# Patient Record
Sex: MALE | Race: WHITE | NOT HISPANIC OR LATINO | Employment: OTHER | URBAN - METROPOLITAN AREA
[De-identification: names, ages, dates, MRNs, and addresses within clinical notes are randomized per-mention and may not be internally consistent; named-entity substitution may affect disease eponyms.]

---

## 2017-01-26 ENCOUNTER — OFFICE VISIT (OUTPATIENT)
Dept: FAMILY MEDICINE | Facility: CLINIC | Age: 62
End: 2017-01-26
Payer: COMMERCIAL

## 2017-01-26 VITALS
WEIGHT: 177 LBS | SYSTOLIC BLOOD PRESSURE: 132 MMHG | BODY MASS INDEX: 22.72 KG/M2 | DIASTOLIC BLOOD PRESSURE: 80 MMHG | HEART RATE: 76 BPM | HEIGHT: 74 IN

## 2017-01-26 DIAGNOSIS — F33.1 MAJOR DEPRESSIVE DISORDER, RECURRENT EPISODE, MODERATE (H): ICD-10-CM

## 2017-01-26 DIAGNOSIS — K59.00 CONSTIPATION, UNSPECIFIED CONSTIPATION TYPE: Primary | ICD-10-CM

## 2017-01-26 PROBLEM — K62.89 PROCTITIS: Status: ACTIVE | Noted: 2017-01-26

## 2017-01-26 PROCEDURE — 99214 OFFICE O/P EST MOD 30 MIN: CPT | Performed by: FAMILY MEDICINE

## 2017-01-26 RX ORDER — POLYETHYLENE GLYCOL 3350 17 G/17G
1 POWDER, FOR SOLUTION ORAL DAILY
COMMUNITY
End: 2022-02-08

## 2017-01-26 ASSESSMENT — ANXIETY QUESTIONNAIRES
5. BEING SO RESTLESS THAT IT IS HARD TO SIT STILL: MORE THAN HALF THE DAYS
2. NOT BEING ABLE TO STOP OR CONTROL WORRYING: NEARLY EVERY DAY
7. FEELING AFRAID AS IF SOMETHING AWFUL MIGHT HAPPEN: NEARLY EVERY DAY
3. WORRYING TOO MUCH ABOUT DIFFERENT THINGS: MORE THAN HALF THE DAYS
1. FEELING NERVOUS, ANXIOUS, OR ON EDGE: MORE THAN HALF THE DAYS
GAD7 TOTAL SCORE: 18
6. BECOMING EASILY ANNOYED OR IRRITABLE: NEARLY EVERY DAY

## 2017-01-26 ASSESSMENT — PATIENT HEALTH QUESTIONNAIRE - PHQ9: 5. POOR APPETITE OR OVEREATING: NEARLY EVERY DAY

## 2017-01-26 NOTE — NURSING NOTE
"Chief Complaint   Patient presents with     Constipation     Follow up on ongoing constipation. Not any better from last office visit.     Initial /80 mmHg  Pulse 76  Ht 6' 1.75\" (1.873 m)  Wt 177 lb (80.287 kg)  BMI 22.89 kg/m2 Estimated body mass index is 22.89 kg/(m^2) as calculated from the following:    Height as of this encounter: 6' 1.75\" (1.873 m).    Weight as of this encounter: 177 lb (80.287 kg).  BP completed using cuff size: large - left arm.  Anisha Bartlett LPN  "

## 2017-01-26 NOTE — PATIENT INSTRUCTIONS
(K59.00) Constipation, unspecified constipation type  (primary encounter diagnosis)  Comment:   Plan: polyethylene glycol (MIRALAX/GLYCOLAX) powder,         linaclotide (LINZESS) 145 MCG capsule        We discussed and reviewed the history and previous therapies. Linzess has been used in the past at 145 mg daily. This is reordered at 90 days and one year of refills. Use the med and the non drug therapies and the OTC medications. Call or return if this is not better. There is a higher dose of 290 mg daily and if that is more effective then call our RN at [phone] and the new dose will be ordered.     (F33.1) Major depressive disorder, recurrent episode, moderate (H)  Comment:   Plan: sertraline (ZOLOFT) 50 MG tablet        We will order Zoloft at 50 mg daily now and call if any side effects. Use the non drug therapies. Exercise is recommended. Avoid alcohol. Recheck in the clinic in 3-4 weeks. The med may need adjustment in the dose. The max is 200 mg daily.

## 2017-01-26 NOTE — PROGRESS NOTES
SUBJECTIVE:                                                    Ghulam Rizzo is a 61 year old male who presents to clinic today for the following health issues:    Constipation     Onset: Several years .     Description:   Frequency of bowel movements: 0-2 daily.  Stool consistency: hard    Progression of Symptoms:  worsening    Accompanying Signs & Symptoms:  Abdominal pain (cramping?): YES  Blood in stool: no   Rectal pain: YES- sometimes  Nausea/vomiting: no   Weight loss or gain: no     Other: Constipation is now causing sleep issues. Not able to fall asleep and wakes up after 2-3 hours of sleep, and feels as though he needs to have a BM, and is having cramping.    History:   History of abdominal surgery: YES- Pt had surgery on his stomach because it was twisted (1989). He also had surgery on his diaphragm and mesh was placed (1991).   Colonoscopy in 2013 showed proctitis.     Precipitating factors:   Recent use of narcotics, anticholinergics, calcium channel blockers, antacids, or iron supplements: no   Chronic Laxative Use: YES- Senna and Ducolax         Therapies Tried and outcome: enemas, increase in fiber, increase in fluids, laxatives, stool softeners and suppositories    He was treated for depression in 2015 with Zoloft at 50 mg daily. This was stopped as he was doing well. The symptoms started to recur in the last few months.       Current outpatient prescriptions:      polyethylene glycol (MIRALAX/GLYCOLAX) powder, Take 1 capful by mouth daily, Disp: , Rfl:      albuterol (ALBUTEROL) 108 (90 BASE) MCG/ACT inhaler, Inhale 2 puffs into the lungs every 4 hours as needed for shortness of breath / dyspnea, Disp: 1 Inhaler, Rfl: 11     senna-docusate (NEGRITO-COLACE) 8.6-50 MG per tablet, Take 1-2 tablets by mouth 2 times daily as needed for constipation., Disp: 100 tablet, Rfl: 5    Patient Active Problem List   Diagnosis     Hyperlipidemia LDL goal <130     Tobacco abuse     Idiopathic thrombocytopenic  "purpura (ITP)     Advanced directives, counseling/discussion     Moderate major depression (H)     Abdominal pain, generalized     Cirrhosis of liver without ascites (H)       Blood pressure 132/80, pulse 76, height 6' 1.75\" (1.873 m), weight 177 lb (80.287 kg).  Exam:  GENERAL APPEARANCE: healthy, alert and no distress  ABDOMEN:  soft, nontender, no HSM or masses and bowel sounds normal  PSYCH: mentation appears normal and affect flat      (K59.00) Constipation, unspecified constipation type  (primary encounter diagnosis)  Comment:   Plan: polyethylene glycol (MIRALAX/GLYCOLAX) powder,         linaclotide (LINZESS) 145 MCG capsule        We discussed and reviewed the history and previous therapies. Linzess has been used in the past at 145 mg daily. This is reordered at 90 days and one year of refills. Use the med and the non drug therapies and the OTC medications. Call or return if this is not better. There is a higher dose of 290 mg daily and if that is more effective then call our RN at [phone] and the new dose will be ordered.     (F33.1) Major depressive disorder, recurrent episode, moderate (H)  Comment:   Plan: sertraline (ZOLOFT) 50 MG tablet        We will order Zoloft at 50 mg daily now and call if any side effects. Use the non drug therapies. Exercise is recommended. Avoid alcohol. Recheck in the clinic in 3-4 weeks. The med may need adjustment in the dose. The max is 200 mg daily.     Luiz Eckert"

## 2017-01-26 NOTE — MR AVS SNAPSHOT
After Visit Summary   1/26/2017    Ghulam Rizzo    MRN: [id number]           Patient Information     Date Of Birth          1955        Visit Information        Provider Department      1/26/2017 8:20 AM Luiz Eckert MD [facility]        Today's Diagnoses     Constipation, unspecified constipation type    -  1     Major depressive disorder, recurrent episode, moderate (H)           Care Instructions    (K59.00) Constipation, unspecified constipation type  (primary encounter diagnosis)  Comment:   Plan: polyethylene glycol (MIRALAX/GLYCOLAX) powder,         linaclotide (LINZESS) 145 MCG capsule        We discussed and reviewed the history and previous therapies. Linzess has been used in the past at 145 mg daily. This is reordered at 90 days and one year of refills. Use the med and the non drug therapies and the OTC medications. Call or return if this is not better. There is a higher dose of 290 mg daily and if that is more effective then call our RN at [phone] and the new dose will be ordered.     (F33.1) Major depressive disorder, recurrent episode, moderate (H)  Comment:   Plan: sertraline (ZOLOFT) 50 MG tablet        We will order Zoloft at 50 mg daily now and call if any side effects. Use the non drug therapies. Exercise is recommended. Avoid alcohol. Recheck in the clinic in 3-4 weeks. The med may need adjustment in the dose. The max is 200 mg daily.         Follow-ups after your visit        Who to contact     If you have questions or need follow up information about today's clinic visit or your schedule please contact [facility] directly at [phone].  Normal or non-critical lab and imaging results will be communicated to you by MyChart, letter or phone within 4 business days after the clinic has received the results. If you do not hear from us within 7 days, please contact the clinic through MyChart or phone. If you have a critical or abnormal lab  "result, we will notify you by phone as soon as possible.  Submit refill requests through [employer] or call your pharmacy and they will forward the refill request to us. Please allow 3 business days for your refill to be completed.          Additional Information About Your Visit        [employer] Information     [employer] lets you send messages to your doctor, view your test results, renew your prescriptions, schedule appointments and more. To sign up, go to [email or web address] . Click on \"Log in\" on the left side of the screen, which will take you to the Welcome page. Then click on \"Sign up Now\" on the right side of the page.     You will be asked to enter the access code listed below, as well as some personal information. Please follow the directions to create your username and password.     Your access code is: [email or web address]  Expires: 2017  9:10 AM     Your access code will  in 90 days. If you need help or a new code, please call your [place] clinic or [phone].        Care EveryWhere ID     This is your Care EveryWhere ID. This could be used by other organizations to access your [place] medical records  [id number]        Your Vitals Were     Pulse Height BMI (Body Mass Index)             76 6' 1.75\" (1.873 m) 22.89 kg/m2          Blood Pressure from Last 3 Encounters:   17 132/80   10/27/16 129/80   16 133/88    Weight from Last 3 Encounters:   17 177 lb (80.287 kg)   10/27/16 184 lb (83.462 kg)   16 186 lb (84.369 kg)              Today, you had the following     No orders found for display         Today's Medication Changes          These changes are accurate as of: 17  9:10 AM.  If you have any questions, ask your nurse or doctor.               Start taking these medicines.        Dose/Directions    linaclotide 145 MCG capsule   Commonly known as:  LINZESS   Used for:  Constipation, unspecified constipation type   Started by:  Luiz Eckert MD        Dose:  " 145 mcg   Take 1 capsule (145 mcg) by mouth every morning (before breakfast)   Quantity:  90 capsule   Refills:  3       sertraline 50 MG tablet   Commonly known as:  ZOLOFT   Used for:  Major depressive disorder, recurrent episode, moderate (H)   Started by:  Luiz Eckert MD        Dose:  50 mg   Take 1 tablet (50 mg) by mouth daily   Quantity:  30 tablet   Refills:  1            Where to get your medicines      These medications were sent to [facility] - [address]  [address] [place] [address]     Phone:  [phone]    - sertraline 50 MG tablet      Some of these will need a paper prescription and others can be bought over the counter.  Ask your nurse if you have questions.     Bring a paper prescription for each of these medications    - linaclotide 145 MCG capsule             Primary Care Provider Office Phone # Fax #    Luiz Eckert [phone]       [facility] [address] [facility] [address]        Thank you!     Thank you for choosing [facility]  for your care. Our goal is always to provide you with excellent care. Hearing back from our patients is one way we can continue to improve our services. Please take a few minutes to complete the written survey that you may receive in the mail after your visit with us. Thank you!             Your Updated Medication List - Protect others around you: Learn how to safely use, store and throw away your medicines at www.disposemymeds.org.          This list is accurate as of: 1/26/17  9:10 AM.  Always use your most recent med list.                   Brand Name Dispense Instructions for use    albuterol 108 (90 BASE) MCG/ACT Inhaler    albuterol    1 Inhaler    Inhale 2 puffs into the lungs every 4 hours as needed for shortness of breath / dyspnea       linaclotide 145 MCG capsule    LINZESS    90 capsule    Take 1 capsule (145 mcg) by mouth every morning  (before breakfast)       polyethylene glycol powder    MIRALAX/GLYCOLAX     Take 1 capful by mouth daily       senna-docusate 8.6-50 MG per tablet    NEGRITO-COLACE    100 tablet    Take 1-2 tablets by mouth 2 times daily as needed for constipation.       sertraline 50 MG tablet    ZOLOFT    30 tablet    Take 1 tablet (50 mg) by mouth daily

## 2017-01-27 ASSESSMENT — ANXIETY QUESTIONNAIRES: GAD7 TOTAL SCORE: 18

## 2017-01-27 ASSESSMENT — PATIENT HEALTH QUESTIONNAIRE - PHQ9: SUM OF ALL RESPONSES TO PHQ QUESTIONS 1-9: 18

## 2017-03-02 ENCOUNTER — TELEPHONE (OUTPATIENT)
Dept: FAMILY MEDICINE | Facility: CLINIC | Age: 62
End: 2017-03-02

## 2017-03-23 DIAGNOSIS — F33.1 MAJOR DEPRESSIVE DISORDER, RECURRENT EPISODE, MODERATE (H): ICD-10-CM

## 2017-03-23 NOTE — TELEPHONE ENCOUNTER
Sertraline     Last Written Prescription Date: 01/26/17  Last Fill Quantity: 30, # refills: 1  Last Office Visit with [facility] primary care provider:  01/26/17        Last PHQ-9 score on record=   PHQ-9 SCORE 1/26/2017   Total Score -   Total Score 18

## 2017-05-22 DIAGNOSIS — F33.1 MAJOR DEPRESSIVE DISORDER, RECURRENT EPISODE, MODERATE (H): ICD-10-CM

## 2017-05-22 NOTE — TELEPHONE ENCOUNTER
Sertraline     Last Written Prescription Date: 03/28/17  Last Fill Quantity: 30, # refills: 1  [place] Office Visit with [facility] [place] care provider:  01/26/17        Last PHQ-9 score on record=   PHQ-9 SCORE 1/26/2017   Total Score -   Total Score 18

## 2017-05-24 NOTE — TELEPHONE ENCOUNTER
Routing refill request to provider for review/approval because:  PHQ-9 is >5 for RN protocol    Thank you  Sujey XIAO RN

## 2017-05-24 NOTE — TELEPHONE ENCOUNTER
He was supposed to recheck in clinic in February.   Please notify prescription sent to pharmacy for only two weeks of med.   Needs appt. .Luiz Eckert

## 2017-06-01 ENCOUNTER — OFFICE VISIT (OUTPATIENT)
Dept: FAMILY MEDICINE | Facility: CLINIC | Age: 62
End: 2017-06-01
Payer: COMMERCIAL

## 2017-06-01 VITALS
HEART RATE: 79 BPM | BODY MASS INDEX: 22.97 KG/M2 | SYSTOLIC BLOOD PRESSURE: 138 MMHG | HEIGHT: 74 IN | TEMPERATURE: 97.4 F | DIASTOLIC BLOOD PRESSURE: 89 MMHG | WEIGHT: 179 LBS

## 2017-06-01 DIAGNOSIS — F33.1 MAJOR DEPRESSIVE DISORDER, RECURRENT EPISODE, MODERATE (H): ICD-10-CM

## 2017-06-01 DIAGNOSIS — J01.01 ACUTE RECURRENT MAXILLARY SINUSITIS: ICD-10-CM

## 2017-06-01 DIAGNOSIS — Z23 ENCOUNTER FOR IMMUNIZATION: Primary | ICD-10-CM

## 2017-06-01 DIAGNOSIS — J20.9 BRONCHITIS WITH BRONCHOSPASM: ICD-10-CM

## 2017-06-01 PROCEDURE — 90670 PCV13 VACCINE IM: CPT | Performed by: FAMILY MEDICINE

## 2017-06-01 PROCEDURE — 99214 OFFICE O/P EST MOD 30 MIN: CPT | Mod: 25 | Performed by: FAMILY MEDICINE

## 2017-06-01 PROCEDURE — 90471 IMMUNIZATION ADMIN: CPT | Performed by: FAMILY MEDICINE

## 2017-06-01 RX ORDER — ALBUTEROL SULFATE 90 UG/1
2 AEROSOL, METERED RESPIRATORY (INHALATION) EVERY 4 HOURS PRN
Qty: 1 INHALER | Refills: 11 | Status: SHIPPED | OUTPATIENT
Start: 2017-06-01 | End: 2018-06-21

## 2017-06-01 ASSESSMENT — ANXIETY QUESTIONNAIRES
6. BECOMING EASILY ANNOYED OR IRRITABLE: NOT AT ALL
2. NOT BEING ABLE TO STOP OR CONTROL WORRYING: NOT AT ALL
1. FEELING NERVOUS, ANXIOUS, OR ON EDGE: NOT AT ALL
7. FEELING AFRAID AS IF SOMETHING AWFUL MIGHT HAPPEN: SEVERAL DAYS
3. WORRYING TOO MUCH ABOUT DIFFERENT THINGS: SEVERAL DAYS
IF YOU CHECKED OFF ANY PROBLEMS ON THIS QUESTIONNAIRE, HOW DIFFICULT HAVE THESE PROBLEMS MADE IT FOR YOU TO DO YOUR WORK, TAKE CARE OF THINGS AT HOME, OR GET ALONG WITH OTHER PEOPLE: SOMEWHAT DIFFICULT
5. BEING SO RESTLESS THAT IT IS HARD TO SIT STILL: SEVERAL DAYS
GAD7 TOTAL SCORE: 3

## 2017-06-01 ASSESSMENT — PATIENT HEALTH QUESTIONNAIRE - PHQ9: 5. POOR APPETITE OR OVEREATING: NOT AT ALL

## 2017-06-01 NOTE — PROGRESS NOTES
SUBJECTIVE:                                                    Ghulam Rizzo is a 61 year old male who presents to clinic today for the following health issues:      Medication Followup of Depression    Taking Medication as prescribed: yes    Side Effects:  None    Medication Helping Symptoms:  Yes    PHQ-9 ([employer]) 10/27/2016 1/26/2017   1.  Little interest or pleasure in doing things 0 2   2.  Feeling down, depressed, or hopeless 1 3   3.  Trouble falling or staying asleep, or sleeping too much 0 3   4.  Feeling tired or having little energy 0 2   5.  Poor appetite or overeating 1 3   6.  Feeling bad about yourself 1 2   7.  Trouble concentrating 0 2   8.  Moving slowly or restless 0 0   9.  Suicidal or self-harm thoughts 0 1   PHQ-9 Total Score 3 18   Difficulty at work, home, or with people  Very difficult     KSENIA-7   [employer], 2002; Used with Permission) 12/17/2015 1/26/2017   1. Feeling nervous, anxious, or on edge 1 2   2. Not being able to stop or control worrying 1 3   3. Worrying too much about different things 1 2   4. Trouble relaxing 1 3   5. Being so restless that it is hard to sit still 0 2   6. Becoming easily annoyed or irritable 0 3   7. Feeling afraid, as if something awful might happen 0 3   KSENIA-7 Total Score 4 18          Current Outpatient Prescriptions:      sertraline (ZOLOFT) 50 MG tablet, Take 1 tablet (50 mg) by mouth daily, Disp: 30 tablet, Rfl: 11     albuterol (ALBUTEROL) 108 (90 BASE) MCG/ACT Inhaler, Inhale 2 puffs into the lungs every 4 hours as needed for shortness of breath / dyspnea, Disp: 1 Inhaler, Rfl: 11     polyethylene glycol (MIRALAX/GLYCOLAX) powder, Take 1 capful by mouth daily, Disp: , Rfl:      linaclotide (LINZESS) 145 MCG capsule, Take 1 capsule (145 mcg) by mouth every morning (before breakfast), Disp: 90 capsule, Rfl: 3     senna-docusate (NEGRITO-COLACE) 8.6-50 MG per tablet, Take 1-2 tablets by mouth 2 times daily as needed for constipation., Disp: 100 tablet,  "Rfl: 5     [DISCONTINUED] sertraline (ZOLOFT) 50 MG tablet, Take 1 tablet (50 mg) by mouth daily, Disp: 15 tablet, Rfl: 0     [DISCONTINUED] albuterol (ALBUTEROL) 108 (90 BASE) MCG/ACT inhaler, Inhale 2 puffs into the lungs every 4 hours as needed for shortness of breath / dyspnea, Disp: 1 Inhaler, Rfl: 11    Patient Active Problem List   Diagnosis     Hyperlipidemia LDL goal <130     Tobacco abuse     Idiopathic thrombocytopenic purpura (ITP)     Advanced directives, counseling/discussion     Abdominal pain, generalized     Cirrhosis of liver without ascites (H)     Proctitis     Major depressive disorder, recurrent episode, moderate (H)       Blood pressure 138/89, pulse 79, temperature 97.4  F (36.3  C), temperature source Tympanic, height 6' 1.75\" (1.873 m), weight 179 lb (81.2 kg).    Exam:  GENERAL APPEARANCE: healthy, alert and no distress  ABDOMEN:  soft, nontender, no HSM or masses and bowel sounds normal  MS: extremities normal- no gross deformities noted, no evidence of inflammation in joints, FROM in all extremities.  SKIN: no suspicious lesions or rashes  PSYCH: mentation appears normal and affect normal/bright      (F33.1) Major depressive disorder, recurrent episode, moderate (H)  Comment:   Plan: sertraline (ZOLOFT) 50 MG tablet        You are doing very well, and the PH-9 IS 2, and the KSENIA-7 is 3 today. Stay on the med daily and call if any side effects.   Use the non drug therapies. This is a chronic therapy. If doing well then refill and recheck in one year.     (J20.9) Bronchitis with bronchospasm  Comment:   Plan: albuterol (ALBUTEROL) 108 (90 BASE) MCG/ACT         Inhaler        Use the inhaler as needed and preventatively. Avoid triggers. Refill for one year. Get the flu shot in the fall.     (J01.01) Acute recurrent maxillary sinusitis  Comment:   Plan: albuterol (ALBUTEROL) 108 (90 BASE) MCG/ACT         Inhaler        For the bowels and the Linzess, you may stop this for several days to see " if any cramping improves and if this does then restart it to be certain. You have to decide if the benefit is more than any side effect.   If doing well then use the dietary and exercise instructions and refill and recheck annually.     Luiz Eckert

## 2017-06-01 NOTE — PATIENT INSTRUCTIONS
Thank you for choosing [facility].  You may be receiving a survey in the mail from Lai Guevara regarding your visit today.  Please take a few minutes to complete and return the survey to let us know how we are doing.      If you have questions or concerns, please contact us via [employer] or you can contact your care team at [phone].    Our Clinic hours are:  Monday 6:40 am  to 7:00 pm  Tuesday -Friday 6:40 am to 5:00 pm    The [place] outpatient lab hours are:  Monday - Friday 6:10 am to 4:45 pm  Saturdays 7:00 am to 11:00 am  Appointments are required, call [phone]    If you have clinical questions after hours or would like to schedule an appointment,  call the clinic at [phone].    (F33.1) Major depressive disorder, recurrent episode, moderate (H)  Comment:   Plan: sertraline (ZOLOFT) 50 MG tablet        You are doing very well, and the PH-9 IS 2, and the KSENIA-7 is 3 today. Stay on the med daily and call if any side effects.   Use the non drug therapies. This is a chronic therapy. If doing well then refill and recheck in one year.     (J20.9) Bronchitis with bronchospasm  Comment:   Plan: albuterol (ALBUTEROL) 108 (90 BASE) MCG/ACT         Inhaler        Use the inhaler as needed and preventatively. Avoid triggers. Refill for one year. Get the flu shot in the fall.     (J01.01) Acute recurrent maxillary sinusitis  Comment:   Plan: albuterol (ALBUTEROL) 108 (90 BASE) MCG/ACT         Inhaler        For the bowels and the Linzess, you may stop this for several days to see if any cramping improves and if this does then restart it to be certain. You have to decide if the benefit is more than any side effect.   If doing well then use the dietary and exercise instructions and refill and recheck annually.

## 2017-06-01 NOTE — NURSING NOTE
"Chief Complaint   Patient presents with     Depression     Recheck on medication.       Initial /89  Pulse 79  Temp 97.4  F (36.3  C) (Tympanic)  Ht 6' 1.75\" (1.873 m)  Wt 179 lb (81.2 kg)  BMI 23.14 kg/m2 Estimated body mass index is 23.14 kg/(m^2) as calculated from the following:    Height as of this encounter: 6' 1.75\" (1.873 m).    Weight as of this encounter: 179 lb (81.2 kg).  Medication Reconciliation: complete  "

## 2017-06-01 NOTE — MR AVS SNAPSHOT
After Visit Summary   6/1/2017    Ghulam Rizzo    MRN: [id number]           Patient Information     Date Of Birth          1955        Visit Information        Provider Department      6/1/2017 10:00 AM Luiz Eckert MD [facility]        Today's Diagnoses     Major depressive disorder, recurrent episode, moderate (H)        Bronchitis with bronchospasm        Acute recurrent maxillary sinusitis          Care Instructions          Thank you for choosing [facility].  You may be receiving a survey in the mail from [facility] regarding your visit today.  Please take a few minutes to complete and return the survey to let us know how we are doing.      If you have questions or concerns, please contact us via [employer] or you can contact your care team at [phone].    Our Clinic hours are:  Monday 6:40 am  to 7:00 pm  Tuesday -Friday 6:40 am to 5:00 pm    The [place] outpatient lab hours are:  Monday - Friday 6:10 am to 4:45 pm  Saturdays 7:00 am to 11:00 am  Appointments are required, call [phone]    If you have clinical questions after hours or would like to schedule an appointment,  call the clinic at [phone].    (F33.1) Major depressive disorder, recurrent episode, moderate (H)  Comment:   Plan: sertraline (ZOLOFT) 50 MG tablet        You are doing very well, and the PH-9 IS 2, and the KSENIA-7 is 3 today. Stay on the med daily and call if any side effects.   Use the non drug therapies. This is a chronic therapy. If doing well then refill and recheck in one year.     (J20.9) Bronchitis with bronchospasm  Comment:   Plan: albuterol (ALBUTEROL) 108 (90 BASE) MCG/ACT         Inhaler        Use the inhaler as needed and preventatively. Avoid triggers. Refill for one year. Get the flu shot in the fall.     (J01.01) Acute recurrent maxillary sinusitis  Comment:   Plan: albuterol (ALBUTEROL) 108 (90 BASE) MCG/ACT         Inhaler        For the bowels and the Linzess,  "you may stop this for several days to see if any cramping improves and if this does then restart it to be certain. You have to decide if the benefit is more than any side effect.   If doing well then use the dietary and exercise instructions and refill and recheck annually.           Follow-ups after your visit        Who to contact     If you have questions or need follow up information about today's clinic visit or your schedule please contact [facility] directly at [phone].  Normal or non-critical lab and imaging results will be communicated to you by Textronics[employer], letter or phone within 4 business days after the clinic has received the results. If you do not hear from us within 7 days, please contact the clinic through Textronics[employer] or phone. If you have a critical or abnormal lab result, we will notify you by phone as soon as possible.  Submit refill requests through Solaicx or call your pharmacy and they will forward the refill request to us. Please allow 3 business days for your refill to be completed.          Additional Information About Your Visit        [facility] Information     Solaicx lets you send messages to your doctor, view your test results, renew your prescriptions, schedule appointments and more. To sign up, go to [email or web address] . Click on \"Log in\" on the left side of the screen, which will take you to the Welcome page. Then click on \"Sign up Now\" on the right side of the page.     You will be asked to enter the access code listed below, as well as some personal information. Please follow the directions to create your username and password.     Your access code is: [email or web address]  Expires: 2017 10:26 AM     Your access code will  in 90 days. If you need help or a new code, please call your [facility] or [phone].        Care EveryWhere ID     This is your Care EveryWhere ID. This could be used by other organizations to access your [facility] medical " "records  [id number]        Your Vitals Were     Pulse Temperature Height BMI (Body Mass Index)          79 97.4  F (36.3  C) (Tympanic) 6' 1.75\" (1.873 m) 23.14 kg/m2         Blood Pressure from Last 3 Encounters:   06/01/17 (!) 145/91   01/26/17 132/80   10/27/16 129/80    Weight from Last 3 Encounters:   06/01/17 179 lb (81.2 kg)   01/26/17 177 lb (80.3 kg)   10/27/16 184 lb (83.5 kg)              Today, you had the following     No orders found for display         Where to get your medicines      These medications were [facility] [id number] [place] [address]  [id number] [place] [id number]     Phone:  [phone]     albuterol 108 (90 BASE) MCG/ACT Inhaler    sertraline 50 MG tablet          Primary Care Provider Office Phone # Fax #    Luiz Eckert [phone]       [facility] [address] [facility] [address] [facility] [address]        Thank you!     Thank you for choosing [facility]  for your care. Our goal is always to provide you with excellent care. Hearing back from our patients is one way we can continue to improve our services. Please take a few minutes to complete the written survey that you may receive in the mail after your visit with us. Thank you!             Your Updated Medication List - Protect others around you: Learn how to safely use, store and throw away your medicines at www.disposemymeds.org.          This list is accurate as of: 6/1/17 10:26 AM.  Always use your most recent med list.                   Brand Name Dispense Instructions for use    albuterol 108 (90 BASE) MCG/ACT Inhaler    albuterol    1 Inhaler    Inhale 2 puffs into the lungs every 4 hours as needed for shortness of breath / dyspnea       linaclotide 145 MCG capsule    LINZESS    90 capsule    Take 1 capsule (145 mcg) by mouth every morning (before breakfast)       polyethylene glycol powder    MIRALAX/GLYCOLAX     Take 1 capful by mouth daily       " senna-docusate 8.6-50 MG per tablet    NEGRITO-COLACE    100 tablet    Take 1-2 tablets by mouth 2 times daily as needed for constipation.       sertraline 50 MG tablet    ZOLOFT    30 tablet    Take 1 tablet (50 mg) by mouth daily

## 2017-06-02 ASSESSMENT — ANXIETY QUESTIONNAIRES: GAD7 TOTAL SCORE: 3

## 2017-06-02 ASSESSMENT — PATIENT HEALTH QUESTIONNAIRE - PHQ9: SUM OF ALL RESPONSES TO PHQ QUESTIONS 1-9: 2

## 2017-08-07 ENCOUNTER — TELEPHONE (OUTPATIENT)
Dept: FAMILY MEDICINE | Facility: CLINIC | Age: 62
End: 2017-08-07

## 2017-08-07 NOTE — TELEPHONE ENCOUNTER
PHQ9 Due by:8/26/17    Please contact patient to complete follow up PHQ9 before their DUE DATE.     This is important feedback for your care team to monitor how you are doing while taking Zoloft.     You completed this same questionnaire   PHQ-9 SCORE 6/1/2017   Total Score -   Total Score 2       [place] STAFF: If upon calling patient and PHQ9 score is higher that 10 route to the provider. You may also seek an RN for review.

## 2017-08-08 NOTE — TELEPHONE ENCOUNTER
Left msg for patient to call back to complete PHQ9 and GAD7 questionnaires. Will postpone for 1 week. Charu Sesay, CMA

## 2017-08-15 NOTE — TELEPHONE ENCOUNTER
Left msg for patient to call back to clinic to complete PHQ9 and GAD7 questionnaires. Will postpone for 1 week. Charu Sesay CMA

## 2017-08-15 NOTE — TELEPHONE ENCOUNTER
Pt's wife called back and will give him the message to call us back.    Lorna [facility]

## 2017-08-17 ASSESSMENT — ANXIETY QUESTIONNAIRES
2. NOT BEING ABLE TO STOP OR CONTROL WORRYING: SEVERAL DAYS
1. FEELING NERVOUS, ANXIOUS, OR ON EDGE: NOT AT ALL
GAD7 TOTAL SCORE: 1
6. BECOMING EASILY ANNOYED OR IRRITABLE: NOT AT ALL
5. BEING SO RESTLESS THAT IT IS HARD TO SIT STILL: NOT AT ALL
3. WORRYING TOO MUCH ABOUT DIFFERENT THINGS: NOT AT ALL
7. FEELING AFRAID AS IF SOMETHING AWFUL MIGHT HAPPEN: NOT AT ALL

## 2017-08-17 ASSESSMENT — PATIENT HEALTH QUESTIONNAIRE - PHQ9
5. POOR APPETITE OR OVEREATING: NOT AT ALL
SUM OF ALL RESPONSES TO PHQ QUESTIONS 1-9: 1

## 2017-08-17 NOTE — TELEPHONE ENCOUNTER
Patient is calling back, so please call him back, to do the panel management.  Leonor Zavalape  Clinic Station  [facility]

## 2017-08-17 NOTE — TELEPHONE ENCOUNTER
Unclear why we are repeating a PHQ-9 when it was done on 6/1/17 and <4.  Dictation does not state it needs to be repeated sooner.  Left message to look into this with Michelle Sheppard as she started this panel management note. Priscilla GONZALEZ RN

## 2017-08-18 ASSESSMENT — ANXIETY QUESTIONNAIRES: GAD7 TOTAL SCORE: 1

## 2017-11-25 ENCOUNTER — HOSPITAL ENCOUNTER (EMERGENCY)
Facility: CLINIC | Age: 62
Discharge: HOME | End: 2017-11-25
Attending: FAMILY MEDICINE | Admitting: FAMILY MEDICINE
Payer: COMMERCIAL

## 2017-11-25 ENCOUNTER — APPOINTMENT (OUTPATIENT)
Dept: ULTRASOUND IMAGING | Facility: CLINIC | Age: 62
End: 2017-11-25
Attending: FAMILY MEDICINE
Payer: COMMERCIAL

## 2017-11-25 VITALS
RESPIRATION RATE: 16 BRPM | OXYGEN SATURATION: 96 % | SYSTOLIC BLOOD PRESSURE: 145 MMHG | DIASTOLIC BLOOD PRESSURE: 104 MMHG | TEMPERATURE: 98.8 F

## 2017-11-25 DIAGNOSIS — I80.01 THROMBOPHLEBITIS OF SUPERFICIAL VEINS OF RIGHT LOWER EXTREMITY: ICD-10-CM

## 2017-11-25 LAB
ALBUMIN SERPL-MCNC: 3.7 G/DL (ref 3.4–5)
ALP SERPL-CCNC: 77 U/L (ref 40–150)
ALT SERPL W P-5'-P-CCNC: 25 U/L (ref 0–70)
ANION GAP SERPL CALCULATED.3IONS-SCNC: 8 MMOL/L (ref 3–14)
APTT PPP: 33 SEC (ref 22–37)
AST SERPL W P-5'-P-CCNC: 14 U/L (ref 0–45)
BASOPHILS # BLD AUTO: 0 10E9/L (ref 0–0.2)
BASOPHILS NFR BLD AUTO: 0.4 %
BILIRUB SERPL-MCNC: 0.8 MG/DL (ref 0.2–1.3)
BUN SERPL-MCNC: 12 MG/DL (ref 7–30)
CALCIUM SERPL-MCNC: 8.4 MG/DL (ref 8.5–10.1)
CHLORIDE SERPL-SCNC: 110 MMOL/L (ref 94–109)
CO2 SERPL-SCNC: 22 MMOL/L (ref 20–32)
CREAT SERPL-MCNC: 0.66 MG/DL (ref 0.66–1.25)
CRP SERPL-MCNC: <2.9 MG/L (ref 0–8)
DIFFERENTIAL METHOD BLD: ABNORMAL
EOSINOPHIL # BLD AUTO: 0.1 10E9/L (ref 0–0.7)
EOSINOPHIL NFR BLD AUTO: 1 %
ERYTHROCYTE [DISTWIDTH] IN BLOOD BY AUTOMATED COUNT: 12.9 % (ref 10–15)
GFR SERPL CREATININE-BSD FRML MDRD: >90 ML/MIN/1.7M2
GLUCOSE SERPL-MCNC: 87 MG/DL (ref 70–99)
HCT VFR BLD AUTO: 51.2 % (ref 40–53)
HGB BLD-MCNC: 18.4 G/DL (ref 13.3–17.7)
IMM GRANULOCYTES # BLD: 0 10E9/L (ref 0–0.4)
IMM GRANULOCYTES NFR BLD: 0.1 %
INR PPP: 1.04 (ref 0.86–1.14)
LYMPHOCYTES # BLD AUTO: 2.1 10E9/L (ref 0.8–5.3)
LYMPHOCYTES NFR BLD AUTO: 29.9 %
MCH RBC QN AUTO: 33.3 PG (ref 26.5–33)
MCHC RBC AUTO-ENTMCNC: 35.9 G/DL (ref 31.5–36.5)
MCV RBC AUTO: 93 FL (ref 78–100)
MONOCYTES # BLD AUTO: 0.5 10E9/L (ref 0–1.3)
MONOCYTES NFR BLD AUTO: 7.6 %
NEUTROPHILS # BLD AUTO: 4.3 10E9/L (ref 1.6–8.3)
NEUTROPHILS NFR BLD AUTO: 61 %
PLATELET # BLD AUTO: 132 10E9/L (ref 150–450)
POTASSIUM SERPL-SCNC: 3.8 MMOL/L (ref 3.4–5.3)
PROT SERPL-MCNC: 7.1 G/DL (ref 6.8–8.8)
RBC # BLD AUTO: 5.53 10E12/L (ref 4.4–5.9)
SODIUM SERPL-SCNC: 140 MMOL/L (ref 133–144)
WBC # BLD AUTO: 7.1 10E9/L (ref 4–11)

## 2017-11-25 PROCEDURE — 86140 C-REACTIVE PROTEIN: CPT | Performed by: FAMILY MEDICINE

## 2017-11-25 PROCEDURE — 85730 THROMBOPLASTIN TIME PARTIAL: CPT | Performed by: FAMILY MEDICINE

## 2017-11-25 PROCEDURE — 80053 COMPREHEN METABOLIC PANEL: CPT | Performed by: FAMILY MEDICINE

## 2017-11-25 PROCEDURE — 99284 EMERGENCY DEPT VISIT MOD MDM: CPT | Mod: 25 | Performed by: FAMILY MEDICINE

## 2017-11-25 PROCEDURE — 93971 EXTREMITY STUDY: CPT | Mod: RT

## 2017-11-25 PROCEDURE — 85610 PROTHROMBIN TIME: CPT | Performed by: FAMILY MEDICINE

## 2017-11-25 PROCEDURE — 85025 COMPLETE CBC W/AUTO DIFF WBC: CPT | Performed by: FAMILY MEDICINE

## 2017-11-25 PROCEDURE — 99284 EMERGENCY DEPT VISIT MOD MDM: CPT | Mod: Z6 | Performed by: FAMILY MEDICINE

## 2017-11-25 NOTE — ED NOTES
First contact with patient.  All discharge home information given and all questions answered.  Patient ambulates out of department with steady gate.

## 2017-11-25 NOTE — ED NOTES
Pt woke two days ago with some burning in is RLL and after showering he noticed that there was a lump there and having pain, the pain and redness has been increasing over the past 2 days. Denies injury/fever/chills

## 2017-11-25 NOTE — ED AVS SNAPSHOT
[facility] Emergency Department    [address] [address]    Phone:  [phone]    Fax:  [phone]                                       Ghulam Rizzo   MRN: [id number]    Department:  [facility] Emergency Department   Date of Visit:  11/25/2017           After Visit Summary Signature Page     I have received my discharge instructions, and my questions have been answered. I have discussed any challenges I see with this plan with the nurse or doctor.    ..........................................................................................................................................  Patient/Patient Representative Signature      ..........................................................................................................................................  Patient Representative Print Name and Relationship to Patient    ..................................................               ................................................  Date                                            Time    ..........................................................................................................................................  Reviewed by Signature/Title    ...................................................              ..............................................  Date                                                            Time

## 2017-11-25 NOTE — DISCHARGE INSTRUCTIONS
Warm wet compresses 20 minutes 4 times a day to the area of concern and leg elevation at bedtime.  Ibuprofen as needed for pain.  Follow up in clinic with your primary care provider if not improving or worse.

## 2017-11-25 NOTE — ED AVS SNAPSHOT
[facility] Emergency Department    [address] [address]    Phone:  [phone]    Fax:  [phone]                                       Ghulam Rizzo   MRN: [id number]    Department:  [facility] Emergency Department   Date of Visit:  11/25/2017           Patient Information     Date Of Birth          1955        Your diagnoses for this visit were:     Thrombophlebitis of superficial veins of right lower extremity        You were seen by Jhnoatan Gonzalez MD.      Follow-up Information     Schedule an appointment as soon as possible for a visit with Luiz Eckert MD.    Specialty:  Family Practice    Why:  As needed, If symptoms worsen    Contact information:    [address] [id number] [phone]          Discharge Instructions       Warm wet compresses 20 minutes 4 times a day to the area of concern and leg elevation at bedtime.  Ibuprofen as needed for pain.  Follow up in clinic with your primary care provider if not improving or worse.    Discharge References/Attachments     THROMBOPHLEBITIS, SUPERFICIAL (ENGLISH)      24 Hour Appointment Hotline       To make an appointment at any [facility] call [phone] ([phone]). If you don't have a family doctor or clinic, we will help you find one. [facility] clinics are conveniently located to serve the needs of you and your family.             Review of your medicines      Our records show that you are taking the medicines listed below. If these are incorrect, please call your family doctor or clinic.        Dose / Directions Last dose taken    albuterol 108 (90 BASE) MCG/ACT Inhaler   Commonly known as:  PROAIR HFA   Dose:  2 puff   Quantity:  1 Inhaler        Inhale 2 puffs into the lungs every 4 hours as needed for shortness of breath / dyspnea   Refills:  11        linaclotide 145 MCG capsule   Commonly known as:  LINZESS   Dose:  145 mcg   Quantity:  90 capsule        Take 1 capsule  (145 mcg) by mouth every morning (before breakfast)   Refills:  3        polyethylene glycol powder   Commonly known as:  MIRALAX/GLYCOLAX   Dose:  1 capful        Take 1 capful by mouth daily   Refills:  0        senna-docusate 8.6-50 MG per tablet   Commonly known as:  NEGRITO-COLACE   Dose:  1-2 tablet   Quantity:  100 tablet        Take 1-2 tablets by mouth 2 times daily as needed for constipation.   Refills:  5        sertraline 50 MG tablet   Commonly known as:  ZOLOFT   Dose:  50 mg   Quantity:  30 tablet        Take 1 tablet (50 mg) by mouth daily   Refills:  11                Procedures and tests performed during your visit     CBC with platelets differential    CRP inflammation    Comprehensive metabolic panel    INR    Partial thromboplastin time    US Lower Extremity Venous Duplex Right      Orders Needing Specimen Collection     None      Pending Results     No orders found from 11/23/2017 to 11/26/2017.            Pending Culture Results     No orders found from 11/23/2017 to 11/26/2017.            Pending Results Instructions     If you had any lab results that were not finalized at the time of your Discharge, you can call the ED Lab Result RN at [phone]. You will be contacted by this team for any positive Lab results or changes in treatment. The nurses are available 7 days a week from 10A to 6:30P.  You can leave a message 24 hours per day and they will return your call.        Test Results From Your Hospital Stay        11/25/2017  2:19 PM      Component Results     Component Value Ref Range & Units Status    WBC 7.1 4.0 - 11.0 10e9/L Final    RBC Count 5.53 4.4 - 5.9 10e12/L Final    Hemoglobin 18.4 (H) 13.3 - 17.7 g/dL Final    Hematocrit 51.2 40.0 - 53.0 % Final    MCV 93 78 - 100 fl Final    MCH 33.3 (H) 26.5 - 33.0 pg Final    MCHC 35.9 31.5 - 36.5 g/dL Final    RDW 12.9 10.0 - 15.0 % Final    Platelet Count 132 (L) 150 - 450 10e9/L Final    Diff Method Automated Method  Final    %  Neutrophils 61.0 % Final    % Lymphocytes 29.9 % Final    % Monocytes 7.6 % Final    % Eosinophils 1.0 % Final    % Basophils 0.4 % Final    % Immature Granulocytes 0.1 % Final    Absolute Neutrophil 4.3 1.6 - 8.3 10e9/L Final    Absolute Lymphocytes 2.1 0.8 - 5.3 10e9/L Final    Absolute Monocytes 0.5 0.0 - 1.3 10e9/L Final    Absolute Eosinophils 0.1 0.0 - 0.7 10e9/L Final    Absolute Basophils 0.0 0.0 - 0.2 10e9/L Final    Abs Immature Granulocytes 0.0 0 - 0.4 10e9/L Final         11/25/2017  2:43 PM      Component Results     Component Value Ref Range & Units Status    CRP Inflammation <2.9 0.0 - 8.0 mg/L Final         11/25/2017  2:28 PM      Component Results     Component Value Ref Range & Units Status    INR 1.04 0.86 - 1.14 Final         11/25/2017  2:28 PM      Component Results     Component Value Ref Range & Units Status    PTT 33 22 - 37 sec Final         11/25/2017  2:44 PM      Component Results     Component Value Ref Range & Units Status    Sodium 140 133 - 144 mmol/L Final    Potassium 3.8 3.4 - 5.3 mmol/L Final    Chloride 110 (H) 94 - 109 mmol/L Final    Carbon Dioxide 22 20 - 32 mmol/L Final    Anion Gap 8 3 - 14 mmol/L Final    Glucose 87 70 - 99 mg/dL Final    Urea Nitrogen 12 7 - 30 mg/dL Final    Creatinine 0.66 0.66 - 1.25 mg/dL Final    GFR Estimate >90 >60 mL/min/1.7m2 Final    Non  GFR Calc    GFR Estimate If Black >90 >60 mL/min/1.7m2 Final    African American GFR Calc    Calcium 8.4 (L) 8.5 - 10.1 mg/dL Final    Bilirubin Total 0.8 0.2 - 1.3 mg/dL Final    Albumin 3.7 3.4 - 5.0 g/dL Final    Protein Total 7.1 6.8 - 8.8 g/dL Final    Alkaline Phosphatase 77 40 - 150 U/L Final    ALT 25 0 - 70 U/L Final    AST 14 0 - 45 U/L Final         11/25/2017  2:35 PM      Narrative     VENOUS ULTRASOUND RIGHT LEG  11/25/2017 2:32 PM     HISTORY: Swollen slightly reddened area right anterior shin; probable  superficial thrombophlebitis rule out DVT;     COMPARISON:  "None.    FINDINGS:  Examination of the deep veins with graded compression and  color flow Doppler with spectral wave form analysis shows no evidence  of thrombus in the common femoral vein, femoral vein, popliteal vein  or calf veins.  There is no venous insufficiency.  Scanning over the  symptomatic area in the right anterior shin demonstrates a superficial  thrombosed vein.        Impression     IMPRESSION: No evidence of deep venous thrombosis.    ANGÉLICA KUHN MD                Thank you for choosing [facility]       Thank you for choosing [facility] for your care. Our goal is always to provide you with excellent care. Hearing back from our patients is one way we can continue to improve our services. Please take a few minutes to complete the written survey that you may receive in the mail after you visit with us. Thank you!        [facility] Compliance Information     [employer] lets you send messages to your doctor, view your test results, renew your prescriptions, schedule appointments and more. To sign up, go to [email or web address] . Click on \"Log in\" on the left side of the screen, which will take you to the Welcome page. Then click on \"Sign up Now\" on the right side of the page.     You will be asked to enter the access code listed below, as well as some personal information. Please follow the directions to create your username and password.     Your access code is: [email or web address]  Expires: 2018  3:31 PM     Your access code will  in 90 days. If you need help or a new code, please call your [facility] clinic or [phone].        Care EveryWhere ID     This is your Care EveryWhere ID. This could be used by other organizations to access your [facility] medical records  [id number]        Equal Access to Services     [facility] : Haddc Almodovar, wajocelynda luqjanny, qaybta kala ziegler . So [facility] [phone].    ATENCIÓN: Si cherri melton a marcos " disposición servicios gratuitos de asistencia lingüística. Eric al [phone].    We comply with applicable federal civil rights laws and [place] laws. We do not discriminate on the basis of race, color, national origin, age, disability, sex, sexual orientation, or gender identity.            After Visit Summary       This is your record. Keep this with you and show to your community pharmacist(s) and doctor(s) at your next visit.

## 2017-11-25 NOTE — ED PROVIDER NOTES
History     Chief Complaint   Patient presents with     Mass     Pt has lump on R shin, red and hard, there for the past 3 days, no change since it started.       HPI  Ghulam Rizzo is a 62 year old male, past medical history significant for depression, alcoholic cirrhosis, ITP, hyperlipidemia, tobacco abuse, presents accompanied by his wife with concerns of a slightly reddened swollen area in the right anterior mid shin area.  The patient denies any history of trauma or injury of any type.  He noted this started about 2-3 days ago and has gotten slightly worse.  He notes no fever chills or sweats.  He feels well otherwise.  The area is tender.      Problem List:    Patient Active Problem List    Diagnosis Date Noted     Proctitis 01/26/2017     Priority: Medium     Colonoscopy in 2013.        Major depressive disorder, recurrent episode, moderate (H) 01/26/2017     Priority: Medium     Paxil caused anorgasmia       Cirrhosis of liver without ascites (H) 09/23/2015     Priority: Medium     Thought due to hepatitis C.  Follows with [facility].  Completed [facility] 9/2015.       Abdominal pain, generalized 08/20/2015     Priority: Medium     Advanced directives, counseling/discussion 09/14/2012     Priority: Medium     Discussed advance care planning with patient; however, patient declined at this time. 9/14/2012          Idiopathic thrombocytopenic purpura (ITP) 08/13/2012     Priority: Medium     See Hematology consult, August, 2012.        Hyperlipidemia LDL goal <130 07/16/2012     Priority: Medium     Tobacco abuse 07/16/2012     Priority: Medium        Past Medical History:    Past Medical History:   Diagnosis Date     Anisocoria      Asthma      Carpal tunnel syndrome      Head injury, unspecified      Hepatitis C      Obesity      Other convulsions      Other mononeuritis of upper limb      Unspecified essential hypertension        Past Surgical History:    Past Surgical History:   Procedure Laterality Date      COLONOSCOPY       GI SURGERY       HERNIA REPAIR       SURGICAL HISTORY OF -   4/6/87    esophagogastroduodenoscopy     SURGICAL HISTORY OF -   4/27/87    exploratory laparotomy and anterior gastropexy over a gastrostomy tube.     SURGICAL HISTORY OF -   11/13/90    esophagogastroduodenoscopy     SURGICAL HISTORY OF -   1991    L diaphragm eventration repair, fixation of stomach and colon to abd wall     SURGICAL HISTORY OF -       hiatal hernia repair     THORACIC SURGERY         Family History:    Family History   Problem Relation Age of Onset     CEREBROVASCULAR DISEASE Mother      HEART DISEASE Brother      HEART DISEASE Brother        Social History:  Marital Status:   [2]  Social History   Substance Use Topics     Smoking status: Current Every Day Smoker     Packs/day: 0.50     Types: Cigarettes     Smokeless tobacco: Never Used      Comment: started again 4/2010     Alcohol use Yes      Comment: 1-2 beers daily        Medications:      sertraline (ZOLOFT) 50 MG tablet   albuterol (ALBUTEROL) 108 (90 BASE) MCG/ACT Inhaler   polyethylene glycol (MIRALAX/GLYCOLAX) powder   linaclotide (LINZESS) 145 MCG capsule   senna-docusate (NEGRITO-COLACE) 8.6-50 MG per tablet         Review of Systems   All other systems reviewed and are negative.      Physical Exam   BP: (!) 143/93  Heart Rate: 80  Temp: 98.8  F (37.1  C)  Resp: 16  SpO2: 98 %      Physical Exam   Constitutional: He appears well-developed and well-nourished.   HENT:   Head: Normocephalic and atraumatic.   Eyes: Conjunctivae and EOM are normal. Pupils are equal, round, and reactive to light.   Neck: Normal range of motion. Neck supple.   Cardiovascular: Normal rate, regular rhythm, normal heart sounds and intact distal pulses.    Pulmonary/Chest: Effort normal and breath sounds normal.   Musculoskeletal:        Legs:  Nursing note and vitals reviewed.      ED Course     ED Course     Procedures             Results for orders placed or performed during  the hospital encounter of 11/25/17   US Lower Extremity Venous Duplex Right    Narrative    VENOUS ULTRASOUND RIGHT LEG  11/25/2017 2:32 PM     HISTORY: Swollen slightly reddened area right anterior shin; probable  superficial thrombophlebitis rule out DVT;     COMPARISON: None.    FINDINGS:  Examination of the deep veins with graded compression and  color flow Doppler with spectral wave form analysis shows no evidence  of thrombus in the common femoral vein, femoral vein, popliteal vein  or calf veins.  There is no venous insufficiency.  Scanning over the  symptomatic area in the right anterior shin demonstrates a superficial  thrombosed vein.      Impression    IMPRESSION: No evidence of deep venous thrombosis.    ANGÉLICA KUHN MD       Critical Care time:  none               Labs Ordered and Resulted from Time of ED Arrival Up to the Time of Departure from the ED   CBC WITH PLATELETS DIFFERENTIAL - Abnormal; Notable for the following:        Result Value    Hemoglobin 18.4 (*)     MCH 33.3 (*)     Platelet Count 132 (*)     All other components within normal limits   COMPREHENSIVE METABOLIC PANEL - Abnormal; Notable for the following:     Chloride 110 (*)     Calcium 8.4 (*)     All other components within normal limits   CRP INFLAMMATION   INR   PARTIAL THROMBOPLASTIN TIME   3:30 PM  Ultrasound and lab diagnostics reviewed with the patient and his wife.  I answered their questions.    Assessments & Plan (with Medical Decision Making)   62-year-old male past medical history reviewed as above presents to the emergency department with concerns of red swollen tender area in his right anterior mid one third shin area as described in the HPI documented physical exam.  Ultrasound was obtained to further delineate the suspicious area itches consistent on ultrasound with a superficial thrombosed vein.  His white count is not elevated, CRP is not elevated.  I do not think this represents cellulitis.  I discussed this  with them.  I recommended appropriate local supportive symptomatic care, anti-inflammatory medication, leg elevation and return if not improving or worse.      Disclaimer: This note consists of symbols derived from keyboarding, dictation and/or voice recognition software. As a result, there may be errors in the script that have gone undetected. Please consider this when interpreting information found in this chart.      I have reviewed the nursing notes.    I have reviewed the findings, diagnosis, plan and need for follow up with the patient.          New Prescriptions    No medications on file       Final diagnoses:   Thrombophlebitis of superficial veins of right lower extremity       11/25/2017   [facility] EMERGENCY DEPARTMENT     Jhonatan Gonzalez MD  11/25/17 0034

## 2018-03-30 ENCOUNTER — TELEPHONE (OUTPATIENT)
Dept: FAMILY MEDICINE | Facility: CLINIC | Age: 63
End: 2018-03-30

## 2018-03-30 DIAGNOSIS — H90.5 SENSORINEURAL HEARING LOSS, UNILATERAL: Primary | ICD-10-CM

## 2018-03-30 NOTE — TELEPHONE ENCOUNTER
Wife says he is hard of hearing. Referral placed. This is not a new problem she just finally talked him into getting tested for hearing aids. Rani Bolden RN

## 2018-03-30 NOTE — TELEPHONE ENCOUNTER
Reason for Call:  Other Referral    Detailed comments: Ghulam's wife is calling asking if Guhlam can get a referral to the Audiologist or bala he need to be seen first?  Please advise    Phone Number Patient can be reached at: Home number on file [phone] (home)    Best Time: any    Can we leave a detailed message on this number? YES    Call taken on 3/30/2018 at 11:02 AM by Jennifer Smiley

## 2018-04-02 ENCOUNTER — OFFICE VISIT (OUTPATIENT)
Dept: AUDIOLOGY | Facility: CLINIC | Age: 63
End: 2018-04-02
Payer: COMMERCIAL

## 2018-04-02 DIAGNOSIS — H90.3 SENSORINEURAL HEARING LOSS, BILATERAL: Primary | ICD-10-CM

## 2018-04-02 PROCEDURE — 92557 COMPREHENSIVE HEARING TEST: CPT | Performed by: AUDIOLOGIST

## 2018-04-02 PROCEDURE — 99207 ZZC NO CHARGE LOS: CPT | Performed by: AUDIOLOGIST

## 2018-04-02 PROCEDURE — 92550 TYMPANOMETRY & REFLEX THRESH: CPT | Performed by: AUDIOLOGIST

## 2018-04-02 NOTE — PROGRESS NOTES
AUDIOLOGY REPORT    SUBJECTIVE:  Ghulam Rizzo is a 62 year old male who was seen in the Audiology Clinic at [facility] for an audiologic evaluation, referred by Dr. Eckert.  The patient has been seen previously in this clinic for assessment and results indicated a mild to moderate sensorineural hearing loss bilaterally. The patient reports difficulty hearing his family, with the TV and in groups. Long standing history of noise exposure. The patient denies bilateral otalgia and bilateral drainage.     OBJECTIVE:  Otoscopic exam indicates partial obstruction with cerumen bilaterally     Pure Tone Thresholds assessed using conventional audiometry with good  reliability from 250-8000 Hz bilaterally using circumaural headphones     RIGHT:  mild, moderate and severe sensorineural hearing loss    LEFT:    mild, moderate and moderate-severe sensorineural hearing loss    Tympanogram:    RIGHT: normal eardrum mobility, 1000 Hz ipsi/contra reflex present    LEFT:   normal eardrum mobility, 1000 Hz ipsi/contra reflex present    Speech Reception Threshold:    RIGHT: 30 dB HL    LEFT:   30 dB HL  Word Recognition Score:     RIGHT: 84% at 75 dB HL using W22 recorded word list.    LEFT:   88% at 75 dB HL using W22 recorded word list.      ASSESSMENT:   Mild sloping to moderate to severe sensorineural hearing loss bilaterally.     Compared to patient's previous audiogram dated 11/3/2004, hearing has decreased bilaterally. Today s results were discussed with the patient and his wife.in detail.     PLAN: It is recommended that the patient return for a hearing aid consultation. Patient was counseled regarding hearing loss and impact on communication. Patient is a good candidate for amplification at this time.A [facility] information packet was given to the patient. Please call this clinic with questions regarding these results or recommendations.        Lillie Mir M.A. AN-AAA  Clinical audiologist [place]  # [id number]  4/2/2018

## 2018-04-02 NOTE — MR AVS SNAPSHOT
"              After Visit Summary   2018    Ghulam Rizzo    MRN: [id number]           Patient Information     Date Of Birth          1955        Visit Information        Provider Department      2018 1:30 PM Lillie Mir AuD [facility]        Today's Diagnoses     Sensorineural hearing loss, bilateral    -  1       Follow-ups after your visit        Who to contact     If you have questions or need follow up information about today's clinic visit or your schedule please contact [facility] directly at [phone].  Normal or non-critical lab and imaging results will be communicated to you by [facility], letter or phone within 4 business days after the clinic has received the results. If you do not hear from us within 7 days, please contact the clinic through [facility] [employer] or phone. If you have a critical or abnormal lab result, we will notify you by phone as soon as possible.  Submit refill requests through [employer] or call your pharmacy and they will forward the refill request to us. Please allow 3 business days for your refill to be completed.          Additional Information About Your Visit        MyChart Information     [employer]Me lets you send messages to your doctor, view your test results, renew your prescriptions, schedule appointments and more. To sign up, go to [email or web address] . Click on \"Log in\" on the left side of the screen, which will take you to the Welcome page. Then click on \"Sign up Now\" on the right side of the page.     You will be asked to enter the access code listed below, as well as some personal information. Please follow the directions to create your username and password.     Your access code is: [email or web address]  Expires: 2018  1:56 PM     Your access code will  in 90 days. If you need help or a new code, please call your [facility] or [phone].        Care EveryWhere ID     This is your Care EveryWhere ID. This could be used by other " organizations to access your [place] medical records  [id number]         Blood Pressure from Last 3 Encounters:   11/25/17 (!) 145/104   06/01/17 138/89   01/26/17 132/80    Weight from Last 3 Encounters:   06/01/17 179 lb (81.2 kg)   01/26/17 177 lb (80.3 kg)   10/27/16 184 lb (83.5 kg)              We Performed the Following     AUDIOGRAM/TYMPANOGRAM - INTERFACE     COMPREHENSIVE HEARING TEST     TYMPANOMETRY AND REFLEX THRESHOLD MEASUREMENTS        Primary Care Provider Office Phone # Fax #    Luiz Ridge Eckert [phone] [address] [address]        Equal Access to Services     TILA CAGLE : Hadii ermias gilmoreo Sokatina, waaxda luqadaha, qaybta kaalmada adeshimayada, la morgan. So [facility] [phone].    ATENCIÓN: Si habla español, tiene a marcos disposición servicios gratuitos de asistencia lingüística. Llame al [phone].    We comply with applicable federal civil rights laws and [place] laws. We do not discriminate on the basis of race, color, national origin, age, disability, sex, sexual orientation, or gender identity.            Thank you!     Thank you for choosing [facility]  for your care. Our goal is always to provide you with excellent care. Hearing back from our patients is one way we can continue to improve our services. Please take a few minutes to complete the written survey that you may receive in the mail after your visit with us. Thank you!             Your Updated Medication List - Protect others around you: Learn how to safely use, store and throw away your medicines at www.disposemymeds.org.          This list is accurate as of 4/2/18  1:56 PM.  Always use your most recent med list.                   Brand Name Dispense Instructions for use Diagnosis    albuterol 108 (90 BASE) MCG/ACT Inhaler    PROAIR HFA    1 Inhaler    Inhale 2 puffs into the lungs every 4 hours as needed for shortness of breath /  dyspnea    Bronchitis with bronchospasm, Acute recurrent maxillary sinusitis       linaclotide 145 MCG capsule    LINZESS    90 capsule    Take 1 capsule (145 mcg) by mouth every morning (before breakfast)    Constipation, unspecified constipation type       polyethylene glycol powder    MIRALAX/GLYCOLAX     Take 1 capful by mouth daily    Constipation, unspecified constipation type       senna-docusate 8.6-50 MG per tablet    NEGRITO-COLACE    100 tablet    Take 1-2 tablets by mouth 2 times daily as needed for constipation.    Chronic constipation       sertraline 50 MG tablet    ZOLOFT    30 tablet    Take 1 tablet (50 mg) by mouth daily    Major depressive disorder, recurrent episode, moderate (H)

## 2018-06-13 ENCOUNTER — ALLIED HEALTH/NURSE VISIT (OUTPATIENT)
Dept: FAMILY MEDICINE | Facility: CLINIC | Age: 63
End: 2018-06-13
Payer: COMMERCIAL

## 2018-06-13 DIAGNOSIS — F33.1 MAJOR DEPRESSIVE DISORDER, RECURRENT EPISODE, MODERATE (H): Primary | ICD-10-CM

## 2018-06-13 PROCEDURE — 99207 ZZC NO CHARGE NURSE ONLY: CPT

## 2018-06-13 NOTE — NURSING NOTE
Patient here for med refill.  Advised he needs an appt and one is made for him.  He has enough medication to cover until the med ck appt. Priscilla GONZALEZ RN

## 2018-06-13 NOTE — MR AVS SNAPSHOT
After Visit Summary   6/13/2018    Ghulam Rizzo    MRN: [id number]           Patient Information     Date Of Birth          1955        Visit Information        Provider Department      6/13/2018 11:00 AM GIOVANNI LANDRY/[facility]        Today's Diagnoses     Major depressive disorder, recurrent episode, moderate (H)    -  1       Follow-ups after your visit        Your next 10 appointments already scheduled     Jun 21, 2018 11:00 AM CDT   SHORT with Luiz Eckert MD   [facility]    [address] [address]   [phone]              Who to contact     If you have questions or need follow up information about today's clinic visit or your schedule please contact [facility] directly at [phone].  Normal or non-critical lab and imaging results will be communicated to you by MyChart, letter or phone within 4 business days after the clinic has received the results. If you do not hear from us within 7 days, please contact the clinic through MyChart or phone. If you have a critical or abnormal lab result, we will notify you by phone as soon as possible.  Submit refill requests through [employer] or call your pharmacy and they will forward the refill request to us. Please allow 3 business days for your refill to be completed.          Additional Information About Your Visit        Care EveryWhere ID     This is your Care EveryWhere ID. This could be used by other organizations to access your [place] medical records  [id number]         Blood Pressure from Last 3 Encounters:   11/25/17 (!) 145/104   06/01/17 138/89   01/26/17 132/80    Weight from Last 3 Encounters:   06/01/17 179 lb (81.2 kg)   01/26/17 177 lb (80.3 kg)   10/27/16 184 lb (83.5 kg)              Today, you had the following     No orders found for display       Primary Care Provider Office Phone # Fax #    Luiz Eckert MD  [phone]       [address] [address]        Equal Access to Services     TILA CAGLE : Hadii aad ku hadisabellpetra Scooby, angelia jamesmary, wilian kashabana edwards, la garcia thuyshima nolen laAnne Mariepauline morgan. So [facility] [phone].    ATENCIÓN: Si habla español, tiene a marcos disposición servicios gratuitos de asistencia lingüística. Llame al [phone].    We comply with applicable federal civil rights laws and [place] laws. We do not discriminate on the basis of race, color, national origin, age, disability, sex, sexual orientation, or gender identity.            Thank you!     Thank you for choosing [facility]  for your care. Our goal is always to provide you with excellent care. Hearing back from our patients is one way we can continue to improve our services. Please take a few minutes to complete the written survey that you may receive in the mail after your visit with us. Thank you!             Your Updated Medication List - Protect others around you: Learn how to safely use, store and throw away your medicines at www.disposemymeds.org.          This list is accurate as of 6/13/18 11:27 AM.  Always use your most recent med list.                   Brand Name Dispense Instructions for use Diagnosis    albuterol 108 (90 Base) MCG/ACT Inhaler    PROAIR HFA    1 Inhaler    Inhale 2 puffs into the lungs every 4 hours as needed for shortness of breath / dyspnea    Bronchitis with bronchospasm, Acute recurrent maxillary sinusitis       linaclotide 145 MCG capsule    LINZESS    90 capsule    Take 1 capsule (145 mcg) by mouth every morning (before breakfast)    Constipation, unspecified constipation type       polyethylene glycol powder    MIRALAX/GLYCOLAX     Take 1 capful by mouth daily    Constipation, unspecified constipation type       senna-docusate 8.6-50 MG per tablet    NEGRITO-COLACE    100 tablet    Take 1-2 tablets by mouth 2 times daily as needed for constipation.     Chronic constipation       sertraline 50 MG tablet    ZOLOFT    30 tablet    Take 1 tablet (50 mg) by mouth daily    Major depressive disorder, recurrent episode, moderate (H)

## 2018-06-21 ENCOUNTER — OFFICE VISIT (OUTPATIENT)
Dept: FAMILY MEDICINE | Facility: CLINIC | Age: 63
End: 2018-06-21
Payer: COMMERCIAL

## 2018-06-21 VITALS
DIASTOLIC BLOOD PRESSURE: 88 MMHG | BODY MASS INDEX: 24.38 KG/M2 | HEART RATE: 75 BPM | TEMPERATURE: 97.9 F | HEIGHT: 74 IN | WEIGHT: 190 LBS | SYSTOLIC BLOOD PRESSURE: 134 MMHG

## 2018-06-21 DIAGNOSIS — F33.1 MAJOR DEPRESSIVE DISORDER, RECURRENT EPISODE, MODERATE (H): ICD-10-CM

## 2018-06-21 DIAGNOSIS — Z23 ENCOUNTER FOR IMMUNIZATION: Primary | ICD-10-CM

## 2018-06-21 PROCEDURE — 99213 OFFICE O/P EST LOW 20 MIN: CPT | Mod: 25 | Performed by: FAMILY MEDICINE

## 2018-06-21 PROCEDURE — 90632 HEPA VACCINE ADULT IM: CPT | Performed by: FAMILY MEDICINE

## 2018-06-21 PROCEDURE — 90471 IMMUNIZATION ADMIN: CPT | Performed by: FAMILY MEDICINE

## 2018-06-21 PROCEDURE — 90472 IMMUNIZATION ADMIN EACH ADD: CPT | Performed by: FAMILY MEDICINE

## 2018-06-21 PROCEDURE — 90732 PPSV23 VACC 2 YRS+ SUBQ/IM: CPT | Performed by: FAMILY MEDICINE

## 2018-06-21 ASSESSMENT — ANXIETY QUESTIONNAIRES
2. NOT BEING ABLE TO STOP OR CONTROL WORRYING: MORE THAN HALF THE DAYS
GAD7 TOTAL SCORE: 7
5. BEING SO RESTLESS THAT IT IS HARD TO SIT STILL: MORE THAN HALF THE DAYS
3. WORRYING TOO MUCH ABOUT DIFFERENT THINGS: NOT AT ALL
6. BECOMING EASILY ANNOYED OR IRRITABLE: NOT AT ALL
1. FEELING NERVOUS, ANXIOUS, OR ON EDGE: MORE THAN HALF THE DAYS
7. FEELING AFRAID AS IF SOMETHING AWFUL MIGHT HAPPEN: SEVERAL DAYS
IF YOU CHECKED OFF ANY PROBLEMS ON THIS QUESTIONNAIRE, HOW DIFFICULT HAVE THESE PROBLEMS MADE IT FOR YOU TO DO YOUR WORK, TAKE CARE OF THINGS AT HOME, OR GET ALONG WITH OTHER PEOPLE: NOT DIFFICULT AT ALL

## 2018-06-21 ASSESSMENT — PATIENT HEALTH QUESTIONNAIRE - PHQ9: 5. POOR APPETITE OR OVEREATING: NOT AT ALL

## 2018-06-21 NOTE — PROGRESS NOTES
SUBJECTIVE:   Ghulam Rizzo is a 62 year old male who presents to clinic today for the following health issues:      Medication Followup of Depression    Taking Medication as prescribed: yes    Side Effects:  None    Medication Helping Symptoms:  Yes, he feels the medication and dose is helping his symptoms.    PHQ-9 ([employer]) 8/17/2017   1.  Little interest or pleasure in doing things 0   2.  Feeling down, depressed, or hopeless 0   3.  Trouble falling or staying asleep, or sleeping too much 1   4.  Feeling tired or having little energy 0   5.  Poor appetite or overeating 0   6.  Feeling bad about yourself 0   7.  Trouble concentrating 0   8.  Moving slowly or restless 0   9.  Suicidal or self-harm thoughts 0   PHQ-9 Total Score 1   Difficulty at work, home, or with people      KSENIA-7   [employer], 2002; Used with Permission) 8/17/2017   1. Feeling nervous, anxious, or on edge 0   2. Not being able to stop or control worrying 1   3. Worrying too much about different things 0   4. Trouble relaxing 0   5. Being so restless that it is hard to sit still 0   6. Becoming easily annoyed or irritable 0   7. Feeling afraid, as if something awful might happen 0   KSENIA-7 Total Score 1   If you checked any problems, how difficult have they made it for you to do your work, take care of things at home, or get along with other people?             Current Outpatient Prescriptions:      polyethylene glycol (MIRALAX/GLYCOLAX) powder, Take 1 capful by mouth daily, Disp: , Rfl:      senna-docusate (NEGRITO-COLACE) 8.6-50 MG per tablet, Take 1-2 tablets by mouth 2 times daily as needed for constipation., Disp: 100 tablet, Rfl: 5     sertraline (ZOLOFT) 50 MG tablet, Take 75 mg daily, or 1.5 pills daily., Disp: 45 tablet, Rfl: 11     [DISCONTINUED] sertraline (ZOLOFT) 50 MG tablet, Take 1 tablet (50 mg) by mouth daily, Disp: 30 tablet, Rfl: 0    Patient Active Problem List   Diagnosis     Hyperlipidemia LDL goal <130     Tobacco abuse      "Idiopathic thrombocytopenic purpura (ITP)     Advanced directives, counseling/discussion     Abdominal pain, generalized     Cirrhosis of liver without ascites (H)     Proctitis     Major depressive disorder, recurrent episode, moderate (H)       Blood pressure 134/88, pulse 75, temperature 97.9  F (36.6  C), temperature source Tympanic, height 6' 1.75\" (1.873 m), weight 190 lb (86.2 kg).    Exam:  GENERAL APPEARANCE: healthy, alert and no distress  EYES: EOMI,  PERRL  NECK: no adenopathy, no asymmetry, masses, or scars and thyroid normal to palpation  RESP: lungs clear to auscultation - no rales, rhonchi or wheezes  CV: regular rates and rhythm, normal S1 S2, no S3 or S4 and no murmur, click or rub -  PSYCH: mentation appears normal and affect normal/bright      (F33.1) Major depressive disorder, recurrent episode, moderate (H)  Comment:   Plan: sertraline (ZOLOFT) 50 MG tablet        We discussed the issues and the options and will increase the dose of the Zoloft from 50 mg to 75 mg daily by taking 1.5 pills of the 50 mg size, daily.   Call my RN at [phone] if any side effects. This will take several days to start working and several weeks for max effect. If doing well then refill and recheck annually.     Luiz Eckert    "

## 2018-06-21 NOTE — PATIENT INSTRUCTIONS
Thank you for choosing [facility].  You may be receiving a survey in the mail from Lai Guevara regarding your visit today.  Please take a few minutes to complete and return the survey to let us know how we are doing.      If you have questions or concerns, please contact us via [employer] or you can contact your care team at [phone].    Our Clinic hours are:  Monday 6:40 am  to 7:00 pm  Tuesday -Friday 6:40 am to 5:00 pm    The [place] outpatient lab hours are:  Monday - Friday 6:10 am to 4:45 pm  Saturdays 7:00 am to 11:00 am  Appointments are required, call [phone]    If you have clinical questions after hours or would like to schedule an appointment,  call the clinic at [phone].    (F33.1) Major depressive disorder, recurrent episode, moderate (H)  Comment:   Plan: sertraline (ZOLOFT) 50 MG tablet        We discussed the issues and the options and will increase the dose of the Zoloft from 50 mg to 75 mg daily by taking 1.5 pills of the 50 mg size, daily.   Call my RN at [phone] if any side effects. This will take several days to start working and several weeks for max effect. If doing well then refill and recheck annually.

## 2018-06-21 NOTE — MR AVS SNAPSHOT
After Visit Summary   6/21/2018    Ghulam Rizzo    MRN: [id number]           Patient Information     Date Of Birth          1955        Visit Information        Provider Department      6/21/2018 11:00 AM Luiz Eckert MD [facility]        Today's Diagnoses     Major depressive disorder, recurrent episode, moderate (H)          Care Instructions          Thank you for choosing [facility].  You may be receiving a survey in the mail from [facility] regarding your visit today.  Please take a few minutes to complete and return the survey to let us know how we are doing.      If you have questions or concerns, please contact us via [employer] or you can contact your care team at [phone].    Our Clinic hours are:  Monday 6:40 am  to 7:00 pm  Tuesday -Friday 6:40 am to 5:00 pm    The [place] outpatient lab hours are:  Monday - Friday 6:10 am to 4:45 pm  Saturdays 7:00 am to 11:00 am  Appointments are required, call [phone]    If you have clinical questions after hours or would like to schedule an appointment,  call the clinic at [phone].    (F33.1) Major depressive disorder, recurrent episode, moderate (H)  Comment:   Plan: sertraline (ZOLOFT) 50 MG tablet        We discussed the issues and the options and will increase the dose of the Zoloft from 50 mg to 75 mg daily by taking 1.5 pills of the 50 mg size, daily.   Call my RN at [phone] if any side effects. This will take several days to start working and several weeks for max effect. If doing well then refill and recheck annually.           Follow-ups after your visit        Who to contact     If you have questions or need follow up information about today's clinic visit or your schedule please contact [facility] directly at [phone].  Normal or non-critical lab and imaging results will be communicated to you by MyChart, letter or phone within 4 business days after the clinic has received the  "results. If you do not hear from us within 7 days, please contact the clinic through [employer] or phone. If you have a critical or abnormal lab result, we will notify you by phone as soon as possible.  Submit refill requests through [employer] or call your pharmacy and they will forward the refill request to us. Please allow 3 business days for your refill to be completed.          Additional Information About Your Visit        Care EveryWhere ID     This is your Care EveryWhere ID. This could be used by other organizations to access your [place] medical records  [id number]        Your Vitals Were     Pulse Temperature Height BMI (Body Mass Index)          75 97.9  F (36.6  C) (Tympanic) 6' 1.75\" (1.873 m) 24.56 kg/m2         Blood Pressure from Last 3 Encounters:   06/21/18 134/88   11/25/17 (!) 145/104   06/01/17 138/89    Weight from Last 3 Encounters:   06/21/18 190 lb (86.2 kg)   06/01/17 179 lb (81.2 kg)   01/26/17 177 lb (80.3 kg)              Today, you had the following     No orders found for display         Today's Medication Changes          These changes are accurate as of 6/21/18 11:50 AM.  If you have any questions, ask your nurse or doctor.               These medicines have changed or have updated prescriptions.        Dose/Directions    sertraline 50 MG tablet   Commonly known as:  ZOLOFT   This may have changed:    - how much to take  - how to take this  - when to take this  - additional instructions   Used for:  Major depressive disorder, recurrent episode, moderate (H)   Changed by:  Luiz Eckert MD        Take 75 mg daily, or 1.5 pills daily.   Quantity:  45 tablet   Refills:  11         Stop taking these medicines if you haven't already. Please contact your care team if you have questions.     albuterol 108 (90 Base) MCG/ACT Inhaler   Commonly known as:  PROAIR HFA   Stopped by:  Luiz Eckert MD           linaclotide 145 MCG capsule   Commonly known as:  LINZESS   Stopped by:  " Luiz Eckert MD                Where to get your medicines      These medications were sent to [facility] [address] [facility] [place]  [address] [facility]. [facility] [address]     Phone:  [phone]     sertraline 50 MG tablet                Primary Care Provider Office Phone # Fax #    Luiz Eckert [phone] [address] [facility] [address]        Equal Access to Services     TILA CAGLE : Hadii aad ku hadasho Soomaali, waaxda luqadaha, qaybta kaalmada adeegyada, waxay idiin hayaan adeeg kharash la'aan . So [facility] [phone].    ATENCIÓN: Si habla español, tiene a marcos disposición servicios gratuitos de asistencia lingüística. [facility] [phone].    We comply with applicable federal civil rights laws and [place] laws. We do not discriminate on the basis of race, color, national origin, age, disability, sex, sexual orientation, or gender identity.            Thank you!     Thank you for choosing [facility]  for your care. Our goal is always to provide you with excellent care. Hearing back from our patients is one way we can continue to improve our services. Please take a few minutes to complete the written survey that you may receive in the mail after your visit with us. Thank you!             Your Updated Medication List - Protect others around you: Learn how to safely use, store and throw away your medicines at www.disposemymeds.org.          This list is accurate as of 6/21/18 11:50 AM.  Always use your most recent med list.                   Brand Name Dispense Instructions for use Diagnosis    polyethylene glycol powder    MIRALAX/GLYCOLAX     Take 1 capful by mouth daily    Constipation, unspecified constipation type       senna-docusate 8.6-50 MG per tablet    NEGRITO-COLACE    100 tablet    Take 1-2 tablets by mouth 2 times daily as needed for constipation.    Chronic constipation       sertraline 50 MG tablet    ZOLOFT    45  tablet    Take 75 mg daily, or 1.5 pills daily.    Major depressive disorder, recurrent episode, moderate (H)

## 2018-06-21 NOTE — NURSING NOTE
Screening Questionnaire for Adult Immunization    Are you sick today?   No   Do you have allergies to medications, food, a vaccine component or latex?   No   Have you ever had a serious reaction after receiving a vaccination?   No   Do you have a long-term health problem with heart disease, lung disease, asthma, kidney disease, metabolic disease (e.g. diabetes), anemia, or other blood disorder?   No   Do you have cancer, leukemia, HIV/AIDS, or any other immune system problem?   No   In the past 3 months, have you taken medications that affect  your immune system, such as prednisone, other steroids, or anticancer drugs; drugs for the treatment of rheumatoid arthritis, Crohn s disease, or psoriasis; or have you had radiation treatments?   No   Have you had a seizure, or a brain or other nervous system problem?   No   During the past year, have you received a transfusion of blood or blood     products, or been given immune (gamma) globulin or antiviral drug?   No   For women: Are you pregnant or is there a chance you could become        pregnant during the next month?   No   Have you received any vaccinations in the past 4 weeks?   No     Immunization questionnaire answers were all negative.        Per orders of Dr. Eckert, injection of Hep A adult #2, Pneumovax 23,  given by Lee Ann John. Patient instructed to remain in clinic for 15 minutes afterwards, and to report any adverse reaction to me immediately.       Screening performed by Lee Ann John on 6/21/2018 at 12:11 PM.

## 2018-06-22 ASSESSMENT — ANXIETY QUESTIONNAIRES: GAD7 TOTAL SCORE: 7

## 2018-06-22 ASSESSMENT — PATIENT HEALTH QUESTIONNAIRE - PHQ9: SUM OF ALL RESPONSES TO PHQ QUESTIONS 1-9: 2

## 2018-11-11 ENCOUNTER — HEALTH MAINTENANCE LETTER (OUTPATIENT)
Age: 63
End: 2018-11-11

## 2019-07-25 DIAGNOSIS — F33.1 MAJOR DEPRESSIVE DISORDER, RECURRENT EPISODE, MODERATE (H): ICD-10-CM

## 2019-07-25 NOTE — TELEPHONE ENCOUNTER
"Requested Prescriptions   Pending Prescriptions Disp Refills     sertraline (ZOLOFT) 50 MG tablet [Pharmacy Med Name: SERTRALINE HCL 50 MG TABLET] 45 tablet 11     Sig: TAKE 1 & 1/2 TABLETS BY MOUTH EVERY DAY  Last Written Prescription Date:  6/21/2018  Last Fill Quantity: 45,  # refills: 11   Last office visit: 6/21/2018 with prescribing provider:  Babar    Future Office Visit:           SSRIs Protocol Failed - 7/25/2019  9:56 AM  KSENIA-7 SCORE 6/1/2017 8/17/2017 6/21/2018   Total Score - - -   Total Score 3 1 7             Failed - PHQ-9 score less than 5 in past 6 months     Please review last PHQ-9 score.  PHQ-9 SCORE 6/1/2017 8/17/2017 6/21/2018   PHQ-9 Total Score - - -   PHQ-9 Total Score 2 1 2              Failed - Recent (6 mo) or future (30 days) visit within the authorizing provider's specialty     Patient had office visit in the last 6 months or has a visit in the next 30 days with authorizing provider or within the authorizing provider's specialty.  See \"Patient Info\" tab in inbasket, or \"Choose Columns\" in Meds & Orders section of the refill encounter.            Passed - Medication is active on med list        Passed - Patient is age 18 or older          "

## 2019-07-25 NOTE — LETTER
[facility]  [address] [facility] [facility] [address]  Phone: [phone]       July 25, 2019         Ghulam Rizzo  [address] [place] [address]            Dear Ghulam:    We are concerned about your health care.  We recently provided you with medication refills.  Many medications require routine follow-up with your doctor.    Your prescription(s) have been refilled for 30 days so you may have time for the above noted follow-up. Please call to schedule soon so we can assure you have an appointment before your next refills are needed.    Thank you,      Luiz Eckert MD / marco

## 2019-08-04 ENCOUNTER — HOSPITAL ENCOUNTER (EMERGENCY)
Facility: CLINIC | Age: 64
Discharge: HOME | End: 2019-08-04
Attending: NURSE PRACTITIONER | Admitting: NURSE PRACTITIONER

## 2019-08-04 VITALS
RESPIRATION RATE: 18 BRPM | WEIGHT: 185 LBS | OXYGEN SATURATION: 100 % | TEMPERATURE: 98.5 F | DIASTOLIC BLOOD PRESSURE: 94 MMHG | BODY MASS INDEX: 23.74 KG/M2 | SYSTOLIC BLOOD PRESSURE: 172 MMHG | HEIGHT: 74 IN

## 2019-08-04 DIAGNOSIS — K02.9 TOOTH CARIES: ICD-10-CM

## 2019-08-04 DIAGNOSIS — K04.01 ACUTE PULPITIS: ICD-10-CM

## 2019-08-04 PROCEDURE — 99214 OFFICE O/P EST MOD 30 MIN: CPT | Mod: Z6 | Performed by: NURSE PRACTITIONER

## 2019-08-04 PROCEDURE — G0463 HOSPITAL OUTPT CLINIC VISIT: HCPCS | Performed by: NURSE PRACTITIONER

## 2019-08-04 RX ORDER — PENICILLIN V POTASSIUM 500 MG/1
500 TABLET, FILM COATED ORAL 3 TIMES DAILY
Qty: 30 TABLET | Refills: 0 | Status: SHIPPED | OUTPATIENT
Start: 2019-08-04 | End: 2019-08-14

## 2019-08-04 RX ORDER — HYDROCODONE BITARTRATE AND ACETAMINOPHEN 5; 325 MG/1; MG/1
1 TABLET ORAL EVERY 6 HOURS PRN
Qty: 10 TABLET | Refills: 0 | Status: SHIPPED | OUTPATIENT
Start: 2019-08-04 | End: 2019-08-07

## 2019-08-04 ASSESSMENT — MIFFLIN-ST. JEOR: SCORE: 1698.9

## 2019-08-04 NOTE — ED PROVIDER NOTES
History     Chief Complaint   Patient presents with     Dental Pain     HPI  Ghulam Rizzo is a 63 year old male who presents with dental pain for the past 3-4 days.  Pt reports pain is worsening and he has noticed swelling of left lower jaw.     Pt denies fever, aches, chills, sweats.   Pt reports foul taste in mouth.  Pt took some tylenol and ibuprofen without significant relief.  Patient denies loss of tooth or cracked tooth or trauma to the teeth or loss of feeling that he is aware of.    Allergies:  Allergies   Allergen Reactions     Levaquin [Levofloxacin] Nausea and Vomiting       Problem List:    Patient Active Problem List    Diagnosis Date Noted     Proctitis 01/26/2017     Priority: Medium     Colonoscopy in 2013.        Major depressive disorder, recurrent episode, moderate (H) 01/26/2017     Priority: Medium     Paxil caused anorgasmia       Cirrhosis of liver without ascites (H) 09/23/2015     Priority: Medium     Thought due to hepatitis C.  Follows with [facility].  Completed Harvoni 9/2015.       Abdominal pain, generalized 08/20/2015     Priority: Medium     Advanced directives, counseling/discussion 09/14/2012     Priority: Medium     Discussed advance care planning with patient; however, patient declined at this time. 9/14/2012          Idiopathic thrombocytopenic purpura (ITP) (H) 08/13/2012     Priority: Medium     See Hematology consult, August, 2012.        Hyperlipidemia LDL goal <130 07/16/2012     Priority: Medium     Tobacco abuse 07/16/2012     Priority: Medium        Past Medical History:    Past Medical History:   Diagnosis Date     Anisocoria      Asthma      Carpal tunnel syndrome      Head injury, unspecified      Hepatitis C      Obesity      Other convulsions      Other mononeuritis of upper limb      Unspecified essential hypertension        Past Surgical History:    Past Surgical History:   Procedure Laterality Date     COLONOSCOPY       GI SURGERY       HERNIA REPAIR        "SURGICAL HISTORY OF -   4/6/87    esophagogastroduodenoscopy     SURGICAL HISTORY OF -   4/27/87    exploratory laparotomy and anterior gastropexy over a gastrostomy tube.     SURGICAL HISTORY OF -   11/13/90    esophagogastroduodenoscopy     SURGICAL HISTORY OF -   1991    L diaphragm eventration repair, fixation of stomach and colon to abd wall     SURGICAL HISTORY OF -       hiatal hernia repair     THORACIC SURGERY         Family History:    Family History   Problem Relation Age of Onset     Cerebrovascular Disease Mother      Heart Disease Brother      Heart Disease Brother        Social History:  Marital Status:   [2]  Social History     Tobacco Use     Smoking status: Current Every Day Smoker     Packs/day: 0.50     Types: Cigarettes     Smokeless tobacco: Never Used     Tobacco comment: started again 4/2010   Substance Use Topics     Alcohol use: Yes     Comment: 1-2 beers daily     Drug use: No        Medications:      HYDROcodone-acetaminophen (NORCO) 5-325 MG tablet   penicillin V (VEETID) 500 MG tablet   polyethylene glycol (MIRALAX/GLYCOLAX) powder   senna-docusate (NEGRITO-COLACE) 8.6-50 MG per tablet   sertraline (ZOLOFT) 50 MG tablet         Review of Systems    Physical Exam   BP: (!) 172/94  Heart Rate: 76  Temp: 98.5  F (36.9  C)  Resp: 18  Height: 188 cm (6' 2\")  Weight: 83.9 kg (185 lb)  SpO2: 100 %      Physical Exam   Constitutional: He appears well-developed and well-nourished. He appears distressed (appears in pain).   HENT:   Head: Normocephalic and atraumatic. Head is without contusion.   Right Ear: Hearing, tympanic membrane, external ear and ear canal normal. No drainage or tenderness.   Left Ear: Hearing, tympanic membrane, external ear and ear canal normal. No drainage or tenderness.   Nose: Nose normal. No rhinorrhea.   Mouth/Throat: Uvula is midline, oropharynx is clear and moist and mucous membranes are normal. No trismus in the jaw. Abnormal dentition. Dental caries present. No " dental abscesses or uvula swelling. No posterior oropharyngeal edema.       Eyes: Pupils are equal, round, and reactive to light. No scleral icterus.   Neck: Normal range of motion. No tracheal deviation present.   Cardiovascular: Normal rate, regular rhythm, normal heart sounds and intact distal pulses. Exam reveals no gallop and no friction rub.   No murmur heard.  Pulmonary/Chest: Effort normal and breath sounds normal. No stridor. No respiratory distress. He has no wheezes. He has no rales.   Lymphadenopathy:     He has cervical adenopathy.   Skin: Skin is warm. Capillary refill takes less than 2 seconds. No rash noted. He is not diaphoretic.   Psychiatric: He has a normal mood and affect.   Nursing note and vitals reviewed.      ED Course        Procedures    No results found for this or any previous visit (from the past 24 hour(s)).    Medications - No data to display    Assessments & Plan (with Medical Decision Making)     I have reviewed the nursing notes.    I have reviewed the findings, diagnosis, plan and need for follow up with the patient.  63-year-old male presenting to urgent care with a 3-day history of dental pain.  Patient denies any trauma or loss of feeling that he is aware of.  Patient states that his pain originated in his left lower tooth region and not able to discern which tooth exactly.  Patient reports over the last 24 hours he has increasing pain with a foul taste in his mouth and some swelling noted on the outside of his left lower jaw.  Patient denies trismus.  On examination there is no trismus and there is no gumline swelling or erythema.  Tooth #20 has a feeling that is partially missing and tender and painful to palpation there is no purulent drainage noted.  There is no fluctuance on either side of the gumline noted.  Patient has no trismus.  Patient is not tachypneic nor tachycardic and no evidence of sepsis or Alphonse's angina.  Will initiate patient on penicillin 500 mill grams  p.o. 3 times daily and discussed pain management with ibuprofen and then hydrocodone for severe pain.  Discussed risks of narcotics.  Information given on dentist and encourage follow-up within 48 hours.  Patient verbalized understanding regarding all of the above.  Also encourage patient to gargle with salt water after every meal.  Patient discharged in stable condition.       Medication List      Started    HYDROcodone-acetaminophen 5-325 MG tablet  Commonly known as:  NORCO  1 tablet, Oral, EVERY 6 HOURS PRN     penicillin V 500 MG tablet  Commonly known as:  VEETID  500 mg, Oral, 3 TIMES DAILY            Final diagnoses:   Acute pulpitis   Tooth caries       8/4/2019   [facility] EMERGENCY DEPARTMENT     Priscilla Sotomayor, ANGELA CNP  08/04/19 1771

## 2019-08-04 NOTE — DISCHARGE INSTRUCTIONS
Start penicillin and take three times daily for 10 days  Take 2 strength tylenol with 800 mg of ibuprofen three times daily.  For more severe pain, take one vicodin every 6 hours.  Call dentist in am and get in ASAP  Many of these clinics offer a sliding fee option for patients that qualify, and see patients on a walk-in or same day basis. Please call each clinic directly. As services, hours, fees and policies vary greatly.          [facility], [facility]  [phone]  Sees no insurance  [facility], [place]  [phone]  Preventive services only  [facility] (mult loc) [phone]  [facility]    ([facility]), [facility]  [phone]  [facility]       [phone]  Preventive services only  [facility]  [phone]  Accepts MA & sees no ins  [facility],      Accepts MA & sees no ins   [place]   [phone]; [phone]  [facility]   Accepts MA & sees no ins       [phone]  [facility], [facility]  [phone]   Accepts MA emergencies  [facility] [phone]  [facility],     Accepts [facility]   [phone]    [facility] [phone]  Accepts MA & sees no ins   [facility]   Dental Clinic    [phone]  [facility], [facility]  [phone]   [facility] [phone]  [facility]  Preventive services only   [place]   [phone]  [facility] and [facility] [phone]  [facility], [place]  [phone]  Same day [facility] [phone]  Same day [facility],      Same day [facility]   [phone]    [facility], [facility] [phone]  Free [facility], walk-in only  [facility] (multiple locations) [phone]      [facility]  Dental , [facility] [phone]    [facility] [phone]  Free clinic, walk-in only  [facility]  [phone]  [facility] [phone] (adults)       [phone] (children)  [facility] [phone]    Also, referral service for low cost dental and healthcare: [phone]  And [phone]

## 2019-08-04 NOTE — ED AVS SNAPSHOT
[facility] Emergency Department  [address] [facility] [address] [facility] [address]  Phone:  [phone]  Fax:  [phone]                                    Ghulam Rizzo   MRN: [id number]    Department:  [facility] Emergency Department   Date of Visit:  8/4/2019           After Visit Summary Signature Page    I have received my discharge instructions, and my questions have been answered. I have discussed any challenges I see with this plan with the nurse or doctor.    ..........................................................................................................................................  Patient/Patient Representative Signature      ..........................................................................................................................................  Patient Representative Print Name and Relationship to Patient    ..................................................               ................................................  Date                                   Time    ..........................................................................................................................................  Reviewed by Signature/Title    ...................................................              ..............................................  Date                                               Time          22EPIC Rev 08/18

## 2019-11-07 ENCOUNTER — OFFICE VISIT (OUTPATIENT)
Dept: FAMILY MEDICINE | Facility: CLINIC | Age: 64
End: 2019-11-07
Payer: MEDICARE

## 2019-11-07 VITALS
HEIGHT: 74 IN | WEIGHT: 189 LBS | HEART RATE: 89 BPM | TEMPERATURE: 96.9 F | DIASTOLIC BLOOD PRESSURE: 98 MMHG | SYSTOLIC BLOOD PRESSURE: 152 MMHG | OXYGEN SATURATION: 99 % | RESPIRATION RATE: 16 BRPM | BODY MASS INDEX: 24.26 KG/M2

## 2019-11-07 DIAGNOSIS — Z13.6 SCREENING FOR HYPERTENSION: ICD-10-CM

## 2019-11-07 DIAGNOSIS — F33.1 MAJOR DEPRESSIVE DISORDER, RECURRENT EPISODE, MODERATE (H): ICD-10-CM

## 2019-11-07 DIAGNOSIS — K58.8 OTHER IRRITABLE BOWEL SYNDROME: Primary | ICD-10-CM

## 2019-11-07 DIAGNOSIS — J06.9 VIRAL URI WITH COUGH: ICD-10-CM

## 2019-11-07 PROCEDURE — 99214 OFFICE O/P EST MOD 30 MIN: CPT | Performed by: FAMILY MEDICINE

## 2019-11-07 ASSESSMENT — ANXIETY QUESTIONNAIRES
2. NOT BEING ABLE TO STOP OR CONTROL WORRYING: MORE THAN HALF THE DAYS
1. FEELING NERVOUS, ANXIOUS, OR ON EDGE: SEVERAL DAYS
6. BECOMING EASILY ANNOYED OR IRRITABLE: MORE THAN HALF THE DAYS
5. BEING SO RESTLESS THAT IT IS HARD TO SIT STILL: MORE THAN HALF THE DAYS
3. WORRYING TOO MUCH ABOUT DIFFERENT THINGS: SEVERAL DAYS
GAD7 TOTAL SCORE: 12
7. FEELING AFRAID AS IF SOMETHING AWFUL MIGHT HAPPEN: MORE THAN HALF THE DAYS

## 2019-11-07 ASSESSMENT — PATIENT HEALTH QUESTIONNAIRE - PHQ9
SUM OF ALL RESPONSES TO PHQ QUESTIONS 1-9: 14
5. POOR APPETITE OR OVEREATING: MORE THAN HALF THE DAYS

## 2019-11-07 ASSESSMENT — MIFFLIN-ST. JEOR: SCORE: 1712.05

## 2019-11-07 NOTE — PATIENT INSTRUCTIONS
Thank you for choosing [facility].  You may be receiving an email and/or telephone survey request from [facility] regarding your visit today.  Please take a few minutes to respond to the survey to let us know how we are doing.      If you have questions or concerns, please contact us via [employer] or you can contact your care team at [phone].    Our Clinic hours are:  Monday 6:40 am  to 7:00 pm  Tuesday -Friday 6:40 am to 5:00 pm    The [place] outpatient lab hours are:  Monday - Friday 6:10 am to 4:45 pm  Saturdays 7:00 am to 11:00 am  Appointments are required, call [phone]    If you have clinical questions after hours or would like to schedule an appointment,  call the clinic at [phone].    (K58.8) Other irritable bowel syndrome  (primary encounter diagnosis)  Comment:   Plan: linaclotide (LINZESS) 145 MCG capsule        For the bowels the Lizness is refilled for one year. Use 145 mg daily til better then find the lowest effective. You may stop this if all the symptoms are resolved for 2-3 weeks.   You may restart it if needed. Use the non drug therapies as in walking and staying well hydrated and stress reduction. Recheck in clinic in one year if the med is needed.     (F33.1) Major depressive disorder, recurrent episode, moderate (H)  Comment:   Plan: sertraline (ZOLOFT) 50 MG tablet        We discussed the depression and use the med at 75 mg daily of the Zoloft. Call if any side effects. Use the non drug therapies.   If doing well then refills are done for one year. Recheck then in clinic if stable.     (Z13.6) Screening for hypertension  Comment:   Plan: For the blood pressure monitor and record the BP at rest and the goal for the average is under 130/80.   Use the non drug therapies. If the BP is high then recheck in clinic with the readings.     (J06.9,  B97.89) Viral URI with cough  Comment:   Plan: use the fluids and Robitussin DM cough med. Tylenol and advil  as needed.   Avoid contagious exposures. Monitor for localizing signs of infection.

## 2019-11-07 NOTE — PROGRESS NOTES
Subjective     Ghulam Rizzo is a 64 year old male who presents to clinic today for the following health issues:    HPI   ENT Symptoms             Symptoms: cc Present Absent Comment   Fever/Chills   x    Fatigue  x  States he is always tired.   Muscle Aches  x  The right arm is always sore in the shoulder area.   Eye Irritation   x    Sneezing  x     Nasal Duran/Drg  x     Sinus Pressure/Pain   x    Loss of smell  x     Dental pain   x    Sore Throat  x  Woke up this morning with a sore throat.   Swollen Glands   x    Ear Pain/Fullness   x    Cough x x  Coughing out phlegm at times-green in color.  Cough is worse at night, has been sleeping in the recliner the past few nights.   Wheeze  x     Chest Pain   x    Shortness of breath   x    Rash   x    Other  x  Bowel concerns-constipation.  See below.     Symptom duration:  10 days.   Symptom severity:  Cough is not getting better.   Treatments tried:  Tylenol as needed.   Contacts:  Unknown.        Medication Followup of Depression    Taking Medication as prescribed: He has been out of the Zoloft.    Side Effects:  None    Medication Helping Symptoms:  He states his wife would say it helps him when taking.  He has been more irritable, but he feels that is due to not feeling well with cold and bowel issues.    Today the PHQ is 14, and the KSENIA is 12.     PHQ-9 ([employer]) 6/21/2018   1.  Little interest or pleasure in doing things 0   2.  Feeling down, depressed, or hopeless 0   3.  Trouble falling or staying asleep, or sleeping too much 0   4.  Feeling tired or having little energy 0   5.  Poor appetite or overeating 2   6.  Feeling bad about yourself 0   7.  Trouble concentrating 0   8.  Moving slowly or restless 0   9.  Suicidal or self-harm thoughts 0   PHQ-9 Total Score 2   Difficulty at work, home, or with people Not difficult at all     KSENIA-7   [employer], 2002; Used with Permission) 6/21/2018   1. Feeling nervous, anxious, or on edge 2   2. Not being able to stop  "or control worrying 2   3. Worrying too much about different things 0   4. Trouble relaxing 0   5. Being so restless that it is hard to sit still 2   6. Becoming easily annoyed or irritable 0   7. Feeling afraid, as if something awful might happen 1   KSENIA-7 Total Score 7   If you checked any problems, how difficult have they made it for you to do your work, take care of things at home, or get along with other people? Not difficult at all        BOWEL ISSUES:  With his current cold symptoms he has noticed more bowel issues-constipation.  He has been taking the laxatives and still will not have a bowel movement until two days later.  He was on Linzess 145 mg one every morning. That seemed to help when taking.    He will take laxatives as needed.      Current Outpatient Medications:      polyethylene glycol (MIRALAX/GLYCOLAX) powder, Take 1 capful by mouth daily, Disp: , Rfl:      senna-docusate (NEGRITO-COLACE) 8.6-50 MG per tablet, Take 1-2 tablets by mouth 2 times daily as needed for constipation., Disp: 100 tablet, Rfl: 5     sertraline (ZOLOFT) 50 MG tablet, TAKE 1 & 1/2 TABLETS BY MOUTH EVERY DAY, Disp: 45 tablet, Rfl: 0    Patient Active Problem List   Diagnosis     Hyperlipidemia LDL goal <130     Tobacco abuse     Idiopathic thrombocytopenic purpura (ITP) (H)     Advanced directives, counseling/discussion     Abdominal pain, generalized     Cirrhosis of liver without ascites (H)     Proctitis     Major depressive disorder, recurrent episode, moderate (H)       Blood pressure (!) 152/98, pulse 89, temperature 96.9  F (36.1  C), temperature source Tympanic, resp. rate 16, height 1.88 m (6' 2\"), weight 85.7 kg (189 lb), SpO2 99 %.    Exam:  GENERAL APPEARANCE: healthy, alert and no distress  EYES: EOMI,  PERRL  HENT: ear canals and TM's normal and nose and mouth without ulcers or lesions  NECK: no adenopathy, no asymmetry, masses, or scars and thyroid normal to palpation  RESP: rhonchi bilateral  CV: regular rates " and rhythm, normal S1 S2, no S3 or S4 and no murmur, click or rub -  ABDOMEN:  soft, nontender, no HSM or masses and bowel sounds normal  SKIN: no suspicious lesions or rashes  PSYCH: mentation appears normal and affect normal/bright      (K58.8) Other irritable bowel syndrome  (primary encounter diagnosis)  Comment:   Plan: linaclotide (LINZESS) 145 MCG capsule        For the bowels the Lizness is refilled for one year. Use 145 mg daily til better then find the lowest effective. You may stop this if all the symptoms are resolved for 2-3 weeks.   You may restart it if needed. Use the non drug therapies as in walking and staying well hydrated and stress reduction. Recheck in clinic in one year if the med is needed.     (F33.1) Major depressive disorder, recurrent episode, moderate (H)  Comment:   Plan: sertraline (ZOLOFT) 50 MG tablet        We discussed the depression and use the med at 75 mg daily of the Zoloft. Call if any side effects. Use the non drug therapies.   If doing well then refills are done for one year. Recheck then in clinic if stable.     (Z13.6) Screening for hypertension  Comment:   Plan: For the blood pressure monitor and record the BP at rest and the goal for the average is under 130/80.   Use the non drug therapies. If the BP is high then recheck in clinic with the readings.     (J06.9,  B97.89) Viral URI with cough  Comment:   Plan: use the fluids and Robitussin DM cough med. Tylenol and advil as needed.   Avoid contagious exposures. Monitor for localizing signs of infection.       Luiz Eckert MD

## 2019-11-08 ASSESSMENT — ANXIETY QUESTIONNAIRES: GAD7 TOTAL SCORE: 12

## 2020-06-28 ENCOUNTER — APPOINTMENT (OUTPATIENT)
Dept: CT IMAGING | Facility: CLINIC | Age: 65
End: 2020-06-28
Attending: FAMILY MEDICINE
Payer: COMMERCIAL

## 2020-06-28 ENCOUNTER — HOSPITAL ENCOUNTER (EMERGENCY)
Facility: CLINIC | Age: 65
Discharge: HOME | End: 2020-06-28
Attending: FAMILY MEDICINE | Admitting: FAMILY MEDICINE
Payer: COMMERCIAL

## 2020-06-28 VITALS
DIASTOLIC BLOOD PRESSURE: 84 MMHG | SYSTOLIC BLOOD PRESSURE: 143 MMHG | RESPIRATION RATE: 16 BRPM | HEIGHT: 74 IN | BODY MASS INDEX: 23.1 KG/M2 | TEMPERATURE: 98.8 F | WEIGHT: 180 LBS | HEART RATE: 66 BPM | OXYGEN SATURATION: 97 %

## 2020-06-28 DIAGNOSIS — R07.89 CHEST WALL PAIN: ICD-10-CM

## 2020-06-28 LAB
ALBUMIN SERPL-MCNC: 4 G/DL (ref 3.4–5)
ALP SERPL-CCNC: 76 U/L (ref 40–150)
ALT SERPL W P-5'-P-CCNC: 27 U/L (ref 0–70)
ANION GAP SERPL CALCULATED.3IONS-SCNC: 4 MMOL/L (ref 3–14)
AST SERPL W P-5'-P-CCNC: 14 U/L (ref 0–45)
BASOPHILS # BLD AUTO: 0.1 10E9/L (ref 0–0.2)
BASOPHILS NFR BLD AUTO: 0.8 %
BILIRUB DIRECT SERPL-MCNC: 0.2 MG/DL (ref 0–0.2)
BILIRUB SERPL-MCNC: 0.7 MG/DL (ref 0.2–1.3)
BUN SERPL-MCNC: 13 MG/DL (ref 7–30)
CALCIUM SERPL-MCNC: 8.6 MG/DL (ref 8.5–10.1)
CHLORIDE SERPL-SCNC: 107 MMOL/L (ref 94–109)
CO2 SERPL-SCNC: 27 MMOL/L (ref 20–32)
CREAT SERPL-MCNC: 0.72 MG/DL (ref 0.66–1.25)
DIFFERENTIAL METHOD BLD: NORMAL
EOSINOPHIL # BLD AUTO: 0.1 10E9/L (ref 0–0.7)
EOSINOPHIL NFR BLD AUTO: 2 %
ERYTHROCYTE [DISTWIDTH] IN BLOOD BY AUTOMATED COUNT: 13.2 % (ref 10–15)
GFR SERPL CREATININE-BSD FRML MDRD: >90 ML/MIN/{1.73_M2}
GLUCOSE SERPL-MCNC: 144 MG/DL (ref 70–99)
HCT VFR BLD AUTO: 50.3 % (ref 40–53)
HGB BLD-MCNC: 17.3 G/DL (ref 13.3–17.7)
IMM GRANULOCYTES # BLD: 0 10E9/L (ref 0–0.4)
IMM GRANULOCYTES NFR BLD: 0.3 %
LYMPHOCYTES # BLD AUTO: 2.3 10E9/L (ref 0.8–5.3)
LYMPHOCYTES NFR BLD AUTO: 37.6 %
MCH RBC QN AUTO: 32.8 PG (ref 26.5–33)
MCHC RBC AUTO-ENTMCNC: 34.4 G/DL (ref 31.5–36.5)
MCV RBC AUTO: 95 FL (ref 78–100)
MONOCYTES # BLD AUTO: 0.4 10E9/L (ref 0–1.3)
MONOCYTES NFR BLD AUTO: 7 %
NEUTROPHILS # BLD AUTO: 3.2 10E9/L (ref 1.6–8.3)
NEUTROPHILS NFR BLD AUTO: 52.3 %
NRBC # BLD AUTO: 0 10*3/UL
NRBC BLD AUTO-RTO: 0 /100
PLATELET # BLD AUTO: 157 10E9/L (ref 150–450)
POTASSIUM SERPL-SCNC: 4 MMOL/L (ref 3.4–5.3)
PROT SERPL-MCNC: 7.3 G/DL (ref 6.8–8.8)
RBC # BLD AUTO: 5.28 10E12/L (ref 4.4–5.9)
SODIUM SERPL-SCNC: 138 MMOL/L (ref 133–144)
TROPONIN I SERPL-MCNC: <0.015 UG/L (ref 0–0.04)
WBC # BLD AUTO: 6.1 10E9/L (ref 4–11)

## 2020-06-28 PROCEDURE — 25000128 H RX IP 250 OP 636: Performed by: FAMILY MEDICINE

## 2020-06-28 PROCEDURE — 93005 ELECTROCARDIOGRAM TRACING: CPT | Performed by: FAMILY MEDICINE

## 2020-06-28 PROCEDURE — 71275 CT ANGIOGRAPHY CHEST: CPT

## 2020-06-28 PROCEDURE — 80048 BASIC METABOLIC PNL TOTAL CA: CPT | Performed by: EMERGENCY MEDICINE

## 2020-06-28 PROCEDURE — 99285 EMERGENCY DEPT VISIT HI MDM: CPT | Mod: 25 | Performed by: FAMILY MEDICINE

## 2020-06-28 PROCEDURE — 80076 HEPATIC FUNCTION PANEL: CPT | Performed by: FAMILY MEDICINE

## 2020-06-28 PROCEDURE — 85025 COMPLETE CBC W/AUTO DIFF WBC: CPT | Performed by: EMERGENCY MEDICINE

## 2020-06-28 PROCEDURE — 25000125 ZZHC RX 250: Performed by: FAMILY MEDICINE

## 2020-06-28 PROCEDURE — 93010 ELECTROCARDIOGRAM REPORT: CPT | Mod: Z6 | Performed by: FAMILY MEDICINE

## 2020-06-28 PROCEDURE — 84484 ASSAY OF TROPONIN QUANT: CPT | Performed by: EMERGENCY MEDICINE

## 2020-06-28 RX ORDER — IOPAMIDOL 755 MG/ML
79 INJECTION, SOLUTION INTRAVASCULAR ONCE
Status: COMPLETED | OUTPATIENT
Start: 2020-06-28 | End: 2020-06-28

## 2020-06-28 RX ADMIN — IOPAMIDOL 79 ML: 755 INJECTION, SOLUTION INTRAVENOUS at 11:24

## 2020-06-28 RX ADMIN — SODIUM CHLORIDE 100 ML: 9 INJECTION, SOLUTION INTRAVENOUS at 11:24

## 2020-06-28 ASSESSMENT — MIFFLIN-ST. JEOR: SCORE: 1671.22

## 2020-06-28 NOTE — ED TRIAGE NOTES
R sided CP the is worse with mvt and palp. Pain off and on for a few days, worse today. Pt states he's been taking tyl for the pain and it does relieve pain some.

## 2020-06-28 NOTE — ED AVS SNAPSHOT
[facility] Emergency Department  [address] [address]  Phone:  [phone]  Fax:  [phone]                                    Ghulam Rizzo   MRN: [id number]    Department:  [facility] Emergency Department   Date of Visit:  6/28/2020           After Visit Summary Signature Page    I have received my discharge instructions, and my questions have been answered. I have discussed any challenges I see with this plan with the nurse or doctor.    ..........................................................................................................................................  Patient/Patient Representative Signature      ..........................................................................................................................................  Patient Representative Print Name and Relationship to Patient    ..................................................               ................................................  Date                                   Time    ..........................................................................................................................................  Reviewed by Signature/Title    ...................................................              ..............................................  Date                                               Time          22EPIC Rev 08/18

## 2020-06-28 NOTE — ED PROVIDER NOTES
"  History     Chief Complaint   Patient presents with     Chest Pain     R sided CP the is worse with mvt and palp. Pain off and on for a few days, worse today.     HPI  Ghulam Rizzo is a 64 year old male who presents with chest pain.  Symptoms began 2 days ago.  He recalls no specific injury but has a throbbing discomfort in the right chest over the pectoralis muscle area.  He has pain aggravated by bending and twisting movements and by use of the right arm.  Deep inspiration increases the pain.  He is not short of breath at rest.  He has not a cough, fever, sore throat.  He does not have nausea or abdominal pain.  He has chronic bowel problems with constipation and irritable bowel.  He has no new bowel concerns.  He has no irritative or obstructive voiding symptoms.  He smokes a half a pack of cigarettes per day.  He has a history of having had hepatitis C and said he was treated.  His chart says he has cirrhosis.  Despite this admits to \"a couple of beers per day.\"  He is not aware if he has been told not to consume alcohol because of the cirrhosis history.  No history of diabetes, hypertension, coronary disease.  He has taken nothing for the pain today.  He does not want anything at this time.    Allergies:  Allergies   Allergen Reactions     Levaquin [Levofloxacin] Nausea and Vomiting       Problem List:    Patient Active Problem List    Diagnosis Date Noted     Other irritable bowel syndrome 11/07/2019     Priority: Medium     Screening for hypertension 11/07/2019     Priority: Medium     Proctitis 01/26/2017     Priority: Medium     Colonoscopy in 2013.        Major depressive disorder, recurrent episode, moderate (H) 01/26/2017     Priority: Medium     Paxil caused anorgasmia       Cirrhosis of liver without ascites (H) 09/23/2015     Priority: Medium     Thought due to hepatitis C.  Follows with [facility].  Completed Harvoni 9/2015.       Abdominal pain, generalized 08/20/2015     Priority: Medium     Advanced " directives, counseling/discussion 09/14/2012     Priority: Medium     Discussed advance care planning with patient; however, patient declined at this time. 9/14/2012          Idiopathic thrombocytopenic purpura (ITP) (H) 08/13/2012     Priority: Medium     See Hematology consult, August, 2012.        Hyperlipidemia LDL goal <130 07/16/2012     Priority: Medium     Tobacco abuse 07/16/2012     Priority: Medium        Past Medical History:    Past Medical History:   Diagnosis Date     Anisocoria      Asthma      Carpal tunnel syndrome      Head injury, unspecified      Hepatitis C      Obesity      Other convulsions      Other mononeuritis of upper limb      Unspecified essential hypertension        Past Surgical History:    Past Surgical History:   Procedure Laterality Date     COLONOSCOPY       GI SURGERY       HERNIA REPAIR       SURGICAL HISTORY OF -   4/6/87    esophagogastroduodenoscopy     SURGICAL HISTORY OF -   4/27/87    exploratory laparotomy and anterior gastropexy over a gastrostomy tube.     SURGICAL HISTORY OF -   11/13/90    esophagogastroduodenoscopy     SURGICAL HISTORY OF -   1991    L diaphragm eventration repair, fixation of stomach and colon to abd wall     SURGICAL HISTORY OF -       hiatal hernia repair     THORACIC SURGERY         Family History:    Family History   Problem Relation Age of Onset     Cerebrovascular Disease Mother      Heart Disease Brother      Heart Disease Brother        Social History:  Marital Status:   [2]  Social History     Tobacco Use     Smoking status: Current Every Day Smoker     Packs/day: 0.50     Types: Cigarettes     Smokeless tobacco: Never Used     Tobacco comment: started again 4/2010   Substance Use Topics     Alcohol use: Yes     Comment: 1-2 beers daily     Drug use: No        Medications:    linaclotide (LINZESS) 145 MCG capsule  polyethylene glycol (MIRALAX/GLYCOLAX) powder  senna-docusate (NEGRITO-COLACE) 8.6-50 MG per tablet  sertraline (ZOLOFT) 50  "MG tablet          Review of Systems    All other systems are reviewed and are negative    Physical Exam   BP: (!) 146/85  Pulse: 85  Temp: 98.8  F (37.1  C)  Resp: 16  Height: 188 cm (6' 2\")  Weight: 81.6 kg (180 lb)  SpO2: 98 %      Physical Exam    Nursing note and vitals were reviewed.  Constitutional: Awake and alert, adequately nourished and developed appearing 64-year-old in mild discomfort, who does not appear acutely ill, and who answers questions appropriately and cooperates with examination.  HEENT: EOMI.   Neck: Freely mobile.  Cardiovascular: Cardiac examination reveals normal heart rate and regular rhythm without murmur.  Pulmonary/Chest: Breathing is unlabored.  Breath sounds are clear and equal bilaterally.  There no retractions, tachypnea, rales, wheezes, or rhonchi.  There appears to be some atrophy of the pectoralis muscles especially laterally on the right more than on the left.  In the area of the lateral pectoralis muscle he is exquisitely tender reproducing his symptoms but there is no ecchymosis overlying this and no crepitus or subcutaneous emphysema.  There is no swelling or deformity, erythema or warmth.  Abdomen: Soft, nontender, no HSM or masses rebound or guarding.  There is a well-healed midline abdominal scar.  Musculoskeletal: Extremities are warm and well-perfused and without edema  Neurological: Alert, oriented, thought content logical, coherent   Skin: Warm, dry, no rashes.  Psychiatric: Affect broad and appropriate.    ED Course        Procedures               EKG Interpretation:      Interpreted by Gerardo Maciel MD  Time reviewed: 10:15  Symptoms at time of EKG: chest pain   Rhythm: normal sinus   Rate: normal  Axis: normal  Ectopy: none  Conduction: normal  ST Segments/ T Waves: No ST-T wave changes  Q Waves: none  Comparison to prior: Unchanged from 3/26/2006    Clinical Impression: normal EKG          Critical Care time:  none               Results for orders placed or " performed during the hospital encounter of 06/28/20 (from the past 24 hour(s))   CBC with platelets, differential   Result Value Ref Range    WBC 6.1 4.0 - 11.0 10e9/L    RBC Count 5.28 4.4 - 5.9 10e12/L    Hemoglobin 17.3 13.3 - 17.7 g/dL    Hematocrit 50.3 40.0 - 53.0 %    MCV 95 78 - 100 fl    MCH 32.8 26.5 - 33.0 pg    MCHC 34.4 31.5 - 36.5 g/dL    RDW 13.2 10.0 - 15.0 %    Platelet Count 157 150 - 450 10e9/L    Diff Method Automated Method     % Neutrophils 52.3 %    % Lymphocytes 37.6 %    % Monocytes 7.0 %    % Eosinophils 2.0 %    % Basophils 0.8 %    % Immature Granulocytes 0.3 %    Nucleated RBCs 0 0 /100    Absolute Neutrophil 3.2 1.6 - 8.3 10e9/L    Absolute Lymphocytes 2.3 0.8 - 5.3 10e9/L    Absolute Monocytes 0.4 0.0 - 1.3 10e9/L    Absolute Eosinophils 0.1 0.0 - 0.7 10e9/L    Absolute Basophils 0.1 0.0 - 0.2 10e9/L    Abs Immature Granulocytes 0.0 0 - 0.4 10e9/L    Absolute Nucleated RBC 0.0    Basic metabolic panel   Result Value Ref Range    Sodium 138 133 - 144 mmol/L    Potassium 4.0 3.4 - 5.3 mmol/L    Chloride 107 94 - 109 mmol/L    Carbon Dioxide 27 20 - 32 mmol/L    Anion Gap 4 3 - 14 mmol/L    Glucose 144 (H) 70 - 99 mg/dL    Urea Nitrogen 13 7 - 30 mg/dL    Creatinine 0.72 0.66 - 1.25 mg/dL    GFR Estimate >90 >60 mL/min/[1.73_m2]    GFR Estimate If Black >90 >60 mL/min/[1.73_m2]    Calcium 8.6 8.5 - 10.1 mg/dL   Troponin I   Result Value Ref Range    Troponin I ES <0.015 0.000 - 0.045 ug/L   Hepatic panel   Result Value Ref Range    Bilirubin Direct 0.2 0.0 - 0.2 mg/dL    Bilirubin Total 0.7 0.2 - 1.3 mg/dL    Albumin 4.0 3.4 - 5.0 g/dL    Protein Total 7.3 6.8 - 8.8 g/dL    Alkaline Phosphatase 76 40 - 150 U/L    ALT 27 0 - 70 U/L    AST 14 0 - 45 U/L   CT Chest Pulmonary Embolism w Contrast    Narrative    CT CHEST PULMONARY EMBOLISM W CONTRAST 6/28/2020 11:36 AM    CLINICAL HISTORY: right plueritic chest pain  TECHNIQUE: CT angiogram chest during arterial phase injection IV  contrast.  2D and 3D MIP reconstructions were performed by the CT  technologist. Dose reduction techniques were used.     CONTRAST: 79mL Isovue-370    COMPARISON: CT chest 8/3/2015    FINDINGS:  ANGIOGRAM CHEST: Pulmonary arteries are normal caliber and negative  for pulmonary emboli. No CT evidence of right heart strain.    LUNGS AND PLEURA: Elevation of the left hemidiaphragm with minimal  left basilar linear scarring. Mild bronchial wall thickening in. No  pleural effusion, pneumothorax or focal consolidation. There are few  punctate calcified granulomas in both lungs. There is a stable 2 mm  noncalcified pulmonary nodule in the posterior right upper lobe 5-34  and considered benign.    MEDIASTINUM/AXILLAE: Heart size is normal without significant  pericardial effusion. Mild concentric left ventricular wall  thickening. The thoracic aorta is normal in caliber with mild  calcified plaque at the arch. No axillary, mediastinal or hilar  lymphadenopathy. Scattered thoracic calcified granulomas.    UPPER ABDOMEN: Partial visualization of a few hepatic cysts  nonobstructive calculus in the inferior pole of the left kidney    MUSCULOSKELETAL: Osteopenia. There are no destructive osseous lesions.      Impression    IMPRESSION:  1.  No pulmonary emboli.  2.  Findings consistent with nonspecific bronchitis.    MARIA ALEJANDRA ALVARADO MD       Medications   iopamidol (ISOVUE-370) solution 79 mL (79 mLs Intravenous Given 6/28/20 1124)   sodium chloride 0.9 % bag 500mL for CT scan flush use (100 mLs Intravenous Given 6/28/20 1124)       Assessments & Plan (with Medical Decision Making)     64-year-old male presented with chest pain.  Symptoms were fully reproducible on palpation of the chest and with use of the right arm and use of the chest wall muscles.  He recalls no specific injury but the history and exam more suggestive of a myofascial cause for his pain.  It is possible his alcohol use resulted in injury of which he was unaware.  Given  his smoking history and other comorbidities, he underwent advanced imaging and CT scan of the chest did not show a cause for his pain.  Laboratory evaluation was all reassuring.  An EKG was normal.  Given the above's findings I have low suspicion for ACS, pneumothorax, pneumonia, rib fracture, other serious causes for his pain.  I reviewed the test results with him and recommended treatment with acetaminophen and ibuprofen.  He should return to be seen if he cannot manage the pain or develops new concerning symptoms such as difficulty breathing, fevers, or other concerning symptoms.  He expressed understanding and his questions were all answered.    I have reviewed the nursing notes.    I have reviewed the findings, diagnosis, plan and need for follow up with the patient.       Discharge Medication List as of 6/28/2020  1:39 PM          Final diagnoses:   Chest wall pain       6/28/2020   [facility] EMERGENCY DEPARTMENT     Gerardo Maciel MD  06/28/20 1536

## 2020-06-28 NOTE — DISCHARGE INSTRUCTIONS
You may use ibuprofen 400 mg 4 times per day up to every 6 hours if needed for discomfort.  You may add acetaminophen 1000 mg 4 times per day up to every 4 hours if needed for discomfort.  Be seen if new concerning symptoms develop such as fever, increased pain, difficulty breathing, or other worrisome symptoms.

## 2020-12-17 DIAGNOSIS — F33.1 MAJOR DEPRESSIVE DISORDER, RECURRENT EPISODE, MODERATE (H): ICD-10-CM

## 2020-12-17 NOTE — TELEPHONE ENCOUNTER
"Requested Prescriptions   Pending Prescriptions Disp Refills     sertraline (ZOLOFT) 50 MG tablet [Pharmacy Med Name: sertraline 50 mg tablet] 45 tablet 11     Sig: TAKE 1 & 1/2 TABLETS BY MOUTH EVERY DAY       SSRIs Protocol Failed - 12/17/2020 10:31 AM        Failed - PHQ-9 score less than 5 in past 6 months     Please review last PHQ-9 score.           Failed - Recent (6 mo) or future (30 days) visit within the authorizing provider's specialty     Patient had office visit in the last 6 months or has a visit in the next 30 days with authorizing provider or within the authorizing provider's specialty.  See \"Patient Info\" tab in inbasket, or \"Choose Columns\" in Meds & Orders section of the refill encounter.            Passed - Medication is active on med list        Passed - Patient is age 18 or older             "

## 2020-12-22 NOTE — TELEPHONE ENCOUNTER
Medication is being filled for 1 time refill only due to:  Patient needs to be seen because it has been more than one year since last visit.  Nataliya Argueta RN

## 2021-01-21 ENCOUNTER — VIRTUAL VISIT (OUTPATIENT)
Dept: FAMILY MEDICINE | Facility: CLINIC | Age: 66
End: 2021-01-21
Payer: COMMERCIAL

## 2021-01-21 DIAGNOSIS — F33.1 MAJOR DEPRESSIVE DISORDER, RECURRENT EPISODE, MODERATE (H): ICD-10-CM

## 2021-01-21 PROCEDURE — 99213 OFFICE O/P EST LOW 20 MIN: CPT | Mod: 95 | Performed by: FAMILY MEDICINE

## 2021-01-21 ASSESSMENT — ANXIETY QUESTIONNAIRES
7. FEELING AFRAID AS IF SOMETHING AWFUL MIGHT HAPPEN: NOT AT ALL
5. BEING SO RESTLESS THAT IT IS HARD TO SIT STILL: SEVERAL DAYS
IF YOU CHECKED OFF ANY PROBLEMS ON THIS QUESTIONNAIRE, HOW DIFFICULT HAVE THESE PROBLEMS MADE IT FOR YOU TO DO YOUR WORK, TAKE CARE OF THINGS AT HOME, OR GET ALONG WITH OTHER PEOPLE: NOT DIFFICULT AT ALL
2. NOT BEING ABLE TO STOP OR CONTROL WORRYING: MORE THAN HALF THE DAYS
3. WORRYING TOO MUCH ABOUT DIFFERENT THINGS: NOT AT ALL
6. BECOMING EASILY ANNOYED OR IRRITABLE: NOT AT ALL
1. FEELING NERVOUS, ANXIOUS, OR ON EDGE: MORE THAN HALF THE DAYS
GAD7 TOTAL SCORE: 5

## 2021-01-21 ASSESSMENT — PATIENT HEALTH QUESTIONNAIRE - PHQ9
SUM OF ALL RESPONSES TO PHQ QUESTIONS 1-9: 3
5. POOR APPETITE OR OVEREATING: NOT AT ALL

## 2021-01-21 NOTE — PATIENT INSTRUCTIONS
Our Clinic hours are:  Mondays    7:20 am - 7 pm  Tues -  Fri  7:20 am - 5 pm    Clinic Phone: [phone]    The clinic lab opens at 7:30 am Mon - Fri and appointments are required.    [facility]. [phone]  Monday  8 am - 7pm  Tues - Fri 8 am - 5:30 pm       (F33.1) Major depressive disorder, recurrent episode, moderate (H)  Comment:   Plan: sertraline (ZOLOFT) 50 MG tablet        You are doing well. This is a chronic problem and I will refill the med for one year.   Use the non drug therapies. Recheck annually if doing well.

## 2021-01-22 ASSESSMENT — ANXIETY QUESTIONNAIRES: GAD7 TOTAL SCORE: 5

## 2022-01-17 DIAGNOSIS — F33.1 MAJOR DEPRESSIVE DISORDER, RECURRENT EPISODE, MODERATE (H): ICD-10-CM

## 2022-01-20 NOTE — TELEPHONE ENCOUNTER
Pending Prescriptions:                       Disp   Refills    sertraline (ZOLOFT) 50 MG tablet          135 ta*3            Sig: TAKE 1 & 1/2 TABLETS BY MOUTH EVERY DAY, Needs           office visit before next refill    Routing refill request to provider for review/approval because:  Labs out of range:    PHQ-9 score:    PHQ 1/21/2021   PHQ-9 Total Score 3   Q9: Thoughts of better off dead/self-harm past 2 weeks Not at all         6 mo since last visit-provider retired-called pt and left message so can schedule appt and new provider    Hernandez Curran, RN

## 2022-01-21 NOTE — TELEPHONE ENCOUNTER
Sharri Ta contacted Ghulam on 01/21/22 and left a message. If patient calls back please contact care team.

## 2022-01-25 ENCOUNTER — NURSE TRIAGE (OUTPATIENT)
Dept: NURSING | Facility: CLINIC | Age: 67
End: 2022-01-25
Payer: COMMERCIAL

## 2022-01-25 NOTE — TELEPHONE ENCOUNTER
Wife Daija reports that Ghulam tested positive for COVID on 1/13. Symptoms started that time: body aches and headache. Cough has also improved and is mild now.    He tested positive again today using at home test. [facility] advised that pt has met guidelines to end home isolation on 1/24. Pt may continue to test positive for COVID for the next 3 months but is no longer considered contagious.    Caller verbalized understanding.    Yue Cat RN/[facility] Nurse Advisor    Reason for Disposition    [1] COVID-19 diagnosed by positive lab test AND [2] mild symptoms (e.g., cough, fever, others) AND [3] no complications or SOB    COVID-19 Home Isolation, questions about    Additional Information    Negative: SEVERE difficulty breathing (e.g., struggling for each breath, speaks in single words)    Negative: Difficult to awaken or acting confused (e.g., disoriented, slurred speech)    Negative: Bluish (or gray) lips or face now    Negative: Shock suspected (e.g., cold/pale/clammy skin, too weak to stand, low BP, rapid pulse)    Negative: Sounds like a life-threatening emergency to the triager    Negative: SEVERE or constant chest pain or pressure (Exception: mild central chest pain, present only when coughing)    Negative: MODERATE difficulty breathing (e.g., speaks in phrases, SOB even at rest, pulse 100-120)    Negative: [1] Headache AND [2] stiff neck (can't touch chin to chest)    Negative: MILD difficulty breathing (e.g., minimal/no SOB at rest, SOB with walking, pulse <100)    Negative: Chest pain or pressure    Negative: Patient sounds very sick or weak to the triager    Negative: Fever > 103 F (39.4 C)    Negative: [1] Fever > 101 F (38.3 C) AND [2] age > 60 years    Negative: [1] Fever > 100.0 F (37.8 C) AND [2] bedridden (e.g., nursing home patient, CVA, chronic illness, recovering from surgery)    Negative: HIGH RISK for severe COVID complications (e.g., age > 64 years, obesity with BMI > 25, pregnant, chronic lung  disease or other chronic medical condition)  (Exception: Already seen by PCP and no new or worsening symptoms.)    Negative: [1] HIGH RISK patient AND [2] influenza is widespread in the community AND [3] ONE OR MORE respiratory symptoms: cough, sore throat, runny or stuffy nose    Negative: [1] HIGH RISK patient AND [2] influenza exposure within the last 7 days AND [3] ONE OR MORE respiratory symptoms: cough, sore throat, runny or stuffy nose    Negative: [1] COVID-19 infection suspected by caller or triager AND [2] mild symptoms (cough, fever, or others) AND [3] negative COVID-19 rapid test    Negative: Fever present > 3 days (72 hours)    Negative: [1] Fever returns after gone for over 24 hours AND [2] symptoms worse or not improved    Negative: [1] Continuous (nonstop) coughing interferes with work or school AND [2] no improvement using cough treatment per protocol    Negative: Cough present > 3 weeks    Negative: [1] COVID-19 diagnosed by positive lab test AND [2] NO symptoms (e.g., cough, fever, others)    Protocols used: CORONAVIRUS (COVID-19) DIAGNOSED OR [id number] 8.25.2021

## 2022-02-08 ENCOUNTER — OFFICE VISIT (OUTPATIENT)
Dept: FAMILY MEDICINE | Facility: CLINIC | Age: 67
End: 2022-02-08
Payer: COMMERCIAL

## 2022-02-08 VITALS
SYSTOLIC BLOOD PRESSURE: 130 MMHG | HEART RATE: 84 BPM | RESPIRATION RATE: 18 BRPM | TEMPERATURE: 97.9 F | DIASTOLIC BLOOD PRESSURE: 78 MMHG | WEIGHT: 178.4 LBS | OXYGEN SATURATION: 98 % | BODY MASS INDEX: 23.64 KG/M2 | HEIGHT: 73 IN

## 2022-02-08 DIAGNOSIS — R73.09 ELEVATED GLUCOSE: ICD-10-CM

## 2022-02-08 DIAGNOSIS — Z00.00 ROUTINE GENERAL MEDICAL EXAMINATION AT A HEALTH CARE FACILITY: Primary | ICD-10-CM

## 2022-02-08 DIAGNOSIS — Z12.11 COLON CANCER SCREENING: ICD-10-CM

## 2022-02-08 DIAGNOSIS — Z12.5 ENCOUNTER FOR SCREENING FOR MALIGNANT NEOPLASM OF PROSTATE: ICD-10-CM

## 2022-02-08 DIAGNOSIS — Z23 NEED FOR VACCINATION: ICD-10-CM

## 2022-02-08 DIAGNOSIS — K74.60 CIRRHOSIS OF LIVER WITHOUT ASCITES, UNSPECIFIED HEPATIC CIRRHOSIS TYPE (H): ICD-10-CM

## 2022-02-08 DIAGNOSIS — Z13.220 SCREENING CHOLESTEROL LEVEL: ICD-10-CM

## 2022-02-08 DIAGNOSIS — F33.40 RECURRENT MAJOR DEPRESSIVE DISORDER, IN REMISSION (H): ICD-10-CM

## 2022-02-08 LAB
CHOLEST SERPL-MCNC: 170 MG/DL
FASTING STATUS PATIENT QL REPORTED: YES
HBA1C MFR BLD: 5.4 % (ref 0–5.6)
HDLC SERPL-MCNC: 61 MG/DL
LDLC SERPL CALC-MCNC: 99 MG/DL
NONHDLC SERPL-MCNC: 109 MG/DL
PSA SERPL-MCNC: 11.3 UG/L (ref 0–4)
TRIGL SERPL-MCNC: 50 MG/DL

## 2022-02-08 PROCEDURE — 80061 LIPID PANEL: CPT | Performed by: FAMILY MEDICINE

## 2022-02-08 PROCEDURE — 99213 OFFICE O/P EST LOW 20 MIN: CPT | Mod: 25 | Performed by: FAMILY MEDICINE

## 2022-02-08 PROCEDURE — G0103 PSA SCREENING: HCPCS | Performed by: FAMILY MEDICINE

## 2022-02-08 PROCEDURE — 83036 HEMOGLOBIN GLYCOSYLATED A1C: CPT | Performed by: FAMILY MEDICINE

## 2022-02-08 PROCEDURE — 90471 IMMUNIZATION ADMIN: CPT | Performed by: FAMILY MEDICINE

## 2022-02-08 PROCEDURE — 90714 TD VACC NO PRESV 7 YRS+ IM: CPT | Performed by: FAMILY MEDICINE

## 2022-02-08 PROCEDURE — 99397 PER PM REEVAL EST PAT 65+ YR: CPT | Mod: 25 | Performed by: FAMILY MEDICINE

## 2022-02-08 PROCEDURE — 36415 COLL VENOUS BLD VENIPUNCTURE: CPT | Performed by: FAMILY MEDICINE

## 2022-02-08 RX ORDER — ATORVASTATIN CALCIUM 20 MG/1
20 TABLET, FILM COATED ORAL DAILY
Qty: 90 TABLET | Refills: 3 | Status: ON HOLD | OUTPATIENT
Start: 2022-02-08 | End: 2022-07-15

## 2022-02-08 ASSESSMENT — ANXIETY QUESTIONNAIRES
1. FEELING NERVOUS, ANXIOUS, OR ON EDGE: NOT AT ALL
7. FEELING AFRAID AS IF SOMETHING AWFUL MIGHT HAPPEN: NOT AT ALL
2. NOT BEING ABLE TO STOP OR CONTROL WORRYING: MORE THAN HALF THE DAYS
3. WORRYING TOO MUCH ABOUT DIFFERENT THINGS: NOT AT ALL
GAD7 TOTAL SCORE: 7
IF YOU CHECKED OFF ANY PROBLEMS ON THIS QUESTIONNAIRE, HOW DIFFICULT HAVE THESE PROBLEMS MADE IT FOR YOU TO DO YOUR WORK, TAKE CARE OF THINGS AT HOME, OR GET ALONG WITH OTHER PEOPLE: SOMEWHAT DIFFICULT
6. BECOMING EASILY ANNOYED OR IRRITABLE: MORE THAN HALF THE DAYS
5. BEING SO RESTLESS THAT IT IS HARD TO SIT STILL: SEVERAL DAYS

## 2022-02-08 ASSESSMENT — PAIN SCALES - GENERAL: PAINLEVEL: NO PAIN (0)

## 2022-02-08 ASSESSMENT — PATIENT HEALTH QUESTIONNAIRE - PHQ9
5. POOR APPETITE OR OVEREATING: MORE THAN HALF THE DAYS
SUM OF ALL RESPONSES TO PHQ QUESTIONS 1-9: 3

## 2022-02-08 ASSESSMENT — MIFFLIN-ST. JEOR: SCORE: 1642.35

## 2022-02-08 ASSESSMENT — ACTIVITIES OF DAILY LIVING (ADL): CURRENT_FUNCTION: NO ASSISTANCE NEEDED

## 2022-02-08 NOTE — PATIENT INSTRUCTIONS
Check with insurance regarding Zoster (shingles)     Lab work today.     Start on Lipitor 20 mg daily.   Follow up with lab only visit in 3 months for recheck of cholesterol.     Tetanus vaccine today.       Preventive Health Recommendations:     See your health care provider every year to    Review health changes.     Discuss preventive care.      Review your medicines if your doctor has prescribed any.      Talk with your health care provider about whether you should have a test to screen for prostate cancer (PSA).    Every 3 years, have a diabetes test (fasting glucose). If you are at risk for diabetes, you should have this test more often.    Every 5 years, have a cholesterol test. Have this test more often if you are at risk for high cholesterol or heart disease.     Every 10 years, have a colonoscopy. Or, have a yearly FIT test (stool test). These exams will check for colon cancer.    Talk to with your health care provider about screening for Abdominal Aortic Aneurysm if you have a family history of AAA or have a history of smoking.    Shots:     Get a flu shot each year.     Get a tetanus shot every 10 years.     Talk to your doctor about your pneumonia vaccines. There are now two you should receive - Pneumovax (PPSV 23) and Prevnar (PCV 13).     Talk to your pharmacist about a shingles vaccine.     Talk to your doctor about the hepatitis B vaccine.  Nutrition:     Eat at least 5 servings of fruits and vegetables each day.     Eat whole-grain bread, whole-wheat pasta and brown rice instead of white grains and rice.     Get adequate Calcium and Vitamin D.   Lifestyle    Exercise for at least 150 minutes a week (30 minutes a day, 5 days a week). This will help you control your weight and prevent disease.     Limit alcohol to one drink per day.     No smoking.     Wear sunscreen to prevent skin cancer.    See your dentist every six months for an exam and cleaning.    See your eye doctor every 1 to 2 years to  screen for conditions such as glaucoma, macular degeneration, cataracts, etc.    Personalized Prevention Plan  You are due for the preventive services outlined below.  Your care team is available to assist you in scheduling these services.  If you have already completed any of these items, please share that information with your care team to update in your medical record.  Health Maintenance Due   Topic Date Due     ANNUAL REVIEW OF HM ORDERS  Never done     Hepatitis B Vaccine (1 of 3 - Risk 3-dose series) Never done     Zoster (Shingles) Vaccine (1 of 2) Never done     Cholesterol Lab  07/23/2017     Colorectal Cancer Screening  08/30/2018     Diptheria Tetanus Pertussis (DTAP/TDAP/TD) Vaccine (3 - Td or Tdap) 05/25/2020     Depression Assessment  07/21/2021     Flu Vaccine (1) 09/01/2021     FALL RISK ASSESSMENT  01/21/2022

## 2022-02-08 NOTE — PROGRESS NOTES
"SUBJECTIVE:   Ghulam Rizzo is a 66 year old male who presents for Preventive Visit.      Patient has been advised of split billing requirements and indicates understanding: Yes  Are you in the first 12 months of your Medicare coverage?  No    Healthy Habits:    In general, how would you rate your overall health?  Fair    Frequency of exercise:  2-3 days/week    Duration of exercise:  Less than 15 minutes    Do you usually eat at least 4 servings of fruit and vegetables a day, include whole grains    & fiber and avoid regularly eating high fat or \"junk\" foods?  No    Taking medications regularly:  Yes    Barriers to taking medications:  None    Medication side effects:  None    Ability to successfully perform activities of daily living:  No assistance needed    Home Safety:  No safety concerns identified    Hearing Impairment:  No hearing concerns    In the past 6 months, have you been bothered by leaking of urine?  No    In general, how would you rate your overall mental or emotional health?  Very good      PHQ-2 Total Score:    Additional concerns today:  No       Immunizations: defers flu, check with insurance for zoster.   Cholesterol: Screen today  Diabetes: screen today with A1c    Colon Cancer: Colonoscopy completed 8/2013, singular tubular adenoma present.   Prostate: screen today   HIV screen: negative in 2012   Diet/Exercise: active outside.   Tobacco: 1/2 ppd for 15 years.       Mood  Currently on Zoloft 75 mg daily.   Reports things are going well.   Wishes to continue on current dose.       The ASCVD Risk score (Marks NILO Jr., et al., 2013) failed to calculate for the following reasons:    Cannot find a previous HDL lab    Cannot find a previous total cholesterol lab      History of hepatitis C. Completed [facility] 9/2015.       Do you feel safe in your environment? Yes    Have you ever done Advance Care Planning? (For example, a Health Directive, POLST, or a discussion with a medical provider or your loved " ones about your wishes): No, advance care planning information given to patient to review.  Patient plans to discuss their wishes with loved ones or provider.         Fall risk  Fallen 2 or more times in the past year?: No  Any fall with injury in the past year?: No    Cognitive Screening   1) Repeat 3 items (Leader, Season, Table)    2) Clock draw: NORMAL  3) 3 item recall: Recalls 2 objects   Results: NORMAL clock, 1-2 items recalled: COGNITIVE IMPAIRMENT LESS LIKELY    Mini-CogTM Copyright SUKI Jewell. Licensed by the author for use in [facility]; reprinted with permission (kyra@[email or web address]). All rights reserved.      Do you have sleep apnea, excessive snoring or daytime drowsiness?: snores a lot\    Reviewed and updated as needed this visit by clinical staff  Tobacco  Allergies  Meds  Problems  Med Hx  Surg Hx  Fam Hx  Soc Hx         Reviewed and updated as needed this visit by Provider  Tobacco  Allergies  Meds  Problems  Med Hx  Surg Hx  Fam Hx        Social History     Tobacco Use     Smoking status: Current Every Day Smoker     Packs/day: 0.50     Types: Cigarettes     Smokeless tobacco: Never Used     Tobacco comment: started again 4/2010   Substance Use Topics     Alcohol use: Yes     Comment: 1-2 beers daily     If you drink alcohol do you typically have >3 drinks per day or >7 drinks per week? No    Alcohol Use 2/8/2022   Prescreen: >3 drinks/day or >7 drinks/week? No       Current providers sharing in care for this patient include:   Patient Care Team:  Luiz Eckert MD as Assigned PCP    The following health maintenance items are reviewed in Epic and correct as of today:  Health Maintenance Due   Topic Date Due     ANNUAL REVIEW OF HM ORDERS  Never done     HEPATITIS B IMMUNIZATION (1 of 3 - Risk 3-dose series) Never done     ZOSTER IMMUNIZATION (1 of 2) Never done     LIPID  07/23/2017     COLORECTAL CANCER SCREENING  08/30/2018     DTAP/TDAP/TD IMMUNIZATION (3 - Td or  "Tdap) 05/25/2020     PHQ-9  07/21/2021     INFLUENZA VACCINE (1) 09/01/2021     FALL RISK ASSESSMENT  01/21/2022     Review of Systems  Constitutional, HEENT, cardiovascular, pulmonary, gi and gu systems are negative, except as otherwise noted.    OBJECTIVE:   /78 (BP Location: Left arm, Patient Position: Sitting, Cuff Size: Adult Regular)   Pulse 84   Temp 97.9  F (36.6  C) (Tympanic)   Resp 18   Ht 1.861 m (6' 1.27\")   Wt 80.9 kg (178 lb 6.4 oz)   SpO2 98%   BMI 23.37 kg/m   Estimated body mass index is 23.37 kg/m  as calculated from the following:    Height as of this encounter: 1.861 m (6' 1.27\").    Weight as of this encounter: 80.9 kg (178 lb 6.4 oz).  Physical Exam  General: Alert, cooperative, no acute distress  Head: Normocephalic, atraumatic   Eyes: PERRL, no scleral icterus, no conjunctival injection   Ears: External ear without deformity, external canals patent, TM intact with no signs of infection   Nose: nares patent  Throat: Oropharynx clear, non-erythematous, tonsils non-enlarged   Neck: supple, trachea midline, no goiter   CV: RRR, no murmur   Lungs: Clear, non-labored breathing, No rales or rhonchi   Abdomen: soft, non-tender prior vertical surgical scar present.   Extremities: No peripheral edema, calves non-tender   MSK: strength intact in upper and lower extremities. Gait normal.   Neuro: CN II-XII grossly intact. No focal deficits. Sensation intact.       ASSESSMENT / PLAN:   Ghulam was seen today for physical and establish care.    Diagnoses and all orders for this visit:    Routine general medical examination at a health care facility  Immunizations: defers flu, check with insurance for zoster.   Cholesterol: Screen today  Diabetes: screen today with A1c    Colon Cancer: Colonoscopy completed 8/2013, singular tubular adenoma present.   Prostate: screen today   HIV screen: negative in 2012   Diet/Exercise: active outside.   Tobacco: 1/2 ppd for 15 years.     Screening cholesterol " "level  Elevated ascvd risk score   - Will start patient on Lipitor 20 mg daily. Check cholesterol today and in 3 months.   Medication discussed in full.   -     Lipid panel reflex to direct LDL Fasting  -     atorvastatin (LIPITOR) 20 MG tablet; Take 1 tablet (20 mg) by mouth daily  -     Lipid panel reflex to direct LDL Fasting; Future  -     ALT; Future    Colon cancer screening  -     Adult Gastro Ref - Procedure Only; Future    Encounter for screening for malignant neoplasm of prostate  -     PSA, screen    Elevated glucose  -     Hemoglobin A1c    Need for vaccination  -     TD PRSERV FREE >=7 YRS ADS IM [[id number]]    Recurrent major depressive disorder, in remission (H)  Stable, doing well. Less irritable on the medication. Wishes to continue at this time.   -     sertraline (ZOLOFT) 50 MG tablet; TAKE 1 & 1/2 TABLETS BY MOUTH EVERY DAY    Cirrhosis of liver without ascites, unspecified hepatic cirrhosis type (H)  Hepatitis C  - Cured by Jose C in 2015.       Patient has been advised of split billing requirements and indicates understanding: Yes by MA and AFR    COUNSELING:  Reviewed preventive health counseling, as reflected in patient instructions       Regular exercise       Healthy diet/nutrition       Colon cancer screening       Prostate cancer screening    Estimated body mass index is 23.37 kg/m  as calculated from the following:    Height as of this encounter: 1.861 m (6' 1.27\").    Weight as of this encounter: 80.9 kg (178 lb 6.4 oz).        He reports that he has been smoking cigarettes. He has been smoking about 0.50 packs per day. He has never used smokeless tobacco.  Tobacco Cessation Action Plan:   Not interested at this time.     The risks, benefits and treatment options of prescribed medications or other treatments have been discussed with the patient. The patient verbalized their understanding and should call or follow up if no improvement or if they develop further problems.    Appropriate " preventive services were discussed with this patient, including applicable screening as appropriate for cardiovascular disease, diabetes, osteopenia/osteoporosis, and glaucoma.  As appropriate for age/gender, discussed screening for colorectal cancer, prostate cancer, breast cancer, and cervical cancer. Checklist reviewing preventive services available has been given to the patient.    Reviewed patients plan of care and provided an AVS. The Basic Care Plan (routine screening as documented in Health Maintenance) for Ghulam meets the Care Plan requirement. This Care Plan has been established and reviewed with the Patient.    Counseling Resources:  ATP IV Guidelines  Pooled Cohorts Equation Calculator  Breast Cancer Risk Calculator  Breast Cancer: Medication to Reduce Risk  FRAX Risk Assessment  ICSI Preventive Guidelines  Dietary Guidelines for Americans, 2010  USDA's MyPlate  ASA Prophylaxis  Lung CA Screening    DO [facility]    Identified Health Risks:

## 2022-02-09 DIAGNOSIS — R97.20 ELEVATED PROSTATE SPECIFIC ANTIGEN (PSA): ICD-10-CM

## 2022-02-09 ASSESSMENT — ANXIETY QUESTIONNAIRES: GAD7 TOTAL SCORE: 7

## 2022-03-21 ENCOUNTER — HOSPITAL ENCOUNTER (OUTPATIENT)
Facility: CLINIC | Age: 67
End: 2022-03-21
Attending: SURGERY | Admitting: SURGERY
Payer: COMMERCIAL

## 2022-03-21 DIAGNOSIS — Z11.59 ENCOUNTER FOR SCREENING FOR OTHER VIRAL DISEASES: Primary | ICD-10-CM

## 2022-04-06 ENCOUNTER — ANESTHESIA EVENT (OUTPATIENT)
Dept: GASTROENTEROLOGY | Facility: CLINIC | Age: 67
End: 2022-04-06
Payer: COMMERCIAL

## 2022-04-06 ASSESSMENT — LIFESTYLE VARIABLES: TOBACCO_USE: 1

## 2022-04-06 NOTE — ANESTHESIA PREPROCEDURE EVALUATION
Anesthesia Pre-Procedure Evaluation    Patient: Ghulam Rizzo   MRN: [id number] : 1955        Procedure : Procedure(s):  COLONOSCOPY          Past Medical History:   Diagnosis Date     Anisocoria      Asthma      Carpal tunnel syndrome          Head injury, unspecified     closed head injury 4 yrs ago     Hepatitis C     Cured by [facility] in . diagnosed in  was positive for Hepatitis C.      Obesity     2006, resolved     Other convulsions     seizure disorder due to head injury 4 yrs ago.     Other mononeuritis of upper limb     L phrenic nerve paralysis     Unspecified essential hypertension       Past Surgical History:   Procedure Laterality Date     COLONOSCOPY       GI SURGERY       HERNIA REPAIR       SURGICAL HISTORY OF -   86    esophagogastroduodenoscopy     SURGICAL HISTORY OF -   87    exploratory laparotomy and anterior gastropexy over a gastrostomy tube.     SURGICAL HISTORY OF -   89    esophagogastroduodenoscopy     SURGICAL HISTORY OF -       L diaphragm eventration repair, fixation of stomach and colon to abd wall     SURGICAL HISTORY OF -       hiatal hernia repair     THORACIC SURGERY        Allergies   Allergen Reactions     Levaquin [Levofloxacin] Nausea and Vomiting      Social History     Tobacco Use     Smoking status: Current Every Day Smoker     Packs/day: 0.50     Types: Cigarettes     Smokeless tobacco: Never Used     Tobacco comment: started again 2010   Substance Use Topics     Alcohol use: Yes     Comment: 1-2 beers daily      Wt Readings from Last 1 Encounters:   22 80.9 kg (178 lb 6.4 oz)        Anesthesia Evaluation   Pt has had prior anesthetic. Type: MAC and General.        ROS/MED HX  ENT/Pulmonary:     (+) tobacco use, Current use, asthma     Neurologic:       Cardiovascular:     (+) Dyslipidemia hypertension-----Previous cardiac testing   Echo: Date: Results:    Stress Test: Date: Results:    ECG Reviewed: Date:   Results:  Sinus Rhythm   WITHIN NORMAL LIMITS  Cath: Date: Results:      METS/Exercise Tolerance:     Hematologic:       Musculoskeletal:       GI/Hepatic: Comment: IBS    (+) hepatitis type C, liver disease,     Renal/Genitourinary:       Endo:       Psychiatric/Substance Use:     (+) psychiatric history depression     Infectious Disease:       Malignancy:       Other:               OUTSIDE LABS:  CBC:   Lab Results   Component Value Date    WBC 6.1 06/28/2020    WBC 7.1 11/25/2017    HGB 17.3 06/28/2020    HGB 18.4 (H) 11/25/2017    HCT 50.3 06/28/2020    HCT 51.2 11/25/2017     06/28/2020     (L) 11/25/2017     BMP:   Lab Results   Component Value Date     06/28/2020     11/25/2017    POTASSIUM 4.0 06/28/2020    POTASSIUM 3.8 11/25/2017    CHLORIDE 107 06/28/2020    CHLORIDE 110 (H) 11/25/2017    CO2 27 06/28/2020    CO2 22 11/25/2017    BUN 13 06/28/2020    BUN 12 11/25/2017    CR 0.72 06/28/2020    CR 0.66 11/25/2017     (H) 06/28/2020    GLC 87 11/25/2017     COAGS:   Lab Results   Component Value Date    PTT 33 11/25/2017    INR 1.04 11/25/2017     POC:   Lab Results   Component Value Date     (H) 04/03/2006     HEPATIC:   Lab Results   Component Value Date    ALBUMIN 4.0 06/28/2020    PROTTOTAL 7.3 06/28/2020    ALT 27 06/28/2020    AST 14 06/28/2020    ALKPHOS 76 06/28/2020    BILITOTAL 0.7 06/28/2020     OTHER:   Lab Results   Component Value Date    A1C 5.4 02/08/2022    TAVIA 8.6 06/28/2020    PHOS 3.5 09/14/2012    MAG 1.9 09/07/2012    TSH 2.54 08/10/2015    T4 1.12 08/10/2015    CRP <2.9 11/25/2017               ANGELA Woodruff CRNA

## 2022-04-20 ENCOUNTER — ANESTHESIA (OUTPATIENT)
Dept: GASTROENTEROLOGY | Facility: CLINIC | Age: 67
End: 2022-04-20
Payer: COMMERCIAL

## 2022-05-31 ENCOUNTER — OFFICE VISIT (OUTPATIENT)
Dept: UROLOGY | Facility: CLINIC | Age: 67
End: 2022-05-31
Attending: FAMILY MEDICINE
Payer: COMMERCIAL

## 2022-05-31 VITALS
HEART RATE: 75 BPM | BODY MASS INDEX: 23.64 KG/M2 | OXYGEN SATURATION: 98 % | DIASTOLIC BLOOD PRESSURE: 83 MMHG | WEIGHT: 178.35 LBS | SYSTOLIC BLOOD PRESSURE: 149 MMHG | HEIGHT: 73 IN | TEMPERATURE: 97.4 F

## 2022-05-31 DIAGNOSIS — N30.00 ACUTE CYSTITIS WITHOUT HEMATURIA: Primary | ICD-10-CM

## 2022-05-31 DIAGNOSIS — R97.20 ELEVATED PROSTATE SPECIFIC ANTIGEN (PSA): ICD-10-CM

## 2022-05-31 LAB
ALBUMIN UR-MCNC: NEGATIVE MG/DL
APPEARANCE UR: CLEAR
BACTERIA #/AREA URNS HPF: ABNORMAL /HPF
BILIRUB UR QL STRIP: NEGATIVE
COLOR UR AUTO: YELLOW
GLUCOSE UR STRIP-MCNC: NEGATIVE MG/DL
HGB UR QL STRIP: ABNORMAL
KETONES UR STRIP-MCNC: NEGATIVE MG/DL
LEUKOCYTE ESTERASE UR QL STRIP: NEGATIVE
MUCOUS THREADS #/AREA URNS LPF: PRESENT /LPF
NITRATE UR QL: NEGATIVE
PH UR STRIP: 6 [PH] (ref 5–7)
RBC #/AREA URNS AUTO: ABNORMAL /HPF
SP GR UR STRIP: 1.02 (ref 1–1.03)
SQUAMOUS #/AREA URNS AUTO: ABNORMAL /LPF
UROBILINOGEN UR STRIP-ACNC: 0.2 E.U./DL
WBC #/AREA URNS AUTO: ABNORMAL /HPF

## 2022-05-31 PROCEDURE — 81001 URINALYSIS AUTO W/SCOPE: CPT | Performed by: UROLOGY

## 2022-05-31 PROCEDURE — 99203 OFFICE O/P NEW LOW 30 MIN: CPT | Mod: 25 | Performed by: UROLOGY

## 2022-05-31 PROCEDURE — 87086 URINE CULTURE/COLONY COUNT: CPT | Performed by: UROLOGY

## 2022-05-31 PROCEDURE — 51798 US URINE CAPACITY MEASURE: CPT | Performed by: UROLOGY

## 2022-05-31 NOTE — NURSING NOTE
"Initial BP (!) 149/83 (BP Location: Right arm, Patient Position: Sitting, Cuff Size: Adult Regular)   Pulse 75   Temp 97.4  F (36.3  C) (Tympanic)   Ht 1.854 m (6' 1\")   Wt 80.9 kg (178 lb 5.6 oz)   SpO2 98%   BMI 23.53 kg/m   Estimated body mass index is 23.53 kg/m  as calculated from the following:    Height as of this encounter: 1.854 m (6' 1\").    Weight as of this encounter: 80.9 kg (178 lb 5.6 oz). .    Maria Regalado MA    "

## 2022-05-31 NOTE — NURSING NOTE
Active order to obtain bladder scan? Yes:    Name of ordering provider:  Dr. Hope  Bladder scan preformed post void Yes: .  Bladder scan reveled 47ML.  Provider notified?  Yes:     Maria Regalado MA

## 2022-05-31 NOTE — PROGRESS NOTES
Appointment source: New Patient  Patient name: Ghulam MCNEAL Matthias  Urology Staff: Ghulam Hope MD    Subjective: This is a 66 year old year old male with an elevated PSA of 11.3 ng/mL.  Last prior PSA was 2.23 ng/mL approximately 9 years ago.    No family history of prostate cancer.    No voiding symptoms.    Does complain of some difficulty initiating a bowel movement but has a significant history of past abdominal surgeries.    Past history of hemorrhoid surgery.    Objective: Postvoid residual 47 mL.    Abdomen shows previous surgical scars and some degree of herniation of the abdomen transversely below the umbilicus.    No evidence of inguinal hernia.    External genitalia are visibly and palpably normal.    Rectal examination revealed no obvious induration of the prostate but limited somewhat by patient discomfort.    Assessment: Elevated PSA    Plan: Urinalysis and urine culture will be reviewed for possible cystitis although he has no symptoms and an MRI of the prostate will be obtained if no evidence of infection is found in his urine specimen.    Total time 15 minutes

## 2022-06-02 LAB — BACTERIA UR CULT: NORMAL

## 2022-06-07 DIAGNOSIS — R97.20 ELEVATED PROSTATE SPECIFIC ANTIGEN (PSA): Primary | ICD-10-CM

## 2022-06-07 NOTE — PROGRESS NOTES
UA UC not compelling for bacterial cystitis.  We will proceed with an MRI of the prostate to evaluate his elevated PSA.

## 2022-07-13 ENCOUNTER — ANESTHESIA EVENT (OUTPATIENT)
Dept: SURGERY | Facility: CLINIC | Age: 67
DRG: 330 | End: 2022-07-13
Payer: COMMERCIAL

## 2022-07-13 ENCOUNTER — ANESTHESIA (OUTPATIENT)
Dept: SURGERY | Facility: CLINIC | Age: 67
DRG: 330 | End: 2022-07-13
Payer: COMMERCIAL

## 2022-07-13 ENCOUNTER — APPOINTMENT (OUTPATIENT)
Dept: CT IMAGING | Facility: CLINIC | Age: 67
DRG: 330 | End: 2022-07-13
Attending: EMERGENCY MEDICINE
Payer: COMMERCIAL

## 2022-07-13 ENCOUNTER — HOSPITAL ENCOUNTER (INPATIENT)
Facility: CLINIC | Age: 67
LOS: 5 days | Discharge: HOME | DRG: 330 | End: 2022-07-18
Attending: EMERGENCY MEDICINE | Admitting: SURGERY
Payer: COMMERCIAL

## 2022-07-13 DIAGNOSIS — Z90.49 S/P RIGHT HEMICOLECTOMY: ICD-10-CM

## 2022-07-13 DIAGNOSIS — Z11.52 ENCOUNTER FOR SCREENING LABORATORY TESTING FOR COVID-19 VIRUS: ICD-10-CM

## 2022-07-13 DIAGNOSIS — K56.2 CECAL VOLVULUS (H): Primary | ICD-10-CM

## 2022-07-13 LAB
ALBUMIN SERPL-MCNC: 3.6 G/DL (ref 3.4–5)
ALBUMIN UR-MCNC: NEGATIVE MG/DL
ALP SERPL-CCNC: 54 U/L (ref 40–150)
ALT SERPL W P-5'-P-CCNC: 17 U/L (ref 0–70)
ANION GAP SERPL CALCULATED.3IONS-SCNC: 6 MMOL/L (ref 3–14)
APPEARANCE UR: CLEAR
AST SERPL W P-5'-P-CCNC: 8 U/L (ref 0–45)
BASOPHILS # BLD AUTO: 0.1 10E3/UL (ref 0–0.2)
BASOPHILS NFR BLD AUTO: 1 %
BILIRUB DIRECT SERPL-MCNC: 0.2 MG/DL (ref 0–0.2)
BILIRUB SERPL-MCNC: 0.8 MG/DL (ref 0.2–1.3)
BILIRUB UR QL STRIP: NEGATIVE
BUN SERPL-MCNC: 12 MG/DL (ref 7–30)
CALCIUM SERPL-MCNC: 8.7 MG/DL (ref 8.5–10.1)
CHLORIDE BLD-SCNC: 106 MMOL/L (ref 94–109)
CO2 SERPL-SCNC: 25 MMOL/L (ref 20–32)
COLOR UR AUTO: YELLOW
CREAT SERPL-MCNC: 0.63 MG/DL (ref 0.66–1.25)
EOSINOPHIL # BLD AUTO: 0.1 10E3/UL (ref 0–0.7)
EOSINOPHIL NFR BLD AUTO: 1 %
ERYTHROCYTE [DISTWIDTH] IN BLOOD BY AUTOMATED COUNT: 13.3 % (ref 10–15)
FLUAV RNA SPEC QL NAA+PROBE: NEGATIVE
FLUBV RNA RESP QL NAA+PROBE: NEGATIVE
GFR SERPL CREATININE-BSD FRML MDRD: >90 ML/MIN/1.73M2
GLUCOSE BLD-MCNC: 92 MG/DL (ref 70–99)
GLUCOSE UR STRIP-MCNC: NEGATIVE MG/DL
HCT VFR BLD AUTO: 45.2 % (ref 40–53)
HGB BLD-MCNC: 15.8 G/DL (ref 13.3–17.7)
HGB UR QL STRIP: ABNORMAL
HOLD SPECIMEN: NORMAL
IMM GRANULOCYTES # BLD: 0 10E3/UL
IMM GRANULOCYTES NFR BLD: 0 %
KETONES UR STRIP-MCNC: 80 MG/DL
LEUKOCYTE ESTERASE UR QL STRIP: NEGATIVE
LIPASE SERPL-CCNC: 86 U/L (ref 73–393)
LYMPHOCYTES # BLD AUTO: 2.1 10E3/UL (ref 0.8–5.3)
LYMPHOCYTES NFR BLD AUTO: 27 %
MCH RBC QN AUTO: 33.1 PG (ref 26.5–33)
MCHC RBC AUTO-ENTMCNC: 35 G/DL (ref 31.5–36.5)
MCV RBC AUTO: 95 FL (ref 78–100)
MONOCYTES # BLD AUTO: 0.7 10E3/UL (ref 0–1.3)
MONOCYTES NFR BLD AUTO: 9 %
MUCOUS THREADS #/AREA URNS LPF: PRESENT /LPF
NEUTROPHILS # BLD AUTO: 4.9 10E3/UL (ref 1.6–8.3)
NEUTROPHILS NFR BLD AUTO: 62 %
NITRATE UR QL: NEGATIVE
NRBC # BLD AUTO: 0 10E3/UL
NRBC BLD AUTO-RTO: 0 /100
PH UR STRIP: 6 [PH] (ref 5–7)
PLATELET # BLD AUTO: 159 10E3/UL (ref 150–450)
POTASSIUM BLD-SCNC: 4.2 MMOL/L (ref 3.4–5.3)
PROT SERPL-MCNC: 6.6 G/DL (ref 6.8–8.8)
RBC # BLD AUTO: 4.78 10E6/UL (ref 4.4–5.9)
RBC URINE: 6 /HPF
SARS-COV-2 RNA RESP QL NAA+PROBE: NEGATIVE
SODIUM SERPL-SCNC: 137 MMOL/L (ref 133–144)
SP GR UR STRIP: 1.02 (ref 1–1.03)
SQUAMOUS EPITHELIAL: <1 /HPF
UROBILINOGEN UR STRIP-MCNC: NORMAL MG/DL
WBC # BLD AUTO: 7.9 10E3/UL (ref 4–11)
WBC URINE: 1 /HPF

## 2022-07-13 PROCEDURE — 120N000001 HC R&B MED SURG/OB

## 2022-07-13 PROCEDURE — C9290 INJ, BUPIVACAINE LIPOSOME: HCPCS | Performed by: NURSE ANESTHETIST, CERTIFIED REGISTERED

## 2022-07-13 PROCEDURE — 250N000013 HC RX MED GY IP 250 OP 250 PS 637: Performed by: SURGERY

## 2022-07-13 PROCEDURE — 258N000003 HC RX IP 258 OP 636: Performed by: NURSE ANESTHETIST, CERTIFIED REGISTERED

## 2022-07-13 PROCEDURE — C9803 HOPD COVID-19 SPEC COLLECT: HCPCS | Performed by: EMERGENCY MEDICINE

## 2022-07-13 PROCEDURE — 83690 ASSAY OF LIPASE: CPT | Performed by: EMERGENCY MEDICINE

## 2022-07-13 PROCEDURE — 74177 CT ABD & PELVIS W/CONTRAST: CPT

## 2022-07-13 PROCEDURE — 250N000009 HC RX 250: Performed by: EMERGENCY MEDICINE

## 2022-07-13 PROCEDURE — 85025 COMPLETE CBC W/AUTO DIFF WBC: CPT | Performed by: EMERGENCY MEDICINE

## 2022-07-13 PROCEDURE — 88307 TISSUE EXAM BY PATHOLOGIST: CPT | Mod: TC | Performed by: SURGERY

## 2022-07-13 PROCEDURE — 360N000077 HC SURGERY LEVEL 4, PER MIN: Performed by: SURGERY

## 2022-07-13 PROCEDURE — 81001 URINALYSIS AUTO W/SCOPE: CPT | Performed by: EMERGENCY MEDICINE

## 2022-07-13 PROCEDURE — 250N000011 HC RX IP 250 OP 636: Performed by: SURGERY

## 2022-07-13 PROCEDURE — 250N000025 HC SEVOFLURANE, PER MIN: Performed by: SURGERY

## 2022-07-13 PROCEDURE — 250N000009 HC RX 250: Performed by: NURSE ANESTHETIST, CERTIFIED REGISTERED

## 2022-07-13 PROCEDURE — 99285 EMERGENCY DEPT VISIT HI MDM: CPT | Performed by: EMERGENCY MEDICINE

## 2022-07-13 PROCEDURE — 96374 THER/PROPH/DIAG INJ IV PUSH: CPT | Mod: 59 | Performed by: EMERGENCY MEDICINE

## 2022-07-13 PROCEDURE — 0DTF0ZZ RESECTION OF RIGHT LARGE INTESTINE, OPEN APPROACH: ICD-10-PCS | Performed by: SURGERY

## 2022-07-13 PROCEDURE — 49560 PR REPAIR INCISIONAL HERNIA,REDUCIBLE: CPT | Mod: 59 | Performed by: SURGERY

## 2022-07-13 PROCEDURE — 36415 COLL VENOUS BLD VENIPUNCTURE: CPT | Performed by: EMERGENCY MEDICINE

## 2022-07-13 PROCEDURE — 999N000141 HC STATISTIC PRE-PROCEDURE NURSING ASSESSMENT: Performed by: SURGERY

## 2022-07-13 PROCEDURE — 271N000001 HC OR GENERAL SUPPLY NON-STERILE: Performed by: SURGERY

## 2022-07-13 PROCEDURE — 96361 HYDRATE IV INFUSION ADD-ON: CPT | Performed by: EMERGENCY MEDICINE

## 2022-07-13 PROCEDURE — 370N000017 HC ANESTHESIA TECHNICAL FEE, PER MIN: Performed by: SURGERY

## 2022-07-13 PROCEDURE — 99222 1ST HOSP IP/OBS MODERATE 55: CPT | Mod: 57 | Performed by: SURGERY

## 2022-07-13 PROCEDURE — 82248 BILIRUBIN DIRECT: CPT | Performed by: EMERGENCY MEDICINE

## 2022-07-13 PROCEDURE — 88307 TISSUE EXAM BY PATHOLOGIST: CPT | Mod: 26 | Performed by: PATHOLOGY

## 2022-07-13 PROCEDURE — 272N000001 HC OR GENERAL SUPPLY STERILE: Performed by: SURGERY

## 2022-07-13 PROCEDURE — 250N000011 HC RX IP 250 OP 636: Performed by: EMERGENCY MEDICINE

## 2022-07-13 PROCEDURE — 250N000011 HC RX IP 250 OP 636: Performed by: NURSE ANESTHETIST, CERTIFIED REGISTERED

## 2022-07-13 PROCEDURE — 710N000009 HC RECOVERY PHASE 1, LEVEL 1, PER MIN: Performed by: SURGERY

## 2022-07-13 PROCEDURE — 99285 EMERGENCY DEPT VISIT HI MDM: CPT | Mod: 25 | Performed by: EMERGENCY MEDICINE

## 2022-07-13 PROCEDURE — 87636 SARSCOV2 & INF A&B AMP PRB: CPT | Performed by: EMERGENCY MEDICINE

## 2022-07-13 PROCEDURE — 44160 REMOVAL OF COLON: CPT | Performed by: SURGERY

## 2022-07-13 PROCEDURE — 0WQF0ZZ REPAIR ABDOMINAL WALL, OPEN APPROACH: ICD-10-PCS | Performed by: SURGERY

## 2022-07-13 PROCEDURE — 258N000003 HC RX IP 258 OP 636: Performed by: SURGERY

## 2022-07-13 PROCEDURE — 258N000003 HC RX IP 258 OP 636: Performed by: EMERGENCY MEDICINE

## 2022-07-13 RX ORDER — ONDANSETRON 2 MG/ML
INJECTION INTRAMUSCULAR; INTRAVENOUS PRN
Status: DISCONTINUED | OUTPATIENT
Start: 2022-07-13 | End: 2022-07-13

## 2022-07-13 RX ORDER — FENTANYL CITRATE 50 UG/ML
50 INJECTION, SOLUTION INTRAMUSCULAR; INTRAVENOUS EVERY 5 MIN PRN
Status: DISCONTINUED | OUTPATIENT
Start: 2022-07-13 | End: 2022-07-13 | Stop reason: HOSPADM

## 2022-07-13 RX ORDER — HYDROMORPHONE HCL IN WATER/PF 6 MG/30 ML
0.2 PATIENT CONTROLLED ANALGESIA SYRINGE INTRAVENOUS
Status: DISCONTINUED | OUTPATIENT
Start: 2022-07-13 | End: 2022-07-18 | Stop reason: HOSPADM

## 2022-07-13 RX ORDER — NALOXONE HYDROCHLORIDE 0.4 MG/ML
0.4 INJECTION, SOLUTION INTRAMUSCULAR; INTRAVENOUS; SUBCUTANEOUS
Status: DISCONTINUED | OUTPATIENT
Start: 2022-07-13 | End: 2022-07-18 | Stop reason: HOSPADM

## 2022-07-13 RX ORDER — KETAMINE HYDROCHLORIDE 10 MG/ML
INJECTION INTRAMUSCULAR; INTRAVENOUS PRN
Status: DISCONTINUED | OUTPATIENT
Start: 2022-07-13 | End: 2022-07-13

## 2022-07-13 RX ORDER — KETOROLAC TROMETHAMINE 30 MG/ML
INJECTION, SOLUTION INTRAMUSCULAR; INTRAVENOUS PRN
Status: DISCONTINUED | OUTPATIENT
Start: 2022-07-13 | End: 2022-07-13

## 2022-07-13 RX ORDER — LIDOCAINE 40 MG/G
CREAM TOPICAL
Status: DISCONTINUED | OUTPATIENT
Start: 2022-07-13 | End: 2022-07-18 | Stop reason: HOSPADM

## 2022-07-13 RX ORDER — GLYCOPYRROLATE 0.2 MG/ML
INJECTION, SOLUTION INTRAMUSCULAR; INTRAVENOUS PRN
Status: DISCONTINUED | OUTPATIENT
Start: 2022-07-13 | End: 2022-07-13

## 2022-07-13 RX ORDER — ENOXAPARIN SODIUM 100 MG/ML
40 INJECTION SUBCUTANEOUS EVERY 24 HOURS
Status: DISCONTINUED | OUTPATIENT
Start: 2022-07-14 | End: 2022-07-18 | Stop reason: HOSPADM

## 2022-07-13 RX ORDER — ONDANSETRON 2 MG/ML
4 INJECTION INTRAMUSCULAR; INTRAVENOUS EVERY 30 MIN PRN
Status: DISCONTINUED | OUTPATIENT
Start: 2022-07-13 | End: 2022-07-13 | Stop reason: HOSPADM

## 2022-07-13 RX ORDER — SODIUM CHLORIDE, SODIUM LACTATE, POTASSIUM CHLORIDE, CALCIUM CHLORIDE 600; 310; 30; 20 MG/100ML; MG/100ML; MG/100ML; MG/100ML
INJECTION, SOLUTION INTRAVENOUS CONTINUOUS
Status: DISCONTINUED | OUTPATIENT
Start: 2022-07-13 | End: 2022-07-15

## 2022-07-13 RX ORDER — ONDANSETRON 2 MG/ML
4 INJECTION INTRAMUSCULAR; INTRAVENOUS EVERY 6 HOURS PRN
Status: DISCONTINUED | OUTPATIENT
Start: 2022-07-13 | End: 2022-07-18 | Stop reason: HOSPADM

## 2022-07-13 RX ORDER — ACETAMINOPHEN 325 MG/1
650 TABLET ORAL EVERY 4 HOURS PRN
Status: DISCONTINUED | OUTPATIENT
Start: 2022-07-16 | End: 2022-07-18 | Stop reason: HOSPADM

## 2022-07-13 RX ORDER — ONDANSETRON 4 MG/1
4 TABLET, ORALLY DISINTEGRATING ORAL EVERY 30 MIN PRN
Status: DISCONTINUED | OUTPATIENT
Start: 2022-07-13 | End: 2022-07-13 | Stop reason: HOSPADM

## 2022-07-13 RX ORDER — MAGNESIUM SULFATE HEPTAHYDRATE 40 MG/ML
INJECTION, SOLUTION INTRAVENOUS PRN
Status: DISCONTINUED | OUTPATIENT
Start: 2022-07-13 | End: 2022-07-13

## 2022-07-13 RX ORDER — HYDROMORPHONE HCL IN WATER/PF 6 MG/30 ML
0.4 PATIENT CONTROLLED ANALGESIA SYRINGE INTRAVENOUS EVERY 5 MIN PRN
Status: DISCONTINUED | OUTPATIENT
Start: 2022-07-13 | End: 2022-07-13 | Stop reason: HOSPADM

## 2022-07-13 RX ORDER — NALOXONE HYDROCHLORIDE 0.4 MG/ML
0.2 INJECTION, SOLUTION INTRAMUSCULAR; INTRAVENOUS; SUBCUTANEOUS
Status: DISCONTINUED | OUTPATIENT
Start: 2022-07-13 | End: 2022-07-18 | Stop reason: HOSPADM

## 2022-07-13 RX ORDER — EPHEDRINE SULFATE 50 MG/ML
INJECTION, SOLUTION INTRAMUSCULAR; INTRAVENOUS; SUBCUTANEOUS PRN
Status: DISCONTINUED | OUTPATIENT
Start: 2022-07-13 | End: 2022-07-13

## 2022-07-13 RX ORDER — PROPOFOL 10 MG/ML
INJECTION, EMULSION INTRAVENOUS PRN
Status: DISCONTINUED | OUTPATIENT
Start: 2022-07-13 | End: 2022-07-13

## 2022-07-13 RX ORDER — ACETAMINOPHEN 325 MG/1
975 TABLET ORAL ONCE
Status: CANCELLED | OUTPATIENT
Start: 2022-07-13

## 2022-07-13 RX ORDER — ACETAMINOPHEN 325 MG/1
975 TABLET ORAL EVERY 8 HOURS
Status: COMPLETED | OUTPATIENT
Start: 2022-07-13 | End: 2022-07-16

## 2022-07-13 RX ORDER — BUPIVACAINE HYDROCHLORIDE 2.5 MG/ML
INJECTION, SOLUTION EPIDURAL; INFILTRATION; INTRACAUDAL PRN
Status: DISCONTINUED | OUTPATIENT
Start: 2022-07-13 | End: 2022-07-13

## 2022-07-13 RX ORDER — ONDANSETRON 4 MG/1
4 TABLET, ORALLY DISINTEGRATING ORAL EVERY 6 HOURS PRN
Status: DISCONTINUED | OUTPATIENT
Start: 2022-07-13 | End: 2022-07-18 | Stop reason: HOSPADM

## 2022-07-13 RX ORDER — SODIUM CHLORIDE 9 MG/ML
INJECTION, SOLUTION INTRAVENOUS CONTINUOUS
Status: DISCONTINUED | OUTPATIENT
Start: 2022-07-13 | End: 2022-07-13

## 2022-07-13 RX ORDER — FENTANYL CITRATE 50 UG/ML
INJECTION, SOLUTION INTRAMUSCULAR; INTRAVENOUS PRN
Status: DISCONTINUED | OUTPATIENT
Start: 2022-07-13 | End: 2022-07-13

## 2022-07-13 RX ORDER — SODIUM CHLORIDE, SODIUM LACTATE, POTASSIUM CHLORIDE, CALCIUM CHLORIDE 600; 310; 30; 20 MG/100ML; MG/100ML; MG/100ML; MG/100ML
INJECTION, SOLUTION INTRAVENOUS CONTINUOUS
Status: DISCONTINUED | OUTPATIENT
Start: 2022-07-13 | End: 2022-07-13 | Stop reason: HOSPADM

## 2022-07-13 RX ORDER — DEXAMETHASONE SODIUM PHOSPHATE 4 MG/ML
INJECTION, SOLUTION INTRA-ARTICULAR; INTRALESIONAL; INTRAMUSCULAR; INTRAVENOUS; SOFT TISSUE PRN
Status: DISCONTINUED | OUTPATIENT
Start: 2022-07-13 | End: 2022-07-13

## 2022-07-13 RX ORDER — MEPERIDINE HYDROCHLORIDE 25 MG/ML
INJECTION INTRAMUSCULAR; INTRAVENOUS; SUBCUTANEOUS PRN
Status: DISCONTINUED | OUTPATIENT
Start: 2022-07-13 | End: 2022-07-13

## 2022-07-13 RX ORDER — IOPAMIDOL 755 MG/ML
75 INJECTION, SOLUTION INTRAVASCULAR ONCE
Status: COMPLETED | OUTPATIENT
Start: 2022-07-13 | End: 2022-07-13

## 2022-07-13 RX ORDER — ERTAPENEM 1 G/1
1 INJECTION, POWDER, LYOPHILIZED, FOR SOLUTION INTRAMUSCULAR; INTRAVENOUS EVERY 24 HOURS
Status: COMPLETED | OUTPATIENT
Start: 2022-07-13 | End: 2022-07-14

## 2022-07-13 RX ORDER — OXYCODONE HYDROCHLORIDE 5 MG/1
5 TABLET ORAL EVERY 4 HOURS PRN
Status: DISCONTINUED | OUTPATIENT
Start: 2022-07-13 | End: 2022-07-13 | Stop reason: HOSPADM

## 2022-07-13 RX ORDER — HYDROMORPHONE HCL IN WATER/PF 6 MG/30 ML
0.4 PATIENT CONTROLLED ANALGESIA SYRINGE INTRAVENOUS
Status: DISCONTINUED | OUTPATIENT
Start: 2022-07-13 | End: 2022-07-18 | Stop reason: HOSPADM

## 2022-07-13 RX ORDER — SODIUM CHLORIDE, SODIUM LACTATE, POTASSIUM CHLORIDE, CALCIUM CHLORIDE 600; 310; 30; 20 MG/100ML; MG/100ML; MG/100ML; MG/100ML
INJECTION, SOLUTION INTRAVENOUS CONTINUOUS PRN
Status: DISCONTINUED | OUTPATIENT
Start: 2022-07-13 | End: 2022-07-13

## 2022-07-13 RX ORDER — ATORVASTATIN CALCIUM 20 MG/1
20 TABLET, FILM COATED ORAL DAILY
Status: DISCONTINUED | OUTPATIENT
Start: 2022-07-14 | End: 2022-07-18 | Stop reason: HOSPADM

## 2022-07-13 RX ADMIN — BUPIVACAINE 20 ML: 13.3 INJECTION, SUSPENSION, LIPOSOMAL INFILTRATION at 20:26

## 2022-07-13 RX ADMIN — BUPIVACAINE HYDROCHLORIDE 20 ML: 2.5 INJECTION, SOLUTION EPIDURAL; INFILTRATION; INTRACAUDAL at 20:26

## 2022-07-13 RX ADMIN — HYDROMORPHONE HYDROCHLORIDE 0.4 MG: 0.2 INJECTION, SOLUTION INTRAMUSCULAR; INTRAVENOUS; SUBCUTANEOUS at 22:38

## 2022-07-13 RX ADMIN — Medication 5 MG: at 18:05

## 2022-07-13 RX ADMIN — Medication 10 MG: at 18:08

## 2022-07-13 RX ADMIN — ROCURONIUM BROMIDE 40 MG: 50 INJECTION, SOLUTION INTRAVENOUS at 18:08

## 2022-07-13 RX ADMIN — DEXAMETHASONE SODIUM PHOSPHATE 4 MG: 4 INJECTION, SOLUTION INTRA-ARTICULAR; INTRALESIONAL; INTRAMUSCULAR; INTRAVENOUS; SOFT TISSUE at 17:59

## 2022-07-13 RX ADMIN — ROCURONIUM BROMIDE 10 MG: 50 INJECTION, SOLUTION INTRAVENOUS at 17:59

## 2022-07-13 RX ADMIN — ERTAPENEM SODIUM 1 G: 1 INJECTION, POWDER, LYOPHILIZED, FOR SOLUTION INTRAMUSCULAR; INTRAVENOUS at 17:32

## 2022-07-13 RX ADMIN — KETAMINE HYDROCHLORIDE 30 MG: 10 INJECTION, SOLUTION INTRAMUSCULAR; INTRAVENOUS at 19:09

## 2022-07-13 RX ADMIN — SUGAMMADEX 200 MG: 100 INJECTION, SOLUTION INTRAVENOUS at 20:00

## 2022-07-13 RX ADMIN — MAGNESIUM SULFATE HEPTAHYDRATE 2 G: 40 INJECTION, SOLUTION INTRAVENOUS at 19:16

## 2022-07-13 RX ADMIN — PHENYLEPHRINE HYDROCHLORIDE 150 MCG: 10 INJECTION INTRAVENOUS at 18:47

## 2022-07-13 RX ADMIN — ACETAMINOPHEN 325MG 975 MG: 325 TABLET ORAL at 22:37

## 2022-07-13 RX ADMIN — KETOROLAC TROMETHAMINE 15 MG: 30 INJECTION, SOLUTION INTRAMUSCULAR at 20:00

## 2022-07-13 RX ADMIN — KETAMINE HYDROCHLORIDE 20 MG: 10 INJECTION, SOLUTION INTRAMUSCULAR; INTRAVENOUS at 19:24

## 2022-07-13 RX ADMIN — PROPOFOL 200 MG: 10 INJECTION, EMULSION INTRAVENOUS at 17:59

## 2022-07-13 RX ADMIN — GLYCOPYRROLATE 0.1 MG: 0.2 INJECTION, SOLUTION INTRAMUSCULAR; INTRAVENOUS at 17:59

## 2022-07-13 RX ADMIN — GLYCOPYRROLATE 0.1 MG: 0.2 INJECTION, SOLUTION INTRAMUSCULAR; INTRAVENOUS at 17:55

## 2022-07-13 RX ADMIN — ROCURONIUM BROMIDE 20 MG: 50 INJECTION, SOLUTION INTRAVENOUS at 19:08

## 2022-07-13 RX ADMIN — SODIUM CHLORIDE, POTASSIUM CHLORIDE, SODIUM LACTATE AND CALCIUM CHLORIDE: 600; 310; 30; 20 INJECTION, SOLUTION INTRAVENOUS at 18:14

## 2022-07-13 RX ADMIN — MIDAZOLAM 2 MG: 1 INJECTION INTRAMUSCULAR; INTRAVENOUS at 17:55

## 2022-07-13 RX ADMIN — MEPERIDINE HYDROCHLORIDE 25 MG: 25 INJECTION, SOLUTION INTRAMUSCULAR; INTRAVENOUS; SUBCUTANEOUS at 18:26

## 2022-07-13 RX ADMIN — SODIUM CHLORIDE 61 ML: 9 INJECTION, SOLUTION INTRAVENOUS at 15:07

## 2022-07-13 RX ADMIN — IOPAMIDOL 75 ML: 755 INJECTION, SOLUTION INTRAVENOUS at 15:07

## 2022-07-13 RX ADMIN — FENTANYL CITRATE 250 MCG: 50 INJECTION, SOLUTION INTRAMUSCULAR; INTRAVENOUS at 17:59

## 2022-07-13 RX ADMIN — Medication 5 MG: at 18:39

## 2022-07-13 RX ADMIN — Medication 5 MG: at 18:44

## 2022-07-13 RX ADMIN — SODIUM CHLORIDE: 9 INJECTION, SOLUTION INTRAVENOUS at 16:09

## 2022-07-13 RX ADMIN — SUCCINYLCHOLINE CHLORIDE 120 MG: 20 INJECTION, SOLUTION INTRAMUSCULAR; INTRAVENOUS at 17:59

## 2022-07-13 RX ADMIN — SODIUM CHLORIDE, POTASSIUM CHLORIDE, SODIUM LACTATE AND CALCIUM CHLORIDE: 600; 310; 30; 20 INJECTION, SOLUTION INTRAVENOUS at 22:56

## 2022-07-13 RX ADMIN — ONDANSETRON 4 MG: 2 INJECTION INTRAMUSCULAR; INTRAVENOUS at 20:00

## 2022-07-13 RX ADMIN — HYDROMORPHONE HYDROCHLORIDE 1 MG: 1 INJECTION, SOLUTION INTRAMUSCULAR; INTRAVENOUS; SUBCUTANEOUS at 20:08

## 2022-07-13 ASSESSMENT — ACTIVITIES OF DAILY LIVING (ADL)
DRESSING/BATHING_DIFFICULTY: NO
DIFFICULTY_EATING/SWALLOWING: NO
DOING_ERRANDS_INDEPENDENTLY_DIFFICULTY: NO
TOILETING_ISSUES: NO
CHANGE_IN_FUNCTIONAL_STATUS_SINCE_ONSET_OF_CURRENT_ILLNESS/INJURY: NO
ADLS_ACUITY_SCORE: 35
FALL_HISTORY_WITHIN_LAST_SIX_MONTHS: NO
ADLS_ACUITY_SCORE: 35
WALKING_OR_CLIMBING_STAIRS_DIFFICULTY: NO

## 2022-07-13 ASSESSMENT — LIFESTYLE VARIABLES: TOBACCO_USE: 1

## 2022-07-13 NOTE — ED TRIAGE NOTES
Pt here with abdominal pain, decreased appetite, and constipation.      Triage Assessment     Row Name 07/13/22 1124       Triage Assessment (Adult)    Airway WDL WDL       Respiratory WDL    Respiratory WDL WDL       Skin Circulation/Temperature WDL    Skin Circulation/Temperature WDL WDL       Cardiac WDL    Cardiac WDL WDL       Peripheral/Neurovascular WDL    Peripheral Neurovascular WDL WDL       Cognitive/Neuro/Behavioral WDL    Cognitive/Neuro/Behavioral WDL WDL

## 2022-07-13 NOTE — ED PROVIDER NOTES
"  History     Chief Complaint   Patient presents with     Abdominal Pain     HPI  Ghulam Rizzo is a 66 year old male who presents for evaluation of abdominal pain.  Pain is in the lower abdomen.  Pain began 3 days ago.  Pain at times is severe, dull and achy.  He is thrown up both of the last 2 days.  He has not eaten today for fear of vomiting.  He has a history of a \"twisted stomach for which she needed surgery 1989, this sounds to be a possible gastric volvulus.  Only other abdominal surgery was for hemorrhoids later.  No history of a bowel obstruction.  He still has his appendix as far as he knows.  No fever.  Last bowel movement was today which was soft.    Allergies:  Allergies   Allergen Reactions     Levaquin [Levofloxacin] Nausea and Vomiting       Problem List:    Patient Active Problem List    Diagnosis Date Noted     Elevated prostate specific antigen (PSA) 02/09/2022     Priority: Medium     Other irritable bowel syndrome 11/07/2019     Priority: Medium     Screening for hypertension 11/07/2019     Priority: Medium     Proctitis 01/26/2017     Priority: Medium     Colonoscopy in 2013.        Major depressive disorder, recurrent episode, moderate (H) 01/26/2017     Priority: Medium     Paxil caused anorgasmia       Cirrhosis of liver without ascites (H) 09/23/2015     Priority: Medium     Thought due to hepatitis C.  Follows with [facility].  Completed [facility] 9/2015.       Abdominal pain, generalized 08/20/2015     Priority: Medium     Advanced directives, counseling/discussion 09/14/2012     Priority: Medium     Discussed advance care planning with patient; however, patient declined at this time. 9/14/2012          Hyperlipidemia LDL goal <130 07/16/2012     Priority: Medium     Tobacco abuse 07/16/2012     Priority: Medium        Past Medical History:    Past Medical History:   Diagnosis Date     Anisocoria      Asthma      Carpal tunnel syndrome      Head injury, unspecified      Hepatitis C      Obesity " "     Other convulsions      Other mononeuritis of upper limb      Unspecified essential hypertension        Past Surgical History:    Past Surgical History:   Procedure Laterality Date     COLONOSCOPY       GI SURGERY       HERNIA REPAIR       SURGICAL HISTORY OF -   4/6/87    esophagogastroduodenoscopy     SURGICAL HISTORY OF -   4/27/87    exploratory laparotomy and anterior gastropexy over a gastrostomy tube.     SURGICAL HISTORY OF -   11/13/90    esophagogastroduodenoscopy     SURGICAL HISTORY OF -   1991    L diaphragm eventration repair, fixation of stomach and colon to abd wall     SURGICAL HISTORY OF -       hiatal hernia repair     THORACIC SURGERY         Family History:    Family History   Problem Relation Age of Onset     Cerebrovascular Disease Mother      Heart Disease Brother      Heart Disease Brother        Social History:  Marital Status:   [2]  Social History     Tobacco Use     Smoking status: Current Every Day Smoker     Packs/day: 0.50     Types: Cigarettes     Smokeless tobacco: Never Used     Tobacco comment: started again 4/2010   Vaping Use     Vaping Use: Never used   Substance Use Topics     Alcohol use: Yes     Comment: 1-2 beers daily     Drug use: No        Medications:    No current outpatient medications on file.        Review of Systems  All other systems are reviewed and are negative    Physical Exam   BP: (!) 150/67  Pulse: 73  Temp: 97.6  F (36.4  C)  Resp: 16  Height: 188 cm (6' 2\")  Weight: 77.1 kg (170 lb)  SpO2: 100 %      Physical Exam  Vitals and nursing note reviewed.   Constitutional:       General: He is not in acute distress.     Appearance: He is well-developed. He is not diaphoretic.   HENT:      Head: Normocephalic and atraumatic.   Eyes:      General: No scleral icterus.        Right eye: No discharge.         Left eye: No discharge.      Conjunctiva/sclera: Conjunctivae normal.   Cardiovascular:      Rate and Rhythm: Normal rate and regular rhythm.      " Heart sounds: Normal heart sounds. No murmur heard.  Pulmonary:      Effort: Pulmonary effort is normal. No respiratory distress.      Breath sounds: Normal breath sounds. No stridor.   Abdominal:      Palpations: Abdomen is soft.      Tenderness: There is abdominal tenderness (severe rlq). There is guarding.   Musculoskeletal:         General: Normal range of motion.      Cervical back: Normal range of motion and neck supple.   Skin:     General: Skin is warm and dry.      Coloration: Skin is not pale.      Findings: No erythema or rash.   Neurological:      Mental Status: He is alert.      Cranial Nerves: No cranial nerve deficit.      Motor: No abnormal muscle tone.         ED Course                 Procedures         Critical Care time:  none               Results for orders placed or performed during the hospital encounter of 07/13/22 (from the past 24 hour(s))   Orange Draw    Narrative    The following orders were created for panel order Orange Draw.  Procedure                               Abnormality         Status                     ---------                               -----------         ------                     Extra Red Top Tube[[id number]]                               Final result               Extra Green Top (Lithium...[[id number]]                      Final result               Extra Purple Top Tube[[id number]]                            Final result                 Please view results for these tests on the individual orders.   CBC with Platelets & Differential    Narrative    The following orders were created for panel order CBC with Platelets & Differential.  Procedure                               Abnormality         Status                     ---------                               -----------         ------                     CBC with platelets and d...[[id number]]  Abnormal            Final result                 Please view results for these tests on the individual orders.   Hepatic  function panel   Result Value Ref Range    Bilirubin Total 0.8 0.2 - 1.3 mg/dL    Bilirubin Direct 0.2 0.0 - 0.2 mg/dL    Protein Total 6.6 (L) 6.8 - 8.8 g/dL    Albumin 3.6 3.4 - 5.0 g/dL    Alkaline Phosphatase 54 40 - 150 U/L    AST 8 0 - 45 U/L    ALT 17 0 - 70 U/L   Lipase   Result Value Ref Range    Lipase 86 73 - 393 U/L   Extra Red Top Tube   Result Value Ref Range    Hold Specimen JIC    Extra Green Top (Lithium Heparin) Tube   Result Value Ref Range    Hold Specimen JIC    Extra Purple Top Tube   Result Value Ref Range    Hold Specimen JIC    CBC with platelets and differential   Result Value Ref Range    WBC Count 7.9 4.0 - 11.0 10e3/uL    RBC Count 4.78 4.40 - 5.90 10e6/uL    Hemoglobin 15.8 13.3 - 17.7 g/dL    Hematocrit 45.2 40.0 - 53.0 %    MCV 95 78 - 100 fL    MCH 33.1 (H) 26.5 - 33.0 pg    MCHC 35.0 31.5 - 36.5 g/dL    RDW 13.3 10.0 - 15.0 %    Platelet Count 159 150 - 450 10e3/uL    % Neutrophils 62 %    % Lymphocytes 27 %    % Monocytes 9 %    % Eosinophils 1 %    % Basophils 1 %    % Immature Granulocytes 0 %    NRBCs per 100 WBC 0 <1 /100    Absolute Neutrophils 4.9 1.6 - 8.3 10e3/uL    Absolute Lymphocytes 2.1 0.8 - 5.3 10e3/uL    Absolute Monocytes 0.7 0.0 - 1.3 10e3/uL    Absolute Eosinophils 0.1 0.0 - 0.7 10e3/uL    Absolute Basophils 0.1 0.0 - 0.2 10e3/uL    Absolute Immature Granulocytes 0.0 <=0.4 10e3/uL    Absolute NRBCs 0.0 10e3/uL   Basic metabolic panel   Result Value Ref Range    Sodium 137 133 - 144 mmol/L    Potassium 4.2 3.4 - 5.3 mmol/L    Chloride 106 94 - 109 mmol/L    Carbon Dioxide (CO2) 25 20 - 32 mmol/L    Anion Gap 6 3 - 14 mmol/L    Urea Nitrogen 12 7 - 30 mg/dL    Creatinine 0.63 (L) 0.66 - 1.25 mg/dL    Calcium 8.7 8.5 - 10.1 mg/dL    Glucose 92 70 - 99 mg/dL    GFR Estimate >90 >60 mL/min/1.73m2   UA with Microscopic reflex to Culture    Specimen: Urine, Midstream   Result Value Ref Range    Color Urine Yellow Colorless, Straw, Light Yellow, Yellow    Appearance Urine  Clear Clear    Glucose Urine Negative Negative mg/dL    Bilirubin Urine Negative Negative    Ketones Urine 80  (A) Negative mg/dL    Specific Gravity Urine 1.019 1.003 - 1.035    Blood Urine Small (A) Negative    pH Urine 6.0 5.0 - 7.0    Protein Albumin Urine Negative Negative mg/dL    Urobilinogen Urine Normal Normal, 2.0 mg/dL    Nitrite Urine Negative Negative    Leukocyte Esterase Urine Negative Negative    Mucus Urine Present (A) None Seen /LPF    RBC Urine 6 (H) <=2 /HPF    WBC Urine 1 <=5 /HPF    Squamous Epithelials Urine <1 <=1 /HPF    Narrative    Urine Culture not indicated   Symptomatic; Yes; 7/10/2022 Influenza A/B & SARS-CoV2 (COVID-19) Virus PCR Multiplex Nose    Specimen: Nose; Swab   Result Value Ref Range    Influenza A PCR Negative Negative    Influenza B PCR Negative Negative    SARS CoV2 PCR Negative Negative    Narrative    Testing was performed using the fernanda SARS-CoV-2 & Influenza A/B Assay on the fernanda Maame System. This test should be ordered for the detection of SARS-CoV-2 and influenza viruses in individuals who meet clinical and/or epidemiological criteria. Test performance is unknown in asymptomatic patients. This test is for in vitro diagnostic use under the FDA EUA for laboratories certified under CLIA to perform moderate and/or high complexity testing. This test has not been FDA cleared or approved. A negative result does not rule out the presence of PCR inhibitors in the specimen or target RNA in concentration below the limit of detection for the assay. If only one viral target is positive but coinfection with multiple targets is suspected, the sample should be re-tested with another FDA cleared, approved or authorized test, if coinfection would change clinical management. [facility] are certified under the Clinical Laboratory Improvement Amendments of 1988 (CLIA-88) as  qualified to perform moderate and/or high complexity laboratory testing.   Abd/pelvis CT, IV  contrast only TRAUMA  / AAA    Narrative    CT ABDOMEN PELVIS WITH CONTRAST 7/13/2022 3:18 PM    CLINICAL HISTORY: Abdominal pain, vomiting.    TECHNIQUE: CT scan of the abdomen and pelvis was performed following  injection of IV contrast. Multiplanar reformats were obtained. Dose  reduction techniques were used.  CONTRAST: 75 mL Isovue 370    COMPARISON: 8/21/2015.     FINDINGS:   LOWER CHEST: Similar markedly elevated left hemidiaphragm with  probable minimal left base atelectasis.    HEPATOBILIARY: Normal contour with no significant mass. No bile duct  dilatation. Calcified gallstones. Low-attenuation subcentimeter liver  lesion(s) compatible with benign cysts or other benign lesions. No  specific evaluation or follow-up is recommended in a low risk patient.    PANCREAS: No significant mass, duct dilatation, or inflammatory  change.    SPLEEN: Normal size.    ADRENAL GLANDS: No significant nodules.    KIDNEYS/BLADDER: 8 mm stone in the inferior left kidney. Benign renal  cysts bilaterally, no follow-up indicated. No ureteral stones or  hydronephrosis. No bladder stone.    BOWEL: The cecum is in the left abdomen. There is some twisting of the  bowel and mesentery just to the left of midline coronal images 27  through 36 compatible with cecal volvulus. Moderate proximal small  bowel dilatation concerning for obstruction.    PELVIC ORGANS: No pelvic masses.    ADDITIONAL FINDINGS: Trace free fluid.    MUSCULOSKELETAL: No frankly destructive bony lesions.      Impression    IMPRESSION:   1.  Findings concerning for cecal volvulus with proximal small bowel  dilatation.  2.  8 mm stone inferior left kidney.     ELIEL DECKER MD         SYSTEM ID:  [id number]       Medications   sodium chloride 0.9% infusion ( Intravenous New Bag 7/13/22 5259)   ertapenem (INVanz) 1 g vial to attach to  mL bag (1 g Intravenous New Bag 7/13/22 5258)   bupivacaine liposome (EXPAREL) 1.3 % LA inj susp 20 mL (has no administration in  "time range)   bupivacaine liposome (EXPAREL) LA inj was given in the infiltration site to produce post-op analgesia. Duration of action is up to 72 hours. Other \"karmen\" meds should not be given for 96 hours except for lidocaine 4% patch. This is for INFORMATION ONLY. (has no administration in time range)   iopamidol (ISOVUE-370) solution 75 mL (75 mLs Intravenous Given 7/13/22 1507)   sodium chloride 0.9 % bag 500mL for CT scan flush use (61 mLs Intravenous Given 7/13/22 1507)       Assessments & Plan (with Medical Decision Making)  66-year-old male who presented with abdominal pain and CT evidence for cecal volvulus.  Case discussed with the surgeon, Dr. Martino who plans to take the patient to the operative room this evening.       I have reviewed the nursing notes.    I have reviewed the findings, diagnosis, plan and need for follow up with the patient.       Current Discharge Medication List          Final diagnoses:   Cecal volvulus (H)       7/13/2022   [facility] EMERGENCY DEPT     Anthony Wylie MD  07/13/22 3552    "

## 2022-07-13 NOTE — H&P
Surgical Consultation/History and Physical  [facility] General Surgery    Ghulam is seen in consultation for abdominal pain    Chief Complaint:  Abdominal pain    History of Present Illness: Ghulam Rizzo is a 66 year old male presents with abdominal pain.  Patient has a history of hepatitis C (Treated, in remission), depression, HLD who presents with intermittent, progressive abdominal pain.  Pain is 4/10 at baseline and increases significantly when he eats, currently it is 4/10. He did have a normal bowel movement for him this morning.  He has associated bloating and cramping with his episodes.  No fevers or chills. He states he has always had issues with bowel movements.  He has a history of gastric volvulus, treated with gastropexy and history of colopexy in past (1989 and 1991).    Patient Active Problem List   Diagnosis     Hyperlipidemia LDL goal <130     Tobacco abuse     Advanced directives, counseling/discussion     Abdominal pain, generalized     Cirrhosis of liver without ascites (H)     Proctitis     Major depressive disorder, recurrent episode, moderate (H)     Other irritable bowel syndrome     Screening for hypertension     Elevated prostate specific antigen (PSA)       Past Medical History:   Diagnosis Date     Anisocoria      Asthma      Carpal tunnel syndrome     2006     Head injury, unspecified     closed head injury 4 yrs ago     Hepatitis C     Cured by Harvoni in 2015. diagnosed in 2012 was positive for Hepatitis C.      Obesity     2006, resolved     Other convulsions     seizure disorder due to head injury 4 yrs ago.     Other mononeuritis of upper limb     L phrenic nerve paralysis     Unspecified essential hypertension        Past Surgical History:   Procedure Laterality Date     COLONOSCOPY       GI SURGERY       HERNIA REPAIR       SURGICAL HISTORY OF -   4/6/87    esophagogastroduodenoscopy     SURGICAL HISTORY OF -   4/27/87    exploratory laparotomy and anterior gastropexy over a  "gastrostomy tube.     SURGICAL HISTORY OF -   11/13/90    esophagogastroduodenoscopy     SURGICAL HISTORY OF -   1991    L diaphragm eventration repair, fixation of stomach and colon to abd wall     SURGICAL HISTORY OF -       hiatal hernia repair     THORACIC SURGERY         Family History   Problem Relation Age of Onset     Cerebrovascular Disease Mother      Heart Disease Brother      Heart Disease Brother        Social History     Tobacco Use     Smoking status: Current Every Day Smoker     Packs/day: 0.50     Types: Cigarettes     Smokeless tobacco: Never Used     Tobacco comment: started again 4/2010   Substance Use Topics     Alcohol use: Yes     Comment: 1-2 beers daily        History   Drug Use No       Current Outpatient Medications   Medication Sig Dispense Refill     atorvastatin (LIPITOR) 20 MG tablet Take 1 tablet (20 mg) by mouth daily 90 tablet 3     senna-docusate (NEGRITO-COLACE) 8.6-50 MG per tablet Take 1-2 tablets by mouth 2 times daily as needed for constipation. 100 tablet 5     sertraline (ZOLOFT) 50 MG tablet TAKE 1 & 1/2 TABLETS BY MOUTH EVERY  tablet 3       Allergies   Allergen Reactions     Levaquin [Levofloxacin] Nausea and Vomiting       Review of Systems:   10 point ROS otherwise negative    Physical Exam:  BP (!) 150/67   Pulse 73   Temp 97.6  F (36.4  C) (Tympanic)   Resp 16   Ht 1.88 m (6' 2\")   Wt 77.1 kg (170 lb)   SpO2 100%   BMI 21.83 kg/m      Constitutional- No acute distress, thin, non-toxic  Eyes: Anicteric, no injection.  PERRL  ENT:  Normocephalic, atraumatic, Nose midline, moist mucus membranes  Neck - supple, no LAD  Respiratory- Clear to auscultation bilaterally, good inspiratory effort  Cardiovascular - Heart RRR, no lift's, thrills, murmurs, rubs, or gallop.  No peripheral edema.  No clubbing.  Abdomen - Soft, partially reducible low midline hernia.  Moderate R sided abdominal pain with voluntary guarding  Neuro - No focal neuro deficits, Alert and " oriented x 3  Psych: Appropriate mood and affect  Musculoskeletal: Normal gait, symmetric strength.  FROM upper and lower extremities.  Skin: Warm, Dry    Lab Results   Component Value Date    WBC 7.9 07/13/2022    WBC 6.1 06/28/2020     Lab Results   Component Value Date    RBC 4.78 07/13/2022    RBC 5.28 06/28/2020     Lab Results   Component Value Date    HGB 15.8 07/13/2022    HGB 17.3 06/28/2020     Lab Results   Component Value Date    HCT 45.2 07/13/2022    HCT 50.3 06/28/2020     Lab Results   Component Value Date    MCV 95 07/13/2022    MCV 95 06/28/2020     Lab Results   Component Value Date    MCH 33.1 07/13/2022    MCH 32.8 06/28/2020     Lab Results   Component Value Date    MCHC 35.0 07/13/2022    MCHC 34.4 06/28/2020     Lab Results   Component Value Date    RDW 13.3 07/13/2022    RDW 13.2 06/28/2020     Lab Results   Component Value Date     07/13/2022     06/28/2020     Last Comprehensive Metabolic Panel:  Sodium   Date Value Ref Range Status   07/13/2022 137 133 - 144 mmol/L Final   06/28/2020 138 133 - 144 mmol/L Final     Potassium   Date Value Ref Range Status   07/13/2022 4.2 3.4 - 5.3 mmol/L Final   06/28/2020 4.0 3.4 - 5.3 mmol/L Final     Chloride   Date Value Ref Range Status   07/13/2022 106 94 - 109 mmol/L Final   06/28/2020 107 94 - 109 mmol/L Final     Carbon Dioxide   Date Value Ref Range Status   06/28/2020 27 20 - 32 mmol/L Final     Carbon Dioxide (CO2)   Date Value Ref Range Status   07/13/2022 25 20 - 32 mmol/L Final     Anion Gap   Date Value Ref Range Status   07/13/2022 6 3 - 14 mmol/L Final   06/28/2020 4 3 - 14 mmol/L Final     Glucose   Date Value Ref Range Status   07/13/2022 92 70 - 99 mg/dL Final   06/28/2020 144 (H) 70 - 99 mg/dL Final     Urea Nitrogen   Date Value Ref Range Status   07/13/2022 12 7 - 30 mg/dL Final   06/28/2020 13 7 - 30 mg/dL Final     Creatinine   Date Value Ref Range Status   07/13/2022 0.63 (L) 0.66 - 1.25 mg/dL Final   06/28/2020  0.72 0.66 - 1.25 mg/dL Final     GFR Estimate   Date Value Ref Range Status   07/13/2022 >90 >60 mL/min/1.73m2 Final     Comment:     Effective December 21, 2021 eGFRcr in adults is calculated using the 2021 CKD-EPI creatinine equation which includes age and gender (Bakari barney al., [place], DOI: 10.1056/RRNFvh3618456)   06/28/2020 >90 >60 mL/min/[1.73_m2] Final     Comment:     Non  GFR Calc  Starting 12/18/2018, serum creatinine based estimated GFR (eGFR) will be   calculated using the Chronic Kidney Disease Epidemiology Collaboration   (CKD-EPI) equation.       Calcium   Date Value Ref Range Status   07/13/2022 8.7 8.5 - 10.1 mg/dL Final   06/28/2020 8.6 8.5 - 10.1 mg/dL Final     Bilirubin Total   Date Value Ref Range Status   07/13/2022 0.8 0.2 - 1.3 mg/dL Final   06/28/2020 0.7 0.2 - 1.3 mg/dL Final     Alkaline Phosphatase   Date Value Ref Range Status   07/13/2022 54 40 - 150 U/L Final   06/28/2020 76 40 - 150 U/L Final     ALT   Date Value Ref Range Status   07/13/2022 17 0 - 70 U/L Final   06/28/2020 27 0 - 70 U/L Final     AST   Date Value Ref Range Status   07/13/2022 8 0 - 45 U/L Final   06/28/2020 14 0 - 45 U/L Final     CT Abdomen/Pelvis:  FINDINGS:   LOWER CHEST: Similar markedly elevated left hemidiaphragm with  probable minimal left base atelectasis.     HEPATOBILIARY: Normal contour with no significant mass. No bile duct  dilatation. Calcified gallstones. Low-attenuation subcentimeter liver  lesion(s) compatible with benign cysts or other benign lesions. No  specific evaluation or follow-up is recommended in a low risk patient.     PANCREAS: No significant mass, duct dilatation, or inflammatory  change.     SPLEEN: Normal size.     ADRENAL GLANDS: No significant nodules.     KIDNEYS/BLADDER: 8 mm stone in the inferior left kidney. Benign renal  cysts bilaterally, no follow-up indicated. No ureteral stones or  hydronephrosis. No bladder stone.     BOWEL: The cecum is in the left abdomen.  There is some twisting of the  bowel and mesentery just to the left of midline coronal images 27  through 36 compatible with cecal volvulus. Moderate proximal small  bowel dilatation concerning for obstruction.     PELVIC ORGANS: No pelvic masses.     ADDITIONAL FINDINGS: Trace free fluid.     MUSCULOSKELETAL: No frankly destructive bony lesions.                                                                      IMPRESSION:   1.  Findings concerning for cecal volvulus with proximal small bowel  dilatation.  2.  8 mm stone inferior left kidney.     Assessment:  1. Cecal volvulus  2. Tobacco dependence  3. History of Hepatitis C    Plan:   Mr. Rizzo presents with cecal volvulus.  He has had numerous other surgeries including gastropexy, colopexy, diaphragm injury, hiatal hernia repair.  I performed an independent history, physical and reviewed his labs/imaging.  I discussed the management of cecal volvulus and recommended laparotomy, colectomy and all indicated procedures.  I discussed the indications, risks, benefits alternatives.  I discussed the expected hospital duration and potentials of recovery.  I reviewed potential of ostomy (though not likely) with him.  He and wife had opportunity to ask questions, all questions were answered.  To OR.    Risks discussed include but are not limited to: bleeding, infection, anastomotic dehiscence (a leak where the bowels are reconnected), potential for ostomy (temporary or permanent), potential for recurrence, potential for ileus (delayed recovery in bowel function), potential need for additional treatments/surgery, injury to intra-abdominal structures, hernia recurrence, difficult recovery from anesthesia (including prolonged ventilation), heart attack, blood clots (DVT or Pulmonary Embolism), stroke, or death.      Tommy Martino, DO on 7/13/2022 at 5:45 PM

## 2022-07-13 NOTE — ANESTHESIA PREPROCEDURE EVALUATION
Anesthesia Pre-Procedure Evaluation    Patient: Ghulam Rizzo   MRN: [id number] : 1955        Procedure : Procedure(s):  Laparotomy, right colectomy, all indicated procedure          Past Medical History:   Diagnosis Date     Anisocoria      Asthma      Carpal tunnel syndrome          Head injury, unspecified     closed head injury 4 yrs ago     Hepatitis C     Cured by [facility] in . diagnosed in  was positive for Hepatitis C.      Obesity     , resolved     Other convulsions     seizure disorder due to head injury 4 yrs ago.     Other mononeuritis of upper limb     L phrenic nerve paralysis     Unspecified essential hypertension       Past Surgical History:   Procedure Laterality Date     COLONOSCOPY       GI SURGERY       HERNIA REPAIR       SURGICAL HISTORY OF -   87    esophagogastroduodenoscopy     SURGICAL HISTORY OF -   87    exploratory laparotomy and anterior gastropexy over a gastrostomy tube.     SURGICAL HISTORY OF -   90    esophagogastroduodenoscopy     SURGICAL HISTORY OF -       L diaphragm eventration repair, fixation of stomach and colon to abd wall     SURGICAL HISTORY OF -       hiatal hernia repair     THORACIC SURGERY        Allergies   Allergen Reactions     Levaquin [Levofloxacin] Nausea and Vomiting      Social History     Tobacco Use     Smoking status: Current Every Day Smoker     Packs/day: 0.50     Types: Cigarettes     Smokeless tobacco: Never Used     Tobacco comment: started again 2010   Substance Use Topics     Alcohol use: Yes     Comment: 1-2 beers daily      Wt Readings from Last 1 Encounters:   22 77.1 kg (170 lb)        Anesthesia Evaluation   Pt has had prior anesthetic. Type: MAC and General.    No history of anesthetic complications       ROS/MED HX  ENT/Pulmonary:     (+) tobacco use, Current use, Intermittent, asthma     Neurologic:  - neg neurologic ROS     Cardiovascular:     (+) Dyslipidemia -----Previous cardiac testing    Echo: Date: Results:    Stress Test: Date: Results:    ECG Reviewed: Date: 6-2020 Results:  Sinus Rhythm   WITHIN NORMAL LIMITS  Cath: Date: Results:      METS/Exercise Tolerance:     Hematologic:  - neg hematologic  ROS     Musculoskeletal:  - neg musculoskeletal ROS     GI/Hepatic: Comment: IBS    (+) hepatitis type C, liver disease,     Renal/Genitourinary:       Endo:  - neg endo ROS     Psychiatric/Substance Use:     (+) psychiatric history depression     Infectious Disease:  - neg infectious disease ROS     Malignancy:  - neg malignancy ROS     Other:  - neg other ROS          Physical Exam    Airway  airway exam normal           Respiratory Devices and Support         Dental  no notable dental history         Cardiovascular   cardiovascular exam normal          Pulmonary   pulmonary exam normal                OUTSIDE LABS:  CBC:   Lab Results   Component Value Date    WBC 7.9 07/13/2022    WBC 6.1 06/28/2020    HGB 15.8 07/13/2022    HGB 17.3 06/28/2020    HCT 45.2 07/13/2022    HCT 50.3 06/28/2020     07/13/2022     06/28/2020     BMP:   Lab Results   Component Value Date     07/13/2022     06/28/2020    POTASSIUM 4.2 07/13/2022    POTASSIUM 4.0 06/28/2020    CHLORIDE 106 07/13/2022    CHLORIDE 107 06/28/2020    CO2 25 07/13/2022    CO2 27 06/28/2020    BUN 12 07/13/2022    BUN 13 06/28/2020    CR 0.63 (L) 07/13/2022    CR 0.72 06/28/2020    GLC 92 07/13/2022     (H) 06/28/2020     COAGS:   Lab Results   Component Value Date    PTT 33 11/25/2017    INR 1.04 11/25/2017     POC:   Lab Results   Component Value Date     (H) 04/03/2006     HEPATIC:   Lab Results   Component Value Date    ALBUMIN 3.6 07/13/2022    PROTTOTAL 6.6 (L) 07/13/2022    ALT 17 07/13/2022    AST 8 07/13/2022    ALKPHOS 54 07/13/2022    BILITOTAL 0.8 07/13/2022     OTHER:   Lab Results   Component Value Date    A1C 5.4 02/08/2022    TAVIA 8.7 07/13/2022    PHOS 3.5 09/14/2012    MAG 1.9 09/07/2012     LIPASE 86 07/13/2022    TSH 2.54 08/10/2015    T4 1.12 08/10/2015    CRP <2.9 11/25/2017       Anesthesia Plan    ASA Status:  2, emergent    NPO Status:  NPO Appropriate    Anesthesia Type: General.     - Airway: ETT   Induction: Intravenous, RSI.   Maintenance: Balanced.        Consents    Anesthesia Plan(s) and associated risks, benefits, and realistic alternatives discussed. Questions answered and patient/representative(s) expressed understanding.     - Discussed: Risks, Benefits and Alternatives for BOTH SEDATION and the PROCEDURE were discussed     - Discussed with:  Patient, Spouse         Postoperative Care    Pain management: IV analgesics, Oral pain medications, Peripheral nerve block (Single Shot).   PONV prophylaxis: Ondansetron (or other 5HT-3), Dexamethasone or Solumedrol     Comments:    Other Comments: Emergent - cecal volvulus            ANGELA Woodruff CRNA

## 2022-07-14 LAB
ANION GAP SERPL CALCULATED.3IONS-SCNC: 7 MMOL/L (ref 3–14)
BUN SERPL-MCNC: 15 MG/DL (ref 7–30)
CALCIUM SERPL-MCNC: 8.1 MG/DL (ref 8.5–10.1)
CHLORIDE BLD-SCNC: 108 MMOL/L (ref 94–109)
CO2 SERPL-SCNC: 23 MMOL/L (ref 20–32)
CREAT SERPL-MCNC: 0.66 MG/DL (ref 0.66–1.25)
ERYTHROCYTE [DISTWIDTH] IN BLOOD BY AUTOMATED COUNT: 13.4 % (ref 10–15)
GFR SERPL CREATININE-BSD FRML MDRD: >90 ML/MIN/1.73M2
GLUCOSE BLD-MCNC: 104 MG/DL (ref 70–99)
HCT VFR BLD AUTO: 45.7 % (ref 40–53)
HGB BLD-MCNC: 15.4 G/DL (ref 13.3–17.7)
MAGNESIUM SERPL-MCNC: 2.2 MG/DL (ref 1.6–2.3)
MCH RBC QN AUTO: 32.9 PG (ref 26.5–33)
MCHC RBC AUTO-ENTMCNC: 33.7 G/DL (ref 31.5–36.5)
MCV RBC AUTO: 98 FL (ref 78–100)
PHOSPHATE SERPL-MCNC: 3.9 MG/DL (ref 2.5–4.5)
PLATELET # BLD AUTO: 142 10E3/UL (ref 150–450)
POTASSIUM BLD-SCNC: 4.2 MMOL/L (ref 3.4–5.3)
RBC # BLD AUTO: 4.68 10E6/UL (ref 4.4–5.9)
SODIUM SERPL-SCNC: 138 MMOL/L (ref 133–144)
WBC # BLD AUTO: 9.1 10E3/UL (ref 4–11)

## 2022-07-14 PROCEDURE — 36415 COLL VENOUS BLD VENIPUNCTURE: CPT | Performed by: SURGERY

## 2022-07-14 PROCEDURE — 258N000003 HC RX IP 258 OP 636: Performed by: SURGERY

## 2022-07-14 PROCEDURE — 85027 COMPLETE CBC AUTOMATED: CPT | Performed by: SURGERY

## 2022-07-14 PROCEDURE — 83735 ASSAY OF MAGNESIUM: CPT | Performed by: SURGERY

## 2022-07-14 PROCEDURE — 250N000013 HC RX MED GY IP 250 OP 250 PS 637: Performed by: SURGERY

## 2022-07-14 PROCEDURE — 250N000011 HC RX IP 250 OP 636: Performed by: SURGERY

## 2022-07-14 PROCEDURE — 80048 BASIC METABOLIC PNL TOTAL CA: CPT | Performed by: SURGERY

## 2022-07-14 PROCEDURE — 120N000001 HC R&B MED SURG/OB

## 2022-07-14 PROCEDURE — 84100 ASSAY OF PHOSPHORUS: CPT | Performed by: SURGERY

## 2022-07-14 RX ADMIN — ACETAMINOPHEN 325MG 975 MG: 325 TABLET ORAL at 13:32

## 2022-07-14 RX ADMIN — HYDROMORPHONE HYDROCHLORIDE 0.4 MG: 0.2 INJECTION, SOLUTION INTRAMUSCULAR; INTRAVENOUS; SUBCUTANEOUS at 22:24

## 2022-07-14 RX ADMIN — HYDROMORPHONE HYDROCHLORIDE 0.4 MG: 0.2 INJECTION, SOLUTION INTRAMUSCULAR; INTRAVENOUS; SUBCUTANEOUS at 06:35

## 2022-07-14 RX ADMIN — ENOXAPARIN SODIUM 40 MG: 100 INJECTION SUBCUTANEOUS at 13:33

## 2022-07-14 RX ADMIN — HYDROMORPHONE HYDROCHLORIDE 0.4 MG: 0.2 INJECTION, SOLUTION INTRAMUSCULAR; INTRAVENOUS; SUBCUTANEOUS at 18:47

## 2022-07-14 RX ADMIN — HYDROMORPHONE HYDROCHLORIDE 0.4 MG: 0.2 INJECTION, SOLUTION INTRAMUSCULAR; INTRAVENOUS; SUBCUTANEOUS at 11:14

## 2022-07-14 RX ADMIN — SODIUM CHLORIDE, POTASSIUM CHLORIDE, SODIUM LACTATE AND CALCIUM CHLORIDE: 600; 310; 30; 20 INJECTION, SOLUTION INTRAVENOUS at 18:56

## 2022-07-14 RX ADMIN — ACETAMINOPHEN 325MG 975 MG: 325 TABLET ORAL at 22:16

## 2022-07-14 RX ADMIN — FAMOTIDINE 20 MG: 10 INJECTION INTRAVENOUS at 08:26

## 2022-07-14 RX ADMIN — HYDROMORPHONE HYDROCHLORIDE 0.4 MG: 0.2 INJECTION, SOLUTION INTRAMUSCULAR; INTRAVENOUS; SUBCUTANEOUS at 13:37

## 2022-07-14 RX ADMIN — SODIUM CHLORIDE, POTASSIUM CHLORIDE, SODIUM LACTATE AND CALCIUM CHLORIDE 100 ML: 600; 310; 30; 20 INJECTION, SOLUTION INTRAVENOUS at 08:33

## 2022-07-14 RX ADMIN — SERTRALINE HYDROCHLORIDE 75 MG: 50 TABLET ORAL at 08:25

## 2022-07-14 RX ADMIN — HYDROMORPHONE HYDROCHLORIDE 0.4 MG: 0.2 INJECTION, SOLUTION INTRAMUSCULAR; INTRAVENOUS; SUBCUTANEOUS at 16:20

## 2022-07-14 RX ADMIN — ERTAPENEM SODIUM 1 G: 1 INJECTION, POWDER, LYOPHILIZED, FOR SOLUTION INTRAMUSCULAR; INTRAVENOUS at 18:50

## 2022-07-14 RX ADMIN — ACETAMINOPHEN 325MG 975 MG: 325 TABLET ORAL at 06:34

## 2022-07-14 RX ADMIN — FAMOTIDINE 20 MG: 10 INJECTION INTRAVENOUS at 20:10

## 2022-07-14 RX ADMIN — ATORVASTATIN CALCIUM 20 MG: 20 TABLET, FILM COATED ORAL at 08:25

## 2022-07-14 RX ADMIN — HYDROMORPHONE HYDROCHLORIDE 0.4 MG: 0.2 INJECTION, SOLUTION INTRAMUSCULAR; INTRAVENOUS; SUBCUTANEOUS at 04:30

## 2022-07-14 ASSESSMENT — ACTIVITIES OF DAILY LIVING (ADL)
ADLS_ACUITY_SCORE: 18
ADLS_ACUITY_SCORE: 21
CHANGE_IN_FUNCTIONAL_STATUS_SINCE_ONSET_OF_CURRENT_ILLNESS/INJURY: NO
DIFFICULTY_EATING/SWALLOWING: NO
WEAR_GLASSES_OR_BLIND: NO
ADLS_ACUITY_SCORE: 21
ADLS_ACUITY_SCORE: 21
CONCENTRATING,_REMEMBERING_OR_MAKING_DECISIONS_DIFFICULTY: NO
ADLS_ACUITY_SCORE: 21
DOING_ERRANDS_INDEPENDENTLY_DIFFICULTY: NO
ADLS_ACUITY_SCORE: 18
TOILETING_ISSUES: NO
ADLS_ACUITY_SCORE: 18
ADLS_ACUITY_SCORE: 18
HEARING_DIFFICULTY_OR_DEAF: NO
WALKING_OR_CLIMBING_STAIRS_DIFFICULTY: NO
ADLS_ACUITY_SCORE: 18
DIFFICULTY_COMMUNICATING: NO
FALL_HISTORY_WITHIN_LAST_SIX_MONTHS: NO
ADLS_ACUITY_SCORE: 18
ADLS_ACUITY_SCORE: 21
ADLS_ACUITY_SCORE: 18
DRESSING/BATHING_DIFFICULTY: NO

## 2022-07-14 NOTE — ANESTHESIA POSTPROCEDURE EVALUATION
Patient: Ghulam Rizzo    Procedure: Procedure(s):  Laparotomy, right colectomy, lysis of adhesions, primary incisional hernia repair       Anesthesia Type:  General    Note:  Disposition: Admission   Postop Pain Control: Uneventful            Sign Out: Well controlled pain   PONV: No   Neuro/Psych: Uneventful            Sign Out: Acceptable/Baseline neuro status   Airway/Respiratory: Uneventful            Sign Out: Acceptable/Baseline resp. status   CV/Hemodynamics: Uneventful            Sign Out: Acceptable CV status; No obvious hypovolemia; No obvious fluid overload   Other NRE: NONE   DID A NON-ROUTINE EVENT OCCUR? No           Last vitals:  Vitals Value Taken Time   /66 07/13/22 2122   Temp 36.7  C (98  F) 07/13/22 2122   Pulse 80 07/13/22 2122   Resp 16 07/13/22 2115   SpO2 94 % 07/13/22 2122       Electronically Signed By: ANGELA Ulloa CRNA  July 13, 2022  10:27 PM

## 2022-07-14 NOTE — PLAN OF CARE
"A&Ox4. Up with STBA. Has had abdominal pain throughout the morning managed with PRN IV dilaudid 0.4 mg x2. Continues on NPO diet, except sips or water/ice chips. Midline abdominal incision covered with island dressing, drainage marked, no more drainage since overnight. Abdominal dressing intact. Normoactive bowel sounds. Afebrile. On RA. Has gotten up x2 to walk around the room, and sat into chair. Rivera cath pulled at 0930 with 600 out. Bladder scan done at 1400 of 121 ml. LR running at 100 ml/hr.     /64 (BP Location: Left arm)   Pulse 65   Temp 97.7  F (36.5  C) (Oral)   Resp 18   Ht 1.88 m (6' 2\")   Wt 77.1 kg (170 lb)   SpO2 97%   BMI 21.83 kg/m      Sharri Ramirez RN on 7/14/2022 at 3:28 PM      "

## 2022-07-14 NOTE — PLAN OF CARE
"[facility] ADMISSION NOTE    Patient admitted to room 2404 at approximately 2130 via cart from PACU. Patient was accompanied by nurse.     Verbal SBAR report received from WON Bahena prior to patient arrival.     Patient trasferred to bed via air kate. Patient alert and oriented X 3. The patient is not having any pain.  . Admission vital signs: Blood pressure 131/66, pulse 80, temperature 98  F (36.7  C), temperature source Axillary, resp. rate 16, height 1.88 m (6' 2\"), weight 77.1 kg (170 lb), SpO2 94 %. Patient was oriented to plan of care, call light, bed controls, tv, telephone, bathroom, and visiting hours.     Risk Assessment    The following safety risks were identified during admission: none. Yellow risk band applied: NO.     Skin Initial Assessment    This writer admitted this patient and completed a full skin assessment and Link score in the Adult PCS flowsheet. Appropriate interventions initiated as needed.          Education    Patient has a [place] to Observation order: No  Observation education completed and documented: N/A      NATALIIA BOUDREAUX RN      "

## 2022-07-14 NOTE — PROGRESS NOTES
Patient alert and oriented. Changing positions in bed indep.  Has c/o pain, relieved with PRN hydromorphone IV. Rivera catheter draining dark yellow urine. Dressing covering his surgical incision has some drainage, but remains intact.

## 2022-07-14 NOTE — PROGRESS NOTES
Subjective:     Seen and examined.  No acute events overnight.  Pain well controlled.  No fevers or chills.  No flatus.    I/O last 3 completed shifts:  In: 800 [I.V.:800]  Out: 400 [Urine:250; Blood:150]     No current outpatient medications on file.         ROUTINE IP LABS (Last four results)  BMP  Recent Labs   Lab 07/14/22  0447 07/13/22  1402    137   POTASSIUM 4.2 4.2   CHLORIDE 108 106   TAVIA 8.1* 8.7   CO2 23 25   BUN 15 12   CR 0.66 0.63*   * 92     CBC  Recent Labs   Lab 07/14/22  0447 07/13/22  1402   WBC 9.1 7.9   RBC 4.68 4.78   HGB 15.4 15.8   HCT 45.7 45.2   MCV 98 95   MCH 32.9 33.1*   MCHC 33.7 35.0   RDW 13.4 13.3   * 159     INRNo lab results found in last 7 days.         Tmax: 99.6  --->  Tcurrent: 98.5   B/P: 117/65  P: 69  R: 16  SpO2:93% on 2 L NC        EXAM  AOX4 NAD  CTAB  RRR  Appropriate incisional TTP without R/R/G,  Dressing C/D/I  No CCE        A/P:   1. POD 1 R Hemicolectomy, lysis of adhesions for cecal volvulus  - Pain: TAP, Dilaudid, Tylenol  - GI PPX: Pepcid  - IVF: LR @100  - Antibiotics: Invanz (stop after todays dose)  - OOB, Ambulate, IS  - Discontinue reynoso catheter  - Okay for sips/chips    Tommy Martino,  on 7/14/2022 at 7:55 AM

## 2022-07-14 NOTE — ANESTHESIA CARE TRANSFER NOTE
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    Anesthesia Type:   No value filed.     Note:    Oropharynx: spontaneously breathing and oral airway in place  Level of Consciousness: drowsy  Oxygen Supplementation: face mask  Level of Supplemental Oxygen (L/min / FiO2): 6  Independent Airway: airway patency satisfactory and stable  Dentition: dentition unchanged  Vital Signs Stable: post-procedure vital signs reviewed and stable  Report to RN Given: handoff report given  Patient transferred to: PACU    Handoff Report: Identifed the Patient, Identified the Reponsible Provider, Reviewed the pertinent medical history, Discussed the surgical course, Reviewed Intra-OP anesthesia mangement and issues during anesthesia, Set expectations for post-procedure period and Allowed opportunity for questions and acknowledgement of understanding      Vitals:  Vitals Value Taken Time   /86 07/13/22 2045   Temp 36.7  C (98  F) 07/13/22 2100   Pulse 80 07/13/22 2100   Resp 16 07/13/22 2100   SpO2 98 % 07/13/22 2100       Electronically Signed By: ANGELA Ulloa CRNA  July 13, 2022  9:01 PM

## 2022-07-14 NOTE — ANESTHESIA PROCEDURE NOTES
TAP Procedure Note    Pre-Procedure   Staff -        CRNA: Ann Marie Bah APRN CRNA       Performed By: CRNA       Pre-Anesthestic Checklist: patient identified, IV checked, site marked, risks and benefits discussed, informed consent, monitors and equipment checked, pre-op evaluation, at physician/surgeon's request and post-op pain management  Timeout:       Correct Patient: Yes        Correct Procedure: Yes        Correct Site: Yes        Correct Position: Yes        Correct Laterality: Yes        Site Marked: Yes  Procedure Documentation  Procedure: TAP       Diagnosis: POST OP PAIN       Laterality: bilateral       Patient Position: supine       Skin prep: Chloraprep       Insertion Site: T10-11.       Needle Type: insulated       Needle Gauge: 20.        Needle Length (millimeters): 100        Ultrasound guided       1. Ultrasound was used to identify targeted nerve, plexus, vascular marker, or fascial plane and place a needle adjacent to it in real-time.       2. Ultrasound was used to visualize the spread of anesthetic in close proximity to the above referenced structure.       3. A permanent image is entered into the patient's record.       4. The visualized anatomic structures appeared normal.       5. There were no apparent abnormal pathologic findings.    Assessment/Narrative         The placement was negative for: blood aspirated, painful injection and site bleeding       Paresthesias: No.       Bolus given via needle..        Secured via.        Insertion/Infusion Method: Single Shot       Complications: none       Injection made incrementally with aspirations every 5 mL.

## 2022-07-14 NOTE — OP NOTE
Procedure Date: July 13, 2022     PREOPERATIVE DIAGNOSIS:   1. Cecal volvulus  2. Incisional hernia    POSTOPERATIVE DIAGNOSIS:   1. Cecal volvulus  2. Intra-abdominal adhesions  3. Incisional hernia     PROCEDURE:   1. Laparotomy  2. Right hemicolectomy  3. Lysis of adhesions  4. Primary incisional hernia repair     ATTENDING SURGEON: Tommy Martino DO    Assistants:  None    ESTIMATED BLOOD LOSS: 30mL     INDICATIONS FOR PROCEDURE: : Mr. Rizzo presents with cecal volvulus by history and CT scan.  I discussed treatment would include right colectomy and all indicated procedures.  He had an incidental incisional herniaAfter discussion was held with the patient regarding alternatives, benefits, and risks (including, but not limited to, bleeding, infection, nontherapeutic operation, potential need for additional treatments/surgery, hernia, MI, death), the patient's questions were addressed and he consented to the aforementioned operative procedure.     PROCEDURE: After informed consent was obtained, the patient was brought to the operating room and placed in the supine position on the Operating Room table. Sequential compression devices were placed. General anesthesia was then administered. The abdomen was then prepped and draped in the typical sterile fashion. A time-out was performed to verify patient and procedure.  A reynoso catheter was placed under sterile conditions     A midline incision was performed and carried down to the fascia at his previous laparotomy incision. The fascia was divided, and the peritoneum was incised and opened to either pole of the incision. His low midline incisional hernia was encountered, omental fat was reduced.  The omentum was tacked under the fascia of the upper 2/3 of the incision. Approximately 30 minutes lysis of adhesions was required.  Findings on exploration include Intra-abdominal adhesions and small bowel dilation.    A wound protector was placed.  The small bowel was  eviscerated and run proximally, it was significantly dilated, however viable and slowly peristalsing.  This was followed distally to a single adhesion to the anterior abdominal wall which was taken down.  The cecum was located in the left mid abdomen and rotated to its anatomic position.  The colon was run.  At the midline there were significant adhesions to the upper abdomen from his known previous colopexy.  Upon examination, the cecal mesentery was significantly elongated and the right colon could easily be rotated to nearly the left paracolic gutter.  The mesentery also had scarring and I suspect he had been intermittently having volvulus for some time, right colectomy was prudent to prevent recurrence as noted on CT scan.      The white line of Toldt was taken down in the right paracolic gutter progressing superiorly.  The hepatic flexure was mobilized with a combination of sharp, blunt and electrocautery dissection.  This was significantly scarred.  Once the colon could be rotated past midline, a distal transection point was chosen a the middle colic artery.  A 75mm blue MANAV was used to transect the distal colon.  The mesentery was taken with the ligasure.  A proximal transection point was chosen and second MANAV used.   The transverse colon and small bowel were approximated with silk sutures, enterotomies created and a side-side functional end to end anastomosis created with a 75mm blue MANAV load.  The interior contents were examined, hemostatic.  A 60mm TA stapler was fired closing the common enterotomy.  The anastomosis was examined, however did not appear appropriate as it was deemed too short.   There was more than sufficient small bowel and colon mobilized to allow a new anastomosis.    The anastomosis was redone, enterotomies were made distal to the previous anastomosis, a blue MANAV was fired and the common enterotomy was taken with the proximal anastomosis with an additional MANAV stapler.  The mesentery of  the previous anastomosis was taken down with the ligasure, this was sent with the primary specimen.  The anastomosis was examined and appeared healthy with appropriate width.  Several Lembert stitch's were placed to imbricate a portion of the staple line and at the corner of the anastomosis.    Hemostasis was assured. Copious sterile saline irrigant was used, and the solution suctioned free. An instrument count, including laparotomy pads, sponges and needles was performed and found to be correct.       A change of gowns and gloves by the operative team was performed.  A closing tray was used.      The midline fascia was closed with a continuous looped #0 PDS suture, beginning from either pole of the incision and securing the suture at their meeting point in the center. Subcutaneous closure was accomplished in layers using 3-0 Vicryl suture, and skin edges were approximated using skin staples. The skin was cleansed and dried, before administration of a sterile bandage.   A post-operative TAP block was performed     The patient tolerated the procedure well, was allowed to recover and was transferred to the recovery room in stable condition.     Tommy Martino DO on 7/13/2022 at 8:20 PM

## 2022-07-15 LAB
ANION GAP SERPL CALCULATED.3IONS-SCNC: 8 MMOL/L (ref 3–14)
BUN SERPL-MCNC: 15 MG/DL (ref 7–30)
CALCIUM SERPL-MCNC: 8.1 MG/DL (ref 8.5–10.1)
CHLORIDE BLD-SCNC: 108 MMOL/L (ref 94–109)
CO2 SERPL-SCNC: 22 MMOL/L (ref 20–32)
CREAT SERPL-MCNC: 0.57 MG/DL (ref 0.66–1.25)
ERYTHROCYTE [DISTWIDTH] IN BLOOD BY AUTOMATED COUNT: 13.5 % (ref 10–15)
GFR SERPL CREATININE-BSD FRML MDRD: >90 ML/MIN/1.73M2
GLUCOSE BLD-MCNC: 85 MG/DL (ref 70–99)
HCT VFR BLD AUTO: 41 % (ref 40–53)
HGB BLD-MCNC: 13.9 G/DL (ref 13.3–17.7)
MCH RBC QN AUTO: 32.4 PG (ref 26.5–33)
MCHC RBC AUTO-ENTMCNC: 33.9 G/DL (ref 31.5–36.5)
MCV RBC AUTO: 96 FL (ref 78–100)
PATH REPORT.COMMENTS IMP SPEC: NORMAL
PATH REPORT.COMMENTS IMP SPEC: NORMAL
PATH REPORT.FINAL DX SPEC: NORMAL
PATH REPORT.GROSS SPEC: NORMAL
PATH REPORT.MICROSCOPIC SPEC OTHER STN: NORMAL
PATH REPORT.RELEVANT HX SPEC: NORMAL
PHOTO IMAGE: NORMAL
PLATELET # BLD AUTO: 132 10E3/UL (ref 150–450)
POTASSIUM BLD-SCNC: 3.9 MMOL/L (ref 3.4–5.3)
RBC # BLD AUTO: 4.29 10E6/UL (ref 4.4–5.9)
SODIUM SERPL-SCNC: 138 MMOL/L (ref 133–144)
WBC # BLD AUTO: 7 10E3/UL (ref 4–11)

## 2022-07-15 PROCEDURE — 80048 BASIC METABOLIC PNL TOTAL CA: CPT | Performed by: SURGERY

## 2022-07-15 PROCEDURE — 258N000001 HC RX 258: Performed by: SURGERY

## 2022-07-15 PROCEDURE — 36415 COLL VENOUS BLD VENIPUNCTURE: CPT | Performed by: SURGERY

## 2022-07-15 PROCEDURE — 85027 COMPLETE CBC AUTOMATED: CPT | Performed by: SURGERY

## 2022-07-15 PROCEDURE — 258N000003 HC RX IP 258 OP 636: Performed by: SURGERY

## 2022-07-15 PROCEDURE — 250N000011 HC RX IP 250 OP 636: Performed by: SURGERY

## 2022-07-15 PROCEDURE — 120N000001 HC R&B MED SURG/OB

## 2022-07-15 PROCEDURE — 250N000013 HC RX MED GY IP 250 OP 250 PS 637: Performed by: SURGERY

## 2022-07-15 RX ORDER — KETOROLAC TROMETHAMINE 15 MG/ML
15 INJECTION, SOLUTION INTRAMUSCULAR; INTRAVENOUS EVERY 6 HOURS
Status: COMPLETED | OUTPATIENT
Start: 2022-07-15 | End: 2022-07-17

## 2022-07-15 RX ORDER — DEXTROSE, SODIUM CHLORIDE, AND POTASSIUM CHLORIDE 5; .45; .15 G/100ML; G/100ML; G/100ML
500 INJECTION INTRAVENOUS ONCE
Status: COMPLETED | OUTPATIENT
Start: 2022-07-15 | End: 2022-07-15

## 2022-07-15 RX ADMIN — FAMOTIDINE 20 MG: 10 INJECTION INTRAVENOUS at 08:04

## 2022-07-15 RX ADMIN — SERTRALINE HYDROCHLORIDE 75 MG: 50 TABLET ORAL at 08:08

## 2022-07-15 RX ADMIN — ACETAMINOPHEN 325MG 975 MG: 325 TABLET ORAL at 05:53

## 2022-07-15 RX ADMIN — ACETAMINOPHEN 325MG 975 MG: 325 TABLET ORAL at 14:21

## 2022-07-15 RX ADMIN — FAMOTIDINE 20 MG: 10 INJECTION INTRAVENOUS at 20:20

## 2022-07-15 RX ADMIN — HYDROMORPHONE HYDROCHLORIDE 0.4 MG: 0.2 INJECTION, SOLUTION INTRAMUSCULAR; INTRAVENOUS; SUBCUTANEOUS at 12:34

## 2022-07-15 RX ADMIN — KETOROLAC TROMETHAMINE 15 MG: 15 INJECTION, SOLUTION INTRAMUSCULAR; INTRAVENOUS at 20:20

## 2022-07-15 RX ADMIN — ACETAMINOPHEN 325MG 975 MG: 325 TABLET ORAL at 22:52

## 2022-07-15 RX ADMIN — POTASSIUM CHLORIDE, DEXTROSE MONOHYDRATE AND SODIUM CHLORIDE 500 ML: 150; 5; 450 INJECTION, SOLUTION INTRAVENOUS at 08:07

## 2022-07-15 RX ADMIN — SODIUM CHLORIDE, POTASSIUM CHLORIDE, SODIUM LACTATE AND CALCIUM CHLORIDE: 600; 310; 30; 20 INJECTION, SOLUTION INTRAVENOUS at 06:00

## 2022-07-15 RX ADMIN — HYDROMORPHONE HYDROCHLORIDE 0.4 MG: 0.2 INJECTION, SOLUTION INTRAMUSCULAR; INTRAVENOUS; SUBCUTANEOUS at 05:54

## 2022-07-15 RX ADMIN — ATORVASTATIN CALCIUM 20 MG: 20 TABLET, FILM COATED ORAL at 08:09

## 2022-07-15 RX ADMIN — KETOROLAC TROMETHAMINE 15 MG: 15 INJECTION, SOLUTION INTRAMUSCULAR; INTRAVENOUS at 14:21

## 2022-07-15 RX ADMIN — HYDROMORPHONE HYDROCHLORIDE 0.4 MG: 0.2 INJECTION, SOLUTION INTRAMUSCULAR; INTRAVENOUS; SUBCUTANEOUS at 00:54

## 2022-07-15 RX ADMIN — ENOXAPARIN SODIUM 40 MG: 100 INJECTION SUBCUTANEOUS at 14:21

## 2022-07-15 RX ADMIN — HYDROMORPHONE HYDROCHLORIDE 0.4 MG: 0.2 INJECTION, SOLUTION INTRAMUSCULAR; INTRAVENOUS; SUBCUTANEOUS at 03:40

## 2022-07-15 RX ADMIN — KETOROLAC TROMETHAMINE 15 MG: 15 INJECTION, SOLUTION INTRAMUSCULAR; INTRAVENOUS at 08:02

## 2022-07-15 ASSESSMENT — ACTIVITIES OF DAILY LIVING (ADL)
ADLS_ACUITY_SCORE: 18
ADLS_ACUITY_SCORE: 22
ADLS_ACUITY_SCORE: 18
ADLS_ACUITY_SCORE: 18
ADLS_ACUITY_SCORE: 22
ADLS_ACUITY_SCORE: 18
ADLS_ACUITY_SCORE: 18
ADLS_ACUITY_SCORE: 22

## 2022-07-15 NOTE — PLAN OF CARE
Complains of abdominal/incisional pain.  PRN Dilaudid given and pain is manageable.  Surgical incision covered with primapore  dressing, old drainage outlined.      Bowel sounds hypoactive, denies flatus.  Denies nausea.  Taking in few ice chips.  Voiding in urinal.  IV fluids at 100 ml per hour.    VSS and afebrile.

## 2022-07-15 NOTE — PROGRESS NOTES
Subjective:   Seen and examined.  No acute events.  No fevers or chills.  No flatus yet.  Ambulating.  Pain present, mostly well controlled.      I/O last 3 completed shifts:  In: 1958 [P.O.:120; I.V.:1838]  Out: 1325 [Urine:1325]     No current outpatient medications on file.         ROUTINE IP LABS (Last four results)  BMP  Recent Labs   Lab 07/15/22  0442 07/14/22  0447 07/13/22  1402    138 137   POTASSIUM 3.9 4.2 4.2   CHLORIDE 108 108 106   TAVIA 8.1* 8.1* 8.7   CO2 22 23 25   BUN 15 15 12   CR 0.57* 0.66 0.63*   GLC 85 104* 92     CBC  Recent Labs   Lab 07/15/22  0442 07/14/22  0447 07/13/22  1402   WBC 7.0 9.1 7.9   RBC 4.29* 4.68 4.78   HGB 13.9 15.4 15.8   HCT 41.0 45.7 45.2   MCV 96 98 95   MCH 32.4 32.9 33.1*   MCHC 33.9 33.7 35.0   RDW 13.5 13.4 13.3   * 142* 159     INRNo lab results found in last 7 days.         Tmax: 99.3  --->  Tcurrent: 99.3   B/P: 130/69  P: 74  R: 16  SpO2:93% RA        EXAM  AOX4 NAD  CTAB  RRR  Soft, appropriate incisional TTP without R/R/G  No CCE        A/P:   1. POD 2 R Hemicolectomy, lysis of adhesions for cecal volvulus  - Pain: TAP, Dilaudid, Tylenol, add Toradol  - GI PPX: Pepcid  - IVF:  D5 0.45 + 20K  - Antibiotics: none  - OOB, Ambulate, IS  - Start clears    Labs stable  Dr. Jordan to see over weekend  Await return of bowel function

## 2022-07-15 NOTE — PLAN OF CARE
Pt complains of minimal pain, managed with tylenol and toradol, received 1 dose of IV dilaudid.  Tolerating small amounts of clear liquids, no nausea.  Small amount of drainage to dressing, staples intact, new dressing applied.  Pt ambulating in room and in halls independently.  Voiding frequent small amounts.  BS audible, + flatus.

## 2022-07-16 LAB
HOLD SPECIMEN: NORMAL
PLATELET # BLD AUTO: 139 10E3/UL (ref 150–450)

## 2022-07-16 PROCEDURE — 250N000013 HC RX MED GY IP 250 OP 250 PS 637: Performed by: SURGERY

## 2022-07-16 PROCEDURE — 85049 AUTOMATED PLATELET COUNT: CPT | Performed by: SURGERY

## 2022-07-16 PROCEDURE — 120N000001 HC R&B MED SURG/OB

## 2022-07-16 PROCEDURE — 36415 COLL VENOUS BLD VENIPUNCTURE: CPT | Performed by: SURGERY

## 2022-07-16 PROCEDURE — 250N000011 HC RX IP 250 OP 636: Performed by: SURGERY

## 2022-07-16 RX ORDER — OXYCODONE HYDROCHLORIDE 5 MG/1
5 TABLET ORAL EVERY 4 HOURS PRN
Status: DISCONTINUED | OUTPATIENT
Start: 2022-07-16 | End: 2022-07-17

## 2022-07-16 RX ADMIN — KETOROLAC TROMETHAMINE 15 MG: 15 INJECTION, SOLUTION INTRAMUSCULAR; INTRAVENOUS at 08:36

## 2022-07-16 RX ADMIN — ACETAMINOPHEN 325MG 975 MG: 325 TABLET ORAL at 06:02

## 2022-07-16 RX ADMIN — FAMOTIDINE 20 MG: 10 INJECTION INTRAVENOUS at 20:12

## 2022-07-16 RX ADMIN — SERTRALINE HYDROCHLORIDE 75 MG: 50 TABLET ORAL at 08:40

## 2022-07-16 RX ADMIN — HYDROMORPHONE HYDROCHLORIDE 0.2 MG: 0.2 INJECTION, SOLUTION INTRAMUSCULAR; INTRAVENOUS; SUBCUTANEOUS at 08:33

## 2022-07-16 RX ADMIN — OXYCODONE HYDROCHLORIDE 5 MG: 5 TABLET ORAL at 18:15

## 2022-07-16 RX ADMIN — KETOROLAC TROMETHAMINE 15 MG: 15 INJECTION, SOLUTION INTRAMUSCULAR; INTRAVENOUS at 01:58

## 2022-07-16 RX ADMIN — KETOROLAC TROMETHAMINE 15 MG: 15 INJECTION, SOLUTION INTRAMUSCULAR; INTRAVENOUS at 14:28

## 2022-07-16 RX ADMIN — HYDROMORPHONE HYDROCHLORIDE 0.2 MG: 0.2 INJECTION, SOLUTION INTRAMUSCULAR; INTRAVENOUS; SUBCUTANEOUS at 14:28

## 2022-07-16 RX ADMIN — ENOXAPARIN SODIUM 40 MG: 100 INJECTION SUBCUTANEOUS at 14:28

## 2022-07-16 RX ADMIN — ACETAMINOPHEN 325MG 975 MG: 325 TABLET ORAL at 14:28

## 2022-07-16 RX ADMIN — ATORVASTATIN CALCIUM 20 MG: 20 TABLET, FILM COATED ORAL at 08:40

## 2022-07-16 RX ADMIN — FAMOTIDINE 20 MG: 10 INJECTION INTRAVENOUS at 08:37

## 2022-07-16 RX ADMIN — KETOROLAC TROMETHAMINE 15 MG: 15 INJECTION, SOLUTION INTRAMUSCULAR; INTRAVENOUS at 20:12

## 2022-07-16 ASSESSMENT — ACTIVITIES OF DAILY LIVING (ADL)
ADLS_ACUITY_SCORE: 22
ADLS_ACUITY_SCORE: 22
ADLS_ACUITY_SCORE: 18
ADLS_ACUITY_SCORE: 22
ADLS_ACUITY_SCORE: 18
ADLS_ACUITY_SCORE: 18
ADLS_ACUITY_SCORE: 22
ADLS_ACUITY_SCORE: 18
ADLS_ACUITY_SCORE: 22

## 2022-07-16 NOTE — PROGRESS NOTES
"General Surgery    S: Still some incisional abdominal pain.  Started on clears yesterday and not nauseated but does seem to feel little more bloated or full quickly with that.  Has passed flatus and had a bowel movement.  Ambulating.  Does get some wheeze or congestion in the chest.  Has been working on incentive spirometer.    O:  Vital signs:  Temp: 98.5  F (36.9  C) Temp src: Oral BP: (!) 151/79 (left arm) Pulse: 68   Resp: 18 SpO2: 94 % O2 Device: None (Room air) Oxygen Delivery: 6 LPM Height: 188 cm (6' 2\") Weight: 77.1 kg (170 lb)  Estimated body mass index is 21.83 kg/m  as calculated from the following:    Height as of this encounter: 1.88 m (6' 2\").    Weight as of this encounter: 77.1 kg (170 lb).      Gen: AAOx3  Abd: Soft, non-distended.  Moderate incisional tenderness.  Dressing clean    Labs/Imaging  Results for orders placed or performed during the hospital encounter of 07/13/22 (from the past 24 hour(s))   Platelet count   Result Value Ref Range    Platelet Count 139 (L) 150 - 450 10e3/uL   Extra Tube    Narrative    The following orders were created for panel order Extra Tube.  Procedure                               Abnormality         Status                     ---------                               -----------         ------                     Extra Green Top (Lithium...[[id number]]                      Final result                 Please view results for these tests on the individual orders.   Extra Green Top (Lithium Heparin) Tube   Result Value Ref Range    Hold Specimen JIC            A: Postop day 3 open right hemicolectomy and lysis of adhesions for cecal volvulus    P: We will add oral oxycodone to his pain regimen as this may last longer and help with control.  Keep on clears for now until tolerating a bit better.  Ambulation, incentive spirometry  Lovenox    Ej Jordan MD    "

## 2022-07-16 NOTE — PLAN OF CARE
Pt complains of intermittent abdominal pain, no nausea.  Pain has been well managed with current medications.  He had a large BM today.  Tolerating clear liquid diet.  Ambulating in room and in hallways independently.  Abdominal incision CDI, dressing changed.  Pt has occasional loose sounding cough, tends to breath shallow.  Encouraged use of IS.

## 2022-07-16 NOTE — PROGRESS NOTES
"DATE & TIME: 7P-7A     7/15-7/16               Cognitive Concerns/ Orientation : alert and oriented    ABNL VS/O2: vitals stable, on RA and tolerating it well    MOBILITY: SBA- independent in room    PAIN MANAGEMENT: pain expressed in abdomen along incision site, scheduled tylenol and toradol given with good relief    DIET: Clear liquids    BOWEL/BLADDER: Voiding small amounts of urine, passing some flatus but no stool noted on shift     ABNL LAB/BG: platelet 139    DRAIN/DEVICES:PIV saline locked     D/C DATE: unsure of discharge plans at this time    OTHER IMPORTANT INFO:     /74 (BP Location: Left arm)   Pulse 74   Temp 98.3  F (36.8  C) (Oral)   Resp 18   Ht 1.88 m (6' 2\")   Wt 77.1 kg (170 lb)   SpO2 94%   BMI 21.83 kg/m      Tiny Gifford RN on 7/16/2022 at 5:55 AM    "

## 2022-07-17 PROCEDURE — 250N000013 HC RX MED GY IP 250 OP 250 PS 637: Performed by: SURGERY

## 2022-07-17 PROCEDURE — 120N000001 HC R&B MED SURG/OB

## 2022-07-17 PROCEDURE — 250N000011 HC RX IP 250 OP 636: Performed by: SURGERY

## 2022-07-17 RX ORDER — HYDROCODONE BITARTRATE AND ACETAMINOPHEN 5; 325 MG/1; MG/1
1-2 TABLET ORAL EVERY 6 HOURS PRN
Status: DISCONTINUED | OUTPATIENT
Start: 2022-07-17 | End: 2022-07-18 | Stop reason: HOSPADM

## 2022-07-17 RX ADMIN — SERTRALINE HYDROCHLORIDE 75 MG: 50 TABLET ORAL at 08:19

## 2022-07-17 RX ADMIN — HYDROCODONE BITARTRATE AND ACETAMINOPHEN 2 TABLET: 5; 325 TABLET ORAL at 21:57

## 2022-07-17 RX ADMIN — HYDROCODONE BITARTRATE AND ACETAMINOPHEN 1 TABLET: 5; 325 TABLET ORAL at 16:03

## 2022-07-17 RX ADMIN — HYDROMORPHONE HYDROCHLORIDE 0.2 MG: 0.2 INJECTION, SOLUTION INTRAMUSCULAR; INTRAVENOUS; SUBCUTANEOUS at 08:19

## 2022-07-17 RX ADMIN — ACETAMINOPHEN 325MG 650 MG: 325 TABLET ORAL at 08:19

## 2022-07-17 RX ADMIN — KETOROLAC TROMETHAMINE 15 MG: 15 INJECTION, SOLUTION INTRAMUSCULAR; INTRAVENOUS at 02:23

## 2022-07-17 RX ADMIN — FAMOTIDINE 20 MG: 10 INJECTION INTRAVENOUS at 08:19

## 2022-07-17 RX ADMIN — HYDROCODONE BITARTRATE AND ACETAMINOPHEN 1 TABLET: 5; 325 TABLET ORAL at 14:22

## 2022-07-17 RX ADMIN — ATORVASTATIN CALCIUM 20 MG: 20 TABLET, FILM COATED ORAL at 08:29

## 2022-07-17 RX ADMIN — ENOXAPARIN SODIUM 40 MG: 100 INJECTION SUBCUTANEOUS at 14:22

## 2022-07-17 RX ADMIN — FAMOTIDINE 20 MG: 10 INJECTION INTRAVENOUS at 21:11

## 2022-07-17 ASSESSMENT — ACTIVITIES OF DAILY LIVING (ADL)
ADLS_ACUITY_SCORE: 18

## 2022-07-17 NOTE — PROGRESS NOTES
"DATE & TIME: 7P-7A 7/16-7/17            Cognitive Concerns/ Orientation :  alert and oriented    ABNL VS/O2: vitals stable, on RA and tolerating it well    MOBILITY: up independently in room and ambulating well    PAIN MANAGEMENT: pain continues in abdomen, states the oxy he was given made the stomach pain worse, toradol was effective overnight    DIET: continues on clears diet, tolerating it well    BOWEL/BLADDER: continent of bowel and bladder    ABNL LAB/BG: platelet 139    DRAIN/DEVICES: PIV saline locked and patent    D/C DATE: unsure of discharge at this time    OTHER IMPORTANT INFO:     BP (!) 146/73 (BP Location: Left arm)   Pulse 73   Temp 99.1  F (37.3  C) (Oral)   Resp 16   Ht 1.88 m (6' 2\")   Wt 77.1 kg (170 lb)   SpO2 94%   BMI 21.83 kg/m      Tiny iGfford RN on 7/17/2022 at 5:36 AM    "

## 2022-07-17 NOTE — PLAN OF CARE
Pt started on norco for pain, tolerating well and pain has been managed.  He has had 2 BMs today.  Diet advanced to full liquids for dinner, had 50% of meal, drinking Boost. Ambulating in room and halls independently.

## 2022-07-17 NOTE — PROGRESS NOTES
"General Surgery    S: Abdomen still sore at times but overall tolerable.  The oral oxycodone yesterday seem to upset his stomach.  He recalls that in the past he had tolerated Vicodin or something similar.  Positive bowel movement and flatus.  Tolerating clears and now feeling like he is up for more.  Ambulating in the halls.  Still some coarseness with breathing but feels that this is improved compared to yesterday.  Using the incentive and able to bring up some mucus.    O:  Vital signs:  Temp: 98.7  F (37.1  C) Temp src: Oral BP: (!) 150/69 Pulse: 71   Resp: 15 SpO2: 97 % O2 Device: None (Room air) Oxygen Delivery: 6 LPM Height: 188 cm (6' 2\") Weight: 77.1 kg (170 lb)  Estimated body mass index is 21.83 kg/m  as calculated from the following:    Height as of this encounter: 1.88 m (6' 2\").    Weight as of this encounter: 77.1 kg (170 lb).      Gen: AAOx3  Abd: Soft, non-distended.  Mild incisional tenderness.  Dressing clean with staples  Lungs coarse    Labs/Imaging  No results found for this or any previous visit (from the past 24 hour(s)).        A: Postop day 4 open right hemicolectomy and lysis of adhesions for cecal volvulus    P: Will try Norco instead of the oxycodone to see if tolerates better  Advance diet as tolerated  Ambulation, incentive spirometry  Lovenox    Ej Jordan MD    "

## 2022-07-18 VITALS
HEIGHT: 74 IN | TEMPERATURE: 98 F | WEIGHT: 170 LBS | OXYGEN SATURATION: 96 % | DIASTOLIC BLOOD PRESSURE: 70 MMHG | HEART RATE: 60 BPM | BODY MASS INDEX: 21.82 KG/M2 | SYSTOLIC BLOOD PRESSURE: 142 MMHG | RESPIRATION RATE: 18 BRPM

## 2022-07-18 PROCEDURE — 250N000013 HC RX MED GY IP 250 OP 250 PS 637: Performed by: SURGERY

## 2022-07-18 PROCEDURE — 250N000011 HC RX IP 250 OP 636: Performed by: SURGERY

## 2022-07-18 RX ORDER — HYDROCODONE BITARTRATE AND ACETAMINOPHEN 5; 325 MG/1; MG/1
1 TABLET ORAL EVERY 6 HOURS PRN
Qty: 12 TABLET | Refills: 0 | Status: SHIPPED | OUTPATIENT
Start: 2022-07-18 | End: 2022-07-21

## 2022-07-18 RX ADMIN — ENOXAPARIN SODIUM 40 MG: 100 INJECTION SUBCUTANEOUS at 14:20

## 2022-07-18 RX ADMIN — SERTRALINE HYDROCHLORIDE 75 MG: 50 TABLET ORAL at 08:35

## 2022-07-18 RX ADMIN — HYDROCODONE BITARTRATE AND ACETAMINOPHEN 2 TABLET: 5; 325 TABLET ORAL at 04:16

## 2022-07-18 RX ADMIN — FAMOTIDINE 20 MG: 10 INJECTION INTRAVENOUS at 08:34

## 2022-07-18 RX ADMIN — HYDROCODONE BITARTRATE AND ACETAMINOPHEN 2 TABLET: 5; 325 TABLET ORAL at 10:36

## 2022-07-18 RX ADMIN — ATORVASTATIN CALCIUM 20 MG: 20 TABLET, FILM COATED ORAL at 08:35

## 2022-07-18 ASSESSMENT — ACTIVITIES OF DAILY LIVING (ADL)
ADLS_ACUITY_SCORE: 18

## 2022-07-18 NOTE — PROGRESS NOTES
"Patient doing well with norco for pain relief.  States it has been helping better than oxy from yesterday.  Continues to drink fluids and ambulating well in room and in hallway.  Will continue to monitor for pain.    /69 (BP Location: Left arm)   Pulse 67   Temp 99  F (37.2  C) (Oral)   Resp 18   Ht 1.88 m (6' 2\")   Wt 77.1 kg (170 lb)   SpO2 96%   BMI 21.83 kg/m      Tiny Gifford RN on 7/18/2022 at 4:33 AM    "

## 2022-07-18 NOTE — PLAN OF CARE
[facility] DISCHARGE NOTE    Patient discharged to home at 3:12 PM via wheel chair. Accompanied by spouse and staff. Discharge instructions reviewed with patient, opportunity offered to ask questions. Prescriptions sent to patients preferred pharmacy. All belongings sent with patient.    Anisha Reis RNGoal Outcome Evaluation:

## 2022-07-18 NOTE — DISCHARGE INSTRUCTIONS
HOME CARE FOLLOWING ABDOMINAL SURGERY    INCISIONAL CARE:  Replace the bandage over your incision (or incisions) until all drainage stops, or if more comfortable to have in place.  If present, leave the steri-strips (white paper tapes) in place for 14 days after surgery.  If you have staples in your incision at the time of discharge, they will be removed at your follow-up appointment.  If Dermabond (a type of skin glue) is present, leave in place until it wears/flakes off.     BATHING:  Avoid baths for 1 week after surgery.  Showers are okay.  You may wash your hair at any time.  Gently pat your incision dry after bathing.    ACTIVITY:  Light Activity -- you may immediately be up and about as tolerated.  Driving -- you may drive when comfortable and off narcotic pain medications (example: Norco, Percocet, Hydrocodone).  Light Work -- resume when comfortable off pain medications.  (If you can drive, you probably can work.)  Strenuous Work/Activity -- limit lifting to 20 pounds for 4 weeks.  Then, progressively increase with time.  Active Sports (running, biking, etc.) -- cautiously resume after 6 weeks.  Most importantly listen to your body.  If an activity causes pain or discomfort please stop and try in a few days or decrease your intensity.    DISCOMFORT:  Use pain medications as prescribed by your surgeon.  Take the pain medication with some food, when possible, to minimize side effects.  Expect gradual improvement.      Narcotic Pain Medications:  Do not drive, make important decisions or operate heavy machinery while on narcotic pain medications.  Narcotic pain medications can be associated with nausea, sleep disturbance, and constipation.  Unless directed otherwise, please take an over the counter stool softener (Colace 100mg twice a day) unless directed otherwise.  As soon as you are able to stop narcotic pain medications the better. Long term use of narcotics is associated with tolerance (the need for more  medication for effect) and potential addiction.    DIET:  Return to diet you were on before surgery, unless you are given specific diet instructions.  Drink plenty of fluids.  While taking pain medications, increase dietary fiber or add a fiber supplementation like Metamucil or Citrucel to help prevent constipation - a possible side effect of pain medications.    NAUSEA:  If nauseated from the anesthetic/pain meds; rest in bed, get up cautiously with assistance, and drink clear liquids (juice, tea, broth).    RETURN APPOINTMENT:  Schedule a follow-up visit 2-3 weeks after discharge from the hospital.  Office Phone: [phone]     CONTACT US IF THE FOLLOWING DEVELOPS:   1. A fever that is above 101     2. If there is a large amount of drainage, bleeding, or swelling.   3. Severe pain that is not relieved by your prescription.   4. Drainage that is thick, cloudy, yellow, green or white.   5. Any other questions not answered by  Frequently Asked Questions  sheet.      FREQUENTLY ASKED QUESTIONS:    Q:  How should my incision look?    A:  Normally your incision will appear slightly swollen with light redness directly along the incision itself as it heals.  It may feel like a bump or ridge as the healing/scarring happens, and over time (3-4 months) this bump or ridge feeling should slowly go away.  In general, clear or pink watery drainage can be normal at first as your incision heals, but should decrease over time.    Q:  How do I know if my incision is infected?  A:  Look at your incision for signs of infection, like redness around the incision spreading to surrounding skin, or drainage of cloudy or foul-smelling drainage.  If you feel warm, check your temperature to see if you are running a fever.    **If any of these things occur, please notify the nurse at our office.  We may need you to come into the office for an incision check.      Q:  How do I take care of my incision?  A:  If you have a dressing in place -  Starting the day after surgery, replace the dressing 1-2 times a day until there is no further drainage from the incision.  At that time, a dressing is no longer needed.  Try to minimize tape on the skin if irritation is occurring at the tape sites.  If you have significant irritation from tape on the skin, please call the office to discuss other method of dressing your incision.    Small pieces of tape called  steri-strips  may be present directly overlying your incision; these may be removed 10 days after surgery unless otherwise specified by your surgeon.  If these tapes start to loosen at the ends, you may trim them back until they fall off or are removed.    A:  If you had  Dermabond  tissue glue used as a dressing (this causes your incision to look shiny with a clear covering over it) - This type of dressing wears off with time and does not require more dressings over the top unless it is draining around the glue as it wears off.  Do not apply ointments or lotions over the incisions until the glue has completely worn off.    Q:  There is a piece of tape or a sticky  lead  still on my skin.  Can I remove this?  A:  Sometimes the sticky  leads  used for monitoring during surgery or for evaluation in the emergency department are not all removed while you are in the hospital.  These sometimes have a tab or metal dot on them.  You can easily remove these on your own, like taking off a band-aid.  If there is a gel substance under the  lead , simply wipe/clean it off with a washcloth or paper towel.      Q:  What can I do to minimize constipation (very hard stools, or lack of stools)?  A:  Stay well hydrated.  Increase your dietary fiber intake or take a fiber supplement -with plenty of water.  Walk around frequently.  You may consider an over-the-counter stool-softener.  Your Pharmacist can assist you with choosing one that is stocked at your pharmacy.  Constipation is also one of the most common side effects of  pain medication.  If you are using pain medication, be pro-active and try to PREVENT problems with constipation by taking the steps above BEFORE constipation becomes a problem.    Q:  What do I do if I need more pain medications?  A:  Call the office to receive refills.  Be aware that certain pain meds cannot be called into a pharmacy and actually require a paper prescription.  A change may be made in your pain med as you progress thru your recovery period or if you have side effects to certain meds.    --Pain meds are NOT refilled after 5pm on weekdays, and NOT AT ALL on the weekends, so please look ahead to prevent problems.      Q:  Why am I having a hard time sleeping now that I am at home?  A:  Many medications you receive while you are in the hospital can impact your sleep for a number of days after your surgery/hospitalization.  Decreased level of activity and naps during the day may also make sleeping at night difficult.  Try to minimize day-time naps, and get up frequently during the day to walk around your home during your recovery time.  Sleep aides may be of some help, but are not recommended for long-term use.      Q:  I am having some back discomfort.  What should I do?  A:  This may be related to certain positioning that was required for your surgery, extended periods of time in bed, or other changes in your overall activity level.  You may try ice, heat, acetaminophen, or ibuprofen to treat this temporarily.  Note that many pain medications have acetaminophen in them and would state this on the prescription bottle.  Be sure not to exceed the maximum of 4000mg per day of acetaminophen.     **If the pain you are having does not resolve, is severe, or is a flare of back pain you have had on other occasions prior to surgery, please contact your primary physician for further recommendations or for an appointment to be examined at their office.    Q:  Why am I having headaches?  A:  Headaches can be caused  by many things:  caffeine withdrawal, use of pain meds, dehydration, high blood pressure, lack of sleep, over-activity/exhaustion, flare-up of usual migraine headaches.  If you feel this is related to muscle tension (a band-like feeling around the head, or a pressure at the low-back of the head) you may try ice or heat to this area.  You may need to drink more fluids (try electrolyte drink like Gatorade), rest, or take your usual migraine medications.   **If your headaches do not resolve, worsen, are accompanied by other symptoms, or if your blood pressure is high, please call your primary physician for recommendation and/or examination.    Q:  I am unable to urinate.  What do I do?  A:  A small percentage of people can have difficulty urinating initially after surgery.  This includes being able to urinate only a very small amount at a time and feeling discomfort or pressure in the very low abdomen.  This is called  urinary retention , and is actually an urgent situation.  Proceed to your nearest Emergency department for evaluation (not an Urgent Care Center).  Sometimes the bladder does not work correctly after certain medications you receive during surgery, or related to certain procedures.  You may need to have a catheter placed until your bladder recovers.  When planning to go to an Emergency department, it may help to call the ER to let them know you are coming in for this problem after a surgery.  This may help you get in quicker to be evaluated.  **If you have symptoms of a urinary tract infection, please contact your primary physician for the proper evaluation and treatment.          If you have other questions, please call the office Monday thru Friday between 8am and 5pm to discuss with the nurse.  # [phone]    There is a surgeon ON CALL on weekday evenings and over the weekend in case of urgent need only, and may be contacted at the same number.    If you are having an emergency, call 911 or proceed  to your nearest emergency department.

## 2022-07-18 NOTE — DISCHARGE SUMMARY
[facility] Discharge Summary    Ghulam Rizzo MRN# [id number]   Age: 66 year old YOB: 1955     Date of Admission:  7/13/2022  Date of Discharge::  7/18/2022   Admitting Physician:  Tommy Martino, DO  Discharge Physician:  Calli Montiel MD     Home clinic:  [place]  Primary doctor:   Kaylen Ref-Primary, Physician    Admission Diagnoses:  Cecal volvulus (H) [K56.2]  Hx of cirrhosis      Discharge Diagnoses:  Active Problems:    Cecal volvulus (H)    Assessment: s/p open right hemicolectomy     Plan: as above        Procedures:  PROCEDURE:   1. Laparotomy  2. Right hemicolectomy  3. Lysis of adhesions  4. Primary incisional hernia repair     Medications prior to Admission:  Medications Prior to Admission   Medication Sig Dispense Refill Last Dose     Cholecalciferol (VITAMIN D3 PO) Take by mouth daily 2 tabs daily   7/13/2022 at am     senna-docusate (NEGRITO-COLACE) 8.6-50 MG per tablet Take 1-2 tablets by mouth 2 times daily as needed for constipation. 100 tablet 5 7/12/2022 at unknown     sertraline (ZOLOFT) 50 MG tablet TAKE 1 & 1/2 TABLETS BY MOUTH EVERY  tablet 3 7/13/2022 at am         Medications on Discharge:  Current Discharge Medication List      START taking these medications    Details   HYDROcodone-acetaminophen (NORCO) 5-325 MG tablet Take 1 tablet by mouth every 6 hours as needed for pain  Qty: 12 tablet, Refills: 0    Associated Diagnoses: Cecal volvulus (H); S/P right hemicolectomy         CONTINUE these medications which have NOT CHANGED    Details   Cholecalciferol (VITAMIN D3 PO) Take by mouth daily 2 tabs daily      senna-docusate (NEGRITO-COLACE) 8.6-50 MG per tablet Take 1-2 tablets by mouth 2 times daily as needed for constipation.  Qty: 100 tablet, Refills: 5    Associated Diagnoses: Chronic constipation      sertraline (ZOLOFT) 50 MG tablet TAKE 1 & 1/2 TABLETS BY MOUTH EVERY DAY  Qty: 135 tablet, Refills: 3    Comments: Profile  Associated Diagnoses: Recurrent  "major depressive disorder, in remission (H)         STOP taking these medications       atorvastatin (LIPITOR) 20 MG tablet Comments:   Reason for Stopping:                 Consultations:  No consultations were requested during this admission    Brief History of Illness:  Mr. Rizzo presents with cecal volvulus by history and CT scan.  I discussed treatment would include right colectomy and all indicated procedures.  He had an incidental incisional herniaAfter discussion was held with the patient regarding alternatives, benefits, and risks (including, but not limited to, bleeding, infection, nontherapeutic operation, potential need for additional treatments/surgery, hernia, MI, death), the patient's questions were addressed and he consented to the aforementioned operative procedure.    Hospital Course:  Did well post operatively.  Finally had return of bowel function on POD #4.  Tolerating diet on POD #5 and was discharged.      Discharge Exam:    BP (!) 142/70 (BP Location: Left arm)   Pulse 60   Temp 98  F (36.7  C) (Oral)   Resp 18   Ht 1.88 m (6' 2\")   Wt 77.1 kg (170 lb)   SpO2 96%   BMI 21.83 kg/m      GENERAL APPEARANCE: healthy, alert and no distress  ABDOMEN: soft, nontender, without hepatosplenomegaly or masses and bowel sounds normal      Attestation:  I have reviewed today's vital signs, notes, medications, labs and imaging.  Amount of time performed on this discharge summary: 15 minutes.    Calli Montiel MD    "

## 2022-07-20 ENCOUNTER — OFFICE VISIT (OUTPATIENT)
Dept: SURGERY | Facility: CLINIC | Age: 67
End: 2022-07-20
Payer: COMMERCIAL

## 2022-07-20 VITALS
BODY MASS INDEX: 21.82 KG/M2 | SYSTOLIC BLOOD PRESSURE: 128 MMHG | DIASTOLIC BLOOD PRESSURE: 76 MMHG | WEIGHT: 170 LBS | TEMPERATURE: 97.9 F | HEART RATE: 75 BPM | HEIGHT: 74 IN

## 2022-07-20 DIAGNOSIS — Z90.49 S/P RIGHT HEMICOLECTOMY: Primary | ICD-10-CM

## 2022-07-20 DIAGNOSIS — Z72.0 TOBACCO ABUSE: ICD-10-CM

## 2022-07-20 PROCEDURE — 99024 POSTOP FOLLOW-UP VISIT: CPT | Performed by: SURGERY

## 2022-07-20 NOTE — LETTER
7/20/2022         RE: Ghulam Rizzo  [address] [place] [id number]        Dear Colleague,    Thank you for referring your patient, Ghulam Rizzo, to the [facility]. Please see a copy of my visit note below.      Assessment & Plan   Problem List Items Addressed This Visit        Behavioral    Tobacco abuse      Other Visit Diagnoses     S/P right hemicolectomy    -  Primary         66-year-old male postop day 7 from exploratory laparotomy with right hemicolectomy and incisional hernia repair secondary to cecal volvulus  -I will leave the staple in for now.  -I discussed with his wife that if the area of redness and bulging worsens or if patients develop increased pain or discharge; they will need to come see me on Friday to see if this area needs to be opened up versus just starting antibiotic.  -This area is likely a seroma or hematoma.  -Patient to follow-up with Dr. Martino next week for staple removal and repeat evaluation of this area.  -All their questions were answered to their satisfaction.        No follow-ups on file.    [facility] Montiel MD  [facility]    Lamar Parmar is a 66 year old accompanied by his spouse, presenting for the following health issues:  Surgical Followup      Exploratory laparotomy with right hemicolectomy and incisional hernia repair with Dr. Martino on 7/13/2022.  Discharged from hospital 2 days ago.  Still having some pain.  There is a little bulge on the left side of the incision.  There is some redness within this area too.  Per his wife, this area has not increased in size or redness.  No discharge at the incision.  Having bowel movement.  Tolerating foods.  No fevers.  No chills.       Review of Systems   Constitutional, HEENT, cardiovascular, pulmonary, gi and gu systems are negative, except as otherwise noted.      Objective    There were no vitals taken for this visit.  There is no height or weight on file to calculate  BMI.  Physical Exam   Abdomen: Incision with staples in place.  Clean dry and intact.  There is a 5 cm bulge on the left side of the incision.  Some redness to this area.  Some warmth.  But no obvious cellulitis.  No signs of abscess or induration.                      Again, thank you for allowing me to participate in the care of your patient.        Sincerely,        Calli Montiel MD

## 2022-07-20 NOTE — NURSING NOTE
"Initial /76 (BP Location: Right arm, Patient Position: Sitting, Cuff Size: Adult Regular)   Pulse 75   Temp 97.9  F (36.6  C) (Tympanic)   Ht 1.88 m (6' 2\")   Wt 77.1 kg (170 lb)   BMI 21.83 kg/m   Estimated body mass index is 21.83 kg/m  as calculated from the following:    Height as of this encounter: 1.88 m (6' 2\").    Weight as of this encounter: 77.1 kg (170 lb). .    Maria Regalado MA    "

## 2022-07-20 NOTE — PROGRESS NOTES
Assessment & Plan   Problem List Items Addressed This Visit        Behavioral    Tobacco abuse      Other Visit Diagnoses     S/P right hemicolectomy    -  Primary         66-year-old male postop day 7 from exploratory laparotomy with right hemicolectomy and incisional hernia repair secondary to cecal volvulus  -I will leave the staple in for now.  -I discussed with his wife that if the area of redness and bulging worsens or if patients develop increased pain or discharge; they will need to come see me on Friday to see if this area needs to be opened up versus just starting antibiotic.  -This area is likely a seroma or hematoma.  -Patient to follow-up with Dr. Martino next week for staple removal and repeat evaluation of this area.  -All their questions were answered to their satisfaction.        No follow-ups on file.    [facility] MD  [facility]    Lamar Parmar is a 66 year old accompanied by his spouse, presenting for the following health issues:  Surgical Followup      Exploratory laparotomy with right hemicolectomy and incisional hernia repair with Dr. Martino on 7/13/2022.  Discharged from hospital 2 days ago.  Still having some pain.  There is a little bulge on the left side of the incision.  There is some redness within this area too.  Per his wife, this area has not increased in size or redness.  No discharge at the incision.  Having bowel movement.  Tolerating foods.  No fevers.  No chills.       Review of Systems   Constitutional, HEENT, cardiovascular, pulmonary, gi and gu systems are negative, except as otherwise noted.      Objective    There were no vitals taken for this visit.  There is no height or weight on file to calculate BMI.  Physical Exam   Abdomen: Incision with staples in place.  Clean dry and intact.  There is a 5 cm bulge on the left side of the incision.  Some redness to this area.  Some warmth.  But no obvious cellulitis.  No signs of abscess or  induration.

## 2022-07-22 ENCOUNTER — TELEPHONE (OUTPATIENT)
Dept: SURGERY | Facility: CLINIC | Age: 67
End: 2022-07-22

## 2022-07-22 NOTE — TELEPHONE ENCOUNTER
Wife called and she reported that her  has only 2 pain pills left and is wondering if they can get a refill. She reported he is still having pain. He is taking them as prescribed. Pt is taking senna and ducolax daily for chronic constipation. Pt was going well in the hospital and is now having trouble going to the bathroom again. Did advise pt drink some prune juice or eat prunes and increase fiber. Please advise. Thank you.  Chio GASCA RN BSN PHN  [facility]

## 2022-07-22 NOTE — TELEPHONE ENCOUNTER
Message  Received: Today  Montiel, MD Calli sent to Christina Dominguez RN  Caller: Unspecified (Today, 10:47 AM)  No.  I will not refill his narcotic especially when he's constipated.  I recommend ibuprofen 600-800mg Q6h alternating with tylenol.  Make sure he eats food before his ibuprofen.     Thanks     Pamela

## 2022-07-22 NOTE — TELEPHONE ENCOUNTER
Called pt and wife and reviewed provider note below. Reviewed again to increase fiber and could use some miralax if needed. Advised to call if they had any questions.  Chio GASCA RN BSN PHN  [facility]

## 2022-07-26 ENCOUNTER — OFFICE VISIT (OUTPATIENT)
Dept: SURGERY | Facility: CLINIC | Age: 67
End: 2022-07-26
Payer: COMMERCIAL

## 2022-07-26 ENCOUNTER — NURSE TRIAGE (OUTPATIENT)
Dept: NURSING | Facility: CLINIC | Age: 67
End: 2022-07-26

## 2022-07-26 VITALS
DIASTOLIC BLOOD PRESSURE: 74 MMHG | HEART RATE: 64 BPM | SYSTOLIC BLOOD PRESSURE: 119 MMHG | HEIGHT: 74 IN | WEIGHT: 158.6 LBS | BODY MASS INDEX: 20.35 KG/M2 | TEMPERATURE: 97.3 F

## 2022-07-26 DIAGNOSIS — Z90.49 S/P COLECTOMY: Primary | ICD-10-CM

## 2022-07-26 PROCEDURE — 99024 POSTOP FOLLOW-UP VISIT: CPT | Performed by: SURGERY

## 2022-07-26 NOTE — NURSING NOTE
"Initial /74 (BP Location: Right arm, Patient Position: Sitting, Cuff Size: Adult Regular)   Pulse 64   Temp 97.3  F (36.3  C) (Tympanic)   Ht 1.88 m (6' 2\")   Wt 71.9 kg (158 lb 9.6 oz)   BMI 20.36 kg/m   Estimated body mass index is 20.36 kg/m  as calculated from the following:    Height as of this encounter: 1.88 m (6' 2\").    Weight as of this encounter: 71.9 kg (158 lb 9.6 oz). .    Maria Regalado MA    "

## 2022-07-26 NOTE — PROGRESS NOTES
"General Surgery Post Op    Pt returns for follow up visit s/p right hemicolectomy on 7/13/22.    He is doing well overall.  He states he has struggled with bowel movements since discharge home.  He has not had a regular bowel regimen since discharge.  No fevers or chills.  No wound drainage.  He last had bowel movement yesterday, states it was difficult to pass. He admits not much appetite.    Physical exam: Vitals: /74 (BP Location: Right arm, Patient Position: Sitting, Cuff Size: Adult Regular)   Pulse 64   Temp 97.3  F (36.3  C) (Tympanic)   Ht 1.88 m (6' 2\")   Wt 71.9 kg (158 lb 9.6 oz)   BMI 20.36 kg/m    BMI= Body mass index is 20.36 kg/m .    Exam:  Constitutional: healthy, alert and no distress  Cardiovascular: negative  Respiratory: negative  Gastrointestinal: Abdomen soft, non-tender. BS normal. No masses, organomegaly  Incision C/D/I.  Small seroma left of midline.  Staples removed without issue at bedside.    Path:  Large intestine, right side with appendix, resection:  -Viable ileal and colonic resection margins  -Normal appendix without inflammation  -Central tissue section with prominent vascular congestion consistent with history of volvulus/mechanical obstruction; no evidence of ischemic injury in examined section material       Assessment:     ICD-10-CM    1. S/P colectomy  Z90.49      Plan: Ghulam Rizzo was seen for follow-up after right colectomy for cecal volvulus.  Patient is doing well overall.  His exam is benign, vitals stable.  He has had diminished appetite and constipation since operation.  I suggested smaller meals (wife states this has been issue prior to surgery), supplements with boost or ensure.  I also suggested a bowel regimen.  We reviewed the pathology which was benign.  We discussed routine post-operative care of wounds including avoiding sun exposure to wounds to limit scarring, no need for overlying ointments, and weight restrictions going forward.  I would like to " see him back in 1 month for follow-up or sooner if needed.    Tommy Martino, DO on 7/26/2022 at 8:49 AM

## 2022-07-26 NOTE — TELEPHONE ENCOUNTER
Nurse Triage SBAR    Consent to communicate on file to talk to Wife Chacha.    Is this a 2nd Level Triage? YES, LICENSED PRACTITIONER REVIEW IS REQUIRED    Situation: Patient developed a rash today after his bath.    Background: Patient started Barbara lax today and developed a rash within 4 hours of taking the medication.    Assessment: Rash is on both his arms and his sides, but not on his legs.  No different soap, no different laundry detergent    Protocol Recommended Disposition:   See HCP Within 4 Hours (Or PCP Triage)    Recommendation: Please advise on continued Miralax use. Patient did take Benadryl 50 mg and this helped the itching.     Paged to provider at 5:47 pm    Page returned 5:52    Per Dr. Rutledge  Patient should stop Barbara lax and he should follow up with surgery for a different bowel regiment. He should call back if he has any difficulty breathing, or swelling.    Wife notified and she verbalizes understanding.    nAtonia Blunt RN on 7/26/2022 at 5:55 PM      Reason for Disposition    [1] Taking new prescription medication AND [2] rash within 4 hours of 1st dose    Additional Information    Negative: Patient sounds very sick or weak to the triager    Negative: Looks infected (e.g., spreading redness, red streak, pus)    Negative: [1] Life-threatening reaction (anaphylaxis) in the past to the same drug AND [2] < 2 hours since exposure    Negative: Difficulty breathing or wheezing    Negative: [1] Hoarseness or cough AND [2] started soon after 1st dose of drug    Negative: [1] Swollen tongue AND [2] started soon after 1st dose of drug    Negative: [1] Purple or blood-colored rash (spots or dots) AND [2] fever    Negative: Sounds like a life-threatening emergency to the triager    Negative: Swollen tongue    Negative: [1] Widespread hives AND [2] onset < 2 hours of exposure to 1st dose of drug    Negative: Fever    Negative: Patient sounds very sick or weak to the triager    Negative: [1] Purple or  blood-colored rash (spots or dots) AND [2] no fever AND [3] sounds well to triager    Protocols used: RASH - WIDESPREAD ON DRUGS-A-AH, ITCHING - [id number]

## 2022-07-26 NOTE — LETTER
"    7/26/2022         RE: Ghulam Rizzo  [address] [place] [address]        Dear Colleague,    Thank you for referring your patient, Ghualm Rizzo, to the [facility]. Please see a copy of my visit note below.    General Surgery Post Op    Pt returns for follow up visit s/p right hemicolectomy on 7/13/22.    He is doing well overall.  He states he has struggled with bowel movements since discharge home.  He has not had a regular bowel regimen since discharge.  No fevers or chills.  No wound drainage.  He last had bowel movement yesterday, states it was difficult to pass. He admits not much appetite.    Physical exam: Vitals: /74 (BP Location: Right arm, Patient Position: Sitting, Cuff Size: Adult Regular)   Pulse 64   Temp 97.3  F (36.3  C) (Tympanic)   Ht 1.88 m (6' 2\")   Wt 71.9 kg (158 lb 9.6 oz)   BMI 20.36 kg/m    BMI= Body mass index is 20.36 kg/m .    Exam:  Constitutional: healthy, alert and no distress  Cardiovascular: negative  Respiratory: negative  Gastrointestinal: Abdomen soft, non-tender. BS normal. No masses, organomegaly  Incision C/D/I.  Small seroma left of midline.  Staples removed without issue at bedside.    Path:  Large intestine, right side with appendix, resection:  -Viable ileal and colonic resection margins  -Normal appendix without inflammation  -Central tissue section with prominent vascular congestion consistent with history of volvulus/mechanical obstruction; no evidence of ischemic injury in examined section material       Assessment:     ICD-10-CM    1. S/P colectomy  Z90.49      Plan: Ghulam Rizzo was seen for follow-up after right colectomy for cecal volvulus.  Patient is doing well overall.  His exam is benign, vitals stable.  He has had diminished appetite and constipation since operation.  I suggested smaller meals (wife states this has been issue prior to surgery), supplements with boost or ensure.  I also suggested a bowel regimen.  We " reviewed the pathology which was benign.  We discussed routine post-operative care of wounds including avoiding sun exposure to wounds to limit scarring, no need for overlying ointments, and weight restrictions going forward.  I would like to see him back in 1 month for follow-up or sooner if needed.    Tommy Martino DO on 7/26/2022 at 8:49 AM          Again, thank you for allowing me to participate in the care of your patient.        Sincerely,        Tommy Martino DO

## 2022-07-27 ENCOUNTER — TELEPHONE (OUTPATIENT)
Dept: SURGERY | Facility: CLINIC | Age: 67
End: 2022-07-27

## 2022-07-27 NOTE — TELEPHONE ENCOUNTER
Reason for Call:  Other call back    Detailed comments: Pt's wife calling stating she would like Dr. Martino's care team to reach out see TE 07/27/22-Nurse Triage. Pt having ongoing hard time with his bowel-worsening. Pt experiencing rash from Mirlax. Rash has since disappeared after using benadryl.   Pt has upcoming appt.-not until Aug. Please advise.     Phone Number Patient can be reached at: Home number on file [phone] (home)    Best Time: any    Can we leave a detailed message on this number? YES    Call taken on 7/27/2022 at 11:18 AM by Ester Trinidad

## 2022-07-27 NOTE — TELEPHONE ENCOUNTER
"Primary Care Provider addressed the rash for assumed Barbara lax allergy.    Recommended they reach out to Surgery to recommend an alternative Bowel plan s/p right hemicolectomy on 7/13/22.      (Had slow transit symptoms prior to surgery and took 4 days to have 1st BM prior to discharge from Hospital.)  Pt had spoke with RN on 7/22=  Her note indicates; taking senna and ducolax daily for chronic constipation. Did advise pt drink some prune juice or eat prunes and increase fiber.    Pt then seen Dr Martino yesterday and advised \"bowel regimen\", Did not document specifics.      Needs replacement recommendation for Miralax.     Please advise.    Renae THOMAS   [facility] RN        "

## 2022-07-28 ENCOUNTER — TELEPHONE (OUTPATIENT)
Dept: SURGERY | Facility: CLINIC | Age: 67
End: 2022-07-28

## 2022-07-28 NOTE — TELEPHONE ENCOUNTER
"Wife, Chacha calling.    \"He had surgery 3 weeks ago and had the staples out Wednesday. He hadn't had any drainage at all until today, bit now he had a little pinkish drainage from his incision and he is really concerned. I think he is trying too hard to have a BM.. He looks scared\"    Denies foul odor, fever, spreading redness.     Advised wounds do drain as they heal and to watch for spreading redness, foul odor, fever, increased tenderness.     I did advise they can cover area with a gauze pad to catch the drainage and if s/sx of infection occur, to call back.     Can try to increase fluid intake and try warm fluids, increase fruit and vegetable intake. Has tried Miralax. Advised can try Milk of magnesia or suppository. Can also try stool softners and otc fiber.     Wife verbalized understanding. Bebe Tamayo RN                "

## 2022-08-25 ENCOUNTER — OFFICE VISIT (OUTPATIENT)
Dept: SURGERY | Facility: CLINIC | Age: 67
End: 2022-08-25
Payer: COMMERCIAL

## 2022-08-25 VITALS
HEART RATE: 86 BPM | DIASTOLIC BLOOD PRESSURE: 77 MMHG | WEIGHT: 161 LBS | SYSTOLIC BLOOD PRESSURE: 138 MMHG | OXYGEN SATURATION: 100 % | BODY MASS INDEX: 20.67 KG/M2

## 2022-08-25 DIAGNOSIS — Z90.49 S/P RIGHT HEMICOLECTOMY: Primary | ICD-10-CM

## 2022-08-25 PROCEDURE — 99024 POSTOP FOLLOW-UP VISIT: CPT | Performed by: SURGERY

## 2022-08-25 NOTE — NURSING NOTE
Chief Complaint   Patient presents with     Surgical Followup     S/P colectomy:patient reports having regular bowel movements and gaining a little appetite since last Post op         Vitals:    08/25/22 1304   BP: 138/77   BP Location: Right arm   Patient Position: Sitting   Cuff Size: Adult Regular   Pulse: 86   SpO2: 100%   Weight: 73 kg (161 lb)     Wt Readings from Last 1 Encounters:   08/25/22 73 kg (161 lb)       Fern Jesus MA

## 2022-08-25 NOTE — LETTER
8/25/2022         RE: Ghulam Rizzo  [address] [place] [id number]        Dear Colleague,    Thank you for referring your patient, Ghulam Rizzo, to the [facility]. Please see a copy of my visit note below.    General Surgery Post Op    Pt returns for follow up visit s/p right colectomy    Patient has been doing well, tolerating diet. Bowels moving well. Pain controlled.He had serous wound drainage which has since stopped.  He has gained 5 lbs back since last visit. No fevers.      Physical exam: Vitals: /77 (BP Location: Right arm, Patient Position: Sitting, Cuff Size: Adult Regular)   Pulse 86   Wt 73 kg (161 lb)   SpO2 100%   BMI 20.67 kg/m    BMI= Body mass index is 20.67 kg/m .    Exam:  Constitutional: healthy, alert and no distress  Cardiovascular: negative  Respiratory: negative  Gastrointestinal: Abdomen soft, non-tender. BS normal. No masses, organomegaly  Incision C/D/I    Assessment:     ICD-10-CM    1. S/P right hemicolectomy  Z90.49      Plan: Mr. Rizzo is doing much better at this visit.  He is gaining weight, energy and back to his baseline.  He likely had a small seroma which has since drained.  He feels his bowels are better than his pre-operative state.  He has no limitations at this time.  I encouraged him to continue dietary supplements at this time until he is back to baseline.  He can follow-up on an as needed basis.    Tommy Martino DO on 8/25/2022 at 1:13 PM          Again, thank you for allowing me to participate in the care of your patient.        Sincerely,        Tommy Martino DO

## 2023-01-16 ENCOUNTER — APPOINTMENT (OUTPATIENT)
Dept: MRI IMAGING | Facility: CLINIC | Age: 68
End: 2023-01-16
Attending: FAMILY MEDICINE
Payer: COMMERCIAL

## 2023-01-16 ENCOUNTER — HOSPITAL ENCOUNTER (EMERGENCY)
Facility: CLINIC | Age: 68
Discharge: HOME | End: 2023-01-16
Attending: FAMILY MEDICINE | Admitting: FAMILY MEDICINE
Payer: COMMERCIAL

## 2023-01-16 VITALS
HEIGHT: 74 IN | HEART RATE: 66 BPM | DIASTOLIC BLOOD PRESSURE: 85 MMHG | BODY MASS INDEX: 22.46 KG/M2 | SYSTOLIC BLOOD PRESSURE: 155 MMHG | OXYGEN SATURATION: 100 % | RESPIRATION RATE: 18 BRPM | WEIGHT: 175 LBS | TEMPERATURE: 98.6 F

## 2023-01-16 DIAGNOSIS — I67.1 INTERNAL CAROTID ANEURYSM: ICD-10-CM

## 2023-01-16 DIAGNOSIS — H81.399 PERIPHERAL VERTIGO, UNSPECIFIED LATERALITY: ICD-10-CM

## 2023-01-16 LAB
ANION GAP SERPL CALCULATED.3IONS-SCNC: 8 MMOL/L (ref 7–15)
APTT PPP: 31 SECONDS (ref 22–38)
BASOPHILS # BLD AUTO: 0 10E3/UL (ref 0–0.2)
BASOPHILS NFR BLD AUTO: 1 %
BUN SERPL-MCNC: 9.8 MG/DL (ref 8–23)
CALCIUM SERPL-MCNC: 9.4 MG/DL (ref 8.8–10.2)
CHLORIDE SERPL-SCNC: 102 MMOL/L (ref 98–107)
CREAT SERPL-MCNC: 0.68 MG/DL (ref 0.67–1.17)
DEPRECATED HCO3 PLAS-SCNC: 27 MMOL/L (ref 22–29)
EOSINOPHIL # BLD AUTO: 0 10E3/UL (ref 0–0.7)
EOSINOPHIL NFR BLD AUTO: 1 %
ERYTHROCYTE [DISTWIDTH] IN BLOOD BY AUTOMATED COUNT: 12.8 % (ref 10–15)
GFR SERPL CREATININE-BSD FRML MDRD: >90 ML/MIN/1.73M2
GLUCOSE SERPL-MCNC: 99 MG/DL (ref 70–99)
HCT VFR BLD AUTO: 44.4 % (ref 40–53)
HGB BLD-MCNC: 15.5 G/DL (ref 13.3–17.7)
HOLD SPECIMEN: NORMAL
IMM GRANULOCYTES # BLD: 0 10E3/UL
IMM GRANULOCYTES NFR BLD: 0 %
INR PPP: 1.09 (ref 0.85–1.15)
LYMPHOCYTES # BLD AUTO: 1.9 10E3/UL (ref 0.8–5.3)
LYMPHOCYTES NFR BLD AUTO: 33 %
MCH RBC QN AUTO: 32.4 PG (ref 26.5–33)
MCHC RBC AUTO-ENTMCNC: 34.9 G/DL (ref 31.5–36.5)
MCV RBC AUTO: 93 FL (ref 78–100)
MONOCYTES # BLD AUTO: 0.4 10E3/UL (ref 0–1.3)
MONOCYTES NFR BLD AUTO: 7 %
NEUTROPHILS # BLD AUTO: 3.3 10E3/UL (ref 1.6–8.3)
NEUTROPHILS NFR BLD AUTO: 58 %
NRBC # BLD AUTO: 0 10E3/UL
NRBC BLD AUTO-RTO: 0 /100
PLATELET # BLD AUTO: 178 10E3/UL (ref 150–450)
POTASSIUM SERPL-SCNC: 4.5 MMOL/L (ref 3.4–5.3)
RBC # BLD AUTO: 4.79 10E6/UL (ref 4.4–5.9)
SODIUM SERPL-SCNC: 137 MMOL/L (ref 136–145)
TROPONIN T SERPL HS-MCNC: <6 NG/L
WBC # BLD AUTO: 5.6 10E3/UL (ref 4–11)

## 2023-01-16 PROCEDURE — 258N000003 HC RX IP 258 OP 636: Performed by: FAMILY MEDICINE

## 2023-01-16 PROCEDURE — 85730 THROMBOPLASTIN TIME PARTIAL: CPT | Performed by: FAMILY MEDICINE

## 2023-01-16 PROCEDURE — 93005 ELECTROCARDIOGRAM TRACING: CPT | Performed by: FAMILY MEDICINE

## 2023-01-16 PROCEDURE — 85025 COMPLETE CBC W/AUTO DIFF WBC: CPT | Performed by: FAMILY MEDICINE

## 2023-01-16 PROCEDURE — 93010 ELECTROCARDIOGRAM REPORT: CPT | Performed by: FAMILY MEDICINE

## 2023-01-16 PROCEDURE — 96360 HYDRATION IV INFUSION INIT: CPT | Mod: 59 | Performed by: FAMILY MEDICINE

## 2023-01-16 PROCEDURE — 255N000002 HC RX 255 OP 636: Performed by: FAMILY MEDICINE

## 2023-01-16 PROCEDURE — 84484 ASSAY OF TROPONIN QUANT: CPT | Performed by: FAMILY MEDICINE

## 2023-01-16 PROCEDURE — 99285 EMERGENCY DEPT VISIT HI MDM: CPT | Mod: 25 | Performed by: FAMILY MEDICINE

## 2023-01-16 PROCEDURE — 96361 HYDRATE IV INFUSION ADD-ON: CPT | Performed by: FAMILY MEDICINE

## 2023-01-16 PROCEDURE — A9585 GADOBUTROL INJECTION: HCPCS | Performed by: FAMILY MEDICINE

## 2023-01-16 PROCEDURE — 80048 BASIC METABOLIC PNL TOTAL CA: CPT | Performed by: FAMILY MEDICINE

## 2023-01-16 PROCEDURE — 70553 MRI BRAIN STEM W/O & W/DYE: CPT

## 2023-01-16 PROCEDURE — 70549 MR ANGIOGRAPH NECK W/O&W/DYE: CPT

## 2023-01-16 PROCEDURE — 36415 COLL VENOUS BLD VENIPUNCTURE: CPT | Performed by: FAMILY MEDICINE

## 2023-01-16 PROCEDURE — 70544 MR ANGIOGRAPHY HEAD W/O DYE: CPT

## 2023-01-16 PROCEDURE — 85610 PROTHROMBIN TIME: CPT | Performed by: FAMILY MEDICINE

## 2023-01-16 RX ORDER — GADOBUTROL 604.72 MG/ML
10 INJECTION INTRAVENOUS ONCE
Status: COMPLETED | OUTPATIENT
Start: 2023-01-16 | End: 2023-01-16

## 2023-01-16 RX ADMIN — SODIUM CHLORIDE 50 ML: 9 INJECTION, SOLUTION INTRAVENOUS at 15:24

## 2023-01-16 RX ADMIN — GADOBUTROL 10 ML: 604.72 INJECTION INTRAVENOUS at 15:24

## 2023-01-16 ASSESSMENT — ACTIVITIES OF DAILY LIVING (ADL)
ADLS_ACUITY_SCORE: 35
ADLS_ACUITY_SCORE: 35

## 2023-01-16 NOTE — ED PROVIDER NOTES
HPI   The patient is a 67-year-old male presenting with dizziness.  He has a known history of cirrhosis without ascites.  Known history of anxiety and depression.  He had a cecal volvulus with right-sided hemicolectomy performed in July, 2022.  He takes Zoloft.  He smokes.  He drinks 1-2 beers a day.  No drugs of abuse.    The patient reports dizziness starting 2 to 3 days ago.  His dizziness will come on multiple times a day.  The dizziness is described as spinning and he feels off balance.  He feels mildly nauseous but there is been no vomiting.  Symptoms will last minutes at a time when present.  It is not reproducible.  It seems to come on without obvious cause.  No lightheadedness or fainting.  No palpitations.  No chest pain.  No shortness of breath.  No recent fever or obvious illness.  No trauma or injury.  No tinnitus or changes in hearing.  No ear pain.  No dental pain.  No facial pain or pressure.  No green nasal discharge.  No headache or neck pain.        Allergies:  Allergies   Allergen Reactions     Levaquin [Levofloxacin] Nausea and Vomiting     Problem List:    Patient Active Problem List    Diagnosis Date Noted     Cecal volvulus (H) 07/13/2022     Priority: Medium     Elevated prostate specific antigen (PSA) 02/09/2022     Priority: Medium     Other irritable bowel syndrome 11/07/2019     Priority: Medium     Screening for hypertension 11/07/2019     Priority: Medium     Proctitis 01/26/2017     Priority: Medium     Colonoscopy in 2013.        Major depressive disorder, recurrent episode, moderate (H) 01/26/2017     Priority: Medium     Paxil caused anorgasmia       Cirrhosis of liver without ascites (H) 09/23/2015     Priority: Medium     Thought due to hepatitis C.  Follows with [facility].  Completed Harvoni 9/2015.       Abdominal pain, generalized 08/20/2015     Priority: Medium     Advanced directives, counseling/discussion 09/14/2012     Priority: Medium     Discussed advance care planning  with patient; however, patient declined at this time. 9/14/2012          Hyperlipidemia LDL goal <130 07/16/2012     Priority: Medium     Tobacco abuse 07/16/2012     Priority: Medium      Past Medical History:    Past Medical History:   Diagnosis Date     Anisocoria      Asthma      Carpal tunnel syndrome      Head injury, unspecified      Hepatitis C      Obesity      Other convulsions      Other mononeuritis of upper limb      Unspecified essential hypertension      Past Surgical History:    Past Surgical History:   Procedure Laterality Date     COLECTOMY WITHOUT COLOSTOMY Right 7/13/2022    Procedure: Laparotomy, right colectomy, lysis of adhesions,;  Surgeon: Tmomy Martino DO;  Location: [facility] OR     COLONOSCOPY       GI SURGERY       HERNIA REPAIR       HERNIORRHAPHY INCISIONAL (LOCATION) N/A 7/13/2022    Procedure: primary incisional hernia repair;  Surgeon: Tommy Martino DO;  Location: [facility] OR     SURGICAL HISTORY OF -   4/6/87    esophagogastroduodenoscopy     SURGICAL HISTORY OF -   4/27/87    exploratory laparotomy and anterior gastropexy over a gastrostomy tube.     SURGICAL HISTORY OF -   11/13/90    esophagogastroduodenoscopy     SURGICAL HISTORY OF -   1991    L diaphragm eventration repair, fixation of stomach and colon to abd wall     SURGICAL HISTORY OF -       hiatal hernia repair     THORACIC SURGERY       Family History:    Family History   Problem Relation Age of Onset     Cerebrovascular Disease Mother      Heart Disease Brother      Heart Disease Brother      Social History:  Marital Status:   [2]  Social History     Tobacco Use     Smoking status: Every Day     Packs/day: 0.50     Types: Cigarettes     Smokeless tobacco: Never     Tobacco comments:     started again 4/2010   Vaping Use     Vaping Use: Never used   Substance Use Topics     Alcohol use: Yes     Comment: 1-2 beers daily     Drug use: No      Medications:    Cholecalciferol (VITAMIN D3 PO)  senna-docusate  "(NEGRITO-COLACE) 8.6-50 MG per tablet  sertraline (ZOLOFT) 50 MG tablet      Review of Systems   All other systems reviewed and are negative.      PE   BP: (!) 160/94  Pulse: 79  Temp: 98.6  F (37  C)  Resp: 18  Height: 188 cm (6' 2\")  Weight: 79.4 kg (175 lb)  SpO2: 98 %  Physical Exam  Vitals and nursing note reviewed.   Constitutional:       General: He is not in acute distress.  HENT:      Head: Atraumatic.      Right Ear: External ear normal.      Left Ear: External ear normal.      Nose: Nose normal.      Mouth/Throat:      Mouth: Mucous membranes are moist.      Pharynx: Oropharynx is clear.   Eyes:      General: No scleral icterus.     Extraocular Movements: Extraocular movements intact.      Conjunctiva/sclera: Conjunctivae normal.      Pupils: Pupils are equal, round, and reactive to light.   Cardiovascular:      Rate and Rhythm: Normal rate.   Pulmonary:      Effort: Pulmonary effort is normal. No respiratory distress.   Abdominal:      Palpations: Abdomen is soft.      Tenderness: There is no abdominal tenderness.   Musculoskeletal:         General: Normal range of motion.      Cervical back: Normal range of motion.   Skin:     General: Skin is warm and dry.   Neurological:      General: No focal deficit present.      Mental Status: He is alert and oriented to person, place, and time.      Comments: The patient does not have reproducible dizziness with Trendelenburg and turning his head to the left or to the right.  No nystagmus.    No dysarthria or dysphasia.  CN II-VIII intact grossly.  Moving U/L extremities B.  Strength 5/5 U/L extremities B.  Sensation intact grossly to touch (equal).  Rapid alternating movements intact.  Negative Rhomberg.  Normal finger-to-nose movments.   Psychiatric:         Behavior: Behavior normal.         ED COURSE and MDM   1453.  Patient has 3 days of intermittent dizziness that he tells me is worsening with time.  No other neurological deficit described and no deficit found " on examination.  No reproducible vertigo here in the ED.  MRI brain, MRA head and neck pending.  EKG documented below.  Lab work pending.    1819.  MRI/MRA complete.  Results reviewed with the patient and his spouse who is in the room throughout.  No evidence for acute pathology requiring hospitalization or consultation.  Peripheral vertigo likely, ENT referral order placed.  Patient does not want medication at home.  Intermittent episodes of dizziness throughout the day, none currently.  Aneurysm identified in the left internal carotid artery, 3 to 4 mm.  Neurosurgery referral order placed, screening likely needed going forward.    EKG  (1518)   Interpretation performed by me.  Rate: 69     Rhythm: sinus     Axis: nl  Intervals: ND (12-2) 168, QRS (<12) 96, QTc (>5) 399  P wave: F is inverted in V1, inverted in V2.    QRS complex: nl   ST segment / T-wave: nl  Conclusion: Possible left atrial enlargement.    1820.  The patient, their parent if applicable, and/or their medical decision maker(s) and I have reviewed all of the available historical information, applicable PMH, physical exam findings, and objective diagnostic data gathered during this ED visit.  We then discussed all work-up options and then together agreed upon the course taken during this visit.  The ultimate disposition and plan was a cooperative decision made between myself and the patient, their parent if applicable, and/or their legal decision maker(s).  The risks and benefits of all decisions made during this visit were discussed to the best of my abilities given the circumstances, and all parties are understanding of the pertinent ramifications of these decisions.      LABS  Labs Ordered and Resulted from Time of ED Arrival to Time of ED Departure   BASIC METABOLIC PANEL - Normal       Result Value    Sodium 137      Potassium 4.5      Chloride 102      Carbon Dioxide (CO2) 27      Anion Gap 8      Urea Nitrogen 9.8      Creatinine 0.68       Calcium 9.4      Glucose 99      GFR Estimate >90     INR - Normal    INR 1.09     PARTIAL THROMBOPLASTIN TIME - Normal    aPTT 31     TROPONIN T, HIGH SENSITIVITY - Normal    Troponin T, High Sensitivity <6     CBC WITH PLATELETS AND DIFFERENTIAL    WBC Count 5.6      RBC Count 4.79      Hemoglobin 15.5      Hematocrit 44.4      MCV 93      MCH 32.4      MCHC 34.9      RDW 12.8      Platelet Count 178      % Neutrophils 58      % Lymphocytes 33      % Monocytes 7      % Eosinophils 1      % Basophils 1      % Immature Granulocytes 0      NRBCs per 100 WBC 0      Absolute Neutrophils 3.3      Absolute Lymphocytes 1.9      Absolute Monocytes 0.4      Absolute Eosinophils 0.0      Absolute Basophils 0.0      Absolute Immature Granulocytes 0.0      Absolute NRBCs 0.0         IMAGING  Images reviewed by me.  Radiology report also reviewed.  MRA Neck (Carotids) wo & w Contrast   Final Result   IMPRESSION:   1. Mildly limited evaluation of the origins of the great vessels and   the origin/proximal V1 segment of the right vertebral artery due to   motion artifacts.   2. Within that limitation, no significant stenosis, occlusion, or   dissection identified involving the cervical carotid or vertebral   arteries.      ELIEL FLOYD MD            SYSTEM ID:  [id number]      MR Brain w/o & w Contrast   Final Result   IMPRESSION:   1. No acute intracranial process.   2. Brain atrophy and presumed chronic small vessel ischemic change, as   described.   3. Unchanged prominent retrocerebellar arachnoid cyst.      ELIEL FLOYD MD            SYSTEM ID:  [id number]      MRA Brain ([place] of [place]) wo Contrast   Final Result   IMPRESSION:   1. No high-grade stenosis or large vessel occlusion identified   involving the major intracranial arteries.   2. Inferomedially directed focal outpouching concerning for saccular   aneurysm arising from the paraclinoid segment of the left internal   carotid artery, as described.      ELIEL SHELTON  MD MARIEL            SYSTEM ID:  [id number]          Procedures    Medications   gadobutrol (GADAVIST) injection 10 mL (10 mLs Intravenous Given 1/16/23 1524)   0.9% sodium chloride BOLUS (0 mLs Intravenous Stopped 1/16/23 1820)         IMPRESSION       ICD-10-CM    1. Peripheral vertigo, unspecified laterality  H81.399 Adult ENT  Referral      2. Internal carotid aneurysm  I67.1 Neurosurgery Referral    3-4 mm               Medication List      There are no discharge medications for this visit.                     Edwin Kohli MD  01/16/23 1569

## 2023-01-16 NOTE — ED TRIAGE NOTES
Pt here with dizziness and vision changes with double vision on/off for three days.      Triage Assessment     Row Name 01/16/23 7609       Triage Assessment (Adult)    Airway WDL WDL       Respiratory WDL    Respiratory WDL WDL       Cardiac WDL    Cardiac WDL WDL       Cognitive/Neuro/Behavioral WDL    Cognitive/Neuro/Behavioral WDL X  dizziness with vision changes x 3 days

## 2023-01-16 NOTE — ED NOTES
Pt back from MRI, able to ambulate from wheelchair to bed independently, denies any pain or current dizziness, Pt reports dizziness is intermittent. Wife at bedside.

## 2023-01-16 NOTE — ED NOTES
"Patient complains of having dizziness x 3 days.  Denies illness.  Dizziness not reproducible with head movement or changes of position.  Vision is blurry with \"trails\" on objects.  Denies nausea and vomiting.  "

## 2023-01-17 NOTE — DISCHARGE INSTRUCTIONS
RETURN TO THE EMERGENCY ROOM FOR THE FOLLOWING:    Severely worsened dizziness, vomiting and dehydration, new severe headache, one-sided weakness or incoordination, facial droop, trouble with speech, or at anytime for any concern.    FOLLOW UP:    Referral orders placed for ENT and neurosurgery, as discussed.  Expect phone calls within the next 2-3 business days to help with scheduling.    TREATMENT RECOMMENDATIONS:    None new.  No changes.    NURSE ADVICE LINE:  [phone] or [phone]

## 2023-01-21 ENCOUNTER — APPOINTMENT (OUTPATIENT)
Dept: CT IMAGING | Facility: CLINIC | Age: 68
End: 2023-01-21
Attending: FAMILY MEDICINE
Payer: COMMERCIAL

## 2023-01-21 ENCOUNTER — HOSPITAL ENCOUNTER (EMERGENCY)
Facility: CLINIC | Age: 68
Discharge: HOME | End: 2023-01-21
Attending: FAMILY MEDICINE | Admitting: FAMILY MEDICINE
Payer: COMMERCIAL

## 2023-01-21 VITALS
HEART RATE: 64 BPM | OXYGEN SATURATION: 100 % | TEMPERATURE: 95.9 F | RESPIRATION RATE: 18 BRPM | SYSTOLIC BLOOD PRESSURE: 149 MMHG | WEIGHT: 175 LBS | BODY MASS INDEX: 22.47 KG/M2 | DIASTOLIC BLOOD PRESSURE: 84 MMHG

## 2023-01-21 DIAGNOSIS — N13.2 HYDRONEPHROSIS WITH URINARY OBSTRUCTION DUE TO URETERAL CALCULUS: ICD-10-CM

## 2023-01-21 DIAGNOSIS — N20.1 URETEROLITHIASIS: ICD-10-CM

## 2023-01-21 DIAGNOSIS — K59.00 CONSTIPATION, UNSPECIFIED CONSTIPATION TYPE: ICD-10-CM

## 2023-01-21 DIAGNOSIS — R10.84 ABDOMINAL PAIN, GENERALIZED: ICD-10-CM

## 2023-01-21 LAB
ALBUMIN SERPL BCG-MCNC: 4.3 G/DL (ref 3.5–5.2)
ALP SERPL-CCNC: 89 U/L (ref 40–129)
ALT SERPL W P-5'-P-CCNC: 15 U/L (ref 10–50)
ANION GAP SERPL CALCULATED.3IONS-SCNC: 9 MMOL/L (ref 7–15)
AST SERPL W P-5'-P-CCNC: 18 U/L (ref 10–50)
BASOPHILS # BLD AUTO: 0.1 10E3/UL (ref 0–0.2)
BASOPHILS NFR BLD AUTO: 1 %
BILIRUB SERPL-MCNC: 0.8 MG/DL
BUN SERPL-MCNC: 10.8 MG/DL (ref 8–23)
CALCIUM SERPL-MCNC: 9.5 MG/DL (ref 8.8–10.2)
CHLORIDE SERPL-SCNC: 100 MMOL/L (ref 98–107)
CREAT SERPL-MCNC: 0.67 MG/DL (ref 0.67–1.17)
DEPRECATED HCO3 PLAS-SCNC: 26 MMOL/L (ref 22–29)
EOSINOPHIL # BLD AUTO: 0.1 10E3/UL (ref 0–0.7)
EOSINOPHIL NFR BLD AUTO: 1 %
ERYTHROCYTE [DISTWIDTH] IN BLOOD BY AUTOMATED COUNT: 12.8 % (ref 10–15)
GFR SERPL CREATININE-BSD FRML MDRD: >90 ML/MIN/1.73M2
GLUCOSE SERPL-MCNC: 102 MG/DL (ref 70–99)
HCT VFR BLD AUTO: 44.7 % (ref 40–53)
HGB BLD-MCNC: 15.9 G/DL (ref 13.3–17.7)
HOLD SPECIMEN: NORMAL
IMM GRANULOCYTES # BLD: 0 10E3/UL
IMM GRANULOCYTES NFR BLD: 0 %
LIPASE SERPL-CCNC: 44 U/L (ref 13–60)
LYMPHOCYTES # BLD AUTO: 2.1 10E3/UL (ref 0.8–5.3)
LYMPHOCYTES NFR BLD AUTO: 28 %
MCH RBC QN AUTO: 32.8 PG (ref 26.5–33)
MCHC RBC AUTO-ENTMCNC: 35.6 G/DL (ref 31.5–36.5)
MCV RBC AUTO: 92 FL (ref 78–100)
MONOCYTES # BLD AUTO: 0.6 10E3/UL (ref 0–1.3)
MONOCYTES NFR BLD AUTO: 8 %
NEUTROPHILS # BLD AUTO: 4.7 10E3/UL (ref 1.6–8.3)
NEUTROPHILS NFR BLD AUTO: 62 %
NRBC # BLD AUTO: 0 10E3/UL
NRBC BLD AUTO-RTO: 0 /100
PLATELET # BLD AUTO: 186 10E3/UL (ref 150–450)
POTASSIUM SERPL-SCNC: 5 MMOL/L (ref 3.4–5.3)
PROT SERPL-MCNC: 6.9 G/DL (ref 6.4–8.3)
RBC # BLD AUTO: 4.85 10E6/UL (ref 4.4–5.9)
SODIUM SERPL-SCNC: 135 MMOL/L (ref 136–145)
WBC # BLD AUTO: 7.5 10E3/UL (ref 4–11)

## 2023-01-21 PROCEDURE — 99284 EMERGENCY DEPT VISIT MOD MDM: CPT | Performed by: FAMILY MEDICINE

## 2023-01-21 PROCEDURE — 80053 COMPREHEN METABOLIC PANEL: CPT | Performed by: FAMILY MEDICINE

## 2023-01-21 PROCEDURE — 99285 EMERGENCY DEPT VISIT HI MDM: CPT | Mod: 25 | Performed by: FAMILY MEDICINE

## 2023-01-21 PROCEDURE — 83690 ASSAY OF LIPASE: CPT | Performed by: FAMILY MEDICINE

## 2023-01-21 PROCEDURE — 36415 COLL VENOUS BLD VENIPUNCTURE: CPT | Performed by: FAMILY MEDICINE

## 2023-01-21 PROCEDURE — 250N000009 HC RX 250: Performed by: FAMILY MEDICINE

## 2023-01-21 PROCEDURE — 85025 COMPLETE CBC W/AUTO DIFF WBC: CPT | Performed by: FAMILY MEDICINE

## 2023-01-21 PROCEDURE — 250N000013 HC RX MED GY IP 250 OP 250 PS 637: Performed by: FAMILY MEDICINE

## 2023-01-21 PROCEDURE — 74177 CT ABD & PELVIS W/CONTRAST: CPT

## 2023-01-21 PROCEDURE — 250N000011 HC RX IP 250 OP 636: Performed by: FAMILY MEDICINE

## 2023-01-21 RX ORDER — ACETAMINOPHEN 500 MG
500-1000 TABLET ORAL EVERY 6 HOURS PRN
COMMUNITY
End: 2023-03-16

## 2023-01-21 RX ORDER — IOPAMIDOL 755 MG/ML
77 INJECTION, SOLUTION INTRAVASCULAR ONCE
Status: COMPLETED | OUTPATIENT
Start: 2023-01-21 | End: 2023-01-21

## 2023-01-21 RX ADMIN — SODIUM CHLORIDE 61 ML: 9 INJECTION, SOLUTION INTRAVENOUS at 14:36

## 2023-01-21 RX ADMIN — SODIUM PHOSPHATE 1 ENEMA: 7; 19 ENEMA RECTAL at 15:43

## 2023-01-21 RX ADMIN — IOPAMIDOL 77 ML: 755 INJECTION, SOLUTION INTRAVENOUS at 14:36

## 2023-01-21 ASSESSMENT — ACTIVITIES OF DAILY LIVING (ADL)
ADLS_ACUITY_SCORE: 35

## 2023-01-21 NOTE — DISCHARGE INSTRUCTIONS
There is a stone in your left ureter.  This will take some time to pass.  You should maintain good hydration.  You should schedule follow-up in the clinic in a week to see if it is passed.  You must return to the emergency department if you develop pain you cannot control with over-the-counter medication, fevers, vomiting, or other worrisome symptoms.  This does not appear to be the cause of your more generalized abdominal pain.  Your test results today are all reassuring with normal blood studies and no worrisome findings on CT other than the kidney stone.  Constipation may be the cause of this.  Return to be seen if you develop fevers, recurrent pain, nausea, vomiting, or other worrisome symptoms.

## 2023-01-21 NOTE — ED TRIAGE NOTES
abd pain, dizzy, having trouble with bowels. Has bowel surgery in July     Triage Assessment     Row Name 01/21/23 1118       Triage Assessment (Adult)    Airway WDL WDL       Respiratory WDL    Respiratory WDL WDL       Cardiac WDL    Cardiac WDL WDL       Peripheral/Neurovascular WDL    Peripheral Neurovascular WDL WDL       Cognitive/Neuro/Behavioral WDL    Cognitive/Neuro/Behavioral WDL WDL

## 2023-01-21 NOTE — ED PROVIDER NOTES
"  History     Chief Complaint   Patient presents with     Abdominal Pain     HPI     Ghulam Rizzo is a 67 year old male who is in with abdominal pain.  This began yesterday.  He was seen 5 days ago with vague symptoms of dizziness and fatigue and had an extensive work-up including brain MRI and MRA, blood chemistries, CBC, EKG.  All of this was normal.  He was referred to ENT with concerns for peripheral vestibulopathy.  However last night he developed diffuse constant upper abdominal discomfort.  He is concerned about his bowel movements which have been decreased and he feels like he has incomplete emptying of his bowel.  He did have a small bowel movement this morning and a small 1 yesterday.  He says he has not passed any gas today.  He is taken nothing by mouth today.  He was severely nauseated this morning but did not vomit.  He did not have any rectal pain with his bowel movement.  He has not had fevers.  His urinary stream has slowed but he does not have any irritative voiding symptoms.  He has had a number of abdominal surgeries.  About 30 years ago he had a \"twisted bowel.\"  He had surgery for that.  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  He had a cecal volvulus in July of last year and had surgery for that during that time had repair of some of the hernias.  He also has had an incisional hernia.  He retains his gallbladder.  He is not having any chest pain, dyspnea, URI symptoms.    Allergies:  Allergies   Allergen Reactions     Levaquin [Levofloxacin] Nausea and Vomiting       Problem List:    Patient Active Problem List    Diagnosis Date Noted     Cecal volvulus (H) 07/13/2022     Priority: Medium     Elevated prostate specific antigen (PSA) 02/09/2022     Priority: Medium     Other irritable bowel syndrome 11/07/2019     Priority: Medium     Screening for hypertension 11/07/2019     Priority: Medium     Proctitis 01/26/2017     " Priority: Medium     Colonoscopy in 2013.        Major depressive disorder, recurrent episode, moderate (H) 01/26/2017     Priority: Medium     Paxil caused anorgasmia       Cirrhosis of liver without ascites (H) 09/23/2015     Priority: Medium     Thought due to hepatitis C.  Follows with [facility].  Completed Harvoni 9/2015.       Abdominal pain, generalized 08/20/2015     Priority: Medium     Advanced directives, counseling/discussion 09/14/2012     Priority: Medium     Discussed advance care planning with patient; however, patient declined at this time. 9/14/2012          Hyperlipidemia LDL goal <130 07/16/2012     Priority: Medium     Tobacco abuse 07/16/2012     Priority: Medium        Past Medical History:    Past Medical History:   Diagnosis Date     Anisocoria      Asthma      Carpal tunnel syndrome      Head injury, unspecified      Hepatitis C      Obesity      Other convulsions      Other mononeuritis of upper limb      Unspecified essential hypertension        Past Surgical History:    Past Surgical History:   Procedure Laterality Date     COLECTOMY WITHOUT COLOSTOMY Right 7/13/2022    Procedure: Laparotomy, right colectomy, lysis of adhesions,;  Surgeon: Tommy Martino DO;  Location: [facility]     COLONOSCOPY       GI SURGERY       HERNIA REPAIR       HERNIORRHAPHY INCISIONAL (LOCATION) N/A 7/13/2022    Procedure: primary incisional hernia repair;  Surgeon: Tommy Martino DO;  Location: [facility] OR     SURGICAL HISTORY OF -   4/6/87    esophagogastroduodenoscopy     SURGICAL HISTORY OF -   4/27/87    exploratory laparotomy and anterior gastropexy over a gastrostomy tube.     SURGICAL HISTORY OF -   11/13/90    esophagogastroduodenoscopy     SURGICAL HISTORY OF -   1991    L diaphragm eventration repair, fixation of stomach and colon to abd wall     SURGICAL HISTORY OF -       hiatal hernia repair     THORACIC SURGERY         Family History:    Family History   Problem Relation Age of Onset      Cerebrovascular Disease Mother      Heart Disease Brother      Heart Disease Brother        Social History:  Marital Status:   [2]  Social History     Tobacco Use     Smoking status: Every Day     Packs/day: 0.50     Types: Cigarettes     Smokeless tobacco: Never     Tobacco comments:     started again 4/2010   Vaping Use     Vaping Use: Never used   Substance Use Topics     Alcohol use: Yes     Comment: 1-2 beers daily     Drug use: No        Medications:    acetaminophen (TYLENOL) 500 MG tablet  Cholecalciferol (VITAMIN D3 PO)  senna-docusate (NEGRITO-COLACE) 8.6-50 MG per tablet  sertraline (ZOLOFT) 50 MG tablet          Review of Systems    All other systems are reviewed and are negative    Physical Exam   BP: (!) 150/82  Pulse: 69  Temp: (!) 95.9  F (35.5  C)  Resp: 18  Weight: 79.4 kg (175 lb)  SpO2: 99 %      Physical Exam     Nursing note and vitals were reviewed.  Constitutional: Awake and alert, chronically ill appearing 67-year-old in moderate discomfort, who answers questions appropriately and cooperates with examination.  HEENT: Atraumatic and normocephalic.  PERRL EOMI.   Neck: Freely mobile.  Cardiovascular: Cardiac examination reveals normal heart rate and regular rhythm without murmur.  Pulmonary/Chest: Breathing is unlabored.  Breath sounds are clear and equal bilaterally.  There no retractions, tachypnea, rales, wheezes, or rhonchi.  Abdomen: Nondistended, soft, diffusely tender with diffuse guarding.  No peritoneal signs.  Musculoskeletal: Extremities are warm and well-perfused and without edema  Neurological: Alert, oriented, thought content logical, coherent   Skin: Warm, dry, no rashes.  Psychiatric: Affect congruent.    ED Course                 Procedures              Critical Care time:  none               Results for orders placed or performed during the hospital encounter of 01/21/23 (from the past 24 hour(s))   [place] Draw    Narrative    The following orders were created for panel  order [place] Draw.  Procedure                               Abnormality         Status                     ---------                               -----------         ------                     Extra Blue Top Tube[[id number]]                              Final result               Extra Red Top Tube[[id number]]                               Final result               Extra Green Top (Lithium...[[id number]]                      Final result               Extra Purple Top Tube[[id number]]                            Final result                 Please view results for these tests on the individual orders.   Extra Blue Top Tube   Result Value Ref Range    Hold Specimen JIC    Extra Red Top Tube   Result Value Ref Range    Hold Specimen JIC    Extra Green Top (Lithium Heparin) Tube   Result Value Ref Range    Hold Specimen JIC    Extra Purple Top Tube   Result Value Ref Range    Hold Specimen jic    CBC with platelets differential    Narrative    The following orders were created for panel order CBC with platelets differential.  Procedure                               Abnormality         Status                     ---------                               -----------         ------                     CBC with platelets and d...[[id number]]                      Final result                 Please view results for these tests on the individual orders.   Comprehensive metabolic panel   Result Value Ref Range    Sodium 135 (L) 136 - 145 mmol/L    Potassium 5.0 3.4 - 5.3 mmol/L    Chloride 100 98 - 107 mmol/L    Carbon Dioxide (CO2) 26 22 - 29 mmol/L    Anion Gap 9 7 - 15 mmol/L    Urea Nitrogen 10.8 8.0 - 23.0 mg/dL    Creatinine 0.67 0.67 - 1.17 mg/dL    Calcium 9.5 8.8 - 10.2 mg/dL    Glucose 102 (H) 70 - 99 mg/dL    Alkaline Phosphatase 89 40 - 129 U/L    AST 18 10 - 50 U/L    ALT 15 10 - 50 U/L    Protein Total 6.9 6.4 - 8.3 g/dL    Albumin 4.3 3.5 - 5.2 g/dL    Bilirubin Total 0.8 <=1.2 mg/dL    GFR Estimate >90 >60  mL/min/1.73m2   Lipase   Result Value Ref Range    Lipase 44 13 - 60 U/L   CBC with platelets and differential   Result Value Ref Range    WBC Count 7.5 4.0 - 11.0 10e3/uL    RBC Count 4.85 4.40 - 5.90 10e6/uL    Hemoglobin 15.9 13.3 - 17.7 g/dL    Hematocrit 44.7 40.0 - 53.0 %    MCV 92 78 - 100 fL    MCH 32.8 26.5 - 33.0 pg    MCHC 35.6 31.5 - 36.5 g/dL    RDW 12.8 10.0 - 15.0 %    Platelet Count 186 150 - 450 10e3/uL    % Neutrophils 62 %    % Lymphocytes 28 %    % Monocytes 8 %    % Eosinophils 1 %    % Basophils 1 %    % Immature Granulocytes 0 %    NRBCs per 100 WBC 0 <1 /100    Absolute Neutrophils 4.7 1.6 - 8.3 10e3/uL    Absolute Lymphocytes 2.1 0.8 - 5.3 10e3/uL    Absolute Monocytes 0.6 0.0 - 1.3 10e3/uL    Absolute Eosinophils 0.1 0.0 - 0.7 10e3/uL    Absolute Basophils 0.1 0.0 - 0.2 10e3/uL    Absolute Immature Granulocytes 0.0 <=0.4 10e3/uL    Absolute NRBCs 0.0 10e3/uL   CT Abdomen Pelvis w Contrast    Narrative    EXAM: CT ABDOMEN PELVIS W CONTRAST  LOCATION: [facility]  DATE/TIME: 1/21/2023 2:49 PM    INDICATION: abd pain  COMPARISON: CT abdomen and pelvis dated 7/13/2022 and 8/21/2015.  TECHNIQUE: CT scan of the abdomen and pelvis was performed following injection of IV contrast. Multiplanar reformats were obtained. Dose reduction techniques were used.  CONTRAST: 77 mL Isovue 370    FINDINGS:   LOWER CHEST: Study is mildly limited in that the left hemidiaphragm is not completely included due to elevation of the hemidiaphragm. This corresponds with patient's known history of diaphragmatic paralysis. Patient also has history of prior   diaphragmatic hernia repair. Visualized portions of the lower chest are otherwise unremarkable.    HEPATOBILIARY: A few subcentimeter hypodense liver lesions are again noted, similar in appearance to the prior study. These likely represent cysts but are too small to characterize. The largest hypoattenuating lesion seen on the prior study  dated   8/21/2015 has significantly decreased in size and only a tiny hypoattenuating lesion seen in this region on the current study. This is most consistent with a cyst. Gallbladder is unremarkable. No new intrahepatic lesion, biliary ductal dilatation,   cholelithiasis or choledocholithiasis is seen.    PANCREAS: Normal.    SPLEEN: There are multiple calcified granulomata in the spleen, similar to prior studies.    ADRENAL GLANDS: Normal.    KIDNEYS/BLADDER: Multiple right renal cysts are again noted, similar to prior studies. There is mild left hydronephrosis. There is a proximal left ureteral calculus (image 71 series 3, image 94 series 4, and image 108 series 5) measuring up to 0.8 x 0.6   x 0.8 cm. This calculus is previously noted to be in the left renal collecting system on 7/13/2022. No left intrarenal calculus seen on today's study. The kidneys otherwise enhance grossly normally. Urinary bladder is normal in appearance.    BOWEL: There is dilatation of the distal rectum and sigmoid colon due to a moderate amount of stool/fluid. There is fluid in the descending colon and in the transverse colon which could be associated with enteritis/diarrhea. Patient appears be status   post right hemicolectomy with small bowel colonic anastomosis of the right upper abdomen. There is mild thickening of the wall in the location likely postoperative in etiology. Small bowel is otherwise unremarkable. The stomach is mostly decompressed but   contains a small amount of fluid and air.    LYMPH NODES: Normal.    VASCULATURE: There is nonaneurysmal atherosclerosis.    PELVIC ORGANS: Normal.    MUSCULOSKELETAL: Partial effusion bilateral SI joints are noted. No aggressive osseous lesions or acute osseous fractures are seen.    ADDITIONAL FINDINGS: No abnormal free fluid or free air collections are seen in the peritoneal cavity. There are tiny bilateral fat-containing inguinal hernias, slightly larger on the left.      Impression     IMPRESSION:   1.  At least partially obstructive proximal left ureteral calculus measures 0.8 x 0.6 x 0.8 cm. Previously, this was noted in the left intrarenal collecting system.  2.  Postop changes status post right hemicolectomy.  3.  Fluid in the colon to the mid to distal descending colon with fluid and stool distending the rectum could be associated with enteritis/diarrhea.  4.  Uppermost aspect of the left abdominal contents are not completely included on the study due to elevation of the left hemidiaphragm. Patient has reported known hemidiaphragm paralysis.       Medications   sodium phosphate (FLEET ENEMA) 1 enema (has no administration in time range)   iopamidol (ISOVUE-370) solution 77 mL (77 mLs Intravenous Given 1/21/23 1436)   sodium chloride 0.9 % bag 500mL for CT scan flush use (61 mLs Intravenous Given 1/21/23 1436)     15:30: Test results reviewed with the patient.  Blood studies are all normal.  CT scan shows the above findings of fluid and stool in the distal colon without evidence of a bowel obstruction.  The patient does not have flank pain or symptoms compatible with renal colic.  The urolithiasis appears to be an incidental finding.  I discussed with him however that he needs to be alert for the development of flank pain, fever, vomiting and needs follow-up to ensure that the stone is passed and hydronephrosis has resolved.  For now I recommended a fleets enema to treat the stool burden in the distal colon.  He is agreeable to this plan.  At shift change his care was transitioned to Dr. Nuñez waiting results of the enema.    Assessments & Plan (with Medical Decision Making)     67-year-old male came in with diffuse abdominal pain and concern for constipation.  He has had a number of abdominal surgeries as discussed above.  He is at risk for bowel obstruction.  He reported not passing flatus and having minimal stooling in the last day.  Therefore he underwent advanced imaging.  This did  not show small bowel obstruction.  It did not show worrisome cause for his pain.  It did show a burden of stool and fluid in the distal colon as described above.  It also showed ureterolithiasis as described above.  This stone had previously been in the kidney and now is in the ureter proximally.  He is not describing any symptoms of flank pain and it seems to be an incidental finding.  I discussed with him however that this can become symptomatic and become infected and then if he develops flank pain, fevers, vomiting or other worrisome symptoms he needs to be seen for reevaluation.  Otherwise he should follow-up next week for recheck to ensure that the stone is passed.  With regard to the stool burden, we will give him a fleets enema in the emergency department to see if we can affect adequate relief of his constipation.  At shift change care was transitioned to Dr. Nuñez awaiting results of the enema.    I have reviewed the nursing notes.    I have reviewed the findings, diagnosis, plan and need for follow up with the patient.       Medical Decision Making  The patient presented with a problem that is an acute illness with systemic symptoms.    The patient's evaluation involved:  review of 3+ prior external note(s) (see separate area of note for details)    The patient's management involved only simple and very low risk treatment.        New Prescriptions    No medications on file       Final diagnoses:   Ureterolithiasis   Hydronephrosis with urinary obstruction due to ureteral calculus   Abdominal pain, generalized   Constipation, unspecified constipation type       1/21/2023   [facility] EMERGENCY DEPT     Gerardo Maciel MD  01/21/23 0262     none

## 2023-01-25 ENCOUNTER — TELEPHONE (OUTPATIENT)
Dept: NEUROSURGERY | Facility: CLINIC | Age: 68
End: 2023-01-25
Payer: COMMERCIAL

## 2023-01-26 ENCOUNTER — TELEPHONE (OUTPATIENT)
Dept: NEUROSURGERY | Facility: CLINIC | Age: 68
End: 2023-01-26
Payer: COMMERCIAL

## 2023-02-01 ENCOUNTER — OFFICE VISIT (OUTPATIENT)
Dept: FAMILY MEDICINE | Facility: CLINIC | Age: 68
End: 2023-02-01
Payer: COMMERCIAL

## 2023-02-01 VITALS
BODY MASS INDEX: 23.06 KG/M2 | DIASTOLIC BLOOD PRESSURE: 90 MMHG | HEART RATE: 75 BPM | RESPIRATION RATE: 18 BRPM | TEMPERATURE: 97.3 F | OXYGEN SATURATION: 98 % | WEIGHT: 174 LBS | HEIGHT: 73 IN | SYSTOLIC BLOOD PRESSURE: 142 MMHG

## 2023-02-01 DIAGNOSIS — F33.40 RECURRENT MAJOR DEPRESSIVE DISORDER, IN REMISSION (H): ICD-10-CM

## 2023-02-01 DIAGNOSIS — I67.1 SACCULAR ANEURYSM: ICD-10-CM

## 2023-02-01 DIAGNOSIS — R42 DIZZINESS: ICD-10-CM

## 2023-02-01 DIAGNOSIS — H61.22 IMPACTED CERUMEN OF LEFT EAR: ICD-10-CM

## 2023-02-01 DIAGNOSIS — R10.84 ABDOMINAL PAIN, GENERALIZED: Primary | ICD-10-CM

## 2023-02-01 DIAGNOSIS — K74.60 CIRRHOSIS OF LIVER WITHOUT ASCITES, UNSPECIFIED HEPATIC CIRRHOSIS TYPE (H): ICD-10-CM

## 2023-02-01 PROCEDURE — 90662 IIV NO PRSV INCREASED AG IM: CPT | Performed by: FAMILY MEDICINE

## 2023-02-01 PROCEDURE — G0008 ADMIN INFLUENZA VIRUS VAC: HCPCS | Performed by: FAMILY MEDICINE

## 2023-02-01 PROCEDURE — 69209 REMOVE IMPACTED EAR WAX UNI: CPT | Mod: LT | Performed by: FAMILY MEDICINE

## 2023-02-01 PROCEDURE — 99214 OFFICE O/P EST MOD 30 MIN: CPT | Mod: 25 | Performed by: FAMILY MEDICINE

## 2023-02-01 ASSESSMENT — PAIN SCALES - GENERAL: PAINLEVEL: NO PAIN (0)

## 2023-02-01 ASSESSMENT — ANXIETY QUESTIONNAIRES
GAD7 TOTAL SCORE: 8
GAD7 TOTAL SCORE: 8
IF YOU CHECKED OFF ANY PROBLEMS ON THIS QUESTIONNAIRE, HOW DIFFICULT HAVE THESE PROBLEMS MADE IT FOR YOU TO DO YOUR WORK, TAKE CARE OF THINGS AT HOME, OR GET ALONG WITH OTHER PEOPLE: NOT DIFFICULT AT ALL
1. FEELING NERVOUS, ANXIOUS, OR ON EDGE: NOT AT ALL
2. NOT BEING ABLE TO STOP OR CONTROL WORRYING: MORE THAN HALF THE DAYS
5. BEING SO RESTLESS THAT IT IS HARD TO SIT STILL: NOT AT ALL
3. WORRYING TOO MUCH ABOUT DIFFERENT THINGS: MORE THAN HALF THE DAYS
6. BECOMING EASILY ANNOYED OR IRRITABLE: MORE THAN HALF THE DAYS
7. FEELING AFRAID AS IF SOMETHING AWFUL MIGHT HAPPEN: MORE THAN HALF THE DAYS

## 2023-02-01 ASSESSMENT — PATIENT HEALTH QUESTIONNAIRE - PHQ9
5. POOR APPETITE OR OVEREATING: NOT AT ALL
SUM OF ALL RESPONSES TO PHQ QUESTIONS 1-9: 4

## 2023-02-01 NOTE — PROGRESS NOTES
Assessment & Plan     Abdominal pain, generalized  - resolved. Continue to monitor.     Dizziness  Improving. Has not followed up with ENT as previously advised. Number provided today.   Recommended eye exam.     Recurrent major depressive disorder, in remission (H)  Stable, refill provided.   - sertraline (ZOLOFT) 50 MG tablet  Dispense: 135 tablet; Refill: 3    Impacted cerumen of left ear  Washout completed today in clinic.       Cirrhosis of liver without ascites, unspecified hepatic cirrhosis type (H)  Known issue that I take into account for their medical decisions; no current exacerbations or new concerns.         Follow up in 2 weeks or sooner as needed.         Jamie Rutledge DO  [facility] JOANA Parmar is a 67 year old, presenting for the following health issues:  ER F/U      HPI     ED/UC Followup:    Facility:  [facility]  Date of visit: 1/21/2023 Ureterolithiasis-doing better and 1/16/2023 Peripheral vertigo-still having some dizziness.  Reason for visit: see above  Current Status: see above      Reports his abdominal pain has been improving.   Having daily bowel movements   No blood in the stool.   No constipation.   Staying hydrated.   On senna-docusate   No abdominal pain   No flank pain         MRA brain   IMPRESSION:  1. No high-grade stenosis or large vessel occlusion identified  involving the major intracranial arteries.  2. 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      MR brain   IMPRESSION:  1. No acute intracranial process.  2. Brain atrophy and presumed chronic small vessel ischemic change, as  described.  3. Unchanged prominent retrocerebellar arachnoid cyst.    MRA Neck  IMPRESSION:  1. Mildly limited evaluation of the origins of the great vessels and  the origin/proximal V1 segment of the right vertebral artery due  "to  motion artifacts.  2. Within that limitation, no significant stenosis, occlusion, or  dissection identified involving the cervical carotid or vertebral  arteries.      No spinning sensation.   Occasional blurriness with vision, nothing in the past three weeks.   Will occasionally have blurry vision. Will last for at most 5 mins then resolve. Will last for a couple times per day.   Last eye exam about 8-9 years ago.     Referral placed for neurosurgery due to aneurysm. Has not followed up with this.   Number provided.       Review of Systems         Objective    BP (!) 148/80 (BP Location: Right arm, Patient Position: Chair, Cuff Size: Adult Regular)   Pulse 75   Temp 97.3  F (36.3  C) (Tympanic)   Resp 18   Ht 1.861 m (6' 1.25\")   Wt 78.9 kg (174 lb)   SpO2 98%   BMI 22.80 kg/m    Body mass index is 22.8 kg/m .  Physical Exam   General: alert, cooperative, no acute distress   CV: RRR, no murmur  Resp: non-labored breathing, clear to auscultation, no wheezing or rales   Abdomen: Soft, non-tender, no guarding.   Extremities: No peripheral edema, calves non-tender.           "

## 2023-02-01 NOTE — PATIENT INSTRUCTIONS
Call to schedule with neurosurgery   Phone: [phone]    Call to follow up with ENT  [phone].      Schedule eye appt.     Ear washout today.     Follow up in 2 weeks.

## 2023-02-03 ENCOUNTER — TELEPHONE (OUTPATIENT)
Dept: NEUROSURGERY | Facility: CLINIC | Age: 68
End: 2023-02-03
Payer: COMMERCIAL

## 2023-02-05 NOTE — TELEPHONE ENCOUNTER
FUTURE VISIT INFORMATION      FUTURE VISIT INFORMATION:    Date: 2/7/2023    Time: 320pm    [place] [facility] [place]:    Referring provider:  Dr. Kohli     Referring providers clinic:  [facility] ED     Reason for visit/diagnosis  ED Visit Follow-up     RECORDS REQUESTED FROM:       Clinic name Comments Records Status Imaging Status   Internal ED Visit-1/16/2023    MR Brain-1/16/2023    MRA Neck/Brain-1/16/2023 Epic PACS

## 2023-02-07 ENCOUNTER — VIRTUAL VISIT (OUTPATIENT)
Dept: NEUROSURGERY | Facility: CLINIC | Age: 68
End: 2023-02-07
Attending: FAMILY MEDICINE
Payer: COMMERCIAL

## 2023-02-07 ENCOUNTER — PRE VISIT (OUTPATIENT)
Dept: NEUROSURGERY | Facility: CLINIC | Age: 68
End: 2023-02-07

## 2023-02-07 DIAGNOSIS — I67.1 INTERNAL CAROTID ANEURYSM: Primary | ICD-10-CM

## 2023-02-07 PROCEDURE — 99441 PR PHYSICIAN TELEPHONE EVALUATION 5-10 MIN: CPT | Mod: 95 | Performed by: NURSE PRACTITIONER

## 2023-02-07 NOTE — LETTER
2023       RE: Ghulam Rizzo  [address] [place] [id number]     Dear Colleague,    Thank you for referring your patient, Ghulam Rizzo, to the [facility] NEUROSURGERY CLINIC [place] at [facility]. Please see a copy of my visit note below.      [facility]  Department of Neurosurgery      Name: Ghulam Rizzo  MRN: [id number]  Age: 67 year old  : 1955  Referring provider: Edwin Kohli  2023      Chief Complaint:   Cerebral aneurysm, unruptured  New patient    History of Present Illness:   Ghulam Rizzo is a 67 year old male with a history of newly diagnosed cerebral aneurysm, unruptured who is here today for further evaluation and management.  On 2023, patient presented to [facility] emergency department with dizziness.  Brain imaging  Ruled out acute findings, but showed an incidental finding of left ICA aneurysm.     Today, I had a phone visit with the patient and his wife.  He continues to have intermittent episodes of dizziness.  Patient denies any known family history of brain aneurysms.  He had elevated blood pressure in the past and was on antihypertensives in the past.  He is not on any medications right now.  He smokes half pack a day for the past 20+ years.  Reports alcohol use, 1 beer per day.      Review of Systems:   Pertinent items are noted in HPI or as in patient entered ROS below, remainder of complete ROS is negative.   No flowsheet data found.     Active Medications:     Current Outpatient Medications:      acetaminophen (TYLENOL) 500 MG tablet, Take 500-1,000 mg by mouth every 6 hours as needed for mild pain, Disp: , Rfl:      Cholecalciferol (VITAMIN D3 PO), Take by mouth daily 2 tabs daily, Disp: , Rfl:      senna-docusate (NEGRITO-COLACE) 8.6-50 MG per tablet, Take 1-2 tablets by mouth 2 times daily as needed for constipation., Disp: 100 tablet, Rfl: 5     sertraline (ZOLOFT) 50 MG tablet,  TAKE 1 & 1/2 TABLETS BY MOUTH EVERY DAY, Disp: 135 tablet, Rfl: 3      Allergies:   Levaquin [levofloxacin]      Past Medical History:  Past Medical History:   Diagnosis Date     Anisocoria      Asthma      Carpal tunnel syndrome     2006     Head injury, unspecified     closed head injury 4 yrs ago     Hepatitis C     Cured by Harvoni in 2015. diagnosed in 2012 was positive for Hepatitis C.      Obesity     2006, resolved     Other convulsions     seizure disorder due to head injury 4 yrs ago.     Other mononeuritis of upper limb     L phrenic nerve paralysis     Unspecified essential hypertension      Patient Active Problem List   Diagnosis     Hyperlipidemia LDL goal <130     Tobacco abuse     Advanced directives, counseling/discussion     Abdominal pain, generalized     Cirrhosis of liver without ascites (H)     Proctitis     Major depressive disorder, recurrent episode, moderate (H)     Other irritable bowel syndrome     Screening for hypertension     Elevated prostate specific antigen (PSA)     Cecal volvulus (H)     Saccular aneurysm        Past Surgical History:  Past Surgical History:   Procedure Laterality Date     COLECTOMY WITHOUT COLOSTOMY Right 7/13/2022    Procedure: Laparotomy, right colectomy, lysis of adhesions,;  Surgeon: Tommy Martino DO;  Location: [facility] OR     COLONOSCOPY       GI SURGERY       HERNIA REPAIR       HERNIORRHAPHY INCISIONAL (LOCATION) N/A 7/13/2022    Procedure: primary incisional hernia repair;  Surgeon: Tommy Martino DO;  Location: [facility] OR     SURGICAL HISTORY OF -   4/6/87    esophagogastroduodenoscopy     SURGICAL HISTORY OF -   4/27/87    exploratory laparotomy and anterior gastropexy over a gastrostomy tube.     SURGICAL HISTORY OF -   11/13/90    esophagogastroduodenoscopy     SURGICAL HISTORY OF -   1991    L diaphragm eventration repair, fixation of stomach and colon to abd wall     SURGICAL HISTORY OF -       hiatal hernia repair     THORACIC SURGERY          Family History:   Family History   Problem Relation Age of Onset     Cerebrovascular Disease Mother      Heart Disease Brother      Heart Disease Brother          Social History:   Social History     Tobacco Use     Smoking status: Every Day     Packs/day: 0.50     Types: Cigarettes     Smokeless tobacco: Never     Tobacco comments:     started again 2010   Vaping Use     Vaping Use: Never used   Substance Use Topics     Alcohol use: Yes     Comment: 1-2 beers daily     Drug use: No            Imagin2023 MRA brain:                                                                   IMPRESSION:  1. No high-grade stenosis or large vessel occlusion identified  involving the major intracranial arteries.  2. 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        Assessment:  Cerebral aneurysm, unruptured  New patient    Plan: Discussed and images reviewed with the Dr. Hill.  Incidental finding of left ICA aneurysm on a recent imaging.  We discussed cerebral aneurysms, risk factors etc in detail. Smoking cessation was discussed.     For the Left ICA aneurysm, we recommend observation at this time.  We will plan to follow-up in 1 year with an MRA brain.  Patient to follow-up with Dr. Hill's team after imaging.       Susie Valenzuela CNP  Department of Neurosurgery    I spent 10 minutes on patient care activities related to this encounter on the date of service, including time spent reviewing the chart, obtaining history and examination and in counseling the patient, and in documentation in the electronic medical record.      Telephone call : 10 minutes

## 2023-02-07 NOTE — PROGRESS NOTES
[facility]  Department of Neurosurgery      Name: Ghulam Rizzo  MRN: [id number]  Age: 67 year old  : 1955  Referring provider: Edwin Kohli  2023      Chief Complaint:   Cerebral aneurysm, unruptured  New patient    History of Present Illness:   Ghulam Rizzo is a 67 year old male with a history of newly diagnosed cerebral aneurysm, unruptured who is here today for further evaluation and management.  On 2023, patient presented to [facility] emergency department with dizziness.  Brain imaging  Ruled out acute findings, but showed an incidental finding of left ICA aneurysm.     Today, I had a phone visit with the patient and his wife.  He continues to have intermittent episodes of dizziness.  Patient denies any known family history of brain aneurysms.  He had elevated blood pressure in the past and was on antihypertensives in the past.  He is not on any medications right now.  He smokes half pack a day for the past 20+ years.  Reports alcohol use, 1 beer per day.      Review of Systems:   Pertinent items are noted in HPI or as in patient entered ROS below, remainder of complete ROS is negative.   No flowsheet data found.     Active Medications:     Current Outpatient Medications:      acetaminophen (TYLENOL) 500 MG tablet, Take 500-1,000 mg by mouth every 6 hours as needed for mild pain, Disp: , Rfl:      Cholecalciferol (VITAMIN D3 PO), Take by mouth daily 2 tabs daily, Disp: , Rfl:      senna-docusate (NEGRITO-COLACE) 8.6-50 MG per tablet, Take 1-2 tablets by mouth 2 times daily as needed for constipation., Disp: 100 tablet, Rfl: 5     sertraline (ZOLOFT) 50 MG tablet, TAKE 1 & 1/2 TABLETS BY MOUTH EVERY DAY, Disp: 135 tablet, Rfl: 3      Allergies:   Levaquin [levofloxacin]      Past Medical History:  Past Medical History:   Diagnosis Date     Anisocoria      Asthma      Carpal tunnel syndrome     2006     Head injury, unspecified     closed head injury 4 yrs ago     Hepatitis  C     Cured by Harvoni in 2015. diagnosed in 2012 was positive for Hepatitis C.      Obesity     2006, resolved     Other convulsions     seizure disorder due to head injury 4 yrs ago.     Other mononeuritis of upper limb     L phrenic nerve paralysis     Unspecified essential hypertension      Patient Active Problem List   Diagnosis     Hyperlipidemia LDL goal <130     Tobacco abuse     Advanced directives, counseling/discussion     Abdominal pain, generalized     Cirrhosis of liver without ascites (H)     Proctitis     Major depressive disorder, recurrent episode, moderate (H)     Other irritable bowel syndrome     Screening for hypertension     Elevated prostate specific antigen (PSA)     Cecal volvulus (H)     Saccular aneurysm        Past Surgical History:  Past Surgical History:   Procedure Laterality Date     COLECTOMY WITHOUT COLOSTOMY Right 7/13/2022    Procedure: Laparotomy, right colectomy, lysis of adhesions,;  Surgeon: Tommy Martino DO;  Location: [facility] OR     COLONOSCOPY       GI SURGERY       HERNIA REPAIR       HERNIORRHAPHY INCISIONAL (LOCATION) N/A 7/13/2022    Procedure: primary incisional hernia repair;  Surgeon: Tommy Martino DO;  Location: [facility] OR     SURGICAL HISTORY OF -   4/6/87    esophagogastroduodenoscopy     SURGICAL HISTORY OF -   4/27/87    exploratory laparotomy and anterior gastropexy over a gastrostomy tube.     SURGICAL HISTORY OF -   11/13/90    esophagogastroduodenoscopy     SURGICAL HISTORY OF -   1991    L diaphragm eventration repair, fixation of stomach and colon to abd wall     SURGICAL HISTORY OF -       hiatal hernia repair     THORACIC SURGERY         Family History:   Family History   Problem Relation Age of Onset     Cerebrovascular Disease Mother      Heart Disease Brother      Heart Disease Brother          Social History:   Social History     Tobacco Use     Smoking status: Every Day     Packs/day: 0.50     Types: Cigarettes     Smokeless tobacco:  Never     Tobacco comments:     started again 2010   Vaping Use     Vaping Use: Never used   Substance Use Topics     Alcohol use: Yes     Comment: 1-2 beers daily     Drug use: No            Imagin2023 MRA brain:                                                                   IMPRESSION:  1. No high-grade stenosis or large vessel occlusion identified  involving the major intracranial arteries.  2. 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        Assessment:  Cerebral aneurysm, unruptured  New patient    Plan: Discussed and images reviewed with the Dr. Hill.  Incidental finding of left ICA aneurysm on a recent imaging.  We discussed cerebral aneurysms, risk factors etc in detail. Smoking cessation was discussed.     For the Left ICA aneurysm, we recommend observation at this time.  We will plan to follow-up in 1 year with an MRA brain.  Patient to follow-up with Dr. Hill's team after imaging.       Susie Valenzuela CNP  Department of Neurosurgery    I spent 10 minutes on patient care activities related to this encounter on the date of service, including time spent reviewing the chart, obtaining history and examination and in counseling the patient, and in documentation in the electronic medical record.

## 2023-02-07 NOTE — NURSING NOTE
Chief Complaint   Patient presents with     Video Visit     Internal Carotis aneursym- MRI results      Is the patient currently in the state of [place]? YES    Visit mode:TELEPHONE     If the visit is dropped, the patient can be reconnected by: TELEPHONE VISIT: Phone number: [phone]    Will anyone else be joining the visit? YES: Chacha Text to cell phone: [phone]      How would you like to obtain your AVS? MyChart    Are changes needed to the allergy or medication list? NO    Comments or concerns related to today's visit:

## 2023-02-08 ENCOUNTER — OFFICE VISIT (OUTPATIENT)
Dept: FAMILY MEDICINE | Facility: CLINIC | Age: 68
End: 2023-02-08
Payer: COMMERCIAL

## 2023-02-08 VITALS
TEMPERATURE: 97.3 F | RESPIRATION RATE: 16 BRPM | HEART RATE: 85 BPM | DIASTOLIC BLOOD PRESSURE: 86 MMHG | SYSTOLIC BLOOD PRESSURE: 144 MMHG | BODY MASS INDEX: 22.8 KG/M2 | OXYGEN SATURATION: 99 % | WEIGHT: 174 LBS

## 2023-02-08 DIAGNOSIS — R97.20 ELEVATED PROSTATE SPECIFIC ANTIGEN (PSA): ICD-10-CM

## 2023-02-08 DIAGNOSIS — R42 DIZZINESS: Primary | ICD-10-CM

## 2023-02-08 DIAGNOSIS — I10 BENIGN ESSENTIAL HYPERTENSION: ICD-10-CM

## 2023-02-08 LAB
ANION GAP SERPL CALCULATED.3IONS-SCNC: 13 MMOL/L (ref 7–15)
BUN SERPL-MCNC: 10.5 MG/DL (ref 8–23)
CALCIUM SERPL-MCNC: 9.3 MG/DL (ref 8.8–10.2)
CHLORIDE SERPL-SCNC: 99 MMOL/L (ref 98–107)
CREAT SERPL-MCNC: 0.69 MG/DL (ref 0.67–1.17)
DEPRECATED HCO3 PLAS-SCNC: 24 MMOL/L (ref 22–29)
GFR SERPL CREATININE-BSD FRML MDRD: >90 ML/MIN/1.73M2
GLUCOSE SERPL-MCNC: 107 MG/DL (ref 70–99)
POTASSIUM SERPL-SCNC: 4.2 MMOL/L (ref 3.4–5.3)
PSA SERPL-MCNC: 10.84 NG/ML (ref 0–4.5)
SODIUM SERPL-SCNC: 136 MMOL/L (ref 136–145)

## 2023-02-08 PROCEDURE — 80048 BASIC METABOLIC PNL TOTAL CA: CPT | Performed by: FAMILY MEDICINE

## 2023-02-08 PROCEDURE — 99214 OFFICE O/P EST MOD 30 MIN: CPT | Performed by: FAMILY MEDICINE

## 2023-02-08 PROCEDURE — 36415 COLL VENOUS BLD VENIPUNCTURE: CPT | Performed by: FAMILY MEDICINE

## 2023-02-08 PROCEDURE — 84153 ASSAY OF PSA TOTAL: CPT | Performed by: FAMILY MEDICINE

## 2023-02-08 RX ORDER — LISINOPRIL 10 MG/1
10 TABLET ORAL DAILY
Qty: 90 TABLET | Refills: 3 | Status: SHIPPED | OUTPATIENT
Start: 2023-02-08 | End: 2023-05-09

## 2023-02-08 ASSESSMENT — PATIENT HEALTH QUESTIONNAIRE - PHQ9
SUM OF ALL RESPONSES TO PHQ QUESTIONS 1-9: 0
SUM OF ALL RESPONSES TO PHQ QUESTIONS 1-9: 0
10. IF YOU CHECKED OFF ANY PROBLEMS, HOW DIFFICULT HAVE THESE PROBLEMS MADE IT FOR YOU TO DO YOUR WORK, TAKE CARE OF THINGS AT HOME, OR GET ALONG WITH OTHER PEOPLE: NOT DIFFICULT AT ALL

## 2023-02-08 ASSESSMENT — PAIN SCALES - GENERAL: PAINLEVEL: NO PAIN (0)

## 2023-02-08 NOTE — PATIENT INSTRUCTIONS
Elevated PSA. Call to schedule MRI of prostate  [phone]    Urology follow up.     Lab work today PSA    Start on Lisinopril 10 mg daily.     Follow up in 2 weeks.       HOW TO CHECK YOUR BLOOD PRESSURE AT HOME:      Avoid eating, smoking, and exercising for at least 30 minutes before taking a reading.     Be sure you have taken your BP medication at least 2-3 hours before you check it.      Sit quietly for 10 minutes before a reading.      Sit in a chair with your feet flat on the floor. Rest your  arm on a table so that the arm cuff is at the same level as your heart.     Remain still during the reading.     Record your blood pressure and pulse in a log and bring to your next appointment.

## 2023-02-08 NOTE — PROGRESS NOTES
Assessment & Plan     Dizziness  -- improving but continues to persist at times. No specific trigger. Can occur at rest or with activity. Imaging MRA neck, MRA brain, and MR Brain unremarkable other than LEFT ICA aneurysm and going to monitor as recently saw Neurosurgery.   -- BP remains elevated. Could be related to elevated blood pressure. Will proceed with treating to see if improves symptoms.     Benign essential hypertension  Start on Lisinopril 10 mg daily. Check lab work today. Advised healthy, low salt diet. Advised checking BP at home. Follow up in 2 weeks for recheck of BP and labs at that time.   - Basic metabolic panel  (Ca, Cl, CO2, Creat, Gluc, K, Na, BUN)  - lisinopril (ZESTRIL) 10 MG tablet  Dispense: 90 tablet; Refill: 3    Elevated prostate specific antigen (PSA)  PSA elevated at 11.3 on 2/8/2022.  -- suppose to follow up with MR prostate but did not do so. Number provided to schedule and have this completed. Recheck PSA today. Referral to Urology placed. No urinary symptoms at this time.   - PSA tumor marker  - Adult Urology  Referral    Follow up in 2 weeks.     Follow up with Urology.       Jamie Rutledge, [facility]    Lamar Parmar is a 67 year old accompanied by his spouse, presenting for the following health issues:  Dizziness      History of Present Illness       Reason for visit:  Follow up from er visits    He eats 0-1 servings of fruits and vegetables daily.He consumes 1 sweetened beverage(s) daily.He exercises with enough effort to increase his heart rate 9 or less minutes per day.  He exercises with enough effort to increase his heart rate 3 or less days per week.   He is taking medications regularly.    Today's PHQ-9         PHQ-9 Total Score: 0    PHQ-9 Q9 Thoughts of better off dead/self-harm past 2 weeks :   Not at all    How difficult have these problems made it for you to do your work, take care of things at home, or get along with other  people: Not difficult at all       67 year old male who presents to clinic for follow up regarding dizziness.     Dizziness  Onset/Duration: On-going-feels like it has slightly improved since last visit  Description:   Do you feel faint: No  Does it feel like the surroundings (bed, room) are moving: YES  Unsteady/off balance: YES  Have you passed out or fallen: No  Intensity: moderate  Progression of Symptoms: improving  Accompanying Signs & Symptoms:  Heart palpitations or chest pain: No  Nausea, vomiting: No  Weakness or lack of coordination in arms or legs: YES  Vision or speech changes: No  Numbness or tingling: No  Ringing in ears (Tinnitus): No  Hearing Loss: No  History:   Head trauma/concussion history: No  Previous similar symptoms: No  Recent bleeding history: No  Any new medications (BP?): No  Precipitating factors:   Worse with activity: No  Worse with head movement: No  Alleviating factors:   Does staying in a fixed position give relief: no   Therapies tried and outcome: None      Can get dizzy with just sitting, standing, or walking.   Scheduled to see Eye doctor later this week.     Recent imaging unremarkable other than Left ICA aneurysm. Followed up with Neurosurgery and plan to observe and repeat MRA brain in 1 year.     Elevated blood pressure.   No recent blood pressure medications.   No chest pain   No shortness of breath.       Elevated PSA 11.3 on 2/8/2022  Has met with Urology.   Suppose to follow up with prostate MRI but did not complete this.   Number provided today.   Denies any urinary symptoms.       Review of Systems   Constitutional, HEENT, cardiovascular, pulmonary, gi and gu systems are negative, except as otherwise noted.      Objective    BP (!) 144/86   Pulse 85   Temp 97.3  F (36.3  C) (Tympanic)   Resp 16   Wt 78.9 kg (174 lb)   SpO2 99%   BMI 22.80 kg/m    Body mass index is 22.8 kg/m .  Physical Exam   General: alert, cooperative, no acute distress   CV: RRR, no  murmur  Resp: non-labored breathing, clear to auscultation, no wheezing or rales   Abdomen: Soft, non-tender, no guarding.   Extremities: No peripheral edema, calves non-tender.   Neuro: CN II-XII grossly intact. No focal deficits. Strength and sensation in upper and lower extremities appropriate. Reflexes 2/4. No dysdiadochokinesia.  No issues with finger-nose-finger.

## 2023-03-01 ENCOUNTER — OFFICE VISIT (OUTPATIENT)
Dept: FAMILY MEDICINE | Facility: CLINIC | Age: 68
End: 2023-03-01
Payer: COMMERCIAL

## 2023-03-01 VITALS
WEIGHT: 174.8 LBS | SYSTOLIC BLOOD PRESSURE: 138 MMHG | DIASTOLIC BLOOD PRESSURE: 80 MMHG | TEMPERATURE: 97.6 F | HEART RATE: 70 BPM | BODY MASS INDEX: 22.9 KG/M2 | OXYGEN SATURATION: 98 %

## 2023-03-01 DIAGNOSIS — I10 BENIGN ESSENTIAL HYPERTENSION: ICD-10-CM

## 2023-03-01 DIAGNOSIS — R42 DIZZINESS: Primary | ICD-10-CM

## 2023-03-01 LAB
CREAT SERPL-MCNC: 0.68 MG/DL (ref 0.67–1.17)
GFR SERPL CREATININE-BSD FRML MDRD: >90 ML/MIN/1.73M2
POTASSIUM SERPL-SCNC: 4.2 MMOL/L (ref 3.4–5.3)

## 2023-03-01 PROCEDURE — 36415 COLL VENOUS BLD VENIPUNCTURE: CPT | Performed by: FAMILY MEDICINE

## 2023-03-01 PROCEDURE — 99213 OFFICE O/P EST LOW 20 MIN: CPT | Performed by: FAMILY MEDICINE

## 2023-03-01 PROCEDURE — 84132 ASSAY OF SERUM POTASSIUM: CPT | Performed by: FAMILY MEDICINE

## 2023-03-01 PROCEDURE — 82565 ASSAY OF CREATININE: CPT | Performed by: FAMILY MEDICINE

## 2023-03-01 ASSESSMENT — PAIN SCALES - GENERAL: PAINLEVEL: NO PAIN (0)

## 2023-03-01 NOTE — PATIENT INSTRUCTIONS
HOW TO CHECK YOUR BLOOD PRESSURE AT HOME:      Avoid eating, smoking, and exercising for at least 30 minutes before taking a reading.     Be sure you have taken your BP medication at least 2-3 hours before you check it.      Sit quietly for 10 minutes before a reading.      Sit in a chair with your feet flat on the floor. Rest your  arm on a table so that the arm cuff is at the same level as your heart.     Remain still during the reading.     Record your blood pressure and pulse in a log and bring to your next appointment.    BP goal is less than 140/90     Continue on Lisinopril 10 mg daily.       Follow up with Neurology for the dizziness.

## 2023-03-01 NOTE — PROGRESS NOTES
Assessment & Plan     Dizziness  Prior workup unremarkable with MRI brain, MRA head and neck.   -- spells will be very brief lasting a couple seconds where he feels the world is spinning. He will close his eyes, then open his eyes and it will resolve.   -- Occurs once every couple weeks.   -- not associated with other symptoms.   -- not associated with quick head movements.   -- will not occur with exertion, and no palpitations or irregular heart beats during the spells.   -- has seen eye specialist who stated eyes are okay.   -- Will place referral to Neurology for further evaluation.   - Adult Neurology  Referral    Benign essential hypertension  Started on Lisinopril 10 mg daily. Tolerating medication.   -- checking BP at home and within goal range.   -- advised to continue to monitor blood pressure.   -- check cr, K today.   - Creatinine  - Potassium    The risks, benefits and treatment options of prescribed medications or other treatments have been discussed with the patient. The patient verbalized their understanding and should call or follow up if no improvement or if they develop further problems.      Jamie Rutledge, [facility]    Lamar Parmar is a 67 year old, presenting for the following health issues:  Hypertension      HPI     67 year old male who presents to clinic for blood pressure follow up.     Continues to have some episodes of dizziness.   In the last 2 months has occurred 4 times.   Will feel like the room is turning.   Dizzy spells will last for a couple seconds. He will close his eyes and then its fine.   Has seen an eye specialist and reports nothing wrong with his eyes.   Has underwent MRI brain, MRA head and neck.   Studies overall unremarkable other than internal carotid aneurysm which patient has seen neurosurgery and determined to continue to monitor at this time and follow up with MRA brain in 1 year.     HTN  Started on Lisinopril 10 mg daily.    Tolerating the medication   No side effects.     Hypertension Follow-up      Do you check your blood pressure regularly outside of the clinic? Yes - every other day    Are you following a low salt diet? No    Are your blood pressures ever more than 140 on the top number (systolic) OR more   than 90 on the bottom number (diastolic), for example 140/90? Unknown-says wife has said it's fine, can not remember what the numbers have been      Review of Systems   Constitutional, HEENT, cardiovascular, pulmonary, gi and gu systems are negative, except as otherwise noted.      Objective    /80   Pulse 70   Temp 97.6  F (36.4  C) (Tympanic)   Wt 79.3 kg (174 lb 12.8 oz)   SpO2 98%   BMI 22.90 kg/m    Body mass index is 22.9 kg/m .  Physical Exam   General: alert, cooperative, no acute distress   CV: RRR, no murmur  Resp: non-labored breathing, clear to auscultation, no wheezing or rales   Extremities: No peripheral edema, calves non-tender.   Neuro: CN II-XII grossly intact. No focal deficits.

## 2023-03-09 ENCOUNTER — HOSPITAL ENCOUNTER (OUTPATIENT)
Dept: MRI IMAGING | Facility: CLINIC | Age: 68
Discharge: HOME | End: 2023-03-09
Attending: UROLOGY | Admitting: UROLOGY
Payer: COMMERCIAL

## 2023-03-09 DIAGNOSIS — R97.20 ELEVATED PROSTATE SPECIFIC ANTIGEN (PSA): ICD-10-CM

## 2023-03-09 PROCEDURE — 72197 MRI PELVIS W/O & W/DYE: CPT | Mod: 26 | Performed by: STUDENT IN AN ORGANIZED HEALTH CARE EDUCATION/TRAINING PROGRAM

## 2023-03-09 PROCEDURE — 255N000002 HC RX 255 OP 636: Performed by: UROLOGY

## 2023-03-09 PROCEDURE — 72197 MRI PELVIS W/O & W/DYE: CPT

## 2023-03-09 PROCEDURE — A9585 GADOBUTROL INJECTION: HCPCS | Performed by: UROLOGY

## 2023-03-09 RX ORDER — GADOBUTROL 604.72 MG/ML
10 INJECTION INTRAVENOUS ONCE
Status: COMPLETED | OUTPATIENT
Start: 2023-03-09 | End: 2023-03-09

## 2023-03-09 RX ADMIN — GADOBUTROL 10 ML: 604.72 INJECTION INTRAVENOUS at 15:17

## 2023-03-15 ENCOUNTER — TELEPHONE (OUTPATIENT)
Dept: UROLOGY | Facility: CLINIC | Age: 68
End: 2023-03-15
Payer: COMMERCIAL

## 2023-03-15 ENCOUNTER — TELEPHONE (OUTPATIENT)
Dept: FAMILY MEDICINE | Facility: CLINIC | Age: 68
End: 2023-03-15
Payer: COMMERCIAL

## 2023-03-15 NOTE — CONFIDENTIAL NOTE
Patient's spouse returned call confirming appointment.      Ej Hassan, RN  RN Care Coordinator - Urology

## 2023-03-15 NOTE — CONFIDENTIAL NOTE
Called and left VM for patient regarding appointment time for bx review with Dr. Blount.  This author's direct line provided for future questions/concerns.    Ej Hassan, RN  RN Care Coordinator - Urology

## 2023-03-15 NOTE — TELEPHONE ENCOUNTER
Reason for Call:  Appointment Request    Patient requesting this type of appt:  Bowel problems, no energy    Requested provider: Jamie Rutledge    Reason patient unable to be scheduled: Not within requested timeframe    When does patient want to be seen/preferred time: 1-2 days    Comments: see above    Okay to leave a detailed message?: Yes at Cell number on file:    Telephone Information:   Mobile [phone]       Call taken on 3/15/2023 at 4:01 PM by Carly Anderson

## 2023-03-16 ENCOUNTER — HOSPITAL ENCOUNTER (EMERGENCY)
Facility: CLINIC | Age: 68
Discharge: HOME | End: 2023-03-16
Attending: EMERGENCY MEDICINE | Admitting: EMERGENCY MEDICINE
Payer: COMMERCIAL

## 2023-03-16 ENCOUNTER — APPOINTMENT (OUTPATIENT)
Dept: CT IMAGING | Facility: CLINIC | Age: 68
End: 2023-03-16
Attending: EMERGENCY MEDICINE
Payer: COMMERCIAL

## 2023-03-16 VITALS
DIASTOLIC BLOOD PRESSURE: 80 MMHG | SYSTOLIC BLOOD PRESSURE: 143 MMHG | BODY MASS INDEX: 22.93 KG/M2 | HEART RATE: 68 BPM | OXYGEN SATURATION: 100 % | WEIGHT: 175 LBS | RESPIRATION RATE: 18 BRPM | TEMPERATURE: 96.8 F

## 2023-03-16 DIAGNOSIS — R10.84 ABDOMINAL PAIN, GENERALIZED: ICD-10-CM

## 2023-03-16 DIAGNOSIS — N30.01 ACUTE CYSTITIS WITH HEMATURIA: ICD-10-CM

## 2023-03-16 LAB
ALBUMIN SERPL BCG-MCNC: 4.5 G/DL (ref 3.5–5.2)
ALBUMIN UR-MCNC: 30 MG/DL
ALP SERPL-CCNC: 89 U/L (ref 40–129)
ALT SERPL W P-5'-P-CCNC: 15 U/L (ref 10–50)
ANION GAP SERPL CALCULATED.3IONS-SCNC: 12 MMOL/L (ref 7–15)
APPEARANCE UR: CLEAR
AST SERPL W P-5'-P-CCNC: 21 U/L (ref 10–50)
BACTERIA #/AREA URNS HPF: ABNORMAL /HPF
BASOPHILS # BLD AUTO: 0 10E3/UL (ref 0–0.2)
BASOPHILS NFR BLD AUTO: 1 %
BILIRUB DIRECT SERPL-MCNC: <0.2 MG/DL (ref 0–0.3)
BILIRUB SERPL-MCNC: 0.9 MG/DL
BILIRUB UR QL STRIP: NEGATIVE
BUN SERPL-MCNC: 12.6 MG/DL (ref 8–23)
CALCIUM SERPL-MCNC: 10 MG/DL (ref 8.8–10.2)
CHLORIDE SERPL-SCNC: 93 MMOL/L (ref 98–107)
COLOR UR AUTO: YELLOW
CREAT SERPL-MCNC: 0.71 MG/DL (ref 0.67–1.17)
DEPRECATED HCO3 PLAS-SCNC: 23 MMOL/L (ref 22–29)
EOSINOPHIL # BLD AUTO: 0.1 10E3/UL (ref 0–0.7)
EOSINOPHIL NFR BLD AUTO: 1 %
ERYTHROCYTE [DISTWIDTH] IN BLOOD BY AUTOMATED COUNT: 12.7 % (ref 10–15)
GFR SERPL CREATININE-BSD FRML MDRD: >90 ML/MIN/1.73M2
GLUCOSE SERPL-MCNC: 113 MG/DL (ref 70–99)
GLUCOSE UR STRIP-MCNC: NEGATIVE MG/DL
HCT VFR BLD AUTO: 46.1 % (ref 40–53)
HGB BLD-MCNC: 16.1 G/DL (ref 13.3–17.7)
HGB UR QL STRIP: ABNORMAL
IMM GRANULOCYTES # BLD: 0 10E3/UL
IMM GRANULOCYTES NFR BLD: 0 %
KETONES UR STRIP-MCNC: 5 MG/DL
LEUKOCYTE ESTERASE UR QL STRIP: ABNORMAL
LIPASE SERPL-CCNC: 34 U/L (ref 13–60)
LYMPHOCYTES # BLD AUTO: 2.1 10E3/UL (ref 0.8–5.3)
LYMPHOCYTES NFR BLD AUTO: 28 %
MCH RBC QN AUTO: 32.9 PG (ref 26.5–33)
MCHC RBC AUTO-ENTMCNC: 34.9 G/DL (ref 31.5–36.5)
MCV RBC AUTO: 94 FL (ref 78–100)
MONOCYTES # BLD AUTO: 0.5 10E3/UL (ref 0–1.3)
MONOCYTES NFR BLD AUTO: 7 %
MUCOUS THREADS #/AREA URNS LPF: PRESENT /LPF
NEUTROPHILS # BLD AUTO: 4.7 10E3/UL (ref 1.6–8.3)
NEUTROPHILS NFR BLD AUTO: 63 %
NITRATE UR QL: NEGATIVE
NRBC # BLD AUTO: 0 10E3/UL
NRBC BLD AUTO-RTO: 0 /100
PH UR STRIP: 6 [PH] (ref 5–7)
PLATELET # BLD AUTO: 220 10E3/UL (ref 150–450)
POTASSIUM SERPL-SCNC: 4.9 MMOL/L (ref 3.4–5.3)
PROT SERPL-MCNC: 7.6 G/DL (ref 6.4–8.3)
RBC # BLD AUTO: 4.89 10E6/UL (ref 4.4–5.9)
RBC URINE: 6 /HPF
SODIUM SERPL-SCNC: 128 MMOL/L (ref 136–145)
SP GR UR STRIP: 1.02 (ref 1–1.03)
UROBILINOGEN UR STRIP-MCNC: NORMAL MG/DL
WBC # BLD AUTO: 7.4 10E3/UL (ref 4–11)
WBC URINE: 13 /HPF

## 2023-03-16 PROCEDURE — 81001 URINALYSIS AUTO W/SCOPE: CPT | Performed by: EMERGENCY MEDICINE

## 2023-03-16 PROCEDURE — 250N000011 HC RX IP 250 OP 636: Performed by: EMERGENCY MEDICINE

## 2023-03-16 PROCEDURE — 36415 COLL VENOUS BLD VENIPUNCTURE: CPT | Performed by: FAMILY MEDICINE

## 2023-03-16 PROCEDURE — 83690 ASSAY OF LIPASE: CPT | Performed by: FAMILY MEDICINE

## 2023-03-16 PROCEDURE — 250N000009 HC RX 250: Performed by: EMERGENCY MEDICINE

## 2023-03-16 PROCEDURE — 99284 EMERGENCY DEPT VISIT MOD MDM: CPT | Performed by: EMERGENCY MEDICINE

## 2023-03-16 PROCEDURE — 80053 COMPREHEN METABOLIC PANEL: CPT | Performed by: FAMILY MEDICINE

## 2023-03-16 PROCEDURE — 82248 BILIRUBIN DIRECT: CPT | Performed by: EMERGENCY MEDICINE

## 2023-03-16 PROCEDURE — 87086 URINE CULTURE/COLONY COUNT: CPT | Performed by: EMERGENCY MEDICINE

## 2023-03-16 PROCEDURE — 85025 COMPLETE CBC W/AUTO DIFF WBC: CPT | Performed by: FAMILY MEDICINE

## 2023-03-16 PROCEDURE — 250N000013 HC RX MED GY IP 250 OP 250 PS 637: Performed by: EMERGENCY MEDICINE

## 2023-03-16 PROCEDURE — 99285 EMERGENCY DEPT VISIT HI MDM: CPT | Mod: 25 | Performed by: EMERGENCY MEDICINE

## 2023-03-16 PROCEDURE — 74177 CT ABD & PELVIS W/CONTRAST: CPT

## 2023-03-16 RX ORDER — SULFAMETHOXAZOLE/TRIMETHOPRIM 800-160 MG
1 TABLET ORAL 2 TIMES DAILY
Qty: 10 TABLET | Refills: 0 | Status: SHIPPED | OUTPATIENT
Start: 2023-03-16 | End: 2023-03-21

## 2023-03-16 RX ORDER — SULFAMETHOXAZOLE/TRIMETHOPRIM 800-160 MG
1 TABLET ORAL ONCE
Status: COMPLETED | OUTPATIENT
Start: 2023-03-16 | End: 2023-03-16

## 2023-03-16 RX ORDER — ACETAMINOPHEN 500 MG
1000 TABLET ORAL EVERY 6 HOURS PRN
COMMUNITY

## 2023-03-16 RX ORDER — IOPAMIDOL 755 MG/ML
77 INJECTION, SOLUTION INTRAVASCULAR ONCE
Status: COMPLETED | OUTPATIENT
Start: 2023-03-16 | End: 2023-03-16

## 2023-03-16 RX ADMIN — SODIUM CHLORIDE 61 ML: 9 INJECTION, SOLUTION INTRAVENOUS at 19:46

## 2023-03-16 RX ADMIN — SULFAMETHOXAZOLE AND TRIMETHOPRIM 1 TABLET: 800; 160 TABLET ORAL at 21:40

## 2023-03-16 RX ADMIN — IOPAMIDOL 77 ML: 755 INJECTION, SOLUTION INTRAVENOUS at 19:46

## 2023-03-16 ASSESSMENT — ACTIVITIES OF DAILY LIVING (ADL)
ADLS_ACUITY_SCORE: 35
ADLS_ACUITY_SCORE: 35

## 2023-03-16 NOTE — ED TRIAGE NOTES
Upper abdomen pain, have had problems forever but worse last couple of days. Also states has ongoing dizziness that is already seeing neurologist for     Triage Assessment     Row Name 03/16/23 1230       Triage Assessment (Adult)    Airway WDL WDL       Respiratory WDL    Respiratory WDL WDL       Peripheral/Neurovascular WDL    Peripheral Neurovascular WDL WDL       Cognitive/Neuro/Behavioral WDL    Cognitive/Neuro/Behavioral WDL WDL

## 2023-03-16 NOTE — ED PROVIDER NOTES
History     Chief Complaint   Patient presents with     Abdominal Pain     HPI  Ghulam Rizzo is a 67 year old male with history notable for cecal volvulus s/p right hemicolectomy, ureterolithiasis, hypertension, hyperlipidemia, prostatic mass on MR w/ biopsy pending, with one week of lower abdominal pain which is worsening over the past 3 days.  Pain is achy in nature.  Seems to be worse after eating.  Not associated with fevers, chills, vomiting or diarrhea.  Last bowel movement this afternoon.  Does feel nauseated.  No abdominal distention.  No dysuria, urgency or frequency.  No chest pain or shortness of breath.    The patient's PMHx, Surgical Hx, Allergies, and Medications were all reviewed with the patient.    Allergies:  Allergies   Allergen Reactions     Levaquin [Levofloxacin] Nausea and Vomiting       Problem List:    Patient Active Problem List    Diagnosis Date Noted     Benign essential hypertension 03/01/2023     Priority: Medium     Saccular aneurysm 02/07/2023     Priority: Medium     Cecal volvulus (H) 07/13/2022     Priority: Medium     Elevated prostate specific antigen (PSA) 02/09/2022     Priority: Medium     Other irritable bowel syndrome 11/07/2019     Priority: Medium     Screening for hypertension 11/07/2019     Priority: Medium     Proctitis 01/26/2017     Priority: Medium     Colonoscopy in 2013.        Major depressive disorder, recurrent episode, moderate (H) 01/26/2017     Priority: Medium     Paxil caused anorgasmia       Cirrhosis of liver without ascites (H) 09/23/2015     Priority: Medium     Thought due to hepatitis C.  Follows with [facility].  Completed Harvoni 9/2015.       Abdominal pain, generalized 08/20/2015     Priority: Medium     Advanced directives, counseling/discussion 09/14/2012     Priority: Medium     Discussed advance care planning with patient; however, patient declined at this time. 9/14/2012          Hyperlipidemia LDL goal <130 07/16/2012     Priority: Medium      Tobacco abuse 07/16/2012     Priority: Medium        Past Medical History:    Past Medical History:   Diagnosis Date     Anisocoria      Asthma      Carpal tunnel syndrome      Head injury, unspecified      Hepatitis C      Obesity      Other convulsions      Other mononeuritis of upper limb      Unspecified essential hypertension        Past Surgical History:    Past Surgical History:   Procedure Laterality Date     COLECTOMY WITHOUT COLOSTOMY Right 7/13/2022    Procedure: Laparotomy, right colectomy, lysis of adhesions,;  Surgeon: Tommy Martino DO;  Location: [facility]     COLONOSCOPY       GI SURGERY       HERNIA REPAIR       HERNIORRHAPHY INCISIONAL (LOCATION) N/A 7/13/2022    Procedure: primary incisional hernia repair;  Surgeon: Tommy Martino DO;  Location: [facility] OR     SURGICAL HISTORY OF -   4/6/87    esophagogastroduodenoscopy     SURGICAL HISTORY OF -   4/27/87    exploratory laparotomy and anterior gastropexy over a gastrostomy tube.     SURGICAL HISTORY OF -   11/13/90    esophagogastroduodenoscopy     SURGICAL HISTORY OF -   1991    L diaphragm eventration repair, fixation of stomach and colon to abd wall     SURGICAL HISTORY OF -       hiatal hernia repair     THORACIC SURGERY         Family History:    Family History   Problem Relation Age of Onset     Cerebrovascular Disease Mother      Heart Disease Brother      Heart Disease Brother        Social History:  Marital Status:   [2]  Social History     Tobacco Use     Smoking status: Every Day     Packs/day: 0.50     Types: Cigarettes     Smokeless tobacco: Never     Tobacco comments:     started again 4/2010   Vaping Use     Vaping Use: Never used   Substance Use Topics     Alcohol use: Yes     Comment: 1-2 beers daily     Drug use: No        Medications:    acetaminophen (TYLENOL) 500 MG tablet  lisinopril (ZESTRIL) 10 MG tablet  senna-docusate (NEGRITO-COLACE) 8.6-50 MG per tablet  sertraline (ZOLOFT) 50 MG  tablet  sulfamethoxazole-trimethoprim (BACTRIM DS) 800-160 MG tablet          Review of Systems  Pertinent positives and negatives mentioned in HPI    Physical Exam   BP: 138/88  Pulse: 94  Temp: 96.8  F (36  C)  Resp: 18  Weight: 79.4 kg (175 lb)  SpO2: 100 %    Physical Exam  GEN: Awake, alert, and cooperative.  Resting comfortably in semireclined position on cart  HENT: MMM. External ears and nose normal bilaterally.  EYES: EOM intact. Conjunctiva clear. No discharge.   NECK: Symmetric, freely mobile.   CV : Regular rate and rhythm.  PULM: Normal effort. No wheezes, rales, or rhonchi bilaterally.  ABD: Mild generalized tenderness without localization.  Soft and nondistended.  No involuntary guarding or rebound tenderness.  BACK: no CVA tenderness  NEURO: Normal speech. Following commands. CN II-XII grossly intact. Answering questions and interacting appropriately.   EXT: No gross deformity. Warm and well perfused.  INT: Warm. No diaphoresis. Normal color.        ED Course        Procedures       Critical Care time:  none               Results for orders placed or performed during the hospital encounter of 03/16/23 (from the past 24 hour(s))   CBC with platelets, differential    Narrative    The following orders were created for panel order CBC with platelets, differential.  Procedure                               Abnormality         Status                     ---------                               -----------         ------                     CBC with platelets and d...[[id number]]                      Final result                 Please view results for these tests on the individual orders.   Basic metabolic panel   Result Value Ref Range    Sodium 128 (L) 136 - 145 mmol/L    Potassium 4.9 3.4 - 5.3 mmol/L    Chloride 93 (L) 98 - 107 mmol/L    Carbon Dioxide (CO2) 23 22 - 29 mmol/L    Anion Gap 12 7 - 15 mmol/L    Urea Nitrogen 12.6 8.0 - 23.0 mg/dL    Creatinine 0.71 0.67 - 1.17 mg/dL    Calcium 10.0 8.8 -  10.2 mg/dL    Glucose 113 (H) 70 - 99 mg/dL    GFR Estimate >90 >60 mL/min/1.73m2   Lipase   Result Value Ref Range    Lipase 34 13 - 60 U/L   CBC with platelets and differential   Result Value Ref Range    WBC Count 7.4 4.0 - 11.0 10e3/uL    RBC Count 4.89 4.40 - 5.90 10e6/uL    Hemoglobin 16.1 13.3 - 17.7 g/dL    Hematocrit 46.1 40.0 - 53.0 %    MCV 94 78 - 100 fL    MCH 32.9 26.5 - 33.0 pg    MCHC 34.9 31.5 - 36.5 g/dL    RDW 12.7 10.0 - 15.0 %    Platelet Count 220 150 - 450 10e3/uL    % Neutrophils 63 %    % Lymphocytes 28 %    % Monocytes 7 %    % Eosinophils 1 %    % Basophils 1 %    % Immature Granulocytes 0 %    NRBCs per 100 WBC 0 <1 /100    Absolute Neutrophils 4.7 1.6 - 8.3 10e3/uL    Absolute Lymphocytes 2.1 0.8 - 5.3 10e3/uL    Absolute Monocytes 0.5 0.0 - 1.3 10e3/uL    Absolute Eosinophils 0.1 0.0 - 0.7 10e3/uL    Absolute Basophils 0.0 0.0 - 0.2 10e3/uL    Absolute Immature Granulocytes 0.0 <=0.4 10e3/uL    Absolute NRBCs 0.0 10e3/uL   Hepatic panel   Result Value Ref Range    Protein Total 7.6 6.4 - 8.3 g/dL    Albumin 4.5 3.5 - 5.2 g/dL    Bilirubin Total 0.9 <=1.2 mg/dL    Alkaline Phosphatase 89 40 - 129 U/L    AST 21 10 - 50 U/L    ALT 15 10 - 50 U/L    Bilirubin Direct <0.20 0.00 - 0.30 mg/dL   UA with Microscopic reflex to Culture    Specimen: Urine, Clean Catch   Result Value Ref Range    Color Urine Yellow Colorless, Straw, Light Yellow, Yellow    Appearance Urine Clear Clear    Glucose Urine Negative Negative mg/dL    Bilirubin Urine Negative Negative    Ketones Urine 5 (A) Negative mg/dL    Specific Gravity Urine 1.018 1.003 - 1.035    Blood Urine Small (A) Negative    pH Urine 6.0 5.0 - 7.0    Protein Albumin Urine 30 (A) Negative mg/dL    Urobilinogen Urine Normal Normal, 2.0 mg/dL    Nitrite Urine Negative Negative    Leukocyte Esterase Urine Trace (A) Negative    Bacteria Urine Few (A) None Seen /HPF    Mucus Urine Present (A) None Seen /LPF    RBC Urine 6 (H) <=2 /HPF    WBC Urine 13  (H) <=5 /HPF    Narrative    Urine Culture ordered based on laboratory criteria   CT Abdomen Pelvis w Contrast    Narrative    EXAM: CT CHEST, ABDOMEN PELVIS W CONTRAST  LOCATION: [facility]  DATE/TIME: 3/16/2023 7:55 PM    INDICATION: Generalized abdominal pain and discomfort, prior hemicolectomy.  COMPARISON: None.  TECHNIQUE: CT scan of the chest, abdomen, and pelvis was performed following injection of IV contrast. Multiplanar reformats were obtained. Dose reduction techniques were used.   CONTRAST: 77 mL Isovue 370    FINDINGS:   LUNGS AND PLEURA: Slight bilateral apical pleural scarring.    MEDIASTINUM/AXILLAE: Scattered benign calcified lymph nodes consistent with prior granulomatous infection. Minimal heterogeneous changes of both thyroid lobes with no dominant lesions identified.    CORONARY ARTERY CALCIFICATION: None.    HEPATOBILIARY: Scattered benign calcified granulomas and simple cysts. The gallbladder and bile ducts are normal.    PANCREAS: Normal.    SPLEEN: Scattered benign calcified granulomas.    ADRENAL GLANDS: Normal.    KIDNEYS/BLADDER: Scattered benign cysts are seen in both kidneys with no follow-up recommended. There is a 5.7 x 4.7 mm stone seen in the proximal aspect of the left ureter with no associated dilatation of the left urinary tract.    BOWEL: Mild findings of diverticulosis with no evidence for diverticulitis. Prior postoperative changes seen of the colon with no complications identified. There are some areas of the colon which show no significant gas, thus detail is limited in these   regions. The bowel is otherwise normal with no obstructive changes.    LYMPH NODES: Normal.    VASCULATURE: Mild to moderate calcification with no aneurysm.    PELVIC ORGANS: Mild prostatic gland enlargement.    MUSCULOSKELETAL: Diffuse osteopenia. Mild to moderate scattered hypertrophic changes primarily in the spine.      Impression    IMPRESSION:  1.  Since 1/21/2023,  the mild dilatation of the upper left urinary tract is no longer seen. Otherwise the abdomen and pelvis are stable.    2.  Stable location of a nonobstructive 5.7 x 4.7 mm stone in the upper aspect of the left ureter.    3.  Diverticulosis with no evidence for diverticulitis.    4.  Prior postoperative changes with no complications identified.    5.  Sequelae from prior granulomatous infection with numerous calcified granulomas.       Medications   iopamidol (ISOVUE-370) solution 77 mL (77 mLs Intravenous $Given 3/16/23 1946)   sodium chloride 0.9 % bag 500mL for CT scan flush use (61 mLs Intravenous $Given 3/16/23 1946)   sulfamethoxazole-trimethoprim (BACTRIM DS) 800-160 MG per tablet 1 tablet (1 tablet Oral $Given 3/16/23 2140)       Assessments & Plan (with Medical Decision Making)   67 year old male with past medical history significant for right hemicolectomy, hypertension, urolithiasis, hyperlipidemia, with 1 week of generalized abdominal pain which is worsened over the past 3 days.  Associated nausea but no vomiting, diarrhea, fevers.  Mild generalized tenderness on exam without signs of peritonitis.    CBC normal.  BMP with mild hyponatremia sodium 128, lipase normal at 34, and hepatic panel normal.    Given his history obtain CT scan abdomen pelvis with IV contrast.  CT showed resolution of dilatation of left urinary tract and stable nonobstructive stone in upper aspect of left ureter.  Diverticulosis without evidence of diverticulitis.    Urinalysis was obtained and Was suggestive of infection.  Urine culture is pending.  There were 13 WBCs and RBCs and trace leukocyte esterase.  I do not see a positive urine culture in our system.  Patient does have allergy to fluoroquinolones.  We will empirically treat with Bactrim.  First dose given here and prescription sent to preferred pharmacy pending culture results.  Patient was comfortable with plan being discharged to home.  We will follow-up with primary care  provider and return to emergency department for new or worsening symptoms.    I have reviewed the nursing notes.         New Prescriptions    SULFAMETHOXAZOLE-TRIMETHOPRIM (BACTRIM DS) 800-160 MG TABLET    Take 1 tablet by mouth 2 times daily for 5 days       Final diagnoses:   Abdominal pain, generalized   Acute cystitis with hematuria     Alex Champion MD    3/16/2023   [facility] EMERGENCY DEPT    Disclaimer: This note consists of words and symbols derived from keyboarding and dictation using voice recognition software.  As a result, there may be errors that have gone undetected.  Please consider this when interpreting information found in this note.             Alex Champion MD  03/16/23 [id number]

## 2023-03-17 NOTE — DISCHARGE INSTRUCTIONS
Your CT scan did not show any new abnormalities to explain your symptoms.  Your urine however appears infected.  A urine culture is pending.  I would like you to take Bactrim twice daily pending urine culture results.    Please follow-up with your primary care provider first part of the week.    Return to the emergency department for any new or worsening symptoms.      Prescription for antibiotic has been sent to preferred pharmacy.

## 2023-03-18 ENCOUNTER — TELEPHONE (OUTPATIENT)
Dept: EMERGENCY MEDICINE | Facility: CLINIC | Age: 68
End: 2023-03-18
Payer: COMMERCIAL

## 2023-03-18 LAB — BACTERIA UR CULT: NO GROWTH

## 2023-03-18 NOTE — TELEPHONE ENCOUNTER
Work up for urine culture - no growth - writer notes stone on CT - no call or change in treatment advised

## 2023-03-21 ENCOUNTER — OFFICE VISIT (OUTPATIENT)
Dept: FAMILY MEDICINE | Facility: CLINIC | Age: 68
End: 2023-03-21
Payer: COMMERCIAL

## 2023-03-21 VITALS
HEART RATE: 107 BPM | SYSTOLIC BLOOD PRESSURE: 130 MMHG | RESPIRATION RATE: 24 BRPM | HEIGHT: 74 IN | TEMPERATURE: 97.8 F | DIASTOLIC BLOOD PRESSURE: 80 MMHG | WEIGHT: 167 LBS | BODY MASS INDEX: 21.43 KG/M2 | OXYGEN SATURATION: 99 %

## 2023-03-21 DIAGNOSIS — K59.00 CONSTIPATION, UNSPECIFIED CONSTIPATION TYPE: ICD-10-CM

## 2023-03-21 DIAGNOSIS — R10.84 ABDOMINAL PAIN, GENERALIZED: Primary | ICD-10-CM

## 2023-03-21 DIAGNOSIS — R97.20 ELEVATED PROSTATE SPECIFIC ANTIGEN (PSA): ICD-10-CM

## 2023-03-21 PROCEDURE — 99214 OFFICE O/P EST MOD 30 MIN: CPT | Performed by: FAMILY MEDICINE

## 2023-03-21 RX ORDER — POLYETHYLENE GLYCOL 3350 17 G/17G
1 POWDER, FOR SOLUTION ORAL DAILY
Qty: 578 G | Refills: 1 | Status: SHIPPED | OUTPATIENT
Start: 2023-03-21

## 2023-03-21 ASSESSMENT — PAIN SCALES - GENERAL: PAINLEVEL: SEVERE PAIN (7)

## 2023-03-21 NOTE — PROGRESS NOTES
Assessment & Plan     Abdominal pain, generalized  Constipation, unspecified constipation type  -- Continues to have abdominal pain   -- Discussed recent lab testing and imaging.   -- having issues with constipation which could be playing a role. Will proceed with glycerin suppositories and also miralax with up titration until having a soft bowel movement daily.   -- Recheck BMP as sodium slightly low when in the ED.   - polyethylene glycol (MIRALAX) 17 GM/Dose powder  Dispense: 578 g; Refill: 1  - glycerin (LAXATIVE) 1.2 g suppository  Dispense: 25 suppository; Refill: 1  - Basic metabolic panel  (Ca, Cl, CO2, Creat, Gluc, K, Na, BUN)    Elevated PSA   Recent prostate MRI with PIRADS 5 lesion. Is scheduled for Prostate biopsy on 5/15         MED REC REQUIRED  Post Medication Reconciliation Status: discharge medications reconciled and changed, per note/orders    The risks, benefits and treatment options of prescribed medications or other treatments have been discussed with the patient. The patient verbalized their understanding and should call or follow up if no improvement or if they develop further problems.    Follow up next week.     >30 minutes spent on the date of the encounter doing chart review, history and exam, documentation and further activities as noted above      Jamie Rutledge DO  [facility]    Lamar Parmar is a 67 year old accompanied by his spouse, presenting for the following health issues:  ER F/U      HPI     ED/UC Followup:    Facility:  [facility]  Date of visit: 3/16/2023   Reason for visit: Abdominal pain  Current Status: still having abdominal pain, having a hard time with bowels.    Lab work CBC (WNL), BMP (hyponatremia 128), lipase (WNL), hepatic panel (WNL), urinalysis 6 RBC's, 12 WBC's.   Urine culture negative.     CT abdomen pelvis with contrast  IMPRESSION:  1.  Since 1/21/2023, the mild dilatation of the upper left urinary tract is no  "longer seen. Otherwise the abdomen and pelvis are stable.   2.  Stable location of a nonobstructive 5.7 x 4.7 mm stone in the upper aspect of the left ureter.  3.  Diverticulosis with no evidence for diverticulitis.  4.  Prior postoperative changes with no complications identified.  5.  Sequelae from prior granulomatous infection with numerous calcified granulomas.    Patient prescribed bactrim for 5 days.       Recent MR prostate with PIRADS 5 lesions, scheduled for prostate biopsy. 5/15.     Today in clinic:   Reports continued abdominal pain.   Having issues with constipation.   Had a small bowel constipated bowel movement.   Using senna-docusate.   Used miralax for a couple days.   Using Tylenol for pain, minimal improvement.       Review of Systems   Constitutional, HEENT, cardiovascular, pulmonary, gi and gu systems are negative, except as otherwise noted.      Objective    /80 (BP Location: Left arm, Patient Position: Chair, Cuff Size: Adult Regular)   Pulse 107   Temp 97.8  F (36.6  C) (Tympanic)   Resp 24   Ht 1.88 m (6' 2\")   Wt 75.8 kg (167 lb)   SpO2 99%   BMI 21.44 kg/m    Body mass index is 21.44 kg/m .  Physical Exam   General: alert, cooperative, no acute distress   CV: RRR, no murmur  Resp: non-labored breathing, clear to auscultation, no wheezing or rales   Abdomen: Soft, mild tenderness throughout. No rebound.   Extremities: No peripheral edema, calves non-tender.         "

## 2023-03-22 ENCOUNTER — TELEPHONE (OUTPATIENT)
Dept: UROLOGY | Facility: CLINIC | Age: 68
End: 2023-03-22
Payer: COMMERCIAL

## 2023-03-22 ENCOUNTER — TELEPHONE (OUTPATIENT)
Dept: FAMILY MEDICINE | Facility: CLINIC | Age: 68
End: 2023-03-22
Payer: COMMERCIAL

## 2023-03-22 DIAGNOSIS — R97.20 ELEVATED PROSTATE SPECIFIC ANTIGEN (PSA): Primary | ICD-10-CM

## 2023-03-22 RX ORDER — CIPROFLOXACIN 500 MG/1
500 TABLET, FILM COATED ORAL 2 TIMES DAILY
Qty: 6 TABLET | Refills: 0 | Status: SHIPPED | OUTPATIENT
Start: 2023-03-22 | End: 2023-03-25

## 2023-03-22 NOTE — TELEPHONE ENCOUNTER
Further discuss at upcoming appt. I would avoid probiotic at this time to not muddy the picture and continue with recommendations from office visit.     Dr. Rutledge

## 2023-03-22 NOTE — TELEPHONE ENCOUNTER
Dr. Rutledge,    The wife of the patient wants to know if probiotics would help patient and if so which one you recommend?.  Also want to talk about adjusting zoloft.  Advised this can be addressed at upcoming appt on Monday 3/27/23. . Priscilla GONZALEZ RN

## 2023-03-22 NOTE — TELEPHONE ENCOUNTER
Called & spoke with pt's wife, Daija & read providers message. Wife Verbalized understanding.    Alesha Grady RN

## 2023-03-27 ENCOUNTER — OFFICE VISIT (OUTPATIENT)
Dept: FAMILY MEDICINE | Facility: CLINIC | Age: 68
End: 2023-03-27
Payer: COMMERCIAL

## 2023-03-27 VITALS
TEMPERATURE: 96.4 F | HEART RATE: 80 BPM | BODY MASS INDEX: 21.19 KG/M2 | WEIGHT: 170.4 LBS | SYSTOLIC BLOOD PRESSURE: 136 MMHG | OXYGEN SATURATION: 96 % | DIASTOLIC BLOOD PRESSURE: 76 MMHG | HEIGHT: 75 IN | RESPIRATION RATE: 16 BRPM

## 2023-03-27 DIAGNOSIS — Z12.11 COLON CANCER SCREENING: ICD-10-CM

## 2023-03-27 DIAGNOSIS — K59.00 CONSTIPATION, UNSPECIFIED CONSTIPATION TYPE: Primary | ICD-10-CM

## 2023-03-27 DIAGNOSIS — R42 DIZZINESS: ICD-10-CM

## 2023-03-27 DIAGNOSIS — Z12.11 SCREEN FOR COLON CANCER: ICD-10-CM

## 2023-03-27 PROCEDURE — 99214 OFFICE O/P EST MOD 30 MIN: CPT | Performed by: FAMILY MEDICINE

## 2023-03-27 ASSESSMENT — PAIN SCALES - GENERAL: PAINLEVEL: NO PAIN (0)

## 2023-03-27 NOTE — PATIENT INSTRUCTIONS
Continue with miralax and senna-docusate    Proceed with colonoscopy.     Call [phone] to schedule colonoscopy.      Schedule holter monitor for 7 days.

## 2023-03-27 NOTE — PROGRESS NOTES
Assessment & Plan     Constipation, unspecified constipation type  Abdominal pain has improved after increasing bowel regimen to include MiraLAX twice daily and senna-docusate daily.  Did not get any relief with glycerin suppositories.  -- He continues to have a sense of incomplete emptying with bowel movements.  His last colonoscopy was in 2013 and had a small tubular adenoma polyp.  Repeat in 5 years however this was not completed.  -Order a colonoscopy for further evaluation  Screen for colon cancer  - Colonoscopy Screening  Referral    Dizziness  intermittent and sporadic. Can happen at rest, when walking, or when going from sitting to standing position. Recent MRA brain, MRA neck, and MR brain unremarkable for culprit.   -- Debi Hallpike testing negative in clinic.   -- Proceed with Holter monitor to rule out any underlying arrhythmia causing symptoms. He denies any other symptoms during his episodes but could be in an abnormal rhyhtm.   -- Has follow up with Neurology on 9/7/2023   - Adult Leadless EKG Monitor 3 to 7 Days    The risks, benefits and treatment options of prescribed medications or other treatments have been discussed with the patient. The patient verbalized their understanding and should call or follow up if no improvement or if they develop further problems.    >30 minutes spent on the date of the encounter doing chart review, history and exam, documentation and further activities as noted above      Jamie Rutledge DO  [facility]    Lamar Parmar is a 67 year old, presenting for the following health issues:  Abdominal Pain (Has had some improvement, not completely gone.      No flowsheet data found.  History of Present Illness       Reason for visit:  Abdominal pain follow up    He eats 0-1 servings of fruits and vegetables daily.He consumes 2 sweetened beverage(s) daily.He exercises with enough effort to increase his heart rate 10 to 19 minutes per day.  He  exercises with enough effort to increase his heart rate 7 days per week.   He is taking medications regularly.       67 year old male who presents to clinic for abdominal pain follow up.     Recent office visit on 3/21/2023  Increased bowel regimen to include glycerin suppositories, miralax.       Pain History:  Where in your body do you have pain? Abdominal/Flank Pain  Onset/Duration: couple months  Description:   Character: Cramping  Location: right lower quadrant left lower quadrant  Radiation: None  Intensity: 0/10  Progression of Symptoms:  improving  Accompanying Signs & Symptoms:  Fever/Chills: No  Gas/Bloating: No  Nausea: No  Vomitting: No  Diarrhea: No  Constipation: YES  Dysuria or Hematuria: No  History:   Trauma: No  Previous similar pain:   Previous tests done: CT 3/16/23  Precipitating factors:   Does the pain change with:     Food: No    Bowel Movement: YES- worse    Urination: No   Other factors:  No  Therapies tried and outcome: stool softner, miralax, suppositories    Miralax twice daily.   Senna-docusate daily in the morning.   Tried glycerin suppository but did not work.   Constipation has improved.   Last colonoscopy in 2013.    Reports having issues with feeling like complete emptying after having a bowel movement.       Dizziness/ lightheadedness  Will occur daily.   Symptoms will last for about 1 min then resolve on its own.   No palpitations.   No chest pain or shortness of breath.   Symptoms can occur when just sitting around resting or when going from a sitting to standing position.   Not related to exertion.   Symptoms have been ongoing for the last couple of months.   Recent evaluation with MRA neck, MRA Brain, MRI brain, EKG which was unremarkable.  No fevers  No vision changes.   Does not occur with quick head movement.       Review of Systems   Constitutional, HEENT, cardiovascular, pulmonary, gi and gu systems are negative, except as otherwise noted.      Objective    /76    "Pulse 80   Temp (!) 96.4  F (35.8  C) (Tympanic)   Resp 16   Ht 1.899 m (6' 2.75\")   Wt 77.3 kg (170 lb 6.4 oz)   SpO2 96%   BMI 21.44 kg/m    Body mass index is 21.44 kg/m .  Physical Exam   General: alert, cooperative, no acute distress   CV: RRR, no murmur  Resp: non-labored breathing, clear to auscultation, no wheezing or rales   Abdomen: Soft, non-tender, no guarding.   Extremities: No peripheral edema, calves non-tender.    Neuro: CN II-XII grossly intact. No focal deficits. Strength and sensation in upper and lower extremities appropriate. Reflexes 2/4. No dysdiadochokinesia.  No issues with finger-nose-finger.  [place]-Hallpike maneuver is negative bilaterally.          "

## 2023-03-29 ENCOUNTER — TELEPHONE (OUTPATIENT)
Dept: SURGERY | Facility: CLINIC | Age: 68
End: 2023-03-29
Payer: COMMERCIAL

## 2023-03-29 NOTE — TELEPHONE ENCOUNTER
Screening Questions  BLUE  KIND OF PREP RED  LOCATION [review exclusion criteria] GREEN  SEDATION TYPE        n Are you active on mychart?       Aamir Ordering/Referring Provider?        ucare What type of coverage do you have?      n Have you had a positive covid test in the last 14 days?     21.44 1. BMI  [BMI 40+ - review exclusion criteria]    y  2. Are you able to give consent for your medical care? [IF NO,RN REVIEW]          n  3. Are you taking any prescription pain medications on a routine schedule   (ex narcotics: oxycodone, roxicodone, oxycontin,  and percocet)? [RN Review]        n  3a. EXTENDED PREP What kind of prescription?     n 4. Do you have any chemical dependencies such as alcohol, street drugs, or methadone?        **If yes 3- 5 , please schedule with MAC sedation.**          IF YES TO ANY 6 - 10 - HOSPITAL SETTING ONLY.     n 6.   Do you need assistance transferring?     n 7.   Have you had a heart or lung transplant?    n 8.   Are you currently on dialysis?   n 9.   Do you use daily home oxygen?   n 10. Do you take nitroglycerin?   10a. n If yes, how often?     11. [FEMALES]  n Are you currently pregnant?    11a. n If yes, how many weeks? [ Greater than 12 weeks, OR NEEDED]    n 12. Do you have Pulmonary Hypertension? *NEED PAC APPT AT [facility] w/ [facility]*     n 13. [review exclusion criteria]  Do you have any implantable devices in your body (pacemaker, defib, LVAD)?    n 14. In the past 6 months, have you had any heart related issues including cardiomyopathy or heart attack?     14a. n If yes, did it require cardiac stenting if so when?     n 15. Have you had a stroke or Transient ischemic attack (TIA - aka  mini stroke ) within 6 months?      n 16. Do you have mod to severe Obstructive Sleep Apnea?  [Hospital only]    n 17. Do you have SEVERE AND UNCONTROLLED asthma? *NEED PAC APPT AT [facility] w/MAC*     18. Are you currently taking any blood thinners?     18a. No. Continue to 19.   18b. Yes/no  "Blood Thinner: No [CONTINUE TO #19]    n 19. Do you take the medication Phentermine?    19a. If yes, \"Hold for 7 days before procedure.  Please consult your prescribing provider if you have questions about holding this medication.\"     n  20. Do you have chronic kidney disease?      n  21. Do you have a diagnosis of diabetes?     y  22. On a regular basis do you go 3-5 days between bowel movements?     y 23. Preferred LOCAL Pharmacy for Pre Prescription    [ LIST ONLY ONE PHARMACY]     [facility] - [place], [place] - [address] [facility]        - CLOSING REMINDERS -    Informed patient they will need an adult    Cannot take any type of public or medical transportation alone    Conscious Sedation- Needs  for 6 hours after the procedure       MAC/General-Needs  for 24 hours after procedure    Pre-Procedure Covid test to be completed [[facility] PCR Testing Required]    Confirmed Nurse will call to complete assessment       - SCHEDULING DETAILS -  n Hospital Setting Required? If yes, what is the exclusion?: n   Montiel  Surgeon    4/10/23  Date of Procedure  Lower Endoscopy [Colonoscopy]  Type of Procedure Scheduled  [facility] [place]- If you answer yes to questions #8, #20, #21 [  pts ]Which Colonoscopy Prep was Sent?     general Sedation Type     n PAC / Pre-op Required                     "

## 2023-04-05 ENCOUNTER — TELEPHONE (OUTPATIENT)
Dept: UROLOGY | Facility: CLINIC | Age: 68
End: 2023-04-05
Payer: COMMERCIAL

## 2023-04-05 NOTE — TELEPHONE ENCOUNTER
----- Message from Ophelia Cronin LPN sent at 3/31/2023  2:05 PM CDT -----  Regarding: RE: sooner biopsy  Please call patient, I moved people around, DEANNE Blount will see him in person in [place] may 4 at 9 am,  ophelia  ----- Message -----  From: Shayna Cummins  Sent: 3/28/2023   8:40 AM CDT  To: Ophelia Cronin LPN  Subject: FW: sooner biopsy                                Ophelia navas patients biopsy got move to to April 24th. Can you please move his result appointment with Dr Blount earlier. Thank you  ----- Message -----  From: Shayna Cummins  Sent: 3/27/2023   3:01 PM CDT  To: Melony Jeffries  Subject: RE: sooner biopsy                                I left him a detailed VM to call me back if he wants a sooner biopsy on 4/24 8am in [place].   ----- Message -----  From: Melony Jeffries  Sent: 3/27/2023   2:12 PM CDT  To: Clinic [facility]ordin[facility]  Subject: sooner biopsy                                    Please offer patient sooner biopsy appointment 4/24 8:00. If patient accepts, send a message to Ophelia Cronin for follow up appointment at [facility]

## 2023-04-05 NOTE — TELEPHONE ENCOUNTER
Mailed out biopsy info and result appt in [place]. Left a detailed VM on cell that result appt will be in person with Dr Blount. Any questions to call the clinic. Number left

## 2023-04-06 ENCOUNTER — HOSPITAL ENCOUNTER (EMERGENCY)
Facility: CLINIC | Age: 68
Discharge: HOME | End: 2023-04-06
Attending: FAMILY MEDICINE | Admitting: FAMILY MEDICINE
Payer: COMMERCIAL

## 2023-04-06 ENCOUNTER — APPOINTMENT (OUTPATIENT)
Dept: GENERAL RADIOLOGY | Facility: CLINIC | Age: 68
End: 2023-04-06
Attending: FAMILY MEDICINE
Payer: COMMERCIAL

## 2023-04-06 VITALS
BODY MASS INDEX: 21.82 KG/M2 | TEMPERATURE: 97 F | OXYGEN SATURATION: 99 % | HEIGHT: 74 IN | SYSTOLIC BLOOD PRESSURE: 133 MMHG | HEART RATE: 67 BPM | DIASTOLIC BLOOD PRESSURE: 82 MMHG | RESPIRATION RATE: 16 BRPM | WEIGHT: 170 LBS

## 2023-04-06 DIAGNOSIS — S32.040A CLOSED COMPRESSION FRACTURE OF L4 LUMBAR VERTEBRA, INITIAL ENCOUNTER (H): ICD-10-CM

## 2023-04-06 DIAGNOSIS — S32.030A CLOSED COMPRESSION FRACTURE OF L3 LUMBAR VERTEBRA, INITIAL ENCOUNTER (H): ICD-10-CM

## 2023-04-06 PROCEDURE — 72100 X-RAY EXAM L-S SPINE 2/3 VWS: CPT

## 2023-04-06 PROCEDURE — 96375 TX/PRO/DX INJ NEW DRUG ADDON: CPT | Performed by: FAMILY MEDICINE

## 2023-04-06 PROCEDURE — 96374 THER/PROPH/DIAG INJ IV PUSH: CPT | Performed by: FAMILY MEDICINE

## 2023-04-06 PROCEDURE — 99284 EMERGENCY DEPT VISIT MOD MDM: CPT | Performed by: FAMILY MEDICINE

## 2023-04-06 PROCEDURE — 99285 EMERGENCY DEPT VISIT HI MDM: CPT | Mod: 25 | Performed by: FAMILY MEDICINE

## 2023-04-06 PROCEDURE — 250N000011 HC RX IP 250 OP 636: Performed by: FAMILY MEDICINE

## 2023-04-06 PROCEDURE — 96376 TX/PRO/DX INJ SAME DRUG ADON: CPT | Performed by: FAMILY MEDICINE

## 2023-04-06 RX ORDER — HYDROMORPHONE HYDROCHLORIDE 1 MG/ML
0.5 INJECTION, SOLUTION INTRAMUSCULAR; INTRAVENOUS; SUBCUTANEOUS
Status: DISCONTINUED | OUTPATIENT
Start: 2023-04-06 | End: 2023-04-06 | Stop reason: HOSPADM

## 2023-04-06 RX ORDER — ONDANSETRON 2 MG/ML
4 INJECTION INTRAMUSCULAR; INTRAVENOUS
Status: DISCONTINUED | OUTPATIENT
Start: 2023-04-06 | End: 2023-04-06 | Stop reason: HOSPADM

## 2023-04-06 RX ORDER — HYDROMORPHONE HYDROCHLORIDE 2 MG/1
2-4 TABLET ORAL EVERY 8 HOURS PRN
Qty: 20 TABLET | Refills: 0 | Status: SHIPPED | OUTPATIENT
Start: 2023-04-06 | End: 2023-04-09

## 2023-04-06 RX ADMIN — HYDROMORPHONE HYDROCHLORIDE 0.5 MG: 1 INJECTION, SOLUTION INTRAMUSCULAR; INTRAVENOUS; SUBCUTANEOUS at 16:22

## 2023-04-06 RX ADMIN — HYDROMORPHONE HYDROCHLORIDE 0.5 MG: 1 INJECTION, SOLUTION INTRAMUSCULAR; INTRAVENOUS; SUBCUTANEOUS at 17:42

## 2023-04-06 RX ADMIN — ONDANSETRON 4 MG: 2 INJECTION INTRAMUSCULAR; INTRAVENOUS at 16:22

## 2023-04-06 ASSESSMENT — ACTIVITIES OF DAILY LIVING (ADL): ADLS_ACUITY_SCORE: 39
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
## 2023-04-06 NOTE — DISCHARGE INSTRUCTIONS
RETURN TO THE EMERGENCY ROOM FOR THE FOLLOWING:    Severely worsened pain, new trouble with walking because of weakness unrelated to pain, new difficulty with bowel or bladder, or at anytime for any concern.    FOLLOW UP:    Referral order was placed for primary care clinic follow-up.  Expect a phone call within the next 2-3 business days to help with scheduling.    TREATMENT RECOMMENDATIONS:    Ibuprofen 600 mg every eight hours for pain (7 days duration).  Tylenol 1000 mg every eight hours for pain (7 days duration).  Therefore, you can alternate these every four hours and do it safely.    Dilaudid 2-4 mg every eight hours as needed for pain not relieved by the above.    Consider MiraLAX to help promote a regular stool pattern.  The goal is to have a soft, easy stool either every day or every other day.  You can titrate MiraLAX to achieve this goal.  MiraLAX is a nonsoluble fiber and is not addictive.  It will not cause bowel dysfunction or pathology.      NURSE ADVICE LINE:  [phone] or [phone]
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
## 2023-04-10 ENCOUNTER — TELEPHONE (OUTPATIENT)
Dept: FAMILY MEDICINE | Facility: CLINIC | Age: 68
End: 2023-04-10
Payer: COMMERCIAL

## 2023-04-10 NOTE — TELEPHONE ENCOUNTER
FYI - Status Update    Who is Calling: family member, wife calling (Chacha)    Update: should we cancel the appt for WED for the Holter Monitor since Ghulam was just in the ER for cracked ribs? Please advise ASAP.  This is probably not the best time to be doing the heart monitor with the compression fracture.    Does caller want a call/response back: Yes     Okay to leave a detailed message?: Yes at Home number on file [phone] (home)

## 2023-04-10 NOTE — TELEPHONE ENCOUNTER
Forwarding status update below to PCP to advise re: ZioPatch placement please.  Patient is scheduled for hospital f/u on 4/17/23.     Misti Higgins RN  [facility]

## 2023-04-12 ENCOUNTER — HOSPITAL ENCOUNTER (OUTPATIENT)
Dept: CARDIOLOGY | Facility: CLINIC | Age: 68
Discharge: HOME | End: 2023-04-12
Attending: FAMILY MEDICINE | Admitting: FAMILY MEDICINE
Payer: COMMERCIAL

## 2023-04-12 DIAGNOSIS — R42 DIZZINESS: ICD-10-CM

## 2023-04-12 PROCEDURE — 93244 EXT ECG>48HR<7D REV&INTERPJ: CPT | Performed by: INTERNAL MEDICINE

## 2023-04-12 PROCEDURE — 93242 EXT ECG>48HR<7D RECORDING: CPT

## 2023-04-17 ENCOUNTER — PRE VISIT (OUTPATIENT)
Dept: UROLOGY | Facility: CLINIC | Age: 68
End: 2023-04-17

## 2023-04-17 ENCOUNTER — OFFICE VISIT (OUTPATIENT)
Dept: FAMILY MEDICINE | Facility: CLINIC | Age: 68
End: 2023-04-17
Payer: COMMERCIAL

## 2023-04-17 ENCOUNTER — TELEPHONE (OUTPATIENT)
Dept: UROLOGY | Facility: CLINIC | Age: 68
End: 2023-04-17

## 2023-04-17 VITALS
SYSTOLIC BLOOD PRESSURE: 124 MMHG | RESPIRATION RATE: 24 BRPM | WEIGHT: 168 LBS | HEART RATE: 82 BPM | DIASTOLIC BLOOD PRESSURE: 80 MMHG | HEIGHT: 74 IN | OXYGEN SATURATION: 97 % | BODY MASS INDEX: 21.56 KG/M2 | TEMPERATURE: 96.7 F

## 2023-04-17 DIAGNOSIS — M80.08XD AGE-RELATED OSTEOPOROSIS WITH CURRENT PATHOLOGICAL FRACTURE, VERTEBRA(E), SUBSEQUENT ENCOUNTER FOR FRACTURE WITH ROUTINE HEALING: ICD-10-CM

## 2023-04-17 DIAGNOSIS — R97.20 ELEVATED PROSTATE SPECIFIC ANTIGEN (PSA): Primary | ICD-10-CM

## 2023-04-17 DIAGNOSIS — S32.040D CLOSED COMPRESSION FRACTURE OF L4 LUMBAR VERTEBRA, WITH ROUTINE HEALING, SUBSEQUENT ENCOUNTER: Primary | ICD-10-CM

## 2023-04-17 DIAGNOSIS — Z23 NEED FOR SHINGLES VACCINE: ICD-10-CM

## 2023-04-17 DIAGNOSIS — R97.20 ELEVATED PROSTATE SPECIFIC ANTIGEN (PSA): ICD-10-CM

## 2023-04-17 DIAGNOSIS — E87.1 HYPONATREMIA: ICD-10-CM

## 2023-04-17 DIAGNOSIS — S32.030D CLOSED COMPRESSION FRACTURE OF L3 LUMBAR VERTEBRA, WITH ROUTINE HEALING, SUBSEQUENT ENCOUNTER: ICD-10-CM

## 2023-04-17 LAB
ANION GAP SERPL CALCULATED.3IONS-SCNC: 13 MMOL/L (ref 7–15)
BUN SERPL-MCNC: 15 MG/DL (ref 8–23)
CALCIUM SERPL-MCNC: 10.1 MG/DL (ref 8.8–10.2)
CHLORIDE SERPL-SCNC: 97 MMOL/L (ref 98–107)
CREAT SERPL-MCNC: 0.82 MG/DL (ref 0.67–1.17)
DEPRECATED CALCIDIOL+CALCIFEROL SERPL-MC: 19 UG/L (ref 20–75)
DEPRECATED HCO3 PLAS-SCNC: 24 MMOL/L (ref 22–29)
GFR SERPL CREATININE-BSD FRML MDRD: >90 ML/MIN/1.73M2
GLUCOSE SERPL-MCNC: 92 MG/DL (ref 70–99)
POTASSIUM SERPL-SCNC: 4.9 MMOL/L (ref 3.4–5.3)
SODIUM SERPL-SCNC: 134 MMOL/L (ref 136–145)

## 2023-04-17 PROCEDURE — 36415 COLL VENOUS BLD VENIPUNCTURE: CPT | Performed by: FAMILY MEDICINE

## 2023-04-17 PROCEDURE — 82306 VITAMIN D 25 HYDROXY: CPT | Performed by: FAMILY MEDICINE

## 2023-04-17 PROCEDURE — 80048 BASIC METABOLIC PNL TOTAL CA: CPT | Performed by: FAMILY MEDICINE

## 2023-04-17 PROCEDURE — 99214 OFFICE O/P EST MOD 30 MIN: CPT | Performed by: FAMILY MEDICINE

## 2023-04-17 RX ORDER — CEFPODOXIME PROXETIL 100 MG/1
100 TABLET, FILM COATED ORAL 2 TIMES DAILY
Qty: 6 TABLET | Refills: 0 | Status: SHIPPED | OUTPATIENT
Start: 2023-04-17 | End: 2023-05-01

## 2023-04-17 RX ORDER — CALCITONIN SALMON 200 [IU]/.09ML
1 SPRAY, METERED NASAL DAILY
Qty: 2.7 ML | Refills: 0 | Status: SHIPPED | OUTPATIENT
Start: 2023-04-17 | End: 2023-05-17

## 2023-04-17 RX ORDER — CHOLECALCIFEROL (VITAMIN D3) 50 MCG
1 TABLET ORAL DAILY
Qty: 90 TABLET | Refills: 3 | COMMUNITY
Start: 2023-04-17

## 2023-04-17 ASSESSMENT — PAIN SCALES - GENERAL: PAINLEVEL: MODERATE PAIN (4)

## 2023-04-17 NOTE — PATIENT INSTRUCTIONS
Follow up with Ortho Spine.     Continue to use Tylenol and ibuprofen for pain.     Start on calcitonin, alternating nostrils daily.     Start taking vitamin D 2000 units daily.   Start taking calcium supplementation of 1000 mg daily.     Follow up in 3 weeks for recheck.

## 2023-04-17 NOTE — TELEPHONE ENCOUNTER
Reason for visit: prostate biopsy      Dx/Hx/Sx: elevated PSA    Records/imaging/labs/orders: MRI in [facility]     At Rooming: MRI guided (PIRADS-5, 1 lesion), gent, collect urine    Called patient to go over prep for biopsy including:      No blood thinning medications for 7 days before procedure, including aspirin, anticoagulants, ibuprofen and fish oil.       prophylactic antibiotics sent to primary pharmacy listed in chart:   -take the day before, the day of and the day after the procedure, one tablet, two times daily.      Complete a Fleets enema approximately 2 hours before the scheduled procedure.      Do not come to the appointment fasted.  
DISPLAY PLAN FREE TEXT

## 2023-04-17 NOTE — CONFIDENTIAL NOTE
Patient's wife called to cancel prostate biopsy scheduled for next week, because patient has a compression fracture in his thoracic spine and cannot lie comfortably for long periods of time.  Patient will call this author back after meeting with reta Hassan RN  RN Care Coordinator - Urology

## 2023-04-17 NOTE — PROGRESS NOTES
Assessment & Plan     Closed compression fracture of L4 lumbar vertebra, with routine healing, subsequent encounter  Closed compression fracture of L3 lumbar vertebra, with routine healing, subsequent encounter  -- Compression fracture from lifting.   -- Check Vitamin D level today.  -- start on calcium, vitamin D supplementation.   -- Proceed with Calcitonin for 1 month.   -- Follow up with Ortho Spine for next steps and cares.   -- Follow up with myself in 3 weeks.   -- Consider starting bisphosphonate at follow up visit.   - calcitonin, salmon, (MIACALCIN) 200 UNIT/ACT nasal spray  Dispense: 2.7 mL; Refill: 0  - Vitamin D Deficiency  - vitamin D3 (CHOLECALCIFEROL) 50 mcg (2000 units) tablet  Dispense: 90 tablet; Refill: 3  - Spine  Referral      Age-related osteoporosis with current pathological fracture, vertebra(e), subsequent encounter for fracture with routine healing  - Vitamin D Deficiency    Hyponatremia  Recheck BMP   - Basic metabolic panel  (Ca, Cl, CO2, Creat, Gluc, K, Na, BUN)    Elevated prostate specific antigen (PSA)  Scheduled to proceed with Prostate biopsy. Last PSA of 10.84.       Need for shingles vaccine  - zoster vaccine recombinant adjuvanted (SHINGRIX) injection  Dispense: 0.5 mL; Refill: 0           MED REC REQUIRED  Post Medication Reconciliation Status: discharge medications reconciled and changed, per note/orders    Follow up with Ortho Spine    Follow up with myself in 3 weeks.       Jamie Rutledge DO  [facility] JOANA Parmar is a 67 year old, presenting for the following health issues:  ER F/U (04/06/23)    HPI     ED/UC Followup:    Facility:  [facility] [place]   Date of visit: 4/6/2023  Reason for visit: back pain   Current Status: getting better but still hurts and is slow healing.     Low back pain   Compression fracture   Xray lumbar spine   IMPRESSION: Acute superior endplate compression fracture at L3 with 20% height loss and no  "retropulsion. Question superior endplate compression fracture at L4 with less than 10% height loss. Remaining vertebral body heights are maintained. Normal   alignment. Mild to moderate multilevel disc height loss, greatest at L5-S1. Mild facet hypertrophy at L4-L5 and L5-S1. There is aortic atherosclerosis. 8 mm calcification at the left mid abdomen adjacent to the left L3 transverse process may represent    atherosclerotic calcification or renal/ureteral stone.      Today in clinic:   Using ibuprofen and tylenol which slightly helps.   Continues to have pain in the lower back. Feels tight, achy pain.   Pain much improved from 1 week ago.   No numbness or tingling in the legs.   No weakness in the legs.   No loss of bowel or bladder function.     No current vitamin D or calcium supplementation.     Elevated PSA  Follows with Urology.     Review of Systems   Constitutional, HEENT, cardiovascular, pulmonary, gi and gu systems are negative, except as otherwise noted.      Objective    /80 (BP Location: Right arm, Patient Position: Sitting, Cuff Size: Adult Regular)   Pulse 82   Temp (!) 96.7  F (35.9  C) (Tympanic)   Resp 24   Ht 1.891 m (6' 2.45\")   Wt 76.2 kg (168 lb)   SpO2 97%   BMI 21.31 kg/m    Body mass index is 21.31 kg/m .  Physical Exam   General: alert, cooperative, no acute distress   MSK back: tender to palpation over the L3-L5 region over spinous process. Back ROM is quite limited in flexion and extension due to discomfort. LE sensation intact. LE strength full.       "

## 2023-04-18 DIAGNOSIS — E55.9 VITAMIN D DEFICIENCY: Primary | ICD-10-CM

## 2023-04-25 ENCOUNTER — TELEPHONE (OUTPATIENT)
Dept: FAMILY MEDICINE | Facility: CLINIC | Age: 68
End: 2023-04-25
Payer: COMMERCIAL

## 2023-04-25 NOTE — TELEPHONE ENCOUNTER
Symptoms    Describe your symptoms: patient is still having concerns about constipation, not improving since last directions. Wife reports increase in mood problems and depression.  Back injury improving    Any pain: Yes:     Patient has visit for:  Next 5 appointments (look out 90 days)    May 08, 2023  2:30 PM  (Arrive by 2:10 PM)  Provider Visit with Jamie Rutledge DO  [facility] ([facility] [place] )  Arrive at: Clinic [address] [id number] [phone]           Have you been seen for this:  No    Appointment offered?: No     Preferred Pharmacy:   [facility] - [place] - [address] [address]  [address] [address] [address]  Phone: [phone] Fax: [phone]      Okay to leave a detailed message?: Yes at Home number on file [phone] (home)

## 2023-04-26 NOTE — TELEPHONE ENCOUNTER
S-(situation): contipation    B-(background): on a regimen of miralax full capsule BID and senna daily.  Not using narcotic for back now.  Using tylenol and Ibuprofen.  Does get out for walks.  Drinking water and OJ.  Not having a regular BM.  Getting frustrated with it.  Not having a formed BM.  Not diarrhea.  No blood noted.  Goes a couple times a day    A-(assessment): costipation    R-(recommendations): dried prune, apricots or layla.  Prune juice, pear juice or apple juice.  Increase the fuirts and veges in diet.   Rescheduled next appt as his prostate bx has been scheduled. Priscilla GONZALEZ RN

## 2023-05-01 RX ORDER — CEFPODOXIME PROXETIL 100 MG/1
100 TABLET, FILM COATED ORAL 2 TIMES DAILY
Qty: 6 TABLET | Refills: 0 | Status: SHIPPED | OUTPATIENT
Start: 2023-05-01 | End: 2023-05-04

## 2023-05-08 ENCOUNTER — OFFICE VISIT (OUTPATIENT)
Dept: UROLOGY | Facility: CLINIC | Age: 68
End: 2023-05-08
Payer: COMMERCIAL

## 2023-05-08 VITALS
WEIGHT: 175 LBS | DIASTOLIC BLOOD PRESSURE: 93 MMHG | SYSTOLIC BLOOD PRESSURE: 171 MMHG | HEIGHT: 74 IN | BODY MASS INDEX: 22.46 KG/M2 | HEART RATE: 72 BPM

## 2023-05-08 DIAGNOSIS — R97.20 ELEVATED PROSTATE SPECIFIC ANTIGEN (PSA): Primary | ICD-10-CM

## 2023-05-08 PROCEDURE — 88305 TISSUE EXAM BY PATHOLOGIST: CPT | Mod: TC | Performed by: UROLOGY

## 2023-05-08 PROCEDURE — G0416 PROSTATE BIOPSY, ANY MTHD: HCPCS | Mod: 26 | Performed by: PATHOLOGY

## 2023-05-08 PROCEDURE — 76999 ECHO EXAMINATION PROCEDURE: CPT | Performed by: UROLOGY

## 2023-05-08 PROCEDURE — 76872 US TRANSRECTAL: CPT | Performed by: UROLOGY

## 2023-05-08 PROCEDURE — 55700 PR BIOPSY OF PROSTATE,NEEDLE/PUNCH: CPT | Performed by: UROLOGY

## 2023-05-08 RX ORDER — GENTAMICIN 40 MG/ML
80 INJECTION, SOLUTION INTRAMUSCULAR; INTRAVENOUS ONCE
Status: COMPLETED | OUTPATIENT
Start: 2023-05-08 | End: 2023-05-08

## 2023-05-08 RX ADMIN — GENTAMICIN 80 MG: 40 INJECTION, SOLUTION INTRAMUSCULAR; INTRAVENOUS at 09:38

## 2023-05-08 ASSESSMENT — PAIN SCALES - GENERAL: PAINLEVEL: NO PAIN (0)

## 2023-05-08 NOTE — PROGRESS NOTES
Reason for visit: MRI ultrasound fusion prostate biopsy    Indications: Mr. Rizzo is a 67 year old -year-old gentleman followed in clinic for elevated PSA. He presents today for  MRI/ultrasound fusion prostate biopsy.    Procedure: After informed consent was obtained and after confirming the patient has been off aspirin or aspirin like products for a week, took his antibiotics and perform his enema, the patient was placed left side down on the procedure table. Digital rectal exam was performed revealing a normal feeling prostate. The ultrasound probe was lubricated and placed into the patient's rectum without difficulty. Inspection of the prostate revealed a few calcifications, but no obvious hypoechoic regions.   Next 5 ccs of lidocaine were injected at the junction of the prostate and the seminal vesicles bilaterally.  Measurement of the prostate were then obtained revealing a volume of 37 ccs.  We then performed an ultrasound sweep of the prostate. These post-processed images were then utilized by the UroNav software to create a 3-D prostate volume.  These images were then overlaid on the patient's MRI from 3/9/23 and MRI/ ultrasound fusion was performed. We then obtained 2 cores from target #1, a lesion in the right mid TZ 11 oclock.  We then obtained another 12 cores in the standard sextant distribution with an additional core each from the left and right transition zones for a total of 16 cores obtained. The patient tolerated the procedure without difficulty.    The patient was counseled he could expect to see blood in his urine, semen, and stool for the next several days and should contact us should he develop any fevers chills or sweats. We'll see the patient back in a week to go over the results of his biopsy.

## 2023-05-08 NOTE — PATIENT INSTRUCTIONS
[facility]  Precautions Following a Prostate Biopsy    There are four conditions that you should watch for after a prostate biopsy:    Excessive pain  Bleeding irregularities (passing numerous  dime sized  clots or if your urine looks like cranberry juice)  Fever of 100 degrees or more  If you are unable to urinate      If any of these occur, call the Urology Clinic during normal business hours (M-F, 8:00-4:30) at [phone].  If you experience a problem after normal business hours, call our 24-hour phone number at [phone] and ask for the Urology Resident on call to be paged.    If you experience any discomfort following the biopsy, you may take Tylenol.  DO NOT TAKE ASPIRIN unless specified by your physician.   If the discomfort becomes severe or uncontrolled by medication, contact the Urology Clinic or Urology Resident (after normal business hours).    Do not be alarmed if you have some blood in your stool, in your urine, or ejaculate (semen).  This occurrence is normal and may last up to three (3) or four (4) days, usually intermittently.  Blood in the ejaculate (semen) may last several weeks, up to about a dozen ejaculations.  The blood in your ejaculate may appear as brown streaks, blood tinged, and immediately following a biopsy, it may appear bright red.    If you run a fever above 100 degrees, call the Urology Clinic or Urology Resident (after normal business hours) immediately.  If you are unable to reach your physician or the Resident on call, go to the nearest emergency room.  Explain that you have had a transrectal biopsy of your prostate and what problems you are experiencing.      You should attempt to urinate following your biopsy before you leave the clinic.  If you are unable to urinate four (4) to six (6) hours after you leave the clinic, you will need to contact the Urology Clinic or the Resident on call.  If you are unable to reach your physician or the  Resident on call, go to the nearest emergency room.            If you have any questions or concerns after your biopsy, feel free to contact the Urology Clinic at [phone] during M-F, 8:00-5pm business hours.  If you need to speak with someone after normal business hours, call [phone] and ask for the Resident on call to be paged.

## 2023-05-08 NOTE — NURSING NOTE
"Chief Complaint   Patient presents with     Prostate Biopsy       Blood pressure (!) 171/93, pulse 72, height 1.88 m (6' 2\"), weight 79.4 kg (175 lb). Body mass index is 22.47 kg/m .    Patient Active Problem List   Diagnosis     Hyperlipidemia LDL goal <130     Tobacco abuse     Advanced directives, counseling/discussion     Abdominal pain, generalized     Cirrhosis of liver without ascites (H)     Proctitis     Major depressive disorder, recurrent episode, moderate (H)     Other irritable bowel syndrome     Screening for hypertension     Elevated prostate specific antigen (PSA)     Cecal volvulus (H)     Saccular aneurysm     Benign essential hypertension       Allergies   Allergen Reactions     Levaquin [Levofloxacin] Nausea and Vomiting     Percocet [Oxycodone-Acetaminophen] Visual Disturbance     hallucinations       Current Outpatient Medications   Medication Sig Dispense Refill     acetaminophen (TYLENOL) 500 MG tablet Take 1,000 mg by mouth every 6 hours as needed for mild pain       calcitonin, salmon, (MIACALCIN) 200 UNIT/ACT nasal spray Spray 1 spray into one nostril alternating nostrils daily for 30 days Alternate nostril each day. 2.7 mL 0     glycerin (LAXATIVE) 1.2 g suppository Place 1 suppository rectally daily as needed (constipation.) 25 suppository 1     lisinopril (ZESTRIL) 10 MG tablet Take 1 tablet (10 mg) by mouth daily 90 tablet 3     polyethylene glycol (MIRALAX) 17 GM/Dose powder Take 17 g (1 capful.) by mouth daily 578 g 1     senna-docusate (NEGRITO-COLACE) 8.6-50 MG per tablet Take 1-2 tablets by mouth 2 times daily as needed for constipation. 100 tablet 5     sertraline (ZOLOFT) 50 MG tablet TAKE 1 & 1/2 TABLETS BY MOUTH EVERY  tablet 3     vitamin D3 (CHOLECALCIFEROL) 50 mcg (2000 units) tablet Take 1 tablet (50 mcg) by mouth daily 90 tablet 3       Social History     Tobacco Use     Smoking status: Every Day     Packs/day: 0.50     Years: 50.00     Pack years: 25.00     Types: " Cigarettes     Smokeless tobacco: Never     Tobacco comments:     started again 2010   Vaping Use     Vaping status: Never Used     Passive vaping exposure: Yes   Substance Use Topics     Alcohol use: Yes     Comment: 1-2 beers daily     Drug use: No       Invasive Procedure Safety Checklist:    Procedure: MRI fusion TRUS prostate biopsy    Action: Complete sections and checkboxes as appropriate.    Pre-procedure:  1. Patient ID Verified with 2 identifiers (Maisha and  or MRN) : YES    2. Procedure and site verified with patient/designee (when able) : YES    3. Accurate consent documentation in medical record : YES    4. H&P (or appropriate assessment) documented in medical record : N/A  H&P must be up to 30 days prior to procedure an updated within 24 hours of                 Procedure as applicable.     5. Relevant diagnostic and radiology test results appropriately labeled and displayed as applicable : YES    6. Blood products, implants, devices, and/or special equipment available for the procedure as applicable : YES    7. Procedure site(s) marked with provider initials [Exclusions: none] : NO    8. Marking not required. Reason : Yes  Procedure does not require site marking    Time Out:     Time-Out performed immediately prior to starting procedure, including verbal and active participation of all team members addressing: YES    1. Correct patient identity.  2. Confirmed that the correct side and site are marked.  3. An accurate procedure to be done.  4. Agreement on the procedure to be done.  5. Correct patient position.  6. Relevant images and results are properly labeled and appropriately displayed.  7. The need to administer antibiotics or fluids for irrigation purposes during the procedure as applicable.  8. Safety precautions based on patient history or medication use.    During Procedure: Verification of correct person, site, and procedure occurs any time the responsibility for care of the patient is  transferred to another member of the care team.      The following medication was given:     MEDICATION:  Lidocaine without epinephrine 1%  ROUTE: administered by physician - prostate nerve block   SITE: administered by physician - prostate nerve block    DOSE: 10 mL  LOT #: [id number]  : [employer]  EXPIRATION DATE: 2/26  NDC#: 05995-197-95   Was there drug waste? Yes  Amount of drug waste (mL): 20 mL.  Reason for waste:  Single use vial    Prior to injection, verified patient identity using patient's name and date of birth.  Due to injection administration, patient instructed to remain in clinic for 15 minutes  afterwards, and to report any adverse reaction to me immediately.    Drug Amount Wasted:  Yes: 20 mL (10 mg/ml )  Vial/Syringe: Single dose vial    Alanis Renteria  5/8/2023  9:34 AM

## 2023-05-08 NOTE — LETTER
5/8/2023       RE: Ghulam Rizzo  [address] [place] [id number]     Dear Colleague,    Thank you for referring your patient, Ghulam Rizzo, to the [facility] [place] [facility]. Please see a copy of my visit note below.    The following medication was given:     MEDICATION:  Gentamicin  ROUTE: IM  SITE: Deltoid - Right  DOSE: 80 mg  LOT #: [id number]  : [employer]  EXPIRATION DATE: 06/24  NDC#: 8256310   Was there drug waste? No  Multi-dose vial: No    Ej Hassan RN  May 8, 2023    Reason for visit: MRI ultrasound fusion prostate biopsy    Indications: Mr. Rizzo is a 67 year old -year-old gentleman followed in clinic for elevated PSA. He presents today for  MRI/ultrasound fusion prostate biopsy.    Procedure: After informed consent was obtained and after confirming the patient has been off aspirin or aspirin like products for a week, took his antibiotics and perform his enema, the patient was placed left side down on the procedure table. Digital rectal exam was performed revealing a normal feeling prostate. The ultrasound probe was lubricated and placed into the patient's rectum without difficulty. Inspection of the prostate revealed a few calcifications, but no obvious hypoechoic regions.   Next 5 ccs of lidocaine were injected at the junction of the prostate and the seminal vesicles bilaterally.  Measurement of the prostate were then obtained revealing a volume of 37 ccs.  We then performed an ultrasound sweep of the prostate. These post-processed images were then utilized by the UroNav software to create a 3-D prostate volume.  These images were then overlaid on the patient's MRI from 3/9/23 and MRI/ ultrasound fusion was performed. We then obtained 2 cores from target #1, a lesion in the right mid TZ 11 oclock.  We then obtained another 12 cores in the standard sextant distribution with an additional core  each from the left and right transition zones for a total of 16 cores obtained. The patient tolerated the procedure without difficulty.    The patient was counseled he could expect to see blood in his urine, semen, and stool for the next several days and should contact us should he develop any fevers chills or sweats. We'll see the patient back in a week to go over the results of his biopsy.       Again, thank you for allowing me to participate in the care of your patient.      Sincerely,    Chase Blount MD

## 2023-05-09 ENCOUNTER — OFFICE VISIT (OUTPATIENT)
Dept: FAMILY MEDICINE | Facility: CLINIC | Age: 68
End: 2023-05-09
Payer: COMMERCIAL

## 2023-05-09 VITALS
WEIGHT: 164 LBS | DIASTOLIC BLOOD PRESSURE: 70 MMHG | HEIGHT: 73 IN | TEMPERATURE: 96.8 F | RESPIRATION RATE: 24 BRPM | OXYGEN SATURATION: 98 % | BODY MASS INDEX: 21.74 KG/M2 | HEART RATE: 83 BPM | SYSTOLIC BLOOD PRESSURE: 120 MMHG

## 2023-05-09 DIAGNOSIS — R42 DIZZINESS: Primary | ICD-10-CM

## 2023-05-09 DIAGNOSIS — I10 BENIGN ESSENTIAL HYPERTENSION: ICD-10-CM

## 2023-05-09 DIAGNOSIS — S32.030D COMPRESSION FRACTURE OF L3 LUMBAR VERTEBRA, WITH ROUTINE HEALING, SUBSEQUENT ENCOUNTER: ICD-10-CM

## 2023-05-09 DIAGNOSIS — R97.20 ELEVATED PROSTATE SPECIFIC ANTIGEN (PSA): ICD-10-CM

## 2023-05-09 DIAGNOSIS — S32.040D COMPRESSION FRACTURE OF L4 VERTEBRA WITH ROUTINE HEALING, SUBSEQUENT ENCOUNTER: ICD-10-CM

## 2023-05-09 PROBLEM — S32.040A COMPRESSION FRACTURE OF L4 VERTEBRA (H): Status: ACTIVE | Noted: 2023-05-09

## 2023-05-09 PROCEDURE — 99214 OFFICE O/P EST MOD 30 MIN: CPT | Performed by: FAMILY MEDICINE

## 2023-05-09 RX ORDER — ALENDRONATE SODIUM 70 MG/1
70 TABLET ORAL
Qty: 52 TABLET | Refills: 0 | Status: CANCELLED | OUTPATIENT
Start: 2023-05-09

## 2023-05-09 ASSESSMENT — PAIN SCALES - GENERAL: PAINLEVEL: NO PAIN (0)

## 2023-05-09 NOTE — PATIENT INSTRUCTIONS
Stop the Lisinopril.     Continue to monitor blood pressure at home.     Follow up in 1 week for recheck of symptoms and follow up on blood pressure.           HOW TO CHECK YOUR BLOOD PRESSURE AT HOME:      Avoid eating, smoking, and exercising for at least 30 minutes before taking a reading.     Be sure you have taken your BP medication at least 2-3 hours before you check it.      Sit quietly for 10 minutes before a reading.      Sit in a chair with your feet flat on the floor. Rest your  arm on a table so that the arm cuff is at the same level as your heart.     Remain still during the reading.     Record your blood pressure and pulse in a log and bring to your next appointment.

## 2023-05-09 NOTE — PROGRESS NOTES
Assessment & Plan     Dizziness  Benign essential hypertension  Continues to have issues with dizziness. Prior MRA brain/neck, MR Brain unremarkable.   Holter monitor with 6 runs of SVT longest being 17 beats.   -- Having episodes of dizziness multiple times per day. Symptoms worse with going from sitting to standing.   -- On Lisinopril 10 mg daily.   -- Plan to stop lisinopril to see if this is culprit for dizziness.   -- Monitor BP at home.   -- Follow up in 1 week for BP recheck and recheck of symptoms.   -- Has Neurology appt 9/7/2023    Compression fracture of L4 vertebra with routine healing, subsequent encounter  Compression fracture of L3 lumbar vertebra, with routine healing, subsequent encounter  Scheduled to follow up with Ortho Spine 6/1  Continue on calcitonin.   Consider fosamax in the future.     Elevated prostate specific antigen (PSA)  Recently underwent prostate biopsy on 5/8.   Follows with Urology.     The risks, benefits and treatment options of prescribed medications or other treatments have been discussed with the patient. The patient verbalized their understanding and should call or follow up if no improvement or if they develop further problems.    Follow up in 1 week.     >30 minutes spent on the date of the encounter doing chart review, history and exam, documentation and further activities as noted above      Jamie Rutledge [facility]    Lamar Parmar is a 67 year old, presenting for the following health issues:  Dizziness                     History of Present Illness       Reason for visit:  Dizziness is getting worse    He eats 0-1 servings of fruits and vegetables daily.He consumes 6 sweetened beverage(s) daily.He exercises with enough effort to increase his heart rate 9 or less minutes per day.  He exercises with enough effort to increase his heart rate 3 or less days per week.   He is taking medications regularly.       67-year-old male who presents  "to clinic for follow-up of compression fracture found on x-ray on 4/6/2023 seen in clinic on 4/17 started on calcium, vitamin D supplementation and also calcitonin.  Instructed to follow-up with orthospine.      Vitamin D testing low at 19 on 4/17.     Today in clinic:    Continues to have issues of dizziness  Symptoms are worse when going from a sitting to standing position.   Symptoms can also occur when just sitting resting.   Recent holter monitor over 7 days showed 6 runs of SVT. He reports having multiple episodes of dizziness daily so unlikely to be attributed to this.       Recent MR brain, MRA neck and brain     MRA Brain  IMPRESSION:  1. No high-grade stenosis or large vessel occlusion identified  involving the major intracranial arteries.  2. 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    MRA neck  IMPRESSION:  1. Mildly limited evaluation of the origins of the great vessels and  the origin/proximal V1 segment of the right vertebral artery due to  motion artifacts.  2. Within that limitation, no significant stenosis, occlusion, or  dissection identified involving the cervical carotid or vertebral  arteries.      MR Brain   IMPRESSION:  1. No acute intracranial process.  2. Brain atrophy and presumed chronic small vessel ischemic change, as  described.  3. Unchanged prominent retrocerebellar arachnoid cyst.        Holter monitor   7-day Zio patch showed supraventricular tachycardia 6 episodes. with fastest episode 139 bpm lasting 17 beats and also longest episode of 17 beats    Review of Systems   Constitutional, HEENT, cardiovascular, pulmonary, gi and gu systems are negative, except as otherwise noted.      Objective    /70 (BP Location: Right arm, Patient Position: Chair, Cuff Size: Adult Regular)   Pulse 83   Temp 96.8  F (36  C) (Tympanic)   Resp 24   Ht 1.848 m (6' 0.75\") "   Wt 74.4 kg (164 lb)   SpO2 98%   BMI 21.79 kg/m    Body mass index is 21.79 kg/m .  Physical Exam   General: alert, cooperative, no acute distress   CV: RRR, no murmur  Resp: non-labored breathing, clear to auscultation, no wheezing or rales   Extremities: No peripheral edema, calves non-tender.   Neuro: CN II-XII grossly intact. No focal deficits. Strength and sensation in upper and lower extremities appropriate.

## 2023-05-10 ENCOUNTER — PRE VISIT (OUTPATIENT)
Dept: UROLOGY | Facility: CLINIC | Age: 68
End: 2023-05-10
Payer: COMMERCIAL

## 2023-05-10 LAB
PATH REPORT.COMMENTS IMP SPEC: ABNORMAL
PATH REPORT.COMMENTS IMP SPEC: ABNORMAL
PATH REPORT.COMMENTS IMP SPEC: YES
PATH REPORT.FINAL DX SPEC: ABNORMAL
PATH REPORT.GROSS SPEC: ABNORMAL
PATH REPORT.MICROSCOPIC SPEC OTHER STN: ABNORMAL
PATH REPORT.RELEVANT HX SPEC: ABNORMAL
PHOTO IMAGE: ABNORMAL

## 2023-05-10 NOTE — TELEPHONE ENCOUNTER
Reason for visit: biopsy results      Dx/Hx/Sx: elevated PSA    Records/imaging/labs/orders: pathology in epic    At Rooming: video visit

## 2023-05-15 ENCOUNTER — VIRTUAL VISIT (OUTPATIENT)
Dept: UROLOGY | Facility: CLINIC | Age: 68
End: 2023-05-15
Payer: COMMERCIAL

## 2023-05-15 DIAGNOSIS — R97.20 ELEVATED PROSTATE SPECIFIC ANTIGEN (PSA): Primary | ICD-10-CM

## 2023-05-15 PROCEDURE — 99207 PR NO CHARGE LOS: CPT | Performed by: UROLOGY

## 2023-05-15 NOTE — LETTER
5/15/2023       RE: Ghulam Rizzo  [address] [place] [phone]     Dear Colleague,    Thank you for referring your patient, Ghulam Rizzo, to the [facility]OLOG[facility]. Please see a copy of my visit note below.    Virtual Visit Details  ATTEMPTED PHONE VISIT:  Phone call went straight to .  Left  that I called.      Type of service:  Telephone Visit   Phone call duration: 0 minutes     CC: prostate biopsy results    HPI: 68 yo male s/p prostate biopsy 5/8/23.      OBJECTIVE:   Pathology from 5/8/23 show Gl 4+3=7 in right mid TZ 11 oclock and right TZ systematic biopsy    ASSESSMENT AND PLAN: Patient's phone went straight to .  No visit conducted today.  Will reschedule.      Again, thank you for allowing me to participate in the care of your patient.      Sincerely,    Chase Blount MD

## 2023-05-15 NOTE — PROGRESS NOTES
Virtual Visit Details  ATTEMPTED PHONE VISIT:  Phone call went straight to .  Left VM that I called.      Type of service:  Telephone Visit   Phone call duration: 0 minutes     CC: prostate biopsy results    HPI: 68 yo male s/p prostate biopsy 5/8/23.      OBJECTIVE:   Pathology from 5/8/23 show Gl 4+3=7 in right mid TZ 11 oclock and right TZ systematic biopsy    ASSESSMENT AND PLAN: Patient's phone went straight to .  No visit conducted today.  Will reschedule.

## 2023-05-15 NOTE — NURSING NOTE
Is the patient currently in the state of [place]? YES    Visit mode:TELEPHONE    If the visit is dropped, the patient can be reconnected by: TELEPHONE VISIT: Phone number: [phone]    Will anyone else be joining the visit? NO      How would you like to obtain your AVS? Mail a copy    Are changes needed to the allergy or medication list? NO    Reason for visit: Video Visit (Path review)

## 2023-05-16 ENCOUNTER — OFFICE VISIT (OUTPATIENT)
Dept: FAMILY MEDICINE | Facility: CLINIC | Age: 68
End: 2023-05-16
Payer: COMMERCIAL

## 2023-05-16 VITALS
WEIGHT: 168 LBS | OXYGEN SATURATION: 100 % | RESPIRATION RATE: 20 BRPM | HEART RATE: 79 BPM | SYSTOLIC BLOOD PRESSURE: 136 MMHG | BODY MASS INDEX: 22.26 KG/M2 | TEMPERATURE: 98.3 F | DIASTOLIC BLOOD PRESSURE: 80 MMHG | HEIGHT: 73 IN

## 2023-05-16 DIAGNOSIS — R42 DIZZINESS: Primary | ICD-10-CM

## 2023-05-16 DIAGNOSIS — C61 ADENOCARCINOMA OF PROSTATE (H): ICD-10-CM

## 2023-05-16 DIAGNOSIS — S32.040D COMPRESSION FRACTURE OF L4 VERTEBRA WITH ROUTINE HEALING, SUBSEQUENT ENCOUNTER: ICD-10-CM

## 2023-05-16 DIAGNOSIS — S32.030D COMPRESSION FRACTURE OF L3 LUMBAR VERTEBRA, WITH ROUTINE HEALING, SUBSEQUENT ENCOUNTER: ICD-10-CM

## 2023-05-16 DIAGNOSIS — I10 BENIGN ESSENTIAL HYPERTENSION: ICD-10-CM

## 2023-05-16 PROCEDURE — 99214 OFFICE O/P EST MOD 30 MIN: CPT | Performed by: FAMILY MEDICINE

## 2023-05-16 ASSESSMENT — PAIN SCALES - GENERAL: PAINLEVEL: SEVERE PAIN (6)

## 2023-05-16 NOTE — PROGRESS NOTES
Assessment & Plan     Dizziness  Benign essential hypertension  Improved after stopping Lisinopril.   Continue to stay off Lisinopril  BP is lower in the afternoon and correlates to when having dizzy spells.   Encouraged staying well hydrated and monitoring blood pressure at home.       Adenocarcinoma of prostate (H)  Compression fracture L3 and L4 vertebra  Discussed recent results with patient regarding most recent prostate biopsy results. Discussed will need to follow up with Urology for next steps and cares. With his recent compression fractures quite concerning for potential metastatic disease. Will proceed with PET scan at this time for futher evaluation.     - Adult Urology  Referral  - PET Oncology (Eyes to Thighs)      Follow up 2 weeks or sooner if needed.     31 minutes spent on the date of the encounter doing chart review, history and exam, documentation and further activities as noted above      Jamie Rutledge DO  [facility]    Lamar Parmar is a 67 year old, presenting for the following health issues:  Hypertension and Dizziness        5/16/23 3:36 PM   Additional Questions   Roomed by Shabana DRAKE        67-year-old male presents to clinic for follow-up on hypertension and also dizziness      Hypertension:  Lisinopril stopped on 5/9/2023 to see if this is potential culprit for his dizziness.    Today in clinic:   Checking blood pressures at home.   BP in the morning is 114-135 systolic, diastolic 71-84   BP in the afternoon systolic , diastolic 50's.   Drinking about 32 ounces of water per day.   Drinks 1 cup of coffee in the morning.   Alcohol 2 beers per day at the most other days won't drink any.     He reports the dizziness has improved since stopping the lisinopril. Having less frequent dizziness.       Recent prostate biopsy  Missed virtual appt yesterday. It appears call was attempted but went straight to voicemail.   Discussed recent biopsy results today  "in clinic with patient.   Discussed will need to follow up with Urology regarding this for next steps and new referral placed as his appt on 5/22 was cancelled.     Compression fractures.   Scheduled to see Spine on 6/1.   On calcium and vitamin D.   Quite concerning for potential metastatic disease causing compression fractures with recent prostate biopsy results.      Review of Systems   Constitutional, HEENT, cardiovascular, pulmonary, gi and gu systems are negative, except as otherwise noted.      Objective    BP (!) 140/80 (BP Location: Left arm, Patient Position: Chair, Cuff Size: Adult Regular)   Pulse 79   Temp 98.3  F (36.8  C) (Tympanic)   Resp 20   Ht 1.848 m (6' 0.75\")   Wt 76.2 kg (168 lb)   SpO2 100%   BMI 22.32 kg/m    Body mass index is 22.32 kg/m .  Physical Exam   General: alert, cooperative, no acute distress   CV: RRR, no murmur  Resp: non-labored breathing,   Abdomen: Soft, non-tender, no guarding.         "

## 2023-05-16 NOTE — PATIENT INSTRUCTIONS
Schedule appt with Urology.     Continue to stay off the Lisinopril medication.     Prostate biopsy results    Schedule PET scan.     Follow up in 2 weeks.       Final Diagnosis   A.  Prostate, target 1, right mid TZ 11:00, biopsy:  - Prostatic adenocarcinoma  - Zion score 7 (4+3)  - Grade group 3  - Extent: Involves 1 of 2 cores (6 mm, 60%)    B.  Prostate, left base, biopsy:  - Benign prostate tissue    C.  Prostate, left mid, biopsy:  - Benign prostate tissue    D.  Prostate, left apex, biopsy:  - Benign prostate tissue    E.  Prostate, right base, biopsy:  - Benign prostate tissue    F.  Prostate, right mid, biopsy:  - Benign prostate tissue    G.  Prostate, right apex, biopsy:  - High-grade prostatic intraepithelial neoplasia (HGPIN)    H.  Prostate, left transition zone, biopsy:  - Benign prostate tissue    I.  Prostate, right transition zone, biopsy:  - Prostatic adenocarcinoma  - Zoin score 7 (4+3)  - Grade group 3  - Extent: Involves 1 of 1 core (3 mm, 25%)

## 2023-05-17 ENCOUNTER — TELEPHONE (OUTPATIENT)
Dept: FAMILY MEDICINE | Facility: CLINIC | Age: 68
End: 2023-05-17
Payer: COMMERCIAL

## 2023-05-17 DIAGNOSIS — C61 ADENOCARCINOMA OF PROSTATE (H): Primary | ICD-10-CM

## 2023-05-18 ENCOUNTER — OFFICE VISIT (OUTPATIENT)
Dept: UROLOGY | Facility: CLINIC | Age: 68
End: 2023-05-18
Payer: COMMERCIAL

## 2023-05-18 VITALS — HEART RATE: 72 BPM | RESPIRATION RATE: 16 BRPM | DIASTOLIC BLOOD PRESSURE: 88 MMHG | SYSTOLIC BLOOD PRESSURE: 154 MMHG

## 2023-05-18 DIAGNOSIS — C61 PROSTATE CANCER (H): Primary | ICD-10-CM

## 2023-05-18 PROCEDURE — 99215 OFFICE O/P EST HI 40 MIN: CPT | Performed by: UROLOGY

## 2023-05-18 NOTE — PROGRESS NOTES
"Initial BP (!) 154/88 (BP Location: Left arm, Patient Position: Chair, Cuff Size: Adult Regular)   Pulse 72   Resp 16  Estimated body mass index is 22.32 kg/m  as calculated from the following:    Height as of 5/16/23: 1.848 m (6' 0.75\").    Weight as of 5/16/23: 76.2 kg (168 lb). .    Patient is here for results.  bello dugan LPN    "

## 2023-05-18 NOTE — PROGRESS NOTES
CC: prostate biopsy results    HPI: 66 yo male with elevated PSA s/p prostate biopsy 5/8/23.  Here for results.    OBJECTIVE: Pathology from 5/8/23 shows Gl 4+3=7 in 1/2 cores from the right mid TZ 11 oclock, and Gl 4+3=7 in 25% of 1 core from the right TZ systematic biopsy.    ASSESSMENT AND PLAN:  Over half of today's 45-minute visit was spent counseling the patient regarding his new diagnosis of prostate cancer.  I suggested to the patient that given his PSA between 10-20 ng/mL, normal or minimally abnormal exam and Gl 4+3=7 he has unfavorable intermediate risk prostate cancer.  As such, the patient will require a staging evaluation.  He already had a CT scan a couple of months ago, therefore we will get a bone scan to complete the evaluation.  Assuming no evidence of mets, the patient would be a candidate for observation, definitive therapy including surgery, both open and robotically assisted laparoscopic approaches, as well as radiation therapy in the form of external beam radiation versus brachytherapy versus proton beam therapy.  The patient is not an ideal observation candidate given his disease characteristics and otherwise age and good health.  We discussed risks of surgery including leaking of urine and erectile dysfunction.  I counseled the patient that at a year from surgery 90% of men would be dry and not need to wear any pads and 70% of men in their early to mid 50s with perfect erectile function going in who underwent a bilateral nerve-sparing would be able to have erections sufficient for intercourse with or without the use of Viagra.  The patient does have a history of several other abdominal surgeries which would make surgery more difficult and would carry a higher likelihood of needing to perform the surgery open.  We also discussed risks of radiation therapy including development of irritative urinary symptoms such as urgency, frequency of urination or urgency, frequency of stooling,  chronically loose stools or blood in the stool as well as blood in the urine.  We also discussed erectile dysfunction is a risk of radiation therapy such that 2-3 years following the treatment 50% of men will have new onset or worsening of preexisting erectile dysfunction.  We also discussed rectal toxicity from radiation can be reduced with placement of SpaceOAR.  We discussed that patients can have radiation after surgery, but generally speaking, we do not do it in reverse.  The patient asked many good questions today and these were answered to his satisfaction.  At this point, I suggested the patient meet with Radiation Oncology to hear about radiation options directly from them.  In addition, I have also suggested the patient obtain Dr. Sean Francois's Guide to Surviving Prostate Cancer, which is an excellent resource for all patients with prostate cancer.  We would then plan to see the patient back in 4-6 weeks from now to answer further questions and begin to make some plans at that time.

## 2023-05-19 ENCOUNTER — TELEPHONE (OUTPATIENT)
Dept: UROLOGY | Facility: CLINIC | Age: 68
End: 2023-05-19
Payer: COMMERCIAL

## 2023-05-19 NOTE — TELEPHONE ENCOUNTER
Reviewed Dr. Blount's note from yesterday's visit:     I suggested to the patient that given his PSA between 10-20 ng/mL, normal or minimally abnormal exam and Gl 4+3=7 he has unfavorable intermediate risk prostate cancer.  As such, the patient will require a staging evaluation.  He already had a CT scan a couple of months ago, therefore we will get a bone scan to complete the evaluation.     Order was placed for an NM bone scan whole body.    Called and spoke with Chacha and informed her another CT was NOT needed. Gave her the scheduling # to set up an appointment for the bone scan.    No further questions.    Kelli Lemus RN on 5/19/2023 at 11:25 AM

## 2023-05-19 NOTE — TELEPHONE ENCOUNTER
Reason for Call:  Other call back    Detailed comments: Patient's wife calling needing clarification on orders from Dr. Blount. Wife states she thought he was to only get the CT scan and skip the PET scan. Wife states that she was contacted this morning regarding the PET scan. Please call to clarify.     Phone Number Patient can be reached at: Home number on file [phone] (home)    Best Time: any    Can we leave a detailed message on this number? YES    Call taken on 5/19/2023 at 10:46 AM by Ester Trinidad

## 2023-05-30 ENCOUNTER — OFFICE VISIT (OUTPATIENT)
Dept: FAMILY MEDICINE | Facility: CLINIC | Age: 68
End: 2023-05-30
Payer: COMMERCIAL

## 2023-05-30 VITALS
SYSTOLIC BLOOD PRESSURE: 136 MMHG | HEART RATE: 90 BPM | HEIGHT: 73 IN | TEMPERATURE: 99.2 F | WEIGHT: 163.8 LBS | RESPIRATION RATE: 16 BRPM | BODY MASS INDEX: 21.71 KG/M2 | DIASTOLIC BLOOD PRESSURE: 76 MMHG | OXYGEN SATURATION: 97 %

## 2023-05-30 DIAGNOSIS — C61 ADENOCARCINOMA OF PROSTATE (H): ICD-10-CM

## 2023-05-30 DIAGNOSIS — I10 BENIGN ESSENTIAL HYPERTENSION: Primary | ICD-10-CM

## 2023-05-30 PROCEDURE — 99213 OFFICE O/P EST LOW 20 MIN: CPT | Performed by: FAMILY MEDICINE

## 2023-05-30 ASSESSMENT — PAIN SCALES - GENERAL: PAINLEVEL: NO PAIN (0)

## 2023-05-30 NOTE — PATIENT INSTRUCTIONS
HOW TO CHECK YOUR BLOOD PRESSURE AT HOME:      Avoid eating, smoking, and exercising for at least 30 minutes before taking a reading.     Be sure you have taken your BP medication at least 2-3 hours before you check it.      Sit quietly for 10 minutes before a reading.      Sit in a chair with your feet flat on the floor. Rest your  arm on a table so that the arm cuff is at the same level as your heart.     Remain still during the reading.     Record your blood pressure and pulse in a log and bring to your next appointment.      Continue to monitor BP at home    Follow up with Urology

## 2023-05-30 NOTE — PROGRESS NOTES
Assessment & Plan     Benign essential hypertension  -- episodes of lightheadedness have improved after stopping his lisinopril.  Blood pressure is at the upper limits of normal today.  Advised to continue to monitor blood pressure at home.  If blood pressure is persistently elevated greater than 140/90 he will reach out to the clinic.    Adenocarcinoma of prostate (H)  Follows with Urology.  Has nuclear medicine bone scan on 6/13/2023      The risks, benefits and treatment options of prescribed medications or other treatments have been discussed with the patient. The patient verbalized their understanding and should call or follow up if no improvement or if they develop further problems.      DO ELIZABET Headley [facility] Parmar is a 67 year old, presenting for the following health issues:  Hypertension        5/30/23  3:18PM   Additional Questions   Roomed by Gato MCNEAL     History of Present Illness       Hypertension: He presents for follow up of hypertension.  He does not check blood pressure  regularly outside of the clinic. Outpatient blood pressures have not been over 140/90. He follows a low salt diet.     He eats 0-1 servings of fruits and vegetables daily.He consumes 1 sweetened beverage(s) daily.He exercises with enough effort to increase his heart rate 10 to 19 minutes per day.  He exercises with enough effort to increase his heart rate 7 days per week.   He is taking medications regularly.     67-year-old male recently diagnosed with adenocarcinoma of the prostate who presents to clinic for follow up on blood pressure.    Prostate Cancer  Plans to proceed with NM bone scan on 6/13/2023  Following with Urology.     HTN  Lisinopril stopped due to low bp and episodes of dizziness which he attributes to low blood pressure.   This has improved by continues to have some mild symptoms.   No chest pain   No irregular heat beats.   Has not been monitoring BP at home.  "        Review of Systems       Objective    /76   Pulse 90   Temp 99.2  F (37.3  C) (Tympanic)   Resp 16   Ht 1.842 m (6' 0.5\")   Wt 74.3 kg (163 lb 12.8 oz)   SpO2 97%   BMI 21.91 kg/m    Body mass index is 21.91 kg/m .  Physical Exam   General: alert, cooperative, no acute distress   CV: RRR, no murmur  Resp: non-labored breathing, clear to auscultation, no wheezing or rales   Extremities: No peripheral edema, calves non-tender.         "

## 2023-06-07 ENCOUNTER — TELEPHONE (OUTPATIENT)
Dept: FAMILY MEDICINE | Facility: CLINIC | Age: 68
End: 2023-06-07
Payer: COMMERCIAL

## 2023-06-07 NOTE — TELEPHONE ENCOUNTER
I spoke with pt and wife.    Pt seen last month for dizziness and was told to be seen again if not getting better.    Pt updates the dizziness is worse.  Sees spots now when stands.  Is using extra caution with position changes by steadying self and holding onto items to stand.    Pt had been outdoors a lot recently.  Pt says he does better when he can be active.    Eating and drinking.  Denies nausea or vomiting.    Asks for appt sooner with Dr Rutledge?    Routed to provider.  Ok to use same day?    Shabana Gandhi RN

## 2023-06-07 NOTE — TELEPHONE ENCOUNTER
Reason for Call:  Appointment Request    Patient requesting this type of appt:  Stated at his last appointment in May that if his symptoms did not get better to be seen again. There is nothing until July 18th. They want something sooner.     Requested provider: Jamie Rutledge    Reason patient unable to be scheduled: Not within requested timeframe    When does patient want to be seen/preferred time: 1-2 days    Okay to leave a detailed message?: Yes at Home number on file [phone] (home)    Call taken on 6/7/2023 at 11:30 AM by Clarisa Limon

## 2023-06-08 ENCOUNTER — HOSPITAL ENCOUNTER (EMERGENCY)
Facility: CLINIC | Age: 68
Discharge: HOME | End: 2023-06-08
Attending: EMERGENCY MEDICINE | Admitting: EMERGENCY MEDICINE
Payer: COMMERCIAL

## 2023-06-08 ENCOUNTER — APPOINTMENT (OUTPATIENT)
Dept: CT IMAGING | Facility: CLINIC | Age: 68
End: 2023-06-08
Attending: EMERGENCY MEDICINE
Payer: COMMERCIAL

## 2023-06-08 VITALS
HEART RATE: 59 BPM | BODY MASS INDEX: 21.05 KG/M2 | OXYGEN SATURATION: 100 % | HEIGHT: 74 IN | WEIGHT: 164 LBS | DIASTOLIC BLOOD PRESSURE: 84 MMHG | RESPIRATION RATE: 20 BRPM | SYSTOLIC BLOOD PRESSURE: 173 MMHG | TEMPERATURE: 98.2 F

## 2023-06-08 DIAGNOSIS — R42 DIZZINESS: ICD-10-CM

## 2023-06-08 LAB
ALBUMIN SERPL BCG-MCNC: 4.3 G/DL (ref 3.5–5.2)
ALBUMIN UR-MCNC: NEGATIVE MG/DL
ALP SERPL-CCNC: 81 U/L (ref 40–129)
ALT SERPL W P-5'-P-CCNC: 18 U/L (ref 10–50)
ANION GAP SERPL CALCULATED.3IONS-SCNC: 10 MMOL/L (ref 7–15)
APPEARANCE UR: CLEAR
AST SERPL W P-5'-P-CCNC: 17 U/L (ref 10–50)
BASOPHILS # BLD AUTO: 0 10E3/UL (ref 0–0.2)
BASOPHILS NFR BLD AUTO: 1 %
BILIRUB SERPL-MCNC: 0.6 MG/DL
BILIRUB UR QL STRIP: NEGATIVE
BUN SERPL-MCNC: 9.9 MG/DL (ref 8–23)
CALCIUM SERPL-MCNC: 9.4 MG/DL (ref 8.8–10.2)
CHLORIDE SERPL-SCNC: 94 MMOL/L (ref 98–107)
COLOR UR AUTO: YELLOW
CREAT SERPL-MCNC: 0.7 MG/DL (ref 0.67–1.17)
DEPRECATED HCO3 PLAS-SCNC: 26 MMOL/L (ref 22–29)
EOSINOPHIL # BLD AUTO: 0.1 10E3/UL (ref 0–0.7)
EOSINOPHIL NFR BLD AUTO: 1 %
ERYTHROCYTE [DISTWIDTH] IN BLOOD BY AUTOMATED COUNT: 13.4 % (ref 10–15)
GFR SERPL CREATININE-BSD FRML MDRD: >90 ML/MIN/1.73M2
GLUCOSE SERPL-MCNC: 96 MG/DL (ref 70–99)
GLUCOSE UR STRIP-MCNC: NEGATIVE MG/DL
HCT VFR BLD AUTO: 39.7 % (ref 40–53)
HGB BLD-MCNC: 14 G/DL (ref 13.3–17.7)
HGB UR QL STRIP: NEGATIVE
HOLD SPECIMEN: NORMAL
HOLD SPECIMEN: NORMAL
IMM GRANULOCYTES # BLD: 0 10E3/UL
IMM GRANULOCYTES NFR BLD: 0 %
KETONES UR STRIP-MCNC: NEGATIVE MG/DL
LACTATE SERPL-SCNC: 1.2 MMOL/L (ref 0.7–2)
LEUKOCYTE ESTERASE UR QL STRIP: NEGATIVE
LYMPHOCYTES # BLD AUTO: 1.6 10E3/UL (ref 0.8–5.3)
LYMPHOCYTES NFR BLD AUTO: 33 %
MAGNESIUM SERPL-MCNC: 1.8 MG/DL (ref 1.7–2.3)
MCH RBC QN AUTO: 32.8 PG (ref 26.5–33)
MCHC RBC AUTO-ENTMCNC: 35.3 G/DL (ref 31.5–36.5)
MCV RBC AUTO: 93 FL (ref 78–100)
MONOCYTES # BLD AUTO: 0.4 10E3/UL (ref 0–1.3)
MONOCYTES NFR BLD AUTO: 9 %
MUCOUS THREADS #/AREA URNS LPF: PRESENT /LPF
NEUTROPHILS # BLD AUTO: 2.7 10E3/UL (ref 1.6–8.3)
NEUTROPHILS NFR BLD AUTO: 56 %
NITRATE UR QL: NEGATIVE
NRBC # BLD AUTO: 0 10E3/UL
NRBC BLD AUTO-RTO: 0 /100
PH UR STRIP: 7 [PH] (ref 5–7)
PLATELET # BLD AUTO: 169 10E3/UL (ref 150–450)
POTASSIUM SERPL-SCNC: 4.6 MMOL/L (ref 3.4–5.3)
PROT SERPL-MCNC: 6.6 G/DL (ref 6.4–8.3)
RBC # BLD AUTO: 4.27 10E6/UL (ref 4.4–5.9)
RBC URINE: 1 /HPF
SODIUM SERPL-SCNC: 130 MMOL/L (ref 136–145)
SP GR UR STRIP: 1.01 (ref 1–1.03)
T4 FREE SERPL-MCNC: 1.27 NG/DL (ref 0.9–1.7)
TSH SERPL DL<=0.005 MIU/L-ACNC: 4.27 UIU/ML (ref 0.3–4.2)
UROBILINOGEN UR STRIP-MCNC: NORMAL MG/DL
WBC # BLD AUTO: 4.9 10E3/UL (ref 4–11)
WBC URINE: <1 /HPF

## 2023-06-08 PROCEDURE — 85014 HEMATOCRIT: CPT | Performed by: EMERGENCY MEDICINE

## 2023-06-08 PROCEDURE — 70450 CT HEAD/BRAIN W/O DYE: CPT

## 2023-06-08 PROCEDURE — 84439 ASSAY OF FREE THYROXINE: CPT | Performed by: EMERGENCY MEDICINE

## 2023-06-08 PROCEDURE — 36415 COLL VENOUS BLD VENIPUNCTURE: CPT | Performed by: EMERGENCY MEDICINE

## 2023-06-08 PROCEDURE — 93010 ELECTROCARDIOGRAM REPORT: CPT | Performed by: EMERGENCY MEDICINE

## 2023-06-08 PROCEDURE — 81001 URINALYSIS AUTO W/SCOPE: CPT | Performed by: EMERGENCY MEDICINE

## 2023-06-08 PROCEDURE — 84443 ASSAY THYROID STIM HORMONE: CPT | Performed by: EMERGENCY MEDICINE

## 2023-06-08 PROCEDURE — 80053 COMPREHEN METABOLIC PANEL: CPT | Performed by: EMERGENCY MEDICINE

## 2023-06-08 PROCEDURE — 258N000003 HC RX IP 258 OP 636: Performed by: EMERGENCY MEDICINE

## 2023-06-08 PROCEDURE — 99285 EMERGENCY DEPT VISIT HI MDM: CPT | Mod: 25 | Performed by: EMERGENCY MEDICINE

## 2023-06-08 PROCEDURE — 93005 ELECTROCARDIOGRAM TRACING: CPT | Performed by: EMERGENCY MEDICINE

## 2023-06-08 PROCEDURE — 83735 ASSAY OF MAGNESIUM: CPT | Performed by: EMERGENCY MEDICINE

## 2023-06-08 PROCEDURE — 83605 ASSAY OF LACTIC ACID: CPT | Performed by: EMERGENCY MEDICINE

## 2023-06-08 PROCEDURE — 99284 EMERGENCY DEPT VISIT MOD MDM: CPT | Performed by: EMERGENCY MEDICINE

## 2023-06-08 PROCEDURE — 96360 HYDRATION IV INFUSION INIT: CPT | Performed by: EMERGENCY MEDICINE

## 2023-06-08 PROCEDURE — 96361 HYDRATE IV INFUSION ADD-ON: CPT | Performed by: EMERGENCY MEDICINE

## 2023-06-08 RX ORDER — SODIUM CHLORIDE 9 MG/ML
INJECTION, SOLUTION INTRAVENOUS CONTINUOUS
Status: DISCONTINUED | OUTPATIENT
Start: 2023-06-08 | End: 2023-06-08 | Stop reason: HOSPADM

## 2023-06-08 RX ADMIN — SODIUM CHLORIDE 1000 ML: 9 INJECTION, SOLUTION INTRAVENOUS at 12:50

## 2023-06-08 ASSESSMENT — ACTIVITIES OF DAILY LIVING (ADL)
ADLS_ACUITY_SCORE: 35

## 2023-06-08 NOTE — DISCHARGE INSTRUCTIONS
Stay well-hydrated    Continue  current medications    Follow-up neurology as scheduled    Return for any concerns

## 2023-06-08 NOTE — ED TRIAGE NOTES
Dizziness for several days  Has seen primary care      Triage Assessment     Row Name 06/08/23 1053       Triage Assessment (Adult)    Airway WDL WDL       Respiratory WDL    Respiratory WDL WDL       Skin Circulation/Temperature WDL    Skin Circulation/Temperature WDL WDL       Cardiac WDL    Cardiac WDL WDL       Peripheral/Neurovascular WDL    Peripheral Neurovascular WDL WDL       Cognitive/Neuro/Behavioral WDL    Cognitive/Neuro/Behavioral WDL WDL

## 2023-06-08 NOTE — ED PROVIDER NOTES
History     Chief Complaint   Patient presents with     Dizziness     HPI  Ghulam Rizzo is a 67 year old male who really weak fatigued many months of feeling lightheaded and near syncopal, worse over the past 5 days.  Denies recent significant exertion, poor oral intake, diarrhea black or bloody stool, nausea vomiting, focal neurologic complaint, shortness of air chest pain palpitations abdominal pain or rash.  History significant for adenocarcinoma the prostate no current treatment is scheduled for follow-up in the near future with urology.  Denies nocturia.  Sustained compression fracture of L3 and L4 vertebrae 5/9/2023.  Has hypertension, history of cecal volvulus, cirrhosis without ascites thought to be due to hepatitis C, hyperlipidemia.  Everyday smoker, 2 beers daily, denies illicit substance use.    Allergies:  Allergies   Allergen Reactions     Levaquin [Levofloxacin] Nausea and Vomiting     Percocet [Oxycodone-Acetaminophen] Visual Disturbance     hallucinations       Problem List:    Patient Active Problem List    Diagnosis Date Noted     Adenocarcinoma of prostate (H) 05/16/2023     Priority: Medium     Compression fracture of L4 vertebra (H) 05/09/2023     Priority: Medium     Compression fracture of L3 lumbar vertebra, with routine healing, subsequent encounter 05/09/2023     Priority: Medium     Benign essential hypertension 03/01/2023     Priority: Medium     Saccular aneurysm 02/07/2023     Priority: Medium     Cecal volvulus (H) 07/13/2022     Priority: Medium     Elevated prostate specific antigen (PSA) 02/09/2022     Priority: Medium     Other irritable bowel syndrome 11/07/2019     Priority: Medium     Screening for hypertension 11/07/2019     Priority: Medium     Proctitis 01/26/2017     Priority: Medium     Colonoscopy in 2013.        Major depressive disorder, recurrent episode, moderate (H) 01/26/2017     Priority: Medium     Paxil caused anorgasmia       Cirrhosis of liver without  ascites (H) 09/23/2015     Priority: Medium     Thought due to hepatitis C.  Follows with [facility].  Completed Harvoni 9/2015.       Abdominal pain, generalized 08/20/2015     Priority: Medium     Advanced directives, counseling/discussion 09/14/2012     Priority: Medium     Discussed advance care planning with patient; however, patient declined at this time. 9/14/2012          Hyperlipidemia LDL goal <130 07/16/2012     Priority: Medium     Tobacco abuse 07/16/2012     Priority: Medium        Past Medical History:    Past Medical History:   Diagnosis Date     Anisocoria      Asthma      Carpal tunnel syndrome      Head injury, unspecified      Hepatitis C      Obesity      Other convulsions      Other mononeuritis of upper limb      Unspecified essential hypertension        Past Surgical History:    Past Surgical History:   Procedure Laterality Date     COLECTOMY WITHOUT COLOSTOMY Right 7/13/2022    Procedure: Laparotomy, right colectomy, lysis of adhesions,;  Surgeon: Tommy Martino DO;  Location: [facility] OR     COLONOSCOPY       GI SURGERY       HERNIA REPAIR       HERNIORRHAPHY INCISIONAL (LOCATION) N/A 7/13/2022    Procedure: primary incisional hernia repair;  Surgeon: Tommy Martino DO;  Location: [facility] OR     SURGICAL HISTORY OF -   4/6/87    esophagogastroduodenoscopy     SURGICAL HISTORY OF -   4/27/87    exploratory laparotomy and anterior gastropexy over a gastrostomy tube.     SURGICAL HISTORY OF -   11/13/90    esophagogastroduodenoscopy     SURGICAL HISTORY OF -   1991    L diaphragm eventration repair, fixation of stomach and colon to abd wall     SURGICAL HISTORY OF -       hiatal hernia repair     THORACIC SURGERY         Family History:    Family History   Problem Relation Age of Onset     Cerebrovascular Disease Mother      Heart Disease Brother      Heart Disease Brother        Social History:  Marital Status:   [2]  Social History     Tobacco Use     Smoking status: Every Day      "Packs/day: 0.50     Years: 50.00     Pack years: 25.00     Types: Cigarettes     Smokeless tobacco: Never     Tobacco comments:     started again 4/2010   Vaping Use     Vaping status: Never Used     Passive vaping exposure: Yes   Substance Use Topics     Alcohol use: Yes     Comment: 1-2 beers daily     Drug use: No        Medications:    acetaminophen (TYLENOL) 500 MG tablet  calcitonin, salmon, (MIACALCIN) 200 UNIT/ACT nasal spray  Calcium Carbonate (CALCIUM-CARB 600 PO)  glycerin (LAXATIVE) 1.2 g suppository  polyethylene glycol (MIRALAX) 17 GM/Dose powder  senna-docusate (NEGRITO-COLACE) 8.6-50 MG per tablet  sertraline (ZOLOFT) 50 MG tablet  vitamin D3 (CHOLECALCIFEROL) 50 mcg (2000 units) tablet          Review of Systems    Physical Exam   BP: (!) 148/81  Pulse: 72  Temp: 98.2  F (36.8  C)  Resp: 20  Height: 188 cm (6' 2\")  Weight: 74.4 kg (164 lb)  SpO2: 100 %  Lying Orthostatic BP: 169/79  Standing Orthostatic BP: 152/94      Physical Exam  Nontoxic appearing no respiratory distress alert and oriented ×3  Head atraumatic normocephalic  Neck supple full active painless range of motion  Lungs clear to auscultation  Heart regular no murmur  Abdomen soft nontender bowel sounds positive   Strength and sensation grossly intact throughout the extremities, gait and station normal  Speech is fluent, good eye contact, thought processes are rational  Lower extremities without swelling, redness or tenderness  Pedal pulses symmetrical and strong    ED Course                 Procedures    ECG: Normal rate and rhythm, axis and intervals within normal limits, no Q waves, no ectopy no acute ST or T wave changes, unchanged from previous, read by Dr. Demetris uNñez         Results for orders placed or performed during the hospital encounter of 06/08/23 (from the past 24 hour(s))   [facility] Draw    Narrative    The following orders were created for panel order [facility].  Procedure                               Abnormality      "    Status                     ---------                               -----------         ------                     Extra Green Top (Lithium...[[id number]]                      Final result               Extra Purple Top Tube[[id number]]                            Final result                 Please view results for these tests on the individual orders.   Extra Green Top (Lithium Heparin) Tube   Result Value Ref Range    Hold Specimen jic    Extra Purple Top Tube   Result Value Ref Range    Hold Specimen JIC    CBC with platelets differential    Narrative    The following orders were created for panel order CBC with platelets differential.  Procedure                               Abnormality         Status                     ---------                               -----------         ------                     CBC with platelets and d...[[id number]]  Abnormal            Final result                 Please view results for these tests on the individual orders.   Comprehensive metabolic panel   Result Value Ref Range    Sodium 130 (L) 136 - 145 mmol/L    Potassium 4.6 3.4 - 5.3 mmol/L    Chloride 94 (L) 98 - 107 mmol/L    Carbon Dioxide (CO2) 26 22 - 29 mmol/L    Anion Gap 10 7 - 15 mmol/L    Urea Nitrogen 9.9 8.0 - 23.0 mg/dL    Creatinine 0.70 0.67 - 1.17 mg/dL    Calcium 9.4 8.8 - 10.2 mg/dL    Glucose 96 70 - 99 mg/dL    Alkaline Phosphatase 81 40 - 129 U/L    AST 17 10 - 50 U/L    ALT 18 10 - 50 U/L    Protein Total 6.6 6.4 - 8.3 g/dL    Albumin 4.3 3.5 - 5.2 g/dL    Bilirubin Total 0.6 <=1.2 mg/dL    GFR Estimate >90 >60 mL/min/1.73m2   Magnesium   Result Value Ref Range    Magnesium 1.8 1.7 - 2.3 mg/dL   CBC with platelets and differential   Result Value Ref Range    WBC Count 4.9 4.0 - 11.0 10e3/uL    RBC Count 4.27 (L) 4.40 - 5.90 10e6/uL    Hemoglobin 14.0 13.3 - 17.7 g/dL    Hematocrit 39.7 (L) 40.0 - 53.0 %    MCV 93 78 - 100 fL    MCH 32.8 26.5 - 33.0 pg    MCHC 35.3 31.5 - 36.5 g/dL    RDW 13.4 10.0 -  15.0 %    Platelet Count 169 150 - 450 10e3/uL    % Neutrophils 56 %    % Lymphocytes 33 %    % Monocytes 9 %    % Eosinophils 1 %    % Basophils 1 %    % Immature Granulocytes 0 %    NRBCs per 100 WBC 0 <1 /100    Absolute Neutrophils 2.7 1.6 - 8.3 10e3/uL    Absolute Lymphocytes 1.6 0.8 - 5.3 10e3/uL    Absolute Monocytes 0.4 0.0 - 1.3 10e3/uL    Absolute Eosinophils 0.1 0.0 - 0.7 10e3/uL    Absolute Basophils 0.0 0.0 - 0.2 10e3/uL    Absolute Immature Granulocytes 0.0 <=0.4 10e3/uL    Absolute NRBCs 0.0 10e3/uL   TSH with free T4 reflex   Result Value Ref Range    TSH 4.27 (H) 0.30 - 4.20 uIU/mL   T4 free   Result Value Ref Range    Free T4 1.27 0.90 - 1.70 ng/dL   Lactic acid whole blood   Result Value Ref Range    Lactic Acid 1.2 0.7 - 2.0 mmol/L   UA with Microscopic reflex to Culture    Specimen: Urine, Midstream   Result Value Ref Range    Color Urine Yellow Colorless, Straw, Light Yellow, Yellow    Appearance Urine Clear Clear    Glucose Urine Negative Negative mg/dL    Bilirubin Urine Negative Negative    Ketones Urine Negative Negative mg/dL    Specific Gravity Urine 1.012 1.003 - 1.035    Blood Urine Negative Negative    pH Urine 7.0 5.0 - 7.0    Protein Albumin Urine Negative Negative mg/dL    Urobilinogen Urine Normal Normal, 2.0 mg/dL    Nitrite Urine Negative Negative    Leukocyte Esterase Urine Negative Negative    Mucus Urine Present (A) None Seen /LPF    RBC Urine 1 <=2 /HPF    WBC Urine <1 <=5 /HPF    Narrative    Urine Culture not indicated   CT Head w/o Contrast    Narrative    CT HEAD W/O CONTRAST 6/8/2023 2:18 PM    INDICATION: Progressive near syncope headache  TECHNIQUE: CT scan of the head without contrast. Dose reduction  techniques were used.  CONTRAST:  None.  COMPARISON: 1/16/2023 brain MRI.    FINDINGS:   No intracranial hemorrhage, new extraaxial collection, mass effect or  CT evidence of acute infarct.  Unchanged retrocerebellar arachnoid  cyst with unchanged benign thinning of the  overlying adjacent  calvarium. Normal parenchymal density for age. The ventricles and  sulci are normal for age. No skull fracture. No scalp hematoma.  Unremarkable orbits. Paranasal sinuses are free of significant  disease. Clear mastoid air cells.      Impression    IMPRESSION:  1.  No acute intracranial abnormality.    2.  Redemonstrated retrocerebellar arachnoid cyst. This appears  relatively unchanged.    EMBER NICK MD         SYSTEM ID:  [id number]       Medications   0.9% sodium chloride BOLUS (0 mLs Intravenous Stopped 6/8/23 1446)     Followed by   sodium chloride 0.9% infusion (0 mLs Intravenous Hold 6/8/23 1253)       Assessments & Plan (with Medical Decision Making)  67-year-old presents with feeling of near syncope details per HPI.  Usual differential considered.  ECG without acute ischemic change, labs are normal and reassuring including CBC, CMP troponin, lactate, TSH elevated slightly at 4.27 Free T4 remains within normal limits.  CT head no evidence for acute change, large post cerebellar arachnoid cyst seen previously.  Urinalysis is unremarkable.  1 L normal saline IV.  Patient remained stable orthostatic vitals are normal.  No indication for further evaluation at this time.  They have neurology follow-up scheduled for mid June.  Encouraged to keep that appointment.  Discussed return criteria all questions answered.     I have reviewed the nursing notes.    I have reviewed the findings, diagnosis, plan and need for follow up with the patient.          New Prescriptions    No medications on file       Final diagnoses:   Dizziness       6/8/2023   [facility] EMERGENCY DEPT     Demetris Nuñez MD  06/08/23 7507

## 2023-06-12 RX ORDER — BISACODYL 5 MG/1
TABLET, DELAYED RELEASE ORAL
Qty: 4 TABLET | Refills: 0 | Status: SHIPPED | OUTPATIENT
Start: 2023-06-12 | End: 2023-10-10

## 2023-06-13 ENCOUNTER — HOSPITAL ENCOUNTER (OUTPATIENT)
Dept: NUCLEAR MEDICINE | Facility: CLINIC | Age: 68
Setting detail: NUCLEAR MEDICINE
Discharge: HOME | End: 2023-06-13
Attending: UROLOGY
Payer: COMMERCIAL

## 2023-06-13 DIAGNOSIS — C61 PROSTATE CANCER (H): ICD-10-CM

## 2023-06-13 PROCEDURE — 78306 BONE IMAGING WHOLE BODY: CPT

## 2023-06-13 PROCEDURE — 343N000001 HC RX 343: Performed by: UROLOGY

## 2023-06-13 PROCEDURE — A9561 TC99M OXIDRONATE: HCPCS | Performed by: UROLOGY

## 2023-06-13 RX ADMIN — Medication 21.2 MILLICURIE: at 07:50

## 2023-06-14 NOTE — PROGRESS NOTES
Department of Radiation Oncology  Radiation Therapy Center  [facility]  [address], [address]  [place] [address]  [phone]       Consultation Note    Name: Ghulam Rizzo MRN: [id number]   : 1955   Date of Service: Mohsen 15, 2023 Referring: Dr. Blount     Reason for consultation: Prostatic adenocarcinoma    History of Present Illness   Mr. Rizzo is a 67 year old male with biopsy-proven unfavorable intermediate (PSA 10-20 ng/mL, both biopsies grade group 3) prostatic adenocarcinoma, [place] 4+3 = 7, pathologic T1c (normal feeling prostate on BRENT, identified by needle biopsy found on 1 side) N0 M0.  The patient's PMH is significant for right colectomy, incisional herniorrhaphy, exploratory laparotomy and anterior gastropexy, left diaphragmatic eventration repair, fixation of the stomach and colon to the abdominal wall, and hiatal hernia repair.     Oncologic History:        3/9/2023, prostatic MR:    Prostate measures 4.3 x 3.4 x 4.9 cm.  37 g.    Suspicious lesion noted in the peripheral zone.    Lesion in the right base and mid gland peripherally and at the transition zone at the 9-12 o'clock position relative to the urethra.  Possible involvement of anterior fibromuscular stroma noted.  22 mm in greatest dimension.  Capsular abutment greater than 15 mm with smooth contour, moderate suspicion of minimal extraprostatic extension.  No suspicious adenopathy.  PI-RADS 5    3/16/2023, CT CAP: Normal lymph nodes.  Mild prostatic gland enlargement noted.    2023, lumbar XR: Acute superior endplate compression fracture at L3 with 20% height loss and no retropulsion.  Questionable superior endplate compression fracture at L4 with less than 10% height loss.    2023: Patient undergoes prostatic biopsy with Dr. Blount. Digital rectal exam was performed revealing a normal feeling prostate.    2023, surgical pathology:    Prostate, Target 1: Right Mid TZ 11:00:  Prostatic adenocarcinoma. [place] 4+3 = 7.  Grade group 3.  1/2 cores    Prostate, [place], Right: High-grade prostatic intraepithelial neoplasia    Prostate, Right Transitional Zone: Prostatic adenocarcinoma.  [place] 4+3 = 7.  Grade group 3.  1/1 cores.    5/18/2023, follow-up with Dr. Blount: Dr. Blount discussed the diagnosis of prostate cancer with the patient.  Patient diagnosed with unfavorable intermediate risk prostate cancer given the patient's PSA between 10-20 ng/mL, normal or minimally abnormal exam and [place] 4+3 = 7.  Plan for the patient to have a bone scan to complete evaluation for staging.  Dr. Blount discussed treatment options including observation, definitive therapy including surgery and/or radiotherapy.  Dr. Blount believes that this patient is not an ideal observation candidate given his disease characteristics.  Because the patient has a history of several abdominal surgeries, surgery would be more difficult and would carry a higher likelihood of needing to perform the surgery open.  Space OAR was discussed.  Referral to radiation oncology placed.    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  The patient plans to see neurology tomorrow, 6/16/2023.  Patient does complain of persistent dizziness, but denied vertigo-like symptoms on interview.  Patient also confirmed the presence of an L2-L3 compression fracture, which was diagnosed during a visit to the emergency department.  He has not seen orthopedic surgery.  Mechanism of injury was from heavy lifting.  As a result, the patient has significantly decreased his level of physical activity to allow his lumbar vertebrae to heal.  Patient  also reported that he has a colonoscopy on 7/3/2023.  The patient is in the clinic today to hear more information about radiotherapy versus surgery.  He was a accompanied by his wife Chacha and his daughter.    Past Medical History:  Past Medical History:   Diagnosis Date     Anisocoria      Asthma      Carpal tunnel syndrome     2006     Head injury, unspecified     closed head injury 4 yrs ago     Hepatitis C     Cured by [facility] in 2015. diagnosed in 2012 was positive for Hepatitis C.      Obesity     2006, resolved     Other convulsions     seizure disorder due to head injury 4 yrs ago.     Other mononeuritis of upper limb     L phrenic nerve paralysis     Unspecified essential hypertension      Past Surgical History:  Past Surgical History:   Procedure Laterality Date     COLECTOMY WITHOUT COLOSTOMY Right 7/13/2022    Procedure: Laparotomy, right colectomy, lysis of adhesions,;  Surgeon: Tommy Martino DO;  Location: [facility] OR     COLONOSCOPY       GI SURGERY       HERNIA REPAIR       HERNIORRHAPHY INCISIONAL (LOCATION) N/A 7/13/2022    Procedure: primary incisional hernia repair;  Surgeon: Tommy Martino DO;  Location: [facility] OR     SURGICAL HISTORY OF -   4/6/87    esophagogastroduodenoscopy     SURGICAL HISTORY OF -   4/27/87    exploratory laparotomy and anterior gastropexy over a gastrostomy tube.     SURGICAL HISTORY OF -   11/13/90    esophagogastroduodenoscopy     SURGICAL HISTORY OF -   1991    L diaphragm eventration repair, fixation of stomach and colon to abd wall     SURGICAL HISTORY OF -       hiatal hernia repair     THORACIC SURGERY       Medications:  Current Outpatient Medications   Medication     acetaminophen (TYLENOL) 500 MG tablet     bisacodyl (DULCOLAX) 5 MG EC tablet     calcitonin, salmon, (MIACALCIN) 200 UNIT/ACT nasal spray     Calcium Carbonate (CALCIUM-CARB 600 PO)     glycerin (LAXATIVE) 1.2 g suppository     polyethylene glycol (GOLYTELY) 236 g suspension      polyethylene glycol (MIRALAX) 17 GM/Dose powder     senna-docusate (NEGRITO-COLACE) 8.6-50 MG per tablet     sertraline (ZOLOFT) 50 MG tablet     vitamin D3 (CHOLECALCIFEROL) 50 mcg (2000 units) tablet     No current facility-administered medications for this visit.     Allergies:  Allergies   Allergen Reactions     Levaquin [Levofloxacin] Nausea and Vomiting     Percocet [Oxycodone-Acetaminophen] Visual Disturbance     hallucinations     Social History:  Social History     Socioeconomic History     Marital status:      Spouse name: Felecia     Number of children: 1     Years of education: Not on file     Highest education level: Not on file   Occupational History     Employer: [employer]   Tobacco Use     Smoking status: Every Day     Packs/day: 0.50     Years: 50.00     Pack years: 25.00     Types: Cigarettes     Smokeless tobacco: Never     Tobacco comments:     started again 4/2010   Vaping Use     Vaping status: Never Used     Passive vaping exposure: Yes   Substance and Sexual Activity     Alcohol use: Yes     Comment: 1-2 beers daily     Drug use: No     Sexual activity: Yes     Partners: Female   Other Topics Concern     Parent/sibling w/ CABG, MI or angioplasty before 65F 55M? Yes     Comment: 2 brothers- heart surgery   Social History Narrative     Not on file     Social Determinants of Health     Financial Resource Strain: Not on file   Food Insecurity: Not on file   Transportation Needs: Not on file   Physical Activity: Not on file   Stress: Not on file   Social Connections: Not on file   Intimate Partner Violence: Not on file   Housing Stability: Not on file     Family History:  Family History   Problem Relation Age of Onset     Cerebrovascular Disease Mother      Heart Disease Brother      Heart Disease Brother      Review of Systems   A 10-point review of systems was performed. Pertinent findings are noted in the HPI.    Physical Exam   ECOG Status: 2    Vitals:  BP (!) 175/90 (BP Location: Left  arm, Patient Position: Chair, Cuff Size: Adult Regular)   Pulse 73   Resp 16   Wt 76.5 kg (168 lb 9.6 oz)   SpO2 100%   BMI 21.65 kg/m      Gen: Alert, in NAD  Head: NC/AT  Eyes: PERRL, EOMI, sclera anicteric  Ears: No external auricular lesions  Nose/sinus: No rhinorrhea or epistaxis  Oral cavity/oropharynx: MMM, no visible oral cavity lesions, FOM and BOT are soft to palpation  Neck: Full ROM, supple, no palpable adenopathy  Pulm: No wheezing, stridor or respiratory distress  CV: Extremities are warm and well-perfused, no cyanosis, no pedal edema  Abdominal: Normal bowel sounds, soft, nontender, no masses  Musculoskeletal: Normal bulk and tone  Skin: Normal color and turgor  Neuro: A/Ox3, CN II-XII intact, normal gait    Imaging/Path/Labs   Imaging: As above  Path: As above  Labs: As above    Assessment    Mr. Rizzo is a 67 year old male with biopsy-proven unfavorable intermediate (PSA 10-20 ng/mL, both biopsies grade group 3) prostatic adenocarcinoma, [place] 4+3 = 7, pathologic T1c (normal feeling prostate on BRENT, identified by needle biopsy found on 1 side) N0 M0.  Plan   I discussed the natural history of his disease and his available management options, including surgery and EBRT. We would recommend radiotherapy to the prostate consisting of 70 Gy in 28 fractions in addition to 6 months of ADT.  Regarding ADT, the overall clinical picture of an intracranial aneurysm combined with persistent hypertension (on chart review) does give some pause for concern.  The patient denied any history of coronary artery disease. However, for these reasons I would like to see what neurology has to say tomorrow, 6/16/2023.  Presuming no contraindications for ADT, plan to treat with 6 months of ADT.  If the patient were to proceed with radiotherapy, plan to start treating the patient 8 weeks after beginning ADT.  In addition, we explained to the benefit of Space OAR and recommended that the patient have this done to  minimize rectal toxicity.    I thoroughly discussed the logistics, risks, and benefits of each option. EBRT would involve approximately 5.5 weeks of daily treatments, with the possibility of fiducial marker placement prior to treatment. I also discussed ADT and its associated side effects, which include (but are not limited to) hot flashes, decreased bone mineral density, weight gain, muscle loss, loss of libido and erectile function, gynecomastia, emotional lability, and effects on hypertension, cholesterol, coronary artery disease and diabetes that may put him at increased risk of cardiac events.     I did discuss that there are retrospective studies suggesting that surgery may have more favorable outcomes. I also discussed that failure after prostatectomy may often be salvaged with RT, but that failure after radiation therapy is more difficult and less successful to be salvaged with a surgery. Ultimately, I stressed that the decision is up to him and the best one is whatever fits most with his desires and how he balances various side effects and quality of life issues that may differ between treatments: namely, that surgery generally has higher risks of immediate postoperative ED and incontinence, and EBRT has more GI side effects such as loose stools and rectal bleeding and ED that may gradually occur over several years. I also discussed the risk of second malignancy from RT.    The risks, benefits, alternatives, and logistics to radiation therapy were discussed in detail. Possible side effects of radiation include, but are not limited to, urinary symptoms including frequency, urgency, intermittency, dysuria, and incontinence, loose stools or diarrhea, erectile dysfunction, and radiation injury to the rectum or bladder resulting in bleeding in the stool or urine, or bowel obstruction or perforation. He is aware that the side effects of radiation therapy may be severe and permanent, although we expect that such  risks would be low and that they are outweighed by the benefit of treatment.     The patient expressed understanding.  All questions were answered. We will have the patient return to our clinic in 1 month to help finalize treatment decision.    Patient was seen and discussed with my attending physician, Dr. Rodgers.    Anish Benavidez MD, MS PGY-2  PGY-2 Radiation Oncology  Department of Radiation Oncology  [facility], [place]  Phone: [phone]      I was present with the resident during the visit. I discussed the case with the resident and agree with the note as documented by the resident.    Armani Rodgers M.D.  Department of Radiation Oncology  [facility]

## 2023-06-15 ENCOUNTER — OFFICE VISIT (OUTPATIENT)
Dept: RADIATION THERAPY | Facility: OUTPATIENT CENTER | Age: 68
End: 2023-06-15
Payer: COMMERCIAL

## 2023-06-15 VITALS
WEIGHT: 168.6 LBS | SYSTOLIC BLOOD PRESSURE: 175 MMHG | RESPIRATION RATE: 16 BRPM | OXYGEN SATURATION: 100 % | HEART RATE: 73 BPM | BODY MASS INDEX: 21.65 KG/M2 | DIASTOLIC BLOOD PRESSURE: 90 MMHG

## 2023-06-15 DIAGNOSIS — C61 ADENOCARCINOMA OF PROSTATE (H): Primary | ICD-10-CM

## 2023-06-15 NOTE — LETTER
6/15/2023         RE: Ghulam Rizzo  [address] [facility] [id number]        Dear Colleague,    Thank you for referring your patient, Ghulam Rizzo, to the [facility] RAD[facility]ATION [facility]. Please see a copy of my visit note below.       Department of Radiation Oncology  Radiation Therapy Center  [facility]  [address], [address]  [place] [address] [phone]       Consultation Note    Name: Ghulam Rizzo MRN: [id number]   : 1955   Date of Service: Mohsen 15, 2023 Referring: Dr. Blount     Reason for consultation: Prostatic adenocarcinoma    History of Present Illness   Mr. Rizzo is a 67 year old male with biopsy-proven unfavorable intermediate (PSA 10-20 ng/mL, both biopsies grade group 3) prostatic adenocarcinoma, [place] 4+3 = 7, pathologic T1c (normal feeling prostate on BRENT, identified by needle biopsy found on 1 side) N0 M0.  The patient's PMH is significant for right colectomy, incisional herniorrhaphy, exploratory laparotomy and anterior gastropexy, left diaphragmatic eventration repair, fixation of the stomach and colon to the abdominal wall, and hiatal hernia repair.     Oncologic History:        3/9/2023, prostatic MR:  Prostate measures 4.3 x 3.4 x 4.9 cm.  37 g.  Suspicious lesion noted in the peripheral zone.  Lesion in the right base and mid gland peripherally and at the transition zone at the 9-12 o'clock position relative to the urethra.  Possible involvement of anterior fibromuscular stroma noted.  22 mm in greatest dimension.  Capsular abutment greater than 15 mm with smooth contour, moderate suspicion of minimal extraprostatic extension.  No suspicious adenopathy.  PI-RADS 5    3/16/2023, CT CAP: Normal lymph nodes.  Mild prostatic gland enlargement noted.    2023, lumbar XR: Acute superior endplate compression fracture at L3 with 20% height loss and no retropulsion.  Questionable superior endplate compression fracture at L4 with less  than 10% height loss.    5/8/2023: Patient undergoes prostatic biopsy with Dr. Blount. Digital rectal exam was performed revealing a normal feeling prostate.    5/8/2023, surgical pathology:  Prostate, Target 1: Right Mid TZ 11:00: Prostatic adenocarcinoma. [place] 4+3 = 7.  Grade group 3.  1/2 cores  Prostate, [place], Right: High-grade prostatic intraepithelial neoplasia  Prostate, Right Transitional Zone: Prostatic adenocarcinoma.  [place] 4+3 = 7.  Grade group 3.  1/1 cores.    5/18/2023, follow-up with Dr. Blount: Dr. Blount discussed the diagnosis of prostate cancer with the patient.  Patient diagnosed with unfavorable intermediate risk prostate cancer given the patient's PSA between 10-20 ng/mL, normal or minimally abnormal exam and Zion 4+3 = 7.  Plan for the patient to have a bone scan to complete evaluation for staging.  Dr. Blount discussed treatment options including observation, definitive therapy including surgery and/or radiotherapy.  Dr. Blount believes that this patient is not an ideal observation candidate given his disease characteristics.  Because the patient has a history of several abdominal surgeries, surgery would be more difficult and would carry a higher likelihood of needing to perform the surgery open.  Space OAR was discussed.  Referral to radiation oncology placed.    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  The patient plans to see neurology tomorrow, 6/16/2023.  Patient does complain of persistent dizziness, but denied vertigo-like symptoms on interview.  Patient also confirmed the presence of an L2-L3 compression fracture, which was diagnosed during a visit to the emergency  department.  He has not seen orthopedic surgery.  Mechanism of injury was from heavy lifting.  As a result, the patient has significantly decreased his level of physical activity to allow his lumbar vertebrae to heal.  Patient also reported that he has a colonoscopy on 7/3/2023.  The patient is in the clinic today to hear more information about radiotherapy versus surgery.  He was a accompanied by his wife Chacha and his daughter.    Past Medical History:  Past Medical History:   Diagnosis Date    Anisocoria     Asthma     Carpal tunnel syndrome     2006    Head injury, unspecified     closed head injury 4 yrs ago    Hepatitis C     Cured by [facility] in 2015. diagnosed in 2012 was positive for Hepatitis C.     Obesity     2006, resolved    Other convulsions     seizure disorder due to head injury 4 yrs ago.    Other mononeuritis of upper limb     L phrenic nerve paralysis    Unspecified essential hypertension      Past Surgical History:  Past Surgical History:   Procedure Laterality Date    COLECTOMY WITHOUT COLOSTOMY Right 7/13/2022    Procedure: Laparotomy, right colectomy, lysis of adhesions,;  Surgeon: Tommy Martino DO;  Location: [facility] OR    COLONOSCOPY      GI SURGERY      HERNIA REPAIR      HERNIORRHAPHY INCISIONAL (LOCATION) N/A 7/13/2022    Procedure: primary incisional hernia repair;  Surgeon: Tommy Martino DO;  Location: [facility] OR    SURGICAL HISTORY OF -   4/6/87    esophagogastroduodenoscopy    SURGICAL HISTORY OF -   4/27/87    exploratory laparotomy and anterior gastropexy over a gastrostomy tube.    SURGICAL HISTORY OF -   11/13/90    esophagogastroduodenoscopy    SURGICAL HISTORY OF -   1991    L diaphragm eventration repair, fixation of stomach and colon to abd wall    SURGICAL HISTORY OF -       hiatal hernia repair    THORACIC SURGERY       Medications:  Current Outpatient Medications   Medication    acetaminophen (TYLENOL) 500 MG tablet    bisacodyl (DULCOLAX) 5 MG EC tablet     calcitonin, salmon, (MIACALCIN) 200 UNIT/ACT nasal spray    Calcium Carbonate (CALCIUM-CARB 600 PO)    glycerin (LAXATIVE) 1.2 g suppository    polyethylene glycol (GOLYTELY) 236 g suspension    polyethylene glycol (MIRALAX) 17 GM/Dose powder    senna-docusate (NEGRITO-COLACE) 8.6-50 MG per tablet    sertraline (ZOLOFT) 50 MG tablet    vitamin D3 (CHOLECALCIFEROL) 50 mcg (2000 units) tablet     No current facility-administered medications for this visit.     Allergies:  Allergies   Allergen Reactions    Levaquin [Levofloxacin] Nausea and Vomiting    Percocet [Oxycodone-Acetaminophen] Visual Disturbance     hallucinations     Social History:  Social History     Socioeconomic History    Marital status:      Spouse name: Felecia    Number of children: 1    Years of education: Not on file    Highest education level: Not on file   Occupational History     Employer: [employer]   Tobacco Use    Smoking status: Every Day     Packs/day: 0.50     Years: 50.00     Pack years: 25.00     Types: Cigarettes    Smokeless tobacco: Never    Tobacco comments:     started again 4/2010   Vaping Use    Vaping status: Never Used     Passive vaping exposure: Yes   Substance and Sexual Activity    Alcohol use: Yes     Comment: 1-2 beers daily    Drug use: No    Sexual activity: Yes     Partners: Female   Other Topics Concern    Parent/sibling w/ CABG, MI or angioplasty before 65F 55M? Yes     Comment: 2 brothers- heart surgery   Social History Narrative    Not on file     Social Determinants of Health     Financial Resource Strain: Not on file   Food Insecurity: Not on file   Transportation Needs: Not on file   Physical Activity: Not on file   Stress: Not on file   Social Connections: Not on file   Intimate Partner Violence: Not on file   Housing Stability: Not on file     Family History:  Family History   Problem Relation Age of Onset    Cerebrovascular Disease Mother     Heart Disease Brother     Heart Disease Brother      Review of  Systems   A 10-point review of systems was performed. Pertinent findings are noted in the HPI.    Physical Exam   ECOG Status: 2    Vitals:  BP (!) 175/90 (BP Location: Left arm, Patient Position: Chair, Cuff Size: Adult Regular)   Pulse 73   Resp 16   Wt 76.5 kg (168 lb 9.6 oz)   SpO2 100%   BMI 21.65 kg/m      Gen: Alert, in NAD  Head: NC/AT  Eyes: PERRL, EOMI, sclera anicteric  Ears: No external auricular lesions  Nose/sinus: No rhinorrhea or epistaxis  Oral cavity/oropharynx: MMM, no visible oral cavity lesions, FOM and BOT are soft to palpation  Neck: Full ROM, supple, no palpable adenopathy  Pulm: No wheezing, stridor or respiratory distress  CV: Extremities are warm and well-perfused, no cyanosis, no pedal edema  Abdominal: Normal bowel sounds, soft, nontender, no masses  Musculoskeletal: Normal bulk and tone  Skin: Normal color and turgor  Neuro: A/Ox3, CN II-XII intact, normal gait    Imaging/Path/Labs   Imaging: As above  Path: As above  Labs: As above    Assessment    Mr. Rizzo is a 67 year old male with biopsy-proven unfavorable intermediate (PSA 10-20 ng/mL, both biopsies grade group 3) prostatic adenocarcinoma, [place] 4+3 = 7, pathologic T1c (normal feeling prostate on BRENT, identified by needle biopsy found on 1 side) N0 M0.  Plan   I discussed the natural history of his disease and his available management options, including surgery and EBRT. We would recommend radiotherapy to the prostate consisting of 70 Gy in 28 fractions in addition to 6 months of ADT.  Regarding ADT, the overall clinical picture of an intracranial aneurysm combined with persistent hypertension (on chart review) does give some pause for concern.  The patient denied any history of coronary artery disease. However, for these reasons I would like to see what neurology has to say tomorrow, 6/16/2023.  Presuming no contraindications for ADT, plan to treat with 6 months of ADT.  If the patient were to proceed with radiotherapy,  plan to start treating the patient 8 weeks after beginning ADT.  In addition, we explained to the benefit of Space OAR and recommended that the patient have this done to minimize rectal toxicity.    I thoroughly discussed the logistics, risks, and benefits of each option. EBRT would involve approximately 5.5 weeks of daily treatments, with the possibility of fiducial marker placement prior to treatment. I also discussed ADT and its associated side effects, which include (but are not limited to) hot flashes, decreased bone mineral density, weight gain, muscle loss, loss of libido and erectile function, gynecomastia, emotional lability, and effects on hypertension, cholesterol, coronary artery disease and diabetes that may put him at increased risk of cardiac events.     I did discuss that there are retrospective studies suggesting that surgery may have more favorable outcomes. I also discussed that failure after prostatectomy may often be salvaged with RT, but that failure after radiation therapy is more difficult and less successful to be salvaged with a surgery. Ultimately, I stressed that the decision is up to him and the best one is whatever fits most with his desires and how he balances various side effects and quality of life issues that may differ between treatments: namely, that surgery generally has higher risks of immediate postoperative ED and incontinence, and EBRT has more GI side effects such as loose stools and rectal bleeding and ED that may gradually occur over several years. I also discussed the risk of second malignancy from RT.    The risks, benefits, alternatives, and logistics to radiation therapy were discussed in detail. Possible side effects of radiation include, but are not limited to, urinary symptoms including frequency, urgency, intermittency, dysuria, and incontinence, loose stools or diarrhea, erectile dysfunction, and radiation injury to the rectum or bladder resulting in bleeding in  the stool or urine, or bowel obstruction or perforation. He is aware that the side effects of radiation therapy may be severe and permanent, although we expect that such risks would be low and that they are outweighed by the benefit of treatment.     The patient expressed understanding.  All questions were answered. We will have the patient return to our clinic in 1 month to help finalize treatment decision.    Patient was seen and discussed with my attending physician, Dr. Rodgers.    Anish Benavidez MD, MS PGY-2  PGY-2 Radiation Oncology  Department of Radiation Oncology  [facility]  Phone: [phone]      I was present with the resident during the visit. I discussed the case with the resident and agree with the note as documented by the resident.    Armani Rodgers M.D.  Department of Radiation Oncology  [facility]          Again, thank you for allowing me to participate in the care of your patient.        Sincerely,        Armani Rodgers MD

## 2023-06-15 NOTE — NURSING NOTE
REASON FOR APPOINTMENT   Type of Cancer: unfavorable intermediate risk  Location: prostate  Date of Symptom Onset:  Rising psa    TREATMENT TO-DATE FOR THIS CANCER  Surgery ? Discussion still ongoing with Dr. Blount - pt gathering info on treatment options surgery or radiation   Chemotherapy ? Needs 6 month dose of Lupron/Eligard   Other Treatments for this Cancer ? Discussion today for Lupron Eligard x6 months, SpaceOAR gel + fiducials, then external beam radiation    PERSONAL HISTORY OF CANCER   Previous Cancer ? no   Prior Radiation ? no   Prior Chemotherapy ? no   Prior Hormonal Therapy ? no     RECENT IMAGING STUDIES  Prostate MRI 3/9/23  Bone scan 6/13  CT 3/16/23    REFERRALS NEEDED  None at this time  Family asked about doing PT due to recent health changes and pt being more deconditioned than normal, this is a good idea - asked family to reach out to PCP at this time     VITALS  BP (!) 175/90 (BP Location: Left arm, Patient Position: Chair, Cuff Size: Adult Regular)   Pulse 73   Resp 16   Wt 76.5 kg (168 lb 9.6 oz)   SpO2 100%   BMI 21.65 kg/m      PACEMAKER/IMPLANTED CARDIAC DEVICE no    PAIN  Denies prostate pain  History of L2-L3 compression fx    PSYCHOSOCIAL  Marital Status:   Patient lives in [place] with wife Daija.  Number of children:   Working status:   Do you feel safe in your home? Yes    REVIEW OF SYSTEMS  Skin: negative  Eyes: negative  Ears/Nose/Throat: negative  Respiratory: No shortness of breath, No cough and No hemoptysis. Some dyspnea on exertion.  Cardiovascular: hx of aneurysm in the past 3 months  Gastrointestinal: intermittent loose stool/diahrrea  Genitourinary: nocturia and frequency  Musculoskeletal: decreased endurance for activity  Neurologic: dizziness, generalized weakness  Psychiatric: anxiety with health changes and decision making  Hematologic/Lymphatic/Immunologic: negative  Endocrine: negative      Radiation Oncology Patient Teaching    Current Concern:  education review with wife Daija, dtr Amber, and pt about steps in treatment  1) ADT  2) spaceOAR/fiducucial 3) external beam   Side effects and day to day life during radiaiton    Person involved with teaching: Patient, Wife and Daughter  Patient asked Questions: Yes  Patient was cooperative: Yes  Patient was receptive (willing to accept information given): Yes    Education Assessment  Comprehension ability: Medium  Knowledge level: Low  Factors affecting teaching: stress of many recent health changes    Education Materials Given  Radiation Therapy and You  Radiation to the pelvis  Caring for Your Skin During Radiation ...    Educational Topics Discussed  Side effects, Medications, Activity, Nutrition, Adjustment to illness and When to call MD/RN    Response To Teaching  More review necessary    Do you have an advanced directive or living will? Yes  Are you DNR/DNI? No

## 2023-06-16 ENCOUNTER — OFFICE VISIT (OUTPATIENT)
Dept: NEUROLOGY | Facility: CLINIC | Age: 68
End: 2023-06-16
Payer: COMMERCIAL

## 2023-06-16 VITALS
HEART RATE: 68 BPM | BODY MASS INDEX: 21.57 KG/M2 | WEIGHT: 168 LBS | DIASTOLIC BLOOD PRESSURE: 84 MMHG | SYSTOLIC BLOOD PRESSURE: 147 MMHG | RESPIRATION RATE: 16 BRPM

## 2023-06-16 DIAGNOSIS — I95.1 ORTHOSTATIC HYPOTENSION: Primary | ICD-10-CM

## 2023-06-16 DIAGNOSIS — R42 VERTIGO: ICD-10-CM

## 2023-06-16 DIAGNOSIS — R20.8 DECREASED SENSE OF VIBRATION: ICD-10-CM

## 2023-06-16 DIAGNOSIS — E83.00 DISORDER OF COPPER METABOLISM, UNSPECIFIED (H): ICD-10-CM

## 2023-06-16 DIAGNOSIS — I67.1 CEREBRAL ANEURYSM, NONRUPTURED: ICD-10-CM

## 2023-06-16 DIAGNOSIS — R73.9 HYPERGLYCEMIA: ICD-10-CM

## 2023-06-16 PROCEDURE — 99205 OFFICE O/P NEW HI 60 MIN: CPT | Performed by: PSYCHIATRY & NEUROLOGY

## 2023-06-16 NOTE — LETTER
6/16/2023         RE: Ghulam Rizzo  [address] [place] [id number]        Dear Colleague,    Thank you for referring your patient, Ghulam Rizzo, to the [facility]. Please see a copy of my visit note below.    NEUROLOGY OUTPATIENT CONSULT NOTE   Jun 16, 2023     CHIEF COMPLAINT/REASON FOR VISIT/REASON FOR CONSULT  Patient presents with:  Consult: Dizziness-Vertigo symptoms     REASON FOR CONSULTATION- Vertigo, light headedness    REFERRAL SOURCE  Dr. Jamie Rutledge  CC Dr. Jamie Rutledge    HISTORY OF PRESENT ILLNESS  Ghulam Rizzo is a 67 year old male seen today for evaluation of dizziness.  Initially his symptoms were episodic but now have become there all the time.  Standing up does make the symptoms worse and then sitting does help with the symptoms.  He mainly complains of lightheadedness with some black lines in his vision.  Denies any chest pains or palpitations.  Drinks plenty of water.  He was on lisinopril which was making his symptoms worse but now symptoms have improved with stopping the lisinopril though not completely.  He did have orthostatic vitals checked in the emergency room which were negative.  He denies any neck pain or mid back pain.  No numbness or weakness in the legs.  Denies any unsteadiness.      He also has infrequent episodes where he feels a room spinning sensation.  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  No ringing in the ears.    Previous history is reviewed and this is unchanged.    PAST MEDICAL/SURGICAL HISTORY  Past Medical History:   Diagnosis Date     Anisocoria      Asthma      Carpal tunnel syndrome     2006     Head injury, unspecified     closed head injury 4 yrs ago     Hepatitis C     Cured by [facility] in 2015. diagnosed in 2012 was positive for Hepatitis C.      Obesity     2006, resolved     Other convulsions     seizure disorder due to head injury 4 yrs ago.     Other mononeuritis of upper limb      L phrenic nerve paralysis     Unspecified essential hypertension      Patient Active Problem List   Diagnosis     Hyperlipidemia LDL goal <130     Tobacco abuse     Advanced directives, counseling/discussion     Abdominal pain, generalized     Cirrhosis of liver without ascites (H)     Proctitis     Major depressive disorder, recurrent episode, moderate (H)     Other irritable bowel syndrome     Screening for hypertension     Elevated prostate specific antigen (PSA)     Cecal volvulus (H)     Saccular aneurysm     Benign essential hypertension     Compression fracture of L4 vertebra (H)     Compression fracture of L3 lumbar vertebra, with routine healing, subsequent encounter     Adenocarcinoma of prostate (H)   Significant for high blood pressure and depression    FAMILY HISTORY  Family History   Problem Relation Age of Onset     Cerebrovascular Disease Mother      Heart Disease Brother      Heart Disease Brother    Negative for neurological problems    SOCIAL HISTORY  Social History     Tobacco Use     Smoking status: Every Day     Packs/day: 0.50     Years: 50.00     Pack years: 25.00     Types: Cigarettes     Smokeless tobacco: Never     Tobacco comments:     started again 4/2010   Vaping Use     Vaping status: Never Used     Passive vaping exposure: Yes   Substance Use Topics     Alcohol use: Yes     Comment: 1-2 beers daily     Drug use: No       SYSTEMS REVIEW  Twelve-system ROS was done and other than the HPI this was negative except for back pain, difficulty walking when getting up, dizziness, hearing loss, vision symptoms, sleeping problems, depression, stomach pain, bowel problems, weight loss, snoring loudly.    MEDICATIONS  bisacodyl (DULCOLAX) 5 MG EC tablet, Take 2 tablets at 3 pm the day before your procedure. If your procedure is before 11 am, take 2 additional tablets at 11 pm. If your procedure is after 11 am, take 2 additional tablets at 6 am. For additional instructions refer to your  colonoscopy prep instructions.  Calcium Carbonate (CALCIUM-CARB 600 PO), Take 2 tablets by mouth daily  polyethylene glycol (MIRALAX) 17 GM/Dose powder, Take 17 g (1 capful.) by mouth daily (Patient taking differently: Take 1 capful. by mouth 2 times daily)  senna-docusate (NEGRITO-COLACE) 8.6-50 MG per tablet, Take 1-2 tablets by mouth 2 times daily as needed for constipation.  sertraline (ZOLOFT) 50 MG tablet, TAKE 1 & 1/2 TABLETS BY MOUTH EVERY DAY  vitamin D3 (CHOLECALCIFEROL) 50 mcg (2000 units) tablet, Take 1 tablet (50 mcg) by mouth daily  acetaminophen (TYLENOL) 500 MG tablet, Take 1,000 mg by mouth every 6 hours as needed for mild pain  calcitonin, salmon, (MIACALCIN) 200 UNIT/ACT nasal spray, Spray 1 spray into one nostril alternating nostrils daily for 30 days Alternate nostril each day.  glycerin (LAXATIVE) 1.2 g suppository, Place 1 suppository rectally daily as needed (constipation.)  polyethylene glycol (GOLYTELY) 236 g suspension, The night before the exam at 6 pm drink an 8-ounce glass every 15 minutes until the jug is half empty. If you arrive before 11 AM: Drink the other half of the Golytely jug at 11 PM night before procedure. If you arrive after 11 AM: Drink the other half of the Golytely jug at 6 AM day of procedure. For additional instructions refer to your colonoscopy prep instructions.    No current facility-administered medications on file prior to visit.       PHYSICAL EXAMINATION  VITALS: BP (!) 160/88   Pulse 66   Resp 16   Wt 76.2 kg (168 lb)   BMI 21.57 kg/m    GENERAL: Healthy appearing, alert, no acute distress, normal habitus.  CARDIOVASCULAR: Extremities warm and well perfused. Pulses present.   NEUROLOGICAL:  Patient is awake and oriented to self, place and time.  Attention span is normal.  Memory is grossly intact.  Language is fluent and follows commands appropriately.  Appropriate fund of knowledge. Cranial nerves 2-12 are intact. There is no pronator drift.  Motor exam shows  5/5 strength in all extremities.  Tone is symmetric bilaterally in upper and lower extremities.  Reflexes are symmetric and 2+ in upper extremities and lower extremities. Sensory exam is grossly intact to light touch, pin prick and vibration with slightly decreased vibratory sense in the feet.  Finger to nose and heel to shin is without dysmetria.  Romberg is unsteady.  Gait is slightly wide-based and the patient is able to do tandem walk and walk on toes and heels with difficulty.  Orthostatic vitals        DIAGNOSTICS  CT head  IMPRESSION:  1.  No acute intracranial abnormality.     2.  Redemonstrated retrocerebellar arachnoid cyst. This appears  relatively unchanged.    MRI brain  IMPRESSION:  1. No acute intracranial process.  2. Brain atrophy and presumed chronic small vessel ischemic change, as  described.  3. Unchanged prominent retrocerebellar arachnoid cyst.     MRA neck 2023  IMPRESSION:  1. Mildly limited evaluation of the origins of the great vessels and  the origin/proximal V1 segment of the right vertebral artery due to  motion artifacts.  2. Within that limitation, no significant stenosis, occlusion, or  dissection identified involving the cervical carotid or vertebral  Arteries.    MRA brain  IMPRESSION:  1. No high-grade stenosis or large vessel occlusion identified  involving the major intracranial arteries.  2. 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    CARDIOLOGY  EKG      RELEVANT LABS  Component      Latest Ref Rng 6/8/2023  12:01 PM   WBC      4.0 - 11.0 10e3/uL 4.9    RBC Count      4.40 - 5.90 10e6/uL 4.27 (L)    Hemoglobin      13.3 - 17.7 g/dL 14.0    Hematocrit      40.0 - 53.0 % 39.7 (L)    MCV      78 - 100 fL 93    MCH      26.5 - 33.0 pg 32.8    MCHC      31.5 - 36.5 g/dL 35.3    RDW      10.0 - 15.0 % 13.4    Platelet Count      150 - 450 10e3/uL 169    %  Neutrophils      % 56    % Lymphocytes      % 33    % Monocytes      % 9    % Eosinophils      % 1    % Basophils      % 1    % Immature Granulocytes      % 0    NRBCs per 100 WBC      <1 /100 0    Absolute Neutrophils      1.6 - 8.3 10e3/uL 2.7    Absolute Lymphocytes      0.8 - 5.3 10e3/uL 1.6    Absolute Monocytes      0.0 - 1.3 10e3/uL 0.4    Absolute Eosinophils      0.0 - 0.7 10e3/uL 0.1    Absolute Basophils      0.0 - 0.2 10e3/uL 0.0    Absolute Immature Granulocytes      <=0.4 10e3/uL 0.0    Absolute NRBCs      10e3/uL 0.0    Sodium      136 - 145 mmol/L 130 (L)    Potassium      3.4 - 5.3 mmol/L 4.6    Chloride      98 - 107 mmol/L 94 (L)    Carbon Dioxide (CO2)      22 - 29 mmol/L 26    Anion Gap      7 - 15 mmol/L 10    Urea Nitrogen      8.0 - 23.0 mg/dL 9.9    Creatinine      0.67 - 1.17 mg/dL 0.70    Calcium      8.8 - 10.2 mg/dL 9.4    Glucose      70 - 99 mg/dL 96    Alkaline Phosphatase      40 - 129 U/L 81    AST      10 - 50 U/L 17    ALT      10 - 50 U/L 18    Protein Total      6.4 - 8.3 g/dL 6.6    Albumin      3.5 - 5.2 g/dL 4.3    Bilirubin Total      <=1.2 mg/dL 0.6    GFR Estimate      >60 mL/min/1.73m2 >90    TSH      0.30 - 4.20 uIU/mL 4.27 (H)    T4 Free      0.90 - 1.70 ng/dL 1.27       Legend:  (L) Low  (H) High    OUTSIDE RECORDS  Outside referral notes and chart notes were reviewed and pertinent information has been summarized (in addition to the HPI):-        IMPRESSION/REPORT/PLAN  Rule out orthostatic hypotension  Vertigo  Decreased sense of vibration-rule out neuropathy  Cerebral aneurysm, nonruptured    This is a 67 year old male with lightheadedness and episodes of vertigo.  On exam he does have slightly increased reflexes with decreased vibratory sense in the feet.  He does also have some unsteadiness with ambulation and especially with tandem walking.  Orthostatic vitals were negative.  Potentially he could have a neuropathy/autonomic neuropathy causing his symptoms.  We  will check a EMG and blood work to look for causes of neuropathy.    Other possibility for his lightheadedness could be normal aging.  Encouraged him to get up more slowly.  Encourage good hydration.  Discussed about pumping exercises.  Could consider medications if needed down the road though orthostatic vitals appear close to normal.    In terms of his vertigo potentially he could have benign positional paroxysmal vertigo and we will set him up with vestibular rehab to see if it helps.    MRI brain/MRA have been noncontributory for causes of dizziness.  His MRI does show cerebral aneurysm.  He does have risk factors of high blood pressure and smoking history.  Encouraged him to stop smoking.  The aneurysm would be unrelated to the dizziness.     Return back after testing.    -     Physical Therapy Referral; Future  -     EMG; Future  -     Vitamin B12; Future  -     Vitamin B1 whole blood; Future  -     Protein Immunofixation Serum; Future  -     Protein electrophoresis; Future  -     Hemoglobin A1c; Future  -     Copper level; Future    Return for In-Clinic Visit (must), After testing.    Over 60 minutes were spent coordinating the care for the patient on the day of the encounter.  This includes previsit, during visit and post visit activities as documented above.  Counseling patient.  Multiple problems reviewed/addressed.  Reviewing multiple test and imaging studies.  (Activities include but not inclusive of reviewing chart, reviewing outside records, reviewing labs and imaging study results as well as the images, patient visit time including getting history and exam,  use if applicable, review of test results with the patient and coming up with a plan in a shared model, counseling patient and family, education and answering patient questions, EMR , EMR diagnosis entry and problem list management, medication reconciliation and prescription management if applicable, paperwork if applicable,  printing documents and documentation of the visit activities.)        Chip Pinon MD  Neurologist  [facility]  Tel:- [phone]    This note was dictated using voice recognition software.  Any grammatical or context distortions are unintentional and inherent to the software.        Again, thank you for allowing me to participate in the care of your patient.        Sincerely,        Chip Pinon MD

## 2023-06-16 NOTE — NURSING NOTE
Chief Complaint   Patient presents with     Consult     Dizziness-Vertigo symptoms     Kelli Dominguez MA,[facility],7:38 AM

## 2023-06-16 NOTE — PROGRESS NOTES
NEUROLOGY OUTPATIENT CONSULT NOTE   Jun 16, 2023     CHIEF COMPLAINT/REASON FOR VISIT/REASON FOR CONSULT  Patient presents with:  Consult: Dizziness-Vertigo symptoms     REASON FOR CONSULTATION- Vertigo, light headedness    REFERRAL SOURCE  Dr. Jamie Rutledge   Dr. Jamie Rutledge    HISTORY OF PRESENT ILLNESS  Ghulam Rizzo is a 67 year old male seen today for evaluation of dizziness.  Initially his symptoms were episodic but now have become there all the time.  Standing up does make the symptoms worse and then sitting does help with the symptoms.  He mainly complains of lightheadedness with some black lines in his vision.  Denies any chest pains or palpitations.  Drinks plenty of water.  He was on lisinopril which was making his symptoms worse but now symptoms have improved with stopping the lisinopril though not completely.  He did have orthostatic vitals checked in the emergency room which were negative.  He denies any neck pain or mid back pain.  No numbness or weakness in the legs.  Denies any unsteadiness.      He also has infrequent episodes where he feels a room spinning sensation.  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  No ringing in the ears.    Previous history is reviewed and this is unchanged.    PAST MEDICAL/SURGICAL HISTORY  Past Medical History:   Diagnosis Date     Anisocoria      Asthma      Carpal tunnel syndrome     2006     Head injury, unspecified     closed head injury 4 yrs ago     Hepatitis C     Cured by [facility] in 2015. diagnosed in 2012 was positive for Hepatitis C.      Obesity     2006, resolved     Other convulsions     seizure disorder due to head injury 4 yrs ago.     Other mononeuritis of upper limb     L phrenic nerve paralysis     Unspecified essential hypertension      Patient Active Problem List   Diagnosis     Hyperlipidemia LDL goal <130     Tobacco abuse     Advanced directives, counseling/discussion     Abdominal pain, generalized      Cirrhosis of liver without ascites (H)     Proctitis     Major depressive disorder, recurrent episode, moderate (H)     Other irritable bowel syndrome     Screening for hypertension     Elevated prostate specific antigen (PSA)     Cecal volvulus (H)     Saccular aneurysm     Benign essential hypertension     Compression fracture of L4 vertebra (H)     Compression fracture of L3 lumbar vertebra, with routine healing, subsequent encounter     Adenocarcinoma of prostate (H)   Significant for high blood pressure and depression    FAMILY HISTORY  Family History   Problem Relation Age of Onset     Cerebrovascular Disease Mother      Heart Disease Brother      Heart Disease Brother    Negative for neurological problems    SOCIAL HISTORY  Social History     Tobacco Use     Smoking status: Every Day     Packs/day: 0.50     Years: 50.00     Pack years: 25.00     Types: Cigarettes     Smokeless tobacco: Never     Tobacco comments:     started again 4/2010   Vaping Use     Vaping status: Never Used     Passive vaping exposure: Yes   Substance Use Topics     Alcohol use: Yes     Comment: 1-2 beers daily     Drug use: No       SYSTEMS REVIEW  Twelve-system ROS was done and other than the HPI this was negative except for back pain, difficulty walking when getting up, dizziness, hearing loss, vision symptoms, sleeping problems, depression, stomach pain, bowel problems, weight loss, snoring loudly.    MEDICATIONS  bisacodyl (DULCOLAX) 5 MG EC tablet, Take 2 tablets at 3 pm the day before your procedure. If your procedure is before 11 am, take 2 additional tablets at 11 pm. If your procedure is after 11 am, take 2 additional tablets at 6 am. For additional instructions refer to your colonoscopy prep instructions.  Calcium Carbonate (CALCIUM-CARB 600 PO), Take 2 tablets by mouth daily  polyethylene glycol (MIRALAX) 17 GM/Dose powder, Take 17 g (1 capful.) by mouth daily (Patient taking differently: Take 1 capful. by mouth 2 times  daily)  senna-docusate (NEGRITO-COLACE) 8.6-50 MG per tablet, Take 1-2 tablets by mouth 2 times daily as needed for constipation.  sertraline (ZOLOFT) 50 MG tablet, TAKE 1 & 1/2 TABLETS BY MOUTH EVERY DAY  vitamin D3 (CHOLECALCIFEROL) 50 mcg (2000 units) tablet, Take 1 tablet (50 mcg) by mouth daily  acetaminophen (TYLENOL) 500 MG tablet, Take 1,000 mg by mouth every 6 hours as needed for mild pain  calcitonin, salmon, (MIACALCIN) 200 UNIT/ACT nasal spray, Spray 1 spray into one nostril alternating nostrils daily for 30 days Alternate nostril each day.  glycerin (LAXATIVE) 1.2 g suppository, Place 1 suppository rectally daily as needed (constipation.)  polyethylene glycol (GOLYTELY) 236 g suspension, The night before the exam at 6 pm drink an 8-ounce glass every 15 minutes until the jug is half empty. If you arrive before 11 AM: Drink the other half of the Golytely jug at 11 PM night before procedure. If you arrive after 11 AM: Drink the other half of the Golytely jug at 6 AM day of procedure. For additional instructions refer to your colonoscopy prep instructions.    No current facility-administered medications on file prior to visit.       PHYSICAL EXAMINATION  VITALS: BP (!) 160/88   Pulse 66   Resp 16   Wt 76.2 kg (168 lb)   BMI 21.57 kg/m    GENERAL: Healthy appearing, alert, no acute distress, normal habitus.  CARDIOVASCULAR: Extremities warm and well perfused. Pulses present.   NEUROLOGICAL:  Patient is awake and oriented to self, place and time.  Attention span is normal.  Memory is grossly intact.  Language is fluent and follows commands appropriately.  Appropriate fund of knowledge. Cranial nerves 2-12 are intact. There is no pronator drift.  Motor exam shows 5/5 strength in all extremities.  Tone is symmetric bilaterally in upper and lower extremities.  Reflexes are symmetric and 2+ in upper extremities and lower extremities. Sensory exam is grossly intact to light touch, pin prick and vibration with  slightly decreased vibratory sense in the feet.  Finger to nose and heel to shin is without dysmetria.  Romberg is unsteady.  Gait is slightly wide-based and the patient is able to do tandem walk and walk on toes and heels with difficulty.  Orthostatic vitals        DIAGNOSTICS  CT head  IMPRESSION:  1.  No acute intracranial abnormality.     2.  Redemonstrated retrocerebellar arachnoid cyst. This appears  relatively unchanged.    MRI brain  IMPRESSION:  1. No acute intracranial process.  2. Brain atrophy and presumed chronic small vessel ischemic change, as  described.  3. Unchanged prominent retrocerebellar arachnoid cyst.     MRA neck 2023  IMPRESSION:  1. Mildly limited evaluation of the origins of the great vessels and  the origin/proximal V1 segment of the right vertebral artery due to  motion artifacts.  2. Within that limitation, no significant stenosis, occlusion, or  dissection identified involving the cervical carotid or vertebral  Arteries.    MRA brain  IMPRESSION:  1. No high-grade stenosis or large vessel occlusion identified  involving the major intracranial arteries.  2. 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    CARDIOLOGY  EKG      RELEVANT LABS  Component      Latest Ref Rn 6/8/2023  12:01 PM   WBC      4.0 - 11.0 10e3/uL 4.9    RBC Count      4.40 - 5.90 10e6/uL 4.27 (L)    Hemoglobin      13.3 - 17.7 g/dL 14.0    Hematocrit      40.0 - 53.0 % 39.7 (L)    MCV      78 - 100 fL 93    MCH      26.5 - 33.0 pg 32.8    MCHC      31.5 - 36.5 g/dL 35.3    RDW      10.0 - 15.0 % 13.4    Platelet Count      150 - 450 10e3/uL 169    % Neutrophils      % 56    % Lymphocytes      % 33    % Monocytes      % 9    % Eosinophils      % 1    % Basophils      % 1    % Immature Granulocytes      % 0    NRBCs per 100 WBC      <1 /100 0    Absolute Neutrophils      1.6 - 8.3 10e3/uL 2.7     Absolute Lymphocytes      0.8 - 5.3 10e3/uL 1.6    Absolute Monocytes      0.0 - 1.3 10e3/uL 0.4    Absolute Eosinophils      0.0 - 0.7 10e3/uL 0.1    Absolute Basophils      0.0 - 0.2 10e3/uL 0.0    Absolute Immature Granulocytes      <=0.4 10e3/uL 0.0    Absolute NRBCs      10e3/uL 0.0    Sodium      136 - 145 mmol/L 130 (L)    Potassium      3.4 - 5.3 mmol/L 4.6    Chloride      98 - 107 mmol/L 94 (L)    Carbon Dioxide (CO2)      22 - 29 mmol/L 26    Anion Gap      7 - 15 mmol/L 10    Urea Nitrogen      8.0 - 23.0 mg/dL 9.9    Creatinine      0.67 - 1.17 mg/dL 0.70    Calcium      8.8 - 10.2 mg/dL 9.4    Glucose      70 - 99 mg/dL 96    Alkaline Phosphatase      40 - 129 U/L 81    AST      10 - 50 U/L 17    ALT      10 - 50 U/L 18    Protein Total      6.4 - 8.3 g/dL 6.6    Albumin      3.5 - 5.2 g/dL 4.3    Bilirubin Total      <=1.2 mg/dL 0.6    GFR Estimate      >60 mL/min/1.73m2 >90    TSH      0.30 - 4.20 uIU/mL 4.27 (H)    T4 Free      0.90 - 1.70 ng/dL 1.27       Legend:  (L) Low  (H) High    OUTSIDE RECORDS  Outside referral notes and chart notes were reviewed and pertinent information has been summarized (in addition to the HPI):-        IMPRESSION/REPORT/PLAN  Rule out orthostatic hypotension  Vertigo  Decreased sense of vibration-rule out neuropathy  Cerebral aneurysm, nonruptured    This is a 67 year old male with lightheadedness and episodes of vertigo.  On exam he does have slightly increased reflexes with decreased vibratory sense in the feet.  He does also have some unsteadiness with ambulation and especially with tandem walking.  Orthostatic vitals were negative.  Potentially he could have a neuropathy/autonomic neuropathy causing his symptoms.  We will check a EMG and blood work to look for causes of neuropathy.    Other possibility for his lightheadedness could be normal aging.  Encouraged him to get up more slowly.  Encourage good hydration.  Discussed about pumping exercises.  Could  consider medications if needed down the road though orthostatic vitals appear close to normal.    In terms of his vertigo potentially he could have benign positional paroxysmal vertigo and we will set him up with vestibular rehab to see if it helps.    MRI brain/MRA have been noncontributory for causes of dizziness.  His MRI does show cerebral aneurysm.  He does have risk factors of high blood pressure and smoking history.  Encouraged him to stop smoking.  The aneurysm would be unrelated to the dizziness.     Return back after testing.    -     Physical Therapy Referral; Future  -     EMG; Future  -     Vitamin B12; Future  -     Vitamin B1 whole blood; Future  -     Protein Immunofixation Serum; Future  -     Protein electrophoresis; Future  -     Hemoglobin A1c; Future  -     Copper level; Future    Return for In-Clinic Visit (must), After testing.    Over 60 minutes were spent coordinating the care for the patient on the day of the encounter.  This includes previsit, during visit and post visit activities as documented above.  Counseling patient.  Multiple problems reviewed/addressed.  Reviewing multiple test and imaging studies.  (Activities include but not inclusive of reviewing chart, reviewing outside records, reviewing labs and imaging study results as well as the images, patient visit time including getting history and exam,  use if applicable, review of test results with the patient and coming up with a plan in a shared model, counseling patient and family, education and answering patient questions, EMR , EMR diagnosis entry and problem list management, medication reconciliation and prescription management if applicable, paperwork if applicable, printing documents and documentation of the visit activities.)        Chip Pinon MD  Neurologist  [facility]  Tel:- [phone]    This note was dictated using voice recognition software.  Any  grammatical or context distortions are unintentional and inherent to the software.

## 2023-06-23 ENCOUNTER — LAB (OUTPATIENT)
Dept: LAB | Facility: CLINIC | Age: 68
End: 2023-06-23
Payer: COMMERCIAL

## 2023-06-23 DIAGNOSIS — E83.00 DISORDER OF COPPER METABOLISM, UNSPECIFIED (H): ICD-10-CM

## 2023-06-23 DIAGNOSIS — R73.9 HYPERGLYCEMIA: ICD-10-CM

## 2023-06-23 DIAGNOSIS — R20.8 DECREASED SENSE OF VIBRATION: ICD-10-CM

## 2023-06-23 LAB — HBA1C MFR BLD: 5 % (ref 0–5.6)

## 2023-06-23 PROCEDURE — 84155 ASSAY OF PROTEIN SERUM: CPT

## 2023-06-23 PROCEDURE — 82607 VITAMIN B-12: CPT

## 2023-06-23 PROCEDURE — 84425 ASSAY OF VITAMIN B-1: CPT | Mod: 90

## 2023-06-23 PROCEDURE — 82525 ASSAY OF COPPER: CPT | Mod: 90

## 2023-06-23 PROCEDURE — 83036 HEMOGLOBIN GLYCOSYLATED A1C: CPT

## 2023-06-23 PROCEDURE — 36415 COLL VENOUS BLD VENIPUNCTURE: CPT

## 2023-06-23 PROCEDURE — 84165 PROTEIN E-PHORESIS SERUM: CPT

## 2023-06-23 PROCEDURE — 86334 IMMUNOFIX E-PHORESIS SERUM: CPT

## 2023-06-23 PROCEDURE — 99000 SPECIMEN HANDLING OFFICE-LAB: CPT

## 2023-06-23 RX ORDER — BISACODYL 5 MG/1
TABLET, DELAYED RELEASE ORAL
Qty: 4 TABLET | Refills: 0 | Status: SHIPPED | OUTPATIENT
Start: 2023-06-23 | End: 2023-10-10

## 2023-06-24 LAB
COPPER SERPL-MCNC: 103.1 UG/DL
TOTAL PROTEIN SERUM FOR ELP: 6.6 G/DL (ref 6.4–8.3)
VIT B12 SERPL-MCNC: 388 PG/ML (ref 232–1245)

## 2023-06-26 LAB
ALBUMIN SERPL ELPH-MCNC: 4.3 G/DL (ref 3.7–5.1)
ALPHA1 GLOB SERPL ELPH-MCNC: 0.3 G/DL (ref 0.2–0.4)
ALPHA2 GLOB SERPL ELPH-MCNC: 0.8 G/DL (ref 0.5–0.9)
B-GLOBULIN SERPL ELPH-MCNC: 0.6 G/DL (ref 0.6–1)
GAMMA GLOB SERPL ELPH-MCNC: 0.7 G/DL (ref 0.7–1.6)
M PROTEIN SERPL ELPH-MCNC: 0.1 G/DL
PROT PATTERN SERPL ELPH-IMP: ABNORMAL
PROT PATTERN SERPL IFE-IMP: NORMAL
VIT B1 PYROPHOSHATE BLD-SCNC: 118 NMOL/L

## 2023-06-29 ENCOUNTER — TELEPHONE (OUTPATIENT)
Dept: NEUROLOGY | Facility: CLINIC | Age: 68
End: 2023-06-29

## 2023-06-29 ENCOUNTER — OFFICE VISIT (OUTPATIENT)
Dept: UROLOGY | Facility: CLINIC | Age: 68
End: 2023-06-29
Payer: COMMERCIAL

## 2023-06-29 VITALS
DIASTOLIC BLOOD PRESSURE: 88 MMHG | BODY MASS INDEX: 21.56 KG/M2 | HEART RATE: 70 BPM | WEIGHT: 167.99 LBS | SYSTOLIC BLOOD PRESSURE: 144 MMHG | HEIGHT: 74 IN | TEMPERATURE: 98.1 F

## 2023-06-29 DIAGNOSIS — C61 ADENOCARCINOMA OF PROSTATE (H): ICD-10-CM

## 2023-06-29 PROCEDURE — 99215 OFFICE O/P EST HI 40 MIN: CPT | Performed by: UROLOGY

## 2023-06-29 ASSESSMENT — PAIN SCALES - GENERAL: PAINLEVEL: NO PAIN (0)

## 2023-06-29 NOTE — NURSING NOTE
"Initial BP (!) 144/88 (BP Location: Left arm, Patient Position: Sitting, Cuff Size: Adult Regular)   Pulse 70   Temp 98.1  F (36.7  C) (Tympanic)   Ht 1.88 m (6' 2.02\")   Wt 76.2 kg (167 lb 15.9 oz)   BMI 21.56 kg/m   Estimated body mass index is 21.56 kg/m  as calculated from the following:    Height as of this encounter: 1.88 m (6' 2.02\").    Weight as of this encounter: 76.2 kg (167 lb 15.9 oz). .    Maria Regalado MA    "

## 2023-06-29 NOTE — TELEPHONE ENCOUNTER
----- Message from Chip Pinon MD sent at 6/26/2023  4:44 PM CDT -----  Blood work shows  1.  Abnormal serum protein electrophoresis.  This could be a false positive and will need to be repeated in 6 months  2.  Low vitamin B12.  I would recommend an over-the-counter vitamin B12 supplement to see if it helps.

## 2023-06-29 NOTE — PROGRESS NOTES
CC: prostate cancer    HPI: 68 yo male with Gl 4+3=7 prostate cancer.  He has been counseled on various options and comes in today to discuss further.    ASSESSMENT AND PLAN:  Over half of today's 55-minute visit was spent reviewing the chart, results and counseling the patient regarding his prostate cancer.  We again discussed options of observation vs surgery vs radiation.  I counseled the patient that given his extensive previous abdominal surgeries that robotic surgery would not be possible and that open surgery might also be more difficult and would carry higher risks of blood loss and possibly worse functional outcomes than robotic surgery.  The patient and his family asked many good questions today and these were answered to his satisfaction.  He will continue to think about his options.  He has an appointment with Dr. Rodgers in July and will likely make a decision then.

## 2023-07-05 ENCOUNTER — THERAPY VISIT (OUTPATIENT)
Dept: PHYSICAL THERAPY | Facility: CLINIC | Age: 68
End: 2023-07-05
Attending: PSYCHIATRY & NEUROLOGY
Payer: COMMERCIAL

## 2023-07-05 DIAGNOSIS — R42 VERTIGO: ICD-10-CM

## 2023-07-05 PROCEDURE — 97162 PT EVAL MOD COMPLEX 30 MIN: CPT | Mod: GP | Performed by: PHYSICAL THERAPIST

## 2023-07-05 PROCEDURE — 97112 NEUROMUSCULAR REEDUCATION: CPT | Mod: GP | Performed by: PHYSICAL THERAPIST

## 2023-07-05 NOTE — PROGRESS NOTES
"PHYSICAL THERAPY EVALUATION  Type of Visit: Evaluation    See electronic medical record for Abuse and Falls Screening details.    Subjective Sx began March 23, 2023, was sitting and room started spinning, maybe 3-4 minutes. Only couple episodes of room spinning. Has been outside and its happened. Can get lightheaded with bending over and standing up. Maybe a month since room has spun. Feels lightheaded sitting still. Has blurriness, sees black dots or lines, can see them right now. Does have blood pressure issues, his sx seem worse in the afternoons, BP is lower in evenings.     Presenting condition or subjective complaint:    Date of onset: 03/23/23    Relevant medical history:     Dates & types of surgery:      Prior diagnostic imaging/testing results:       Prior therapy history for the same diagnosis, illness or injury:        Prior Level of Function   Transfers: Independent  Ambulation: Independent  ADL: Independent  IADL: walks daily, house/yard work      Patient goals for therapy:  to not be dizzy, figure out what is wrong with black lines/dots he sees  Objective       OBSERVATION: R eye rests slightly higher, R pupil slight larger, L eye rests lateral  INTEGUMENTARY:   POSTURE: Standing Posture: Rounded shoulders, Forward head  PALPATION:   RANGE OF MOTION:   STRENGTH:       GAIT: slow stiff, decreased arm swing, mild path deviation vertical head turns, pivot normal      BALANCE:     SPECIAL TESTS  Functional Gait Assessment (FGA)      10 Meter Walk Test (Comfortable)     10 Meter Walk Test (Fast)     6 Minute Walk Test (6MWT)           Singleton Balance Scale (BBS)     5 Times Sit-to-Stand (5TSTS)       Dynamic Gait Index (DGI)  9/18 on 6 items   Timed Up and Go (TUG) - sec    Single Leg Stance Right (sec)    Single Leg Stance Left (sec)    Modified CTSIB Conditions (sec) Cond 1: 30 mild sway AP  Cond 2: 30  Mod sway AP., almost shaky  Cond 4: 30  Feet apart 2\"  Cond 5 : 7   Feet apart 2 \"   Romberg  (sec)  "   Sharpened Romberg (sec)    30 Second Sit to Stand (reps/height)            SENSATION: later in appt pt reports neurologist said he had decreased sensation in his feet (this had been asked prior without this answer)    REFLEXES:   COORDINATION:   MUSCLE TONE:        VESTIBULAR EVALUATION       Pertinent visual history: as noted above  Pertinent history of current vestibular problem:   DHI:      Cervicogenic Screen    Neck ROM Normal   Vertebral Artery Test Normal   Alar Ligament Test    Transverse Ligament Test Normal   Distraction    Neck Torsion Test (head still, body rotating)    Neck Torsion Test (head and body rotating)         Oculomotor Screen    Ocular ROM    Smooth Pursuit Normal   Saccades Normal   VOR Normal   VOR Cancellation    Head Impulse Test    Convergence Testing Abnormal neither converge fully, L eye worse, tried 3 different targets        Infrared Goggle Exam Vestibular Suppressant in Last 24 Hours? No  Exam Completed With: Infrared goggles   Spontaneous Nystagmus Negative   Gaze Evoked Nystagmus Negative   Head Shake Horizontal Nystagmus Negative   Positional Testing    Supine Head-Hanging Test     Left Right   Debi-Hallpike Negative Negative   Sidelying Test     HSCC Supine Roll Test Negative Negative   HSCC Forward Roll Test     Columb[place] and Lean Test -  Sitting Erect    Columb[place] and Lean Test - Seated, Head Bent 60 Degrees Forward    [place] and Lean Test - Seated, Head Bent Backwards       BPPV Canal(s):   BPPV Type:     Dynamic Visual Acuity (DVA)    Static Acuity (LogMar)    Horizontal Head Movement at 1 Hz (LogMar)    Horizontal Head Movement at 2 Hz (LogMar)           Assessment & Plan   CLINICAL IMPRESSIONS   Medical Diagnosis: vertigo    Treatment Diagnosis: motion sensitivity   Impression/Assessment: Patient is a 67 year old male with lightheaded, dizzy complaints.  The following significant findings have been identified: Impaired balance, Impaired sensation and Impaired gait. These impairments  interfere with their ability to perform recreational activities and household chores as compared to previous level of function.     Clinical Decision Making (Complexity):   Clinical Presentation: Evolving/Changing  Clinical Presentation Rationale: based on medical and personal factors listed in PT evaluation  Clinical Decision Making (Complexity): Moderate complexity    PLAN OF CARE  Treatment Interventions:  Interventions: Neuromuscular Re-education    Long Term Goals     PT Goal 1  Goal Identifier: 1  Goal Description: pt will have no dizzy spells in 4wk  Target Date: 08/02/23      Frequency of Treatment: 1x/wk  Duration of Treatment: 1x planned, willhold open 4wks    Recommended Referrals to Other Professionals:   Education Assessment:   Learner/Method: Patient;Listening;No Barriers to Learning    Risks and benefits of evaluation/treatment have been explained.   Patient/Family/caregiver agrees with Plan of Care.     Evaluation Time:     PT Eval, Moderate Complexity Minutes (46257): 25      Signing Clinician: Kris Hoenk, PT M [facility]                                                                                   OUTPATIENT PHYSICAL THERAPY      PLAN OF TREATMENT FOR OUTPATIENT REHABILITATION   Patient's Last Name, First Name, Ghulam Moran YOB: 1955   Provider's Name   [facility]   Medical Record No.  [id number]     Onset Date: 03/23/23  Start of Care Date: 07/05/23     Medical Diagnosis:  vertigo      PT Treatment Diagnosis:  motion sensitivity Plan of Treatment  Frequency/Duration: 1x/wk/ 1x planned, willhold open 4wks    Certification date from 07/05/23 to 08/02/23         See note for plan of treatment details and functional goals     Kris Hoenk, PT                         I CERTIFY THE NEED FOR THESE SERVICES FURNISHED UNDER        THIS PLAN OF TREATMENT AND WHILE UNDER MY CARE     (Physician attestation of this  document indicates review and certification of the therapy plan).                  Referring Provider:  Chip Pinon      Initial Assessment  See Epic Evaluation- Start of Care Date: 07/05/23

## 2023-07-12 ENCOUNTER — OFFICE VISIT (OUTPATIENT)
Dept: RADIATION THERAPY | Facility: OUTPATIENT CENTER | Age: 68
End: 2023-07-12
Payer: COMMERCIAL

## 2023-07-12 VITALS — WEIGHT: 169.6 LBS | BODY MASS INDEX: 21.77 KG/M2

## 2023-07-12 DIAGNOSIS — C61 ADENOCARCINOMA OF PROSTATE (H): Primary | ICD-10-CM

## 2023-07-12 NOTE — NURSING NOTE
"Oncology Rooming Note    July 12, 2023 10:02 AM   Ghulam Rizzo is a 67 year old male who presents for:    Chief Complaint   Patient presents with     Radiation Therapy     otv     Initial Vitals: Wt 76.9 kg (169 lb 9.6 oz)   BMI 21.77 kg/m   Estimated body mass index is 21.77 kg/m  as calculated from the following:    Height as of 6/29/23: 1.88 m (6' 2.02\").    Weight as of this encounter: 76.9 kg (169 lb 9.6 oz). Body surface area is 2 meters squared.  Data Unavailable Comment: Data Unavailable   No LMP for male patient.  Allergies reviewed: Yes  Medications reviewed: Yes    Medications: Medication refills not needed today.  Pharmacy name entered into Peer[employer]: [facility] - [place], [place] - [address] [address] [facility]    Clinical concerns: patient and wife Daija return to discuss options for prostate cancer treatment.  RN reviewed steps would be  Short term Lupron  SpaceOAR/fiducial procedure with Randy Blount  Radiation simulation/MRI  Pt and wife looking to proceed with these steps - MD will review in depth MD was notified.      Felicia Orourke RN            "

## 2023-07-12 NOTE — LETTER
7/12/2023         RE: Ghulam Rizzo  [address] [place] [id number]        Dear Colleague,    Thank you for referring your patient, Ghulam Rizzo, to the [facility] RAD[facility]ATION [facility]. Please see a copy of my visit note below.    Radiation Oncology Note    HPI: Mr. Rizzo is a 67 year old male with biopsy-proven unfavorable intermediate (PSA 10-20 ng/mL, both biopsies grade group 3) prostatic adenocarcinoma, Zion 4+3 = 7, pathologic T1c (normal feeling prostate on BRENT, identified by needle biopsy found on 1 side) N0 M0.    The patient presents for follow up.     After discussion, the patient wishes to proceed with definitive RT + ST-ADT.     Plan:  1. We will plan to schedule the patient for ST- ADT with plan to begin RT at approximately 8 week tracey.     2. Tentatively plan to treat to a total dose of 70 Gy in 28 fractions.     3. We discussed consideration of Space OAR gel/ fiducial markers prior to RT start.    4. We discussed side effects in great detail.     5. The patient agrees with current plan in place.    Armani Rodgers M.D.  Department of Radiation Oncology  [facility]

## 2023-07-12 NOTE — PROGRESS NOTES
Radiation Oncology Note    HPI: Mr. Rizzo is a 67 year old male with biopsy-proven unfavorable intermediate (PSA 10-20 ng/mL, both biopsies grade group 3) prostatic adenocarcinoma, Zion 4+3 = 7, pathologic T1c (normal feeling prostate on BRENT, identified by needle biopsy found on 1 side) N0 M0.    The patient presents for follow up.     After discussion, the patient wishes to proceed with definitive RT + ST-ADT.     Plan:  1. We will plan to schedule the patient for ST- ADT with plan to begin RT at approximately 8 week tracey.     2. Tentatively plan to treat to a total dose of 70 Gy in 28 fractions.     3. We discussed consideration of Space OAR gel/ fiducial markers prior to RT start.    4. We discussed side effects in great detail.     5. The patient agrees with current plan in place.    Armani Rodgers M.D.  Department of Radiation Oncology  [facility]

## 2023-07-13 ENCOUNTER — TELEPHONE (OUTPATIENT)
Dept: UROLOGY | Facility: CLINIC | Age: 68
End: 2023-07-13
Payer: COMMERCIAL

## 2023-07-13 NOTE — TELEPHONE ENCOUNTER
I was notified by radiation oncology the patient will move forward with lupron and radiation, not surg,  Dr Blount will be notified.  bello dugan LPN

## 2023-07-17 ENCOUNTER — ANESTHESIA EVENT (OUTPATIENT)
Dept: GASTROENTEROLOGY | Facility: CLINIC | Age: 68
End: 2023-07-17
Payer: COMMERCIAL

## 2023-07-17 ASSESSMENT — ENCOUNTER SYMPTOMS: SEIZURES: 1

## 2023-07-17 ASSESSMENT — LIFESTYLE VARIABLES: TOBACCO_USE: 1

## 2023-07-17 NOTE — ANESTHESIA PREPROCEDURE EVALUATION
Anesthesia Pre-Procedure Evaluation    Patient: Ghulam Rizzo   MRN: [id number] : 1955        Procedure : Procedure(s):  Colonoscopy          Past Medical History:   Diagnosis Date    Anisocoria     Asthma     Carpal tunnel syndrome         Head injury, unspecified     closed head injury 4 yrs ago    Hepatitis C     Cured by [facility] in . diagnosed in  was positive for Hepatitis C.     Obesity     2006, resolved    Other convulsions     seizure disorder due to head injury 4 yrs ago.    Other mononeuritis of upper limb     L phrenic nerve paralysis    Unspecified essential hypertension       Past Surgical History:   Procedure Laterality Date    COLECTOMY WITHOUT COLOSTOMY Right 2022    Procedure: Laparotomy, right colectomy, lysis of adhesions,;  Surgeon: Tommy Martino DO;  Location: [facility]    COLONOSCOPY      GI SURGERY      HERNIA REPAIR      HERNIORRHAPHY INCISIONAL (LOCATION) N/A 2022    Procedure: primary incisional hernia repair;  Surgeon: Tommy Martino DO;  Location: [facility]    SURGICAL HISTORY OF -   87    esophagogastroduodenoscopy    SURGICAL HISTORY OF -   87    exploratory laparotomy and anterior gastropexy over a gastrostomy tube.    SURGICAL HISTORY OF -   90    esophagogastroduodenoscopy    SURGICAL HISTORY OF -       L diaphragm eventration repair, fixation of stomach and colon to abd wall    SURGICAL HISTORY OF -       hiatal hernia repair    THORACIC SURGERY        Allergies   Allergen Reactions    Levaquin [Levofloxacin] Nausea and Vomiting    Percocet [Oxycodone-Acetaminophen] Visual Disturbance     hallucinations      Social History     Tobacco Use    Smoking status: Every Day     Packs/day: 0.50     Years: 50.00     Pack years: 25.00     Types: Cigarettes    Smokeless tobacco: Never    Tobacco comments:     started again 2010   Substance Use Topics    Alcohol use: Yes     Comment: 1-2 beers daily      Wt Readings from Last 1  Encounters:   07/12/23 76.9 kg (169 lb 9.6 oz)        Anesthesia Evaluation            ROS/MED HX  ENT/Pulmonary:     (+)                tobacco use, Current use,    asthma                  Neurologic:     (+)       seizures (? convulsions listed in chart),                         Cardiovascular:     (+) Dyslipidemia hypertension- -   -  - -                                      METS/Exercise Tolerance:     Hematologic:  - neg hematologic  ROS     Musculoskeletal:  - neg musculoskeletal ROS     GI/Hepatic:     (+)     hepatitis resolved      hepatitis type C, liver disease,       Renal/Genitourinary:  - neg Renal ROS     Endo:  - neg endo ROS     Psychiatric/Substance Use:  - neg psychiatric ROS     Infectious Disease:  - neg infectious disease ROS     Malignancy:       Other:            Physical Exam    Airway  airway exam normal      Mallampati: II   TM distance: > 3 FB   Neck ROM: full   Mouth opening: > 3 cm    Respiratory Devices and Support         Dental       (+) Completely normal teeth      Cardiovascular   cardiovascular exam normal          Pulmonary   pulmonary exam normal        breath sounds clear to auscultation           OUTSIDE LABS:  CBC:   Lab Results   Component Value Date    WBC 4.9 06/08/2023    WBC 7.4 03/16/2023    HGB 14.0 06/08/2023    HGB 16.1 03/16/2023    HCT 39.7 (L) 06/08/2023    HCT 46.1 03/16/2023     06/08/2023     03/16/2023     BMP:   Lab Results   Component Value Date     (L) 06/08/2023     (L) 04/17/2023    POTASSIUM 4.6 06/08/2023    POTASSIUM 4.9 04/17/2023    CHLORIDE 94 (L) 06/08/2023    CHLORIDE 97 (L) 04/17/2023    CO2 26 06/08/2023    CO2 24 04/17/2023    BUN 9.9 06/08/2023    BUN 15.0 04/17/2023    CR 0.70 06/08/2023    CR 0.82 04/17/2023    GLC 96 06/08/2023    GLC 92 04/17/2023     COAGS:   Lab Results   Component Value Date    PTT 31 01/16/2023    INR 1.09 01/16/2023     POC:   Lab Results   Component Value Date     (H) 04/03/2006      HEPATIC:   Lab Results   Component Value Date    ALBUMIN 4.3 06/08/2023    PROTTOTAL 6.6 06/08/2023    ALT 18 06/08/2023    AST 17 06/08/2023    ALKPHOS 81 06/08/2023    BILITOTAL 0.6 06/08/2023     OTHER:   Lab Results   Component Value Date    LACT 1.2 06/08/2023    A1C 5.0 06/23/2023    TAVIA 9.4 06/08/2023    PHOS 3.9 07/14/2022    MAG 1.8 06/08/2023    LIPASE 34 03/16/2023    TSH 4.27 (H) 06/08/2023    T4 1.27 06/08/2023    CRP <2.9 11/25/2017       Anesthesia Plan    ASA Status:  3    NPO Status:  NPO Appropriate    Anesthesia Type: General.     - Airway: Native airway   Induction: Intravenous.   Maintenance: TIVA.        Consents    Anesthesia Plan(s) and associated risks, benefits, and realistic alternatives discussed. Questions answered and patient/representative(s) expressed understanding.     - Discussed: Risks, Benefits and Alternatives for BOTH SEDATION and the PROCEDURE were discussed     - Discussed with:  Patient            Postoperative Care            Comments:                ANGELA Bob CRNA

## 2023-07-18 ENCOUNTER — HOSPITAL ENCOUNTER (OUTPATIENT)
Facility: CLINIC | Age: 68
Discharge: HOME | End: 2023-07-18
Attending: SURGERY | Admitting: SURGERY
Payer: COMMERCIAL

## 2023-07-18 ENCOUNTER — ANESTHESIA (OUTPATIENT)
Dept: GASTROENTEROLOGY | Facility: CLINIC | Age: 68
End: 2023-07-18
Payer: COMMERCIAL

## 2023-07-18 VITALS
OXYGEN SATURATION: 99 % | SYSTOLIC BLOOD PRESSURE: 156 MMHG | TEMPERATURE: 97.6 F | HEART RATE: 66 BPM | DIASTOLIC BLOOD PRESSURE: 84 MMHG | HEIGHT: 74 IN | RESPIRATION RATE: 14 BRPM | WEIGHT: 169.6 LBS | BODY MASS INDEX: 21.77 KG/M2

## 2023-07-18 DIAGNOSIS — Z12.11 SPECIAL SCREENING FOR MALIGNANT NEOPLASMS, COLON: Primary | ICD-10-CM

## 2023-07-18 LAB — COLONOSCOPY: NORMAL

## 2023-07-18 PROCEDURE — 250N000011 HC RX IP 250 OP 636: Performed by: NURSE ANESTHETIST, CERTIFIED REGISTERED

## 2023-07-18 PROCEDURE — 45378 DIAGNOSTIC COLONOSCOPY: CPT | Performed by: SURGERY

## 2023-07-18 PROCEDURE — 250N000009 HC RX 250

## 2023-07-18 PROCEDURE — 258N000003 HC RX IP 258 OP 636: Performed by: NURSE ANESTHETIST, CERTIFIED REGISTERED

## 2023-07-18 PROCEDURE — 250N000009 HC RX 250: Performed by: NURSE ANESTHETIST, CERTIFIED REGISTERED

## 2023-07-18 PROCEDURE — 45385 COLONOSCOPY W/LESION REMOVAL: CPT | Mod: PT | Performed by: SURGERY

## 2023-07-18 PROCEDURE — 370N000017 HC ANESTHESIA TECHNICAL FEE, PER MIN: Performed by: SURGERY

## 2023-07-18 PROCEDURE — 258N000003 HC RX IP 258 OP 636

## 2023-07-18 PROCEDURE — 88305 TISSUE EXAM BY PATHOLOGIST: CPT | Mod: 26 | Performed by: PATHOLOGY

## 2023-07-18 PROCEDURE — 88305 TISSUE EXAM BY PATHOLOGIST: CPT | Mod: TC | Performed by: SURGERY

## 2023-07-18 RX ORDER — LIDOCAINE 40 MG/G
CREAM TOPICAL
Status: DISCONTINUED | OUTPATIENT
Start: 2023-07-18 | End: 2023-07-18 | Stop reason: HOSPADM

## 2023-07-18 RX ORDER — PROPOFOL 10 MG/ML
INJECTION, EMULSION INTRAVENOUS CONTINUOUS PRN
Status: DISCONTINUED | OUTPATIENT
Start: 2023-07-18 | End: 2023-07-18

## 2023-07-18 RX ORDER — ONDANSETRON 2 MG/ML
4 INJECTION INTRAMUSCULAR; INTRAVENOUS EVERY 30 MIN PRN
Status: DISCONTINUED | OUTPATIENT
Start: 2023-07-18 | End: 2023-07-18 | Stop reason: HOSPADM

## 2023-07-18 RX ORDER — EPHEDRINE SULFATE 50 MG/ML
INJECTION, SOLUTION INTRAMUSCULAR; INTRAVENOUS; SUBCUTANEOUS PRN
Status: DISCONTINUED | OUTPATIENT
Start: 2023-07-18 | End: 2023-07-18

## 2023-07-18 RX ORDER — ONDANSETRON 4 MG/1
4 TABLET, ORALLY DISINTEGRATING ORAL EVERY 30 MIN PRN
Status: DISCONTINUED | OUTPATIENT
Start: 2023-07-18 | End: 2023-07-18 | Stop reason: HOSPADM

## 2023-07-18 RX ORDER — SODIUM CHLORIDE, SODIUM LACTATE, POTASSIUM CHLORIDE, CALCIUM CHLORIDE 600; 310; 30; 20 MG/100ML; MG/100ML; MG/100ML; MG/100ML
INJECTION, SOLUTION INTRAVENOUS CONTINUOUS PRN
Status: DISCONTINUED | OUTPATIENT
Start: 2023-07-18 | End: 2023-07-18

## 2023-07-18 RX ORDER — SODIUM CHLORIDE, SODIUM LACTATE, POTASSIUM CHLORIDE, CALCIUM CHLORIDE 600; 310; 30; 20 MG/100ML; MG/100ML; MG/100ML; MG/100ML
INJECTION, SOLUTION INTRAVENOUS CONTINUOUS
Status: DISCONTINUED | OUTPATIENT
Start: 2023-07-18 | End: 2023-07-18 | Stop reason: HOSPADM

## 2023-07-18 RX ORDER — LIDOCAINE HYDROCHLORIDE 20 MG/ML
INJECTION, SOLUTION INFILTRATION; PERINEURAL PRN
Status: DISCONTINUED | OUTPATIENT
Start: 2023-07-18 | End: 2023-07-18

## 2023-07-18 RX ADMIN — Medication 5 MG: at 14:26

## 2023-07-18 RX ADMIN — PROPOFOL 200 MCG/KG/MIN: 10 INJECTION, EMULSION INTRAVENOUS at 14:06

## 2023-07-18 RX ADMIN — SODIUM CHLORIDE, POTASSIUM CHLORIDE, SODIUM LACTATE AND CALCIUM CHLORIDE: 600; 310; 30; 20 INJECTION, SOLUTION INTRAVENOUS at 14:09

## 2023-07-18 RX ADMIN — Medication 10 MG: at 14:19

## 2023-07-18 RX ADMIN — Medication 10 MG: at 14:10

## 2023-07-18 RX ADMIN — LIDOCAINE HYDROCHLORIDE 0.3 ML: 10 INJECTION, SOLUTION EPIDURAL; INFILTRATION; INTRACAUDAL; PERINEURAL at 13:32

## 2023-07-18 RX ADMIN — SODIUM CHLORIDE, POTASSIUM CHLORIDE, SODIUM LACTATE AND CALCIUM CHLORIDE: 600; 310; 30; 20 INJECTION, SOLUTION INTRAVENOUS at 13:32

## 2023-07-18 RX ADMIN — LIDOCAINE HYDROCHLORIDE 60 MG: 20 INJECTION, SOLUTION INFILTRATION; PERINEURAL at 14:06

## 2023-07-18 ASSESSMENT — ACTIVITIES OF DAILY LIVING (ADL)
ADLS_ACUITY_SCORE: 33
ADLS_ACUITY_SCORE: 35

## 2023-07-18 NOTE — ANESTHESIA POSTPROCEDURE EVALUATION
Patient: Ghulam Rizzo    Procedure: Procedure(s):  Colonoscopy with polypectomy       Anesthesia Type:  General    Note:  Disposition: Outpatient   Postop Pain Control:             Sign Out: Well controlled pain   PONV:    Neuro/Psych:             Sign Out: Acceptable/Baseline neuro status   Airway/Respiratory:             Sign Out: Acceptable/Baseline resp. status   CV/Hemodynamics:             Sign Out: Acceptable CV status   Other NRE: NONE   DID A NON-ROUTINE EVENT OCCUR? No           Last vitals:  Vitals:    07/18/23 1304   BP: (!) 155/90   Pulse: 76   Resp: 17   Temp: 36.4  C (97.6  F)   SpO2: 100%       Electronically Signed By: ANGELA Ulloa CRNA  July 18, 2023  2:39 PM

## 2023-07-18 NOTE — H&P
ENDOSCOPY PRE-SEDATION H&P FOR OUTPATIENT PROCEDURES    Ghulam Rizzo  [id number]  1955    Procedure:   Colonoscopy possible biopsy, possible polypectomy, with MAC sedation.     Pre-procedure diagnosis: screen has prostate cancer.     Past medical history:   Past Medical History:   Diagnosis Date     Anisocoria      Asthma      Carpal tunnel syndrome     2006     Head injury, unspecified     closed head injury 4 yrs ago     Hepatitis C     Cured by [facility] in 2015. diagnosed in 2012 was positive for Hepatitis C.      Obesity     2006, resolved     Other convulsions     seizure disorder due to head injury 4 yrs ago.     Other mononeuritis of upper limb     L phrenic nerve paralysis     Unspecified essential hypertension        Past surgical history:   Past Surgical History:   Procedure Laterality Date     COLECTOMY WITHOUT COLOSTOMY Right 7/13/2022    Procedure: Laparotomy, right colectomy, lysis of adhesions,;  Surgeon: Tommy Martino DO;  Location: [facility]     COLONOSCOPY       GI SURGERY       HERNIA REPAIR       HERNIORRHAPHY INCISIONAL (LOCATION) N/A 7/13/2022    Procedure: primary incisional hernia repair;  Surgeon: Tommy Martino DO;  Location: [facility]     SURGICAL HISTORY OF -   4/6/87    esophagogastroduodenoscopy     SURGICAL HISTORY OF -   4/27/87    exploratory laparotomy and anterior gastropexy over a gastrostomy tube.     SURGICAL HISTORY OF -   11/13/90    esophagogastroduodenoscopy     SURGICAL HISTORY OF -   1991    L diaphragm eventration repair, fixation of stomach and colon to abd wall     SURGICAL HISTORY OF -       hiatal hernia repair     THORACIC SURGERY         Current Facility-Administered Medications   Medication     lactated ringers infusion     lidocaine (LMX4) kit     lidocaine 1 % 0.1-1 mL     ondansetron (ZOFRAN ODT) ODT tab 4 mg    Or     ondansetron (ZOFRAN) injection 4 mg     prochlorperazine (COMPAZINE) injection 5 mg     sodium chloride (PF) 0.9% PF flush 3 mL      sodium chloride (PF) 0.9% PF flush 3 mL       Allergies   Allergen Reactions     Levaquin [Levofloxacin] Nausea and Vomiting     Percocet [Oxycodone-Acetaminophen] Visual Disturbance     hallucinations       History of Anesthesia/Sedation Problems: no    Physical Exam:    Mental status: alert  Heart: Normal  Lung: Normal  Assessment of patient's airway: Normal  Other as pertinent for procedure: None     ASA Score: See Provation note    Mallampati score:  II - Faucial pillars and soft palate may be seen, but uvula is masked by the base of the tongue    Assessment/Plan:     The patient is an appropriate candidate to receive sedation.    Informed consent was discussed with the patient/family, including the risks, benefits, potential complications and any alternative options associated with sedation.    Patient assessment completed just prior to sedation and while under constant observation by the provider. Condition determined to be adequate for proceeding with sedation.    The specific risks for the procedure were discussed with the patient at the time of informed consent and include but are not limited to perforation which could require surgery, missing significant neoplasm or lesion, hemorrhage and adverse sedative complication.      Octavio Aguirre MD

## 2023-07-18 NOTE — LETTER
"July 20, 2023      Ghulam Rizzo  [address] [place] [phone]        Dear ,    We are writing to inform you of your test results.    Your pathology report was:  Normal, please follow up by having a repeat colonoscopy in 10 YEARS.      If you do have further questions please don t hesitate to call the below number.      To make an appointment Please call [phone], for  [facility] or  for [facility], to schedule a follow up appointment in 2 weeks.       Resulted Orders   Surgical Pathology Exam   Result Value Ref Range    Case Report       Surgical Pathology Report                         Case: [id number]                                  Authorizing Provider:  Octavio Aguirre MD Collected:           07/18/2023 02:32 PM          Ordering Location:     [facility]   Received:            07/19/2023 06:32 AM                                 [place]                                                                      Pathologist:           Isaias Hill MD PhD                                                      Specimen:    Large Intestine, Colon, at 10 cm                                                           Final Diagnosis       A. Colon, polyp at 10 cm, polypectomy:  -Hyperplastic polyp  -Negative for dysplasia or malignancy.        Clinical Information       Procedure:  Colonoscopy with polypectomy  Pre-op Diagnosis: Screen for colon cancer [Z12.11]  Post-op Diagnosis: Z12.11 - Screen for colon cancer [ICD-10-CM]      Gross Description       A(1). Large Intestine, Colon, at 10 cm:  The specimen is received in formalin, labeled with the patient's name, medical record number and other identifying information designated \"colon polyp at 10 cm\". It consists of 3 polypoid tissue fragments, 0.3-0.7 cm.  The largest fragment is inked black and trisected.  Entirely submitted in 1 cassette.   (ODALYS Shah)      Microscopic " Description       Microscopic examination was performed.      Performing Labs       The technical component of this testing was completed at [facility]      Case Images         If you have any questions or concerns, please call the clinic at the number listed above.       Sincerely,      Octavio Aguirre MD

## 2023-07-20 LAB
PATH REPORT.COMMENTS IMP SPEC: NORMAL
PATH REPORT.COMMENTS IMP SPEC: NORMAL
PATH REPORT.FINAL DX SPEC: NORMAL
PATH REPORT.GROSS SPEC: NORMAL
PATH REPORT.MICROSCOPIC SPEC OTHER STN: NORMAL
PATH REPORT.RELEVANT HX SPEC: NORMAL
PHOTO IMAGE: NORMAL

## 2023-07-21 ENCOUNTER — INFUSION THERAPY VISIT (OUTPATIENT)
Dept: INFUSION THERAPY | Facility: CLINIC | Age: 68
End: 2023-07-21
Attending: PHYSICIAN ASSISTANT
Payer: COMMERCIAL

## 2023-07-21 VITALS — HEART RATE: 78 BPM | SYSTOLIC BLOOD PRESSURE: 170 MMHG | RESPIRATION RATE: 16 BRPM | DIASTOLIC BLOOD PRESSURE: 93 MMHG

## 2023-07-21 DIAGNOSIS — C61 ADENOCARCINOMA OF PROSTATE (H): Primary | ICD-10-CM

## 2023-07-21 PROCEDURE — 250N000011 HC RX IP 250 OP 636: Mod: JZ | Performed by: PHYSICIAN ASSISTANT

## 2023-07-21 PROCEDURE — 96402 CHEMO HORMON ANTINEOPL SQ/IM: CPT

## 2023-07-21 RX ADMIN — LEUPROLIDE ACETATE 45 MG: KIT at 11:04

## 2023-07-21 NOTE — PROGRESS NOTES
Infusion Nursing Note:  Ghulam Rizzo presents today for Lupron.    Patient seen by provider today: No   present during visit today: Not Applicable.    Note: Lupron education given to pt viz [employer]. This is pt's 1st Lupron injection.      Intravenous Access:  N/A.    Treatment Conditions:  Not Applicable.      Post Infusion Assessment:  Patient tolerated injection without incident.       Discharge Plan:   Discharge instructions reviewed with: Patient and Family.  Patient and/or family verbalized understanding of discharge instructions and all questions answered.  Patient discharged in stable condition accompanied by: wife.  Departure Mode: Ambulatory.      Zulma Souza RN

## 2023-07-24 ENCOUNTER — PREP FOR PROCEDURE (OUTPATIENT)
Dept: UROLOGY | Facility: CLINIC | Age: 68
End: 2023-07-24
Payer: COMMERCIAL

## 2023-07-24 DIAGNOSIS — C61 PROSTATE CANCER (H): Primary | ICD-10-CM

## 2023-07-24 RX ORDER — CEFAZOLIN SODIUM 2 G/50ML
2 SOLUTION INTRAVENOUS SEE ADMIN INSTRUCTIONS
Status: CANCELLED | OUTPATIENT
Start: 2023-07-24

## 2023-07-24 RX ORDER — CEFAZOLIN SODIUM 2 G/50ML
2 SOLUTION INTRAVENOUS
Status: CANCELLED | OUTPATIENT
Start: 2023-07-24

## 2023-08-02 ENCOUNTER — TELEPHONE (OUTPATIENT)
Dept: UROLOGY | Facility: CLINIC | Age: 68
End: 2023-08-02
Payer: COMMERCIAL

## 2023-08-02 NOTE — TELEPHONE ENCOUNTER
Called patient to schedule surgery/procedure with Dr. Blount there was no answer, left voice message for patient to callback direct line to schedule, holding Wednesday August 30th for procedure.     Deric Jensen on 8/2/2023 at 12:06 PM

## 2023-08-06 DIAGNOSIS — C61 ADENOCARCINOMA OF PROSTATE (H): Primary | ICD-10-CM

## 2023-08-14 NOTE — TELEPHONE ENCOUNTER
Patient is scheduled for a surgical procedure with Dr. Blount      Spoke with: Patients wife Chacha via phone      Date of Surgery/Procedure: Wednesday August 30, 2023    [facility]      Informed patient they will need an adult : Yes     Pre-op: Yes      H&P: Patient to schedule    Additional imaging/appointments:     Additional comments:      Surgery packet: Sent via mail 8/14/23     Patient is aware that surgery time is tentative to change and to expect a call 3-1 business days from Pre Admission Nursing for instructions and arrival time

## 2023-08-15 DIAGNOSIS — C61 PROSTATE CANCER (H): Primary | ICD-10-CM

## 2023-08-21 ENCOUNTER — TELEPHONE (OUTPATIENT)
Dept: UROLOGY | Facility: CLINIC | Age: 68
End: 2023-08-21
Payer: COMMERCIAL

## 2023-08-21 NOTE — CONFIDENTIAL NOTE
VM for patient regarding need for H&P and UA before upcoming surgery.  This author's direct line provided for future questions/concerns.    Ej Hassan, RN  RN Care Coordinator - Urology

## 2023-08-25 ENCOUNTER — OFFICE VISIT (OUTPATIENT)
Dept: FAMILY MEDICINE | Facility: CLINIC | Age: 68
End: 2023-08-25
Payer: COMMERCIAL

## 2023-08-25 ENCOUNTER — TELEPHONE (OUTPATIENT)
Dept: NURSING | Facility: CLINIC | Age: 68
End: 2023-08-25

## 2023-08-25 VITALS
RESPIRATION RATE: 16 BRPM | BODY MASS INDEX: 20.57 KG/M2 | WEIGHT: 160.3 LBS | TEMPERATURE: 96.9 F | DIASTOLIC BLOOD PRESSURE: 90 MMHG | HEART RATE: 85 BPM | OXYGEN SATURATION: 97 % | SYSTOLIC BLOOD PRESSURE: 144 MMHG

## 2023-08-25 DIAGNOSIS — I10 BENIGN ESSENTIAL HYPERTENSION: ICD-10-CM

## 2023-08-25 DIAGNOSIS — Z01.818 PREOP GENERAL PHYSICAL EXAM: ICD-10-CM

## 2023-08-25 DIAGNOSIS — E55.9 VITAMIN D DEFICIENCY: ICD-10-CM

## 2023-08-25 DIAGNOSIS — C61 ADENOCARCINOMA OF PROSTATE (H): ICD-10-CM

## 2023-08-25 DIAGNOSIS — C61 PROSTATE CANCER (H): ICD-10-CM

## 2023-08-25 LAB
ALBUMIN UR-MCNC: ABNORMAL MG/DL
ALBUMIN UR-MCNC: ABNORMAL MG/DL
ANION GAP SERPL CALCULATED.3IONS-SCNC: 12 MMOL/L (ref 7–15)
APPEARANCE UR: ABNORMAL
APPEARANCE UR: ABNORMAL
BACTERIA #/AREA URNS HPF: ABNORMAL /HPF
BILIRUB UR QL STRIP: NEGATIVE
BILIRUB UR QL STRIP: NEGATIVE
BUN SERPL-MCNC: 12 MG/DL (ref 8–23)
CALCIUM SERPL-MCNC: 10.2 MG/DL (ref 8.8–10.2)
CAOX CRY #/AREA URNS HPF: ABNORMAL /HPF
CHLORIDE SERPL-SCNC: 99 MMOL/L (ref 98–107)
COLOR UR AUTO: YELLOW
COLOR UR AUTO: YELLOW
CREAT SERPL-MCNC: 0.79 MG/DL (ref 0.67–1.17)
DEPRECATED HCO3 PLAS-SCNC: 25 MMOL/L (ref 22–29)
GFR SERPL CREATININE-BSD FRML MDRD: >90 ML/MIN/1.73M2
GLUCOSE SERPL-MCNC: 105 MG/DL (ref 70–99)
GLUCOSE UR STRIP-MCNC: NEGATIVE MG/DL
GLUCOSE UR STRIP-MCNC: NEGATIVE MG/DL
HGB UR QL STRIP: ABNORMAL
HGB UR QL STRIP: ABNORMAL
KETONES UR STRIP-MCNC: NEGATIVE MG/DL
KETONES UR STRIP-MCNC: NEGATIVE MG/DL
LEUKOCYTE ESTERASE UR QL STRIP: ABNORMAL
LEUKOCYTE ESTERASE UR QL STRIP: ABNORMAL
MUCOUS THREADS #/AREA URNS LPF: PRESENT /LPF
NITRATE UR QL: NEGATIVE
NITRATE UR QL: NEGATIVE
PH UR STRIP: 6.5 [PH] (ref 5–7)
PH UR STRIP: 6.5 [PH] (ref 5–7)
POTASSIUM SERPL-SCNC: 4.3 MMOL/L (ref 3.4–5.3)
RBC #/AREA URNS AUTO: ABNORMAL /HPF
SODIUM SERPL-SCNC: 136 MMOL/L (ref 136–145)
SP GR UR STRIP: 1.02 (ref 1–1.03)
SP GR UR STRIP: 1.02 (ref 1–1.03)
SQUAMOUS #/AREA URNS AUTO: ABNORMAL /LPF
UROBILINOGEN UR STRIP-ACNC: 0.2 E.U./DL
UROBILINOGEN UR STRIP-ACNC: 0.2 E.U./DL
WBC #/AREA URNS AUTO: ABNORMAL /HPF

## 2023-08-25 PROCEDURE — 99214 OFFICE O/P EST MOD 30 MIN: CPT | Performed by: NURSE PRACTITIONER

## 2023-08-25 PROCEDURE — 80048 BASIC METABOLIC PNL TOTAL CA: CPT | Performed by: FAMILY MEDICINE

## 2023-08-25 PROCEDURE — 81001 URINALYSIS AUTO W/SCOPE: CPT | Performed by: NURSE PRACTITIONER

## 2023-08-25 PROCEDURE — 81003 URINALYSIS AUTO W/O SCOPE: CPT | Mod: 59 | Performed by: NURSE PRACTITIONER

## 2023-08-25 PROCEDURE — 82306 VITAMIN D 25 HYDROXY: CPT | Performed by: NURSE PRACTITIONER

## 2023-08-25 PROCEDURE — 36415 COLL VENOUS BLD VENIPUNCTURE: CPT | Performed by: NURSE PRACTITIONER

## 2023-08-25 RX ORDER — AMLODIPINE BESYLATE 2.5 MG/1
2.5 TABLET ORAL DAILY
Qty: 30 TABLET | Refills: 2 | Status: SHIPPED | OUTPATIENT
Start: 2023-08-25 | End: 2023-12-22

## 2023-08-25 ASSESSMENT — ANXIETY QUESTIONNAIRES
GAD7 TOTAL SCORE: 3
5. BEING SO RESTLESS THAT IT IS HARD TO SIT STILL: NOT AT ALL
7. FEELING AFRAID AS IF SOMETHING AWFUL MIGHT HAPPEN: NOT AT ALL
2. NOT BEING ABLE TO STOP OR CONTROL WORRYING: SEVERAL DAYS
IF YOU CHECKED OFF ANY PROBLEMS ON THIS QUESTIONNAIRE, HOW DIFFICULT HAVE THESE PROBLEMS MADE IT FOR YOU TO DO YOUR WORK, TAKE CARE OF THINGS AT HOME, OR GET ALONG WITH OTHER PEOPLE: NOT DIFFICULT AT ALL
3. WORRYING TOO MUCH ABOUT DIFFERENT THINGS: NOT AT ALL
4. TROUBLE RELAXING: NOT AT ALL
6. BECOMING EASILY ANNOYED OR IRRITABLE: SEVERAL DAYS
1. FEELING NERVOUS, ANXIOUS, OR ON EDGE: SEVERAL DAYS
GAD7 TOTAL SCORE: 3

## 2023-08-25 ASSESSMENT — PATIENT HEALTH QUESTIONNAIRE - PHQ9
SUM OF ALL RESPONSES TO PHQ QUESTIONS 1-9: 4
SUM OF ALL RESPONSES TO PHQ QUESTIONS 1-9: 4

## 2023-08-25 ASSESSMENT — PAIN SCALES - GENERAL: PAINLEVEL: NO PAIN (0)

## 2023-08-25 NOTE — LETTER
August 28, 2023      Ghulam Rizzo  [address] [place] [id number]        Dear ,    We are writing to inform you of your test results.    Vitamin D testing satisfactory.     Resulted Orders   Vitamin D Deficiency   Result Value Ref Range    Vitamin D, Total (25-Hydroxy) 44 20 - 75 ug/L    Narrative    Season, race, dietary intake, and treatment affect the concentration of 25-hydroxy-Vitamin D. Values may decrease during winter months and increase during summer months. Values 20-29 ug/L may indicate Vitamin D insufficiency and values <20 ug/L may indicate Vitamin D deficiency.    Vitamin D determination is routinely performed by an immunoassay specific for 25 hydroxyvitamin D3.  If an individual is on vitamin D2(ergocalciferol) supplementation, please specify 25 OH vitamin D2 and D3 level determination by LCMSMS test [id number].         If you have any questions or concerns, please call the clinic at the number listed above.       Sincerely,      Jamie Rutledge, DO

## 2023-08-25 NOTE — TELEPHONE ENCOUNTER
"Patient's wife calling, consent to communicate is in the chart and patient is with her. She is wondering about lab results from a pre-op exam.     Note from UA read to patient's wife as follows:    \"Urine is negative for infection, but positive for small amount of blood, might be due to his prostate. He can proceed with scheduled surgery     Rosa Alvarado, ANGELA CNP\"    Daija states satisfaction with results.     Charley Hoover RN  [facility]  August 25, 2023, 3:18 PM    "

## 2023-08-25 NOTE — PROGRESS NOTES
[facility]  [address] [place] [address]  Phone: [phone]  Primary Provider: Jamie Rutledge  Pre-op Performing Provider: JEN RAMÍREZ      PREOPERATIVE EVALUATION:  Today's date: 8/25/2023    Ghulam Rizzo is a 67 year old male who presents for a preoperative evaluation.      5/30/2023     3:12 PM   Additional Questions   Roomed by Gato MCNEAL   Accompanied by self       Surgical Information:  Surgery/Procedure: Transrectal ultrasound guided placement of fiducials and SpaceOar   Surgery Location: [facility]   Surgeon: DR Blount  Surgery Date: 8/30/23  Time of Surgery: 12:40 PM   Where patient plans to recover: At home with family  Fax number for surgical facility: Note does not need to be faxed, will be available electronically in Epic.    Assessment & Plan     The proposed surgical procedure is considered INTERMEDIATE risk.    Preop general physical exam    - UA Macroscopic with reflex to Microscopic and Culture - Lab Collect; Future  - Basic metabolic panel  (Ca, Cl, CO2, Creat, Gluc, K, Na, BUN); Future    Adenocarcinoma of prostate (H)  -cleared for surgery   - UA Macroscopic with reflex to Microscopic and Culture - Lab Collect; Future  - Basic metabolic panel  (Ca, Cl, CO2, Creat, Gluc, K, Na, BUN); Future    Benign essential hypertension  -BP was slightly elevated during visit, patient reports BP at home ranging from normal in the evenings to 140-150/90 in the mornings, he tried Lisinopril 10 mg daily, but had to stop it due to dizziness  -recommended to start Amlodipine, low dose 2.5 mg daily, monitor BP at home, patient was advised that he can proceed with scheduled surgery as long as his BP stay's below 150/90, hopefully will improve with Amlodipine   - amLODIPine (NORVASC) 2.5 MG tablet; Take 1 tablet (2.5 mg) by mouth daily  - Basic metabolic panel  (Ca, Cl, CO2, Creat, Gluc, K, Na, BUN); Future     - No identified additional risk factors other than  previously addressed    Antiplatelet or Anticoagulation Medication Instructions:   - Patient is on no antiplatelet or anticoagulation medications.    Additional Medication Instructions:  Patient is to take all scheduled medications on the day of surgery    RECOMMENDATION:  APPROVAL GIVEN to proceed with proposed procedure, without further diagnostic evaluation.      Subjective       HPI related to upcoming procedure: history of prostate cancer         8/25/2023     1:11 PM   Preop Questions   1. Have you ever had a heart attack or stroke? No   2. Have you ever had surgery on your heart or blood vessels, such as a stent placement, a coronary artery bypass, or surgery on an artery in your head, neck, heart, or legs? No   3. Do you have chest pain with activity? No   4. Do you have a history of  heart failure? No   5. Do you currently have a cold, bronchitis or symptoms of other infection? No   6. Do you have a cough, shortness of breath, or wheezing? YES -dry cough, mild    7. Do you or anyone in your family have previous history of blood clots? No   8. Do you or does anyone in your family have a serious bleeding problem such as prolonged bleeding following surgeries or cuts? No   9. Have you ever had problems with anemia or been told to take iron pills? No   10. Have you had any abnormal blood loss such as black, tarry or bloody stools? No   11. Have you ever had a blood transfusion? No   12. Are you willing to have a blood transfusion if it is medically needed before, during, or after your surgery? Yes   13. Have you or any of your relatives ever had problems with anesthesia? No   14. Do you have sleep apnea, excessive snoring or daytime drowsiness? No   15. Do you have any artifical heart valves or other implanted medical devices like a pacemaker, defibrillator, or continuous glucose monitor? No   16. Do you have artificial joints? No   17. Are you allergic to latex? No         Preoperative Review of :    reviewed - no record of controlled substances prescribed.      Status of Chronic Conditions:  See problem list for active medical problems.  Problems all longstanding and stable, except as noted/documented.  See ROS for pertinent symptoms related to these conditions.    Review of Systems  Constitutional, neuro, ENT, endocrine, pulmonary, cardiac, gastrointestinal, genitourinary, musculoskeletal, integument and psychiatric systems are negative, except as otherwise noted.    Patient Active Problem List    Diagnosis Date Noted    Vertigo 07/05/2023     Priority: Medium    Adenocarcinoma of prostate (H) 05/16/2023     Priority: Medium    Compression fracture of L4 vertebra (H) 05/09/2023     Priority: Medium    Compression fracture of L3 lumbar vertebra, with routine healing, subsequent encounter 05/09/2023     Priority: Medium    Benign essential hypertension 03/01/2023     Priority: Medium    Saccular aneurysm 02/07/2023     Priority: Medium    Cecal volvulus (H) 07/13/2022     Priority: Medium    Elevated prostate specific antigen (PSA) 02/09/2022     Priority: Medium    Other irritable bowel syndrome 11/07/2019     Priority: Medium    Screening for hypertension 11/07/2019     Priority: Medium    Proctitis 01/26/2017     Priority: Medium     Colonoscopy in 2013.       Major depressive disorder, recurrent episode, moderate (H) 01/26/2017     Priority: Medium     Paxil caused anorgasmia      Cirrhosis of liver without ascites (H) 09/23/2015     Priority: Medium     Thought due to hepatitis C.  Follows with [facility].  Completed [facility] 9/2015.      Abdominal pain, generalized 08/20/2015     Priority: Medium    Advanced directives, counseling/discussion 09/14/2012     Priority: Medium     Discussed advance care planning with patient; however, patient declined at this time. 9/14/2012         Hyperlipidemia LDL goal <130 07/16/2012     Priority: Medium    Tobacco abuse 07/16/2012     Priority: Medium      Past Medical  History:   Diagnosis Date    Anisocoria     Asthma     Carpal tunnel syndrome     2006    Head injury, unspecified     closed head injury 4 yrs ago    Hepatitis C     Cured by Harvoni in 2015. diagnosed in 2012 was positive for Hepatitis C.     Obesity     2006, resolved    Other convulsions     seizure disorder due to head injury 4 yrs ago.    Other mononeuritis of upper limb     L phrenic nerve paralysis    Unspecified essential hypertension      Past Surgical History:   Procedure Laterality Date    COLECTOMY WITHOUT COLOSTOMY Right 7/13/2022    Procedure: Laparotomy, right colectomy, lysis of adhesions,;  Surgeon: Tommy Martino DO;  Location: [facility]    COLONOSCOPY      COLONOSCOPY N/A 7/18/2023    Procedure: Colonoscopy with polypectomy;  Surgeon: Octavio Aguirre MD;  Location: [facility]    GI SURGERY      HERNIA REPAIR      HERNIORRHAPHY INCISIONAL (LOCATION) N/A 7/13/2022    Procedure: primary incisional hernia repair;  Surgeon: Tommy Martino DO;  Location: [facility]    SURGICAL HISTORY OF -   4/6/87    esophagogastroduodenoscopy    SURGICAL HISTORY OF -   4/27/87    exploratory laparotomy and anterior gastropexy over a gastrostomy tube.    SURGICAL HISTORY OF -   11/13/90    esophagogastroduodenoscopy    SURGICAL HISTORY OF -   1991    L diaphragm eventration repair, fixation of stomach and colon to abd wall    SURGICAL HISTORY OF -       hiatal hernia repair    THORACIC SURGERY       Current Outpatient Medications   Medication Sig Dispense Refill    acetaminophen (TYLENOL) 500 MG tablet Take 1,000 mg by mouth every 6 hours as needed for mild pain      amLODIPine (NORVASC) 2.5 MG tablet Take 1 tablet (2.5 mg) by mouth daily 30 tablet 2    Calcium Carbonate (CALCIUM-CARB 600 PO) Take 2 tablets by mouth daily      glycerin (LAXATIVE) 1.2 g suppository Place 1 suppository rectally daily as needed (constipation.) 25 suppository 1    polyethylene glycol (MIRALAX) 17 GM/Dose powder Take 17 g (1  capful.) by mouth daily (Patient taking differently: Take 1 capful. by mouth 2 times daily) 578 g 1    senna-docusate (NEGRITO-COLACE) 8.6-50 MG per tablet Take 1-2 tablets by mouth 2 times daily as needed for constipation. 100 tablet 5    sertraline (ZOLOFT) 50 MG tablet TAKE 1 & 1/2 TABLETS BY MOUTH EVERY  tablet 3    vitamin D3 (CHOLECALCIFEROL) 50 mcg (2000 units) tablet Take 1 tablet (50 mcg) by mouth daily 90 tablet 3    bisacodyl (DULCOLAX) 5 MG EC tablet 2 days prior to procedure, take 2 tablets at 4 pm. 1 day prior to procedure, take 2 tablets at 4 pm. For additional instructions refer to your colonoscopy prep instructions. (Patient not taking: Reported on 8/25/2023) 4 tablet 0    bisacodyl (DULCOLAX) 5 MG EC tablet Take 2 tablets at 3 pm the day before your procedure. If your procedure is before 11 am, take 2 additional tablets at 11 pm. If your procedure is after 11 am, take 2 additional tablets at 6 am. For additional instructions refer to your colonoscopy prep instructions. (Patient not taking: Reported on 8/25/2023) 4 tablet 0    calcitonin, salmon, (MIACALCIN) 200 UNIT/ACT nasal spray Spray 1 spray into one nostril alternating nostrils daily for 30 days Alternate nostril each day. 2.7 mL 0    polyethylene glycol (GOLYTELY) 236 g suspension 2 days prior at 5pm, mix and drink half of a jug of Golytely. Drink an 8 oz. glass of Golytely every 15 minutes until half of the jug is gone. Place remainder of Golytely in the refrigerator. 1 day prior at 5 pm, drink the 2nd half of a jug of Golytely bowel prep. 6 hours before your check-in time, drink an 8 oz. glass of Golytely every 15 minutes until half of the 2nd jug of Golytely is gone. Discard remainder of second jug. (Patient not taking: Reported on 8/25/2023) 8000 mL 0    polyethylene glycol (GOLYTELY) 236 g suspension The night before the exam at 6 pm drink an 8-ounce glass every 15 minutes until the jug is half empty. If you arrive before 11 AM:  Drink the other half of the Golytely jug at 11 PM night before procedure. If you arrive after 11 AM: Drink the other half of the Golytely jug at 6 AM day of procedure. For additional instructions refer to your colonoscopy prep instructions. (Patient not taking: Reported on 8/25/2023) 4000 mL 0       Allergies   Allergen Reactions    Levaquin [Levofloxacin] Nausea and Vomiting    Percocet [Oxycodone-Acetaminophen] Visual Disturbance     hallucinations        Social History     Tobacco Use    Smoking status: Every Day     Packs/day: 0.50     Years: 50.00     Pack years: 25.00     Types: Cigarettes    Smokeless tobacco: Never    Tobacco comments:     started again 4/2010   Substance Use Topics    Alcohol use: Yes     Comment: 1-2 beers daily       History   Drug Use No         Objective     BP (!) 144/90   Pulse 85   Temp 96.9  F (36.1  C) (Tympanic)   Resp 16   Wt 72.7 kg (160 lb 4.8 oz)   SpO2 97%   BMI 20.57 kg/m      Physical Exam    GENERAL APPEARANCE: healthy, alert and no distress     EYES: EOMI,  PERRL     NECK: no adenopathy, no asymmetry, masses, or scars and thyroid normal to palpation     RESP: lungs clear to auscultation - no rales, rhonchi or wheezes     CV: regular rates and rhythm, normal S1 S2, no S3 or S4 and no murmur, click or rub     MS: extremities normal- no gross deformities noted, no evidence of inflammation in joints, FROM in all extremities.     SKIN: no suspicious lesions or rashes     NEURO: Normal strength and tone, sensory exam grossly normal, mentation intact and speech normal     PSYCH: mentation appears normal. and affect normal/bright     LYMPHATICS: No cervical adenopathy    Recent Labs   Lab Test 06/23/23  1437 06/08/23  1201 04/17/23  1213 03/16/23  1550 01/21/23  1316 01/16/23  1452 07/13/22  1402 02/08/22  1447   HGB  --  14.0  --  16.1   < > 15.5   < >  --    PLT  --  169  --  220   < > 178   < >  --    INR  --   --   --   --   --  1.09  --   --    NA  --  130* 134* 128*    < > 137   < >  --    POTASSIUM  --  4.6 4.9 4.9   < > 4.5   < >  --    CR  --  0.70 0.82 0.71   < > 0.68   < >  --    A1C 5.0  --   --   --   --   --   --  5.4    < > = values in this interval not displayed.        Diagnostics:  Labs pending at this time.  Results will be reviewed when available.   No EKG this visit, completed in the last 90 days.    Revised Cardiac Risk Index (RCRI):  The patient has the following serious cardiovascular risks for perioperative complications:   - No serious cardiac risks = 0 points     RCRI Interpretation: 0 points: Class I (very low risk - 0.4% complication rate)         Signed Electronically by: ANGELA Herring CNP  Copy of this evaluation report is provided to requesting physician.    Answers submitted by the patient for this visit:  Patient Health Questionnaire (Submitted on 8/25/2023)  PHQ9 TOTAL SCORE: 4  KSENIA-7 (Submitted on 8/25/2023)  KSENIA 7 TOTAL SCORE: 3

## 2023-08-25 NOTE — LETTER
August 28, 2023      Ghulam Rizzo  [address] [place] [id number]        Dear ,    We are writing to inform you of your test results.      Urine is negative for infection, but positive for small amount of blood, might be due to his prostate. He can proceed with scheduled surgery     Resulted Orders   Vitamin D Deficiency   Result Value Ref Range    Vitamin D, Total (25-Hydroxy) 44 20 - 75 ug/L    Narrative    Season, race, dietary intake, and treatment affect the concentration of 25-hydroxy-Vitamin D. Values may decrease during winter months and increase during summer months. Values 20-29 ug/L may indicate Vitamin D insufficiency and values <20 ug/L may indicate Vitamin D deficiency.    Vitamin D determination is routinely performed by an immunoassay specific for 25 hydroxyvitamin D3.  If an individual is on vitamin D2(ergocalciferol) supplementation, please specify 25 OH vitamin D2 and D3 level determination by LCMSMS test [id number].     UA Macroscopic with reflex to Microscopic and Culture - Lab Collect   Result Value Ref Range    Color Urine Yellow Colorless, Straw, Light Yellow, Yellow    Appearance Urine Slightly Cloudy (A) Clear    Glucose Urine Negative Negative mg/dL    Bilirubin Urine Negative Negative    Ketones Urine Negative Negative mg/dL    Specific Gravity Urine 1.020 1.003 - 1.035    Blood Urine Small (A) Negative    pH Urine 6.5 5.0 - 7.0    Protein Albumin Urine Trace (A) Negative mg/dL    Urobilinogen Urine 0.2 0.2, 1.0 E.U./dL    Nitrite Urine Negative Negative    Leukocyte Esterase Urine Trace (A) Negative   UA Microscopic with Reflex to Culture   Result Value Ref Range    Bacteria Urine Few (A) None Seen /HPF    RBC Urine 2-5 (A) 0-2 /HPF /HPF    WBC Urine 0-5 0-5 /HPF /HPF    Squamous Epithelials Urine Few (A) None Seen /LPF    Mucus Urine Present (A) None Seen /LPF    Calcium Oxalate Crystals Urine Few (A) None Seen /HPF    Narrative    Urine Culture not indicated       If you  have any questions or concerns, please call the clinic at the number listed above.       Sincerely,      ANGELA Carreon CNP

## 2023-08-25 NOTE — H&P (VIEW-ONLY)
[facility]  [address] [facility] [address]  Phone: [phone]  Primary Provider: Jamie Rutledge  Pre-op Performing Provider: JEN RAMÍREZ      PREOPERATIVE EVALUATION:  Today's date: 8/25/2023    Ghulam Rizzo is a 67 year old male who presents for a preoperative evaluation.      5/30/2023     3:12 PM   Additional Questions   Roomed by Gato MCNEAL   Accompanied by self       Surgical Information:  Surgery/Procedure: Transrectal ultrasound guided placement of fiducials and SpaceOar   Surgery Location: [facility]   Surgeon: DR Blount  Surgery Date: 8/30/23  Time of Surgery: 12:40 PM   Where patient plans to recover: At home with family  Fax number for surgical facility: Note does not need to be faxed, will be available electronically in Epic.    Assessment & Plan     The proposed surgical procedure is considered INTERMEDIATE risk.    Preop general physical exam    - UA Macroscopic with reflex to Microscopic and Culture - Lab Collect; Future  - Basic metabolic panel  (Ca, Cl, CO2, Creat, Gluc, K, Na, BUN); Future    Adenocarcinoma of prostate (H)  -cleared for surgery   - UA Macroscopic with reflex to Microscopic and Culture - Lab Collect; Future  - Basic metabolic panel  (Ca, Cl, CO2, Creat, Gluc, K, Na, BUN); Future    Benign essential hypertension  -BP was slightly elevated during visit, patient reports BP at home ranging from normal in the evenings to 140-150/90 in the mornings, he tried Lisinopril 10 mg daily, but had to stop it due to dizziness  -recommended to start Amlodipine, low dose 2.5 mg daily, monitor BP at home, patient was advised that he can proceed with scheduled surgery as long as his BP stay's below 150/90, hopefully will improve with Amlodipine   - amLODIPine (NORVASC) 2.5 MG tablet; Take 1 tablet (2.5 mg) by mouth daily  - Basic metabolic panel  (Ca, Cl, CO2, Creat, Gluc, K, Na, BUN); Future     - No identified additional risk factors other than  previously addressed    Antiplatelet or Anticoagulation Medication Instructions:   - Patient is on no antiplatelet or anticoagulation medications.    Additional Medication Instructions:  Patient is to take all scheduled medications on the day of surgery    RECOMMENDATION:  APPROVAL GIVEN to proceed with proposed procedure, without further diagnostic evaluation.      Subjective       HPI related to upcoming procedure: history of prostate cancer         8/25/2023     1:11 PM   Preop Questions   1. Have you ever had a heart attack or stroke? No   2. Have you ever had surgery on your heart or blood vessels, such as a stent placement, a coronary artery bypass, or surgery on an artery in your head, neck, heart, or legs? No   3. Do you have chest pain with activity? No   4. Do you have a history of  heart failure? No   5. Do you currently have a cold, bronchitis or symptoms of other infection? No   6. Do you have a cough, shortness of breath, or wheezing? YES -dry cough, mild    7. Do you or anyone in your family have previous history of blood clots? No   8. Do you or does anyone in your family have a serious bleeding problem such as prolonged bleeding following surgeries or cuts? No   9. Have you ever had problems with anemia or been told to take iron pills? No   10. Have you had any abnormal blood loss such as black, tarry or bloody stools? No   11. Have you ever had a blood transfusion? No   12. Are you willing to have a blood transfusion if it is medically needed before, during, or after your surgery? Yes   13. Have you or any of your relatives ever had problems with anesthesia? No   14. Do you have sleep apnea, excessive snoring or daytime drowsiness? No   15. Do you have any artifical heart valves or other implanted medical devices like a pacemaker, defibrillator, or continuous glucose monitor? No   16. Do you have artificial joints? No   17. Are you allergic to latex? No         Preoperative Review of :    reviewed - no record of controlled substances prescribed.      Status of Chronic Conditions:  See problem list for active medical problems.  Problems all longstanding and stable, except as noted/documented.  See ROS for pertinent symptoms related to these conditions.    Review of Systems  Constitutional, neuro, ENT, endocrine, pulmonary, cardiac, gastrointestinal, genitourinary, musculoskeletal, integument and psychiatric systems are negative, except as otherwise noted.    Patient Active Problem List    Diagnosis Date Noted    Vertigo 07/05/2023     Priority: Medium    Adenocarcinoma of prostate (H) 05/16/2023     Priority: Medium    Compression fracture of L4 vertebra (H) 05/09/2023     Priority: Medium    Compression fracture of L3 lumbar vertebra, with routine healing, subsequent encounter 05/09/2023     Priority: Medium    Benign essential hypertension 03/01/2023     Priority: Medium    Saccular aneurysm 02/07/2023     Priority: Medium    Cecal volvulus (H) 07/13/2022     Priority: Medium    Elevated prostate specific antigen (PSA) 02/09/2022     Priority: Medium    Other irritable bowel syndrome 11/07/2019     Priority: Medium    Screening for hypertension 11/07/2019     Priority: Medium    Proctitis 01/26/2017     Priority: Medium     Colonoscopy in 2013.       Major depressive disorder, recurrent episode, moderate (H) 01/26/2017     Priority: Medium     Paxil caused anorgasmia      Cirrhosis of liver without ascites (H) 09/23/2015     Priority: Medium     Thought due to hepatitis C.  Follows with [facility].  Completed [facility] 9/2015.      Abdominal pain, generalized 08/20/2015     Priority: Medium    Advanced directives, counseling/discussion 09/14/2012     Priority: Medium     Discussed advance care planning with patient; however, patient declined at this time. 9/14/2012         Hyperlipidemia LDL goal <130 07/16/2012     Priority: Medium    Tobacco abuse 07/16/2012     Priority: Medium      Past Medical  History:   Diagnosis Date    Anisocoria     Asthma     Carpal tunnel syndrome     2006    Head injury, unspecified     closed head injury 4 yrs ago    Hepatitis C     Cured by [facility] in 2015. diagnosed in 2012 was positive for Hepatitis C.     Obesity     2006, resolved    Other convulsions     seizure disorder due to head injury 4 yrs ago.    Other mononeuritis of upper limb     L phrenic nerve paralysis    Unspecified essential hypertension      Past Surgical History:   Procedure Laterality Date    COLECTOMY WITHOUT COLOSTOMY Right 7/13/2022    Procedure: Laparotomy, right colectomy, lysis of adhesions,;  Surgeon: Tommy Martino DO;  Location: [facility]    COLONOSCOPY      COLONOSCOPY N/A 7/18/2023    Procedure: Colonoscopy with polypectomy;  Surgeon: Octavio Aguirre MD;  Location: [facility]    GI SURGERY      HERNIA REPAIR      HERNIORRHAPHY INCISIONAL (LOCATION) N/A 7/13/2022    Procedure: primary incisional hernia repair;  Surgeon: Tommy Martino DO;  Location: [facility]    SURGICAL HISTORY OF -   4/6/87    esophagogastroduodenoscopy    SURGICAL HISTORY OF -   4/27/87    exploratory laparotomy and anterior gastropexy over a gastrostomy tube.    SURGICAL HISTORY OF -   11/13/90    esophagogastroduodenoscopy    SURGICAL HISTORY OF -   1991    L diaphragm eventration repair, fixation of stomach and colon to abd wall    SURGICAL HISTORY OF -       hiatal hernia repair    THORACIC SURGERY       Current Outpatient Medications   Medication Sig Dispense Refill    acetaminophen (TYLENOL) 500 MG tablet Take 1,000 mg by mouth every 6 hours as needed for mild pain      amLODIPine (NORVASC) 2.5 MG tablet Take 1 tablet (2.5 mg) by mouth daily 30 tablet 2    Calcium Carbonate (CALCIUM-CARB 600 PO) Take 2 tablets by mouth daily      glycerin (LAXATIVE) 1.2 g suppository Place 1 suppository rectally daily as needed (constipation.) 25 suppository 1    polyethylene glycol (MIRALAX) 17 GM/Dose powder Take 17 g (1  capful.) by mouth daily (Patient taking differently: Take 1 capful. by mouth 2 times daily) 578 g 1    senna-docusate (NEGRITO-COLACE) 8.6-50 MG per tablet Take 1-2 tablets by mouth 2 times daily as needed for constipation. 100 tablet 5    sertraline (ZOLOFT) 50 MG tablet TAKE 1 & 1/2 TABLETS BY MOUTH EVERY  tablet 3    vitamin D3 (CHOLECALCIFEROL) 50 mcg (2000 units) tablet Take 1 tablet (50 mcg) by mouth daily 90 tablet 3    bisacodyl (DULCOLAX) 5 MG EC tablet 2 days prior to procedure, take 2 tablets at 4 pm. 1 day prior to procedure, take 2 tablets at 4 pm. For additional instructions refer to your colonoscopy prep instructions. (Patient not taking: Reported on 8/25/2023) 4 tablet 0    bisacodyl (DULCOLAX) 5 MG EC tablet Take 2 tablets at 3 pm the day before your procedure. If your procedure is before 11 am, take 2 additional tablets at 11 pm. If your procedure is after 11 am, take 2 additional tablets at 6 am. For additional instructions refer to your colonoscopy prep instructions. (Patient not taking: Reported on 8/25/2023) 4 tablet 0    calcitonin, salmon, (MIACALCIN) 200 UNIT/ACT nasal spray Spray 1 spray into one nostril alternating nostrils daily for 30 days Alternate nostril each day. 2.7 mL 0    polyethylene glycol (GOLYTELY) 236 g suspension 2 days prior at 5pm, mix and drink half of a jug of Golytely. Drink an 8 oz. glass of Golytely every 15 minutes until half of the jug is gone. Place remainder of Golytely in the refrigerator. 1 day prior at 5 pm, drink the 2nd half of a jug of Golytely bowel prep. 6 hours before your check-in time, drink an 8 oz. glass of Golytely every 15 minutes until half of the 2nd jug of Golytely is gone. Discard remainder of second jug. (Patient not taking: Reported on 8/25/2023) 8000 mL 0    polyethylene glycol (GOLYTELY) 236 g suspension The night before the exam at 6 pm drink an 8-ounce glass every 15 minutes until the jug is half empty. If you arrive before 11 AM:  Drink the other half of the Golytely jug at 11 PM night before procedure. If you arrive after 11 AM: Drink the other half of the Golytely jug at 6 AM day of procedure. For additional instructions refer to your colonoscopy prep instructions. (Patient not taking: Reported on 8/25/2023) 4000 mL 0       Allergies   Allergen Reactions    Levaquin [Levofloxacin] Nausea and Vomiting    Percocet [Oxycodone-Acetaminophen] Visual Disturbance     hallucinations        Social History     Tobacco Use    Smoking status: Every Day     Packs/day: 0.50     Years: 50.00     Pack years: 25.00     Types: Cigarettes    Smokeless tobacco: Never    Tobacco comments:     started again 4/2010   Substance Use Topics    Alcohol use: Yes     Comment: 1-2 beers daily       History   Drug Use No         Objective     BP (!) 144/90   Pulse 85   Temp 96.9  F (36.1  C) (Tympanic)   Resp 16   Wt 72.7 kg (160 lb 4.8 oz)   SpO2 97%   BMI 20.57 kg/m      Physical Exam    GENERAL APPEARANCE: healthy, alert and no distress     EYES: EOMI,  PERRL     NECK: no adenopathy, no asymmetry, masses, or scars and thyroid normal to palpation     RESP: lungs clear to auscultation - no rales, rhonchi or wheezes     CV: regular rates and rhythm, normal S1 S2, no S3 or S4 and no murmur, click or rub     MS: extremities normal- no gross deformities noted, no evidence of inflammation in joints, FROM in all extremities.     SKIN: no suspicious lesions or rashes     NEURO: Normal strength and tone, sensory exam grossly normal, mentation intact and speech normal     PSYCH: mentation appears normal. and affect normal/bright     LYMPHATICS: No cervical adenopathy    Recent Labs   Lab Test 06/23/23  1437 06/08/23  1201 04/17/23  1213 03/16/23  1550 01/21/23  1316 01/16/23  1452 07/13/22  1402 02/08/22  1447   HGB  --  14.0  --  16.1   < > 15.5   < >  --    PLT  --  169  --  220   < > 178   < >  --    INR  --   --   --   --   --  1.09  --   --    NA  --  130* 134* 128*    < > 137   < >  --    POTASSIUM  --  4.6 4.9 4.9   < > 4.5   < >  --    CR  --  0.70 0.82 0.71   < > 0.68   < >  --    A1C 5.0  --   --   --   --   --   --  5.4    < > = values in this interval not displayed.        Diagnostics:  Labs pending at this time.  Results will be reviewed when available.   No EKG this visit, completed in the last 90 days.    Revised Cardiac Risk Index (RCRI):  The patient has the following serious cardiovascular risks for perioperative complications:   - No serious cardiac risks = 0 points     RCRI Interpretation: 0 points: Class I (very low risk - 0.4% complication rate)         Signed Electronically by: ANGELA Herring CNP  Copy of this evaluation report is provided to requesting physician.    Answers submitted by the patient for this visit:  Patient Health Questionnaire (Submitted on 8/25/2023)  PHQ9 TOTAL SCORE: 4  KSENIA-7 (Submitted on 8/25/2023)  KSENIA 7 TOTAL SCORE: 3

## 2023-08-26 LAB — DEPRECATED CALCIDIOL+CALCIFEROL SERPL-MC: 44 UG/L (ref 20–75)

## 2023-08-30 ENCOUNTER — HOSPITAL ENCOUNTER (OUTPATIENT)
Facility: CLINIC | Age: 68
Discharge: HOME | End: 2023-08-30
Attending: UROLOGY | Admitting: UROLOGY
Payer: COMMERCIAL

## 2023-08-30 ENCOUNTER — ANESTHESIA EVENT (OUTPATIENT)
Dept: SURGERY | Facility: CLINIC | Age: 68
End: 2023-08-30
Payer: COMMERCIAL

## 2023-08-30 ENCOUNTER — ANESTHESIA (OUTPATIENT)
Dept: SURGERY | Facility: CLINIC | Age: 68
End: 2023-08-30
Payer: COMMERCIAL

## 2023-08-30 VITALS
SYSTOLIC BLOOD PRESSURE: 151 MMHG | TEMPERATURE: 97.7 F | RESPIRATION RATE: 13 BRPM | OXYGEN SATURATION: 100 % | DIASTOLIC BLOOD PRESSURE: 75 MMHG | BODY MASS INDEX: 20.46 KG/M2 | WEIGHT: 159.39 LBS | HEIGHT: 74 IN | HEART RATE: 61 BPM

## 2023-08-30 LAB — GLUCOSE BLDC GLUCOMTR-MCNC: 101 MG/DL (ref 70–99)

## 2023-08-30 PROCEDURE — C1889 IMPLANT/INSERT DEVICE, NOC: HCPCS | Performed by: UROLOGY

## 2023-08-30 PROCEDURE — 360N000075 HC SURGERY LEVEL 2, PER MIN: Performed by: UROLOGY

## 2023-08-30 PROCEDURE — 250N000009 HC RX 250: Performed by: NURSE ANESTHETIST, CERTIFIED REGISTERED

## 2023-08-30 PROCEDURE — 55874 TPRNL PLMT BIODEGRDABL MATRL: CPT | Mod: GC | Performed by: UROLOGY

## 2023-08-30 PROCEDURE — 710N000012 HC RECOVERY PHASE 2, PER MINUTE: Performed by: UROLOGY

## 2023-08-30 PROCEDURE — 82962 GLUCOSE BLOOD TEST: CPT

## 2023-08-30 PROCEDURE — 370N000017 HC ANESTHESIA TECHNICAL FEE, PER MIN: Performed by: UROLOGY

## 2023-08-30 PROCEDURE — 250N000011 HC RX IP 250 OP 636: Performed by: UROLOGY

## 2023-08-30 PROCEDURE — 55876 PLACE RT DEVICE/MARKER PROS: CPT | Mod: GC | Performed by: UROLOGY

## 2023-08-30 PROCEDURE — 258N000003 HC RX IP 258 OP 636: Performed by: NURSE ANESTHETIST, CERTIFIED REGISTERED

## 2023-08-30 PROCEDURE — 999N000141 HC STATISTIC PRE-PROCEDURE NURSING ASSESSMENT: Performed by: UROLOGY

## 2023-08-30 PROCEDURE — 250N000011 HC RX IP 250 OP 636: Performed by: NURSE ANESTHETIST, CERTIFIED REGISTERED

## 2023-08-30 PROCEDURE — 250N000011 HC RX IP 250 OP 636: Mod: JZ | Performed by: NURSE ANESTHETIST, CERTIFIED REGISTERED

## 2023-08-30 DEVICE — KIT NDL RAD POLYMARK MARKER PM-1.0-3-18-20: Type: IMPLANTABLE DEVICE | Site: PROSTATE | Status: FUNCTIONAL

## 2023-08-30 RX ORDER — DIMENHYDRINATE 50 MG/ML
25 INJECTION, SOLUTION INTRAMUSCULAR; INTRAVENOUS
Status: DISCONTINUED | OUTPATIENT
Start: 2023-08-30 | End: 2023-08-30 | Stop reason: HOSPADM

## 2023-08-30 RX ORDER — FENTANYL CITRATE 50 UG/ML
25 INJECTION, SOLUTION INTRAMUSCULAR; INTRAVENOUS EVERY 5 MIN PRN
Status: DISCONTINUED | OUTPATIENT
Start: 2023-08-30 | End: 2023-08-30 | Stop reason: HOSPADM

## 2023-08-30 RX ORDER — PROPOFOL 10 MG/ML
INJECTION, EMULSION INTRAVENOUS PRN
Status: DISCONTINUED | OUTPATIENT
Start: 2023-08-30 | End: 2023-08-30

## 2023-08-30 RX ORDER — FENTANYL CITRATE 50 UG/ML
INJECTION, SOLUTION INTRAMUSCULAR; INTRAVENOUS PRN
Status: DISCONTINUED | OUTPATIENT
Start: 2023-08-30 | End: 2023-08-30

## 2023-08-30 RX ORDER — ONDANSETRON 4 MG/1
4 TABLET, ORALLY DISINTEGRATING ORAL EVERY 30 MIN PRN
Status: DISCONTINUED | OUTPATIENT
Start: 2023-08-30 | End: 2023-08-30 | Stop reason: HOSPADM

## 2023-08-30 RX ORDER — ONDANSETRON 2 MG/ML
4 INJECTION INTRAMUSCULAR; INTRAVENOUS EVERY 30 MIN PRN
Status: DISCONTINUED | OUTPATIENT
Start: 2023-08-30 | End: 2023-08-30 | Stop reason: HOSPADM

## 2023-08-30 RX ORDER — CEFAZOLIN SODIUM/WATER 2 G/20 ML
2 SYRINGE (ML) INTRAVENOUS SEE ADMIN INSTRUCTIONS
Status: DISCONTINUED | OUTPATIENT
Start: 2023-08-30 | End: 2023-08-30 | Stop reason: HOSPADM

## 2023-08-30 RX ORDER — FENTANYL CITRATE 50 UG/ML
50 INJECTION, SOLUTION INTRAMUSCULAR; INTRAVENOUS EVERY 5 MIN PRN
Status: DISCONTINUED | OUTPATIENT
Start: 2023-08-30 | End: 2023-08-30 | Stop reason: HOSPADM

## 2023-08-30 RX ORDER — HYDROMORPHONE HYDROCHLORIDE 1 MG/ML
0.4 INJECTION, SOLUTION INTRAMUSCULAR; INTRAVENOUS; SUBCUTANEOUS EVERY 5 MIN PRN
Status: DISCONTINUED | OUTPATIENT
Start: 2023-08-30 | End: 2023-08-30 | Stop reason: HOSPADM

## 2023-08-30 RX ORDER — PROPOFOL 10 MG/ML
INJECTION, EMULSION INTRAVENOUS CONTINUOUS PRN
Status: DISCONTINUED | OUTPATIENT
Start: 2023-08-30 | End: 2023-08-30

## 2023-08-30 RX ORDER — HALOPERIDOL 5 MG/ML
1 INJECTION INTRAMUSCULAR
Status: DISCONTINUED | OUTPATIENT
Start: 2023-08-30 | End: 2023-08-30 | Stop reason: HOSPADM

## 2023-08-30 RX ORDER — SODIUM CHLORIDE, SODIUM LACTATE, POTASSIUM CHLORIDE, CALCIUM CHLORIDE 600; 310; 30; 20 MG/100ML; MG/100ML; MG/100ML; MG/100ML
INJECTION, SOLUTION INTRAVENOUS CONTINUOUS
Status: DISCONTINUED | OUTPATIENT
Start: 2023-08-30 | End: 2023-08-30 | Stop reason: HOSPADM

## 2023-08-30 RX ORDER — SODIUM CHLORIDE, SODIUM LACTATE, POTASSIUM CHLORIDE, CALCIUM CHLORIDE 600; 310; 30; 20 MG/100ML; MG/100ML; MG/100ML; MG/100ML
INJECTION, SOLUTION INTRAVENOUS CONTINUOUS PRN
Status: DISCONTINUED | OUTPATIENT
Start: 2023-08-30 | End: 2023-08-30

## 2023-08-30 RX ORDER — HYDROMORPHONE HYDROCHLORIDE 1 MG/ML
0.2 INJECTION, SOLUTION INTRAMUSCULAR; INTRAVENOUS; SUBCUTANEOUS EVERY 5 MIN PRN
Status: DISCONTINUED | OUTPATIENT
Start: 2023-08-30 | End: 2023-08-30 | Stop reason: HOSPADM

## 2023-08-30 RX ORDER — ONDANSETRON 2 MG/ML
INJECTION INTRAMUSCULAR; INTRAVENOUS PRN
Status: DISCONTINUED | OUTPATIENT
Start: 2023-08-30 | End: 2023-08-30

## 2023-08-30 RX ORDER — LIDOCAINE HYDROCHLORIDE 20 MG/ML
INJECTION, SOLUTION INFILTRATION; PERINEURAL PRN
Status: DISCONTINUED | OUTPATIENT
Start: 2023-08-30 | End: 2023-08-30

## 2023-08-30 RX ORDER — CEFAZOLIN SODIUM/WATER 2 G/20 ML
2 SYRINGE (ML) INTRAVENOUS
Status: COMPLETED | OUTPATIENT
Start: 2023-08-30 | End: 2023-08-30

## 2023-08-30 RX ADMIN — LIDOCAINE HYDROCHLORIDE 50 MG: 20 INJECTION, SOLUTION INFILTRATION; PERINEURAL at 15:54

## 2023-08-30 RX ADMIN — SODIUM CHLORIDE, POTASSIUM CHLORIDE, SODIUM LACTATE AND CALCIUM CHLORIDE: 600; 310; 30; 20 INJECTION, SOLUTION INTRAVENOUS at 15:45

## 2023-08-30 RX ADMIN — Medication 2 G: at 15:45

## 2023-08-30 RX ADMIN — ONDANSETRON 4 MG: 2 INJECTION INTRAMUSCULAR; INTRAVENOUS at 15:54

## 2023-08-30 RX ADMIN — PROPOFOL 50 MG: 10 INJECTION, EMULSION INTRAVENOUS at 15:54

## 2023-08-30 RX ADMIN — FENTANYL CITRATE 100 MCG: 50 INJECTION, SOLUTION INTRAMUSCULAR; INTRAVENOUS at 15:50

## 2023-08-30 RX ADMIN — MIDAZOLAM 2 MG: 1 INJECTION INTRAMUSCULAR; INTRAVENOUS at 15:45

## 2023-08-30 RX ADMIN — PROPOFOL 20 MG: 10 INJECTION, EMULSION INTRAVENOUS at 16:36

## 2023-08-30 RX ADMIN — PROPOFOL 20 MG: 10 INJECTION, EMULSION INTRAVENOUS at 16:42

## 2023-08-30 RX ADMIN — PROPOFOL 100 MCG/KG/MIN: 10 INJECTION, EMULSION INTRAVENOUS at 15:54

## 2023-08-30 ASSESSMENT — LIFESTYLE VARIABLES: TOBACCO_USE: 1

## 2023-08-30 ASSESSMENT — ACTIVITIES OF DAILY LIVING (ADL)
ADLS_ACUITY_SCORE: 35
ADLS_ACUITY_SCORE: 35
ADLS_ACUITY_SCORE: 33
ADLS_ACUITY_SCORE: 35

## 2023-08-30 NOTE — OP NOTE
OPERATIVE REPORT  8/30/2023    PREOPERATIVE DIAGNOSIS:    1. Prostate cancer    POSTOPERATIVE DIAGNOSIS: Same as above    PROCEDURES PERFORMED:   1. Transrectal ultrasound-guided placement of fiducial markers and SpaceOAR.    STAFF SURGEON: Chase Blount MD PhD    RESIDENT(S):  Gary Reese MD    ANESTHESIA: General    ESTIMATED BLOOD LOSS: 1 mL.     DRAINS: None    SPECIMEN(S): None    OPERATIVE INDICATIONS:   Ghulam Rizzo is a(n) 67 year old male with a recent diagnosis of prostate cancer.  The patient has elected external beam radiation therapy as his management strategy.  The patient presents today for transrectal ultrasound-guided placement of SpaceOAR fiducial markers in preparation for his radiation therapy.    DESCRIPTION OF PROCEDURE:   After informed consent was obtained, the patient was brought to the operating room, where he was administered general anesthesia with an LMA.  After suitable level of anesthesia was attained, he was placed in lithotomy position with all pressure points padded.  He was administered preoperative antibiotics.  He was prepped and draped in standard sterile fashion.  Next, the transrectal ultrasound probe was lubricated and placed into the patient's rectum, allowing good visualization of the prostate.  Then under transrectal ultrasound guidance, we advanced the needles containing the fiducial markers into the prostate.  We placed 1 marker in the right base, 1 at the right apex and 1 at the left apex.  These were all placed without difficulty.  We then used a finder needle, which was advanced under transrectal ultrasound guidance into the space between the prostate and the rectum.  After a confirmatory puff of saline, 10 mL of SpaceOAR hydrogel was injected in this space, resulting in an excellent left of the prostate off the rectum.  This was confirmed in both the sagittal and axial views.  The puncture sites were subsequently dressed with bacitracin, gauze and Tegaderm.   The patient was awakened, and he was brought to the recovery room in stable condition.

## 2023-08-30 NOTE — INTERVAL H&P NOTE
I have reviewed the surgical (or preoperative) H&P that is linked to this encounter, and examined the patient. There are no significant changes    Clinical Conditions Present on Arrival:  Clinically Significant Risk Factors Present on Admission          # Hypercalcemia: Highest Ca = 10.2 mg/dL in last 30 days, will monitor as appropriate

## 2023-08-30 NOTE — ANESTHESIA CARE TRANSFER NOTE
Patient: Ghulam Rizzo    Procedure: Procedure(s):  Transrectal ultrasound guided placement of fiducials and SpaceOar       Diagnosis: Prostate cancer (H) [C61]  Diagnosis Additional Information: No value filed.    Anesthesia Type:   General     Note:    Oropharynx: oropharynx clear of all foreign objects and spontaneously breathing  Level of Consciousness: awake and drowsy  Oxygen Supplementation: room air    Independent Airway: airway patency satisfactory and stable  Dentition: dentition unchanged  Vital Signs Stable: post-procedure vital signs reviewed and stable  Report to RN Given: handoff report given  Patient transferred to: Phase II    Handoff Report: Identifed the Patient, Identified the Reponsible Provider, Reviewed the pertinent medical history, Discussed the surgical course, Reviewed Intra-OP anesthesia mangement and issues during anesthesia, Set expectations for post-procedure period and Allowed opportunity for questions and acknowledgement of understanding      Vitals:  Vitals Value Taken Time   BP     Temp     Pulse     Resp     SpO2         Electronically Signed By: ANGELA Tapia CRNA  August 30, 2023  5:08 PM

## 2023-08-30 NOTE — ANESTHESIA PREPROCEDURE EVALUATION
Anesthesia Pre-Procedure Evaluation    Patient: Ghulam Rizzo   MRN: [id number] : 1955        Procedure : Procedure(s):  Transrectal ultrasound guided placement of fiducials and SpaceOar          Past Medical History:   Diagnosis Date     Anisocoria      Asthma      Carpal tunnel syndrome     2006     Head injury, unspecified     closed head injury 4 yrs ago     Hepatitis C     Cured by [facility] in . diagnosed in  was positive for Hepatitis C.      Obesity     2006, resolved     Other convulsions     seizure disorder due to head injury 4 yrs ago.     Other mononeuritis of upper limb     L phrenic nerve paralysis     Unspecified essential hypertension       Past Surgical History:   Procedure Laterality Date     COLECTOMY WITHOUT COLOSTOMY Right 2022    Procedure: Laparotomy, right colectomy, lysis of adhesions,;  Surgeon: Tommy Martino DO;  Location: [facility]     COLONOSCOPY       COLONOSCOPY N/A 2023    Procedure: Colonoscopy with polypectomy;  Surgeon: Octavio Aguirre MD;  Location: [facility]     GI SURGERY       HERNIA REPAIR       HERNIORRHAPHY INCISIONAL (LOCATION) N/A 2022    Procedure: primary incisional hernia repair;  Surgeon: Tommy Martino DO;  Location: [facility]     SURGICAL HISTORY OF -   87    esophagogastroduodenoscopy     SURGICAL HISTORY OF -   87    exploratory laparotomy and anterior gastropexy over a gastrostomy tube.     SURGICAL HISTORY OF -   90    esophagogastroduodenoscopy     SURGICAL HISTORY OF -       L diaphragm eventration repair, fixation of stomach and colon to abd wall     SURGICAL HISTORY OF -       hiatal hernia repair     THORACIC SURGERY        Allergies   Allergen Reactions     Levaquin [Levofloxacin] Nausea and Vomiting     Percocet [Oxycodone-Acetaminophen] Visual Disturbance     hallucinations      Social History     Tobacco Use     Smoking status: Every Day     Packs/day: 0.50     Years: 50.00     Pack years:  25.00     Types: Cigarettes     Smokeless tobacco: Never     Tobacco comments:     started again 4/2010   Substance Use Topics     Alcohol use: Yes     Comment: 1-2 beers daily      Wt Readings from Last 1 Encounters:   08/30/23 72.3 kg (159 lb 6.3 oz)        Anesthesia Evaluation   Pt has had prior anesthetic.         ROS/MED HX  ENT/Pulmonary:     (+)                tobacco use, Current use,    asthma                  Neurologic:       Cardiovascular:     (+) Dyslipidemia hypertension- -   -  - -                                      METS/Exercise Tolerance:     Hematologic:       Musculoskeletal:       GI/Hepatic:     (+)           hepatitis  liver disease,       Renal/Genitourinary:     (+)        BPH,      Endo:       Psychiatric/Substance Use:     (+) psychiatric history depression and anxiety       Infectious Disease:       Malignancy:       Other:          Physical Exam    Airway        Mallampati: II   TM distance: > 3 FB   Neck ROM: full   Mouth opening: > 3 cm    Respiratory Devices and Support         Dental       (+) Minor Abnormalities - some fillings, tiny chips      Cardiovascular   cardiovascular exam normal       Rhythm and rate: regular and normal     Pulmonary   pulmonary exam normal        breath sounds clear to auscultation       OUTSIDE LABS:  CBC:   Lab Results   Component Value Date    WBC 4.9 06/08/2023    WBC 7.4 03/16/2023    HGB 14.0 06/08/2023    HGB 16.1 03/16/2023    HCT 39.7 (L) 06/08/2023    HCT 46.1 03/16/2023     06/08/2023     03/16/2023     BMP:   Lab Results   Component Value Date     08/25/2023     (L) 06/08/2023    POTASSIUM 4.3 08/25/2023    POTASSIUM 4.6 06/08/2023    CHLORIDE 99 08/25/2023    CHLORIDE 94 (L) 06/08/2023    CO2 25 08/25/2023    CO2 26 06/08/2023    BUN 12.0 08/25/2023    BUN 9.9 06/08/2023    CR 0.79 08/25/2023    CR 0.70 06/08/2023     (H) 08/30/2023     (H) 08/25/2023     COAGS:   Lab Results   Component Value Date     PTT 31 01/16/2023    INR 1.09 01/16/2023     POC:   Lab Results   Component Value Date     (H) 04/03/2006     HEPATIC:   Lab Results   Component Value Date    ALBUMIN 4.3 06/08/2023    PROTTOTAL 6.6 06/08/2023    ALT 18 06/08/2023    AST 17 06/08/2023    ALKPHOS 81 06/08/2023    BILITOTAL 0.6 06/08/2023     OTHER:   Lab Results   Component Value Date    LACT 1.2 06/08/2023    A1C 5.0 06/23/2023    TAVIA 10.2 08/25/2023    PHOS 3.9 07/14/2022    MAG 1.8 06/08/2023    LIPASE 34 03/16/2023    TSH 4.27 (H) 06/08/2023    T4 1.27 06/08/2023    CRP <2.9 11/25/2017       Anesthesia Plan    ASA Status:  3    NPO Status:  NPO Appropriate    Anesthesia Type: General.     - Airway: LMA   Induction: Intravenous.   Maintenance: Balanced.        Consents    Anesthesia Plan(s) and associated risks, benefits, and realistic alternatives discussed. Questions answered and patient/representative(s) expressed understanding.     - Discussed: Risks, Benefits and Alternatives for BOTH SEDATION and the PROCEDURE were discussed     - Discussed with:  Spouse, Patient      - Extended Intubation/Ventilatory Support Discussed: No.      - Patient is DNR/DNI Status: No     Use of blood products discussed: No .     Postoperative Care    Pain management: IV analgesics, Oral pain medications, Multi-modal analgesia.   PONV prophylaxis: Ondansetron (or other 5HT-3), Dexamethasone or Solumedrol     Comments:              Jayden Saldana MD

## 2023-08-30 NOTE — DISCHARGE INSTRUCTIONS

## 2023-08-31 NOTE — ANESTHESIA POSTPROCEDURE EVALUATION
Patient: Ghulam Rizzo    Procedure: Procedure(s):  Transrectal ultrasound guided placement of fiducials and SpaceOar       Anesthesia Type:  General    Note:  Disposition: Outpatient   Postop Pain Control: Uneventful            Sign Out: Well controlled pain   PONV: No   Neuro/Psych: Uneventful            Sign Out: Acceptable/Baseline neuro status   Airway/Respiratory: Uneventful            Sign Out: Acceptable/Baseline resp. status   CV/Hemodynamics: Uneventful            Sign Out: Acceptable CV status; No obvious hypovolemia; No obvious fluid overload   Other NRE: NONE   DID A NON-ROUTINE EVENT OCCUR? No           Last vitals:  Vitals Value Taken Time   /75 08/30/23 1800   Temp 36.5  C (97.7  F) 08/30/23 1702   Pulse 61 08/30/23 1800   Resp 13 08/30/23 1800   SpO2 100 % 08/30/23 1803   Vitals shown include unvalidated device data.    Electronically Signed By: Denise Menchaca MD  August 30, 2023  7:07 PM

## 2023-09-07 ENCOUNTER — HOSPITAL ENCOUNTER (OUTPATIENT)
Dept: MRI IMAGING | Facility: CLINIC | Age: 68
Discharge: HOME | End: 2023-09-07
Attending: RADIOLOGY | Admitting: RADIOLOGY
Payer: COMMERCIAL

## 2023-09-07 ENCOUNTER — OFFICE VISIT (OUTPATIENT)
Dept: RADIATION THERAPY | Facility: OUTPATIENT CENTER | Age: 68
End: 2023-09-07
Payer: COMMERCIAL

## 2023-09-07 DIAGNOSIS — C61 ADENOCARCINOMA OF PROSTATE (H): ICD-10-CM

## 2023-09-07 DIAGNOSIS — C61 ADENOCARCINOMA OF PROSTATE (H): Primary | ICD-10-CM

## 2023-09-07 PROCEDURE — 72195 MRI PELVIS W/O DYE: CPT | Mod: TC,52

## 2023-09-07 NOTE — LETTER
9/7/2023         RE: Ghulam Rizzo  [address] [place] [id number]        Dear Colleague,    Thank you for referring your patient, Ghulam Rizzo, to the [facility]. Please see a copy of my visit note below.    Patient presents for CT simulation.    In the interval, the patient underwent Space OAR gel placement as well as prostate fiducial markers.    Side effects were re-discussed.  Consent obtained.    Armani Rodgers M.D.  Department of Radiation Oncology  [facility]       Again, thank you for allowing me to participate in the care of your patient.        Sincerely,        Armani Rodgers MD

## 2023-09-07 NOTE — PROGRESS NOTES
Patient presents for CT simulation.    In the interval, the patient underwent Space OAR gel placement as well as prostate fiducial markers.    Side effects were re-discussed.  Consent obtained.    Armani Rodgers M.D.  Department of Radiation Oncology  [facility]

## 2023-09-13 ENCOUNTER — OFFICE VISIT (OUTPATIENT)
Dept: NEUROLOGY | Facility: CLINIC | Age: 68
End: 2023-09-13
Attending: PSYCHIATRY & NEUROLOGY
Payer: COMMERCIAL

## 2023-09-13 VITALS
WEIGHT: 161 LBS | HEART RATE: 72 BPM | SYSTOLIC BLOOD PRESSURE: 154 MMHG | RESPIRATION RATE: 16 BRPM | DIASTOLIC BLOOD PRESSURE: 88 MMHG | BODY MASS INDEX: 20.67 KG/M2

## 2023-09-13 DIAGNOSIS — G62.9 NEUROPATHY: Primary | ICD-10-CM

## 2023-09-13 DIAGNOSIS — I95.1 ORTHOSTATIC HYPOTENSION: ICD-10-CM

## 2023-09-13 DIAGNOSIS — I67.1 CEREBRAL ANEURYSM, NONRUPTURED: ICD-10-CM

## 2023-09-13 DIAGNOSIS — R20.8 DECREASED SENSE OF VIBRATION: ICD-10-CM

## 2023-09-13 DIAGNOSIS — R77.8 ABNORMAL SPEP: ICD-10-CM

## 2023-09-13 PROCEDURE — 99214 OFFICE O/P EST MOD 30 MIN: CPT | Performed by: PSYCHIATRY & NEUROLOGY

## 2023-09-13 PROCEDURE — 95886 MUSC TEST DONE W/N TEST COMP: CPT | Mod: RT | Performed by: PSYCHIATRY & NEUROLOGY

## 2023-09-13 PROCEDURE — 95910 NRV CNDJ TEST 7-8 STUDIES: CPT | Performed by: PSYCHIATRY & NEUROLOGY

## 2023-09-13 RX ORDER — LANOLIN ALCOHOL/MO/W.PET/CERES
1000 CREAM (GRAM) TOPICAL DAILY
Qty: 90 TABLET | Refills: 1 | Status: SHIPPED | OUTPATIENT
Start: 2023-09-13

## 2023-09-13 NOTE — PROGRESS NOTES
NEUROLOGY OUTPATIENT PROGRESS NOTE   Sep 13, 2023     CHIEF COMPLAINT/REASON FOR VISIT/REASON FOR CONSULT  Patient presents with:  Follow Up    REASON FOR CONSULTATION- Vertigo, light headedness    REFERRAL SOURCE  Dr. Jamie Rutledge  CC Dr. Jamie Rutledge    HISTORY OF PRESENT ILLNESS  Ghulam Rizzo is a 67 year old male seen today for evaluation of dizziness.  Initially his symptoms were episodic but now have become there all the time.  Standing up does make the symptoms worse and then sitting does help with the symptoms.  He mainly complains of lightheadedness with some black lines in his vision.  Denies any chest pains or palpitations.  Drinks plenty of water.  He was on lisinopril which was making his symptoms worse but now symptoms have improved with stopping the lisinopril though not completely.  He did have orthostatic vitals checked in the emergency room which were negative.  He denies any neck pain or mid back pain.  No numbness or weakness in the legs.  Denies any unsteadiness.      He also has infrequent episodes where he feels a room spinning sensation.  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  No ringing in the ears.    9/13/23  Patient returns today.  Reports that his dizziness is much better at this point.  Physical therapy was done though he was not diagnosed with vertigo.  Reports mainly some blurred vision and episodes.  This can happen with sitting and not necessarily with standing.  Plans to see the eye doctor.  No other new symptoms.  Continues to remain low but unsteady but no falls.  No aneurysmal symptoms.    Previous history is reviewed and this is unchanged.    PAST MEDICAL/SURGICAL HISTORY  Past Medical History:   Diagnosis Date    Anisocoria     Asthma     Carpal tunnel syndrome     2006    Head injury, unspecified     closed head injury 4 yrs ago    Hepatitis C     Cured by [facility] in 2015. diagnosed in 2012 was positive for Hepatitis C.     Obesity      2006, resolved    Other convulsions     seizure disorder due to head injury 4 yrs ago.    Other mononeuritis of upper limb     L phrenic nerve paralysis    Unspecified essential hypertension      Patient Active Problem List   Diagnosis    Hyperlipidemia LDL goal <130    Tobacco abuse    Advanced directives, counseling/discussion    Abdominal pain, generalized    Cirrhosis of liver without ascites (H)    Proctitis    Major depressive disorder, recurrent episode, moderate (H)    Other irritable bowel syndrome    Screening for hypertension    Elevated prostate specific antigen (PSA)    Cecal volvulus (H)    Saccular aneurysm    Benign essential hypertension    Compression fracture of L4 vertebra (H)    Compression fracture of L3 lumbar vertebra, with routine healing, subsequent encounter    Adenocarcinoma of prostate (H)    Vertigo   Significant for high blood pressure and depression    FAMILY HISTORY  Family History   Problem Relation Age of Onset    Cerebrovascular Disease Mother     Heart Disease Brother     Heart Disease Brother    Negative for neurological problems    SOCIAL HISTORY  Social History     Tobacco Use    Smoking status: Every Day     Packs/day: 0.50     Years: 50.00     Pack years: 25.00     Types: Cigarettes    Smokeless tobacco: Never    Tobacco comments:     started again 4/2010   Vaping Use    Vaping Use: Never used   Substance Use Topics    Alcohol use: Yes     Comment: 1-2 beers daily    Drug use: No       SYSTEMS REVIEW  Twelve-system ROS was done and other than the HPI this was negative except for back pain, difficulty walking when getting up, dizziness, hearing loss, vision symptoms, sleeping problems, depression, stomach pain, bowel problems, weight loss, snoring loudly.  No new concerns/issues.    MEDICATIONS  acetaminophen (TYLENOL) 500 MG tablet, Take 1,000 mg by mouth every 6 hours as needed for mild pain  amLODIPine (NORVASC) 2.5 MG tablet, Take 1 tablet (2.5 mg) by mouth  daily  bisacodyl (DULCOLAX) 5 MG EC tablet, 2 days prior to procedure, take 2 tablets at 4 pm. 1 day prior to procedure, take 2 tablets at 4 pm. For additional instructions refer to your colonoscopy prep instructions.  bisacodyl (DULCOLAX) 5 MG EC tablet, Take 2 tablets at 3 pm the day before your procedure. If your procedure is before 11 am, take 2 additional tablets at 11 pm. If your procedure is after 11 am, take 2 additional tablets at 6 am. For additional instructions refer to your colonoscopy prep instructions.  Calcium Carbonate (CALCIUM-CARB 600 PO), Take 2 tablets by mouth daily  glycerin (LAXATIVE) 1.2 g suppository, Place 1 suppository rectally daily as needed (constipation.)  polyethylene glycol (GOLYTELY) 236 g suspension, 2 days prior at 5pm, mix and drink half of a jug of Golytely. Drink an 8 oz. glass of Golytely every 15 minutes until half of the jug is gone. Place remainder of Golytely in the refrigerator. 1 day prior at 5 pm, drink the 2nd half of a jug of Golytely bowel prep. 6 hours before your check-in time, drink an 8 oz. glass of Golytely every 15 minutes until half of the 2nd jug of Golytely is gone. Discard remainder of second jug.  polyethylene glycol (GOLYTELY) 236 g suspension, The night before the exam at 6 pm drink an 8-ounce glass every 15 minutes until the jug is half empty. If you arrive before 11 AM: Drink the other half of the Golytely jug at 11 PM night before procedure. If you arrive after 11 AM: Drink the other half of the Golytely jug at 6 AM day of procedure. For additional instructions refer to your colonoscopy prep instructions.  polyethylene glycol (MIRALAX) 17 GM/Dose powder, Take 17 g (1 capful.) by mouth daily (Patient taking differently: Take 1 capful. by mouth 2 times daily)  senna-docusate (NEGRITO-COLACE) 8.6-50 MG per tablet, Take 1-2 tablets by mouth 2 times daily as needed for constipation.  sertraline (ZOLOFT) 50 MG tablet, TAKE 1 & 1/2 TABLETS BY MOUTH EVERY  DAY  vitamin D3 (CHOLECALCIFEROL) 50 mcg (2000 units) tablet, Take 1 tablet (50 mcg) by mouth daily  calcitonin, salmon, (MIACALCIN) 200 UNIT/ACT nasal spray, Spray 1 spray into one nostril alternating nostrils daily for 30 days Alternate nostril each day.    leuprolide (LUPRON DEPOT) kit 45 mg         PHYSICAL EXAMINATION  VITALS: BP (!) 154/88   Pulse 72   Resp 16   Wt 73 kg (161 lb)   BMI 20.67 kg/m    GENERAL: Healthy appearing, alert, no acute distress, normal habitus.  CARDIOVASCULAR: Extremities warm and well perfused. Pulses present.   NEUROLOGICAL:  Patient is awake and oriented to self, place and time.  Attention span is normal.  Memory is grossly intact.  Language is fluent and follows commands appropriately.  Appropriate fund of knowledge. Cranial nerves 2-12 are intact. There is no pronator drift.  Motor exam shows 5/5 strength in all extremities.  Tone is symmetric bilaterally in upper and lower extremities.  Reflexes are symmetric and 2+ in upper extremities and lower extremities. Sensory exam is grossly intact to light touch, pin prick and vibration with slightly decreased vibratory sense in the feet.  Finger to nose and heel to shin is without dysmetria.  Romberg is unsteady.  Gait is slightly wide-based and the patient is able to do tandem walk and walk on toes and heels with difficulty.  Orthostatic vitals    Exam similar to before.  Gait is slightly better compared to before.    DIAGNOSTICS  CT head  IMPRESSION:  1.  No acute intracranial abnormality.     2.  Redemonstrated retrocerebellar arachnoid cyst. This appears  relatively unchanged.      MRI brain  IMPRESSION:  1. No acute intracranial process.  2. Brain atrophy and presumed chronic small vessel ischemic change, as  described.  3. Unchanged prominent retrocerebellar arachnoid cyst.     MRA neck 2023  IMPRESSION:  1. Mildly limited evaluation of the origins of the great vessels and  the origin/proximal V1 segment of the right vertebral  artery due to  motion artifacts.  2. Within that limitation, no significant stenosis, occlusion, or  dissection identified involving the cervical carotid or vertebral  Arteries.    MRA brain  IMPRESSION:  1. No high-grade stenosis or large vessel occlusion identified  involving the major intracranial arteries.  2. 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      CARDIOLOGY  EKG      RELEVANT LABS  Component      Latest Ref Rng 6/8/2023  12:01 PM   WBC      4.0 - 11.0 10e3/uL 4.9    RBC Count      4.40 - 5.90 10e6/uL 4.27 (L)    Hemoglobin      13.3 - 17.7 g/dL 14.0    Hematocrit      40.0 - 53.0 % 39.7 (L)    MCV      78 - 100 fL 93    MCH      26.5 - 33.0 pg 32.8    MCHC      31.5 - 36.5 g/dL 35.3    RDW      10.0 - 15.0 % 13.4    Platelet Count      150 - 450 10e3/uL 169    % Neutrophils      % 56    % Lymphocytes      % 33    % Monocytes      % 9    % Eosinophils      % 1    % Basophils      % 1    % Immature Granulocytes      % 0    NRBCs per 100 WBC      <1 /100 0    Absolute Neutrophils      1.6 - 8.3 10e3/uL 2.7    Absolute Lymphocytes      0.8 - 5.3 10e3/uL 1.6    Absolute Monocytes      0.0 - 1.3 10e3/uL 0.4    Absolute Eosinophils      0.0 - 0.7 10e3/uL 0.1    Absolute Basophils      0.0 - 0.2 10e3/uL 0.0    Absolute Immature Granulocytes      <=0.4 10e3/uL 0.0    Absolute NRBCs      10e3/uL 0.0    Sodium      136 - 145 mmol/L 130 (L)    Potassium      3.4 - 5.3 mmol/L 4.6    Chloride      98 - 107 mmol/L 94 (L)    Carbon Dioxide (CO2)      22 - 29 mmol/L 26    Anion Gap      7 - 15 mmol/L 10    Urea Nitrogen      8.0 - 23.0 mg/dL 9.9    Creatinine      0.67 - 1.17 mg/dL 0.70    Calcium      8.8 - 10.2 mg/dL 9.4    Glucose      70 - 99 mg/dL 96    Alkaline Phosphatase      40 - 129 U/L 81    AST      10 - 50 U/L 17    ALT      10 - 50 U/L 18    Protein Total      6.4 - 8.3 g/dL 6.6     Albumin      3.5 - 5.2 g/dL 4.3    Bilirubin Total      <=1.2 mg/dL 0.6    GFR Estimate      >60 mL/min/1.73m2 >90    TSH      0.30 - 4.20 uIU/mL 4.27 (H)    T4 Free      0.90 - 1.70 ng/dL 1.27       Legend:  (L) Low  (H) High    OUTSIDE RECORDS  Outside referral notes and chart notes were reviewed and pertinent information has been summarized (in addition to the HPI):-      EMG    Component      Latest Ref Rng 6/23/2023  2:37 PM   Albumin Fraction      3.7 - 5.1 g/dL 4.3    Alpha 1 Fraction      0.2 - 0.4 g/dL 0.3    Alpha 2 Fraction      0.5 - 0.9 g/dL 0.8    Beta Fraction      0.6 - 1.0 g/dL 0.6    Gamma Fraction      0.7 - 1.6 g/dL 0.7    Monoclonal Peak      <=0.0 g/dL 0.1 (H)    ELP Interpretation: Very small monoclonal protein (0.1 g/dL) seen in the gamma fraction. See immunofixation report on same specimen. Pathologic significance requires clinical correlation. Maria Dewitt M.D., Ph.D.    Vitamin B12      232 - 1,245 pg/mL 388    Vitamin B1 Whole Blood Level      70 - 180 nmol/L 118    Immunofixation ELP Monoclonal IgG immunoglobulin of lambda light chain type. Pathologic significance requires clinical correlation. Maria Dewitt M.D., Ph.D.    Hemoglobin A1C      0.0 - 5.6 % 5.0    Copper      70.0 - 140.0 ug/dL 103.1    Total Protein Serum for ELP      6.4 - 8.3 g/dL 6.6       Legend:  (H) High    EMG  CLINICAL INTERPRETATION:  This is an abnormal nerve conduction and EMG study.  The study is suggestive of a sensorimotor polyneuropathy in both legs.  Further clinical correlation is needed.     IMPRESSION/REPORT/PLAN  Rule out orthostatic hypotension  Vertigo  Decreased sense of vibration-rule out neuropathy  Cerebral aneurysm, nonruptured  Abnormal SPEP  Low normal B12    This is a 67 year old male with lightheadedness and episodes of vertigo.  On exam he does have slightly increased reflexes with decreased vibratory sense in the feet.  He does also have some unsteadiness with ambulation and especially with  tandem walking.  Orthostatic vitals were negative.  Exam does look slightly better today.  Possibly his dizziness might be more unsteadiness.  He does complain of some blurred vision at times and would recommend that he follow with an eye doctor for further evaluation.    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  Blood work has shown low normal vitamin B12.  We will put him on a vitamin B12 supplement to see if it helps.  Recheck level in 6 months.  Serum protein electrophoresis was slightly abnormal.  Suspect this is a false positive.  Recheck in 6 months.  Prognosis of idiopathic neuropathy.    He does have some lightheadedness though orthostatic vitals were negative.  Encourage good hydration.  Could be related to an autonomic neuropathy and will monitor.  Overall his dizziness is better.  Did see physical therapy for possible vertigo they did not feel he had vertigo.  Recommend to do the exercises on his own.    MRI brain/MRA have been noncontributory for causes of dizziness.  His MRI does show cerebral aneurysm.  He does have risk factors of high blood pressure and smoking history.  Encouraged him to stop smoking.  He still has not done so.  The aneurysm would be unrelated to the dizziness.  Repeat MRA in 6 months.    Return in 6 months.    -     cyanocobalamin (VITAMIN B-12) 1000 MCG tablet; Take 1 tablet (1,000 mcg) by mouth daily  -     Vitamin B12; Future  -     Protein Immunofixation Serum; Future  -     Protein electrophoresis; Future  -     MRA Head w/o Contrast Angiogram; Future    Return in about 6 months (around 3/13/2024) for In-Clinic Visit (must), After testing.    Over 35 minutes were spent coordinating the care for the patient on the day of the encounter.  This includes previsit, during visit and post visit activities as documented above.  Counseling patient.  Multiple test reviewed.  Prescription management.  Testing ordered.  (Activities include but not inclusive of reviewing  chart, reviewing outside records, reviewing labs and imaging study results as well as the images, patient visit time including getting history and exam,  use if applicable, review of test results with the patient and coming up with a plan in a shared model, counseling patient and family, education and answering patient questions, EMR , EMR diagnosis entry and problem list management, medication reconciliation and prescription management if applicable, paperwork if applicable, printing documents and documentation of the visit activities.)        Chip Pinon MD  Neurologist  [facility] [place]  Tel:- [phone]    This note was dictated using voice recognition software.  Any grammatical or context distortions are unintentional and inherent to the software.

## 2023-09-13 NOTE — LETTER
9/13/2023         RE: Ghulam Rizzo  [address] [place] [id number]        Dear Colleague,    Thank you for referring your patient, Ghulam Rizzo, to the [facility] NEUROLOGY [facility] [place]. Please see a copy of my visit note below.    NEUROLOGY OUTPATIENT PROGRESS NOTE   Sep 13, 2023     CHIEF COMPLAINT/REASON FOR VISIT/REASON FOR CONSULT  Patient presents with:  Follow Up    REASON FOR CONSULTATION- Vertigo, light headedness    REFERRAL SOURCE  Dr. Jamie Rutledge  CC Dr. Jamie Rutledge    HISTORY OF PRESENT ILLNESS  Ghulam Rizzo is a 67 year old male seen today for evaluation of dizziness.  Initially his symptoms were episodic but now have become there all the time.  Standing up does make the symptoms worse and then sitting does help with the symptoms.  He mainly complains of lightheadedness with some black lines in his vision.  Denies any chest pains or palpitations.  Drinks plenty of water.  He was on lisinopril which was making his symptoms worse but now symptoms have improved with stopping the lisinopril though not completely.  He did have orthostatic vitals checked in the emergency room which were negative.  He denies any neck pain or mid back pain.  No numbness or weakness in the legs.  Denies any unsteadiness.      He also has infrequent episodes where he feels a room spinning sensation.  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  No ringing in the ears.    9/13/23  Patient returns today.  Reports that his dizziness is much better at this point.  Physical therapy was done though he was not diagnosed with vertigo.  Reports mainly some blurred vision and episodes.  This can happen with sitting and not necessarily with standing.  Plans to see the eye doctor.  No other new symptoms.  Continues to remain low but unsteady but no falls.  No aneurysmal symptoms.    Previous history is reviewed and this is unchanged.    PAST MEDICAL/SURGICAL HISTORY  Past Medical  History:   Diagnosis Date     Anisocoria      Asthma      Carpal tunnel syndrome     2006     Head injury, unspecified     closed head injury 4 yrs ago     Hepatitis C     Cured by [facility] in 2015. diagnosed in 2012 was positive for Hepatitis C.      Obesity     2006, resolved     Other convulsions     seizure disorder due to head injury 4 yrs ago.     Other mononeuritis of upper limb     L phrenic nerve paralysis     Unspecified essential hypertension      Patient Active Problem List   Diagnosis     Hyperlipidemia LDL goal <130     Tobacco abuse     Advanced directives, counseling/discussion     Abdominal pain, generalized     Cirrhosis of liver without ascites (H)     Proctitis     Major depressive disorder, recurrent episode, moderate (H)     Other irritable bowel syndrome     Screening for hypertension     Elevated prostate specific antigen (PSA)     Cecal volvulus (H)     Saccular aneurysm     Benign essential hypertension     Compression fracture of L4 vertebra (H)     Compression fracture of L3 lumbar vertebra, with routine healing, subsequent encounter     Adenocarcinoma of prostate (H)     Vertigo   Significant for high blood pressure and depression    FAMILY HISTORY  Family History   Problem Relation Age of Onset     Cerebrovascular Disease Mother      Heart Disease Brother      Heart Disease Brother    Negative for neurological problems    SOCIAL HISTORY  Social History     Tobacco Use     Smoking status: Every Day     Packs/day: 0.50     Years: 50.00     Pack years: 25.00     Types: Cigarettes     Smokeless tobacco: Never     Tobacco comments:     started again 4/2010   Vaping Use     Vaping Use: Never used   Substance Use Topics     Alcohol use: Yes     Comment: 1-2 beers daily     Drug use: No       SYSTEMS REVIEW  Twelve-system ROS was done and other than the HPI this was negative except for back pain, difficulty walking when getting up, dizziness, hearing loss, vision symptoms, sleeping problems,  depression, stomach pain, bowel problems, weight loss, snoring loudly.  No new concerns/issues.    MEDICATIONS  acetaminophen (TYLENOL) 500 MG tablet, Take 1,000 mg by mouth every 6 hours as needed for mild pain  amLODIPine (NORVASC) 2.5 MG tablet, Take 1 tablet (2.5 mg) by mouth daily  bisacodyl (DULCOLAX) 5 MG EC tablet, 2 days prior to procedure, take 2 tablets at 4 pm. 1 day prior to procedure, take 2 tablets at 4 pm. For additional instructions refer to your colonoscopy prep instructions.  bisacodyl (DULCOLAX) 5 MG EC tablet, Take 2 tablets at 3 pm the day before your procedure. If your procedure is before 11 am, take 2 additional tablets at 11 pm. If your procedure is after 11 am, take 2 additional tablets at 6 am. For additional instructions refer to your colonoscopy prep instructions.  Calcium Carbonate (CALCIUM-CARB 600 PO), Take 2 tablets by mouth daily  glycerin (LAXATIVE) 1.2 g suppository, Place 1 suppository rectally daily as needed (constipation.)  polyethylene glycol (GOLYTELY) 236 g suspension, 2 days prior at 5pm, mix and drink half of a jug of Golytely. Drink an 8 oz. glass of Golytely every 15 minutes until half of the jug is gone. Place remainder of Golytely in the refrigerator. 1 day prior at 5 pm, drink the 2nd half of a jug of Golytely bowel prep. 6 hours before your check-in time, drink an 8 oz. glass of Golytely every 15 minutes until half of the 2nd jug of Golytely is gone. Discard remainder of second jug.  polyethylene glycol (GOLYTELY) 236 g suspension, The night before the exam at 6 pm drink an 8-ounce glass every 15 minutes until the jug is half empty. If you arrive before 11 AM: Drink the other half of the Golytely jug at 11 PM night before procedure. If you arrive after 11 AM: Drink the other half of the Golytely jug at 6 AM day of procedure. For additional instructions refer to your colonoscopy prep instructions.  polyethylene glycol (MIRALAX) 17 GM/Dose powder, Take 17 g (1  capful.) by mouth daily (Patient taking differently: Take 1 capful. by mouth 2 times daily)  senna-docusate (NEGRITO-COLACE) 8.6-50 MG per tablet, Take 1-2 tablets by mouth 2 times daily as needed for constipation.  sertraline (ZOLOFT) 50 MG tablet, TAKE 1 & 1/2 TABLETS BY MOUTH EVERY DAY  vitamin D3 (CHOLECALCIFEROL) 50 mcg (2000 units) tablet, Take 1 tablet (50 mcg) by mouth daily  calcitonin, salmon, (MIACALCIN) 200 UNIT/ACT nasal spray, Spray 1 spray into one nostril alternating nostrils daily for 30 days Alternate nostril each day.    leuprolide (LUPRON DEPOT) kit 45 mg         PHYSICAL EXAMINATION  VITALS: BP (!) 154/88   Pulse 72   Resp 16   Wt 73 kg (161 lb)   BMI 20.67 kg/m    GENERAL: Healthy appearing, alert, no acute distress, normal habitus.  CARDIOVASCULAR: Extremities warm and well perfused. Pulses present.   NEUROLOGICAL:  Patient is awake and oriented to self, place and time.  Attention span is normal.  Memory is grossly intact.  Language is fluent and follows commands appropriately.  Appropriate fund of knowledge. Cranial nerves 2-12 are intact. There is no pronator drift.  Motor exam shows 5/5 strength in all extremities.  Tone is symmetric bilaterally in upper and lower extremities.  Reflexes are symmetric and 2+ in upper extremities and lower extremities. Sensory exam is grossly intact to light touch, pin prick and vibration with slightly decreased vibratory sense in the feet.  Finger to nose and heel to shin is without dysmetria.  Romberg is unsteady.  Gait is slightly wide-based and the patient is able to do tandem walk and walk on toes and heels with difficulty.  Orthostatic vitals    Exam similar to before.  Gait is slightly better compared to before.    DIAGNOSTICS  CT head  IMPRESSION:  1.  No acute intracranial abnormality.     2.  Redemonstrated retrocerebellar arachnoid cyst. This appears  relatively unchanged.      MRI brain  IMPRESSION:  1. No acute intracranial process.  2. Brain  atrophy and presumed chronic small vessel ischemic change, as  described.  3. Unchanged prominent retrocerebellar arachnoid cyst.     MRA neck 2023  IMPRESSION:  1. Mildly limited evaluation of the origins of the great vessels and  the origin/proximal V1 segment of the right vertebral artery due to  motion artifacts.  2. Within that limitation, no significant stenosis, occlusion, or  dissection identified involving the cervical carotid or vertebral  Arteries.    MRA brain  IMPRESSION:  1. No high-grade stenosis or large vessel occlusion identified  involving the major intracranial arteries.  2. 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      CARDIOLOGY  EKG      RELEVANT LABS  Component      Latest Ref Rng 6/8/2023  12:01 PM   WBC      4.0 - 11.0 10e3/uL 4.9    RBC Count      4.40 - 5.90 10e6/uL 4.27 (L)    Hemoglobin      13.3 - 17.7 g/dL 14.0    Hematocrit      40.0 - 53.0 % 39.7 (L)    MCV      78 - 100 fL 93    MCH      26.5 - 33.0 pg 32.8    MCHC      31.5 - 36.5 g/dL 35.3    RDW      10.0 - 15.0 % 13.4    Platelet Count      150 - 450 10e3/uL 169    % Neutrophils      % 56    % Lymphocytes      % 33    % Monocytes      % 9    % Eosinophils      % 1    % Basophils      % 1    % Immature Granulocytes      % 0    NRBCs per 100 WBC      <1 /100 0    Absolute Neutrophils      1.6 - 8.3 10e3/uL 2.7    Absolute Lymphocytes      0.8 - 5.3 10e3/uL 1.6    Absolute Monocytes      0.0 - 1.3 10e3/uL 0.4    Absolute Eosinophils      0.0 - 0.7 10e3/uL 0.1    Absolute Basophils      0.0 - 0.2 10e3/uL 0.0    Absolute Immature Granulocytes      <=0.4 10e3/uL 0.0    Absolute NRBCs      10e3/uL 0.0    Sodium      136 - 145 mmol/L 130 (L)    Potassium      3.4 - 5.3 mmol/L 4.6    Chloride      98 - 107 mmol/L 94 (L)    Carbon Dioxide (CO2)      22 - 29 mmol/L 26    Anion Gap      7 - 15 mmol/L 10    Urea  Nitrogen      8.0 - 23.0 mg/dL 9.9    Creatinine      0.67 - 1.17 mg/dL 0.70    Calcium      8.8 - 10.2 mg/dL 9.4    Glucose      70 - 99 mg/dL 96    Alkaline Phosphatase      40 - 129 U/L 81    AST      10 - 50 U/L 17    ALT      10 - 50 U/L 18    Protein Total      6.4 - 8.3 g/dL 6.6    Albumin      3.5 - 5.2 g/dL 4.3    Bilirubin Total      <=1.2 mg/dL 0.6    GFR Estimate      >60 mL/min/1.73m2 >90    TSH      0.30 - 4.20 uIU/mL 4.27 (H)    T4 Free      0.90 - 1.70 ng/dL 1.27       Legend:  (L) Low  (H) High    OUTSIDE RECORDS  Outside referral notes and chart notes were reviewed and pertinent information has been summarized (in addition to the HPI):-      EMG    Component      Latest Ref Rng 6/23/2023  2:37 PM   Albumin Fraction      3.7 - 5.1 g/dL 4.3    Alpha 1 Fraction      0.2 - 0.4 g/dL 0.3    Alpha 2 Fraction      0.5 - 0.9 g/dL 0.8    Beta Fraction      0.6 - 1.0 g/dL 0.6    Gamma Fraction      0.7 - 1.6 g/dL 0.7    Monoclonal Peak      <=0.0 g/dL 0.1 (H)    ELP Interpretation: Very small monoclonal protein (0.1 g/dL) seen in the gamma fraction. See immunofixation report on same specimen. Pathologic significance requires clinical correlation. Maria Dewitt M.D., Ph.D.    Vitamin B12      232 - 1,245 pg/mL 388    Vitamin B1 Whole Blood Level      70 - 180 nmol/L 118    Immunofixation ELP Monoclonal IgG immunoglobulin of lambda light chain type. Pathologic significance requires clinical correlation. Maria Dewitt M.D., Ph.D.    Hemoglobin A1C      0.0 - 5.6 % 5.0    Copper      70.0 - 140.0 ug/dL 103.1    Total Protein Serum for ELP      6.4 - 8.3 g/dL 6.6       Legend:  (H) High    EMG  CLINICAL INTERPRETATION:  This is an abnormal nerve conduction and EMG study.  The study is suggestive of a sensorimotor polyneuropathy in both legs.  Further clinical correlation is needed.     IMPRESSION/REPORT/PLAN  Rule out orthostatic hypotension  Vertigo  Decreased sense of vibration-rule out neuropathy  Cerebral  aneurysm, nonruptured  Abnormal SPEP  Low normal B12    This is a 67 year old male with lightheadedness and episodes of vertigo.  On exam he does have slightly increased reflexes with decreased vibratory sense in the feet.  He does also have some unsteadiness with ambulation and especially with tandem walking.  Orthostatic vitals were negative.  Exam does look slightly better today.  Possibly his dizziness might be more unsteadiness.  He does complain of some blurred vision at times and would recommend that he follow with an eye doctor for further evaluation.    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  Blood work has shown low normal vitamin B12.  We will put him on a vitamin B12 supplement to see if it helps.  Recheck level in 6 months.  Serum protein electrophoresis was slightly abnormal.  Suspect this is a false positive.  Recheck in 6 months.  Prognosis of idiopathic neuropathy.    He does have some lightheadedness though orthostatic vitals were negative.  Encourage good hydration.  Could be related to an autonomic neuropathy and will monitor.  Overall his dizziness is better.  Did see physical therapy for possible vertigo they did not feel he had vertigo.  Recommend to do the exercises on his own.    MRI brain/MRA have been noncontributory for causes of dizziness.  His MRI does show cerebral aneurysm.  He does have risk factors of high blood pressure and smoking history.  Encouraged him to stop smoking.  He still has not done so.  The aneurysm would be unrelated to the dizziness.  Repeat MRA in 6 months.    Return in 6 months.    -     cyanocobalamin (VITAMIN B-12) 1000 MCG tablet; Take 1 tablet (1,000 mcg) by mouth daily  -     Vitamin B12; Future  -     Protein Immunofixation Serum; Future  -     Protein electrophoresis; Future  -     MRA Head w/o Contrast Angiogram; Future    Return in about 6 months (around 3/13/2024) for In-Clinic Visit (must), After testing.    Over 35 minutes were spent  coordinating the care for the patient on the day of the encounter.  This includes previsit, during visit and post visit activities as documented above.  Counseling patient.  Multiple test reviewed.  Prescription management.  Testing ordered.  (Activities include but not inclusive of reviewing chart, reviewing outside records, reviewing labs and imaging study results as well as the images, patient visit time including getting history and exam,  use if applicable, review of test results with the patient and coming up with a plan in a shared model, counseling patient and family, education and answering patient questions, EMR , EMR diagnosis entry and problem list management, medication reconciliation and prescription management if applicable, paperwork if applicable, printing documents and documentation of the visit activities.)        Chip Pinon MD  Neurologist  [facility] Neurology [facility]  Tel:- [phone]    This note was dictated using voice recognition software.  Any grammatical or context distortions are unintentional and inherent to the software.      Again, thank you for allowing me to participate in the care of your patient.        Sincerely,        Chip Pinon MD

## 2023-09-13 NOTE — PROCEDURES
[facility]     Formerly [facility]  [address], [address]  [place] [address]  Tel: [phone]  Fax: [phone]          Full Name: Ghulam Rizzo  Gender: Male  Patient ID: [id number] YOB: 1955      Visit Date: 9/13/2023 08:19  Age: 67 Years 11 Months Old  Interpreted By: Chip Pinon MD   Ref DrApple: Jamie Rutledge DO  Tech: ST   Height: 6 feet 2 inch  Reason for referral: Evaluate bilateral lowers. c/o dizziness, balance difficulty > 6 months. Right = Left.       Motor NCS      Nerve / Sites Lat Amp Dist Richar    ms mV cm m/s   R Peroneal - EDB      Ankle 5.94 2.2 8       Fib head 15.31 1.9 33 35      Pop fossa 18.39 1.8 11 36   L Peroneal - EDB      Ankle 5.42 4.6 8       Fib head 14.01 3.0 32 37      Pop fossa 16.98 2.7 11 37   R Tibial - AH      Ankle 5.94 4.2 8       Pop fossa 18.28 3.4 43 35   L Tibial - AH      Ankle 5.78 2.4 8       Pop fossa 18.07 1.8 45 37       F  Wave      Nerve Fmin    ms   R Peroneal - EDB 70.83   R Tibial - AH 73.18   L Peroneal - EDB 74.17   L Tibial - AH 74.11       Sensory NCS      Nerve / Sites Onset Lat Pk Lat Amp.2-3 Dist Richar    ms ms  V cm m/s   R Sural - Ankle (Calf)      Calf 4.01 4.53 6.3 14 35   L Sural - Ankle (Calf)      Calf 4.32 5.16 6.0 14 32   R Superficial peroneal - Ankle      Lat leg NR NR NR 12 NR   L Superficial peroneal - Ankle      Lat leg NR NR NR 12 NR       EMG Summary Table     Spontaneous MUAP Rcmt Note   Muscle Fib PSW Fasc IA # Amp Dur PPP Rate Type   R. Gluteus medius None None None N N N N N N N   R. Gluteus ramses None None None N N N N N N N   R. L3 paraspinal None None None N N N N N N N   R. L4 paraspinal None None None N N N N N N N   R. L5 paraspinal None None None N N N N N N N   R. S1 paraspinal Occ Occ None N N N N N N N   R. Adductor chiquita None None None N N N N N N N   R. Quadriceps None None None N N N N N N N   R. Tibialis anterior None None None Incr N N N  N N N   R. Peroneus longus None None None Incr N N N N N N   R. Gastrocnemius (Medial head) None None None Incr N N N N N N   R. Gastrocnemius (Lateral head) None None None Incr N N N N N N   R. Tibialis posterior Occ Occ None N N N N N N N   L. L3 paraspinal None None None N N N N N N N   L. L4 paraspinal None None None N N N N N N N   L. L5 paraspinal None None None N N N N N N N   L. S1 paraspinal None None None Incr N N N N N N   L. Adductor chiquita None None None N N N N N N N   L. Quadriceps None None None N N N N N N N   L. Tibialis anterior None None None Incr N N N N N N   L. Peroneus longus None None None Incr N N N N N N   L. Gastrocnemius (Medial head) None None None Incr N N N N N N   L. Gastrocnemius (Lateral head) None None None Incr N N N N N N   L. Tibialis posterior None None None Incr N N N N N N     SUMMARY  Nerve conduction and EMG study of bilateral lower extremities shows:    Normal right peroneal distal motor latency with low normal amplitude and decreased conduction velocity.  Normal left peroneal distal motor latency, amplitude and decreased conduction velocity.  Normal right tibial distal motor latency with low normal amplitude and decreased conduction velocity.  Normal left tibial distal motor latency with low amplitude and decreased conduction velocity.  Abnormal bilateral sural and absent bilateral superficial peroneal sensory SNAP.  Monopolar needle exam as above.      CLINICAL INTERPRETATION:  This is an abnormal nerve conduction and EMG study.  The study is suggestive of a sensorimotor polyneuropathy in both legs.  Further clinical correlation is needed.     Chip Pinon MD  Neurologist  [facility] Neurology [facility]  Tel:- [phone]

## 2023-09-13 NOTE — LETTER
9/13/2023         RE: Ghulam Rizzo  [address] [place] [id number]        Dear Colleague,    Thank you for referring your patient, Ghulam Rizzo, to the [facility] NEUROLOG[facility] CL[facility]IC [place]. Please see a copy of my visit note below.    See procedure note.       Again, thank you for allowing me to participate in the care of your patient.        Sincerely,        Chip Pinon MD

## 2023-09-21 ENCOUNTER — APPOINTMENT (OUTPATIENT)
Dept: RADIATION THERAPY | Facility: OUTPATIENT CENTER | Age: 68
End: 2023-09-21
Payer: COMMERCIAL

## 2023-09-22 ENCOUNTER — APPOINTMENT (OUTPATIENT)
Dept: RADIATION THERAPY | Facility: OUTPATIENT CENTER | Age: 68
End: 2023-09-22
Payer: COMMERCIAL

## 2023-09-22 NOTE — PROGRESS NOTES
Ghulam Barry is coming in for labs Monday 09/25. The only labs he has in his chart have an expected date of March 2024. Is it okay to draw these this early? Please advise.  Thank you,  [facility]

## 2023-09-25 ENCOUNTER — LAB (OUTPATIENT)
Dept: LAB | Facility: CLINIC | Age: 68
End: 2023-09-25
Payer: COMMERCIAL

## 2023-09-25 ENCOUNTER — DOCUMENTATION ONLY (OUTPATIENT)
Dept: RADIATION THERAPY | Facility: OUTPATIENT CENTER | Age: 68
End: 2023-09-25

## 2023-09-25 ENCOUNTER — APPOINTMENT (OUTPATIENT)
Dept: RADIATION THERAPY | Facility: OUTPATIENT CENTER | Age: 68
End: 2023-09-25
Payer: COMMERCIAL

## 2023-09-25 DIAGNOSIS — C61 ADENOCARCINOMA OF PROSTATE (H): ICD-10-CM

## 2023-09-25 DIAGNOSIS — R30.0 DYSURIA: Primary | ICD-10-CM

## 2023-09-25 DIAGNOSIS — R30.0 DYSURIA: ICD-10-CM

## 2023-09-25 LAB
ALBUMIN UR-MCNC: NEGATIVE MG/DL
APPEARANCE UR: CLEAR
BILIRUB UR QL STRIP: NEGATIVE
COLOR UR AUTO: YELLOW
GLUCOSE UR STRIP-MCNC: NEGATIVE MG/DL
HGB UR QL STRIP: NEGATIVE
KETONES UR STRIP-MCNC: NEGATIVE MG/DL
LEUKOCYTE ESTERASE UR QL STRIP: NEGATIVE
NITRATE UR QL: NEGATIVE
PH UR STRIP: 6 [PH] (ref 5–7)
SP GR UR STRIP: <=1.005 (ref 1–1.03)
UROBILINOGEN UR STRIP-ACNC: 0.2 E.U./DL

## 2023-09-25 PROCEDURE — 81003 URINALYSIS AUTO W/O SCOPE: CPT

## 2023-09-25 NOTE — PROGRESS NOTES
Patient on day 3 of radiation for prostate cancer. Reports burning and frequency of urination through the weekend,    MD requests UA/UC. Pt over to lab.

## 2023-09-26 ENCOUNTER — APPOINTMENT (OUTPATIENT)
Dept: RADIATION THERAPY | Facility: OUTPATIENT CENTER | Age: 68
End: 2023-09-26
Payer: COMMERCIAL

## 2023-09-27 ENCOUNTER — APPOINTMENT (OUTPATIENT)
Dept: RADIATION THERAPY | Facility: OUTPATIENT CENTER | Age: 68
End: 2023-09-27
Payer: COMMERCIAL

## 2023-09-27 ENCOUNTER — OFFICE VISIT (OUTPATIENT)
Dept: RADIATION THERAPY | Facility: OUTPATIENT CENTER | Age: 68
End: 2023-09-27
Payer: COMMERCIAL

## 2023-09-27 VITALS
WEIGHT: 162 LBS | HEART RATE: 83 BPM | BODY MASS INDEX: 20.8 KG/M2 | DIASTOLIC BLOOD PRESSURE: 89 MMHG | SYSTOLIC BLOOD PRESSURE: 150 MMHG

## 2023-09-27 DIAGNOSIS — C61 ADENOCARCINOMA OF PROSTATE (H): Primary | ICD-10-CM

## 2023-09-27 NOTE — LETTER
2023         RE: Ghulam Rizzo  [address] [facility] [id number]        Dear Colleague,    Thank you for referring your patient, Ghulam Rizzo, to the  [facility]. Please see a copy of my visit note below.    [facility]  SPECIALIZING IN BREAKTHROUGHS  Radiation Oncology    On Treatment Visit Note      Ghulam Rizzo      Date: 2023   MRN: [id number]   : 1955  Diagnosis: Prostate cancer      Reason for Visit:  On Radiation Treatment Visit     Treatment Summary to Date  Treatment Site: pelvis/prostate Current Dose: 1250/7000 cGy Fractions:       Chemotherapy  Chemo concurrent with radx?: No    Subjective:    Doing well.  No acute complaints.  No GI bother.  Mild  bother. UA ordered was negative. Noticed  slight dysuria and reduced urinary flow.    Nursing ROS:   Nutrition Alteration  Diet Type: Patient's Preference  Skin  Skin Reaction: 0 - No changes        Cardiovascular  Respiratory effort: 1 - Normal - without distress  Gastrointestinal  GI Note: chronic constipation, using otc products  Genitourinary   Note: burning has resolved, recent UA negative, still difficult to start, nocturia 3-4x     Pain Assessment  Pain Note: no pain issues      Objective:   BP (!) 150/89   Pulse 83   Wt 73.5 kg (162 lb)   BMI 20.80 kg/m    No apparent distress    Labs:  CBC RESULTS:   Recent Labs   Lab Test 23  1201   WBC 4.9   RBC 4.27*   HGB 14.0   HCT 39.7*   MCV 93   MCH 32.8   MCHC 35.3   RDW 13.4        ELECTROLYTES:  Recent Labs   Lab Test 23  1106 23  1336   NA  --  136   POTASSIUM  --  4.3   CHLORIDE  --  99   TAVIA  --  10.2   CO2  --  25   BUN  --  12.0   CR  --  0.79   * 105*       Assessment:  Mr. Rizzo is a 67 year old male with biopsy-proven unfavorable intermediate  prostatic adenocarcinoma, [place] 4+3 = 7, pathologic T1cN0.  He is undergoing definitive radiation therapy with short-term ADT.    Tolerating radiation  therapy well.  All questions and concerns addressed.    Plan:   Continue current therapy.    2.    bother.  UA was negative.  Consider ibuprofen as needed for radiation-induced inflammation.  Will consider Flomax if needed    Mosaiq chart and setup information reviewed  Ports checked    Medication Review  Med list reviewed with patient?: Yes  Med Note: Lupron #1 7/21/2023           Armani Rodgers MD

## 2023-09-27 NOTE — PROGRESS NOTES
[facility]  SPECIALIZING IN BREAKTHROUGHS  Radiation Oncology    On Treatment Visit Note      Ghulam Rizzo      Date: 2023   MRN: [id number]   : 1955  Diagnosis: Prostate cancer      Reason for Visit:  On Radiation Treatment Visit     Treatment Summary to Date  Treatment Site: pelvis/prostate Current Dose: 1250/7000 cGy Fractions:       Chemotherapy  Chemo concurrent with radx?: No    Subjective:    Doing well.  No acute complaints.  No GI bother.  Mild  bother. UA ordered was negative. Noticed  slight dysuria and reduced urinary flow.    Nursing ROS:   Nutrition Alteration  Diet Type: Patient's Preference  Skin  Skin Reaction: 0 - No changes        Cardiovascular  Respiratory effort: 1 - Normal - without distress  Gastrointestinal  GI Note: chronic constipation, using otc products  Genitourinary   Note: burning has resolved, recent UA negative, still difficult to start, nocturia 3-4x     Pain Assessment  Pain Note: no pain issues      Objective:   BP (!) 150/89   Pulse 83   Wt 73.5 kg (162 lb)   BMI 20.80 kg/m    No apparent distress    Labs:  CBC RESULTS:   Recent Labs   Lab Test 23  1201   WBC 4.9   RBC 4.27*   HGB 14.0   HCT 39.7*   MCV 93   MCH 32.8   MCHC 35.3   RDW 13.4        ELECTROLYTES:  Recent Labs   Lab Test 23  1106 23  1336   NA  --  136   POTASSIUM  --  4.3   CHLORIDE  --  99   TAVIA  --  10.2   CO2  --  25   BUN  --  12.0   CR  --  0.79   * 105*       Assessment:  Mr. Rizzo is a 67 year old male with biopsy-proven unfavorable intermediate  prostatic adenocarcinoma, [place] 4+3 = 7, pathologic T1cN0.  He is undergoing definitive radiation therapy with short-term ADT.    Tolerating radiation therapy well.  All questions and concerns addressed.    Plan:   Continue current therapy.    2.    bother.  UA was negative.  Consider ibuprofen as needed for radiation-induced inflammation.  Will consider Flomax if needed    Mosaiq chart and setup  information reviewed  Ports checked    Medication Review  Med list reviewed with patient?: Yes  Med Note: Lupron #1 7/21/2023           Armani Rodgers MD

## 2023-09-28 ENCOUNTER — APPOINTMENT (OUTPATIENT)
Dept: RADIATION THERAPY | Facility: OUTPATIENT CENTER | Age: 68
End: 2023-09-28
Payer: COMMERCIAL

## 2023-09-29 ENCOUNTER — APPOINTMENT (OUTPATIENT)
Dept: RADIATION THERAPY | Facility: OUTPATIENT CENTER | Age: 68
End: 2023-09-29
Payer: COMMERCIAL

## 2023-10-02 ENCOUNTER — APPOINTMENT (OUTPATIENT)
Dept: RADIATION THERAPY | Facility: OUTPATIENT CENTER | Age: 68
End: 2023-10-02
Payer: COMMERCIAL

## 2023-10-03 ENCOUNTER — APPOINTMENT (OUTPATIENT)
Dept: FAMILY MEDICINE | Facility: CLINIC | Age: 68
End: 2023-10-03
Payer: COMMERCIAL

## 2023-10-03 ENCOUNTER — APPOINTMENT (OUTPATIENT)
Dept: RADIATION THERAPY | Facility: OUTPATIENT CENTER | Age: 68
End: 2023-10-03
Payer: COMMERCIAL

## 2023-10-04 ENCOUNTER — OFFICE VISIT (OUTPATIENT)
Dept: RADIATION THERAPY | Facility: OUTPATIENT CENTER | Age: 68
End: 2023-10-04
Payer: COMMERCIAL

## 2023-10-04 ENCOUNTER — APPOINTMENT (OUTPATIENT)
Dept: RADIATION THERAPY | Facility: OUTPATIENT CENTER | Age: 68
End: 2023-10-04
Payer: COMMERCIAL

## 2023-10-04 VITALS
WEIGHT: 162 LBS | SYSTOLIC BLOOD PRESSURE: 148 MMHG | HEART RATE: 65 BPM | BODY MASS INDEX: 20.8 KG/M2 | DIASTOLIC BLOOD PRESSURE: 88 MMHG

## 2023-10-04 DIAGNOSIS — C61 ADENOCARCINOMA OF PROSTATE (H): Primary | ICD-10-CM

## 2023-10-04 NOTE — LETTER
10/4/2023         RE: Ghulam Rizzo  [address] [place] [id number]        Dear Colleague,    Thank you for referring your patient, Ghulam Rizzo, to the  [facility]. Please see a copy of my visit note below.    [facility]  SPECIALIZING IN BREAKTHROUGHS  Radiation Oncology    On Treatment Visit Note      Ghulam Rizzo      Date: 10/4/2023   MRN: [id number]   : 1955  Diagnosis: Prostate cancer      Reason for Visit:  On Radiation Treatment Visit     Treatment Summary to Date  Treatment Site: pelvis/prostate Current Dose: 2500/7000 cGy Fractions: 10/28      Chemotherapy  Chemo concurrent with radx?: No    Subjective:    Doing well.  No acute complaints.  No GI bother.  Mild  bother. UA ordered was negative. Noticed  slight dysuria and reduced urinary flow.    Nursing ROS:   Nutrition Alteration  Diet Type: Patient's Preference  Skin  Skin Reaction: 0 - No changes        Cardiovascular  Respiratory effort: 1 - Normal - without distress  Gastrointestinal  GI Note: chronic constipation, using otc products  Genitourinary   Note: burning has resolved, recent UA negative, still difficult to start, nocturia 3-4x     Pain Assessment  Pain Note: no pain issues      Objective:   BP (!) 148/88   Pulse 65   Wt 73.5 kg (162 lb)   BMI 20.80 kg/m    No apparent distress    Labs:  CBC RESULTS:   Recent Labs   Lab Test 23  1201   WBC 4.9   RBC 4.27*   HGB 14.0   HCT 39.7*   MCV 93   MCH 32.8   MCHC 35.3   RDW 13.4        ELECTROLYTES:  Recent Labs   Lab Test 23  1106 23  1336   NA  --  136   POTASSIUM  --  4.3   CHLORIDE  --  99   TAVIA  --  10.2   CO2  --  25   BUN  --  12.0   CR  --  0.79   * 105*       Assessment:  Mr. Rizzo is a 68 year old male with biopsy-proven unfavorable intermediate  prostatic adenocarcinoma, [place] 4+3 = 7, pathologic T1cN0.  He is undergoing definitive radiation therapy with short-term ADT.    Tolerating radiation  therapy well.  All questions and concerns addressed.    Plan:   Continue current therapy.    2.    bother.  UA was negative.  Consider ibuprofen as needed for radiation-induced inflammation.  Will consider Flomax if needed    Mosaiq chart and setup information reviewed  Ports checked    Medication Review  Med list reviewed with patient?: Yes  Med Note: Lupron #1 7/21/2023           Armani Rodgers MD

## 2023-10-04 NOTE — PROGRESS NOTES
[facility]  SPECIALIZING IN BREAKTHROUGHS  Radiation Oncology    On Treatment Visit Note      Ghulam Rizzo      Date: 10/4/2023   MRN: [id number]   : 1955  Diagnosis: Prostate cancer      Reason for Visit:  On Radiation Treatment Visit     Treatment Summary to Date  Treatment Site: pelvis/prostate Current Dose: 2500/7000 cGy Fractions: 10/28      Chemotherapy  Chemo concurrent with radx?: No    Subjective:    Doing well.  No acute complaints.  No GI bother.  Mild  bother. UA ordered was negative. Noticed  slight dysuria and reduced urinary flow.    Nursing ROS:   Nutrition Alteration  Diet Type: Patient's Preference  Skin  Skin Reaction: 0 - No changes        Cardiovascular  Respiratory effort: 1 - Normal - without distress  Gastrointestinal  GI Note: chronic constipation, using otc products  Genitourinary   Note: burning has resolved, recent UA negative, still difficult to start, nocturia 3-4x     Pain Assessment  Pain Note: no pain issues      Objective:   BP (!) 148/88   Pulse 65   Wt 73.5 kg (162 lb)   BMI 20.80 kg/m    No apparent distress    Labs:  CBC RESULTS:   Recent Labs   Lab Test 23  1201   WBC 4.9   RBC 4.27*   HGB 14.0   HCT 39.7*   MCV 93   MCH 32.8   MCHC 35.3   RDW 13.4        ELECTROLYTES:  Recent Labs   Lab Test 23  1106 23  1336   NA  --  136   POTASSIUM  --  4.3   CHLORIDE  --  99   TAVIA  --  10.2   CO2  --  25   BUN  --  12.0   CR  --  0.79   * 105*       Assessment:  Mr. Rizzo is a 68 year old male with biopsy-proven unfavorable intermediate  prostatic adenocarcinoma, [place] 4+3 = 7, pathologic T1cN0.  He is undergoing definitive radiation therapy with short-term ADT.    Tolerating radiation therapy well.  All questions and concerns addressed.    Plan:   Continue current therapy.    2.    bother.  UA was negative.  Consider ibuprofen as needed for radiation-induced inflammation.  Will consider Flomax if needed    Mosaiq chart and setup  information reviewed  Ports checked    Medication Review  Med list reviewed with patient?: Yes  Med Note: Lupron #1 7/21/2023           Armani Rodgers MD

## 2023-10-05 ENCOUNTER — APPOINTMENT (OUTPATIENT)
Dept: RADIATION THERAPY | Facility: OUTPATIENT CENTER | Age: 68
End: 2023-10-05
Payer: COMMERCIAL

## 2023-10-06 ENCOUNTER — APPOINTMENT (OUTPATIENT)
Dept: RADIATION THERAPY | Facility: OUTPATIENT CENTER | Age: 68
End: 2023-10-06
Payer: COMMERCIAL

## 2023-10-09 ENCOUNTER — APPOINTMENT (OUTPATIENT)
Dept: RADIATION THERAPY | Facility: OUTPATIENT CENTER | Age: 68
End: 2023-10-09
Payer: COMMERCIAL

## 2023-10-09 DIAGNOSIS — C61 PROSTATE CANCER (H): Primary | ICD-10-CM

## 2023-10-09 RX ORDER — TAMSULOSIN HYDROCHLORIDE 0.4 MG/1
0.4 CAPSULE ORAL DAILY
Qty: 30 CAPSULE | Refills: 1 | Status: SHIPPED | OUTPATIENT
Start: 2023-10-09 | End: 2023-11-29

## 2023-10-10 ENCOUNTER — APPOINTMENT (OUTPATIENT)
Dept: RADIATION THERAPY | Facility: OUTPATIENT CENTER | Age: 68
End: 2023-10-10
Payer: COMMERCIAL

## 2023-10-10 ENCOUNTER — VIRTUAL VISIT (OUTPATIENT)
Dept: FAMILY MEDICINE | Facility: CLINIC | Age: 68
End: 2023-10-10
Payer: COMMERCIAL

## 2023-10-10 DIAGNOSIS — K58.8 OTHER IRRITABLE BOWEL SYNDROME: ICD-10-CM

## 2023-10-10 DIAGNOSIS — C61 ADENOCARCINOMA OF PROSTATE (H): ICD-10-CM

## 2023-10-10 DIAGNOSIS — K59.00 CONSTIPATION, UNSPECIFIED CONSTIPATION TYPE: Primary | ICD-10-CM

## 2023-10-10 PROCEDURE — 99214 OFFICE O/P EST MOD 30 MIN: CPT | Mod: 95 | Performed by: FAMILY MEDICINE

## 2023-10-10 NOTE — PROGRESS NOTES
Ghulam is a 68 year old who is being evaluated via a billable telephone visit.      What phone number would you like to be contacted at? [phone]  How would you like to obtain your AVS? Mail a copy    Distant Location (provider location):  On-site    Assessment & Plan     Constipation, unspecified constipation type  Other irritable bowel syndrome  Having GI issues and abdominal discomfort from his Senna-S and miralax. He used Linzess in the past with great success. Wishes to go back on Linzess. Prescription provided. He plans to follow up with GI after completion of radiation therapy.   - linaclotide (LINZESS) 145 MCG capsule  Dispense: 30 capsule; Refill: 3    Adenocarcinoma of prostate (H)  Undergoing radiation therapy. Radiation therapy to be completed at the end of the month.     The risks, benefits and treatment options of prescribed medications or other treatments have been discussed with the patient. The patient verbalized their understanding and should call or follow up if no improvement or if they develop further problems.      Jamie Rutledge, [facility]    Subjective   Ghulam is a 68 year old, presenting for the following health issues:  bowel issues (Would like to discuss getting a prescription for Linzess, has used this in the past, discontinued to the cost.  Would like to try this again as cost is more affordable.  Original RX from [facility].  )      10/10/2023     4:52 PM   Additional Questions   Roomed by Debra MESA   Accompanied by wife, Felecia         10/10/2023     4:52 PM   Patient Reported Additional Medications   Patient reports taking the following new medications none       HPI     Chief Complaint   Patient presents with    bowel issues     Would like to discuss getting a prescription for Linzess, has used this in the past, discontinued to the cost.  Would like to try this again as cost is more affordable.  Original RX from [facility].         Using miralax twice per day.   Senna-S  1-2 tablets BID, occasionally taking more of this.   Reports having some abdominal discomfort at times.   Reports can sit on the toilet for a long time, and go a little bit. Wait 20 minutes then go some more.   Has tried to increase his fluid intake as well.   Currently undergoing radiation therapy for prostate cancer.     In the past has used Linzess medication which was quite helpful. Got this through [facility]. Due to financial reasons and insurance no longer covering the medication he stopped this.        Review of Systems       Objective    Vitals - Patient Reported  Pain Score: No Pain (0)        Physical Exam   healthy, alert, and no distress  PSYCH: Alert and oriented times 3; coherent speech, normal   rate and volume, able to articulate logical thoughts, able   to abstract reason, no tangential thoughts, no hallucinations   or delusions  His affect is normal  RESP: No cough, no audible wheezing, able to talk in full sentences  Remainder of exam unable to be completed due to telephone visits      Phone call duration: 11 minutes

## 2023-10-11 ENCOUNTER — OFFICE VISIT (OUTPATIENT)
Dept: RADIATION THERAPY | Facility: OUTPATIENT CENTER | Age: 68
End: 2023-10-11
Payer: COMMERCIAL

## 2023-10-11 ENCOUNTER — APPOINTMENT (OUTPATIENT)
Dept: RADIATION THERAPY | Facility: OUTPATIENT CENTER | Age: 68
End: 2023-10-11
Payer: COMMERCIAL

## 2023-10-11 VITALS
BODY MASS INDEX: 20.8 KG/M2 | HEART RATE: 66 BPM | DIASTOLIC BLOOD PRESSURE: 88 MMHG | WEIGHT: 162 LBS | OXYGEN SATURATION: 100 % | RESPIRATION RATE: 18 BRPM | SYSTOLIC BLOOD PRESSURE: 169 MMHG

## 2023-10-11 DIAGNOSIS — C61 ADENOCARCINOMA OF PROSTATE (H): Primary | ICD-10-CM

## 2023-10-11 ASSESSMENT — PAIN SCALES - GENERAL: PAINLEVEL: NO PAIN (0)

## 2023-10-11 NOTE — PROGRESS NOTES
[facility]  SPECIALIZING IN BREAKTHROUGHS  Radiation Oncology    On Treatment Visit Note      Ghulam Rizzo      Date: 10/11/2023   MRN: [id number]   : 1955  Diagnosis: Prostate cancer      Reason for Visit:  On Radiation Treatment Visit     Treatment Summary to Date  Treatment Site: pelvis/prostate Current Dose: 3750/7000 cGy Fractions: 15/28      Chemotherapy  Chemo concurrent with radx?: No    Subjective:    Doing well.  No acute complaints.  No GI bother.  Mild  bother. Prior UA ordered was negative. Noticed  slight dysuria and reduced urinary flow. Ibuprofen tried without alleviation. Started flomax today.    Nursing ROS:   Nutrition Alteration  Diet Type: Patient's Preference  Skin  Skin Reaction: 0 - No changes        Cardiovascular  Respiratory effort: 1 - Normal - without distress  Gastrointestinal  GI Note: chronic constipation, using otc products  Genitourinary   Note: burning has resolved, recent UA negative, still difficult to start, nocturia 3-4x     Pain Assessment  Pain Note: no pain issues      Objective:   BP (!) 169/88   Pulse 66   Resp 18   Wt 73.5 kg (162 lb)   SpO2 100%   BMI 20.80 kg/m    No apparent distress    Labs:  CBC RESULTS:   Recent Labs   Lab Test 23  1201   WBC 4.9   RBC 4.27*   HGB 14.0   HCT 39.7*   MCV 93   MCH 32.8   MCHC 35.3   RDW 13.4        ELECTROLYTES:  Recent Labs   Lab Test 23  1106 23  1336   NA  --  136   POTASSIUM  --  4.3   CHLORIDE  --  99   TAVIA  --  10.2   CO2  --  25   BUN  --  12.0   CR  --  0.79   * 105*       Assessment:  Mr. Rizzo is a 68 year old male with biopsy-proven unfavorable intermediate  prostatic adenocarcinoma, [place] 4+3 = 7, pathologic T1cN0.  He is undergoing definitive radiation therapy with short-term ADT.    Tolerating radiation therapy well.  All questions and concerns addressed.    Plan:   Continue current therapy.    2.    bother.  Started on Flomax, will monitor.    Mosaiq chart  and setup information reviewed  Ports checked    Medication Review  Med list reviewed with patient?: Yes  Med Note: Lupron #1 7/21/2023           Armani Rodgers MD

## 2023-10-11 NOTE — LETTER
10/11/2023         RE: Ghulam Rizzo  [address] [facility] [address]        Dear Colleague,    Thank you for referring your patient, Ghulam Rizzo, to the  [facility]. Please see a copy of my visit note below.    [facility]  SPECIALIZING IN BREAKTHROUGHS  Radiation Oncology    On Treatment Visit Note      Ghulam Rizzo      Date: 10/11/2023   MRN: [id number]   : 1955  Diagnosis: Prostate cancer      Reason for Visit:  On Radiation Treatment Visit     Treatment Summary to Date  Treatment Site: pelvis/prostate Current Dose: 3750/7000 cGy Fractions: 15/28      Chemotherapy  Chemo concurrent with radx?: No    Subjective:    Doing well.  No acute complaints.  No GI bother.  Mild  bother. Prior UA ordered was negative. Noticed  slight dysuria and reduced urinary flow. Ibuprofen tried without alleviation. Started flomax today.    Nursing ROS:   Nutrition Alteration  Diet Type: Patient's Preference  Skin  Skin Reaction: 0 - No changes        Cardiovascular  Respiratory effort: 1 - Normal - without distress  Gastrointestinal  GI Note: chronic constipation, using otc products  Genitourinary   Note: burning has resolved, recent UA negative, still difficult to start, nocturia 3-4x     Pain Assessment  Pain Note: no pain issues      Objective:   BP (!) 169/88   Pulse 66   Resp 18   Wt 73.5 kg (162 lb)   SpO2 100%   BMI 20.80 kg/m    No apparent distress    Labs:  CBC RESULTS:   Recent Labs   Lab Test 23  1201   WBC 4.9   RBC 4.27*   HGB 14.0   HCT 39.7*   MCV 93   MCH 32.8   MCHC 35.3   RDW 13.4        ELECTROLYTES:  Recent Labs   Lab Test 23  1106 23  1336   NA  --  136   POTASSIUM  --  4.3   CHLORIDE  --  99   TAVIA  --  10.2   CO2  --  25   BUN  --  12.0   CR  --  0.79   * 105*       Assessment:  Mr. Rizzo is a 68 year old male with biopsy-proven unfavorable intermediate  prostatic adenocarcinoma, [place] 4+3 = 7, pathologic T1cN0.  He  is undergoing definitive radiation therapy with short-term ADT.    Tolerating radiation therapy well.  All questions and concerns addressed.    Plan:   Continue current therapy.    2.    bother.  Started on Flomax, will monitor.    Mosaiq chart and setup information reviewed  Ports checked    Medication Review  Med list reviewed with patient?: Yes  Med Note: Lupron #1 7/21/2023           Armani Rodgers MD

## 2023-10-12 ENCOUNTER — APPOINTMENT (OUTPATIENT)
Dept: RADIATION THERAPY | Facility: OUTPATIENT CENTER | Age: 68
End: 2023-10-12
Payer: COMMERCIAL

## 2023-10-13 ENCOUNTER — APPOINTMENT (OUTPATIENT)
Dept: RADIATION THERAPY | Facility: OUTPATIENT CENTER | Age: 68
End: 2023-10-13
Payer: COMMERCIAL

## 2023-10-16 ENCOUNTER — APPOINTMENT (OUTPATIENT)
Dept: RADIATION THERAPY | Facility: OUTPATIENT CENTER | Age: 68
End: 2023-10-16
Payer: COMMERCIAL

## 2023-10-17 ENCOUNTER — APPOINTMENT (OUTPATIENT)
Dept: RADIATION THERAPY | Facility: OUTPATIENT CENTER | Age: 68
End: 2023-10-17
Payer: COMMERCIAL

## 2023-10-18 ENCOUNTER — OFFICE VISIT (OUTPATIENT)
Dept: RADIATION THERAPY | Facility: OUTPATIENT CENTER | Age: 68
End: 2023-10-18
Payer: COMMERCIAL

## 2023-10-18 ENCOUNTER — APPOINTMENT (OUTPATIENT)
Dept: RADIATION THERAPY | Facility: OUTPATIENT CENTER | Age: 68
End: 2023-10-18
Payer: COMMERCIAL

## 2023-10-18 VITALS
SYSTOLIC BLOOD PRESSURE: 159 MMHG | WEIGHT: 162 LBS | HEART RATE: 69 BPM | BODY MASS INDEX: 20.8 KG/M2 | DIASTOLIC BLOOD PRESSURE: 83 MMHG

## 2023-10-18 DIAGNOSIS — C61 ADENOCARCINOMA OF PROSTATE (H): Primary | ICD-10-CM

## 2023-10-18 NOTE — LETTER
10/18/2023         RE: Ghulam Rizzo  [address] [facility] [address]        Dear Colleague,    Thank you for referring your patient, Ghulam Rizzo, to the  [facility]. Please see a copy of my visit note below.    [facility]  SPECIALIZING IN BREAKTHROUGHS  Radiation Oncology    On Treatment Visit Note      Ghulam Rizzo      Date: 10/18/2023   MRN: [id number]   : 1955  Diagnosis: Prostate cancer      Reason for Visit:  On Radiation Treatment Visit     Treatment Summary to Date  Treatment Site: pelvis/prostate Current Dose: 5000/7000 cGy Fractions:       Chemotherapy  Chemo concurrent with radx?: No    Subjective:    Doing okay. Mild GI bother, but has variable bowel habit at baseline.  Mild  bother.  Noticed  slight dysuria and reduced urinary flow. Ibuprofen tried without alleviation. Started flomax  with some alleviation.  Has mild urgency at night.    Nursing ROS:   Nutrition Alteration  Diet Type: Patient's Preference  Skin  Skin Reaction: 0 - No changes        Cardiovascular  Respiratory effort: 1 - Normal - without distress  Gastrointestinal  GI Note: chronic constipation, using otc products  Genitourinary   Note: burning has resolved, recent UA negative, still difficult to start, nocturia 3-4x     Pain Assessment  Pain Note: no pain issues      Objective:   BP (!) 159/83   Pulse 69   Wt 73.5 kg (162 lb)   BMI 20.80 kg/m    No apparent distress    Labs:  CBC RESULTS:   Recent Labs   Lab Test 23  1201   WBC 4.9   RBC 4.27*   HGB 14.0   HCT 39.7*   MCV 93   MCH 32.8   MCHC 35.3   RDW 13.4        ELECTROLYTES:  Recent Labs   Lab Test 23  1106 23  1336   NA  --  136   POTASSIUM  --  4.3   CHLORIDE  --  99   TAVIA  --  10.2   CO2  --  25   BUN  --  12.0   CR  --  0.79   * 105*       Assessment:  Mr. Rizzo is a 68 year old male with biopsy-proven unfavorable intermediate  prostatic adenocarcinoma, [place] 4+3 = 7, pathologic  T1cN0.  He is undergoing definitive radiation therapy with short-term ADT.    Tolerating radiation therapy well.  All questions and concerns addressed.    Plan:   Continue current therapy.    2.    bother.  Continue Flomax.  Discussed consideration of anticholinergic for possible bladder spasm etiology as well, but will continue to assess at this time    Mosaiq chart and setup information reviewed  Ports checked    Medication Review  Med list reviewed with patient?: Yes  Med Note: Lupron #1 7/21/2023           Armani Rodgers MD

## 2023-10-18 NOTE — PROGRESS NOTES
[facility]  SPECIALIZING IN BREAKTHROUGHS  Radiation Oncology    On Treatment Visit Note      Ghulam Rizzo      Date: 10/18/2023   MRN: [id number]   : 1955  Diagnosis: Prostate cancer      Reason for Visit:  On Radiation Treatment Visit     Treatment Summary to Date  Treatment Site: pelvis/prostate Current Dose: 5000/7000 cGy Fractions:       Chemotherapy  Chemo concurrent with radx?: No    Subjective:    Doing okay. Mild GI bother, but has variable bowel habit at baseline.  Mild  bother.  Noticed  slight dysuria and reduced urinary flow. Ibuprofen tried without alleviation. Started flomax  with some alleviation.  Has mild urgency at night.    Nursing ROS:   Nutrition Alteration  Diet Type: Patient's Preference  Skin  Skin Reaction: 0 - No changes        Cardiovascular  Respiratory effort: 1 - Normal - without distress  Gastrointestinal  GI Note: chronic constipation, using otc products  Genitourinary   Note: burning has resolved, recent UA negative, still difficult to start, nocturia 3-4x     Pain Assessment  Pain Note: no pain issues      Objective:   BP (!) 159/83   Pulse 69   Wt 73.5 kg (162 lb)   BMI 20.80 kg/m    No apparent distress    Labs:  CBC RESULTS:   Recent Labs   Lab Test 23  1201   WBC 4.9   RBC 4.27*   HGB 14.0   HCT 39.7*   MCV 93   MCH 32.8   MCHC 35.3   RDW 13.4        ELECTROLYTES:  Recent Labs   Lab Test 23  1106 23  1336   NA  --  136   POTASSIUM  --  4.3   CHLORIDE  --  99   TAVIA  --  10.2   CO2  --  25   BUN  --  12.0   CR  --  0.79   * 105*       Assessment:  Mr. Rizzo is a 68 year old male with biopsy-proven unfavorable intermediate  prostatic adenocarcinoma, [place] 4+3 = 7, pathologic T1cN0.  He is undergoing definitive radiation therapy with short-term ADT.    Tolerating radiation therapy well.  All questions and concerns addressed.    Plan:   Continue current therapy.    2.    bother.  Continue Flomax.  Discussed  consideration of anticholinergic for possible bladder spasm etiology as well, but will continue to assess at this time    Mosaiq chart and setup information reviewed  Ports checked    Medication Review  Med list reviewed with patient?: Yes  Med Note: Lupron #1 7/21/2023           Armani Rodgers MD

## 2023-10-19 ENCOUNTER — APPOINTMENT (OUTPATIENT)
Dept: RADIATION THERAPY | Facility: OUTPATIENT CENTER | Age: 68
End: 2023-10-19
Payer: COMMERCIAL

## 2023-10-20 ENCOUNTER — APPOINTMENT (OUTPATIENT)
Dept: RADIATION THERAPY | Facility: OUTPATIENT CENTER | Age: 68
End: 2023-10-20
Payer: COMMERCIAL

## 2023-10-23 ENCOUNTER — APPOINTMENT (OUTPATIENT)
Dept: RADIATION THERAPY | Facility: OUTPATIENT CENTER | Age: 68
End: 2023-10-23
Payer: COMMERCIAL

## 2023-10-24 ENCOUNTER — APPOINTMENT (OUTPATIENT)
Dept: RADIATION THERAPY | Facility: OUTPATIENT CENTER | Age: 68
End: 2023-10-24
Payer: COMMERCIAL

## 2023-10-25 ENCOUNTER — OFFICE VISIT (OUTPATIENT)
Dept: RADIATION THERAPY | Facility: OUTPATIENT CENTER | Age: 68
End: 2023-10-25
Payer: COMMERCIAL

## 2023-10-25 ENCOUNTER — APPOINTMENT (OUTPATIENT)
Dept: RADIATION THERAPY | Facility: OUTPATIENT CENTER | Age: 68
End: 2023-10-25
Payer: COMMERCIAL

## 2023-10-25 VITALS
DIASTOLIC BLOOD PRESSURE: 93 MMHG | SYSTOLIC BLOOD PRESSURE: 169 MMHG | WEIGHT: 161 LBS | OXYGEN SATURATION: 100 % | BODY MASS INDEX: 20.67 KG/M2 | RESPIRATION RATE: 18 BRPM | HEART RATE: 66 BPM

## 2023-10-25 DIAGNOSIS — C61 ADENOCARCINOMA OF PROSTATE (H): Primary | ICD-10-CM

## 2023-10-25 NOTE — PROGRESS NOTES
[facility]  SPECIALIZING IN BREAKTHROUGHS  Radiation Oncology    On Treatment Visit Note      Ghulam Rizzo      Date: 10/25/2023   MRN: [id number]   : 1955  Diagnosis: Prostate cancer      Reason for Visit:  On Radiation Treatment Visit     Treatment Summary to Date  Treatment Site: pelvis/prostate Current Dose: 6250/7000 cGy Fractions:       Chemotherapy  Chemo concurrent with radx?: No    Subjective:    Doing okay. Mild GI bother, but has variable bowel habit at baseline.  Mild  bother.  Noticed  slight dysuria and reduced urinary flow. Ibuprofen tried without alleviation. Started flomax  with some alleviation.      Nursing ROS:   Nutrition Alteration  Diet Type: Patient's Preference  Skin  Skin Reaction: 0 - No changes        Cardiovascular  Respiratory effort: 1 - Normal - without distress  Gastrointestinal  GI Note: chronic constipation, using otc products  Genitourinary   Note: burning has resolved, recent UA negative, still difficult to start, nocturia 3-4x     Pain Assessment  Pain Note: no pain issues      Objective:   BP (!) 169/93   Pulse 66   Resp 18   Wt 73 kg (161 lb)   SpO2 100%   BMI 20.67 kg/m    No apparent distress    Labs:  CBC RESULTS:   Recent Labs   Lab Test 23  1201   WBC 4.9   RBC 4.27*   HGB 14.0   HCT 39.7*   MCV 93   MCH 32.8   MCHC 35.3   RDW 13.4        ELECTROLYTES:  Recent Labs   Lab Test 23  1106 23  1336   NA  --  136   POTASSIUM  --  4.3   CHLORIDE  --  99   TAVIA  --  10.2   CO2  --  25   BUN  --  12.0   CR  --  0.79   * 105*       Assessment:  Mr. Rizzo is a 68 year old male with biopsy-proven unfavorable intermediate  prostatic adenocarcinoma, [place] 4+3 = 7, pathologic T1cN0.  He is undergoing definitive radiation therapy with short-term ADT.    Tolerating radiation therapy well.  All questions and concerns addressed.    Plan:   Continue current therapy.  EOT on Monday. Will have patient RTC in 1 month with PSA, will  see PA.  2.   EULALIA bother.  Continue Flomax, consider increasing from daily to twice daily.    Mosaiq chart and setup information reviewed  Ports checked    Medication Review  Med list reviewed with patient?: Yes  Med Note: Lupron #1 7/21/2023           Armani Rodgers MD

## 2023-10-25 NOTE — LETTER
10/25/2023         RE: Ghulam Rizzo  [address] [facility] [address]        Dear Colleague,    Thank you for referring your patient, Ghulam Rizzo, to the  [facility]. Please see a copy of my visit note below.    [facility]  SPECIALIZING IN BREAKTHROUGHS  Radiation Oncology    On Treatment Visit Note      Ghulam Rizzo      Date: 10/25/2023   MRN: [id number]   : 1955  Diagnosis: Prostate cancer      Reason for Visit:  On Radiation Treatment Visit     Treatment Summary to Date  Treatment Site: pelvis/prostate Current Dose: 6250/7000 cGy Fractions:       Chemotherapy  Chemo concurrent with radx?: No    Subjective:    Doing okay. Mild GI bother, but has variable bowel habit at baseline.  Mild  bother.  Noticed  slight dysuria and reduced urinary flow. Ibuprofen tried without alleviation. Started flomax  with some alleviation.      Nursing ROS:   Nutrition Alteration  Diet Type: Patient's Preference  Skin  Skin Reaction: 0 - No changes        Cardiovascular  Respiratory effort: 1 - Normal - without distress  Gastrointestinal  GI Note: chronic constipation, using otc products  Genitourinary   Note: burning has resolved, recent UA negative, still difficult to start, nocturia 3-4x     Pain Assessment  Pain Note: no pain issues      Objective:   BP (!) 169/93   Pulse 66   Resp 18   Wt 73 kg (161 lb)   SpO2 100%   BMI 20.67 kg/m    No apparent distress    Labs:  CBC RESULTS:   Recent Labs   Lab Test 23  1201   WBC 4.9   RBC 4.27*   HGB 14.0   HCT 39.7*   MCV 93   MCH 32.8   MCHC 35.3   RDW 13.4        ELECTROLYTES:  Recent Labs   Lab Test 23  1106 23  1336   NA  --  136   POTASSIUM  --  4.3   CHLORIDE  --  99   TAVIA  --  10.2   CO2  --  25   BUN  --  12.0   CR  --  0.79   * 105*       Assessment:  Mr. Rizzo is a 68 year old male with biopsy-proven unfavorable intermediate  prostatic adenocarcinoma, [place] 4+3 = 7, pathologic  T1cN0.  He is undergoing definitive radiation therapy with short-term ADT.    Tolerating radiation therapy well.  All questions and concerns addressed.    Plan:   Continue current therapy.  EOT on Monday. Will have patient RTC in 1 month with PSA, will see PA.  2.    bother.  Continue Flomax, consider increasing from daily to twice daily.    Mosaiq chart and setup information reviewed  Ports checked    Medication Review  Med list reviewed with patient?: Yes  Med Note: Lupron #1 7/21/2023           Armani Rodgers MD

## 2023-10-26 ENCOUNTER — APPOINTMENT (OUTPATIENT)
Dept: RADIATION THERAPY | Facility: OUTPATIENT CENTER | Age: 68
End: 2023-10-26
Payer: COMMERCIAL

## 2023-10-27 ENCOUNTER — APPOINTMENT (OUTPATIENT)
Dept: RADIATION THERAPY | Facility: OUTPATIENT CENTER | Age: 68
End: 2023-10-27
Payer: COMMERCIAL

## 2023-10-30 ENCOUNTER — APPOINTMENT (OUTPATIENT)
Dept: RADIATION THERAPY | Facility: OUTPATIENT CENTER | Age: 68
End: 2023-10-30
Payer: COMMERCIAL

## 2023-10-31 ENCOUNTER — DOCUMENTATION ONLY (OUTPATIENT)
Dept: RADIATION THERAPY | Facility: OUTPATIENT CENTER | Age: 68
End: 2023-10-31

## 2023-10-31 NOTE — PROGRESS NOTES
Radiotherapy Treatment Summary              PATIENT: Ghulam Rizzo  MEDICAL RECORD NO: [id number]   : 1955    DIAGNOSIS: Prostate adenocarcinoma  INTENT OF RADIOTHERAPY: Adjuvant  PATHOLOGY:  unfavorable intermediate, Zion 4 + 3 = 7                                 STAGE:[id number]   CONCURRENT SYSTEMIC THERAPY:  No       ONCOLOGIC HISTORY:      23  PSA  =10.84      3/9/2023, prostatic MR:  Prostate measures 4.3 x 3.4 x 4.9 cm.  37 g.  Suspicious lesion noted in the peripheral zone.  Lesion in the right base and mid gland peripherally and at the transition zone at the 9-12 o'clock position relative to the urethra.  Possible involvement of anterior fibromuscular stroma noted.  22 mm in greatest dimension.  Capsular abutment greater than 15 mm with smooth contour, moderate suspicion of minimal extraprostatic extension.  No suspicious adenopathy.  PI-RADS 5     3/16/2023, CT CAP: Normal lymph nodes.  Mild prostatic gland enlargement noted.     2023, lumbar XR: Acute superior endplate compression fracture at L3 with 20% height loss and no retropulsion.  Questionable superior endplate compression fracture at L4 with less than 10% height loss.     2023: Patient undergoes prostatic biopsy with Dr. Blount. Digital rectal exam was performed revealing a normal feeling prostate.     2023, surgical pathology:  Prostate, Target 1: Right Mid TZ 11:00: Prostatic adenocarcinoma. Zion 4+3 = 7.  Grade group 3.  1/2 cores  Prostate, [place], Right: High-grade prostatic intraepithelial neoplasia  Prostate, Right Transitional Zone: Prostatic adenocarcinoma.  Zion 4+3 = 7.  Grade group 3.  1/1 cores.     2023, follow-up with Dr. Blount: Dr. Blount discussed the diagnosis of prostate cancer with the patient.  Patient diagnosed with unfavorable intermediate risk prostate cancer given the patient's PSA between 10-20 ng/mL, normal or minimally abnormal exam and Zion 4+3 = 7.  Plan for the patient to have  a bone scan to complete evaluation for staging.  Dr. Blount discussed treatment options including observation, definitive therapy including surgery and/or radiotherapy.  Dr. Blount believes that this patient is not an ideal observation candidate given his disease characteristics.  Because the patient has a history of several abdominal surgeries, surgery would be more difficult and would carry a higher likelihood of needing to perform the surgery open.  Space OAR was discussed.  Referral to radiation oncology placed.     6/8/2023, CT head no contrast: No acute intracranial abnormality     6/13/2023, bone scan: No osseous metastases    6/15/23 seen in clinic for consult. Dr Rodgers recommended radiotherapy to the prostate consisting of 70 Gy in 28 fractions in addition to 6 months of ADT.     7/21/23 received Lupron 45 mg.     SITE OF TREATMENT: Pelvis/Prostate    DATES  OF TREATMENT: 9/21/23 to 10/30/23    TOTAL DOSE OF TREATMENT: 7, 000 cGy    DOSE PER FRACTION OF TREATMENT: 250 cGy x 28 fractions       COMMENT/TOXICITY:     bother, Flomax started once then increased to twice daily              PAIN MANAGEMENT:  None                           FOLLOW UP PLAN: One month  CC  Patient Care Team:  Jamie Rutledge DO as PCP - General (Family Medicine)  Jamie Rutledge DO as MD (Family Medicine)  BONITA Hope MD as MD (Urology)  Jamie Rutledge DO as Assigned PCP  Chase Blount MD as Assigned Surgical Provider  Chip Pinon MD as MD (Neurology)  Chip Pinon MD as Assigned Neuroscience Provider  Armani Rodgers MD as Assigned Cancer Care Provider       ODALYS Segura  Department of Radiation Oncology  [facility]

## 2023-11-20 ENCOUNTER — TELEPHONE (OUTPATIENT)
Dept: RADIATION THERAPY | Facility: OUTPATIENT CENTER | Age: 68
End: 2023-11-20

## 2023-11-20 ENCOUNTER — HOSPITAL ENCOUNTER (EMERGENCY)
Facility: CLINIC | Age: 68
Discharge: HOME | End: 2023-11-20
Attending: EMERGENCY MEDICINE | Admitting: EMERGENCY MEDICINE
Payer: COMMERCIAL

## 2023-11-20 ENCOUNTER — APPOINTMENT (OUTPATIENT)
Dept: CT IMAGING | Facility: CLINIC | Age: 68
End: 2023-11-20
Attending: EMERGENCY MEDICINE
Payer: COMMERCIAL

## 2023-11-20 VITALS
BODY MASS INDEX: 20.28 KG/M2 | HEART RATE: 66 BPM | OXYGEN SATURATION: 100 % | WEIGHT: 158 LBS | RESPIRATION RATE: 18 BRPM | HEIGHT: 74 IN | DIASTOLIC BLOOD PRESSURE: 85 MMHG | TEMPERATURE: 98.4 F | SYSTOLIC BLOOD PRESSURE: 144 MMHG

## 2023-11-20 DIAGNOSIS — R33.9 URINARY RETENTION: ICD-10-CM

## 2023-11-20 LAB
ALBUMIN UR-MCNC: 30 MG/DL
ANION GAP SERPL CALCULATED.3IONS-SCNC: 10 MMOL/L (ref 7–15)
APPEARANCE UR: CLEAR
BASOPHILS # BLD AUTO: 0.1 10E3/UL (ref 0–0.2)
BASOPHILS NFR BLD AUTO: 1 %
BILIRUB UR QL STRIP: NEGATIVE
BUN SERPL-MCNC: 14 MG/DL (ref 8–23)
CALCIUM SERPL-MCNC: 9.2 MG/DL (ref 8.8–10.2)
CHLORIDE SERPL-SCNC: 100 MMOL/L (ref 98–107)
COLOR UR AUTO: YELLOW
CREAT SERPL-MCNC: 0.73 MG/DL (ref 0.67–1.17)
DEPRECATED HCO3 PLAS-SCNC: 24 MMOL/L (ref 22–29)
EGFRCR SERPLBLD CKD-EPI 2021: >90 ML/MIN/1.73M2
EOSINOPHIL # BLD AUTO: 0.1 10E3/UL (ref 0–0.7)
EOSINOPHIL NFR BLD AUTO: 2 %
ERYTHROCYTE [DISTWIDTH] IN BLOOD BY AUTOMATED COUNT: 13.5 % (ref 10–15)
GLUCOSE SERPL-MCNC: 105 MG/DL (ref 70–99)
GLUCOSE UR STRIP-MCNC: NEGATIVE MG/DL
HCT VFR BLD AUTO: 32.6 % (ref 40–53)
HGB BLD-MCNC: 11.7 G/DL (ref 13.3–17.7)
HGB UR QL STRIP: ABNORMAL
IMM GRANULOCYTES # BLD: 0 10E3/UL
IMM GRANULOCYTES NFR BLD: 0 %
KETONES UR STRIP-MCNC: NEGATIVE MG/DL
LEUKOCYTE ESTERASE UR QL STRIP: NEGATIVE
LYMPHOCYTES # BLD AUTO: 0.8 10E3/UL (ref 0.8–5.3)
LYMPHOCYTES NFR BLD AUTO: 19 %
MCH RBC QN AUTO: 33.2 PG (ref 26.5–33)
MCHC RBC AUTO-ENTMCNC: 35.9 G/DL (ref 31.5–36.5)
MCV RBC AUTO: 93 FL (ref 78–100)
MONOCYTES # BLD AUTO: 0.4 10E3/UL (ref 0–1.3)
MONOCYTES NFR BLD AUTO: 10 %
MUCOUS THREADS #/AREA URNS LPF: PRESENT /LPF
NEUTROPHILS # BLD AUTO: 2.8 10E3/UL (ref 1.6–8.3)
NEUTROPHILS NFR BLD AUTO: 68 %
NITRATE UR QL: NEGATIVE
NRBC # BLD AUTO: 0 10E3/UL
NRBC BLD AUTO-RTO: 0 /100
PH UR STRIP: 6 [PH] (ref 5–7)
PLATELET # BLD AUTO: 150 10E3/UL (ref 150–450)
POTASSIUM SERPL-SCNC: 4.6 MMOL/L (ref 3.4–5.3)
RBC # BLD AUTO: 3.52 10E6/UL (ref 4.4–5.9)
RBC URINE: 8 /HPF
SODIUM SERPL-SCNC: 134 MMOL/L (ref 135–145)
SP GR UR STRIP: 1.01 (ref 1–1.03)
SQUAMOUS EPITHELIAL: <1 /HPF
UROBILINOGEN UR STRIP-MCNC: NORMAL MG/DL
WBC # BLD AUTO: 4.1 10E3/UL (ref 4–11)
WBC URINE: 3 /HPF

## 2023-11-20 PROCEDURE — 51798 US URINE CAPACITY MEASURE: CPT | Performed by: EMERGENCY MEDICINE

## 2023-11-20 PROCEDURE — 81001 URINALYSIS AUTO W/SCOPE: CPT | Performed by: EMERGENCY MEDICINE

## 2023-11-20 PROCEDURE — 99284 EMERGENCY DEPT VISIT MOD MDM: CPT | Mod: 25 | Performed by: EMERGENCY MEDICINE

## 2023-11-20 PROCEDURE — 36415 COLL VENOUS BLD VENIPUNCTURE: CPT | Performed by: EMERGENCY MEDICINE

## 2023-11-20 PROCEDURE — 85025 COMPLETE CBC W/AUTO DIFF WBC: CPT | Performed by: EMERGENCY MEDICINE

## 2023-11-20 PROCEDURE — 99284 EMERGENCY DEPT VISIT MOD MDM: CPT | Performed by: EMERGENCY MEDICINE

## 2023-11-20 PROCEDURE — 80048 BASIC METABOLIC PNL TOTAL CA: CPT | Performed by: EMERGENCY MEDICINE

## 2023-11-20 PROCEDURE — 250N000009 HC RX 250: Performed by: EMERGENCY MEDICINE

## 2023-11-20 PROCEDURE — 74176 CT ABD & PELVIS W/O CONTRAST: CPT

## 2023-11-20 PROCEDURE — 51702 INSERT TEMP BLADDER CATH: CPT | Performed by: EMERGENCY MEDICINE

## 2023-11-20 RX ORDER — OXYBUTYNIN CHLORIDE 5 MG/1
5 TABLET ORAL 3 TIMES DAILY PRN
Qty: 12 TABLET | Refills: 0 | Status: SHIPPED | OUTPATIENT
Start: 2023-11-20 | End: 2023-11-29

## 2023-11-20 RX ORDER — LIDOCAINE HYDROCHLORIDE 20 MG/ML
JELLY TOPICAL ONCE
Status: COMPLETED | OUTPATIENT
Start: 2023-11-20 | End: 2023-11-20

## 2023-11-20 RX ADMIN — LIDOCAINE HYDROCHLORIDE: 20 JELLY TOPICAL at 10:54

## 2023-11-20 ASSESSMENT — ACTIVITIES OF DAILY LIVING (ADL)
ADLS_ACUITY_SCORE: 35
ADLS_ACUITY_SCORE: 35

## 2023-11-20 NOTE — ED TRIAGE NOTES
Pt presents due to inability to urinate. Last time he could fully void was yesterday afternoon. This morning only dribbling. Feeling as though he needs to go but no pain. Does report some burning with urination since yesterday. Finished radiation for prostate CA end of October.      Triage Assessment (Adult)       Row Name 11/20/23 1042          Triage Assessment    Airway WDL WDL        Respiratory WDL    Respiratory WDL WDL        Skin Circulation/Temperature WDL    Skin Circulation/Temperature WDL WDL        Cardiac WDL    Cardiac WDL WDL        Peripheral/Neurovascular WDL    Peripheral Neurovascular WDL WDL        Cognitive/Neuro/Behavioral WDL    Cognitive/Neuro/Behavioral WDL WDL

## 2023-11-20 NOTE — TELEPHONE ENCOUNTER
Received phone call from Ghulam's wife, informing this clinic that Ghulam is having a lot of pelvic pain/pressure and has been unable to urinate since late yesterday. They are very hesitant to go to ER or urgent care. No history of needing a catheter prior. They were instructed to go to ER/urgent care for an evaluation, they agreed. They may also call Dr Blount's urology office, as they are the referring providers. Dr. Rodgers made aware.

## 2023-11-20 NOTE — ED PROVIDER NOTES
History     Chief Complaint   Patient presents with    Urinary Retention     HPI  Ghulam Rizzo is a 68 year old male with past medical history significant for hyperlipidemia cirrhosis prostate cancer with recent treatment of prostate cancer with radiation who presents the emergency department complaining of urinary retention.  Patient states since yesterday afternoon he has only been able to pass a small amount of urine he feels distended like he has to go and has some burning with urination.  He finished radiation for prostate cancer at the end of October. Denies any fevers or chills has not had any significant headache or visual changes denies chest pain or shortness of breath.  He has not had any significant abdominal pain or flank pain just feels a fullness in his lower abdomen.  Did not see any blood in his urine denies any numbness weakness in any extremity.  He has not had a rash.  As per HPI.    Allergies:  Allergies   Allergen Reactions    Levaquin [Levofloxacin] Nausea and Vomiting    Percocet [Oxycodone-Acetaminophen] Visual Disturbance     hallucinations       Problem List:    Patient Active Problem List    Diagnosis Date Noted    Vertigo 07/05/2023     Priority: Medium    Adenocarcinoma of prostate (H) 05/16/2023     Priority: Medium    Compression fracture of L4 vertebra (H) 05/09/2023     Priority: Medium    Compression fracture of L3 lumbar vertebra, with routine healing, subsequent encounter 05/09/2023     Priority: Medium    Benign essential hypertension 03/01/2023     Priority: Medium    Saccular aneurysm 02/07/2023     Priority: Medium    Cecal volvulus (H) 07/13/2022     Priority: Medium    Elevated prostate specific antigen (PSA) 02/09/2022     Priority: Medium    Other irritable bowel syndrome 11/07/2019     Priority: Medium    Screening for hypertension 11/07/2019     Priority: Medium    Proctitis 01/26/2017     Priority: Medium     Colonoscopy in 2013.       Major depressive disorder,  recurrent episode, moderate (H) 01/26/2017     Priority: Medium     Paxil caused anorgasmia      Cirrhosis of liver without ascites (H) 09/23/2015     Priority: Medium     Thought due to hepatitis C.  Follows with [facility].  Completed Harvoni 9/2015.      Abdominal pain, generalized 08/20/2015     Priority: Medium    Advanced directives, counseling/discussion 09/14/2012     Priority: Medium     Discussed advance care planning with patient; however, patient declined at this time. 9/14/2012         Hyperlipidemia LDL goal <130 07/16/2012     Priority: Medium    Tobacco abuse 07/16/2012     Priority: Medium        Past Medical History:    Past Medical History:   Diagnosis Date    Anisocoria     Asthma     Carpal tunnel syndrome     Head injury, unspecified     Hepatitis C     Obesity     Other convulsions     Other mononeuritis of upper limb     Unspecified essential hypertension        Past Surgical History:    Past Surgical History:   Procedure Laterality Date    COLECTOMY WITHOUT COLOSTOMY Right 7/13/2022    Procedure: Laparotomy, right colectomy, lysis of adhesions,;  Surgeon: Tommy Martino DO;  Location: [facility] OR    COLONOSCOPY      COLONOSCOPY N/A 7/18/2023    Procedure: Colonoscopy with polypectomy;  Surgeon: Octavio Aguirre MD;  Location: [facility]    GI SURGERY      HERNIA REPAIR      HERNIORRHAPHY INCISIONAL (LOCATION) N/A 7/13/2022    Procedure: primary incisional hernia repair;  Surgeon: Tommy Martino DO;  Location: [facility] OR    INSERT SEED MARKER / MATRIX N/A 8/30/2023    Procedure: Transrectal ultrasound guided placement of fiducials and SpaceOar;  Surgeon: Chase Blount MD;  Location:  OR    SURGICAL HISTORY OF -   4/6/87    esophagogastroduodenoscopy    SURGICAL HISTORY OF -   4/27/87    exploratory laparotomy and anterior gastropexy over a gastrostomy tube.    SURGICAL HISTORY OF -   11/13/90    esophagogastroduodenoscopy    SURGICAL HISTORY OF -   1991    L diaphragm  "eventration repair, fixation of stomach and colon to abd wall    SURGICAL HISTORY OF -       hiatal hernia repair    THORACIC SURGERY         Family History:    Family History   Problem Relation Age of Onset    Cerebrovascular Disease Mother     Heart Disease Brother     Heart Disease Brother        Social History:  Marital Status:   [2]  Social History     Tobacco Use    Smoking status: Former     Packs/day: 0.50     Years: 50.00     Additional pack years: 0.00     Total pack years: 25.00     Types: Cigarettes    Smokeless tobacco: Never    Tobacco comments:     started again 4/2010   Vaping Use    Vaping Use: Never used   Substance Use Topics    Alcohol use: Yes     Comment: 1-2 beers daily    Drug use: No        Medications:    acetaminophen (TYLENOL) 500 MG tablet  amLODIPine (NORVASC) 2.5 MG tablet  calcitonin, salmon, (MIACALCIN) 200 UNIT/ACT nasal spray  Calcium Carbonate (CALCIUM-CARB 600 PO)  cyanocobalamin (VITAMIN B-12) 1000 MCG tablet  glycerin (LAXATIVE) 1.2 g suppository  linaclotide (LINZESS) 145 MCG capsule  polyethylene glycol (MIRALAX) 17 GM/Dose powder  senna-docusate (NEGRITO-COLACE) 8.6-50 MG per tablet  sertraline (ZOLOFT) 50 MG tablet  tamsulosin (FLOMAX) 0.4 MG capsule  vitamin D3 (CHOLECALCIFEROL) 50 mcg (2000 units) tablet          Review of Systems    Physical Exam   BP: 108/56  Pulse: 76  Temp: 98.4  F (36.9  C)  Resp: 18  Height: 188 cm (6' 2\")  Weight: 71.7 kg (158 lb)  SpO2: 100 %      Physical Exam  Vitals and nursing note reviewed.   Constitutional:       Appearance: Normal appearance. He is not ill-appearing, toxic-appearing or diaphoretic.      Comments: Mild generalized distress secondary to urinary retention   HENT:      Head: Normocephalic.      Nose: Nose normal.      Mouth/Throat:      Mouth: Mucous membranes are moist.      Pharynx: Oropharynx is clear.   Eyes:      Conjunctiva/sclera: Conjunctivae normal.   Cardiovascular:      Rate and Rhythm: Normal rate and regular " rhythm.      Pulses: Normal pulses.      Heart sounds: Normal heart sounds. No murmur heard.  Pulmonary:      Effort: Pulmonary effort is normal.      Breath sounds: Normal breath sounds. No stridor. No wheezing or rhonchi.   Abdominal:      General: Abdomen is flat. Bowel sounds are normal. There is no distension.      Palpations: Abdomen is soft.      Tenderness: There is no abdominal tenderness. There is no right CVA tenderness or left CVA tenderness.   Musculoskeletal:         General: No swelling or tenderness. Normal range of motion.      Cervical back: Normal range of motion and neck supple.      Right lower leg: No edema.      Left lower leg: No edema.   Skin:     General: Skin is warm and dry.      Findings: No rash.   Neurological:      General: No focal deficit present.      Mental Status: He is alert and oriented to person, place, and time.      Sensory: No sensory deficit.      Motor: No weakness.      Coordination: Coordination normal.   Psychiatric:         Mood and Affect: Mood normal.         ED Course                 Procedures              Critical Care time:  none               Labs Ordered and Resulted from Time of ED Arrival to Time of ED Departure   BASIC METABOLIC PANEL - Abnormal       Result Value    Sodium 134 (*)     Potassium 4.6      Chloride 100      Carbon Dioxide (CO2) 24      Anion Gap 10      Urea Nitrogen 14.0      Creatinine 0.73      GFR Estimate >90      Calcium 9.2      Glucose 105 (*)    ROUTINE UA WITH MICROSCOPIC REFLEX TO CULTURE - Abnormal    Color Urine Yellow      Appearance Urine Clear      Glucose Urine Negative      Bilirubin Urine Negative      Ketones Urine Negative      Specific Gravity Urine 1.014      Blood Urine Small (*)     pH Urine 6.0      Protein Albumin Urine 30 (*)     Urobilinogen Urine Normal      Nitrite Urine Negative      Leukocyte Esterase Urine Negative      Mucus Urine Present (*)     RBC Urine 8 (*)     WBC Urine 3      Squamous Epithelials  Urine <1     CBC WITH PLATELETS AND DIFFERENTIAL - Abnormal    WBC Count 4.1      RBC Count 3.52 (*)     Hemoglobin 11.7 (*)     Hematocrit 32.6 (*)     MCV 93      MCH 33.2 (*)     MCHC 35.9      RDW 13.5      Platelet Count 150      % Neutrophils 68      % Lymphocytes 19      % Monocytes 10      % Eosinophils 2      % Basophils 1      % Immature Granulocytes 0      NRBCs per 100 WBC 0      Absolute Neutrophils 2.8      Absolute Lymphocytes 0.8      Absolute Monocytes 0.4      Absolute Eosinophils 0.1      Absolute Basophils 0.1      Absolute Immature Granulocytes 0.0      Absolute NRBCs 0.0          Medications   lidocaine (XYLOCAINE) 2 % external gel ( Topical $Given 11/20/23 105)     Results for orders placed or performed during the hospital encounter of 11/20/23   Abd/pelvis CT - no contrast - Stone Protocol    Narrative    CT ABDOMEN AND PELVIS WITHOUT CONTRAST 11/20/2023 12:14 PM    CLINICAL HISTORY: Abdominal pain.  Urinary retention status post x-ray  treatment for prostate cancer with difficulty urinating rule out  obstruction or other abnormality.    TECHNIQUE: CT scan of the abdomen and pelvis was performed without IV  contrast. Multiplanar reformats were obtained. Dose reduction  techniques were used.  CONTRAST: None.    COMPARISON: March 16, 2023    FINDINGS:   LOWER CHEST: Stable elevated left hemidiaphragm. No infiltrates or  effusions.    HEPATOBILIARY: No significant mass or bile duct dilatation. No  calcified gallstones. Stable liver cyst requiring no follow-up.    PANCREAS: No significant mass, duct dilatation, or inflammatory  change.    SPLEEN: Normal size. Granulomatous change. Similar more anterior  positioning than typically seen.    ADRENAL GLANDS: No significant nodules.    KIDNEYS/BLADDER: Lobular stone vs. two stones adjacent one another  measuring 9 mm in the bladder. No other urolithiasis. No  hydronephrosis. Catheter present in the bladder, largely decompressed.    BOWEL: No  obstruction or inflammatory change.    VASCULATURE: No abdominal aortic aneurysm.    PELVIC ORGANS: Hyperdensity in the posterior prostate extending into  the pelvis noted, of uncertain etiology.    OTHER: No adenopathy or free fluid.    MUSCULOSKELETAL: New compression deformity of L3 since comparison. No  discrete sclerotic bony lesions.      Impression    IMPRESSION:   1.  Compression deformity of L3 which is new since comparison.  2.  Two stones adjacent one another vs. lobular stone in the bladder  measuring 9 mm.    ELIEL DECKER MD         SYSTEM ID:  [id number]      Assessments & Plan (with Medical Decision Making) records reviewed.  Past medical history medications and allergies were reviewed.  Office visit for radiation oncology on 1025 was reviewed.  Labs were obtained.  A bladder scan was done and showed about 500 mL of urine present a Rivera catheter was therefore placed because patient was unable to void.  Urine was sent.  I independently reviewed interpreted labs.  Patient's white count was 4.1 hemoglobin 1.7 platelet count 150.  Basic metabolic panel without significant abnormality.  Urinalysis with small blood and 8 RBCs no evidence of UTI is present.  Patient feeling better after Rivera catheter placed.  Urine is clear.  Due to his history of radiation treatment to rule out obstruction or other abnormality risk benefits of CT scan of the abdomen pelvis without contrast were discussed patient and wife and they agree with plan.  This revealed a compression deformity at L3 and 2 stones adjacent with another or lobular stone in bladder measuring 9 mm.  Findings were discussed with patient in detail he is aware of the compression fracture and the stone was bladder I have advised him to follow-up with his urologist or radiation oncology this week and that we could either pull the Rivera catheter now and see if he can urinate or wait a few days and have it pulled by primary or the urgent care.  If decreased  urine output blood in the urine abdominal pain fevers or other symptoms present he should be return for further evaluation and care.  Patient is agree with this plan.     I have reviewed the nursing notes.    I have reviewed the findings, diagnosis, plan and need for follow up with the patient.     .        Discharge Medication List as of 11/20/2023  1:50 PM          Final diagnoses:   Urinary retention       11/20/2023   [facility] EMERGENCY DEPT       Luiz Syed MD  11/21/23 [id number]

## 2023-11-20 NOTE — DISCHARGE INSTRUCTIONS
Return if symptoms worsen or new symptoms develop.  Follow-up with primary care physician next available.  Drink plenty of fluids.  Wear Rivera leg bag and monitor urine output.  Remove Rivera catheter in several days.  Contact urologist about possible follow-up.  If decreased urine output increased abdominal pain fevers or other symptoms present patient should immediately return for further evaluation and care.

## 2023-11-21 ENCOUNTER — OFFICE VISIT (OUTPATIENT)
Dept: FAMILY MEDICINE | Facility: CLINIC | Age: 68
End: 2023-11-21
Payer: COMMERCIAL

## 2023-11-21 VITALS
RESPIRATION RATE: 14 BRPM | DIASTOLIC BLOOD PRESSURE: 80 MMHG | HEIGHT: 73 IN | OXYGEN SATURATION: 100 % | SYSTOLIC BLOOD PRESSURE: 142 MMHG | HEART RATE: 77 BPM | BODY MASS INDEX: 21.07 KG/M2 | TEMPERATURE: 97.9 F | WEIGHT: 159 LBS

## 2023-11-21 DIAGNOSIS — R33.9 URINARY RETENTION: Primary | ICD-10-CM

## 2023-11-21 DIAGNOSIS — N21.0 BLADDER STONE: ICD-10-CM

## 2023-11-21 DIAGNOSIS — C61 ADENOCARCINOMA OF PROSTATE (H): ICD-10-CM

## 2023-11-21 PROCEDURE — 99214 OFFICE O/P EST MOD 30 MIN: CPT | Mod: 25 | Performed by: FAMILY MEDICINE

## 2023-11-21 PROCEDURE — 90662 IIV NO PRSV INCREASED AG IM: CPT | Performed by: FAMILY MEDICINE

## 2023-11-21 PROCEDURE — G0008 ADMIN INFLUENZA VIRUS VAC: HCPCS | Performed by: FAMILY MEDICINE

## 2023-11-21 ASSESSMENT — PAIN SCALES - GENERAL: PAINLEVEL: NO PAIN (0)

## 2023-11-21 NOTE — PATIENT INSTRUCTIONS
Start on linzess. Continue other constipation medications as well.     Follow up with Urology regarding the bladder stones and the urinary retention.

## 2023-11-21 NOTE — PROGRESS NOTES
Assessment & Plan     Urinary retention  Bladder stone  Patient with urinary retention and ER visit on 11/20 with reynoso catheter placed. CT of abdomen showed 1-2 stones in the bladder. Reynoso draining appropriately. Suppose to follow up with Urology and urgent referral placed for reynoso management and next steps moving forward regarding his urinary retention.   - Adult Urology  Referral      Adenocarcinoma of prostate (H)  Completed radiation therapy at the end of October.     The risks, benefits and treatment options of prescribed medications or other treatments have been discussed with the patient. The patient verbalized their understanding and should call or follow up if no improvement or if they develop further problems.      >30 minutes spent on the date of the encounter doing chart review, history and exam, documentation and further activities as noted above      Jamie Rutledge DO  [facility] SystemJUAREZ Parmar is a 68 year old, presenting for the following health issues:  ER Follow Up and Constipation (Follow up from previous phone visit. No longer taking the linzess)        11/21/2023     2:48 PM   Additional Questions   Roomed by Shelby ALICEA   Accompanied by self         11/21/2023     2:48 PM   Patient Reported Additional Medications   Patient reports taking the following new medications none       History of Present Illness       Reason for visit:  ER Follow up and constipation follow up    He eats 0-1 servings of fruits and vegetables daily.He consumes 4 sweetened beverage(s) daily.He exercises with enough effort to increase his heart rate 60 or more minutes per day.  He exercises with enough effort to increase his heart rate 5 days per week.   He is taking medications regularly.         ED/UC Followup:    Facility:  [facility]   Date of visit: 11/20/2023  Reason for visit: urinary retention   Current Status: Improved with catheter    Recently completed radiation to  "prostate at end of October.     CT abdomen pelvis w/o contrast.   IMPRESSION:   1.  Compression deformity of L3 which is new since comparison.  2.  Two stones adjacent one another vs. lobular stone in the bladder  measuring 9 mm.    Today in clinic:    Recent ED visit on 11/20/2023, note not completed at time of visit.   Reynoso catheter placed in the ED.   Prescribed oxybutynin for bladder spasm.     Reynoso catheter has been draining without any issues. No blood clots or blood in the urine.   No abdomina pain.  No back pain   No fevers.       Continues to have some constipation.   Miralax 1 capful 2 times daily.   Senna- docusate 3 tablets in the AM.   Started on linzess and stopped his other constipation medication. Took linzess for one week then stopped and went back on the miralax, senna-docusate.   Continues to have some issues with constipation.       Review of Systems   Constitutional, HEENT, cardiovascular, pulmonary, gi and gu systems are negative, except as otherwise noted.      Objective    BP (!) 142/80 (BP Location: Right arm, Patient Position: Sitting, Cuff Size: Adult Regular)   Pulse 77   Temp 97.9  F (36.6  C) (Tympanic)   Resp 14   Ht 1.854 m (6' 1\")   Wt 72.1 kg (159 lb)   SpO2 100%   BMI 20.98 kg/m    Body mass index is 20.98 kg/m .  Physical Exam   General: alert, cooperative, no acute distress   CV: RRR, no murmur  Resp: non-labored breathing, clear to auscultation, no wheezing or rales   Abdomen: Soft, non-tender, no guarding.   Extremities: No peripheral edema, calves non-tender.   Gu: reynoso catheter in place. Draining straw colored urine.       "

## 2023-11-22 ENCOUNTER — TELEPHONE (OUTPATIENT)
Dept: UROLOGY | Facility: CLINIC | Age: 68
End: 2023-11-22
Payer: COMMERCIAL

## 2023-11-22 NOTE — TELEPHONE ENCOUNTER
Spoke with patient and explained that he should keep the catheter in for more than a couple of days. Advised that he make appt next week for TOV and appt with provider. Explained that he could have the catheter removed sooner, however this would be done in family practice or urgent care as explained in the ER visit. Pt opted for appt next week for TOV and provider. Appointments made.  Chio GASCA RN BSN PHN  Specialty Clinics

## 2023-11-22 NOTE — TELEPHONE ENCOUNTER
[facility]    Phone Message    May a detailed message be left on voicemail: yes     Reason for Call: Appointment Intake    Referring Provider Name:     Jamie Rutledge DO     Diagnosis and/or Symptoms:     Jamie Rutledge DO       Patient is being referred for retention.Sending encounter per guidelines. Please review and follow-up with patient for scheduling.  Appt needed in 3-5 days    Action Taken: Message routed to:  Other: [facility] urology    Travel Screening: Not Applicable

## 2023-11-29 ENCOUNTER — ALLIED HEALTH/NURSE VISIT (OUTPATIENT)
Dept: UROLOGY | Facility: CLINIC | Age: 68
End: 2023-11-29
Payer: COMMERCIAL

## 2023-11-29 ENCOUNTER — OFFICE VISIT (OUTPATIENT)
Dept: UROLOGY | Facility: CLINIC | Age: 68
End: 2023-11-29
Payer: COMMERCIAL

## 2023-11-29 VITALS
DIASTOLIC BLOOD PRESSURE: 83 MMHG | RESPIRATION RATE: 20 BRPM | TEMPERATURE: 98 F | HEART RATE: 84 BPM | SYSTOLIC BLOOD PRESSURE: 135 MMHG

## 2023-11-29 DIAGNOSIS — C61 PROSTATE CANCER (H): ICD-10-CM

## 2023-11-29 DIAGNOSIS — R33.9 URINARY RETENTION WITH INCOMPLETE BLADDER EMPTYING: Primary | ICD-10-CM

## 2023-11-29 PROCEDURE — 99213 OFFICE O/P EST LOW 20 MIN: CPT | Performed by: STUDENT IN AN ORGANIZED HEALTH CARE EDUCATION/TRAINING PROGRAM

## 2023-11-29 PROCEDURE — 99207 PR NO CHARGE NURSE ONLY: CPT

## 2023-11-29 RX ORDER — CIPROFLOXACIN 500 MG/1
500 TABLET, FILM COATED ORAL ONCE
Status: COMPLETED | OUTPATIENT
Start: 2023-11-29 | End: 2023-11-29

## 2023-11-29 RX ORDER — TAMSULOSIN HYDROCHLORIDE 0.4 MG/1
0.4 CAPSULE ORAL DAILY
Qty: 90 CAPSULE | Refills: 1 | Status: SHIPPED | OUTPATIENT
Start: 2023-11-29 | End: 2023-11-29

## 2023-11-29 RX ORDER — TAMSULOSIN HYDROCHLORIDE 0.4 MG/1
0.4 CAPSULE ORAL EVERY EVENING
Qty: 90 CAPSULE | Refills: 1 | Status: SHIPPED | OUTPATIENT
Start: 2023-11-29 | End: 2024-06-07

## 2023-11-29 RX ADMIN — CIPROFLOXACIN 500 MG: 500 TABLET, FILM COATED ORAL at 13:38

## 2023-11-29 NOTE — PROGRESS NOTES
UROLOGY FOLLOW-UP NOTE          Chief Complaint:   Today I had the pleasure of seeing . Ghulam Rizzo in follow-up for a chief complaint of urinary retention.          Interval Update   Ghulam Rizzo is a very pleasant 68 year old male with a history of cirrhosis of the liver, cecal volvulus, HLD, HTN, and prostate cancer.     Brief History: Mr. Ghulam Rizzo was diagnosed with [place] 4+3=7 prostate cancer in May 2023. He was referred to radiation oncology for treatment and robotic prostatectomy was not an option given his history of abdominal surgeries. He completed radiation therapy in October 2023.     He presented to the ED on 11/20/2023 with urinary retention and an indwelling catheter was placed. CT abdomen pelvis showed either two adjacent bladder stones or one lobular stone measuring 9 mm. Prior CT scans in 2023 showed a 5-6 mm stone in the left ureter.          Physical Exam:   Patient is a 68 year old  male   Vitals: Blood pressure 135/83, pulse 84, temperature 98  F (36.7  C), temperature source Tympanic, resp. rate 20.  General: Alert and oriented x 3, no acute distress.  Respiratory: Non-labored breathing.  Cardiac: Regular rate.      Labs and Pathology:    I personally reviewed all applicable laboratory data and went over findings with patient  Significant for:    CBC RESULTS:  Recent Labs   Lab Test 11/20/23  1126 06/08/23  1201 03/16/23  1550 01/21/23  1316   WBC 4.1 4.9 7.4 7.5   HGB 11.7* 14.0 16.1 15.9    169 220 186        BMP RESULTS:  Recent Labs   Lab Test 11/20/23  1126 08/30/23  1106 08/25/23  1336 06/08/23  1201 04/17/23  1213 07/13/22  1402 06/28/20  1016 11/25/17  1400   *  --  136 130* 134*   < > 138 140   POTASSIUM 4.6  --  4.3 4.6 4.9   < > 4.0 3.8   CHLORIDE 100  --  99 94* 97*   < > 107 110*   CO2 24  --  25 26 24   < > 27 22   ANIONGAP 10  --  12 10 13   < > 4 8   * 101* 105* 96 92   < > 144* 87   BUN 14.0  --  12.0 9.9 15.0   < > 13 12   CR 0.73  --  0.79  0.70 0.82   < > 0.72 0.66   GFRESTIMATED >90  --  >90 >90 >90   < > >90 >90   GFRESTBLACK  --   --   --   --   --   --  >90 >90   TAVIA 9.2  --  10.2 9.4 10.1   < > 8.6 8.4*    < > = values in this interval not displayed.       UA RESULTS:   Recent Labs   Lab Test 11/20/23  1114 09/25/23  1403 08/25/23  1336 06/08/23  1243   SG 1.014 <=1.005 1.020  1.020 1.012   URINEPH 6.0 6.0 6.5  6.5 7.0   NITRITE Negative Negative Negative  Negative Negative   RBCU 8*  --  2-5* 1   WBCU 3  --  0-5 <1       PSA RESULTS  PSA   Date Value Ref Range Status   08/03/2012 2.23 0 - 4 ug/L Final   01/15/2011 2.24 0 - 4 ug/L Final   11/03/2009 2.01 0 - 4 ug/L Final   11/30/2007 3.29 0 - 4 ug/L Final   12/02/2006 3.12 0 - 4 ug/L Final     Prostate Specific Antigen Screen   Date Value Ref Range Status   02/08/2022 11.30 (H) 0.00 - 4.00 ug/L Final     PSA Tumor Marker   Date Value Ref Range Status   02/08/2023 10.84 (H) 0.00 - 4.50 ng/mL Final           Imaging:    I personally reviewed all applicable imaging and went over the below findings with patient.    Results for orders placed or performed during the hospital encounter of 11/20/23   Abd/pelvis CT - no contrast - Stone Protocol    Narrative    CT ABDOMEN AND PELVIS WITHOUT CONTRAST 11/20/2023 12:14 PM    CLINICAL HISTORY: Abdominal pain.  Urinary retention status post x-ray  treatment for prostate cancer with difficulty urinating rule out  obstruction or other abnormality.    TECHNIQUE: CT scan of the abdomen and pelvis was performed without IV  contrast. Multiplanar reformats were obtained. Dose reduction  techniques were used.  CONTRAST: None.    COMPARISON: March 16, 2023    FINDINGS:   LOWER CHEST: Stable elevated left hemidiaphragm. No infiltrates or  effusions.    HEPATOBILIARY: No significant mass or bile duct dilatation. No  calcified gallstones. Stable liver cyst requiring no follow-up.    PANCREAS: No significant mass, duct dilatation, or inflammatory  change.    SPLEEN:  Normal size. Granulomatous change. Similar more anterior  positioning than typically seen.    ADRENAL GLANDS: No significant nodules.    KIDNEYS/BLADDER: Lobular stone vs. two stones adjacent one another  measuring 9 mm in the bladder. No other urolithiasis. No  hydronephrosis. Catheter present in the bladder, largely decompressed.    BOWEL: No obstruction or inflammatory change.    VASCULATURE: No abdominal aortic aneurysm.    PELVIC ORGANS: Hyperdensity in the posterior prostate extending into  the pelvis noted, of uncertain etiology.    OTHER: No adenopathy or free fluid.    MUSCULOSKELETAL: New compression deformity of L3 since comparison. No  discrete sclerotic bony lesions.      Impression    IMPRESSION:   1.  Compression deformity of L3 which is new since comparison.  2.  Two stones adjacent one another vs. lobular stone in the bladder  measuring 9 mm.    ELIEL DECKER MD         SYSTEM ID:  [id number]              Assessment/Plan   68 year old male seen in evaluation for urinary retention and bladder stones. He presented to the ED on 11/20/2023 with urinary retention and an indwelling catheter was placed.     A TOV was perfomed today. 275 mL NS was instilled in the bladder. The patient was able to void 250 mL and 20 mL remained by bladder scan. He left clinic without a catheter.     He has stopped taking tamsulosin due to lightheadedness. We discussed a trial of this medication taken in the evening. He is in agreement.     Of note, he has a history of prostate cancer and completed prostate radiation in October 2023.     CT abdomen pelvis from 11/20/2023 showed either two adjacent bladder stones or one lobular stone measuring 9 mm. Prior CT scans in 2023 showed a 5-6 mm stone in the left ureter. We will schedule the patient for cystoscopy to evaluate for the persistence of bladder stone(s), possible removal, and causes of urinary retention.     Plan:  Consider trial of tamsulosin 0.4 mg every evening. Monitor  for lightheadedness.   Cystoscopy for evaluation of bladder stone(s).              Past Medical History:     Past Medical History:   Diagnosis Date    Anisocoria     Asthma     Carpal tunnel syndrome     2006    Head injury, unspecified     closed head injury 4 yrs ago    Hepatitis C     Cured by [facility] in 2015. diagnosed in 2012 was positive for Hepatitis C.     Obesity     2006, resolved    Other convulsions     seizure disorder due to head injury 4 yrs ago.    Other mononeuritis of upper limb     L phrenic nerve paralysis    Unspecified essential hypertension             Past Surgical History:     Past Surgical History:   Procedure Laterality Date    COLECTOMY WITHOUT COLOSTOMY Right 7/13/2022    Procedure: Laparotomy, right colectomy, lysis of adhesions,;  Surgeon: Tommy Martino DO;  Location: [facility]    COLONOSCOPY      COLONOSCOPY N/A 7/18/2023    Procedure: Colonoscopy with polypectomy;  Surgeon: Octavio Aguirre MD;  Location: [facility]    GI SURGERY      HERNIA REPAIR      HERNIORRHAPHY INCISIONAL (LOCATION) N/A 7/13/2022    Procedure: primary incisional hernia repair;  Surgeon: Tommy Martino DO;  Location: [facility] OR    INSERT SEED MARKER / MATRIX N/A 8/30/2023    Procedure: Transrectal ultrasound guided placement of fiducials and SpaceO[employer];  Surgeon: Chase Blount MD;  Location:  OR    SURGICAL HISTORY OF -   4/6/87    esophagogastroduodenoscopy    SURGICAL HISTORY OF -   4/27/87    exploratory laparotomy and anterior gastropexy over a gastrostomy tube.    SURGICAL HISTORY OF -   11/13/90    esophagogastroduodenoscopy    SURGICAL HISTORY OF -   1991    L diaphragm eventration repair, fixation of stomach and colon to abd wall    SURGICAL HISTORY OF -       hiatal hernia repair    THORACIC SURGERY              Medications     Current Outpatient Medications   Medication    acetaminophen (TYLENOL) 500 MG tablet    amLODIPine (NORVASC) 2.5 MG tablet    calcitonin, salmon,  (MIACALCIN) 200 UNIT/ACT nasal spray    Calcium Carbonate (CALCIUM-CARB 600 PO)    cyanocobalamin (VITAMIN B-12) 1000 MCG tablet    glycerin (LAXATIVE) 1.2 g suppository    linaclotide (LINZESS) 145 MCG capsule    oxyBUTYnin (DITROPAN) 5 MG tablet    polyethylene glycol (MIRALAX) 17 GM/Dose powder    senna-docusate (NEGRITO-COLACE) 8.6-50 MG per tablet    sertraline (ZOLOFT) 50 MG tablet    tamsulosin (FLOMAX) 0.4 MG capsule    vitamin D3 (CHOLECALCIFEROL) 50 mcg (2000 units) tablet     Current Facility-Administered Medications   Medication    leuprolide (LUPRON DEPOT) kit 45 mg            Family History:     Family History   Problem Relation Age of Onset    Cerebrovascular Disease Mother     Heart Disease Brother     Heart Disease Brother             Social History:     Social History     Socioeconomic History    Marital status:      Spouse name: Felecia    Number of children: 1    Years of education: Not on file    Highest education level: Not on file   Occupational History     Employer: [employer]   Tobacco Use    Smoking status: Former     Packs/day: 0.50     Years: 50.00     Additional pack years: 0.00     Total pack years: 25.00     Types: Cigarettes    Smokeless tobacco: Never    Tobacco comments:     started again 4/2010   Vaping Use    Vaping Use: Never used   Substance and Sexual Activity    Alcohol use: Yes     Comment: 1-2 beers daily    Drug use: No    Sexual activity: Yes     Partners: Female   Other Topics Concern    Parent/sibling w/ CABG, MI or angioplasty before 65F 55M? Yes     Comment: 2 brothers- heart surgery   Social History Narrative    Not on file     Social Determinants of Health     Financial Resource Strain: Low Risk  (11/21/2023)    Financial Resource Strain     Within the past 12 months, have you or your family members you live with been unable to get utilities (heat, electricity) when it was really needed?: No   Food Insecurity: Low Risk  (11/21/2023)    Food Insecurity     Within  the past 12 months, did you worry that your food would run out before you got money to buy more?: No     Within the past 12 months, did the food you bought just not last and you didn t have money to get more?: No   Transportation Needs: Low Risk  (11/21/2023)    Transportation Needs     Within the past 12 months, has lack of transportation kept you from medical appointments, getting your medicines, non-medical meetings or appointments, work, or from getting things that you need?: No   Physical Activity: Not on file   Stress: Not on file   Social Connections: Not on file   Interpersonal Safety: Not on file   Housing Stability: Low Risk  (11/21/2023)    Housing Stability     Do you have housing? : Yes     Are you worried about losing your housing?: No            Allergies:   Levaquin [levofloxacin] and Percocet [oxycodone-acetaminophen]         Review of Systems:  From intake questionnaire   Negative 14 system review except as noted on HPI, nurse's note.        JOZEF PLATT PA-C  Department of Urology

## 2023-11-29 NOTE — PROGRESS NOTES
Chief Complaint   Patient presents with    Urinary Retention       Vitals:    11/29/23 1340   BP: 135/83   BP Location: Right arm   Patient Position: Sitting   Cuff Size: Adult Regular   Pulse: 84   Resp: 20   Temp: 98  F (36.7  C)   TempSrc: Tympanic     Wt Readings from Last 1 Encounters:   11/21/23 72.1 kg (159 lb)       TOV performed prior to appointment  Instilled 275 ml into bladder  Following removal of catheter, patient voided 250 ml  Bladder scan revealed 20 ml     Abx given orally per standing orders    11/29/2023    Aleksandra TAVAREZ RN   Specialty Clinics

## 2023-11-29 NOTE — PROGRESS NOTES
Patient presented to clinic today for trial void per Amelia Antonio PA-C     Urine in catheter bag appearing clear yellow.   Disconnected at reynoso tube.   Instilled 275 ml of sterile water into bladder.   Patient tolerated well. Upon patient having urge to urinate and unable to tolerate more fluid  Balloon emptied. 8 ml's removed  16 fr reynoso removed with tip intact.     Patient able to urinate 250 ml.   Bladder scan revealed 20 ml post-void    Per Urology RN TOV guidelines: Patient discharged home. To contact clinic with any questions or concerns. Should present to local ED if unable to urinate at home after clinic hours  Antibiotic given to patient per protocol- see MAR for administration details.     Provider on-site for nurse visit: Amelia Antonio PA-C       Patient discharged to home.     Aleksandra TAVAREZ RN  [facility]

## 2023-12-04 ENCOUNTER — LAB (OUTPATIENT)
Dept: LAB | Facility: CLINIC | Age: 68
End: 2023-12-04
Payer: COMMERCIAL

## 2023-12-04 DIAGNOSIS — C61 ADENOCARCINOMA OF PROSTATE (H): ICD-10-CM

## 2023-12-04 LAB — PSA SERPL DL<=0.01 NG/ML-MCNC: 0.05 NG/ML (ref 0–4.5)

## 2023-12-04 PROCEDURE — 36415 COLL VENOUS BLD VENIPUNCTURE: CPT

## 2023-12-04 PROCEDURE — 84153 ASSAY OF PSA TOTAL: CPT

## 2023-12-05 ENCOUNTER — OFFICE VISIT (OUTPATIENT)
Dept: RADIATION THERAPY | Facility: OUTPATIENT CENTER | Age: 68
End: 2023-12-05
Payer: COMMERCIAL

## 2023-12-05 VITALS
RESPIRATION RATE: 12 BRPM | BODY MASS INDEX: 22.35 KG/M2 | OXYGEN SATURATION: 100 % | WEIGHT: 169.4 LBS | DIASTOLIC BLOOD PRESSURE: 82 MMHG | SYSTOLIC BLOOD PRESSURE: 149 MMHG | HEART RATE: 82 BPM

## 2023-12-05 DIAGNOSIS — C61 PROSTATE CANCER (H): Primary | ICD-10-CM

## 2023-12-05 NOTE — LETTER
12/5/2023         RE: Ghulam Rizzo  [address] [place] [id number]        Dear Colleague,    Thank you for referring your patient, Ghulam Rizzo, to the [facility]. Please see a copy of my visit note below.    Radiation Oncology Note    HPI: Mr. Rizzo is a 68 year old male with biopsy-proven unfavorable intermediate  prostatic adenocarcinoma, [place] 4+3 = 7, pathologic T1cN0.  He underwent definitive radiation therapy with short-term ADT.     Radiation Treatment  9/21/23 to 10/30/23: Prostate, 7000 cGy in 28 fractions (7/21/23 received Lupron 45 mg x 1).     Patient returns for follow-up.    PSA on 12/4/23 was 0.05, down from pre-RT value of 10.84.    He is overall doing okay right now. Was seen in ED for urinary obstructive symptoms, requiring reynoso catheter placement. Though to possibly related to bladder stones vs alternative etiology. Will undergo cystoscopy. Doing okay otherwise. No blood in stool.    Plan:  PSA is down trending.   Agree with cystoscopy for further evaluation of recent urinary obstructive symptoms/retention. Timeline after RT suggests alternative etiology than RT, although is possible to have contributed.    Return to clinic in 6 months with PSA,  will see PAApple Rodgers M.D.  Department of Radiation Oncology  [facility]       Again, thank you for allowing me to participate in the care of your patient.        Sincerely,        Armani Rodgers MD

## 2023-12-05 NOTE — NURSING NOTE
FOLLOW-UP VISIT    Patient Name: Ghulam Rizzo      : 1955     Age: 68 year old        ______________________________________________________________________________     Chief Complaint   Patient presents with    Radiation Therapy     Follow up with Dr. Rodgers     BP (!) 149/82 (BP Location: Left arm, Patient Position: Sitting, Cuff Size: Adult Regular)   Pulse 82   Resp 12   Wt 76.8 kg (169 lb 6.4 oz)   SpO2 100%   BMI 22.35 kg/m       Date Radiation Completed: 10/30/23    Pain  Denies    Meds  Current Med List Reviewed: Yes  Medication Note:     AUA: AUA Score: 9 (23 1500)  SABINA:      PSA   Date Value Ref Range Status   2012 2.23 0 - 4 ug/L Final   01/15/2011 2.24 0 - 4 ug/L Final   2009 2.01 0 - 4 ug/L Final   2007 3.29 0 - 4 ug/L Final   2006 3.12 0 - 4 ug/L Final     Prostate Specific Antigen Screen   Date Value Ref Range Status   2022 11.30 (H) 0.00 - 4.00 ug/L Final     PSA Tumor Marker   Date Value Ref Range Status   2023 0.05 0.00 - 4.50 ng/mL Final   2023 10.84 (H) 0.00 - 4.50 ng/mL Final       Bowel:  chronic constipation    Bladder: some frequency and urgency but calming/getting better after radiation and with flomax every evening  Nocturia: 4 - Once every 2 hours    Energy Level: fair to good    Appointments:   Urologist: Dr. Jose Alejandro Antonio  23     Other Notes:

## 2023-12-05 NOTE — PROGRESS NOTES
Radiation Oncology Note    HPI: Mr. Rizzo is a 68 year old male with biopsy-proven unfavorable intermediate  prostatic adenocarcinoma, [place] 4+3 = 7, pathologic T1cN0.  He underwent definitive radiation therapy with short-term ADT.     Radiation Treatment  9/21/23 to 10/30/23: Prostate, 7000 cGy in 28 fractions (7/21/23 received Lupron 45 mg x 1).     Patient returns for follow-up.    PSA on 12/4/23 was 0.05, down from pre-RT value of 10.84.    He is overall doing okay right now. Was seen in ED for urinary obstructive symptoms, requiring reynoso catheter placement. Though to possibly related to bladder stones vs alternative etiology. Will undergo cystoscopy. Doing okay otherwise. No blood in stool.    Plan:  PSA is down trending.   Agree with cystoscopy for further evaluation of recent urinary obstructive symptoms/retention. Timeline after RT suggests alternative etiology than RT, although is possible to have contributed.    Return to clinic in 6 months with PSA,  will see REID Rodgers M.D.  Department of Radiation Oncology  [facility]

## 2023-12-21 ENCOUNTER — OFFICE VISIT (OUTPATIENT)
Dept: UROLOGY | Facility: CLINIC | Age: 68
End: 2023-12-21
Payer: COMMERCIAL

## 2023-12-21 VITALS — SYSTOLIC BLOOD PRESSURE: 146 MMHG | DIASTOLIC BLOOD PRESSURE: 91 MMHG | RESPIRATION RATE: 16 BRPM | HEART RATE: 69 BPM

## 2023-12-21 DIAGNOSIS — R33.9 URINARY RETENTION WITH INCOMPLETE BLADDER EMPTYING: Primary | ICD-10-CM

## 2023-12-21 PROCEDURE — 52000 CYSTOURETHROSCOPY: CPT | Performed by: UROLOGY

## 2023-12-21 PROCEDURE — 99213 OFFICE O/P EST LOW 20 MIN: CPT | Mod: 25 | Performed by: UROLOGY

## 2023-12-21 NOTE — NURSING NOTE
"Initial BP (!) 146/91 (BP Location: Right arm, Patient Position: Chair, Cuff Size: Adult Regular)   Pulse 69   Resp 16  Estimated body mass index is 22.35 kg/m  as calculated from the following:    Height as of 11/21/23: 1.854 m (6' 1\").    Weight as of 12/5/23: 76.8 kg (169 lb 6.4 oz). .  Patient is here for a cysto.  bello dugan LPN    "

## 2023-12-22 ENCOUNTER — OFFICE VISIT (OUTPATIENT)
Dept: FAMILY MEDICINE | Facility: CLINIC | Age: 68
End: 2023-12-22
Payer: COMMERCIAL

## 2023-12-22 VITALS
TEMPERATURE: 97.5 F | HEART RATE: 73 BPM | SYSTOLIC BLOOD PRESSURE: 140 MMHG | BODY MASS INDEX: 22.4 KG/M2 | OXYGEN SATURATION: 100 % | WEIGHT: 169 LBS | HEIGHT: 73 IN | DIASTOLIC BLOOD PRESSURE: 83 MMHG | RESPIRATION RATE: 20 BRPM

## 2023-12-22 DIAGNOSIS — I10 BENIGN ESSENTIAL HYPERTENSION: ICD-10-CM

## 2023-12-22 DIAGNOSIS — F33.40 RECURRENT MAJOR DEPRESSIVE DISORDER, IN REMISSION (H): ICD-10-CM

## 2023-12-22 PROBLEM — F33.1 MAJOR DEPRESSIVE DISORDER, RECURRENT EPISODE, MODERATE (H): Status: RESOLVED | Noted: 2017-01-26 | Resolved: 2023-12-22

## 2023-12-22 PROCEDURE — 99214 OFFICE O/P EST MOD 30 MIN: CPT | Performed by: FAMILY MEDICINE

## 2023-12-22 RX ORDER — AMLODIPINE BESYLATE 5 MG/1
5 TABLET ORAL DAILY
Qty: 90 TABLET | Refills: 3 | Status: SHIPPED | OUTPATIENT
Start: 2023-12-22

## 2023-12-22 RX ORDER — RESPIRATORY SYNCYTIAL VIRUS VACCINE 120MCG/0.5
0.5 KIT INTRAMUSCULAR ONCE
Qty: 1 EACH | Refills: 0 | Status: CANCELLED | OUTPATIENT
Start: 2023-12-22 | End: 2023-12-22

## 2023-12-22 ASSESSMENT — PATIENT HEALTH QUESTIONNAIRE - PHQ9: SUM OF ALL RESPONSES TO PHQ QUESTIONS 1-9: 5

## 2023-12-22 ASSESSMENT — PAIN SCALES - GENERAL: PAINLEVEL: NO PAIN (0)

## 2023-12-22 NOTE — PATIENT INSTRUCTIONS
Decrease sertraline from 75 mg daily to 50 mg daily.     Increase amlodipine from 2.5 mg daily to 5 mg daily.     Follow up in 2-3 weeks for a nurse only blood pressure check.     Continue to monitor BP at home.     HOW TO CHECK YOUR BLOOD PRESSURE AT HOME:      Avoid eating, smoking, and exercising for at least 30 minutes before taking a reading.     Be sure you have taken your BP medication at least 2-3 hours before you check it.      Sit quietly for 10 minutes before a reading.      Sit in a chair with your feet flat on the floor. Rest your  arm on a table so that the arm cuff is at the same level as your heart.     Remain still during the reading.     Record your blood pressure and pulse in a log and bring to your next appointment.

## 2023-12-22 NOTE — PROGRESS NOTES
CC: prostate cancer     HPI: 67yo male with [place] 4+3=7 prostate cancer.  He opted for radiation therapy and recently completed treatment.  Had an episode of retention with imaging showing possible bladder stones.  Here for cysto.    Cystoscopy: after informed consent was obtained, the patient was prepped and draped in standard sterile fashion. The flexible cystoscope was introduced into the patient's urethra without difficulty. There were no strictures in the urethra. Upon entering the bladder, the UOs were orthotopic and effluxing clear urine. There were a couple of neovascularizations in the bladder, but no papillary lesions.  There were no other mucosal lesions, stones or foreign objects noted in the bladder. The remainder of the exam was normal.    ASSESSMENT AND PLAN: Over half of today's 25-minute visit was spent reviewing the chart, results and counseling the patient regarding his urination.  We are pleased to see no bladder stones.  The retention may have been from swelling of the prostate vs other causes.  No intervention required at this time.  Patient will follow up as needed.

## 2023-12-22 NOTE — PROGRESS NOTES
Assessment & Plan     Recurrent major depressive disorder, in remission (H24)  Overall doing well.  Currently on sertraline 75 mg daily.  He wishes to try and decrease this dose.  He will decrease down to 50 mg for the next few months.  If symptoms remain well-controlled he will take 1/2 tablet (25 mg daily) for 1 to 2 weeks and then come off the medication.  - sertraline (ZOLOFT) 50 MG tablet  Dispense: 90 tablet; Refill: 3    Benign essential hypertension  Blood pressure elevated here in clinic.  Is also been elevated when he checks at home.  He is currently asymptomatic.  Currently only on amlodipine 2.5 mg daily.  Plan increase to 5 mg daily.  Follow-up in 2 weeks with a nurse only blood pressure check.  - amLODIPine (NORVASC) 5 MG tablet  Dispense: 90 tablet; Refill: 3      The risks, benefits and treatment options of prescribed medications or other treatments have been discussed with the patient. The patient verbalized their understanding and should call or follow up if no improvement or if they develop further problems.      [facility]    Lamar Parmar is a 68 year old, presenting for the following health issues:  RECHECK        12/22/2023     2:17 PM   Additional Questions   Roomed by Shabana DRAKE       History of Present Illness       Mental Health Follow-up:  Patient presents to follow-up on Depression.Patient's depression since last visit has been:  Better  The patient is not having other symptoms associated with depression.      Any significant life events: health concerns  Patient is not feeling anxious or having panic attacks.  Patient has no concerns about alcohol or drug use.    He eats 0-1 servings of fruits and vegetables daily.He consumes 4 sweetened beverage(s) daily.He exercises with enough effort to increase his heart rate 10 to 19 minutes per day.  He exercises with enough effort to increase his heart rate 3 or less days per week.   He is taking  "medications regularly.       Hypertension Follow-up    Do you check your blood pressure regularly outside of the clinic? No   Are you following a low salt diet? Yes  Are your blood pressures ever more than 140 on the top number (systolic) OR more   than 90 on the bottom number (diastolic), for example 140/90? Yes    Amlodipine 2.5 mg daily  Checking BP at home and has been elevated.   No chest pain or shortness of breath. No exertional symptoms.  No lightheadedness..         Depression  Sertraline 75 mg daily.  Wishes to try and decrease his medication as has been doing well.     Review of Systems       Objective    BP (!) 140/83 (BP Location: Right arm, Patient Position: Chair, Cuff Size: Adult Regular)   Pulse 73   Temp 97.5  F (36.4  C) (Oral)   Resp 20   Ht 1.854 m (6' 1\")   Wt 76.7 kg (169 lb)   SpO2 100%   BMI 22.30 kg/m    Body mass index is 22.3 kg/m .  Physical Exam   General: alert, cooperative, no acute distress   CV: RRR, no murmur  Resp: non-labored breathing, clear to auscultation, no wheezing or rales   Extremities: No peripheral edema, calves non-tender.   Appearance: Patient appears well groomed and appropriately dressed.   Orientation: Patient alert and oriented to person, place, and time.  Behavior: Appropriate to setting and is cooperative with mental status examination. No overactivity or catatonia.  Speech: Organized, clear, and concise, with appropriate volume and tone.  Thought: Able to think abstractly. No evidence of tangentiality or circumstantiality.  Perception: No auditory or visual hallucinations. No delusions.  Mood: No evidence of euphoria or dysphoria. No lability.  Affect: Congruent to mood. Not flattened or narrowed.  Insight/Judgement: Insight and judgment intact.      "

## 2024-01-02 ENCOUNTER — TELEPHONE (OUTPATIENT)
Dept: NEUROSURGERY | Facility: CLINIC | Age: 69
End: 2024-01-02
Payer: COMMERCIAL

## 2024-01-03 ENCOUNTER — TELEPHONE (OUTPATIENT)
Dept: NEUROSURGERY | Facility: CLINIC | Age: 69
End: 2024-01-03
Payer: COMMERCIAL

## 2024-01-09 NOTE — PATIENT INSTRUCTIONS
Start using glycerin suppository 1-2 times daily.     Use miralax 1 capful daily, if not improving use 1 capful twice daily.   If not improving use 2 capfuls in AM, 1 capful at night.     Follow up next week.   
No indicators present

## 2024-02-02 ENCOUNTER — HOSPITAL ENCOUNTER (OUTPATIENT)
Dept: MRI IMAGING | Facility: CLINIC | Age: 69
Discharge: HOME | End: 2024-02-02
Attending: PSYCHIATRY & NEUROLOGY | Admitting: PSYCHIATRY & NEUROLOGY
Payer: COMMERCIAL

## 2024-02-02 DIAGNOSIS — I67.1 CEREBRAL ANEURYSM, NONRUPTURED: ICD-10-CM

## 2024-02-02 PROCEDURE — 70544 MR ANGIOGRAPHY HEAD W/O DYE: CPT

## 2024-02-05 ENCOUNTER — VIRTUAL VISIT (OUTPATIENT)
Dept: NEUROSURGERY | Facility: CLINIC | Age: 69
End: 2024-02-05
Payer: COMMERCIAL

## 2024-02-05 VITALS — BODY MASS INDEX: 23.1 KG/M2 | WEIGHT: 180 LBS | HEIGHT: 74 IN

## 2024-02-05 DIAGNOSIS — I67.1 INTERNAL CAROTID ANEURYSM: Primary | ICD-10-CM

## 2024-02-05 PROCEDURE — 99441 PR PHYSICIAN TELEPHONE EVALUATION 5-10 MIN: CPT | Mod: 93 | Performed by: NURSE PRACTITIONER

## 2024-02-05 ASSESSMENT — PAIN SCALES - GENERAL: PAINLEVEL: NO PAIN (0)

## 2024-02-05 NOTE — NURSING NOTE
Is the patient currently in the state of [place]? YES    Visit mode:TELEPHONE    If the visit is dropped, the patient can be reconnected by: TELEPHONE VISIT: Phone number: [phone]    Will anyone else be joining the visit? NO  (If patient encounters technical issues they should call [phone]    How would you like to obtain your AVS? Mail a copy    Are changes needed to the allergy or medication list? No    Reason for visit: TOÑITO CASTILLO

## 2024-02-05 NOTE — PROGRESS NOTES
Virtual Visit Details    Type of service:  Telephone Visit   Phone call duration: 10 minutes     [facility]  Department of Neurosurgery      Name: Ghulam Rizzo  MRN: [id number]  Age: 68 year old  : 1955  Referring provider: No ref. provider found  2024      Chief Complaint:   Cerebral aneurysm, unruptured  Follow-up    History of Present Illness:   Ghulam Rizzo is a 68 year old male with a history of unruptured brain aneurysm who is seen today for a follow-up.    Cerebral aneurysm was incidentally found on brain imaging in 2023 when patient presented to the ER with dizziness.  Initial visit with me on 2023.  We recommended to follow-up with MRA in a year.    Today I had a phone visit with the patient and his wife.  Overall he has been doing well.  No new neurological concerns.  He completed the MRA recently.  Please see the results below.      Review of Systems:   Pertinent items are noted in HPI or as in patient entered ROS below, remainder of complete ROS is negative.        No data to display               Imagin2024 MRA brain:  IMPRESSION: No significant change in the inferomedially pointing  saccular aneurysm off the paraclinoid/cavernous left ICA measuring 3.1  x 2.4 mm.     Assessment:  Cerebral aneurysm, unruptured  Follow-up    Plan:  Will review imaging and will contact the patient with the follow-up plan.     Addendum on 2024: Recent MRA brain from 2024 was reviewed by Dr. Hill.  This shows stable left ICA cavernous/paraclinoid aneurysm.  Patient to follow-up with us in 2 years.  I called and left a voice message to the patient with this recommendation.    I spent 10 minutes on patient care activities related to this encounter on the date of service, including time spent reviewing the chart, obtaining history and examination and in counseling the patient, and in documentation in the electronic medical record.      Susie MILLER,  [facility]  Department of Neurosurgery

## 2024-02-05 NOTE — LETTER
2024       RE: Ghulam Rizzo  [address] [place] [address]       Dear Colleague,    Thank you for referring your patient, Ghulam Rizzo, to the [facility] NEUROSURGERY [facility]IC [facility]. Please see a copy of my visit note below.    [facility]  Department of Neurosurgery      Name: Ghulam Rizzo  MRN: [id number]  Age: 68 year old  : 1955  Referring provider: No ref. provider found  2024      Chief Complaint:   Cerebral aneurysm, unruptured  Follow-up    History of Present Illness:   Ghulam Rizzo is a 68 year old male with a history of unruptured brain aneurysm who is seen today for a follow-up.    Cerebral aneurysm was incidentally found on brain imaging in 2023 when patient presented to the ER with dizziness.  Initial visit with me on 2023.  We recommended to follow-up with MRA in a year.    Today I had a phone visit with the patient and his wife.  Overall he has been doing well.  No new neurological concerns.  He completed the MRA recently.  Please see the results below.      Review of Systems:   Pertinent items are noted in HPI or as in patient entered ROS below, remainder of complete ROS is negative.        No data to display               Imagin2024 MRI brain:  IMPRESSION: No significant change in the inferomedially pointing  saccular aneurysm off the paraclinoid/cavernous left ICA measuring 3.1  x 2.4 mm.     Assessment:  Cerebral aneurysm, unruptured  Follow-up    Plan:  Will review imaging and will contact the patient with the follow-up plan.       I spent 10 minutes on patient care activities related to this encounter on the date of service, including time spent reviewing the chart, obtaining history and examination and in counseling the patient, and in documentation in the electronic medical record.        Again, thank you for allowing me to participate in the care of your patient.       Sincerely,    SusieANGELA Orosco CNP

## 2024-05-10 ENCOUNTER — OFFICE VISIT (OUTPATIENT)
Dept: FAMILY MEDICINE | Facility: CLINIC | Age: 69
End: 2024-05-10
Payer: COMMERCIAL

## 2024-05-10 VITALS
DIASTOLIC BLOOD PRESSURE: 84 MMHG | HEIGHT: 73 IN | RESPIRATION RATE: 20 BRPM | OXYGEN SATURATION: 99 % | HEART RATE: 72 BPM | BODY MASS INDEX: 23.59 KG/M2 | SYSTOLIC BLOOD PRESSURE: 130 MMHG | TEMPERATURE: 97.8 F | WEIGHT: 178 LBS

## 2024-05-10 DIAGNOSIS — E78.5 HYPERLIPIDEMIA LDL GOAL <130: ICD-10-CM

## 2024-05-10 DIAGNOSIS — R51.9 NONINTRACTABLE HEADACHE, UNSPECIFIED CHRONICITY PATTERN, UNSPECIFIED HEADACHE TYPE: ICD-10-CM

## 2024-05-10 DIAGNOSIS — R42 DIZZINESS: Primary | ICD-10-CM

## 2024-05-10 DIAGNOSIS — G62.9 NEUROPATHY: ICD-10-CM

## 2024-05-10 LAB
ANION GAP SERPL CALCULATED.3IONS-SCNC: 13 MMOL/L (ref 7–15)
BUN SERPL-MCNC: 14.2 MG/DL (ref 8–23)
CALCIUM SERPL-MCNC: 9.3 MG/DL (ref 8.8–10.2)
CHLORIDE SERPL-SCNC: 101 MMOL/L (ref 98–107)
CREAT SERPL-MCNC: 0.8 MG/DL (ref 0.67–1.17)
DEPRECATED HCO3 PLAS-SCNC: 22 MMOL/L (ref 22–29)
EGFRCR SERPLBLD CKD-EPI 2021: >90 ML/MIN/1.73M2
ERYTHROCYTE [DISTWIDTH] IN BLOOD BY AUTOMATED COUNT: 13.2 % (ref 10–15)
GLUCOSE SERPL-MCNC: 96 MG/DL (ref 70–99)
HCT VFR BLD AUTO: 34.3 % (ref 40–53)
HGB BLD-MCNC: 11.6 G/DL (ref 13.3–17.7)
MAGNESIUM SERPL-MCNC: 1.9 MG/DL (ref 1.7–2.3)
MCH RBC QN AUTO: 32 PG (ref 26.5–33)
MCHC RBC AUTO-ENTMCNC: 33.8 G/DL (ref 31.5–36.5)
MCV RBC AUTO: 95 FL (ref 78–100)
PLATELET # BLD AUTO: 182 10E3/UL (ref 150–450)
POTASSIUM SERPL-SCNC: 3.9 MMOL/L (ref 3.4–5.3)
RBC # BLD AUTO: 3.63 10E6/UL (ref 4.4–5.9)
SODIUM SERPL-SCNC: 136 MMOL/L (ref 135–145)
WBC # BLD AUTO: 7 10E3/UL (ref 4–11)

## 2024-05-10 PROCEDURE — 99214 OFFICE O/P EST MOD 30 MIN: CPT | Mod: 25 | Performed by: FAMILY MEDICINE

## 2024-05-10 PROCEDURE — 90677 PCV20 VACCINE IM: CPT | Performed by: FAMILY MEDICINE

## 2024-05-10 PROCEDURE — 85027 COMPLETE CBC AUTOMATED: CPT | Performed by: FAMILY MEDICINE

## 2024-05-10 PROCEDURE — G0009 ADMIN PNEUMOCOCCAL VACCINE: HCPCS | Performed by: FAMILY MEDICINE

## 2024-05-10 PROCEDURE — 80048 BASIC METABOLIC PNL TOTAL CA: CPT | Performed by: FAMILY MEDICINE

## 2024-05-10 PROCEDURE — 82607 VITAMIN B-12: CPT | Performed by: FAMILY MEDICINE

## 2024-05-10 PROCEDURE — 83735 ASSAY OF MAGNESIUM: CPT | Performed by: FAMILY MEDICINE

## 2024-05-10 PROCEDURE — 84165 PROTEIN E-PHORESIS SERUM: CPT | Performed by: PATHOLOGY

## 2024-05-10 PROCEDURE — 36415 COLL VENOUS BLD VENIPUNCTURE: CPT | Performed by: FAMILY MEDICINE

## 2024-05-10 PROCEDURE — 84155 ASSAY OF PROTEIN SERUM: CPT | Performed by: FAMILY MEDICINE

## 2024-05-10 PROCEDURE — 86334 IMMUNOFIX E-PHORESIS SERUM: CPT | Performed by: PATHOLOGY

## 2024-05-10 RX ORDER — RESPIRATORY SYNCYTIAL VIRUS VACCINE 120MCG/0.5
0.5 KIT INTRAMUSCULAR ONCE
Qty: 1 EACH | Refills: 0 | Status: CANCELLED | OUTPATIENT
Start: 2024-05-10 | End: 2024-05-10

## 2024-05-10 ASSESSMENT — PAIN SCALES - GENERAL: PAINLEVEL: NO PAIN (0)

## 2024-05-10 NOTE — PATIENT INSTRUCTIONS
Recheck labs today.     Stop the tamsulosin. If unable to void or urinary stream decreases then restart the medication.     Follow up with Neurology for further evaluation and cares.

## 2024-05-10 NOTE — NURSING NOTE
Prior to immunization administration, verified patients identity using patient s name and date of birth. Please see Immunization Activity for additional information.     Screening Questionnaire for Adult Immunization    Are you sick today?   No   Do you have allergies to medications, food, a vaccine component or latex?   No   Have you ever had a serious reaction after receiving a vaccination?   No   Do you have a long-term health problem with heart, lung, kidney, or metabolic disease (e.g., diabetes), asthma, a blood disorder, no spleen, complement component deficiency, a cochlear implant, or a spinal fluid leak?  Are you on long-term aspirin therapy?   No   Do you have cancer, leukemia, HIV/AIDS, or any other immune system problem?   No   Do you have a parent, brother, or sister with an immune system problem?   No   In the past 3 months, have you taken medications that affect  your immune system, such as prednisone, other steroids, or anticancer drugs; drugs for the treatment of rheumatoid arthritis, Crohn s disease, or psoriasis; or have you had radiation treatments?   No   Have you had a seizure, or a brain or other nervous system problem?   No   During the past year, have you received a transfusion of blood or blood    products, or been given immune (gamma) globulin or antiviral drug?   No   For women: Are you pregnant or is there a chance you could become       pregnant during the next month?   No   Have you received any vaccinations in the past 4 weeks?   No     Immunization questionnaire answers were all negative.      Patient instructed to remain in clinic for 15 minutes afterwards, and to report any adverse reactions.     Screening performed by Shabana Domínguez CMA on 5/10/2024 at 3:15 PM.

## 2024-05-10 NOTE — LETTER
May 13, 2024      Ghulam Rizzo  [address] [place] [id number]        Dear ,    We are writing to inform you of your test results.    The serum protein electrophoresis is borderline positive.  This could be a false positive and would need to be rechecked in 6 months.  We can discuss this further during the next appointment.    Resulted Orders   Vitamin B12   Result Value Ref Range    Vitamin B12 1,202 232 - 1,245 pg/mL   Protein Immunofixation Serum   Result Value Ref Range    Immunofixation ELP       Monoclonal IgG immunoglobulin of lambda light chain type. Pathologic significance requires clinical correlation. KAT Baptiste M.D., Ph.D., Pathologist   Total Protein, Serum for ELP   Result Value Ref Range    Total Protein Serum for ELP 6.5 6.4 - 8.3 g/dL   Protein Electrophoresis, Serum   Result Value Ref Range    Albumin 4.1 3.7 - 5.1 g/dL    Alpha 1 0.3 0.2 - 0.4 g/dL    Alpha 2 0.8 0.5 - 0.9 g/dL    Beta Globulin 0.6 0.6 - 1.0 g/dL    Gamma Globulin 0.7 0.7 - 1.6 g/dL    Monoclonal Peak 0.1 (H) <=0.0 g/dL    ELP Interpretation       Very small monoclonal protein (0.1 g/dL) seen in the gamma fraction. Previously characterized in our laboratory on 06/23/2023 as a monoclonal IgG immunoglobulin of lambda light chain type. Pathologic significance requires clinical correlation. KAT Baptiste M.D., Ph.D., Pathologist.       If you have any questions or concerns, please call the clinic at the number listed above.       Sincerely,      Chip Pinon MD

## 2024-05-10 NOTE — PROGRESS NOTES
Assessment & Plan     Dizziness  Plan to recheck some labs today. Advised to follow up with Neurology. Saw Neurology 9/13/2023 and suppose to follow up in 6 months. Referral placed for patient.   -- question if this is related to his tamsulosin medication. Will have patient hold his tamsulosin for the next couple of weeks to see if this alleviates his symptoms. He recently completed radiation treatment for prostate cancer. Hopefully no longer needs the tamsulosin and dizziness symptoms will resolve. Discussed if having any urinary retention, decreased stream, or other voiding issues to go back on the medication.   - CBC with platelets  - Basic metabolic panel  (Ca, Cl, CO2, Creat, Gluc, K, Na, BUN)  - Magnesium  - Adult Neurology  Referral    Nonintractable headache, unspecified chronicity pattern, unspecified headache type  Follow up with Neurology. Potentially tension headaches?   - Adult Neurology  Referral    Hyperlipidemia LDL goal <130    Neuropathy  - Vitamin B12  - Protein Immunofixation Serum  - Protein electrophoresis      MED REC REQUIRED  Post Medication Reconciliation Status: discharge medications reconciled and changed, per note/orders    The risks, benefits and treatment options of prescribed medications or other treatments have been discussed with the patient. The patient verbalized their understanding and should call or follow up if no improvement or if they develop further problems.      Lamar Parmar is a 68 year old, presenting for the following health issues:  ER F/U      5/10/2024     3:08 PM   Additional Questions   Roomed by Shabana FORMAN     ED/UC Followup:    Facility:  [facility]  Date of visit: 03/29/2024  Reason for visit: Headache, blurry vision and dizziness  Current Status: better      Recent ED visit on 3/29/2023 at [facility].   This was reviewed. Labs and imaging reviewed.       Occasionally will get blurry vision  "at times. Has seen an eye doctor and reports had a normal exam recently prior to ED visit.     Will get a headache once in a while about 1 time per week. He states this will occur for about 15-20 minutes then resolve. Pain will be in the back of the head in the middle.   He will typically get these at night lying in bed.     He reports getting blurry vision with prolonged driving. He does fine with short distance driving.   No chest pain or shortness of breath.   No exertional symptoms.   No abdominal pain.   No changes in his bowels.   Has completed vestibular rehab without much progress in his symptoms.     Has seen Neurology in the past. Last visit 9/13/2023 and it was recommended he follow up in 6 months.         Objective    /84 (BP Location: Right arm, Patient Position: Chair, Cuff Size: Adult Regular)   Pulse 72   Temp 97.8  F (36.6  C) (Oral)   Resp 20   Ht 1.85 m (6' 0.84\")   Wt 80.7 kg (178 lb)   SpO2 99%   BMI 23.59 kg/m    Body mass index is 23.59 kg/m .  Physical Exam   General: alert, cooperative, no acute distress   CV: RRR, no murmur  Resp: non-labored breathing, clear to auscultation, no wheezing or rales   Abdomen: Soft, non-tender, no guarding.   Extremities: No peripheral edema, calves non-tender.   Neuro: CN II-XII grossly intact. No focal deficits. Strength and sensation in upper and lower extremities appropriate. Reflexes 2/4. No dysdiadochokinesia.  No issues with finger-nose-finger.      Signed Electronically by: Jamie Rutledge DO    "

## 2024-05-11 LAB
TOTAL PROTEIN SERUM FOR ELP: 6.5 G/DL (ref 6.4–8.3)
VIT B12 SERPL-MCNC: 1202 PG/ML (ref 232–1245)

## 2024-05-13 LAB
ALBUMIN SERPL ELPH-MCNC: 4.1 G/DL (ref 3.7–5.1)
ALPHA1 GLOB SERPL ELPH-MCNC: 0.3 G/DL (ref 0.2–0.4)
ALPHA2 GLOB SERPL ELPH-MCNC: 0.8 G/DL (ref 0.5–0.9)
B-GLOBULIN SERPL ELPH-MCNC: 0.6 G/DL (ref 0.6–1)
GAMMA GLOB SERPL ELPH-MCNC: 0.7 G/DL (ref 0.7–1.6)
M PROTEIN SERPL ELPH-MCNC: 0.1 G/DL
PROT PATTERN SERPL ELPH-IMP: ABNORMAL
PROT PATTERN SERPL IFE-IMP: NORMAL

## 2024-05-14 DIAGNOSIS — D64.9 LOW HEMOGLOBIN: Primary | ICD-10-CM

## 2024-05-29 ENCOUNTER — HOSPITAL ENCOUNTER (EMERGENCY)
Facility: CLINIC | Age: 69
Discharge: HOME | End: 2024-05-29
Attending: EMERGENCY MEDICINE | Admitting: EMERGENCY MEDICINE
Payer: COMMERCIAL

## 2024-05-29 VITALS
TEMPERATURE: 97.2 F | WEIGHT: 174 LBS | RESPIRATION RATE: 18 BRPM | SYSTOLIC BLOOD PRESSURE: 127 MMHG | OXYGEN SATURATION: 99 % | DIASTOLIC BLOOD PRESSURE: 80 MMHG | HEART RATE: 76 BPM | BODY MASS INDEX: 23.06 KG/M2

## 2024-05-29 DIAGNOSIS — K92.1 BLACK STOOLS: ICD-10-CM

## 2024-05-29 DIAGNOSIS — K62.5 BRIGHT RED BLOOD PER RECTUM: ICD-10-CM

## 2024-05-29 DIAGNOSIS — K59.09 CHRONIC CONSTIPATION: ICD-10-CM

## 2024-05-29 DIAGNOSIS — K64.4 EXTERNAL HEMORRHOIDS: ICD-10-CM

## 2024-05-29 LAB
ALBUMIN SERPL BCG-MCNC: 4.3 G/DL (ref 3.5–5.2)
ALP SERPL-CCNC: 97 U/L (ref 40–150)
ALT SERPL W P-5'-P-CCNC: 9 U/L (ref 0–70)
ANION GAP SERPL CALCULATED.3IONS-SCNC: 14 MMOL/L (ref 7–15)
AST SERPL W P-5'-P-CCNC: 13 U/L (ref 0–45)
BASOPHILS # BLD AUTO: 0 10E3/UL (ref 0–0.2)
BASOPHILS NFR BLD AUTO: 1 %
BILIRUB SERPL-MCNC: 0.3 MG/DL
BUN SERPL-MCNC: 11.3 MG/DL (ref 8–23)
CALCIUM SERPL-MCNC: 9.4 MG/DL (ref 8.8–10.2)
CHLORIDE SERPL-SCNC: 103 MMOL/L (ref 98–107)
CREAT SERPL-MCNC: 0.73 MG/DL (ref 0.67–1.17)
DEPRECATED HCO3 PLAS-SCNC: 22 MMOL/L (ref 22–29)
EGFRCR SERPLBLD CKD-EPI 2021: >90 ML/MIN/1.73M2
EOSINOPHIL # BLD AUTO: 0.1 10E3/UL (ref 0–0.7)
EOSINOPHIL NFR BLD AUTO: 1 %
ERYTHROCYTE [DISTWIDTH] IN BLOOD BY AUTOMATED COUNT: 13.5 % (ref 10–15)
GLUCOSE SERPL-MCNC: 99 MG/DL (ref 70–99)
HCT VFR BLD AUTO: 39.6 % (ref 40–53)
HEMOCCULT STL QL: NEGATIVE
HGB BLD-MCNC: 13.8 G/DL (ref 13.3–17.7)
IMM GRANULOCYTES # BLD: 0 10E3/UL
IMM GRANULOCYTES NFR BLD: 0 %
LYMPHOCYTES # BLD AUTO: 1.6 10E3/UL (ref 0.8–5.3)
LYMPHOCYTES NFR BLD AUTO: 32 %
MCH RBC QN AUTO: 33.2 PG (ref 26.5–33)
MCHC RBC AUTO-ENTMCNC: 34.8 G/DL (ref 31.5–36.5)
MCV RBC AUTO: 95 FL (ref 78–100)
MONOCYTES # BLD AUTO: 0.4 10E3/UL (ref 0–1.3)
MONOCYTES NFR BLD AUTO: 9 %
NEUTROPHILS # BLD AUTO: 2.9 10E3/UL (ref 1.6–8.3)
NEUTROPHILS NFR BLD AUTO: 57 %
NRBC # BLD AUTO: 0 10E3/UL
NRBC BLD AUTO-RTO: 0 /100
PLATELET # BLD AUTO: 199 10E3/UL (ref 150–450)
POTASSIUM SERPL-SCNC: 4.7 MMOL/L (ref 3.4–5.3)
PROT SERPL-MCNC: 7 G/DL (ref 6.4–8.3)
RBC # BLD AUTO: 4.16 10E6/UL (ref 4.4–5.9)
SODIUM SERPL-SCNC: 139 MMOL/L (ref 135–145)
WBC # BLD AUTO: 5.1 10E3/UL (ref 4–11)

## 2024-05-29 PROCEDURE — 36415 COLL VENOUS BLD VENIPUNCTURE: CPT | Performed by: EMERGENCY MEDICINE

## 2024-05-29 PROCEDURE — 258N000003 HC RX IP 258 OP 636: Performed by: EMERGENCY MEDICINE

## 2024-05-29 PROCEDURE — 84295 ASSAY OF SERUM SODIUM: CPT | Performed by: EMERGENCY MEDICINE

## 2024-05-29 PROCEDURE — 96374 THER/PROPH/DIAG INJ IV PUSH: CPT | Performed by: EMERGENCY MEDICINE

## 2024-05-29 PROCEDURE — 99284 EMERGENCY DEPT VISIT MOD MDM: CPT | Mod: 25 | Performed by: EMERGENCY MEDICINE

## 2024-05-29 PROCEDURE — 96361 HYDRATE IV INFUSION ADD-ON: CPT | Performed by: EMERGENCY MEDICINE

## 2024-05-29 PROCEDURE — 80053 COMPREHEN METABOLIC PANEL: CPT | Performed by: EMERGENCY MEDICINE

## 2024-05-29 PROCEDURE — 85049 AUTOMATED PLATELET COUNT: CPT | Performed by: EMERGENCY MEDICINE

## 2024-05-29 PROCEDURE — 84155 ASSAY OF PROTEIN SERUM: CPT | Performed by: EMERGENCY MEDICINE

## 2024-05-29 PROCEDURE — 250N000011 HC RX IP 250 OP 636: Performed by: EMERGENCY MEDICINE

## 2024-05-29 PROCEDURE — 85025 COMPLETE CBC W/AUTO DIFF WBC: CPT | Performed by: EMERGENCY MEDICINE

## 2024-05-29 PROCEDURE — 82272 OCCULT BLD FECES 1-3 TESTS: CPT | Performed by: EMERGENCY MEDICINE

## 2024-05-29 PROCEDURE — 99284 EMERGENCY DEPT VISIT MOD MDM: CPT | Performed by: EMERGENCY MEDICINE

## 2024-05-29 RX ORDER — LORAZEPAM 2 MG/ML
1 INJECTION INTRAMUSCULAR ONCE
Status: COMPLETED | OUTPATIENT
Start: 2024-05-29 | End: 2024-05-29

## 2024-05-29 RX ADMIN — SODIUM CHLORIDE, POTASSIUM CHLORIDE, SODIUM LACTATE AND CALCIUM CHLORIDE 1000 ML: 600; 310; 30; 20 INJECTION, SOLUTION INTRAVENOUS at 17:23

## 2024-05-29 RX ADMIN — LORAZEPAM 1 MG: 2 INJECTION INTRAMUSCULAR; INTRAVENOUS at 17:25

## 2024-05-29 ASSESSMENT — ACTIVITIES OF DAILY LIVING (ADL)
ADLS_ACUITY_SCORE: 35
ADLS_ACUITY_SCORE: 37

## 2024-05-29 NOTE — ED PROVIDER NOTES
History     Chief Complaint   Patient presents with    Dizziness     Pt has been having on going issues with dizziness and hasn't been able to kick it. Pt states he has black stools last couple days off and on and bright blood in toilet.     Rectal Bleeding            HPI  History per patient, exam review of [facility] EMR and Care Everywhere EMR.    Ghulam Rizzo is a 68 year old male  with history of depression, vertigo and hemorrhoids who presents for primary complaint of black stools and bright red blood per rectum in the past couple days.  Intermittent black stools, and intermittent bright red blood per rectum and noted in the toilet bowl after BMs, not with each stool.  Stools are   sometimes black, some stools are not.  Bright red blood per rectum with some BMs, but sometimes not.  No abdominal pain or signs or symptoms of symptomatic anemia.  No rectal pain.  No anticoagulation therapy no other signs or symptoms of bleeding.  In addition he complains of chronic dizziness with frequent episodes treated with meclizine.  Prior extensive evaluation for vertigo and dizziness including unremarkable MRI of the brain without and with contrast, MRI brain [place] [facility] [place] without contrast 3/29/2024 and CT head 6/8/2023.  Acute neurologic deficit or abnormality.    Previous Records Reviewed:  7/18/2023 Colonoscopy:  Findings:       The perianal and digital rectal examinations were normal.        A 4 mm polyp was found at 10 cm proximal to the anus. The polyp was        sessile. The polyp was removed with a cold snare. Resection and        retrieval were complete.        The exam was otherwise without abnormality.                                                                                    Impression:            - Preparation of the colon was poor.                          - One 4 mm polyp at 10 cm proximal to the anus,                          removed with a cold snare. Resected and retrieved.                           - The examination was otherwise normal.     Allergies:  Allergies   Allergen Reactions    Levaquin [Levofloxacin] Nausea and Vomiting    Percocet [Oxycodone-Acetaminophen] Visual Disturbance     hallucinations       Problem List:    Patient Active Problem List    Diagnosis Date Noted    Recurrent major depressive disorder, in remission (H24) 12/22/2023     Priority: Medium    Vertigo 07/05/2023     Priority: Medium    Adenocarcinoma of prostate (H) 05/16/2023     Priority: Medium    Compression fracture of L4 vertebra (H) 05/09/2023     Priority: Medium    Compression fracture of L3 lumbar vertebra, with routine healing, subsequent encounter 05/09/2023     Priority: Medium    Benign essential hypertension 03/01/2023     Priority: Medium    Saccular aneurysm 02/07/2023     Priority: Medium    Cecal volvulus (H) 07/13/2022     Priority: Medium    Elevated prostate specific antigen (PSA) 02/09/2022     Priority: Medium    Other irritable bowel syndrome 11/07/2019     Priority: Medium    Screening for hypertension 11/07/2019     Priority: Medium    Proctitis 01/26/2017     Priority: Medium     Colonoscopy in 2013.       Cirrhosis of liver without ascites (H) 09/23/2015     Priority: Medium     Thought due to hepatitis C.  Follows with [facility].  Completed [facility] 9/2015.      Abdominal pain, generalized 08/20/2015     Priority: Medium    Advanced directives, counseling/discussion 09/14/2012     Priority: Medium     Discussed advance care planning with patient; however, patient declined at this time. 9/14/2012         Hyperlipidemia LDL goal <130 07/16/2012     Priority: Medium    Tobacco abuse 07/16/2012     Priority: Medium        Past Medical History:    Past Medical History:   Diagnosis Date    Anisocoria     Asthma     Carpal tunnel syndrome     Head injury, unspecified     Hepatitis C     Obesity     Other convulsions     Other mononeuritis of upper limb     Unspecified essential hypertension        Past Surgical  History:    Past Surgical History:   Procedure Laterality Date    COLECTOMY WITHOUT COLOSTOMY Right 7/13/2022    Procedure: Laparotomy, right colectomy, lysis of adhesions,;  Surgeon: Tommy Martino DO;  Location: [facility] OR    COLONOSCOPY      COLONOSCOPY N/A 7/18/2023    Procedure: Colonoscopy with polypectomy;  Surgeon: Octavio Aguirre MD;  Location: [facility]    GI SURGERY      HERNIA REPAIR      HERNIORRHAPHY INCISIONAL (LOCATION) N/A 7/13/2022    Procedure: primary incisional hernia repair;  Surgeon: Tommy Martino DO;  Location: [facility] OR    INSERT SEED MARKER / MATRIX N/A 8/30/2023    Procedure: Transrectal ultrasound guided placement of fiducials and SpaceOar;  Surgeon: Chase Blount MD;  Location:  OR    SURGICAL HISTORY OF -   4/6/87    esophagogastroduodenoscopy    SURGICAL HISTORY OF -   4/27/87    exploratory laparotomy and anterior gastropexy over a gastrostomy tube.    SURGICAL HISTORY OF -   11/13/90    esophagogastroduodenoscopy    SURGICAL HISTORY OF -   1991    L diaphragm eventration repair, fixation of stomach and colon to abd wall    SURGICAL HISTORY OF -       hiatal hernia repair    THORACIC SURGERY         Family History:    Family History   Problem Relation Age of Onset    Cerebrovascular Disease Mother     Heart Disease Brother     Heart Disease Brother        Social History:  Marital Status:   [2]  Social History     Tobacco Use    Smoking status: Former     Current packs/day: 0.50     Average packs/day: 0.5 packs/day for 50.0 years (25.0 ttl pk-yrs)     Types: Cigarettes    Smokeless tobacco: Never    Tobacco comments:     started again 4/2010   Vaping Use    Vaping status: Never Used   Substance Use Topics    Alcohol use: Yes     Comment: 1-2 beers daily    Drug use: No        Medications:    acetaminophen (TYLENOL) 500 MG tablet  amLODIPine (NORVASC) 5 MG tablet  calcitonin, salmon, (MIACALCIN) 200 UNIT/ACT nasal spray  Calcium Carbonate  (CALCIUM-CARB 600 PO)  cyanocobalamin (VITAMIN B-12) 1000 MCG tablet  glycerin (LAXATIVE) 1.2 g suppository  linaclotide (LINZESS) 145 MCG capsule  polyethylene glycol (MIRALAX) 17 GM/Dose powder  senna-docusate (NEGRITO-COLACE) 8.6-50 MG per tablet  sertraline (ZOLOFT) 50 MG tablet  tamsulosin (FLOMAX) 0.4 MG capsule  vitamin D3 (CHOLECALCIFEROL) 50 mcg (2000 units) tablet        Review of Systems  As mentioned in the HPI, in addition focused review of systems was negative.    Physical Exam   BP: 127/80  Pulse: 76  Temp: 97.2  F (36.2  C)  Resp: 18  Weight: 78.9 kg (174 lb)  SpO2: 99 %      Physical Exam  Vitals and nursing note reviewed.   Constitutional:       General: He is not in acute distress.     Appearance: Normal appearance. He is well-developed. He is not ill-appearing, toxic-appearing or diaphoretic.   HENT:      Head: Normocephalic and atraumatic.      Right Ear: External ear normal.      Left Ear: External ear normal.      Nose: Nose normal.      Mouth/Throat:      Mouth: Mucous membranes are dry.   Eyes:      General: No scleral icterus.     Extraocular Movements: Extraocular movements intact.      Conjunctiva/sclera: Conjunctivae normal.   Neck:      Trachea: No tracheal deviation.   Cardiovascular:      Rate and Rhythm: Normal rate and regular rhythm.      Heart sounds: Normal heart sounds. No murmur heard.     No friction rub. No gallop.   Pulmonary:      Effort: Pulmonary effort is normal. No respiratory distress.      Breath sounds: Normal breath sounds. No wheezing, rhonchi or rales.   Abdominal:      General: There is no distension.      Palpations: Abdomen is soft. There is no mass.      Tenderness: There is no abdominal tenderness.   Genitourinary:     Comments: Rectal examination: Nontender nonthrombosed nonbleeding external hemorrhoids.  No rectal masses.  Small amount of normal-appearing brown stool in the rectal vault Hemoccult negative.  Musculoskeletal:         General: Normal range of  motion.      Cervical back: Normal range of motion and neck supple.      Right lower leg: No edema.      Left lower leg: No edema.   Skin:     General: Skin is warm and dry.      Coloration: Skin is not pale.      Findings: No erythema or rash.   Neurological:      General: No focal deficit present.      Mental Status: He is alert and oriented to person, place, and time.   Psychiatric:         Behavior: Behavior normal.      Comments: Flat affect, depressed mood.         ED Course        Procedures              Results for orders placed or performed during the hospital encounter of 05/29/24 (from the past 24 hour(s))   CBC with platelets, differential    Narrative    The following orders were created for panel order CBC with platelets, differential.  Procedure                               Abnormality         Status                     ---------                               -----------         ------                     CBC with platelets and d...[[id number]]  Abnormal            Final result                 Please view results for these tests on the individual orders.   Comprehensive metabolic panel   Result Value Ref Range    Sodium 139 135 - 145 mmol/L    Potassium 4.7 3.4 - 5.3 mmol/L    Carbon Dioxide (CO2) 22 22 - 29 mmol/L    Anion Gap 14 7 - 15 mmol/L    Urea Nitrogen 11.3 8.0 - 23.0 mg/dL    Creatinine 0.73 0.67 - 1.17 mg/dL    GFR Estimate >90 >60 mL/min/1.73m2    Calcium 9.4 8.8 - 10.2 mg/dL    Chloride 103 98 - 107 mmol/L    Glucose 99 70 - 99 mg/dL    Alkaline Phosphatase 97 40 - 150 U/L    AST 13 0 - 45 U/L    ALT 9 0 - 70 U/L    Protein Total 7.0 6.4 - 8.3 g/dL    Albumin 4.3 3.5 - 5.2 g/dL    Bilirubin Total 0.3 <=1.2 mg/dL   CBC with platelets and differential   Result Value Ref Range    WBC Count 5.1 4.0 - 11.0 10e3/uL    RBC Count 4.16 (L) 4.40 - 5.90 10e6/uL    Hemoglobin 13.8 13.3 - 17.7 g/dL    Hematocrit 39.6 (L) 40.0 - 53.0 %    MCV 95 78 - 100 fL    MCH 33.2 (H) 26.5 - 33.0 pg    MCHC 34.8  31.5 - 36.5 g/dL    RDW 13.5 10.0 - 15.0 %    Platelet Count 199 150 - 450 10e3/uL    % Neutrophils 57 %    % Lymphocytes 32 %    % Monocytes 9 %    % Eosinophils 1 %    % Basophils 1 %    % Immature Granulocytes 0 %    NRBCs per 100 WBC 0 <1 /100    Absolute Neutrophils 2.9 1.6 - 8.3 10e3/uL    Absolute Lymphocytes 1.6 0.8 - 5.3 10e3/uL    Absolute Monocytes 0.4 0.0 - 1.3 10e3/uL    Absolute Eosinophils 0.1 0.0 - 0.7 10e3/uL    Absolute Basophils 0.0 0.0 - 0.2 10e3/uL    Absolute Immature Granulocytes 0.0 <=0.4 10e3/uL    Absolute NRBCs 0.0 10e3/uL   Occult blood stool   Result Value Ref Range    Occult Blood Negative Negative       Medications   lactated ringers BOLUS 1,000 mL (1,000 mLs Intravenous $New Bag 5/29/24 8593)   LORazepam (ATIVAN) injection 1 mg (1 mg Intravenous $Given 5/29/24 5731)       Assessments & Plan (with Medical Decision Making)   Hemodynamically stable with benign abdomen, normal hemoglobin and Hemoccult negative stool.  Suspect he has benign hemorrhoidal bleeding.  No current evidence of rectal bleeding or hemorrhoidal bleeding on exam today.  I will discharge him with instruction for supportive care for this and have follow-up with surgery clinic.  Return as needed for new or worsening symptoms or any signs or symptoms of anemia.  I will make a referral for his follow-up with surgery clinic.  Chronic stable peripheral vertigo of benign nature.  He can follow-up with his primary care provider in the near future and return as needed for new or worsening dizziness or vertigo or any new neurologic problems or concerns.    I have reviewed the nursing notes.    I have reviewed the findings, diagnosis, plan and need for follow up with the patient and his wife.     Medical Decision Making: Moderate complexity      New Prescriptions    No medications on file       Final diagnoses:   Black stools - Stool Hemoccult negative.   Bright red blood per rectum   External hemorrhoids       5/29/2024   M  [facility] EMERGENCY DEPT       Jose Mratin Hitchcock MD  05/29/24 3735

## 2024-05-29 NOTE — ED TRIAGE NOTES
Pt has been having on going issues with dizziness and hasn't been able to kick it. Pt states he has black stools last couple days off and on and bright blood in toilet.

## 2024-06-06 ENCOUNTER — LAB (OUTPATIENT)
Dept: LAB | Facility: CLINIC | Age: 69
End: 2024-06-06
Payer: COMMERCIAL

## 2024-06-06 ENCOUNTER — OFFICE VISIT (OUTPATIENT)
Dept: SURGERY | Facility: CLINIC | Age: 69
End: 2024-06-06
Attending: EMERGENCY MEDICINE
Payer: COMMERCIAL

## 2024-06-06 VITALS
WEIGHT: 177 LBS | BODY MASS INDEX: 23.46 KG/M2 | HEIGHT: 73 IN | SYSTOLIC BLOOD PRESSURE: 173 MMHG | OXYGEN SATURATION: 100 % | DIASTOLIC BLOOD PRESSURE: 81 MMHG | HEART RATE: 66 BPM

## 2024-06-06 DIAGNOSIS — K92.1 BLACK STOOLS: ICD-10-CM

## 2024-06-06 DIAGNOSIS — K62.5 BRIGHT RED BLOOD PER RECTUM: ICD-10-CM

## 2024-06-06 DIAGNOSIS — C61 PROSTATE CANCER (H): ICD-10-CM

## 2024-06-06 DIAGNOSIS — K64.4 EXTERNAL HEMORRHOIDS: ICD-10-CM

## 2024-06-06 DIAGNOSIS — D64.9 LOW HEMOGLOBIN: ICD-10-CM

## 2024-06-06 LAB
ERYTHROCYTE [DISTWIDTH] IN BLOOD BY AUTOMATED COUNT: 13.6 % (ref 10–15)
FERRITIN SERPL-MCNC: 132 NG/ML (ref 31–409)
HCT VFR BLD AUTO: 40.8 % (ref 40–53)
HGB BLD-MCNC: 13.5 G/DL (ref 13.3–17.7)
IRON BINDING CAPACITY (ROCHE): 319 UG/DL (ref 240–430)
IRON SATN MFR SERPL: 35 % (ref 15–46)
IRON SERPL-MCNC: 113 UG/DL (ref 61–157)
MCH RBC QN AUTO: 32.2 PG (ref 26.5–33)
MCHC RBC AUTO-ENTMCNC: 33.1 G/DL (ref 31.5–36.5)
MCV RBC AUTO: 97 FL (ref 78–100)
PLATELET # BLD AUTO: 189 10E3/UL (ref 150–450)
PSA SERPL DL<=0.01 NG/ML-MCNC: 0.03 NG/ML (ref 0–4.5)
RBC # BLD AUTO: 4.19 10E6/UL (ref 4.4–5.9)
WBC # BLD AUTO: 5.5 10E3/UL (ref 4–11)

## 2024-06-06 PROCEDURE — 99213 OFFICE O/P EST LOW 20 MIN: CPT | Performed by: SURGERY

## 2024-06-06 PROCEDURE — 36415 COLL VENOUS BLD VENIPUNCTURE: CPT

## 2024-06-06 PROCEDURE — 85027 COMPLETE CBC AUTOMATED: CPT

## 2024-06-06 PROCEDURE — 83540 ASSAY OF IRON: CPT

## 2024-06-06 PROCEDURE — 84153 ASSAY OF PSA TOTAL: CPT

## 2024-06-06 PROCEDURE — 82728 ASSAY OF FERRITIN: CPT

## 2024-06-06 PROCEDURE — 83550 IRON BINDING TEST: CPT

## 2024-06-06 NOTE — NURSING NOTE
Chief Complaint   Patient presents with    Black Stool     Ref Dr Hitchcock for black stools &ext Hemorrhoids        Vitals:    06/06/24 1337   Weight: 80.3 kg (177 lb)     Wt Readings from Last 1 Encounters:   06/06/24 80.3 kg (177 lb)       Fern Jesus MA

## 2024-06-06 NOTE — H&P (VIEW-ONLY)
Surgical Consultation/History and Physical  [facility] Surgery    Ghulam is seen in consultation for GI Bleeding, at the request of Jamie Rutledge DO.    Chief Complaint:  GI Bleeding    History of Present Illness: Ghulam Rizzo is a 68 year old male presents with GI bleeding. Patient has a history of both gastric surgery and right colectomy. He presents with both black and occasional bright red stools.  He is not on anticoagulation.  He has a history of hepatitis C which was treated.      Patient Active Problem List   Diagnosis    Hyperlipidemia LDL goal <130    Tobacco abuse    Advanced directives, counseling/discussion    Abdominal pain, generalized    Cirrhosis of liver without ascites (H)    Proctitis    Other irritable bowel syndrome    Screening for hypertension    Elevated prostate specific antigen (PSA)    Cecal volvulus (H)    Saccular aneurysm    Benign essential hypertension    Compression fracture of L4 vertebra (H)    Compression fracture of L3 lumbar vertebra, with routine healing, subsequent encounter    Adenocarcinoma of prostate (H)    Vertigo    Recurrent major depressive disorder, in remission (H24)       Past Medical History:   Diagnosis Date    Anisocoria     Asthma     Carpal tunnel syndrome     2006    Head injury, unspecified     closed head injury 4 yrs ago    Hepatitis C     Cured by [facility] in 2015. diagnosed in 2012 was positive for Hepatitis C.     Obesity     2006, resolved    Other convulsions     seizure disorder due to head injury 4 yrs ago.    Other mononeuritis of upper limb     L phrenic nerve paralysis    Unspecified essential hypertension        Past Surgical History:   Procedure Laterality Date    COLECTOMY WITHOUT COLOSTOMY Right 7/13/2022    Procedure: Laparotomy, right colectomy, lysis of adhesions,;  Surgeon: Tommy Martino DO;  Location: [facility]    COLONOSCOPY      COLONOSCOPY N/A 7/18/2023    Procedure: Colonoscopy with polypectomy;  Surgeon:  Octavio Aguirre MD;  Location: [facility]    GI SURGERY      HERNIA REPAIR      HERNIORRHAPHY INCISIONAL (LOCATION) N/A 7/13/2022    Procedure: primary incisional hernia repair;  Surgeon: Tommy Martino DO;  Location: [facility]    INSERT SEED MARKER / MATRIX N/A 8/30/2023    Procedure: Transrectal ultrasound guided placement of fiducials and SpaceOar;  Surgeon: Chaes Blount MD;  Location:  OR    SURGICAL HISTORY OF -   4/6/87    esophagogastroduodenoscopy    SURGICAL HISTORY OF -   4/27/87    exploratory laparotomy and anterior gastropexy over a gastrostomy tube.    SURGICAL HISTORY OF -   11/13/90    esophagogastroduodenoscopy    SURGICAL HISTORY OF -   1991    L diaphragm eventration repair, fixation of stomach and colon to abd wall    SURGICAL HISTORY OF -       hiatal hernia repair    THORACIC SURGERY       Family History   Problem Relation Age of Onset    Cerebrovascular Disease Mother     Heart Disease Brother     Heart Disease Brother      Social History     Tobacco Use    Smoking status: Former     Current packs/day: 0.50     Average packs/day: 0.5 packs/day for 50.0 years (25.0 ttl pk-yrs)     Types: Cigarettes    Smokeless tobacco: Never    Tobacco comments:     started again 4/2010   Substance Use Topics    Alcohol use: Yes     Comment: 1-2 beers daily      History   Drug Use No     Current Outpatient Medications   Medication Sig Dispense Refill    acetaminophen (TYLENOL) 500 MG tablet Take 1,000 mg by mouth every 6 hours as needed for mild pain      amLODIPine (NORVASC) 5 MG tablet Take 1 tablet (5 mg) by mouth daily 90 tablet 3    calcitonin, salmon, (MIACALCIN) 200 UNIT/ACT nasal spray Spray 1 spray into one nostril alternating nostrils daily for 30 days Alternate nostril each day. 2.7 mL 0    Calcium Carbonate (CALCIUM-CARB 600 PO) Take 2 tablets by mouth daily      cyanocobalamin (VITAMIN B-12) 1000 MCG tablet Take 1 tablet (1,000 mcg) by mouth daily 90 tablet 1    glycerin  "(LAXATIVE) 1.2 g suppository Place 1 suppository rectally daily as needed (constipation.) 25 suppository 1    linaclotide (LINZESS) 145 MCG capsule Take 1 capsule (145 mcg) by mouth every morning (before breakfast) 30 capsule 3    polyethylene glycol (MIRALAX) 17 GM/Dose powder Take 17 g (1 capful.) by mouth daily (Patient taking differently: Take 1 capful. by mouth 2 times daily) 578 g 1    senna-docusate (NEGRITO-COLACE) 8.6-50 MG per tablet Take 1-2 tablets by mouth 2 times daily as needed for constipation. 100 tablet 5    sertraline (ZOLOFT) 50 MG tablet Take 1 tablet daily. 90 tablet 3    tamsulosin (FLOMAX) 0.4 MG capsule Take 1 capsule (0.4 mg) by mouth every evening 90 capsule 1    vitamin D3 (CHOLECALCIFEROL) 50 mcg (2000 units) tablet Take 1 tablet (50 mcg) by mouth daily 90 tablet 3       Allergies   Allergen Reactions    Levaquin [Levofloxacin] Nausea and Vomiting    Percocet [Oxycodone-Acetaminophen] Visual Disturbance     hallucinations       Review of Systems:   5 point ROS otherwise negative    Physical Exam:  BP (!) 173/81   Pulse 66   Ht 1.85 m (6' 0.84\")   Wt 80.3 kg (177 lb)   SpO2 100%   BMI 23.46 kg/m      Constitutional- No acute distress, well nourished, non-toxic  Eyes: Anicteric, no injection.  PERRL  ENT:  Normocephalic, atraumatic, Nose midline, moist mucus membranes  Neck - supple, no LAD  Respiratory- Good inspiratory effort  Cardiovascular -  No peripheral edema.  No clubbing.  Abdomen - Soft, non-tender, +BS, no hepatosplenomegaly, no palpable masses, well healed midline  Neuro - No focal neuro deficits, Alert and oriented x 3  Psych: Appropriate mood and affect  Musculoskeletal: Normal gait, symmetric strength.  FROM upper and lower extremities.  Skin: Warm, Dry    Lab Results   Component Value Date    WBC 5.5 06/06/2024    WBC 6.1 06/28/2020     Lab Results   Component Value Date    RBC 4.19 06/06/2024    RBC 5.28 06/28/2020     Lab Results   Component Value Date    HGB 13.5 " 06/06/2024    HGB 17.3 06/28/2020     Lab Results   Component Value Date    HCT 40.8 06/06/2024    HCT 50.3 06/28/2020     Lab Results   Component Value Date    MCV 97 06/06/2024    MCV 95 06/28/2020     Lab Results   Component Value Date    MCH 32.2 06/06/2024    MCH 32.8 06/28/2020     Lab Results   Component Value Date    MCHC 33.1 06/06/2024    MCHC 34.4 06/28/2020     Lab Results   Component Value Date    RDW 13.6 06/06/2024    RDW 13.2 06/28/2020     Lab Results   Component Value Date     06/06/2024     06/28/2020     Assessment:  1. Black stools    2. Bright red blood per rectum    3. External hemorrhoids      Plan:   Mr. Rizzo presents with GI bleed of uncertain etiology.  His guaiac was negative in the ED.  His most recent hemogram is normal.  He has potential upper and lower sources and history is not consistent with one or the other.  I discussed need for endoscopy and in his case would recommend double as most recent colonoscopy (7/2023) had poor prep).  He is agreeable to this.  I advised to monitor for signs of bleeding in the interim and need for prompt attention should they recur.  All questions answered.  We discussed extended bowel prep with doses of miralax starting 2 days prior to full prep.  He is agreeable.      Tommy Martino, DO on 6/6/2024 at 1:36 PM

## 2024-06-06 NOTE — PROGRESS NOTES
Surgical Consultation/History and Physical  [facility] Surgery    Ghulam is seen in consultation for GI Bleeding, at the request of Jamie Rutledge DO.    Chief Complaint:  GI Bleeding    History of Present Illness: Ghulam Rizzo is a 68 year old male presents with GI bleeding. Patient has a history of both gastric surgery and right colectomy. He presents with both black and occasional bright red stools.  He is not on anticoagulation.  He has a history of hepatitis C which was treated.      Patient Active Problem List   Diagnosis    Hyperlipidemia LDL goal <130    Tobacco abuse    Advanced directives, counseling/discussion    Abdominal pain, generalized    Cirrhosis of liver without ascites (H)    Proctitis    Other irritable bowel syndrome    Screening for hypertension    Elevated prostate specific antigen (PSA)    Cecal volvulus (H)    Saccular aneurysm    Benign essential hypertension    Compression fracture of L4 vertebra (H)    Compression fracture of L3 lumbar vertebra, with routine healing, subsequent encounter    Adenocarcinoma of prostate (H)    Vertigo    Recurrent major depressive disorder, in remission (H24)       Past Medical History:   Diagnosis Date    Anisocoria     Asthma     Carpal tunnel syndrome     2006    Head injury, unspecified     closed head injury 4 yrs ago    Hepatitis C     Cured by [facility] in 2015. diagnosed in 2012 was positive for Hepatitis C.     Obesity     2006, resolved    Other convulsions     seizure disorder due to head injury 4 yrs ago.    Other mononeuritis of upper limb     L phrenic nerve paralysis    Unspecified essential hypertension        Past Surgical History:   Procedure Laterality Date    COLECTOMY WITHOUT COLOSTOMY Right 7/13/2022    Procedure: Laparotomy, right colectomy, lysis of adhesions,;  Surgeon: Tommy Martino DO;  Location: [facility]    COLONOSCOPY      COLONOSCOPY N/A 7/18/2023    Procedure: Colonoscopy with polypectomy;  Surgeon:  Octavio Agurire MD;  Location: [facility]    GI SURGERY      HERNIA REPAIR      HERNIORRHAPHY INCISIONAL (LOCATION) N/A 7/13/2022    Procedure: primary incisional hernia repair;  Surgeon: Tommy Martino DO;  Location: [facility]    INSERT SEED MARKER / MATRIX N/A 8/30/2023    Procedure: Transrectal ultrasound guided placement of fiducials and SpaceOar;  Surgeon: Chase Blount MD;  Location:  OR    SURGICAL HISTORY OF -   4/6/87    esophagogastroduodenoscopy    SURGICAL HISTORY OF -   4/27/87    exploratory laparotomy and anterior gastropexy over a gastrostomy tube.    SURGICAL HISTORY OF -   11/13/90    esophagogastroduodenoscopy    SURGICAL HISTORY OF -   1991    L diaphragm eventration repair, fixation of stomach and colon to abd wall    SURGICAL HISTORY OF -       hiatal hernia repair    THORACIC SURGERY       Family History   Problem Relation Age of Onset    Cerebrovascular Disease Mother     Heart Disease Brother     Heart Disease Brother      Social History     Tobacco Use    Smoking status: Former     Current packs/day: 0.50     Average packs/day: 0.5 packs/day for 50.0 years (25.0 ttl pk-yrs)     Types: Cigarettes    Smokeless tobacco: Never    Tobacco comments:     started again 4/2010   Substance Use Topics    Alcohol use: Yes     Comment: 1-2 beers daily      History   Drug Use No     Current Outpatient Medications   Medication Sig Dispense Refill    acetaminophen (TYLENOL) 500 MG tablet Take 1,000 mg by mouth every 6 hours as needed for mild pain      amLODIPine (NORVASC) 5 MG tablet Take 1 tablet (5 mg) by mouth daily 90 tablet 3    calcitonin, salmon, (MIACALCIN) 200 UNIT/ACT nasal spray Spray 1 spray into one nostril alternating nostrils daily for 30 days Alternate nostril each day. 2.7 mL 0    Calcium Carbonate (CALCIUM-CARB 600 PO) Take 2 tablets by mouth daily      cyanocobalamin (VITAMIN B-12) 1000 MCG tablet Take 1 tablet (1,000 mcg) by mouth daily 90 tablet 1    glycerin  "(LAXATIVE) 1.2 g suppository Place 1 suppository rectally daily as needed (constipation.) 25 suppository 1    linaclotide (LINZESS) 145 MCG capsule Take 1 capsule (145 mcg) by mouth every morning (before breakfast) 30 capsule 3    polyethylene glycol (MIRALAX) 17 GM/Dose powder Take 17 g (1 capful.) by mouth daily (Patient taking differently: Take 1 capful. by mouth 2 times daily) 578 g 1    senna-docusate (NEGRITO-COLACE) 8.6-50 MG per tablet Take 1-2 tablets by mouth 2 times daily as needed for constipation. 100 tablet 5    sertraline (ZOLOFT) 50 MG tablet Take 1 tablet daily. 90 tablet 3    tamsulosin (FLOMAX) 0.4 MG capsule Take 1 capsule (0.4 mg) by mouth every evening 90 capsule 1    vitamin D3 (CHOLECALCIFEROL) 50 mcg (2000 units) tablet Take 1 tablet (50 mcg) by mouth daily 90 tablet 3       Allergies   Allergen Reactions    Levaquin [Levofloxacin] Nausea and Vomiting    Percocet [Oxycodone-Acetaminophen] Visual Disturbance     hallucinations       Review of Systems:   5 point ROS otherwise negative    Physical Exam:  BP (!) 173/81   Pulse 66   Ht 1.85 m (6' 0.84\")   Wt 80.3 kg (177 lb)   SpO2 100%   BMI 23.46 kg/m      Constitutional- No acute distress, well nourished, non-toxic  Eyes: Anicteric, no injection.  PERRL  ENT:  Normocephalic, atraumatic, Nose midline, moist mucus membranes  Neck - supple, no LAD  Respiratory- Good inspiratory effort  Cardiovascular -  No peripheral edema.  No clubbing.  Abdomen - Soft, non-tender, +BS, no hepatosplenomegaly, no palpable masses, well healed midline  Neuro - No focal neuro deficits, Alert and oriented x 3  Psych: Appropriate mood and affect  Musculoskeletal: Normal gait, symmetric strength.  FROM upper and lower extremities.  Skin: Warm, Dry    Lab Results   Component Value Date    WBC 5.5 06/06/2024    WBC 6.1 06/28/2020     Lab Results   Component Value Date    RBC 4.19 06/06/2024    RBC 5.28 06/28/2020     Lab Results   Component Value Date    HGB 13.5 " 06/06/2024    HGB 17.3 06/28/2020     Lab Results   Component Value Date    HCT 40.8 06/06/2024    HCT 50.3 06/28/2020     Lab Results   Component Value Date    MCV 97 06/06/2024    MCV 95 06/28/2020     Lab Results   Component Value Date    MCH 32.2 06/06/2024    MCH 32.8 06/28/2020     Lab Results   Component Value Date    MCHC 33.1 06/06/2024    MCHC 34.4 06/28/2020     Lab Results   Component Value Date    RDW 13.6 06/06/2024    RDW 13.2 06/28/2020     Lab Results   Component Value Date     06/06/2024     06/28/2020     Assessment:  1. Black stools    2. Bright red blood per rectum    3. External hemorrhoids      Plan:   Mr. Rizzo presents with GI bleed of uncertain etiology.  His guaiac was negative in the ED.  His most recent hemogram is normal.  He has potential upper and lower sources and history is not consistent with one or the other.  I discussed need for endoscopy and in his case would recommend double as most recent colonoscopy (7/2023) had poor prep).  He is agreeable to this.  I advised to monitor for signs of bleeding in the interim and need for prompt attention should they recur.  All questions answered.  We discussed extended bowel prep with doses of miralax starting 2 days prior to full prep.  He is agreeable.      Tommy Martino, DO on 6/6/2024 at 1:36 PM

## 2024-06-06 NOTE — LETTER
6/6/2024      Ghulam Rizzo  [address] [place] [id number]      Dear Colleague,    Thank you for referring your patient, Ghulam Rizzo, to the [facility]. Please see a copy of my visit note below.    Surgical Consultation/History and Physical  [facility] Surgery    Ghulam is seen in consultation for GI Bleeding, at the request of Jamie Rutledge DO.    Chief Complaint:  GI Bleeding    History of Present Illness: Ghulam Rizzo is a 68 year old male presents with GI bleeding. Patient has a history of both gastric surgery and right colectomy. He presents with both black and occasional bright red stools.  He is not on anticoagulation.  He has a history of hepatitis C which was treated.      Patient Active Problem List   Diagnosis     Hyperlipidemia LDL goal <130     Tobacco abuse     Advanced directives, counseling/discussion     Abdominal pain, generalized     Cirrhosis of liver without ascites (H)     Proctitis     Other irritable bowel syndrome     Screening for hypertension     Elevated prostate specific antigen (PSA)     Cecal volvulus (H)     Saccular aneurysm     Benign essential hypertension     Compression fracture of L4 vertebra (H)     Compression fracture of L3 lumbar vertebra, with routine healing, subsequent encounter     Adenocarcinoma of prostate (H)     Vertigo     Recurrent major depressive disorder, in remission (H24)       Past Medical History:   Diagnosis Date     Anisocoria      Asthma      Carpal tunnel syndrome     2006     Head injury, unspecified     closed head injury 4 yrs ago     Hepatitis C     Cured by [facility] in 2015. diagnosed in 2012 was positive for Hepatitis C.      Obesity     2006, resolved     Other convulsions     seizure disorder due to head injury 4 yrs ago.     Other mononeuritis of upper limb     L phrenic nerve paralysis     Unspecified essential hypertension        Past Surgical History:   Procedure Laterality Date     COLECTOMY  WITHOUT COLOSTOMY Right 7/13/2022    Procedure: Laparotomy, right colectomy, lysis of adhesions,;  Surgeon: Tommy Martino DO;  Location: [facility]     COLONOSCOPY       COLONOSCOPY N/A 7/18/2023    Procedure: Colonoscopy with polypectomy;  Surgeon: Octavio Aguirre MD;  Location: [facility]     GI SURGERY       HERNIA REPAIR       HERNIORRHAPHY INCISIONAL (LOCATION) N/A 7/13/2022    Procedure: primary incisional hernia repair;  Surgeon: Tommy Martino DO;  Location: [facility]     INSERT SEED MARKER / MATRIX N/A 8/30/2023    Procedure: Transrectal ultrasound guided placement of fiducials and SpaceOar;  Surgeon: Chase Blount MD;  Location:  OR     SURGICAL HISTORY OF -   4/6/87    esophagogastroduodenoscopy     SURGICAL HISTORY OF -   4/27/87    exploratory laparotomy and anterior gastropexy over a gastrostomy tube.     SURGICAL HISTORY OF -   11/13/90    esophagogastroduodenoscopy     SURGICAL HISTORY OF -   1991    L diaphragm eventration repair, fixation of stomach and colon to abd wall     SURGICAL HISTORY OF -       hiatal hernia repair     THORACIC SURGERY       Family History   Problem Relation Age of Onset     Cerebrovascular Disease Mother      Heart Disease Brother      Heart Disease Brother      Social History     Tobacco Use     Smoking status: Former     Current packs/day: 0.50     Average packs/day: 0.5 packs/day for 50.0 years (25.0 ttl pk-yrs)     Types: Cigarettes     Smokeless tobacco: Never     Tobacco comments:     started again 4/2010   Substance Use Topics     Alcohol use: Yes     Comment: 1-2 beers daily      History   Drug Use No     Current Outpatient Medications   Medication Sig Dispense Refill     acetaminophen (TYLENOL) 500 MG tablet Take 1,000 mg by mouth every 6 hours as needed for mild pain       amLODIPine (NORVASC) 5 MG tablet Take 1 tablet (5 mg) by mouth daily 90 tablet 3     calcitonin, salmon, (MIACALCIN) 200 UNIT/ACT nasal spray Spray 1 spray into  "one nostril alternating nostrils daily for 30 days Alternate nostril each day. 2.7 mL 0     Calcium Carbonate (CALCIUM-CARB 600 PO) Take 2 tablets by mouth daily       cyanocobalamin (VITAMIN B-12) 1000 MCG tablet Take 1 tablet (1,000 mcg) by mouth daily 90 tablet 1     glycerin (LAXATIVE) 1.2 g suppository Place 1 suppository rectally daily as needed (constipation.) 25 suppository 1     linaclotide (LINZESS) 145 MCG capsule Take 1 capsule (145 mcg) by mouth every morning (before breakfast) 30 capsule 3     polyethylene glycol (MIRALAX) 17 GM/Dose powder Take 17 g (1 capful.) by mouth daily (Patient taking differently: Take 1 capful. by mouth 2 times daily) 578 g 1     senna-docusate (NEGRITO-COLACE) 8.6-50 MG per tablet Take 1-2 tablets by mouth 2 times daily as needed for constipation. 100 tablet 5     sertraline (ZOLOFT) 50 MG tablet Take 1 tablet daily. 90 tablet 3     tamsulosin (FLOMAX) 0.4 MG capsule Take 1 capsule (0.4 mg) by mouth every evening 90 capsule 1     vitamin D3 (CHOLECALCIFEROL) 50 mcg (2000 units) tablet Take 1 tablet (50 mcg) by mouth daily 90 tablet 3       Allergies   Allergen Reactions     Levaquin [Levofloxacin] Nausea and Vomiting     Percocet [Oxycodone-Acetaminophen] Visual Disturbance     hallucinations       Review of Systems:   5 point ROS otherwise negative    Physical Exam:  BP (!) 173/81   Pulse 66   Ht 1.85 m (6' 0.84\")   Wt 80.3 kg (177 lb)   SpO2 100%   BMI 23.46 kg/m      Constitutional- No acute distress, well nourished, non-toxic  Eyes: Anicteric, no injection.  PERRL  ENT:  Normocephalic, atraumatic, Nose midline, moist mucus membranes  Neck - supple, no LAD  Respiratory- Good inspiratory effort  Cardiovascular -  No peripheral edema.  No clubbing.  Abdomen - Soft, non-tender, +BS, no hepatosplenomegaly, no palpable masses, well healed midline  Neuro - No focal neuro deficits, Alert and oriented x 3  Psych: Appropriate mood and affect  Musculoskeletal: Normal gait, " symmetric strength.  FROM upper and lower extremities.  Skin: Warm, Dry    Lab Results   Component Value Date    WBC 5.5 06/06/2024    WBC 6.1 06/28/2020     Lab Results   Component Value Date    RBC 4.19 06/06/2024    RBC 5.28 06/28/2020     Lab Results   Component Value Date    HGB 13.5 06/06/2024    HGB 17.3 06/28/2020     Lab Results   Component Value Date    HCT 40.8 06/06/2024    HCT 50.3 06/28/2020     Lab Results   Component Value Date    MCV 97 06/06/2024    MCV 95 06/28/2020     Lab Results   Component Value Date    MCH 32.2 06/06/2024    MCH 32.8 06/28/2020     Lab Results   Component Value Date    MCHC 33.1 06/06/2024    MCHC 34.4 06/28/2020     Lab Results   Component Value Date    RDW 13.6 06/06/2024    RDW 13.2 06/28/2020     Lab Results   Component Value Date     06/06/2024     06/28/2020     Assessment:  1. Black stools    2. Bright red blood per rectum    3. External hemorrhoids      Plan:   Mr. Rizzo presents with GI bleed of uncertain etiology.  His guaiac was negative in the ED.  His most recent hemogram is normal.  He has potential upper and lower sources and history is not consistent with one or the other.  I discussed need for endoscopy and in his case would recommend double as most recent colonoscopy (7/2023) had poor prep).  He is agreeable to this.  I advised to monitor for signs of bleeding in the interim and need for prompt attention should they recur.  All questions answered.  We discussed extended bowel prep with doses of miralax starting 2 days prior to full prep.  He is agreeable.      Tommy Martino DO on 6/6/2024 at 1:36 PM      Again, thank you for allowing me to participate in the care of your patient.        Sincerely,        Tommy Martino DO

## 2024-06-07 ENCOUNTER — ANESTHESIA EVENT (OUTPATIENT)
Dept: GASTROENTEROLOGY | Facility: CLINIC | Age: 69
End: 2024-06-07
Payer: COMMERCIAL

## 2024-06-07 ENCOUNTER — OFFICE VISIT (OUTPATIENT)
Dept: RADIATION THERAPY | Facility: OUTPATIENT CENTER | Age: 69
End: 2024-06-07
Payer: COMMERCIAL

## 2024-06-07 DIAGNOSIS — C61 PROSTATE CANCER (H): ICD-10-CM

## 2024-06-07 RX ORDER — TAMSULOSIN HYDROCHLORIDE 0.4 MG/1
0.4 CAPSULE ORAL EVERY EVENING
Qty: 90 CAPSULE | Refills: 3 | Status: SHIPPED | OUTPATIENT
Start: 2024-06-07

## 2024-06-07 ASSESSMENT — ENCOUNTER SYMPTOMS: SEIZURES: 1

## 2024-06-07 ASSESSMENT — LIFESTYLE VARIABLES: TOBACCO_USE: 1

## 2024-06-07 NOTE — ANESTHESIA PREPROCEDURE EVALUATION
Anesthesia Pre-Procedure Evaluation    Patient: Ghulam Rizzo   MRN: [id number] : 1955        Procedure : Procedure(s):  Colonoscopy  Esophagoscopy, gastroscopy, duodenoscopy (EGD), combined          Past Medical History:   Diagnosis Date     Anisocoria      Asthma      Carpal tunnel syndrome          Head injury, unspecified     closed head injury 4 yrs ago     Hepatitis C     Cured by [facility] in . diagnosed in  was positive for Hepatitis C.      Obesity     2006, resolved     Other convulsions     seizure disorder due to head injury 4 yrs ago.     Other mononeuritis of upper limb     L phrenic nerve paralysis     Unspecified essential hypertension       Past Surgical History:   Procedure Laterality Date     COLECTOMY WITHOUT COLOSTOMY Right 2022    Procedure: Laparotomy, right colectomy, lysis of adhesions,;  Surgeon: Tommy Martino DO;  Location: [facility]     COLONOSCOPY       COLONOSCOPY N/A 2023    Procedure: Colonoscopy with polypectomy;  Surgeon: Octavio Aguirre MD;  Location: [facility]     GI SURGERY       HERNIA REPAIR       HERNIORRHAPHY INCISIONAL (LOCATION) N/A 2022    Procedure: primary incisional hernia repair;  Surgeon: Tommy Martino DO;  Location: [facility]     INSERT SEED MARKER / MATRIX N/A 2023    Procedure: Transrectal ultrasound guided placement of fiducials and SpaceOar;  Surgeon: Chase Blount MD;  Location:  OR     SURGICAL HISTORY OF -   86    esophagogastroduodenoscopy     SURGICAL HISTORY OF -   87    exploratory laparotomy and anterior gastropexy over a gastrostomy tube.     SURGICAL HISTORY OF -   89    esophagogastroduodenoscopy     SURGICAL HISTORY OF -       L diaphragm eventration repair, fixation of stomach and colon to abd wall     SURGICAL HISTORY OF -       hiatal hernia repair     THORACIC SURGERY        Allergies   Allergen Reactions     Levaquin [Levofloxacin] Nausea and Vomiting      Percocet [Oxycodone-Acetaminophen] Visual Disturbance     hallucinations      Social History     Tobacco Use     Smoking status: Former     Current packs/day: 0.50     Average packs/day: 0.5 packs/day for 50.0 years (25.0 ttl pk-yrs)     Types: Cigarettes     Smokeless tobacco: Never     Tobacco comments:     started again 4/2010   Substance Use Topics     Alcohol use: Yes     Comment: 1-2 beers daily      Wt Readings from Last 1 Encounters:   06/06/24 80.3 kg (177 lb)        Anesthesia Evaluation   Pt has had prior anesthetic. Type: General and MAC.        ROS/MED HX  ENT/Pulmonary:     (+)                tobacco use, Past use,  25  Pack-Year Hx,    asthma                  Neurologic:     (+)       seizures,                         Cardiovascular:     (+) Dyslipidemia hypertension- -   -  - -                                 Previous cardiac testing   Echo: Date: Results:    Stress Test:  Date: Results:    ECG Reviewed:  Date: 6/8/23 Results:  Sinus  Rhythm   WITHIN NORMAL LIMITS  Cath:  Date: Results:      METS/Exercise Tolerance:     Hematologic:  - neg hematologic  ROS     Musculoskeletal: Comment: Compression fracture of L3 lumbar vertebra, with routine healing, subsequent encounter  Compression fracture of L4 vertebra (H)      (+)  arthritis,             GI/Hepatic:     (+)           hepatitis type C, liver disease,       Renal/Genitourinary:       Endo:  - neg endo ROS     Psychiatric/Substance Use:  - neg psychiatric ROS     Infectious Disease:  - neg infectious disease ROS     Malignancy:   (+) Malignancy, History of Prostate.Prostate CA status post Surgery.      Other:  - neg other ROS          Physical Exam    Airway        Mallampati: I   TM distance: > 3 FB   Neck ROM: full   Mouth opening: > 3 cm    Respiratory Devices and Support         Dental           Cardiovascular   cardiovascular exam normal          Pulmonary   pulmonary exam normal            OUTSIDE LABS:  CBC:   Lab Results   Component Value  Date    WBC 5.5 06/06/2024    WBC 5.1 05/29/2024    HGB 13.5 06/06/2024    HGB 13.8 05/29/2024    HCT 40.8 06/06/2024    HCT 39.6 (L) 05/29/2024     06/06/2024     05/29/2024     BMP:   Lab Results   Component Value Date     05/29/2024     05/10/2024    POTASSIUM 4.7 05/29/2024    POTASSIUM 3.9 05/10/2024    CHLORIDE 103 05/29/2024    CHLORIDE 101 05/10/2024    CO2 22 05/29/2024    CO2 22 05/10/2024    BUN 11.3 05/29/2024    BUN 14.2 05/10/2024    CR 0.73 05/29/2024    CR 0.80 05/10/2024    GLC 99 05/29/2024    GLC 96 05/10/2024     COAGS:   Lab Results   Component Value Date    PTT 31 01/16/2023    INR 1.09 01/16/2023     POC:   Lab Results   Component Value Date     (H) 04/03/2006     HEPATIC:   Lab Results   Component Value Date    ALBUMIN 4.3 05/29/2024    PROTTOTAL 7.0 05/29/2024    ALT 9 05/29/2024    AST 13 05/29/2024    ALKPHOS 97 05/29/2024    BILITOTAL 0.3 05/29/2024     OTHER:   Lab Results   Component Value Date    LACT 1.2 06/08/2023    A1C 5.0 06/23/2023    TAVIA 9.4 05/29/2024    PHOS 3.9 07/14/2022    MAG 1.9 05/10/2024    LIPASE 34 03/16/2023    TSH 4.27 (H) 06/08/2023    T4 1.27 06/08/2023    CRP <2.9 11/25/2017       Anesthesia Plan    ASA Status:  3       Anesthesia Type: General.   Induction: Propofol.   Maintenance: TIVA.        Consents    Anesthesia Plan(s) and associated risks, benefits, and realistic alternatives discussed. Questions answered and patient/representative(s) expressed understanding.     - Discussed: Risks, Benefits and Alternatives for BOTH SEDATION and the PROCEDURE were discussed     - Discussed with:  Patient            Postoperative Care    Pain management: IV analgesics, Oral pain medications, Multi-modal analgesia.   PONV prophylaxis: Ondansetron (or other 5HT-3), Dexamethasone or Solumedrol     Comments:             ANGELA Colunga CRNA    I have reviewed the pertinent notes and labs in the chart from the past 30 days and (re)examined  the patient.  Any updates or changes from those notes are reflected in this note.

## 2024-06-07 NOTE — LETTER
2024      Ghulam Rizzo  [address] [place] [id number]      Dear Colleague,    Thank you for referring your patient, Ghulam Rizzo, to the [facility] RADIATION THER[facility]Y [facility]IN[facility]. Please see a copy of my visit note below.       Department of Radiation Oncology  Radiation Therapy Center  [facility] [place] [address]  [phone]       Radiation Oncology Follow-up Visit  2024      Ghulam Rizzo  MRN: [id number]   : 1955     Radiation Oncologist: Armani Rodgers MD  Provider: ODALYS Segura  [facility]  with Dr Rodgers    DIAGNOSIS: Prostate adenocarcinoma  INTENT OF RADIOTHERAPY: Adjuvant  PATHOLOGY:  unfavorable intermediate, [place] 4 + 3 = 7                                 STAGE:[id number]   CONCURRENT SYSTEMIC THERAPY:  No        ONCOLOGIC HISTORY:      23  PSA  =10.84      3/9/2023, prostatic MR:  Prostate measures 4.3 x 3.4 x 4.9 cm.  37 g.  Suspicious lesion noted in the peripheral zone.  Lesion in the right base and mid gland peripherally and at the transition zone at the 9-12 o'clock position relative to the urethra.  Possible involvement of anterior fibromuscular stroma noted.  22 mm in greatest dimension.  Capsular abutment greater than 15 mm with smooth contour, moderate suspicion of minimal extraprostatic extension.  No suspicious adenopathy.  PI-RADS 5     3/16/2023, CT CAP: Normal lymph nodes.  Mild prostatic gland enlargement noted.     2023, lumbar XR: Acute superior endplate compression fracture at L3 with 20% height loss and no retropulsion.  Questionable superior endplate compression fracture at L4 with less than 10% height loss.     2023: Patient undergoes prostatic biopsy with Dr. Blount. Digital rectal exam was performed revealing a normal feeling prostate.     2023, surgical pathology:  Prostate, Target 1: Right Mid TZ 11:00: Prostatic adenocarcinoma. [place] 4+3 = 7.  Grade group 3.  1/2 cores  Prostate, [place], Right: High-grade  prostatic intraepithelial neoplasia  Prostate, Right Transitional Zone: Prostatic adenocarcinoma.  [place] 4+3 = 7.  Grade group 3.  1/1 cores.     5/18/2023, follow-up with Dr. Blount: Dr. Blount discussed the diagnosis of prostate cancer with the patient.  Patient diagnosed with unfavorable intermediate risk prostate cancer given the patient's PSA between 10-20 ng/mL, normal or minimally abnormal exam and [place] 4+3 = 7.  Plan for the patient to have a bone scan to complete evaluation for staging.  Dr. Blount discussed treatment options including observation, definitive therapy including surgery and/or radiotherapy.  Dr. Blount believes that this patient is not an ideal observation candidate given his disease characteristics.  Because the patient has a history of several abdominal surgeries, surgery would be more difficult and would carry a higher likelihood of needing to perform the surgery open.  Space OAR was discussed.  Referral to radiation oncology placed.     6/8/2023, CT head no contrast: No acute intracranial abnormality     6/13/2023, bone scan: No osseous metastases     6/15/23 seen in clinic for consult. Dr Rodgers recommended radiotherapy to the prostate consisting of 70 Gy in 28 fractions in addition to 6 months of ADT.      7/21/23 received Lupron 45 mg.      SITE OF TREATMENT: Pelvis/Prostate     DATES  OF TREATMENT: 9/21/23 to 10/30/23     TOTAL DOSE OF TREATMENT: 7, 000 cGy     DOSE PER FRACTION OF TREATMENT: 250 cGy x 28 fractions    INTERVAL SINCE COMPLETION OF RADIATION THERAPY:   7 mo    SUBJECTIVE:   Ghulam returns for follow up. He is on flomax. Flow is good. Asking for refill. One month ago noted blood in stool and in toilet bowel, bright red. Had a screening colonoscopy 7/2023 which was normal. He is having a colonoscopy next week. He is dealing with vertigo and room spinning and headaches for several months. Seeing Neuro and ENT.   Denies blood in urine. No other complaints.     PHYSICAL  EXAM:  There were no vitals taken for this visit.  Gen: Alert, in NAD  Eyes: PERRL, EOMI, sclera anicteric  HENT     Head: NC/AT     Ears: No external auricular lesions     Nose/sinus: No rhinorrhea or epistaxis     Oral Cavity/Oropharynx: MMM  Neck: Supple, full ROM, no LAD  Pulm: No wheezing, stridor or respiratory distress  CV: Well-perfused, no cyanosis, no pedal edema  Abdominal: Soft, nontender, nondistended, no hepatomegaly  Back: No step-offs or pain to palpation along the thoracolumbar spine, no CVA tenderness    Musculoskeletal: Normal bulk and tone   Skin: Normal color and turgor  Neurologic: A/Ox3, CN II-XII intact  Psychiatric: Appropriate mood and affect    LABS AND IMAGING:  PSA   6/6/24  0.03  12/4/23 0.05  10/30/23  RT  7/21/23  Lupron  2/8/23  10.84    IMPRESSION:   Mr. Rizzo is a 68 year old male with a prostate adenocarcinoma unfavorable intermediate, [place] 4 + 3 = 7    [id number], he underwent ST ADT and RT. PSA is low has BRBPR. Having colonoscopy next week. We discussed the possibility of radiation proctitis. If so will need referral to GI    PLAN:   RTO in 6 months with PSA  Refilled ODALYS Antonio  Department of Radiation Oncology  [facility]

## 2024-06-07 NOTE — PATIENT INSTRUCTIONS
Do your PSA in 6 months, I will see you after.   I will see you every 6 months for a long time.  We discussed the possibility that the blood in stool is radiation proctitis.   When you have the colonoscopy they will be able to tell.     Yulissa Longoria  Physician Assistant  Radiation Oncology

## 2024-06-07 NOTE — PROGRESS NOTES
Department of Radiation Oncology  Radiation Therapy Center  [facility]  [place] [address]  [phone]       Radiation Oncology Follow-up Visit  2024      Ghulam Rizzo  MRN: [id number]   : 1955     Radiation Oncologist: Armani Rodgers MD  Provider: ODALYS Segura  [facility]  with Dr Rodgers    DIAGNOSIS: Prostate adenocarcinoma  INTENT OF RADIOTHERAPY: Adjuvant  PATHOLOGY:  unfavorable intermediate, [place] 4 + 3 = 7                                 STAGE:[id number]   CONCURRENT SYSTEMIC THERAPY:  No        ONCOLOGIC HISTORY:      23  PSA  =10.84      3/9/2023, prostatic MR:  Prostate measures 4.3 x 3.4 x 4.9 cm.  37 g.  Suspicious lesion noted in the peripheral zone.  Lesion in the right base and mid gland peripherally and at the transition zone at the 9-12 o'clock position relative to the urethra.  Possible involvement of anterior fibromuscular stroma noted.  22 mm in greatest dimension.  Capsular abutment greater than 15 mm with smooth contour, moderate suspicion of minimal extraprostatic extension.  No suspicious adenopathy.  PI-RADS 5     3/16/2023, CT CAP: Normal lymph nodes.  Mild prostatic gland enlargement noted.     2023, lumbar XR: Acute superior endplate compression fracture at L3 with 20% height loss and no retropulsion.  Questionable superior endplate compression fracture at L4 with less than 10% height loss.     2023: Patient undergoes prostatic biopsy with Dr. Blount. Digital rectal exam was performed revealing a normal feeling prostate.     2023, surgical pathology:  Prostate, Target 1: Right Mid TZ 11:00: Prostatic adenocarcinoma. [place] 4+3 = 7.  Grade group 3.  1/2 cores  Prostate, [place], Right: High-grade prostatic intraepithelial neoplasia  Prostate, Right Transitional Zone: Prostatic adenocarcinoma.  [place] 4+3 = 7.  Grade group 3.  1/1 cores.     2023, follow-up with Dr. Blount: Dr. Blount discussed the diagnosis of prostate  cancer with the patient.  Patient diagnosed with unfavorable intermediate risk prostate cancer given the patient's PSA between 10-20 ng/mL, normal or minimally abnormal exam and [place] 4+3 = 7.  Plan for the patient to have a bone scan to complete evaluation for staging.  Dr. Blount discussed treatment options including observation, definitive therapy including surgery and/or radiotherapy.  Dr. Blount believes that this patient is not an ideal observation candidate given his disease characteristics.  Because the patient has a history of several abdominal surgeries, surgery would be more difficult and would carry a higher likelihood of needing to perform the surgery open.  Space OAR was discussed.  Referral to radiation oncology placed.     6/8/2023, CT head no contrast: No acute intracranial abnormality     6/13/2023, bone scan: No osseous metastases     6/15/23 seen in clinic for consult. Dr Rodgers recommended radiotherapy to the prostate consisting of 70 Gy in 28 fractions in addition to 6 months of ADT.      7/21/23 received Lupron 45 mg.      SITE OF TREATMENT: Pelvis/Prostate     DATES  OF TREATMENT: 9/21/23 to 10/30/23     TOTAL DOSE OF TREATMENT: 7, 000 cGy     DOSE PER FRACTION OF TREATMENT: 250 cGy x 28 fractions    INTERVAL SINCE COMPLETION OF RADIATION THERAPY:   7 mo    SUBJECTIVE:   Ghulam returns for follow up. He is on flomax. Flow is good. Asking for refill. One month ago noted blood in stool and in toilet bowel, bright red. Had a screening colonoscopy 7/2023 which was normal. He is having a colonoscopy next week. He is dealing with vertigo and room spinning and headaches for several months. Seeing Neuro and ENT.   Denies blood in urine. No other complaints.     PHYSICAL EXAM:  There were no vitals taken for this visit.  Gen: Alert, in NAD  Eyes: PERRL, EOMI, sclera anicteric  HENT     Head: NC/AT     Ears: No external auricular lesions     Nose/sinus: No rhinorrhea or epistaxis     Oral  Cavity/Oropharynx: MMM  Neck: Supple, full ROM, no LAD  Pulm: No wheezing, stridor or respiratory distress  CV: Well-perfused, no cyanosis, no pedal edema  Abdominal: Soft, nontender, nondistended, no hepatomegaly  Back: No step-offs or pain to palpation along the thoracolumbar spine, no CVA tenderness    Musculoskeletal: Normal bulk and tone   Skin: Normal color and turgor  Neurologic: A/Ox3, CN II-XII intact  Psychiatric: Appropriate mood and affect    LABS AND IMAGING:  PSA   6/6/24  0.03  12/4/23 0.05  10/30/23  RT  7/21/23  Lupron  2/8/23  10.84    IMPRESSION:   Mr. Rizzo is a 68 year old male with a prostate adenocarcinoma unfavorable intermediate, [place] 4 + 3 = 7    [id number], he underwent ST ADT and RT. PSA is low has BRBPR. Having colonoscopy next week. We discussed the possibility of radiation proctitis. If so will need referral to GI    PLAN:   RTO in 6 months with PSA  Refilled FloODALYS Rich  Department of Radiation Oncology  [facility]

## 2024-06-10 ENCOUNTER — HOSPITAL ENCOUNTER (OUTPATIENT)
Facility: CLINIC | Age: 69
Discharge: HOME | End: 2024-06-10
Attending: SURGERY | Admitting: SURGERY
Payer: COMMERCIAL

## 2024-06-10 ENCOUNTER — ANESTHESIA (OUTPATIENT)
Dept: GASTROENTEROLOGY | Facility: CLINIC | Age: 69
End: 2024-06-10
Payer: COMMERCIAL

## 2024-06-10 VITALS
TEMPERATURE: 97.6 F | SYSTOLIC BLOOD PRESSURE: 151 MMHG | RESPIRATION RATE: 16 BRPM | HEART RATE: 61 BPM | OXYGEN SATURATION: 100 % | HEIGHT: 72 IN | WEIGHT: 177 LBS | BODY MASS INDEX: 23.98 KG/M2 | DIASTOLIC BLOOD PRESSURE: 85 MMHG

## 2024-06-10 DIAGNOSIS — K62.5: ICD-10-CM

## 2024-06-10 DIAGNOSIS — K62.5 RECTAL BLEEDING: Primary | ICD-10-CM

## 2024-06-10 LAB
COLONOSCOPY: NORMAL
UPPER GI ENDOSCOPY: NORMAL

## 2024-06-10 PROCEDURE — 43235 EGD DIAGNOSTIC BRUSH WASH: CPT | Performed by: SURGERY

## 2024-06-10 PROCEDURE — 258N000003 HC RX IP 258 OP 636: Mod: JZ | Performed by: SURGERY

## 2024-06-10 PROCEDURE — 45380 COLONOSCOPY AND BIOPSY: CPT | Performed by: SURGERY

## 2024-06-10 PROCEDURE — 258N000003 HC RX IP 258 OP 636: Mod: JZ | Performed by: NURSE ANESTHETIST, CERTIFIED REGISTERED

## 2024-06-10 PROCEDURE — 370N000017 HC ANESTHESIA TECHNICAL FEE, PER MIN: Performed by: SURGERY

## 2024-06-10 PROCEDURE — 250N000011 HC RX IP 250 OP 636: Mod: JZ | Performed by: NURSE ANESTHETIST, CERTIFIED REGISTERED

## 2024-06-10 PROCEDURE — 43239 EGD BIOPSY SINGLE/MULTIPLE: CPT | Performed by: SURGERY

## 2024-06-10 PROCEDURE — 250N000009 HC RX 250: Performed by: SURGERY

## 2024-06-10 PROCEDURE — 88305 TISSUE EXAM BY PATHOLOGIST: CPT | Mod: TC | Performed by: SURGERY

## 2024-06-10 PROCEDURE — 45378 DIAGNOSTIC COLONOSCOPY: CPT | Performed by: SURGERY

## 2024-06-10 PROCEDURE — 250N000009 HC RX 250: Performed by: NURSE ANESTHETIST, CERTIFIED REGISTERED

## 2024-06-10 RX ORDER — LIDOCAINE HYDROCHLORIDE 20 MG/ML
INJECTION, SOLUTION INFILTRATION; PERINEURAL PRN
Status: DISCONTINUED | OUTPATIENT
Start: 2024-06-10 | End: 2024-06-10

## 2024-06-10 RX ORDER — SODIUM CHLORIDE, SODIUM LACTATE, POTASSIUM CHLORIDE, CALCIUM CHLORIDE 600; 310; 30; 20 MG/100ML; MG/100ML; MG/100ML; MG/100ML
INJECTION, SOLUTION INTRAVENOUS CONTINUOUS
Status: DISCONTINUED | OUTPATIENT
Start: 2024-06-10 | End: 2024-06-10 | Stop reason: HOSPADM

## 2024-06-10 RX ORDER — PROPOFOL 10 MG/ML
INJECTION, EMULSION INTRAVENOUS PRN
Status: DISCONTINUED | OUTPATIENT
Start: 2024-06-10 | End: 2024-06-10

## 2024-06-10 RX ORDER — NALOXONE HYDROCHLORIDE 0.4 MG/ML
0.4 INJECTION, SOLUTION INTRAMUSCULAR; INTRAVENOUS; SUBCUTANEOUS
Status: CANCELLED | OUTPATIENT
Start: 2024-06-10

## 2024-06-10 RX ORDER — ONDANSETRON 2 MG/ML
4 INJECTION INTRAMUSCULAR; INTRAVENOUS
Status: DISCONTINUED | OUTPATIENT
Start: 2024-06-10 | End: 2024-06-10 | Stop reason: HOSPADM

## 2024-06-10 RX ORDER — PROPOFOL 10 MG/ML
INJECTION, EMULSION INTRAVENOUS CONTINUOUS PRN
Status: DISCONTINUED | OUTPATIENT
Start: 2024-06-10 | End: 2024-06-10

## 2024-06-10 RX ORDER — NALOXONE HYDROCHLORIDE 0.4 MG/ML
0.2 INJECTION, SOLUTION INTRAMUSCULAR; INTRAVENOUS; SUBCUTANEOUS
Status: CANCELLED | OUTPATIENT
Start: 2024-06-10

## 2024-06-10 RX ORDER — LIDOCAINE 40 MG/G
CREAM TOPICAL
Status: DISCONTINUED | OUTPATIENT
Start: 2024-06-10 | End: 2024-06-10 | Stop reason: HOSPADM

## 2024-06-10 RX ORDER — FLUMAZENIL 0.1 MG/ML
0.2 INJECTION, SOLUTION INTRAVENOUS
Status: CANCELLED | OUTPATIENT
Start: 2024-06-10 | End: 2024-06-11

## 2024-06-10 RX ADMIN — SODIUM CHLORIDE, POTASSIUM CHLORIDE, SODIUM LACTATE AND CALCIUM CHLORIDE 1000 ML: 600; 310; 30; 20 INJECTION, SOLUTION INTRAVENOUS at 14:20

## 2024-06-10 RX ADMIN — PROPOFOL 150 MCG/KG/MIN: 10 INJECTION, EMULSION INTRAVENOUS at 18:09

## 2024-06-10 RX ADMIN — PHENYLEPHRINE HYDROCHLORIDE 100 MCG: 10 INJECTION INTRAVENOUS at 18:26

## 2024-06-10 RX ADMIN — PHENYLEPHRINE HYDROCHLORIDE 100 MCG: 10 INJECTION INTRAVENOUS at 18:23

## 2024-06-10 RX ADMIN — LIDOCAINE HYDROCHLORIDE 0.1 ML: 10 INJECTION, SOLUTION EPIDURAL; INFILTRATION; INTRACAUDAL; PERINEURAL at 14:21

## 2024-06-10 RX ADMIN — LIDOCAINE HYDROCHLORIDE 100 MG: 20 INJECTION, SOLUTION INFILTRATION; PERINEURAL at 18:09

## 2024-06-10 RX ADMIN — PHENYLEPHRINE HYDROCHLORIDE 100 MCG: 10 INJECTION INTRAVENOUS at 18:31

## 2024-06-10 RX ADMIN — PROPOFOL 150 MG: 10 INJECTION, EMULSION INTRAVENOUS at 18:09

## 2024-06-10 ASSESSMENT — ACTIVITIES OF DAILY LIVING (ADL)
ADLS_ACUITY_SCORE: 37

## 2024-06-10 NOTE — INTERVAL H&P NOTE
I have reviewed the surgical (or preoperative) H&P that is linked to this encounter, and examined the patient. There are no significant changes      Patient noted blood with prep, bright red.  History of prostate irradiation.    Discussed indications, risks, benefits including bleeding, perforation, missed lesion.    Tommy Martino,  on 6/10/2024 at 2:43 PM      Clinical Conditions Present on Arrival:  Clinically Significant Risk Factors Present on Admission

## 2024-06-10 NOTE — ANESTHESIA POSTPROCEDURE EVALUATION
Patient: Ghulam Rizzo    Procedure: Procedure(s):  Colonoscopy  Esophagoscopy, gastroscopy, duodenoscopy (EGD), combined       Anesthesia Type:  General    Note:  Disposition: Outpatient   Postop Pain Control: Uneventful            Sign Out: Well controlled pain   PONV: No   Neuro/Psych: Uneventful            Sign Out: Acceptable/Baseline neuro status   Airway/Respiratory: Uneventful            Sign Out: Acceptable/Baseline resp. status   CV/Hemodynamics: Uneventful            Sign Out: Acceptable CV status; No obvious hypovolemia; No obvious fluid overload   Other NRE: NONE   DID A NON-ROUTINE EVENT OCCUR? No       Last vitals:  Vitals:    06/10/24 1344   BP: (!) 147/83   Pulse: 60   Resp: 18   Temp: 37.1  C (98.7  F)   SpO2: 100%       Electronically Signed By: ANGELA Colunga CRNA  Cassidy 10, 2024  6:34 PM

## 2024-06-10 NOTE — LETTER
Ghulam Rizzo  [address] [place] [id number]  June 17, 2024    Dear Ghulam,   This letter is to inform you of the results of your pathology report on your upper endoscopy (EGD).    Your pathology report was:  Normal, please follow up by having a repeat upper endoscopy (EGD), if your symptoms worsen.  If you have questions, please contact your primary care physician.    Final Diagnosis   A: Stomach, biopsies:  - Normal oxyntic and antral pattern mucosa with minimal chronic inflammation  -No evidence of acute inflammation, intestinal metaplasia or dysplasia  -No evidence of Helicobacter pylori-like organisms on routine/H&E stain     B: Distal esophagus, biopsies:  -Benign squamous mucosa with mild basal layer hyperplasia suggesting reflux-like change; no evidence of inflammation (including no eosinophils); no evidence of columnar mucosa; no evidence of atypia or malignancy       Your colonoscopy showed radiation proctitis. If no improvement with rectal steroids, we can burn the area with Argon coagulopathy if needed.  Please call my office if you continue to have issues.    If you have any questions or concerns please do not hesitate to call my office at [phone].  Sincerely,   Tommy Martino, DO   [facility]

## 2024-06-10 NOTE — ANESTHESIA CARE TRANSFER NOTE
Patient: Ghulam Rizzo    Procedure: Procedure(s):  Colonoscopy  Esophagoscopy, gastroscopy, duodenoscopy (EGD), combined       Diagnosis: Black stools [K92.1]  BRBPR (bright red blood per rectum) [K62.5]  Diagnosis Additional Information: No value filed.    Anesthesia Type:   General     Note:    Oropharynx: oropharynx clear of all foreign objects and spontaneously breathing  Level of Consciousness: drowsy  Oxygen Supplementation: room air    Independent Airway: airway patency satisfactory and stable  Dentition: dentition unchanged  Vital Signs Stable: post-procedure vital signs reviewed and stable  Report to RN Given: handoff report given  Patient transferred to: Phase II    Handoff Report: Identifed the Patient, Identified the Reponsible Provider, Reviewed the pertinent medical history, Discussed the surgical course, Reviewed Intra-OP anesthesia mangement and issues during anesthesia, Set expectations for post-procedure period and Allowed opportunity for questions and acknowledgement of understanding  Vitals:  Vitals Value Taken Time   BP     Temp     Pulse     Resp     SpO2         Electronically Signed By: ANGELA Colunga CRNA  Cassidy 10, 2024  6:34 PM

## 2024-06-11 ENCOUNTER — TELEPHONE (OUTPATIENT)
Dept: SURGERY | Facility: CLINIC | Age: 69
End: 2024-06-11
Payer: COMMERCIAL

## 2024-06-11 DIAGNOSIS — K62.5: Primary | ICD-10-CM

## 2024-06-11 DIAGNOSIS — K62.5: ICD-10-CM

## 2024-06-11 RX ORDER — HYDROCORTISONE 2.5 %
CREAM (GRAM) TOPICAL 2 TIMES DAILY
Qty: 30 G | Refills: 0 | Status: SHIPPED | OUTPATIENT
Start: 2024-06-11

## 2024-06-11 NOTE — TELEPHONE ENCOUNTER
Prior Authorization Retail Medication Request    Medication/Dose: Key: [id number]  hydrocortisone-pramoxine (PROCTOFOAM-HC) 1-1 % rectal foam    Insurance   Primary: [employer]  Insurance ID:  [id number]      Pharmacy Information (if different than what is on RX)  Name:  [facility]  Phone:  [phone]  Fax:[phone]

## 2024-06-11 NOTE — TELEPHONE ENCOUNTER
Requested Prescriptions   Pending Prescriptions Disp Refills    hydrocortisone-pramoxine (PROCTOFOAM-HC) 1-1 % rectal foam 10 g 0     Sig: Place 1 Applicatorful rectally 3 times daily       There is no refill protocol information for this order          Last office visit: 6/6/2024 ; last virtual visit: Visit date not found with prescribing provider: Tommy Martino  Future Office Visit:      Jennifer Wu   [facility] [place]   [facility]  [phone] ( Please do not share number with patient)

## 2024-06-11 NOTE — TELEPHONE ENCOUNTER
Patient had colonoscopy 6/10/24 with Dr Martino  Hydrocortisone-pramoxine rectal foam was ordered for patient to use  [facility] sent us a refill request today.    Writer called [facility].  [facility] states this medication is not covered by patient's insurance  Cash price to patient would be $200.    Would Dr Martino like to prescribe alternative or try for prior auth?    Pharmacy suggests alternative of hydrocortisone 2.5% with rectal applicator (even if not covered this would be approximately $20)    Routed to Dr Martino:  Proctofoam not covered, $200 out of pocket.  Would you like to do prior auth with insurance company or prescribe alternative?    Alejandro UPTON [facility]

## 2024-06-12 PROCEDURE — 88305 TISSUE EXAM BY PATHOLOGIST: CPT | Mod: 26 | Performed by: PATHOLOGY

## 2024-06-18 NOTE — TELEPHONE ENCOUNTER
PA Initiation    Medication: PROCTOFOAM 1 % EX FOAM  Insurance Company: [employer] - Phone [phone] Fax [phone]  Pharmacy Filling the Rx: [facility] - [facility] - [id number] [facility]  Filling Pharmacy Phone: [phone]  Filling Pharmacy Fax:    Start Date: 6/18/2024

## 2024-06-21 NOTE — TELEPHONE ENCOUNTER
PRIOR AUTHORIZATION DENIED    Medication: PROCTOFOAM 1 % EX FOAM  Insurance Company: [employer] - Phone [phone] Fax [phone]  Denial Date: 6/21/2024  Denial Reason(s): SEE DENIAL LETTER BELOW  Appeal Information:       Patient Notified: NO

## 2024-06-22 ENCOUNTER — HOSPITAL ENCOUNTER (EMERGENCY)
Facility: CLINIC | Age: 69
Discharge: HOME | End: 2024-06-22
Attending: PHYSICIAN ASSISTANT | Admitting: PHYSICIAN ASSISTANT
Payer: COMMERCIAL

## 2024-06-22 VITALS
SYSTOLIC BLOOD PRESSURE: 149 MMHG | DIASTOLIC BLOOD PRESSURE: 87 MMHG | TEMPERATURE: 98.9 F | OXYGEN SATURATION: 99 % | HEART RATE: 72 BPM | RESPIRATION RATE: 16 BRPM

## 2024-06-22 DIAGNOSIS — K08.89 PAIN, DENTAL: ICD-10-CM

## 2024-06-22 DIAGNOSIS — K04.7 DENTAL INFECTION: ICD-10-CM

## 2024-06-22 PROCEDURE — 99213 OFFICE O/P EST LOW 20 MIN: CPT | Performed by: PHYSICIAN ASSISTANT

## 2024-06-22 PROCEDURE — 99283 EMERGENCY DEPT VISIT LOW MDM: CPT | Performed by: PHYSICIAN ASSISTANT

## 2024-06-22 PROCEDURE — G0463 HOSPITAL OUTPT CLINIC VISIT: HCPCS | Performed by: PHYSICIAN ASSISTANT

## 2024-06-22 ASSESSMENT — ENCOUNTER SYMPTOMS
FACIAL SWELLING: 1
FEVER: 0
CONSTITUTIONAL NEGATIVE: 1

## 2024-06-22 ASSESSMENT — COLUMBIA-SUICIDE SEVERITY RATING SCALE - C-SSRS
2. HAVE YOU ACTUALLY HAD ANY THOUGHTS OF KILLING YOURSELF IN THE PAST MONTH?: NO
1. IN THE PAST MONTH, HAVE YOU WISHED YOU WERE DEAD OR WISHED YOU COULD GO TO SLEEP AND NOT WAKE UP?: NO
6. HAVE YOU EVER DONE ANYTHING, STARTED TO DO ANYTHING, OR PREPARED TO DO ANYTHING TO END YOUR LIFE?: NO

## 2024-06-22 NOTE — ED PROVIDER NOTES
History     Chief Complaint   Patient presents with    Dental Pain     HPI  Ghulam Rizzo is a 68 year old male who presents with complaints of right-sided dental pain and associated facial swelling for the past couple days.  Denies neck pain/stiffness or difficulties handling secretions.  Denies gingival swelling.  Denies fevers, chills, sore throat, or cough.  Has a dentist to follow-up with but it is currently at the peak and so hoping to get antibiotics.      Allergies:  Allergies   Allergen Reactions    Levaquin [Levofloxacin] Nausea and Vomiting    Percocet [Oxycodone-Acetaminophen] Visual Disturbance     hallucinations       Problem List:    Patient Active Problem List    Diagnosis Date Noted    Recurrent major depressive disorder, in remission (H24) 12/22/2023     Priority: Medium    Vertigo 07/05/2023     Priority: Medium    Adenocarcinoma of prostate (H) 05/16/2023     Priority: Medium    Compression fracture of L4 vertebra (H) 05/09/2023     Priority: Medium    Compression fracture of L3 lumbar vertebra, with routine healing, subsequent encounter 05/09/2023     Priority: Medium    Benign essential hypertension 03/01/2023     Priority: Medium    Saccular aneurysm 02/07/2023     Priority: Medium    Cecal volvulus (H) 07/13/2022     Priority: Medium    Elevated prostate specific antigen (PSA) 02/09/2022     Priority: Medium    Other irritable bowel syndrome 11/07/2019     Priority: Medium    Screening for hypertension 11/07/2019     Priority: Medium    Proctitis 01/26/2017     Priority: Medium     Colonoscopy in 2013.       Cirrhosis of liver without ascites (H) 09/23/2015     Priority: Medium     Thought due to hepatitis C.  Follows with [facility].  Completed Harv[facility] 9/2015.      Abdominal pain, generalized 08/20/2015     Priority: Medium    Hyperlipidemia LDL goal <130 07/16/2012     Priority: Medium    Tobacco abuse 07/16/2012     Priority: Medium        Past Medical History:    Past Medical History:  She was in [place] and came back last week  Her MIL passed away  Has had a mild cough since coming back     WELL WOMAN EXAM    CHIEF COMPLAINT:  Complete well woman exam     HISTORY OF PRESENT ILLNESS:  Patient is here today for complete physical 2    The patient has not been asked about pregnancy.  Last Pap :  2018  History of abnormal Pap smear:  none  Menopause date:  none  Menopause symptoms:  none  HRT (Hormone Replacement Therapy) or hormonal contraceptive use:  none  Last mammogram:    Last bone density:  none  Last colonoscopy:  none  Menstrual problems:  none  Genitourinary issues:  none  Additional issues to discuss:  none    Past Medical History:   Diagnosis Date   • High cholesterol    • Low vitamin B12 level    • Thyroid disease        Past Surgical History:   Procedure Laterality Date   • Induced          Current Outpatient Medications   Medication Sig Dispense Refill   • pravastatin (PRAVACHOL) 20 MG tablet Take 1 tablet by mouth daily. 90 tablet 3   • cholecalciferol (VITAMIN D3) 1000 UNITS tablet Take 1,000 Units by mouth daily.     • Multiple Vitamin (MULTI-VITAMIN DAILY PO) Take  by mouth daily.     • Cyanocobalamin (B-12) 1000 MCG CAPS Take 1,000 mcg by mouth daily. 30 tablet 11     No current facility-administered medications for this visit.       ALLERGIES:  No Known Allergies    History reviewed. No pertinent family history.    Social History     Socioeconomic History   • Marital status: /Civil Union     Spouse name: Not on file   • Number of children: Not on file   • Years of education: Not on file   • Highest education level: Not on file   Occupational History   • Not on file   Tobacco Use   • Smoking status: Never Smoker   • Smokeless tobacco: Never Used   Vaping Use   • Vaping Use: never used   Substance and Sexual Activity   • Alcohol use: No   • Drug use: No   • Sexual activity: Not on file   Other Topics Concern   • Not on file   Social History Narrative    ** Merged    Diagnosis Date    Anisocoria     Asthma     Carpal tunnel syndrome     Head injury, unspecified     Hepatitis C     Obesity     Other convulsions     Other mononeuritis of upper limb     Unspecified essential hypertension        Past Surgical History:    Past Surgical History:   Procedure Laterality Date    COLECTOMY WITHOUT COLOSTOMY Right 7/13/2022    Procedure: Laparotomy, right colectomy, lysis of adhesions,;  Surgeon: Tommy Martino DO;  Location: [facility]    COLONOSCOPY      COLONOSCOPY N/A 7/18/2023    Procedure: Colonoscopy with polypectomy;  Surgeon: Octavio Aguirre MD;  Location: [facility]    COLONOSCOPY N/A 6/10/2024    Procedure: Colonoscopy;  Surgeon: Tommy Martino DO;  Location: [facility]    ESOPHAGOSCOPY, GASTROSCOPY, DUODENOSCOPY (EGD), COMBINED N/A 6/10/2024    Procedure: Esophagoscopy, gastroscopy, duodenoscopy (EGD), combined;  Surgeon: Tommy Martino DO;  Location: [facility]    GI SURGERY      HERNIA REPAIR      HERNIORRHAPHY INCISIONAL (LOCATION) N/A 7/13/2022    Procedure: primary incisional hernia repair;  Surgeon: Tommy Martino DO;  Location: [facility]    INSERT SEED MARKER / MATRIX N/A 8/30/2023    Procedure: Transrectal ultrasound guided placement of fiducials and SpaceOar;  Surgeon: Chase Blount MD;  Location:  OR    SURGICAL HISTORY OF -   4/6/87    esophagogastroduodenoscopy    SURGICAL HISTORY OF -   4/27/87    exploratory laparotomy and anterior gastropexy over a gastrostomy tube.    SURGICAL HISTORY OF -   11/13/90    esophagogastroduodenoscopy    SURGICAL HISTORY OF -   1991    L diaphragm eventration repair, fixation of stomach and colon to abd wall    SURGICAL HISTORY OF -       hiatal hernia repair    THORACIC SURGERY         Family History:    Family History   Problem Relation Age of Onset    Cerebrovascular Disease Mother     Heart Disease Brother     Heart Disease Brother        Social History:  Marital Status:    History Encounter **         No  FH of gyn/colon cancer    Lives at home  With  and 2 children     no pets     no exercise         Social Determinants of Health     Financial Resource Strain:    • Social Determinants: Financial Resource Strain: Not on file   Food Insecurity:    • Social Determinants: Food Insecurity: Not on file   Transportation Needs:    • Lack of Transportation (Medical): Not on file   • Lack of Transportation (Non-Medical): Not on file   Physical Activity:    • Days of Exercise per Week: Not on file   • Minutes of Exercise per Session: Not on file   Stress:    • Social Determinants: Stress: Not on file   Social Connections:    • Social Determinants: Social Connections: Not on file   Intimate Partner Violence:    • Social Determinants: Intimate Partner Violence Past Fear: Not on file   • Social Determinants: Intimate Partner Violence Current Fear: Not on file       REVIEW OF SYSTEMS:    CONSTITUTIONAL:  No significant weight gain or loss.  No fever or chills.  Normal nutritional habits.  ENT:  Ears:  No hearing loss or recurrent infections.  Nose:  No epistaxis, rhinorrhea, nasal congestion.  Mouth/throat:  No sores in mouth, no recurrent sore throat, hoarseness, dental problems.    RESPIRATORY:  No history of asthma, copd (chronic obstructive pulmonary disease).  Nonsmoker.  No dyspnea.  No chronic cough.  CARDIOVASCULAR:  No history of cardiac disease.  No chest pain.  No shortness of breath at rest or with exertion.  No pain in calves with ambulation.  No palpitations.  No dizziness upon standing.  GASTROINTESTINAL:  No nausea or vomiting.  No diarrhea or constipation.  No bleeding per rectum.  No history of liver disease or pancreatic disease.  No GERD (gastroesophageal reflux disease), dysphagia or odynophagia.  GENITOURINARY:  No difficulty voiding or recurrent urinary tract infections.  No history of renal disease or stones.  Regular menses without significant dysmenorrheal  [2]  Social History     Tobacco Use    Smoking status: Former     Current packs/day: 0.50     Average packs/day: 0.5 packs/day for 50.0 years (25.0 ttl pk-yrs)     Types: Cigarettes    Smokeless tobacco: Never    Tobacco comments:     started again 4/2010   Vaping Use    Vaping status: Never Used   Substance Use Topics    Alcohol use: Yes     Comment: 1-2 beers daily    Drug use: No        Medications:    amoxicillin-clavulanate (AUGMENTIN) 875-125 MG tablet  acetaminophen (TYLENOL) 500 MG tablet  amLODIPine (NORVASC) 5 MG tablet  calcitonin, salmon, (MIACALCIN) 200 UNIT/ACT nasal spray  Calcium Carbonate (CALCIUM-CARB 600 PO)  cyanocobalamin (VITAMIN B-12) 1000 MCG tablet  glycerin (LAXATIVE) 1.2 g suppository  hydrocortisone 2.5 % cream  hydrocortisone-pramoxine (PROCTOFOAM-HC) 1-1 % rectal foam  linaclotide (LINZESS) 145 MCG capsule  polyethylene glycol (MIRALAX) 17 GM/Dose powder  senna-docusate (NEGRITO-COLACE) 8.6-50 MG per tablet  sertraline (ZOLOFT) 50 MG tablet  tamsulosin (FLOMAX) 0.4 MG capsule  vitamin D3 (CHOLECALCIFEROL) 50 mcg (2000 units) tablet          Review of Systems   Constitutional: Negative.  Negative for fever.   HENT:  Positive for dental problem and facial swelling.    All other systems reviewed and are negative.      Physical Exam   BP: (!) 149/87  Pulse: 72  Temp: 98.9  F (37.2  C)  Resp: 16  SpO2: 99 %      Physical Exam  Constitutional:       General: He is not in acute distress.     Appearance: Normal appearance. He is well-developed. He is not ill-appearing, toxic-appearing or diaphoretic.   HENT:      Head: Normocephalic and atraumatic.      Right Ear: Tympanic membrane, ear canal and external ear normal.      Left Ear: Tympanic membrane, ear canal and external ear normal.      Nose: Nose normal. No congestion or rhinorrhea.      Mouth/Throat:      Lips: Pink.      Mouth: Mucous membranes are moist.      Dentition: Dental tenderness and dental caries present. No gingival swelling.       symptoms.    The patient has not been asked about pregnancy.  Pap smears normal and up-to-date.  SKIN:  No rashes or history of significant skin disease.  No history of skin cancer or pre-cancerous lesions.  HEMATOLOGIC / LYMPHATIC:  No history of anemia or significant blood dyscrasias.  No recurrent enlarged lymph nodes.  No history of hematologic or lymphatic malignancies.    ENDOCRINE:  No history of thyroid disease.  No reported hair loss, cold intolerance, significant weight changes, fatigue.  No history of diabetes.  No polydypsia or polyuria.  No history of cholesterol or triglyceride problems.    MUSCULOSKELETAL:  No joint pain or muscle weakness.  No history of significant arthritis.  No complaints of pain involving neck or back.  NEUROLOGIC:   No history of seizures or other neurological conditions.  No weakness, numbness or tingling in extremities.  No recurrent headaches.  No dizziness.  No difficulty with gait.  No history of CVA (cerebrovascular accident).  PSYCHIATRIC:  No hypersomnolence, agitation, depressed mood, anxiety symptoms, or panic symptoms.  No history of treatment for psychiatric illness.  Denies auditory or visual hallucinations.      PHYSICAL EXAM:    GENERAL:  Well-developed, well-hydrated, pleasant female in no acute distress.    Blood pressure 111/75, pulse 86, height 5' 1\" (1.549 m), weight 83.5 kg (184 lb 1.4 oz), SpO2 97 %.  HEENT/NECK:  HEAD:  Normocephalic.  EYES: PERRLA, EOMI (Pupils equal, round, reactive to light and accommodation), sclerae and conjuctivae clear, lids normal bilaterally.  EARS:  Normal pinnae, external auditory canals and tympanic membranes.  Hearing is grossly normal.  NOSE:  Normal turbinates and no significant deviation of septum.  OROPHARYNX/THROAT:  No erythema, exudates or postnasal drip.  No suspicious intraoral lesions.  Normal tonsils, palate elevates normally, uvula in midline.  NECK:  No significant anterior cervical or supraclavicular  Pharynx: Oropharynx is clear. Uvula midline. No pharyngeal swelling, oropharyngeal exudate, posterior oropharyngeal erythema or uvula swelling.      Tonsils: No tonsillar exudate or tonsillar abscesses.      Comments: Dental caries and tenderness to right upper teeth.  No evidence of dental abscess.  Mild right-sided facial swelling.    Eyes:      Conjunctiva/sclera: Conjunctivae normal.      Pupils: Pupils are equal, round, and reactive to light.   Cardiovascular:      Rate and Rhythm: Normal rate and regular rhythm.      Heart sounds: Normal heart sounds.   Pulmonary:      Effort: Pulmonary effort is normal. No respiratory distress.      Breath sounds: Normal breath sounds. No stridor. No wheezing or rales.   Musculoskeletal:      Cervical back: Full passive range of motion without pain, normal range of motion and neck supple. No rigidity. Normal range of motion.   Lymphadenopathy:      Cervical: No cervical adenopathy.   Skin:     General: Skin is warm and dry.   Neurological:      Mental Status: He is alert and oriented to person, place, and time.   Psychiatric:         Behavior: Behavior is cooperative.         ED Course        Procedures    No results found for this or any previous visit (from the past 24 hour(s)).    Medications - No data to display    Assessments & Plan (with Medical Decision Making)     Pt is a 68 year old male who presents with complaints of right-sided dental pain and associated facial swelling for the past couple days.      Pt is afebrile on arrival.  Exam as above.  Dental caries and tenderness to right upper teeth.  No evidence of dental abscess.  Mild right-sided facial swelling.  Encouraged symptomatic treatments at home.  Return precautions were reviewed.  Hand-outs were provided.    Patient was sent with Augmentin and was instructed to follow-up with a dentist within the next 2 days for continued care and management (he was given a list of local dentists to follow-up with).  He is  lymphadenopathy.  Normal sized thyroid to palpation with no identified masses.  No carotid bruits or JVD (jugular venous distention).     HEART:  Regular rate and rhythm.  Normal S1 and S2.  No murmur or extra heart sounds.  PMI (Point of maximal impulse) is not displaced.  LUNGS:  Clear to auscultation bilaterally with good respiratory effort.  ABDOMEN:  Soft, nontender, positive bowel sounds, no masses or organomegaly.  No hernias appreciated.  EXTREMITIES:  No clubbing, cyanosis, edema.  No nail abnormalities.  Has varicose veins BL legs   MUSCULOSKELETAL:  Examination of the neck, shoulders, elbows, wrists, hands, thoracolumbar spine, hips, knees, ankles and feet reveals no crepitance, deformity or swelling.  Range of motion is normal.  All joints are stable and with normal muscle tone and strength.  Gait is normal.  NEUROLOGICAL:  Cranial nerves II-XII are intact by testing.  Muscle mass is symmetric and normal, and strength is 5/5 and equal bilaterally.  DTRs (Deep tendon reflexes) and gross sensory testing to light touch are normal and symmetric in the upper and lower extremities.    SKIN:  Warm and dry without suspicious lesions or rashes.  BREASTS:  Symmetric and without dimples or nipple retraction bilaterally.  Palpation reveals no suspicious or dominant masses.  No axillary lymphadenopathy is appreciated bilaterally.    PSYCHIATRIC:  Alert and oriented.  Judgment is intact.  Mood and affect are appropriate for situation.    ASSESSMENT:  Well woman exam  Dry cough; covid tests neg; advised claritin qd    Body mass index is 34.78 kg/m².    BMI (BODY MASS INDEX) ASSESSMENT/PLAN:  Patient is obese.    Journal food intake daily       Recent Review Flowsheet Data     Date 12/23/2021    Feeling down, depressed or hopeless? 1          DEPRESSION ASSESSMENT/PLAN:  Depression screening is negative no further plan needed.      PLAN:    Fasting labs: CBC, lipid panel, TSH, 25-OH VIT D    Refills: none  Discussed  to return to the ED for persistent and/or worsening symptoms.  Patient expressed understanding of the diagnosis and plan and was discharged home.    I have reviewed the nursing notes.    I have reviewed the findings, diagnosis, plan and need for follow up with the patient.       Discharge Medication List as of 6/22/2024 11:56 AM        START taking these medications    Details   amoxicillin-clavulanate (AUGMENTIN) 875-125 MG tablet Take 1 tablet by mouth 2 times daily for 7 days, Disp-14 tablet, R-0, E-Prescribe             Final diagnoses:   Pain, dental   Dental infection       6/22/2024   [facility] EMERGENCY DEPT      Disclaimer:  This note consists of symbols derived from keyboarding, dictation and/or voice recognition software.  As a result, there may be errors in the script that have gone undetected.  Please consider this when interpreting information found in this chart.     Isela Rocha PA-C  06/22/24 [id number]     vaccines including hepatitis B, tetanus and influenza.- utd in shots  Discussed appropriate self-care including diet, exercise, daily calcium intake.- advised   Return for next physical in 1 year (Pap to be performed in 1 years assuming current Pap is normal).

## 2024-07-22 ENCOUNTER — TELEPHONE (OUTPATIENT)
Dept: SURGERY | Facility: CLINIC | Age: 69
End: 2024-07-22

## 2024-07-22 NOTE — TELEPHONE ENCOUNTER
Patient walked in to clinic as he was informed he would be contacted about scheduling a flex sig and has not been called nor understands what what he should be doing instead of the cancelled appt last week.    Clarified with patient/spouse -  he was called last week and told he does not need the appt and that person told him he would be called (in the next two days) to schedule a flex sig.    Advised that Salena stated in the results letter that if no improvement with the steroid then should discuss with Dr Martino.    Pt has not improved since starting the steroid.   Not as bad as in the beginning of the bleeding.    Please specify if pt is to have a repeat/partial colonoscopy (Flex Sig) or be seen in clinic.  When / what next step is.    Pt spouse would like the call back as she answers the phone more often than the patient and can leave a detailed message if she does not answer.    Renae THOMAS   [facility] RN

## 2024-07-25 DIAGNOSIS — K62.5 BRIGHT RED BLOOD PER RECTUM: Primary | ICD-10-CM

## 2024-08-09 ENCOUNTER — HOSPITAL ENCOUNTER (EMERGENCY)
Facility: CLINIC | Age: 69
Discharge: HOME | End: 2024-08-09
Attending: EMERGENCY MEDICINE | Admitting: EMERGENCY MEDICINE
Payer: COMMERCIAL

## 2024-08-09 VITALS
OXYGEN SATURATION: 100 % | HEIGHT: 74 IN | WEIGHT: 170 LBS | RESPIRATION RATE: 20 BRPM | DIASTOLIC BLOOD PRESSURE: 96 MMHG | HEART RATE: 67 BPM | BODY MASS INDEX: 21.82 KG/M2 | SYSTOLIC BLOOD PRESSURE: 159 MMHG | TEMPERATURE: 98.2 F

## 2024-08-09 DIAGNOSIS — L02.91 ABSCESS: ICD-10-CM

## 2024-08-09 PROCEDURE — 10060 I&D ABSCESS SIMPLE/SINGLE: CPT | Performed by: EMERGENCY MEDICINE

## 2024-08-09 PROCEDURE — 99283 EMERGENCY DEPT VISIT LOW MDM: CPT

## 2024-08-09 PROCEDURE — 10060 I&D ABSCESS SIMPLE/SINGLE: CPT

## 2024-08-09 PROCEDURE — 99283 EMERGENCY DEPT VISIT LOW MDM: CPT | Mod: 25 | Performed by: EMERGENCY MEDICINE

## 2024-08-09 RX ORDER — SULFAMETHOXAZOLE/TRIMETHOPRIM 800-160 MG
1 TABLET ORAL 2 TIMES DAILY
Qty: 10 TABLET | Refills: 0 | Status: SHIPPED | OUTPATIENT
Start: 2024-08-09 | End: 2024-09-04

## 2024-08-09 ASSESSMENT — COLUMBIA-SUICIDE SEVERITY RATING SCALE - C-SSRS
2. HAVE YOU ACTUALLY HAD ANY THOUGHTS OF KILLING YOURSELF IN THE PAST MONTH?: NO
6. HAVE YOU EVER DONE ANYTHING, STARTED TO DO ANYTHING, OR PREPARED TO DO ANYTHING TO END YOUR LIFE?: NO
1. IN THE PAST MONTH, HAVE YOU WISHED YOU WERE DEAD OR WISHED YOU COULD GO TO SLEEP AND NOT WAKE UP?: NO

## 2024-08-09 ASSESSMENT — ACTIVITIES OF DAILY LIVING (ADL)
ADLS_ACUITY_SCORE: 37
ADLS_ACUITY_SCORE: 37

## 2024-08-09 NOTE — ED TRIAGE NOTES
Pt arrives via private car with c/o lump on back of neck in hairline on left side, states has been there for several years but the past few weeks has been getting larger. Area red and tender.      Triage Assessment (Adult)       Row Name 08/09/24 1101          Triage Assessment    Airway WDL WDL        Respiratory WDL    Respiratory WDL WDL        Skin Circulation/Temperature WDL    Skin Circulation/Temperature WDL WDL        Cardiac WDL    Cardiac WDL WDL        Peripheral/Neurovascular WDL    Peripheral Neurovascular WDL WDL        Cognitive/Neuro/Behavioral WDL    Cognitive/Neuro/Behavioral WDL WDL

## 2024-08-09 NOTE — ED PROVIDER NOTES
History     Chief Complaint   Patient presents with    Mass     HPI  Ghulam Rizzo is a 68 year old male who presents for skin lesion.  Localized to back of neck on the left side.  Developed over the past several days. He does not report fever.  He does have a history of past similar episodes in the same area but usually he is able to get it to drain slightly on its own and it improves.  Does not report headache, chest pain, shortness of breath, abdominal pain, nausea, vomiting, diarrhea, or weakness.  Does not have recent antibiotic use.  No other complaints.     Allergies:  Allergies   Allergen Reactions    Levaquin [Levofloxacin] Nausea and Vomiting    Percocet [Oxycodone-Acetaminophen] Visual Disturbance     hallucinations       Problem List:    Patient Active Problem List    Diagnosis Date Noted    Recurrent major depressive disorder, in remission (H24) 12/22/2023     Priority: Medium    Vertigo 07/05/2023     Priority: Medium    Adenocarcinoma of prostate (H) 05/16/2023     Priority: Medium    Compression fracture of L4 vertebra (H) 05/09/2023     Priority: Medium    Compression fracture of L3 lumbar vertebra, with routine healing, subsequent encounter 05/09/2023     Priority: Medium    Benign essential hypertension 03/01/2023     Priority: Medium    Saccular aneurysm 02/07/2023     Priority: Medium    Cecal volvulus (H) 07/13/2022     Priority: Medium    Elevated prostate specific antigen (PSA) 02/09/2022     Priority: Medium    Other irritable bowel syndrome 11/07/2019     Priority: Medium    Screening for hypertension 11/07/2019     Priority: Medium    Proctitis 01/26/2017     Priority: Medium     Colonoscopy in 2013.       Cirrhosis of liver without ascites (H) 09/23/2015     Priority: Medium     Thought due to hepatitis C.  Follows with [facility].  Completed [facility] 9/2015.      Abdominal pain, generalized 08/20/2015     Priority: Medium    Hyperlipidemia LDL goal <130 07/16/2012     Priority: Medium     Tobacco abuse 07/16/2012     Priority: Medium        Past Medical History:    Past Medical History:   Diagnosis Date    Anisocoria     Asthma     Carpal tunnel syndrome     Head injury, unspecified     Hepatitis C     Obesity     Other convulsions     Other mononeuritis of upper limb     Unspecified essential hypertension        Past Surgical History:    Past Surgical History:   Procedure Laterality Date    COLECTOMY WITHOUT COLOSTOMY Right 7/13/2022    Procedure: Laparotomy, right colectomy, lysis of adhesions,;  Surgeon: Tommy Martino DO;  Location: [facility]    COLONOSCOPY      COLONOSCOPY N/A 7/18/2023    Procedure: Colonoscopy with polypectomy;  Surgeon: Octavio Aguirre MD;  Location: [facility]    COLONOSCOPY N/A 6/10/2024    Procedure: Colonoscopy;  Surgeon: Tommy Martino DO;  Location: [facility]    ESOPHAGOSCOPY, GASTROSCOPY, DUODENOSCOPY (EGD), COMBINED N/A 6/10/2024    Procedure: Esophagoscopy, gastroscopy, duodenoscopy (EGD), combined;  Surgeon: Tommy Martino DO;  Location: [facility]    GI SURGERY      HERNIA REPAIR      HERNIORRHAPHY INCISIONAL (LOCATION) N/A 7/13/2022    Procedure: primary incisional hernia repair;  Surgeon: Tommy Martino DO;  Location: [facility]    INSERT SEED MARKER / MATRIX N/A 8/30/2023    Procedure: Transrectal ultrasound guided placement of fiducials and SpaceOar;  Surgeon: Chase Blount MD;  Location:  OR    SURGICAL HISTORY OF -   4/6/87    esophagogastroduodenoscopy    SURGICAL HISTORY OF -   4/27/87    exploratory laparotomy and anterior gastropexy over a gastrostomy tube.    SURGICAL HISTORY OF -   11/13/90    esophagogastroduodenoscopy    SURGICAL HISTORY OF -   1991    L diaphragm eventration repair, fixation of stomach and colon to abd wall    SURGICAL HISTORY OF -       hiatal hernia repair    THORACIC SURGERY         Family History:    Family History   Problem Relation Age of Onset    Cerebrovascular Disease Mother     Heart  "Disease Brother     Heart Disease Brother        Social History:  Marital Status:   [2]  Social History     Tobacco Use    Smoking status: Former     Current packs/day: 0.50     Average packs/day: 0.5 packs/day for 50.0 years (25.0 ttl pk-yrs)     Types: Cigarettes    Smokeless tobacco: Never    Tobacco comments:     started again 4/2010   Vaping Use    Vaping status: Never Used   Substance Use Topics    Alcohol use: Yes     Comment: 1-2 beers daily    Drug use: No        Medications:    sulfamethoxazole-trimethoprim (BACTRIM DS) 800-160 MG tablet  acetaminophen (TYLENOL) 500 MG tablet  amLODIPine (NORVASC) 5 MG tablet  calcitonin, salmon, (MIACALCIN) 200 UNIT/ACT nasal spray  Calcium Carbonate (CALCIUM-CARB 600 PO)  cyanocobalamin (VITAMIN B-12) 1000 MCG tablet  glycerin (LAXATIVE) 1.2 g suppository  hydrocortisone 2.5 % cream  hydrocortisone-pramoxine (PROCTOFOAM-HC) 1-1 % rectal foam  linaclotide (LINZESS) 145 MCG capsule  polyethylene glycol (MIRALAX) 17 GM/Dose powder  senna-docusate (NEGRITO-COLACE) 8.6-50 MG per tablet  sertraline (ZOLOFT) 50 MG tablet  tamsulosin (FLOMAX) 0.4 MG capsule  vitamin D3 (CHOLECALCIFEROL) 50 mcg (2000 units) tablet          Review of Systems    Physical Exam   BP: (!) 159/96  Pulse: 67  Temp: 98.2  F (36.8  C)  Resp: 20  Height: 188 cm (6' 2\")  Weight: 77.1 kg (170 lb)  SpO2: 100 %      Physical Exam  Constitutional:       General: He is not in acute distress.     Appearance: He is well-developed.   HENT:      Head: Normocephalic and atraumatic.   Cardiovascular:      Rate and Rhythm: Normal rate.   Pulmonary:      Effort: No respiratory distress.      Breath sounds: No stridor.   Skin:     General: Skin is warm and dry.      Comments: Tender, erythematous and fluctuant area at the back of the neck just below the hairline   Neurological:      Mental Status: He is alert.         ED [facility]    PROCEDURE: " -Incision/Drainage    Date/Time: 8/9/2024 12:22 PM    Performed by: Rodriguez Priest MD  Authorized by: Rodriguez Priest MD    Risks, benefits and alternatives discussed.      LOCATION:      Type:  Abscess    Location:  Neck    Neck location:  L posterior    PRE-PROCEDURE DETAILS:     Skin preparation:  Antiseptic wash    PROCEDURE TYPE:     Complexity:  Simple    ANESTHESIA (see MAR for exact dosages):     Anesthesia method:  Local infiltration    Local anesthetic:  Bupivacaine 0.5% w/o epi    PROCEDURE DETAILS:     Needle aspiration: no      Incision types:  Single straight    Incision depth:  Dermal    Scalpel blade:  11    Wound management:  Probed and deloculated    Drainage:  Purulent    Drainage amount:  Moderate    Wound treatment:  Wound left open    Packing materials:  None    PROCEDURE    Patient Tolerance:  Patient tolerated the procedure well with no immediate complications                Critical Care time:  none               No results found for this or any previous visit (from the past 24 hour(s)).    Medications - No data to display    Assessments & Plan (with Medical Decision Making)   Differential includes cellulitis, abscess.  He remained stable.  An I&D was performed, see associated procedure note.  He will be prescribed abx.  Will follow up in the ED as needed or return sooner for worsening. The patient was in agreement with this plan.     I have reviewed the nursing notes.    I have reviewed the findings, diagnosis, plan and need for follow up with the patient.               New Prescriptions    SULFAMETHOXAZOLE-TRIMETHOPRIM (BACTRIM DS) 800-160 MG TABLET    Take 1 tablet by mouth 2 times daily       Final diagnoses:   Abscess       8/9/2024   [facility] EMERGENCY DEPT       Rodriguez Priest MD  08/09/24 [id number]

## 2024-08-13 ENCOUNTER — ANESTHESIA EVENT (OUTPATIENT)
Dept: GASTROENTEROLOGY | Facility: CLINIC | Age: 69
End: 2024-08-13
Payer: COMMERCIAL

## 2024-08-13 RX ORDER — DEXAMETHASONE SODIUM PHOSPHATE 4 MG/ML
4 INJECTION, SOLUTION INTRA-ARTICULAR; INTRALESIONAL; INTRAMUSCULAR; INTRAVENOUS; SOFT TISSUE
Status: CANCELLED | OUTPATIENT
Start: 2024-08-13

## 2024-08-13 RX ORDER — ONDANSETRON 2 MG/ML
4 INJECTION INTRAMUSCULAR; INTRAVENOUS EVERY 30 MIN PRN
Status: CANCELLED | OUTPATIENT
Start: 2024-08-13

## 2024-08-13 RX ORDER — ONDANSETRON 4 MG/1
4 TABLET, ORALLY DISINTEGRATING ORAL EVERY 30 MIN PRN
Status: CANCELLED | OUTPATIENT
Start: 2024-08-13

## 2024-08-13 NOTE — ANESTHESIA PREPROCEDURE EVALUATION
Anesthesia Pre-Procedure Evaluation    Patient: Ghulam Rizzo   MRN: [id number] : 1955        Procedure : Procedure(s):  flex sig          Past Medical History:   Diagnosis Date    Anisocoria     Asthma     Carpal tunnel syndrome         Head injury, unspecified     closed head injury 4 yrs ago    Hepatitis C     Cured by [facility] in . diagnosed in  was positive for Hepatitis C.     Obesity     2006, resolved    Other convulsions     seizure disorder due to head injury 4 yrs ago.    Other mononeuritis of upper limb     L phrenic nerve paralysis    Unspecified essential hypertension       Past Surgical History:   Procedure Laterality Date    COLECTOMY WITHOUT COLOSTOMY Right 2022    Procedure: Laparotomy, right colectomy, lysis of adhesions,;  Surgeon: Tommy Martino DO;  Location: [facility]    COLONOSCOPY      COLONOSCOPY N/A 2023    Procedure: Colonoscopy with polypectomy;  Surgeon: Octavio Aguirre MD;  Location: [facility]    COLONOSCOPY N/A 6/10/2024    Procedure: Colonoscopy;  Surgeon: Tommy Martino DO;  Location: [facility]    ESOPHAGOSCOPY, GASTROSCOPY, DUODENOSCOPY (EGD), COMBINED N/A 6/10/2024    Procedure: Esophagoscopy, gastroscopy, duodenoscopy (EGD), combined;  Surgeon: Tommy Martino DO;  Location: [facility]    GI SURGERY      HERNIA REPAIR      HERNIORRHAPHY INCISIONAL (LOCATION) N/A 2022    Procedure: primary incisional hernia repair;  Surgeon: Tommy Martino DO;  Location: [facility]    INSERT SEED MARKER / MATRIX N/A 2023    Procedure: Transrectal ultrasound guided placement of fiducials and SpaceOar;  Surgeon: Chase Blount MD;  Location:  OR    SURGICAL HISTORY OF -   87    esophagogastroduodenoscopy    SURGICAL HISTORY OF -   87    exploratory laparotomy and anterior gastropexy over a gastrostomy tube.    SURGICAL HISTORY OF -   90    esophagogastroduodenoscopy    SURGICAL HISTORY OF 1991    L  diaphragm eventration repair, fixation of stomach and colon to abd wall    SURGICAL HISTORY OF -       hiatal hernia repair    THORACIC SURGERY        Allergies   Allergen Reactions    Levaquin [Levofloxacin] Nausea and Vomiting    Percocet [Oxycodone-Acetaminophen] Visual Disturbance     hallucinations      Social History     Tobacco Use    Smoking status: Former     Current packs/day: 0.50     Average packs/day: 0.5 packs/day for 50.0 years (25.0 ttl pk-yrs)     Types: Cigarettes    Smokeless tobacco: Never    Tobacco comments:     started again 4/2010   Substance Use Topics    Alcohol use: Yes     Comment: 1-2 beers daily      Wt Readings from Last 1 Encounters:   08/09/24 77.1 kg (170 lb)        Anesthesia Evaluation            ROS/MED HX  ENT/Pulmonary:     (+)                      asthma                  Neurologic:       Cardiovascular:     (+)  hypertension- -   -  - -                                      METS/Exercise Tolerance:     Hematologic:       Musculoskeletal:       GI/Hepatic:     (+)           hepatitis  liver disease,       Renal/Genitourinary:       Endo:     (+)               Obesity,       Psychiatric/Substance Use:       Infectious Disease:       Malignancy:       Other:               OUTSIDE LABS:  CBC:   Lab Results   Component Value Date    WBC 5.5 06/06/2024    WBC 5.1 05/29/2024    HGB 13.5 06/06/2024    HGB 13.8 05/29/2024    HCT 40.8 06/06/2024    HCT 39.6 (L) 05/29/2024     06/06/2024     05/29/2024     BMP:   Lab Results   Component Value Date     05/29/2024     05/10/2024    POTASSIUM 4.7 05/29/2024    POTASSIUM 3.9 05/10/2024    CHLORIDE 103 05/29/2024    CHLORIDE 101 05/10/2024    CO2 22 05/29/2024    CO2 22 05/10/2024    BUN 11.3 05/29/2024    BUN 14.2 05/10/2024    CR 0.73 05/29/2024    CR 0.80 05/10/2024    GLC 99 05/29/2024    GLC 96 05/10/2024     COAGS:   Lab Results   Component Value Date    PTT 31 01/16/2023    INR 1.09 01/16/2023     POC:   Lab  Results   Component Value Date     (H) 04/03/2006     HEPATIC:   Lab Results   Component Value Date    ALBUMIN 4.3 05/29/2024    PROTTOTAL 7.0 05/29/2024    ALT 9 05/29/2024    AST 13 05/29/2024    ALKPHOS 97 05/29/2024    BILITOTAL 0.3 05/29/2024     OTHER:   Lab Results   Component Value Date    LACT 1.2 06/08/2023    A1C 5.0 06/23/2023    TAVIA 9.4 05/29/2024    PHOS 3.9 07/14/2022    MAG 1.9 05/10/2024    LIPASE 34 03/16/2023    TSH 4.27 (H) 06/08/2023    T4 1.27 06/08/2023    CRP <2.9 11/25/2017              Baljit Garg, ANGELA ARNOLD    I have reviewed the pertinent notes and labs in the chart from the past 30 days and (re)examined the patient.  Any updates or changes from those notes are reflected in this note.

## 2024-08-14 ENCOUNTER — HOSPITAL ENCOUNTER (OUTPATIENT)
Facility: CLINIC | Age: 69
Discharge: HOME | End: 2024-08-14
Attending: SURGERY | Admitting: SURGERY
Payer: COMMERCIAL

## 2024-08-14 ENCOUNTER — ANESTHESIA (OUTPATIENT)
Dept: GASTROENTEROLOGY | Facility: CLINIC | Age: 69
End: 2024-08-14
Payer: COMMERCIAL

## 2024-08-14 VITALS
SYSTOLIC BLOOD PRESSURE: 148 MMHG | RESPIRATION RATE: 18 BRPM | HEART RATE: 70 BPM | OXYGEN SATURATION: 100 % | TEMPERATURE: 98 F | DIASTOLIC BLOOD PRESSURE: 85 MMHG

## 2024-08-14 PROCEDURE — 45330 DIAGNOSTIC SIGMOIDOSCOPY: CPT | Mod: 73

## 2024-08-14 PROCEDURE — 999N000141 HC STATISTIC PRE-PROCEDURE NURSING ASSESSMENT: Performed by: SURGERY

## 2024-08-14 PROCEDURE — 258N000003 HC RX IP 258 OP 636: Performed by: SURGERY

## 2024-08-14 PROCEDURE — 250N000009 HC RX 250: Performed by: SURGERY

## 2024-08-14 RX ORDER — LIDOCAINE 40 MG/G
CREAM TOPICAL
Status: DISCONTINUED | OUTPATIENT
Start: 2024-08-14 | End: 2024-08-14 | Stop reason: HOSPADM

## 2024-08-14 RX ORDER — SODIUM CHLORIDE, SODIUM LACTATE, POTASSIUM CHLORIDE, CALCIUM CHLORIDE 600; 310; 30; 20 MG/100ML; MG/100ML; MG/100ML; MG/100ML
INJECTION, SOLUTION INTRAVENOUS CONTINUOUS
Status: DISCONTINUED | OUTPATIENT
Start: 2024-08-14 | End: 2024-08-14 | Stop reason: HOSPADM

## 2024-08-14 RX ORDER — ONDANSETRON 2 MG/ML
4 INJECTION INTRAMUSCULAR; INTRAVENOUS
Status: DISCONTINUED | OUTPATIENT
Start: 2024-08-14 | End: 2024-08-14 | Stop reason: HOSPADM

## 2024-08-14 RX ADMIN — SODIUM CHLORIDE, POTASSIUM CHLORIDE, SODIUM LACTATE AND CALCIUM CHLORIDE: 600; 310; 30; 20 INJECTION, SOLUTION INTRAVENOUS at 13:03

## 2024-08-14 RX ADMIN — LIDOCAINE HYDROCHLORIDE 0.1 ML: 10 INJECTION, SOLUTION EPIDURAL; INFILTRATION; INTRACAUDAL; PERINEURAL at 13:04

## 2024-08-14 ASSESSMENT — ACTIVITIES OF DAILY LIVING (ADL)
ADLS_ACUITY_SCORE: 37
ADLS_ACUITY_SCORE: 37

## 2024-08-14 NOTE — OR NURSING
in to see pt and states will reschedule procedure and have pt do a complete prep to be cleaned out. Pt wife called to  iv discontinued. Pt understanding.  states his care team will call and reschedule. Pt aware to call clinic if not hearing from dr [facility]. Discharged home with wife. 45 min spent with pt admission.

## 2024-08-15 ENCOUNTER — HOSPITAL ENCOUNTER (OUTPATIENT)
Facility: CLINIC | Age: 69
End: 2024-08-15
Attending: SURGERY | Admitting: SURGERY
Payer: COMMERCIAL

## 2024-08-15 NOTE — OR NURSING
Wife called and wants to  colonoscopy prep instructions as she cannot retrieve it off of my chart or email. Will come to [facility] to  next week.  procedure 8/28/24

## 2024-08-18 ENCOUNTER — APPOINTMENT (OUTPATIENT)
Dept: CT IMAGING | Facility: CLINIC | Age: 69
End: 2024-08-18
Attending: FAMILY MEDICINE
Payer: COMMERCIAL

## 2024-08-18 ENCOUNTER — HOSPITAL ENCOUNTER (EMERGENCY)
Facility: CLINIC | Age: 69
Discharge: HOME | End: 2024-08-18
Attending: FAMILY MEDICINE | Admitting: FAMILY MEDICINE
Payer: COMMERCIAL

## 2024-08-18 VITALS
BODY MASS INDEX: 21.82 KG/M2 | SYSTOLIC BLOOD PRESSURE: 103 MMHG | HEIGHT: 74 IN | RESPIRATION RATE: 14 BRPM | OXYGEN SATURATION: 100 % | WEIGHT: 170 LBS | HEART RATE: 79 BPM | DIASTOLIC BLOOD PRESSURE: 73 MMHG | TEMPERATURE: 97.8 F

## 2024-08-18 DIAGNOSIS — E86.0 MILD DEHYDRATION: ICD-10-CM

## 2024-08-18 DIAGNOSIS — K52.9 NON-SPECIFIC COLITIS: ICD-10-CM

## 2024-08-18 LAB
ALBUMIN SERPL BCG-MCNC: 4.1 G/DL (ref 3.5–5.2)
ALBUMIN UR-MCNC: 30 MG/DL
ALP SERPL-CCNC: 101 U/L (ref 40–150)
ALT SERPL W P-5'-P-CCNC: 8 U/L (ref 0–70)
ANION GAP SERPL CALCULATED.3IONS-SCNC: 12 MMOL/L (ref 7–15)
APPEARANCE UR: ABNORMAL
AST SERPL W P-5'-P-CCNC: 10 U/L (ref 0–45)
BASOPHILS # BLD AUTO: 0 10E3/UL (ref 0–0.2)
BASOPHILS NFR BLD AUTO: 1 %
BILIRUB SERPL-MCNC: 0.5 MG/DL
BILIRUB UR QL STRIP: NEGATIVE
BUN SERPL-MCNC: 13.5 MG/DL (ref 8–23)
CALCIUM SERPL-MCNC: 9.7 MG/DL (ref 8.8–10.4)
CHLORIDE SERPL-SCNC: 101 MMOL/L (ref 98–107)
CK SERPL-CCNC: 29 U/L (ref 39–308)
COLOR UR AUTO: ABNORMAL
CREAT SERPL-MCNC: 0.87 MG/DL (ref 0.67–1.17)
EGFRCR SERPLBLD CKD-EPI 2021: >90 ML/MIN/1.73M2
EOSINOPHIL # BLD AUTO: 0.1 10E3/UL (ref 0–0.7)
EOSINOPHIL NFR BLD AUTO: 1 %
ERYTHROCYTE [DISTWIDTH] IN BLOOD BY AUTOMATED COUNT: 13 % (ref 10–15)
GLUCOSE SERPL-MCNC: 97 MG/DL (ref 70–99)
GLUCOSE UR STRIP-MCNC: NEGATIVE MG/DL
HCO3 SERPL-SCNC: 22 MMOL/L (ref 22–29)
HCT VFR BLD AUTO: 45.5 % (ref 40–53)
HGB BLD-MCNC: 16.4 G/DL (ref 13.3–17.7)
HGB UR QL STRIP: NEGATIVE
HYALINE CASTS: 8 /LPF
IMM GRANULOCYTES # BLD: 0 10E3/UL
IMM GRANULOCYTES NFR BLD: 0 %
KETONES UR STRIP-MCNC: 5 MG/DL
LEUKOCYTE ESTERASE UR QL STRIP: NEGATIVE
LIPASE SERPL-CCNC: 23 U/L (ref 13–60)
LYMPHOCYTES # BLD AUTO: 1.1 10E3/UL (ref 0.8–5.3)
LYMPHOCYTES NFR BLD AUTO: 19 %
MCH RBC QN AUTO: 33.7 PG (ref 26.5–33)
MCHC RBC AUTO-ENTMCNC: 36 G/DL (ref 31.5–36.5)
MCV RBC AUTO: 93 FL (ref 78–100)
MONOCYTES # BLD AUTO: 0.9 10E3/UL (ref 0–1.3)
MONOCYTES NFR BLD AUTO: 16 %
MUCOUS THREADS #/AREA URNS LPF: PRESENT /LPF
NEUTROPHILS # BLD AUTO: 3.6 10E3/UL (ref 1.6–8.3)
NEUTROPHILS NFR BLD AUTO: 63 %
NITRATE UR QL: NEGATIVE
NRBC # BLD AUTO: 0 10E3/UL
NRBC BLD AUTO-RTO: 0 /100
PH UR STRIP: 5 [PH] (ref 5–7)
PLATELET # BLD AUTO: 185 10E3/UL (ref 150–450)
POTASSIUM SERPL-SCNC: 3.8 MMOL/L (ref 3.4–5.3)
PROT SERPL-MCNC: 7.3 G/DL (ref 6.4–8.3)
RBC # BLD AUTO: 4.87 10E6/UL (ref 4.4–5.9)
RBC URINE: 6 /HPF
SODIUM SERPL-SCNC: 135 MMOL/L (ref 135–145)
SP GR UR STRIP: 1.02 (ref 1–1.03)
SQUAMOUS EPITHELIAL: <1 /HPF
UROBILINOGEN UR STRIP-MCNC: NORMAL MG/DL
WBC # BLD AUTO: 5.7 10E3/UL (ref 4–11)
WBC URINE: 2 /HPF

## 2024-08-18 PROCEDURE — 99284 EMERGENCY DEPT VISIT MOD MDM: CPT | Performed by: FAMILY MEDICINE

## 2024-08-18 PROCEDURE — 96374 THER/PROPH/DIAG INJ IV PUSH: CPT | Performed by: FAMILY MEDICINE

## 2024-08-18 PROCEDURE — 76705 ECHO EXAM OF ABDOMEN: CPT | Mod: 26 | Performed by: FAMILY MEDICINE

## 2024-08-18 PROCEDURE — 96361 HYDRATE IV INFUSION ADD-ON: CPT | Performed by: FAMILY MEDICINE

## 2024-08-18 PROCEDURE — 74177 CT ABD & PELVIS W/CONTRAST: CPT

## 2024-08-18 PROCEDURE — 250N000013 HC RX MED GY IP 250 OP 250 PS 637: Performed by: FAMILY MEDICINE

## 2024-08-18 PROCEDURE — 96361 HYDRATE IV INFUSION ADD-ON: CPT

## 2024-08-18 PROCEDURE — 36415 COLL VENOUS BLD VENIPUNCTURE: CPT | Performed by: FAMILY MEDICINE

## 2024-08-18 PROCEDURE — 87507 IADNA-DNA/RNA PROBE TQ 12-25: CPT | Mod: 59

## 2024-08-18 PROCEDURE — 250N000009 HC RX 250: Performed by: FAMILY MEDICINE

## 2024-08-18 PROCEDURE — 81001 URINALYSIS AUTO W/SCOPE: CPT | Performed by: FAMILY MEDICINE

## 2024-08-18 PROCEDURE — 51798 US URINE CAPACITY MEASURE: CPT | Performed by: FAMILY MEDICINE

## 2024-08-18 PROCEDURE — 76705 ECHO EXAM OF ABDOMEN: CPT | Performed by: FAMILY MEDICINE

## 2024-08-18 PROCEDURE — 85025 COMPLETE CBC W/AUTO DIFF WBC: CPT | Performed by: FAMILY MEDICINE

## 2024-08-18 PROCEDURE — 93010 ELECTROCARDIOGRAM REPORT: CPT | Performed by: FAMILY MEDICINE

## 2024-08-18 PROCEDURE — 87324 CLOSTRIDIUM AG IA: CPT

## 2024-08-18 PROCEDURE — 93005 ELECTROCARDIOGRAM TRACING: CPT | Performed by: FAMILY MEDICINE

## 2024-08-18 PROCEDURE — 51798 US URINE CAPACITY MEASURE: CPT

## 2024-08-18 PROCEDURE — 82550 ASSAY OF CK (CPK): CPT | Performed by: FAMILY MEDICINE

## 2024-08-18 PROCEDURE — 99285 EMERGENCY DEPT VISIT HI MDM: CPT | Mod: 25

## 2024-08-18 PROCEDURE — 96374 THER/PROPH/DIAG INJ IV PUSH: CPT

## 2024-08-18 PROCEDURE — 80053 COMPREHEN METABOLIC PANEL: CPT | Performed by: FAMILY MEDICINE

## 2024-08-18 PROCEDURE — 250N000011 HC RX IP 250 OP 636: Performed by: FAMILY MEDICINE

## 2024-08-18 PROCEDURE — 83690 ASSAY OF LIPASE: CPT | Performed by: FAMILY MEDICINE

## 2024-08-18 PROCEDURE — 87493 C DIFF AMPLIFIED PROBE: CPT

## 2024-08-18 PROCEDURE — 258N000003 HC RX IP 258 OP 636: Performed by: FAMILY MEDICINE

## 2024-08-18 PROCEDURE — 99285 EMERGENCY DEPT VISIT HI MDM: CPT | Mod: 25 | Performed by: FAMILY MEDICINE

## 2024-08-18 RX ORDER — IOPAMIDOL 755 MG/ML
83 INJECTION, SOLUTION INTRAVASCULAR ONCE
Status: COMPLETED | OUTPATIENT
Start: 2024-08-18 | End: 2024-08-18

## 2024-08-18 RX ORDER — MORPHINE SULFATE 15 MG/1
7.5 TABLET ORAL EVERY 4 HOURS PRN
Status: DISCONTINUED | OUTPATIENT
Start: 2024-08-18 | End: 2024-08-18 | Stop reason: HOSPADM

## 2024-08-18 RX ORDER — SODIUM CHLORIDE 9 MG/ML
1000 INJECTION, SOLUTION INTRAVENOUS CONTINUOUS
Status: DISCONTINUED | OUTPATIENT
Start: 2024-08-18 | End: 2024-08-18 | Stop reason: HOSPADM

## 2024-08-18 RX ORDER — ONDANSETRON 2 MG/ML
4 INJECTION INTRAMUSCULAR; INTRAVENOUS EVERY 30 MIN PRN
Status: DISCONTINUED | OUTPATIENT
Start: 2024-08-18 | End: 2024-08-18 | Stop reason: HOSPADM

## 2024-08-18 RX ORDER — ACETAMINOPHEN 325 MG/1
650 TABLET ORAL ONCE
Status: COMPLETED | OUTPATIENT
Start: 2024-08-18 | End: 2024-08-18

## 2024-08-18 RX ADMIN — SODIUM CHLORIDE 61 ML: 9 INJECTION, SOLUTION INTRAVENOUS at 14:53

## 2024-08-18 RX ADMIN — SODIUM CHLORIDE 1000 ML: 9 INJECTION, SOLUTION INTRAVENOUS at 13:39

## 2024-08-18 RX ADMIN — MORPHINE SULFATE 7.5 MG: 15 TABLET ORAL at 13:39

## 2024-08-18 RX ADMIN — ACETAMINOPHEN 650 MG: 325 TABLET, FILM COATED ORAL at 13:39

## 2024-08-18 RX ADMIN — IOPAMIDOL 83 ML: 755 INJECTION, SOLUTION INTRAVENOUS at 14:53

## 2024-08-18 RX ADMIN — ONDANSETRON 4 MG: 2 INJECTION INTRAMUSCULAR; INTRAVENOUS at 13:39

## 2024-08-18 ASSESSMENT — ENCOUNTER SYMPTOMS
ABDOMINAL PAIN: 1
CHILLS: 0
CONSTIPATION: 0
VOMITING: 0
WHEEZING: 0
SINUS PRESSURE: 0
LIGHT-HEADEDNESS: 1
DIFFICULTY URINATING: 1
COUGH: 0
SORE THROAT: 0
HEADACHES: 0
PALPITATIONS: 0
SHORTNESS OF BREATH: 0
BLOOD IN STOOL: 0
FREQUENCY: 0
DYSURIA: 0
DIARRHEA: 0
DIAPHORESIS: 0
FEVER: 0
NAUSEA: 0

## 2024-08-18 ASSESSMENT — ACTIVITIES OF DAILY LIVING (ADL)
ADLS_ACUITY_SCORE: 37

## 2024-08-18 ASSESSMENT — COLUMBIA-SUICIDE SEVERITY RATING SCALE - C-SSRS
1. IN THE PAST MONTH, HAVE YOU WISHED YOU WERE DEAD OR WISHED YOU COULD GO TO SLEEP AND NOT WAKE UP?: NO
6. HAVE YOU EVER DONE ANYTHING, STARTED TO DO ANYTHING, OR PREPARED TO DO ANYTHING TO END YOUR LIFE?: NO
2. HAVE YOU ACTUALLY HAD ANY THOUGHTS OF KILLING YOURSELF IN THE PAST MONTH?: NO

## 2024-08-18 NOTE — ED PROVIDER NOTES
History     Chief Complaint   Patient presents with    Urinary Retention    Abdominal Pain     HPI  Ghulam Rizzo is a 68 year old male who presents with a history of prostate cancer status postradiation therapy and no surgery, history of cirrhosis without ascites, history of tobacco abuse quitting about 10 months ago.  He however continues to drink alcohol every day.  He has a history of prior cecal volvulus.  He arrives because he felt that he could not urinate had recently stopped Flomax because he had been feeling lightheaded.  Lightheadedness did not improve after stopping and he restarted again 3 days ago.  However he has a sensation to urinate but cannot urinate over the last day.  That was since last evening.  He has no associated fever.  No dysuria urgency frequency.  He has lower abdominal pain.  There is no associated nausea vomiting.  There is been no significant change in his stools beyond the chronic diarrhea that he experiences.  Feels more weak or tired.  There is a prescription for Septra in the record but I see no recent UA and he denies knowing what the reason for the Septra had been.    Allergies:  Allergies   Allergen Reactions    Levaquin [Levofloxacin] Nausea and Vomiting    Percocet [Oxycodone-Acetaminophen] Visual Disturbance     hallucinations       Problem List:    Patient Active Problem List    Diagnosis Date Noted    Recurrent major depressive disorder, in remission (H24) 12/22/2023     Priority: Medium    Vertigo 07/05/2023     Priority: Medium    Adenocarcinoma of prostate (H) 05/16/2023     Priority: Medium    Compression fracture of L4 vertebra (H) 05/09/2023     Priority: Medium    Compression fracture of L3 lumbar vertebra, with routine healing, subsequent encounter 05/09/2023     Priority: Medium    Benign essential hypertension 03/01/2023     Priority: Medium    Saccular aneurysm 02/07/2023     Priority: Medium    Cecal volvulus (H) 07/13/2022     Priority: Medium    Elevated  prostate specific antigen (PSA) 02/09/2022     Priority: Medium    Other irritable bowel syndrome 11/07/2019     Priority: Medium    Screening for hypertension 11/07/2019     Priority: Medium    Proctitis 01/26/2017     Priority: Medium     Colonoscopy in 2013.       Cirrhosis of liver without ascites (H) 09/23/2015     Priority: Medium     Thought due to hepatitis C.  Follows with [facility].  Completed Harvoni 9/2015.      Abdominal pain, generalized 08/20/2015     Priority: Medium    Hyperlipidemia LDL goal <130 07/16/2012     Priority: Medium    Tobacco abuse 07/16/2012     Priority: Medium        Past Medical History:    Past Medical History:   Diagnosis Date    Anisocoria     Asthma     Carpal tunnel syndrome     Head injury, unspecified     Hepatitis C     Obesity     Other convulsions     Other mononeuritis of upper limb     Unspecified essential hypertension        Past Surgical History:    Past Surgical History:   Procedure Laterality Date    COLECTOMY WITHOUT COLOSTOMY Right 7/13/2022    Procedure: Laparotomy, right colectomy, lysis of adhesions,;  Surgeon: Tommy Martino DO;  Location: [facility]    COLONOSCOPY      COLONOSCOPY N/A 7/18/2023    Procedure: Colonoscopy with polypectomy;  Surgeon: Octavio Aguirre MD;  Location: [facility]    COLONOSCOPY N/A 6/10/2024    Procedure: Colonoscopy;  Surgeon: Tommy Martino DO;  Location: [facility]    ESOPHAGOSCOPY, GASTROSCOPY, DUODENOSCOPY (EGD), COMBINED N/A 6/10/2024    Procedure: Esophagoscopy, gastroscopy, duodenoscopy (EGD), combined;  Surgeon: Tommy Martino DO;  Location: [facility]    GI SURGERY      HERNIA REPAIR      HERNIORRHAPHY INCISIONAL (LOCATION) N/A 7/13/2022    Procedure: primary incisional hernia repair;  Surgeon: Tommy Martino DO;  Location: [facility]    INSERT SEED MARKER / MATRIX N/A 8/30/2023    Procedure: Transrectal ultrasound guided placement of fiducials and SpaceOar;  Surgeon: Chase Blount MD;   Location: [facility]    SURGICAL HISTORY OF -   4/6/87    esophagogastroduodenoscopy    SURGICAL HISTORY OF -   4/27/87    exploratory laparotomy and anterior gastropexy over a gastrostomy tube.    SURGICAL HISTORY OF -   11/13/90    esophagogastroduodenoscopy    SURGICAL HISTORY OF -   1991    L diaphragm eventration repair, fixation of stomach and colon to abd wall    SURGICAL HISTORY OF -       hiatal hernia repair    THORACIC SURGERY         Family History:    Family History   Problem Relation Age of Onset    Cerebrovascular Disease Mother     Heart Disease Brother     Heart Disease Brother        Social History:  Marital Status:   [2]  Social History     Tobacco Use    Smoking status: Former     Current packs/day: 0.50     Average packs/day: 0.5 packs/day for 50.0 years (25.0 ttl pk-yrs)     Types: Cigarettes    Smokeless tobacco: Never    Tobacco comments:     started again 4/2010   Vaping Use    Vaping status: Never Used   Substance Use Topics    Alcohol use: Yes     Comment: 1-2 beers daily    Drug use: No        Medications:    acetaminophen (TYLENOL) 500 MG tablet  amLODIPine (NORVASC) 5 MG tablet  calcitonin, salmon, (MIACALCIN) 200 UNIT/ACT nasal spray  Calcium Carbonate (CALCIUM-CARB 600 PO)  cyanocobalamin (VITAMIN B-12) 1000 MCG tablet  glycerin (LAXATIVE) 1.2 g suppository  hydrocortisone 2.5 % cream  hydrocortisone-pramoxine (PROCTOFOAM-HC) 1-1 % rectal foam  linaclotide (LINZESS) 145 MCG capsule  polyethylene glycol (MIRALAX) 17 GM/Dose powder  senna-docusate (NEGRITO-COLACE) 8.6-50 MG per tablet  sertraline (ZOLOFT) 50 MG tablet  sulfamethoxazole-trimethoprim (BACTRIM DS) 800-160 MG tablet  tamsulosin (FLOMAX) 0.4 MG capsule  vitamin D3 (CHOLECALCIFEROL) 50 mcg (2000 units) tablet          Review of Systems   Constitutional:  Negative for chills, diaphoresis and fever.   HENT:  Negative for ear pain, sinus pressure and sore throat.    Eyes:  Negative for visual disturbance.   Respiratory:  Negative  "for cough, shortness of breath and wheezing.    Cardiovascular:  Negative for chest pain and palpitations.   Gastrointestinal:  Positive for abdominal pain. Negative for blood in stool, constipation, diarrhea, nausea and vomiting.   Genitourinary:  Positive for difficulty urinating. Negative for dysuria, frequency and urgency.   Skin:  Negative for rash.   Neurological:  Positive for light-headedness. Negative for headaches.   All other systems reviewed and are negative.      Physical Exam   BP: 136/88  Pulse: 82  Temp: 97.8  F (36.6  C)  Resp: 14  Height: 188 cm (6' 2\")  Weight: 77.1 kg (170 lb)  SpO2: 100 %      Physical Exam  Constitutional:       General: He is in acute distress.      Appearance: He is not diaphoretic.   Eyes:      Conjunctiva/sclera: Conjunctivae normal.   Cardiovascular:      Rate and Rhythm: Normal rate and regular rhythm.      Heart sounds: No murmur heard.  Pulmonary:      Effort: Pulmonary effort is normal. No respiratory distress.      Breath sounds: Normal breath sounds. No stridor. No wheezing or rhonchi.   Abdominal:      General: Abdomen is flat. There is no distension.      Palpations: Abdomen is soft. There is no mass.      Tenderness: There is abdominal tenderness in the right lower quadrant, suprapubic area and left lower quadrant. There is no guarding.   Musculoskeletal:      Cervical back: Neck supple.      Right lower leg: No edema.      Left lower leg: No edema.   Skin:     Coloration: Skin is not pale.      Findings: No rash.   Neurological:      General: No focal deficit present.      Mental Status: He is alert.           ED Course        Procedures    Results for orders placed during the hospital encounter of 08/18/24    POC US ABDOMEN LIMITED    Impression  [facility] Procedure Note    Limited Bedside ED Ultrasound of the Urinary Bladder:    PERFORMED BY: Dr. Demetris Dow MD  INDICATIONS:  Possible obstrucion and/or mass  PROBE: Low frequency convex probe  BODY " LOCATION:  Abdomen  FINDINGS:  Visualization of the bladder in longitudinal and transverse views demonstrated a distended state.  The bladder dimensions measured: 125 cc  INTERPRETATION:  Total calculated volume was estimated at 125 cc.  The evaluation was normal without evidence of obstruction or mass.  No bladder distention noted.  IMAGE DOCUMENTATION: Images were archived to PACs system.    IVC is visualized and is compressed - c/w dehydration.  Difficult cardiac views.                 EKG Interpretation:      Interpreted by Demetris Dow MD  EKG done at 1330 hrs. demonstrates a sinus rhythm at 75 bpm normal axis.  There is no ST change.  No T wave changes.  Normal R progression and no Q waves.  Normal intervals.  Normal conduction.  No ectopy.  Impression sinus rhythm 75 bpm with no significant acute changes.    Critical Care time:  none               Results for orders placed or performed during the hospital encounter of 08/18/24 (from the past 24 hour(s))   POC US ABDOMEN LIMITED    Impression    [facility] Procedure Note      Limited Bedside ED Ultrasound of the Urinary Bladder:    PERFORMED BY: Dr. Demetris Dow MD  INDICATIONS:  Possible obstrucion and/or mass  PROBE: Low frequency convex probe  BODY LOCATION:  Abdomen  FINDINGS:  Visualization of the bladder in longitudinal and transverse views demonstrated a distended state.  The bladder dimensions measured: 125 cc  INTERPRETATION:  Total calculated volume was estimated at 125 cc.  The evaluation was normal without evidence of obstruction or mass.  No bladder distention noted.  IMAGE DOCUMENTATION: Images were archived to PACs system.    IVC is visualized and is compressed - c/w dehydration.  Difficult cardiac views.           Medications   sodium chloride 0.9% BOLUS 1,000 mL (has no administration in time range)   sodium chloride 0.9 % infusion (has no administration in time range)   acetaminophen (TYLENOL) tablet 650 mg (has no administration  in time range)   morphine (MSIR) IR half-tab 7.5 mg (has no administration in time range)   ondansetron (ZOFRAN) injection 4 mg (has no administration in time range)       Assessments & Plan (with Medical Decision Making)   MDM; Ghulam Rizzo is a 68 year old male presenting with a history of prostate cancer and had been on Flomax long-term then stopped for a week when he thought it might be related to his lightheadedness disposition by ongoing and then found there was no change in lightheadedness and restarted the Flomax 3 days ago.  Today he did come in with a sense of inability to void since last night but found to have a contracted IVC and a bladder that only contained 125 cc of fluid.  Unclear cause for his abdominal pain now and suspect he is actually dehydrated with decreased urine output and plan for a broad-based evaluation including urinalysis chemistry panel CBC running IV fluids and if creatinine is adequate will perform CT of the abdomen pelvis with contrast otherwise without.    Findings are reassuring on CT.  There are findings of colitis but I do not know if this is due to his radiation proctitis or other causes.  I will collect stool samples have him contact Dr. Martino who plans a colonoscopy for him in a couple of weeks and determine if this needs to be delayed further because of his possible acute colitis versus prior.  He has had no significant change in his diarrhea which has been chronic.  I have made recommendations as below regarding maintaining hydration which was a key precipitating factor in his presentation.  Precautions are given for return.    I have reviewed the nursing notes.    I have reviewed the findings, diagnosis, plan and need for follow up with the patient.           Medical Decision Making  The patient's presentation was of moderate complexity (an undiagnosed new problem with uncertain prognosis).    The patient's evaluation involved:  ordering and/or review of 3+ test(s) in  this encounter (see separate area of note for details)    The patient's management necessitated moderate risk (prescription drug management including medications given in the ED).        New Prescriptions    No medications on file       Final diagnoses:   Non-specific colitis - unclear if this is related to your radiation therapy versus new. recommend ontaining stool samples at home.  let your colonoscopy doctors know about the changes on CT - in some cases scope mneeds to be delayed for 8 weeks after an episode -= but this may just be chronic change   Mild dehydration - this likely was from your recent prep - no changes on renal function - some hyaline casts in the urine. stay hydrated and return for worsrning. 64 oz clear fluid per day       8/18/2024   [facility] EMERGENCY DEPT       Demetris Dow MD  08/18/24 0559

## 2024-08-18 NOTE — DISCHARGE INSTRUCTIONS
ICD-10-CM    1. Non-specific colitis  K52.9     unclear if this is related to your radiation therapy versus new. recommend ontaining stool samples at home.  let your colonoscopy doctors know about the changes on CT - in some cases scope mneeds to be delayed for 8 weeks after an episode -= but this may just be chronic change      2. Mild dehydration  E86.0     this likely was from your recent prep - no changes on renal function - some hyaline casts in the urine. stay hydrated and return for worsrning. 64 oz clear fluid per day

## 2024-08-18 NOTE — ED TRIAGE NOTES
Pt presents with concern for urinary retention. Last time he was able to fully urinate was at midnight. Dribbling this morning and feels generalized abd discomfort. Pt has prostate cancer and has undergone radiation last fall for this. Was taking flomax but taken off due to side effect of dizziness. Pt also reports chronic problems with constipation. Last bowel movement was last night at 2200 but a small amount.      Triage Assessment (Adult)       Row Name 08/18/24 1248          Triage Assessment    Airway WDL WDL        Respiratory WDL    Respiratory WDL WDL        Skin Circulation/Temperature WDL    Skin Circulation/Temperature WDL WDL        Cardiac WDL    Cardiac WDL WDL        Peripheral/Neurovascular WDL    Peripheral Neurovascular WDL WDL        Cognitive/Neuro/Behavioral WDL    Cognitive/Neuro/Behavioral WDL WDL

## 2024-08-19 ENCOUNTER — TELEPHONE (OUTPATIENT)
Dept: NURSING | Facility: CLINIC | Age: 69
End: 2024-08-19
Payer: COMMERCIAL

## 2024-08-19 ENCOUNTER — LAB (OUTPATIENT)
Dept: LAB | Facility: CLINIC | Age: 69
End: 2024-08-19
Payer: COMMERCIAL

## 2024-08-19 DIAGNOSIS — A04.72 C. DIFFICILE COLITIS: Primary | ICD-10-CM

## 2024-08-19 DIAGNOSIS — K52.9 NON-SPECIFIC COLITIS: ICD-10-CM

## 2024-08-19 LAB
ADV 40+41 DNA STL QL NAA+NON-PROBE: NEGATIVE
ASTRO TYP 1-8 RNA STL QL NAA+NON-PROBE: NEGATIVE
C CAYETANENSIS DNA STL QL NAA+NON-PROBE: NEGATIVE
C DIFF GDH STL QL IA: POSITIVE
C DIFF TOX A+B STL QL IA: POSITIVE
C DIFF TOX B STL QL: POSITIVE
CAMPYLOBACTER DNA SPEC NAA+PROBE: NEGATIVE
CRYPTOSP DNA STL QL NAA+NON-PROBE: NEGATIVE
E COLI O157 DNA STL QL NAA+NON-PROBE: NORMAL
E HISTOLYT DNA STL QL NAA+NON-PROBE: NEGATIVE
EAEC ASTA GENE ISLT QL NAA+PROBE: NEGATIVE
EC STX1+STX2 GENES STL QL NAA+NON-PROBE: NEGATIVE
EPEC EAE GENE STL QL NAA+NON-PROBE: NEGATIVE
ETEC LTA+ST1A+ST1B TOX ST NAA+NON-PROBE: NEGATIVE
G LAMBLIA DNA STL QL NAA+NON-PROBE: NEGATIVE
NOROVIRUS GI+II RNA STL QL NAA+NON-PROBE: NEGATIVE
P SHIGELLOIDES DNA STL QL NAA+NON-PROBE: NEGATIVE
RVA RNA STL QL NAA+NON-PROBE: NEGATIVE
SALMONELLA SP RPOD STL QL NAA+PROBE: NEGATIVE
SAPO I+II+IV+V RNA STL QL NAA+NON-PROBE: NEGATIVE
SHIGELLA SP+EIEC IPAH ST NAA+NON-PROBE: NEGATIVE
V CHOLERAE DNA SPEC QL NAA+PROBE: NEGATIVE
VIBRIO DNA SPEC NAA+PROBE: NEGATIVE
Y ENTEROCOL DNA STL QL NAA+PROBE: NEGATIVE

## 2024-08-19 RX ORDER — VANCOMYCIN HYDROCHLORIDE 125 MG/1
125 CAPSULE ORAL 4 TIMES DAILY
Qty: 40 CAPSULE | Refills: 0 | Status: SHIPPED | OUTPATIENT
Start: 2024-08-19 | End: 2024-08-29

## 2024-08-19 NOTE — TELEPHONE ENCOUNTER
[facility]    Reason for call: Lab Result Notification     Lab Result (including Rx patient on, if applicable).  If culture, copy of lab report at bottom.    Lab Result: C. Diff Toxin  Component      Latest Ref Rng 8/18/2024  6:45 AM   C. difficile GDH Antigen      Negative  Positive !    C. Difficile Toxin      Negative  Positive !       Legend:  ! Abnormal  Component      Latest Ref Rng 8/18/2024  6:45 AM   C Difficile Toxin B by PCR      Negative  Positive !       Legend:  ! Abnormal    8/18/2024: No ED medication prescribed      Creatinine Level (mg/dl)   Creatinine   Date Value Ref Range Status   08/18/2024 0.87 0.67 - 1.17 mg/dL Final   06/28/2020 0.72 0.66 - 1.25 mg/dL Final    Creatinine clearance (ml/min), if applicable    Serum creatinine: 0.87 mg/dL 08/18/24 1338  Estimated creatinine clearance: 87.4 mL/min     Patient presented to [facility] ED on 8/18/2024 with urinary retention and lower abdominal pain. Patient experiences chronic diarrhea.    RN Recommendations/Instructions per [place] ED lab result protocol:   [facility] ED lab result protocol utilized: C Difficile Protocol        Instruct to start antibiotic, sent to preferred pharmacy.     Unable to reach patient/caregiver.     Left voicemail message requesting a call back to [phone] between 9 a.m. and 5:30 p.m. for patient's ED/UC lab results.    ADDENDUM: Wife, Chacha, called back. There is consent to communicate on file. Relayed C Diff results to caller. Wife states that patient is napping right now, but he is no longer experiencing urinary retention or abdominal pain. States that patient has chronic diarrhea and does not think it is any worse than usual. Advised patient start Vancomycin 125mg PO QID x 10days. Will send to [facility] per caller's request. Discussed follow-up recommendations and return precautions.       Lillie Diana RN

## 2024-08-20 ENCOUNTER — PATIENT OUTREACH (OUTPATIENT)
Dept: CARE COORDINATION | Facility: CLINIC | Age: 69
End: 2024-08-20
Payer: COMMERCIAL

## 2024-08-20 NOTE — TELEPHONE ENCOUNTER
[facility]    Reason for call: Wife has additional questions about C diff    Lab Result (including Rx patient on, if applicable).  If culture, copy of lab report at bottom.  Lab Result:   Component      Latest Ref Rng 8/18/2024  6:45 AM   C. difficile GDH Antigen      Negative  Positive !    C. Difficile Toxin      Negative  Positive !    C Difficile Toxin B by PCR      Negative  Positive !       Legend:  ! Abnormal      RN Recommendations/Instructions per [place] ED lab result protocol:   [facility] ED lab result protocol utilized: c diff  Wife (consent on file) requesting to have testing done after he completed the Vancomycin.    Per protocol, She was advised to have him followup with his PCP within 1 to 2 weeks after completing the antibiotic    Drew Kendall RN

## 2024-08-20 NOTE — PROGRESS NOTES
[facility] Contact  [facility]/Voicemail     Clinical Data: Care Coordination ED-sourced Outreach-     Outreach attempted x 2.  Left message on patient's voicemail, providing [facility]'s 24/7 scheduling and nurse triage phone number [phone] ([phone]) for questions/concerns and/or to schedule an appt with an [facility] provider.      [facility] unable to send Care Coordination introduction letter as patient does not have an active MyChart account. Clinic Care Coordination services remain available via referral if needed.    Plan: [facility] will do no further outreaches at this time.       ABDULLAHI Joyce  [facility] Care Resource [place], [facility]    *Connected Care Resource Team does NOT follow patient ongoing. Referrals are identified based on internal discharge reports and the outreach is to ensure patient has an understanding of their discharge instructions.

## 2024-08-20 NOTE — TELEPHONE ENCOUNTER
Wife, returning call from  left 8/19/24 by ED Results Team - reviewed action plan discussed yesterday with pt and wife.  Wife asking further care guidelines for C-diff; education give, pt/wife verbalize understanding.  Lalitha Willson RN  Emergency Department Results Team

## 2024-08-21 ENCOUNTER — TELEPHONE (OUTPATIENT)
Dept: FAMILY MEDICINE | Facility: CLINIC | Age: 69
End: 2024-08-21
Payer: COMMERCIAL

## 2024-08-21 NOTE — TELEPHONE ENCOUNTER
Wife calls and asks if re-testing is needed after c-diff abx complete. Adv per cdc website testing is not recommended if symptoms are resolved so will need to see how pt is doing after finishing abx. Pt has follow up appt with provider shortly after abx treatment.     They are having company and a wedding early sept so they just wanted to plan for follow up      Hernandez Curran RN

## 2024-09-04 ENCOUNTER — OFFICE VISIT (OUTPATIENT)
Dept: FAMILY MEDICINE | Facility: CLINIC | Age: 69
End: 2024-09-04
Payer: COMMERCIAL

## 2024-09-04 VITALS
HEIGHT: 74 IN | TEMPERATURE: 97 F | WEIGHT: 172.6 LBS | DIASTOLIC BLOOD PRESSURE: 70 MMHG | OXYGEN SATURATION: 98 % | HEART RATE: 71 BPM | SYSTOLIC BLOOD PRESSURE: 118 MMHG | BODY MASS INDEX: 22.15 KG/M2 | RESPIRATION RATE: 16 BRPM

## 2024-09-04 DIAGNOSIS — L02.11 ABSCESS OF NECK: ICD-10-CM

## 2024-09-04 DIAGNOSIS — A04.72 C. DIFFICILE COLITIS: Primary | ICD-10-CM

## 2024-09-04 PROCEDURE — 99213 OFFICE O/P EST LOW 20 MIN: CPT | Performed by: FAMILY MEDICINE

## 2024-09-04 PROCEDURE — G2211 COMPLEX E/M VISIT ADD ON: HCPCS | Performed by: FAMILY MEDICINE

## 2024-09-04 ASSESSMENT — ANXIETY QUESTIONNAIRES
GAD7 TOTAL SCORE: 10
8. IF YOU CHECKED OFF ANY PROBLEMS, HOW DIFFICULT HAVE THESE MADE IT FOR YOU TO DO YOUR WORK, TAKE CARE OF THINGS AT HOME, OR GET ALONG WITH OTHER PEOPLE?: NOT DIFFICULT AT ALL
GAD7 TOTAL SCORE: 10
GAD7 TOTAL SCORE: 10
7. FEELING AFRAID AS IF SOMETHING AWFUL MIGHT HAPPEN: NOT AT ALL

## 2024-09-04 ASSESSMENT — PATIENT HEALTH QUESTIONNAIRE - PHQ9
SUM OF ALL RESPONSES TO PHQ QUESTIONS 1-9: 3
10. IF YOU CHECKED OFF ANY PROBLEMS, HOW DIFFICULT HAVE THESE PROBLEMS MADE IT FOR YOU TO DO YOUR WORK, TAKE CARE OF THINGS AT HOME, OR GET ALONG WITH OTHER PEOPLE: NOT DIFFICULT AT ALL
SUM OF ALL RESPONSES TO PHQ QUESTIONS 1-9: 3

## 2024-09-04 ASSESSMENT — PAIN SCALES - GENERAL: PAINLEVEL: NO PAIN (0)

## 2024-09-12 ENCOUNTER — OFFICE VISIT (OUTPATIENT)
Dept: SURGERY | Facility: CLINIC | Age: 69
End: 2024-09-12
Payer: COMMERCIAL

## 2024-09-12 ENCOUNTER — PATIENT OUTREACH (OUTPATIENT)
Dept: CARE COORDINATION | Facility: CLINIC | Age: 69
End: 2024-09-12

## 2024-09-12 VITALS
SYSTOLIC BLOOD PRESSURE: 144 MMHG | DIASTOLIC BLOOD PRESSURE: 80 MMHG | WEIGHT: 172 LBS | OXYGEN SATURATION: 97 % | TEMPERATURE: 98.5 F | HEART RATE: 62 BPM | BODY MASS INDEX: 22.07 KG/M2 | HEIGHT: 74 IN

## 2024-09-12 DIAGNOSIS — K92.1 HEMATOCHEZIA: Primary | ICD-10-CM

## 2024-09-12 PROCEDURE — 99212 OFFICE O/P EST SF 10 MIN: CPT | Performed by: SURGERY

## 2024-09-12 ASSESSMENT — PAIN SCALES - GENERAL: PAINLEVEL: NO PAIN (0)

## 2024-09-13 ENCOUNTER — HOSPITAL ENCOUNTER (OUTPATIENT)
Facility: CLINIC | Age: 69
End: 2024-09-13
Attending: SURGERY | Admitting: SURGERY
Payer: COMMERCIAL

## 2024-09-13 ENCOUNTER — TELEPHONE (OUTPATIENT)
Dept: GASTROENTEROLOGY | Facility: CLINIC | Age: 69
End: 2024-09-13
Payer: COMMERCIAL

## 2024-09-13 NOTE — PROGRESS NOTES
Patient returns to discuss upcoming planned fulguration of angiodysplasia of the rectum.  He had recent C diff colitis, which has since resolved.  Patient was on multiple courses of antibiotics for neck abscess which has since resolved.  He is having persistent bleeding.    We reviewed colonoscopy with fulguration in detail.  This can be done in next 4-6 weeks.  With colon prep recommend probiotic after and fiber supplementation.  All questions answered.    Tommy Martino, DO on 9/13/2024 at 12:11 PM

## 2024-09-13 NOTE — TELEPHONE ENCOUNTER
"Endoscopy Scheduling Screen      What insurance is in the chart?  Other:  [facility]    Ordering/Referring Provider: Salena   (If ordering provider performs procedure, schedule with ordering provider unless otherwise instructed. )    BMI: There is no height or weight on file to calculate BMI. 22.08    Sedation Ordered  general anesthesia.   BMI<= 45 45 < BMI <= 48 48 < BMI < = 50  BMI > 50   No Restrictions No [facility]  No [facility]  [facility] with exceptions Hospital Only OR Only       Do you have a history of malignant hyperthermia?  No    (Females) Are you currently pregnant?   No     Have you been diagnosed or told you have pulmonary hypertension?   No    Do you have any heart conditions?  No     Do you have an LVAD?  No    Have you been told you have moderate to severe sleep apnea?  No.    Have you been told you have COPD, asthma, or any other lung disease?  No    Have you ever had or are you waiting for an organ transplant?  No. Continue scheduling, no site restrictions.    Have you had a stroke or transient ischemic attack (TIA aka \"mini stroke\" in the last 6 months?   No    Have you been diagnosed with or been told you have cirrhosis of the liver?   No.    Are you currently on dialysis?   No    Do you need assistance transferring?   No    BMI: There is no height or weight on file to calculate BMI.     Is patients BMI > 50?  No    BMI > 40?  No    Do you have a diagnosis of diabetes?  No    Do you take an injectable medication for weight loss or diabetes (excluding insulin)?  No    Do you take the medication Naltrexone?  No    Do you take blood thinners?  No     Prep   Are you currently on dialysis or do you have chronic kidney disease?  No    Do you have a diagnosis of cystic fibrosis (CF)?  No    On a regular basis do you go 3 -5 days between bowel movements?  No    Preferred Pharmacy:  No Pharmacies Listed    Final Scheduling Details     Procedure scheduled  Colonoscopy    Surgeon:  Salena    PER DR KOCH " "ORDER PATIENT NEEDS \"FULL PREP/MIRALAX\"    Date of procedure:  10-16-24      Location  [place] - Patient preference.    What is your communication preference for Instructions and/or Bowel Prep?   Mail/USPS    Patient Reminders:    You will receive a call from a Nurse to review instructions and health history.  This assessment must be completed prior to your procedure.  Failure to complete the Nurse assessment may result in the procedure being cancelled.       On the day of your procedure, please designate an adult(s) who can drive you home stay with you for the next 24 hours. The medicines used in the exam will make you sleepy. You will not be able to drive.       You cannot take public transportation, ride share services, or non-medical taxi service without a responsible caregiver.  Medical transport services are allowed with the requirement that a responsible caregiver will receive you at your destination.  We require that drivers and caregivers are confirmed prior to your procedure.    "

## 2024-09-15 ENCOUNTER — APPOINTMENT (OUTPATIENT)
Dept: CT IMAGING | Facility: CLINIC | Age: 69
End: 2024-09-15
Attending: FAMILY MEDICINE
Payer: COMMERCIAL

## 2024-09-15 ENCOUNTER — HOSPITAL ENCOUNTER (EMERGENCY)
Facility: CLINIC | Age: 69
Discharge: HOME | End: 2024-09-15
Attending: FAMILY MEDICINE | Admitting: FAMILY MEDICINE
Payer: COMMERCIAL

## 2024-09-15 VITALS
HEIGHT: 74 IN | TEMPERATURE: 99 F | RESPIRATION RATE: 18 BRPM | SYSTOLIC BLOOD PRESSURE: 132 MMHG | DIASTOLIC BLOOD PRESSURE: 78 MMHG | HEART RATE: 69 BPM | BODY MASS INDEX: 21.82 KG/M2 | OXYGEN SATURATION: 97 % | WEIGHT: 170 LBS

## 2024-09-15 DIAGNOSIS — K61.2 ANORECTAL ABSCESS: ICD-10-CM

## 2024-09-15 LAB
ALBUMIN SERPL BCG-MCNC: 3.9 G/DL (ref 3.5–5.2)
ALP SERPL-CCNC: 91 U/L (ref 40–150)
ALT SERPL W P-5'-P-CCNC: 8 U/L (ref 0–70)
ANION GAP SERPL CALCULATED.3IONS-SCNC: 12 MMOL/L (ref 7–15)
AST SERPL W P-5'-P-CCNC: 12 U/L (ref 0–45)
BASOPHILS # BLD AUTO: 0 10E3/UL (ref 0–0.2)
BASOPHILS NFR BLD AUTO: 1 %
BILIRUB SERPL-MCNC: 0.5 MG/DL
BUN SERPL-MCNC: 13.5 MG/DL (ref 8–23)
C DIFF GDH STL QL IA: ABNORMAL
C DIFF TOX A+B STL QL IA: ABNORMAL
C DIFF TOX B STL QL: POSITIVE
CALCIUM SERPL-MCNC: 8.9 MG/DL (ref 8.8–10.4)
CHLORIDE SERPL-SCNC: 102 MMOL/L (ref 98–107)
CREAT SERPL-MCNC: 0.74 MG/DL (ref 0.67–1.17)
CRP SERPL-MCNC: 51.32 MG/L
EGFRCR SERPLBLD CKD-EPI 2021: >90 ML/MIN/1.73M2
EOSINOPHIL # BLD AUTO: 0.1 10E3/UL (ref 0–0.7)
EOSINOPHIL NFR BLD AUTO: 2 %
ERYTHROCYTE [DISTWIDTH] IN BLOOD BY AUTOMATED COUNT: 13.9 % (ref 10–15)
GLUCOSE SERPL-MCNC: 104 MG/DL (ref 70–99)
HCO3 SERPL-SCNC: 23 MMOL/L (ref 22–29)
HCT VFR BLD AUTO: 40 % (ref 40–53)
HGB BLD-MCNC: 14.2 G/DL (ref 13.3–17.7)
IMM GRANULOCYTES # BLD: 0 10E3/UL
IMM GRANULOCYTES NFR BLD: 0 %
LIPASE SERPL-CCNC: 21 U/L (ref 13–60)
LYMPHOCYTES # BLD AUTO: 1 10E3/UL (ref 0.8–5.3)
LYMPHOCYTES NFR BLD AUTO: 13 %
MCH RBC QN AUTO: 33.1 PG (ref 26.5–33)
MCHC RBC AUTO-ENTMCNC: 35.5 G/DL (ref 31.5–36.5)
MCV RBC AUTO: 93 FL (ref 78–100)
MONOCYTES # BLD AUTO: 0.9 10E3/UL (ref 0–1.3)
MONOCYTES NFR BLD AUTO: 13 %
NEUTROPHILS # BLD AUTO: 5.3 10E3/UL (ref 1.6–8.3)
NEUTROPHILS NFR BLD AUTO: 72 %
NRBC # BLD AUTO: 0 10E3/UL
NRBC BLD AUTO-RTO: 0 /100
PLATELET # BLD AUTO: 192 10E3/UL (ref 150–450)
POTASSIUM SERPL-SCNC: 3.3 MMOL/L (ref 3.4–5.3)
PROT SERPL-MCNC: 6.8 G/DL (ref 6.4–8.3)
RBC # BLD AUTO: 4.29 10E6/UL (ref 4.4–5.9)
SODIUM SERPL-SCNC: 137 MMOL/L (ref 135–145)
WBC # BLD AUTO: 7.4 10E3/UL (ref 4–11)

## 2024-09-15 PROCEDURE — 86140 C-REACTIVE PROTEIN: CPT | Performed by: FAMILY MEDICINE

## 2024-09-15 PROCEDURE — 87324 CLOSTRIDIUM AG IA: CPT | Mod: XU | Performed by: FAMILY MEDICINE

## 2024-09-15 PROCEDURE — 36415 COLL VENOUS BLD VENIPUNCTURE: CPT | Performed by: FAMILY MEDICINE

## 2024-09-15 PROCEDURE — 96376 TX/PRO/DX INJ SAME DRUG ADON: CPT | Performed by: FAMILY MEDICINE

## 2024-09-15 PROCEDURE — 250N000013 HC RX MED GY IP 250 OP 250 PS 637: Performed by: FAMILY MEDICINE

## 2024-09-15 PROCEDURE — 87493 C DIFF AMPLIFIED PROBE: CPT | Performed by: FAMILY MEDICINE

## 2024-09-15 PROCEDURE — 99284 EMERGENCY DEPT VISIT MOD MDM: CPT | Performed by: FAMILY MEDICINE

## 2024-09-15 PROCEDURE — 74177 CT ABD & PELVIS W/CONTRAST: CPT

## 2024-09-15 PROCEDURE — 85025 COMPLETE CBC W/AUTO DIFF WBC: CPT | Performed by: FAMILY MEDICINE

## 2024-09-15 PROCEDURE — 250N000011 HC RX IP 250 OP 636: Performed by: FAMILY MEDICINE

## 2024-09-15 PROCEDURE — 96374 THER/PROPH/DIAG INJ IV PUSH: CPT | Mod: 59 | Performed by: FAMILY MEDICINE

## 2024-09-15 PROCEDURE — 80053 COMPREHEN METABOLIC PANEL: CPT | Performed by: FAMILY MEDICINE

## 2024-09-15 PROCEDURE — 96375 TX/PRO/DX INJ NEW DRUG ADDON: CPT | Performed by: FAMILY MEDICINE

## 2024-09-15 PROCEDURE — 250N000009 HC RX 250: Performed by: FAMILY MEDICINE

## 2024-09-15 PROCEDURE — 99285 EMERGENCY DEPT VISIT HI MDM: CPT | Mod: 25 | Performed by: FAMILY MEDICINE

## 2024-09-15 PROCEDURE — 83690 ASSAY OF LIPASE: CPT | Performed by: FAMILY MEDICINE

## 2024-09-15 PROCEDURE — 85004 AUTOMATED DIFF WBC COUNT: CPT | Performed by: FAMILY MEDICINE

## 2024-09-15 RX ORDER — OXYCODONE HYDROCHLORIDE 5 MG/1
5 TABLET ORAL EVERY 6 HOURS PRN
Qty: 10 TABLET | Refills: 0 | Status: SHIPPED | OUTPATIENT
Start: 2024-09-15 | End: 2024-09-16

## 2024-09-15 RX ORDER — IOPAMIDOL 755 MG/ML
80 INJECTION, SOLUTION INTRAVASCULAR ONCE
Status: COMPLETED | OUTPATIENT
Start: 2024-09-15 | End: 2024-09-15

## 2024-09-15 RX ORDER — ONDANSETRON 2 MG/ML
4 INJECTION INTRAMUSCULAR; INTRAVENOUS ONCE
Status: COMPLETED | OUTPATIENT
Start: 2024-09-15 | End: 2024-09-15

## 2024-09-15 RX ORDER — METRONIDAZOLE 500 MG/1
500 TABLET ORAL 4 TIMES DAILY
Qty: 40 TABLET | Refills: 0 | Status: SHIPPED | OUTPATIENT
Start: 2024-09-15 | End: 2024-09-16

## 2024-09-15 RX ORDER — HYDROMORPHONE HYDROCHLORIDE 1 MG/ML
0.5 INJECTION, SOLUTION INTRAMUSCULAR; INTRAVENOUS; SUBCUTANEOUS
Status: DISCONTINUED | OUTPATIENT
Start: 2024-09-15 | End: 2024-09-15 | Stop reason: HOSPADM

## 2024-09-15 RX ORDER — CIPROFLOXACIN 500 MG/1
500 TABLET, FILM COATED ORAL ONCE
Status: COMPLETED | OUTPATIENT
Start: 2024-09-15 | End: 2024-09-15

## 2024-09-15 RX ORDER — METRONIDAZOLE 500 MG/1
500 TABLET ORAL ONCE
Status: COMPLETED | OUTPATIENT
Start: 2024-09-15 | End: 2024-09-15

## 2024-09-15 RX ORDER — CIPROFLOXACIN 500 MG/1
500 TABLET, FILM COATED ORAL 2 TIMES DAILY
Qty: 20 TABLET | Refills: 0 | Status: SHIPPED | OUTPATIENT
Start: 2024-09-15 | End: 2024-09-16

## 2024-09-15 RX ADMIN — METRONIDAZOLE 500 MG: 500 TABLET ORAL at 19:29

## 2024-09-15 RX ADMIN — ONDANSETRON 4 MG: 2 INJECTION INTRAMUSCULAR; INTRAVENOUS at 16:59

## 2024-09-15 RX ADMIN — IOPAMIDOL 80 ML: 755 INJECTION, SOLUTION INTRAVENOUS at 16:45

## 2024-09-15 RX ADMIN — SODIUM CHLORIDE 100 ML: 9 INJECTION, SOLUTION INTRAVENOUS at 16:45

## 2024-09-15 RX ADMIN — HYDROMORPHONE HYDROCHLORIDE 0.5 MG: 1 INJECTION, SOLUTION INTRAMUSCULAR; INTRAVENOUS; SUBCUTANEOUS at 16:58

## 2024-09-15 RX ADMIN — CIPROFLOXACIN HYDROCHLORIDE 500 MG: 500 TABLET, FILM COATED ORAL at 19:29

## 2024-09-15 RX ADMIN — HYDROMORPHONE HYDROCHLORIDE 0.5 MG: 1 INJECTION, SOLUTION INTRAMUSCULAR; INTRAVENOUS; SUBCUTANEOUS at 19:29

## 2024-09-15 ASSESSMENT — COLUMBIA-SUICIDE SEVERITY RATING SCALE - C-SSRS
6. HAVE YOU EVER DONE ANYTHING, STARTED TO DO ANYTHING, OR PREPARED TO DO ANYTHING TO END YOUR LIFE?: NO
1. IN THE PAST MONTH, HAVE YOU WISHED YOU WERE DEAD OR WISHED YOU COULD GO TO SLEEP AND NOT WAKE UP?: NO
2. HAVE YOU ACTUALLY HAD ANY THOUGHTS OF KILLING YOURSELF IN THE PAST MONTH?: NO

## 2024-09-15 ASSESSMENT — ACTIVITIES OF DAILY LIVING (ADL)
ADLS_ACUITY_SCORE: 37

## 2024-09-15 NOTE — DISCHARGE INSTRUCTIONS
Push fluids, bland diet  Stool results with respect to C. difficile should be available in the next few days and you will get a call if they are positive.  Tylenol for pain, oxycodone for breakthrough pain.  Cipro and Flagyl x 10 days as discussed.  Please call for a clinic visit with Dr. Martino in the next 10 to 14 days.  Return to the emergency department if worse or changes.

## 2024-09-15 NOTE — ED TRIAGE NOTES
Pt here with abdominal pain for 2 days     Triage Assessment (Adult)       Row Name 09/15/24 1401          Triage Assessment    Airway WDL WDL        Respiratory WDL    Respiratory WDL WDL        Skin Circulation/Temperature WDL    Skin Circulation/Temperature WDL WDL        Cardiac WDL    Cardiac WDL WDL        Peripheral/Neurovascular WDL    Peripheral Neurovascular WDL WDL        Cognitive/Neuro/Behavioral WDL    Cognitive/Neuro/Behavioral WDL WDL

## 2024-09-15 NOTE — ED PROVIDER NOTES
History     Chief Complaint   Patient presents with    Abdominal Pain     HPI  Ghulam Rizzo is a 68 year old male, past medical history is significant for depression, adenocarcinoma of the prostate, hypertension, cecal volvulus, IBS, proctitis, cirrhosis, hyperlipidemia, tobacco abuse, presents to the emergency department concerns of 2 days of left lower quadrant abdominal pain.  History is obtained from the patient who presents with his wife with concerns of 2 days of left lower quadrant abdominal pressure always present to some extent, not worsened by eating or drinking.  The patient has had recent treatment for C. difficile colitis and has had issues with rectal bleeding that I reviewed in the EHR.  He denies any recent rectal bleeding but does note that since completing C. difficile treatment with antibiotics about 10 days ago he still having diarrhea up to 10 or 11 episodes per day small-volume.  He denies any fever chills or sweats there is no nausea or vomiting.  He is able to eat and drink normally.  He also notes some right upper quadrant abdominal discomfort does not correlate with eating or drinking.  It is absent currently.  It is similar, pressure-like, to the left lower quadrant abdominal pain.  Notes no urinary tract symptoms such as frequency urgency or dysuria.        The EHR was reviewed and they note that the patient saw Dr. Kota Martino general surgery on 9/12/2024 for discussion of colonoscopy with fulguration of the angiodysplasia in the rectum near future.      Allergies:  Allergies   Allergen Reactions    Levaquin [Levofloxacin] Nausea and Vomiting    Percocet [Oxycodone-Acetaminophen] Visual Disturbance     hallucinations       Problem List:    Patient Active Problem List    Diagnosis Date Noted    Recurrent major depressive disorder, in remission (H24) 12/22/2023     Priority: Medium    Vertigo 07/05/2023     Priority: Medium    Adenocarcinoma of prostate (H) 05/16/2023     Priority:  Medium    Compression fracture of L4 vertebra (H) 05/09/2023     Priority: Medium    Compression fracture of L3 lumbar vertebra, with routine healing, subsequent encounter 05/09/2023     Priority: Medium    Benign essential hypertension 03/01/2023     Priority: Medium    Saccular aneurysm 02/07/2023     Priority: Medium    Cecal volvulus (H) 07/13/2022     Priority: Medium    Elevated prostate specific antigen (PSA) 02/09/2022     Priority: Medium    Other irritable bowel syndrome 11/07/2019     Priority: Medium    Screening for hypertension 11/07/2019     Priority: Medium    Proctitis 01/26/2017     Priority: Medium     Colonoscopy in 2013.       Cirrhosis of liver without ascites (H) 09/23/2015     Priority: Medium     Thought due to hepatitis C.  Follows with [facility].  Completed [facility] 9/2015.      Abdominal pain, generalized 08/20/2015     Priority: Medium    Hyperlipidemia LDL goal <130 07/16/2012     Priority: Medium    Tobacco abuse 07/16/2012     Priority: Medium        Past Medical History:    Past Medical History:   Diagnosis Date    Anisocoria     Asthma     Carpal tunnel syndrome     Head injury, unspecified     Hepatitis C     Obesity     Other convulsions     Other mononeuritis of upper limb     Unspecified essential hypertension        Past Surgical History:    Past Surgical History:   Procedure Laterality Date    COLECTOMY WITHOUT COLOSTOMY Right 7/13/2022    Procedure: Laparotomy, right colectomy, lysis of adhesions,;  Surgeon: Tommy Martino DO;  Location: [facility]    COLONOSCOPY      COLONOSCOPY N/A 7/18/2023    Procedure: Colonoscopy with polypectomy;  Surgeon: Octavio Aguirre MD;  Location: [facility]    COLONOSCOPY N/A 6/10/2024    Procedure: Colonoscopy;  Surgeon: Tommy Martino DO;  Location: [facility]    ESOPHAGOSCOPY, GASTROSCOPY, DUODENOSCOPY (EGD), COMBINED N/A 6/10/2024    Procedure: Esophagoscopy, gastroscopy, duodenoscopy (EGD), combined;  Surgeon: Tommy Martino  DO Luiz;  Location: [facility]    GI SURGERY      HERNIA REPAIR      HERNIORRHAPHY INCISIONAL (LOCATION) N/A 7/13/2022    Procedure: primary incisional hernia repair;  Surgeon: Tommy Martino DO;  Location: [facility]    INSERT SEED MARKER / MATRIX N/A 8/30/2023    Procedure: Transrectal ultrasound guided placement of fiducials and [employer];  Surgeon: Chase Blount MD;  Location:  OR    SURGICAL HISTORY OF -   4/6/87    esophagogastroduodenoscopy    SURGICAL HISTORY OF -   4/27/87    exploratory laparotomy and anterior gastropexy over a gastrostomy tube.    SURGICAL HISTORY OF -   11/13/90    esophagogastroduodenoscopy    SURGICAL HISTORY OF -   1991    L diaphragm eventration repair, fixation of stomach and colon to abd wall    SURGICAL HISTORY OF -       hiatal hernia repair    THORACIC SURGERY         Family History:    Family History   Problem Relation Age of Onset    Cerebrovascular Disease Mother     Heart Disease Brother     Heart Disease Brother        Social History:  Marital Status:   [2]  Social History     Tobacco Use    Smoking status: Former     Current packs/day: 0.50     Average packs/day: 0.5 packs/day for 50.0 years (25.0 ttl pk-yrs)     Types: Cigarettes    Smokeless tobacco: Never   Vaping Use    Vaping status: Never Used   Substance Use Topics    Alcohol use: Yes     Comment: 1-2 beers daily    Drug use: No        Medications:    ciprofloxacin (CIPRO) 500 MG tablet  metroNIDAZOLE (FLAGYL) 500 MG tablet  oxyCODONE (ROXICODONE) 5 MG tablet  acetaminophen (TYLENOL) 500 MG tablet  amLODIPine (NORVASC) 5 MG tablet  calcitonin, salmon, (MIACALCIN) 200 UNIT/ACT nasal spray  Calcium Carbonate (CALCIUM-CARB 600 PO)  cyanocobalamin (VITAMIN B-12) 1000 MCG tablet  glycerin (LAXATIVE) 1.2 g suppository  hydrocortisone 2.5 % cream  hydrocortisone-pramoxine (PROCTOFOAM-HC) 1-1 % rectal foam  linaclotide (LINZESS) 145 MCG capsule  polyethylene glycol (MIRALAX) 17 GM/Dose  "powder  senna-docusate (NEGRITO-COLACE) 8.6-50 MG per tablet  sertraline (ZOLOFT) 50 MG tablet  tamsulosin (FLOMAX) 0.4 MG capsule  vitamin D3 (CHOLECALCIFEROL) 50 mcg (2000 units) tablet          Review of Systems   All other systems reviewed and are negative.      Physical Exam   BP: (!) 147/81  Pulse: 73  Temp: 98.8  F (37.1  C)  Resp: 20  Height: 188 cm (6' 2\")  Weight: 77.1 kg (170 lb)  SpO2: 100 %      Physical Exam  Vitals and nursing note reviewed.   Constitutional:       General: He is not in acute distress.     Appearance: He is well-developed and normal weight. He is not ill-appearing.   HENT:      Head: Normocephalic and atraumatic.      Mouth/Throat:      Mouth: Mucous membranes are moist.      Pharynx: Oropharynx is clear.   Eyes:      Extraocular Movements: Extraocular movements intact.      Pupils: Pupils are equal, round, and reactive to light.   Cardiovascular:      Rate and Rhythm: Normal rate and regular rhythm.      Heart sounds: Normal heart sounds.   Pulmonary:      Effort: Pulmonary effort is normal.      Breath sounds: Normal breath sounds.   Abdominal:      Comments: Soft bowel sounds present, hyperactive.  Multiple surgical scars.  Tender left lower quadrant, suprapubic area and right lower quadrant with voluntary guarding difficult to examine.     Skin:     General: Skin is warm and dry.      Capillary Refill: Capillary refill takes less than 2 seconds.   Neurological:      General: No focal deficit present.      Mental Status: He is alert.   Psychiatric:         Mood and Affect: Mood normal.         Behavior: Behavior normal.         ED Course        Procedures              Results for orders placed or performed during the hospital encounter of 09/15/24 (from the past 24 hour(s))   CBC with platelets, differential    Narrative    The following orders were created for panel order CBC with platelets, differential.  Procedure                               Abnormality         Status             "         ---------                               -----------         ------                     CBC with platelets and d...[[id number]]  Abnormal            Final result                 Please view results for these tests on the individual orders.   Comprehensive metabolic panel   Result Value Ref Range    Sodium 137 135 - 145 mmol/L    Potassium 3.3 (L) 3.4 - 5.3 mmol/L    Carbon Dioxide (CO2) 23 22 - 29 mmol/L    Anion Gap 12 7 - 15 mmol/L    Urea Nitrogen 13.5 8.0 - 23.0 mg/dL    Creatinine 0.74 0.67 - 1.17 mg/dL    GFR Estimate >90 >60 mL/min/1.73m2    Calcium 8.9 8.8 - 10.4 mg/dL    Chloride 102 98 - 107 mmol/L    Glucose 104 (H) 70 - 99 mg/dL    Alkaline Phosphatase 91 40 - 150 U/L    AST 12 0 - 45 U/L    ALT 8 0 - 70 U/L    Protein Total 6.8 6.4 - 8.3 g/dL    Albumin 3.9 3.5 - 5.2 g/dL    Bilirubin Total 0.5 <=1.2 mg/dL   CBC with platelets and differential   Result Value Ref Range    WBC Count 7.4 4.0 - 11.0 10e3/uL    RBC Count 4.29 (L) 4.40 - 5.90 10e6/uL    Hemoglobin 14.2 13.3 - 17.7 g/dL    Hematocrit 40.0 40.0 - 53.0 %    MCV 93 78 - 100 fL    MCH 33.1 (H) 26.5 - 33.0 pg    MCHC 35.5 31.5 - 36.5 g/dL    RDW 13.9 10.0 - 15.0 %    Platelet Count 192 150 - 450 10e3/uL    % Neutrophils 72 %    % Lymphocytes 13 %    % Monocytes 13 %    % Eosinophils 2 %    % Basophils 1 %    % Immature Granulocytes 0 %    NRBCs per 100 WBC 0 <1 /100    Absolute Neutrophils 5.3 1.6 - 8.3 10e3/uL    Absolute Lymphocytes 1.0 0.8 - 5.3 10e3/uL    Absolute Monocytes 0.9 0.0 - 1.3 10e3/uL    Absolute Eosinophils 0.1 0.0 - 0.7 10e3/uL    Absolute Basophils 0.0 0.0 - 0.2 10e3/uL    Absolute Immature Granulocytes 0.0 <=0.4 10e3/uL    Absolute NRBCs 0.0 10e3/uL   Lipase   Result Value Ref Range    Lipase 21 13 - 60 U/L   CRP Inflammation   Result Value Ref Range    CRP Inflammation 51.32 (H) <5.00 mg/L   CT Abdomen Pelvis w Contrast    Narrative    EXAM: CT ABDOMEN PELVIS W CONTRAST  LOCATION: [facility]  CENTER  DATE: 9/15/2024    INDICATION: Left lower quadrant abdominal pain, diarrhea.  COMPARISON: CT 8/18/2024.  TECHNIQUE: CT scan of the abdomen and pelvis was performed following injection of IV contrast. Multiplanar reformats were obtained. Dose reduction techniques were used.  CONTRAST: 80 mL Isovue 370.    FINDINGS:   LOWER CHEST: Stable elevated left hemidiaphragm.    HEPATOBILIARY: Stable hepatic cysts. No acute liver abnormality identified. No visible gallstones.    PANCREAS: Normal.    SPLEEN: Incidental benign calcifications.    ADRENAL GLANDS: Normal.    KIDNEYS/BLADDER: No significant mass, stones, or hydronephrosis. There are simple or benign cysts. No follow up is needed. Some wall thickening of the urinary bladder appears stable.    BOWEL: Again seen is wall thickening with edema involving the distal colon including the sigmoid colon, and there is also involvement of the rectum with adjacent mild edema. Between the anorectal junction and the posterior prostate is a tiny hypodensity   that is approximately 1.5 x 0.5 cm, series 3 image 234 with some minimal peripheral wall enhancement. No bowel obstruction. Postoperative changes again noted. Appendix not seen.    LYMPH NODES: Normal.    VASCULATURE: Scattered vascular calcifications.    PELVIC ORGANS: See above bowel section.    MUSCULOSKELETAL: Spine degenerative changes. Mild height loss at L3 vertebral body. This appears stable.      Impression    IMPRESSION:   1.  Inflammatory wall thickening involving the sigmoid colon and rectum again noted consistent with distal colitis and proctitis. There is adjacent edema. A tiny potential locule of fluid between the anorectal junction and the prostate is suggested and   this could be a small abscess versus reactive fluid. It is only 1.5 cm.  2.  Mild wall thickening of the urinary bladder again noted. Correlate clinically with any evidence of cystitis.  3.  No other acute abnormality.   6:40 PM  Given the  finding of a small potential abscess identified on CT in the anorectal junction area I spoke with Dr. Frederick for general surgery regarding this patient.  We note that he has no fever and no white count.  Vital sign stability.  Surgery felt it would be probably appropriate to treat this patient with antibiotics for this and have him follow-up with Dr. Martino.  Will plan on putting him on Cipro and Flagyl which surgery felt would be appropriate for this.    6:57 PM  Of the above was discussed in the room with the patient and disposition home was felt to be acceptable.  He requested pain medications and given the time of day I sent an order for oxycodone to the [facility].  He will be given the first dose of Cipro and Flagyl here tonight and I have sent the balance of the prescription to [facility] at his request to  tomorrow.      Medications   HYDROmorphone (PF) (DILAUDID) injection 0.5 mg (0.5 mg Intravenous $Given 9/15/24 1658)   ciprofloxacin (CIPRO) tablet 500 mg (has no administration in time range)   metroNIDAZOLE (FLAGYL) tablet 500 mg (has no administration in time range)   iopamidol (ISOVUE-370) solution 80 mL (80 mLs Intravenous $Given 9/15/24 1645)   sodium chloride 0.9 % bag 500mL for CT scan flush use (100 mLs Intravenous $Given 9/15/24 1645)   ondansetron (ZOFRAN) injection 4 mg (4 mg Intravenous $Given 9/15/24 1659)       Assessments & Plan (with Medical Decision Making)   Assessment and plan with medical decision making at the time stamp above.      Disclaimer: This note consists of symbols derived from keyboarding, dictation and/or voice recognition software. As a result, there may be errors in the script that have gone undetected. Please consider this when interpreting information found in this chart.            I have reviewed the nursing notes.    I have reviewed the findings, diagnosis, plan and need for follow up with the patient.          New Prescriptions    CIPROFLOXACIN  (CIPRO) 500 MG TABLET    Take 1 tablet (500 mg) by mouth 2 times daily for 10 days.    METRONIDAZOLE (FLAGYL) 500 MG TABLET    Take 1 tablet (500 mg) by mouth 4 times daily for 10 days.    OXYCODONE (ROXICODONE) 5 MG TABLET    Take 1 tablet (5 mg) by mouth every 6 hours as needed for severe pain.       Final diagnoses:   Anorectal abscess - Possible small abscess identified on CT       9/15/2024   [facility] EMERGENCY DEPT       Jhonatan Gonzalez MD  09/15/24 4135

## 2024-09-16 ENCOUNTER — TELEPHONE (OUTPATIENT)
Dept: NURSING | Facility: CLINIC | Age: 69
End: 2024-09-16
Payer: COMMERCIAL

## 2024-09-16 DIAGNOSIS — A04.72 C. DIFFICILE COLITIS: Primary | ICD-10-CM

## 2024-09-16 RX ORDER — OXYCODONE HYDROCHLORIDE 5 MG/1
5 TABLET ORAL EVERY 6 HOURS PRN
Qty: 10 TABLET | Refills: 0 | Status: SHIPPED | OUTPATIENT
Start: 2024-09-16

## 2024-09-16 RX ORDER — METRONIDAZOLE 500 MG/1
500 TABLET ORAL 4 TIMES DAILY
Qty: 40 TABLET | Refills: 0 | Status: SHIPPED | OUTPATIENT
Start: 2024-09-16 | End: 2024-09-27

## 2024-09-16 RX ORDER — CIPROFLOXACIN 500 MG/1
500 TABLET, FILM COATED ORAL 2 TIMES DAILY
Qty: 20 TABLET | Refills: 0 | Status: SHIPPED | OUTPATIENT
Start: 2024-09-16 | End: 2024-09-27

## 2024-09-16 NOTE — TELEPHONE ENCOUNTER
[facility]    Reason for call: Lab Result Notification     Lab Result (including Rx patient on, if applicable).  If culture, copy of lab report at bottom.  Lab Result:   Component      Latest Ref Rng 9/15/2024  3:19 PM   C. difficile GDH Antigen      Negative  Invalid ! (Invalid)    C. Difficile Toxin      Negative  Invalid ! (Invalid)    C Difficile Toxin B by PCR      Negative  Positive !       Legend:  ! (Invalid) Invalid Result  ! Abnormal    Creatinine Level (mg/dl)   Creatinine   Date Value Ref Range Status   09/15/2024 0.74 0.67 - 1.17 mg/dL Final   06/28/2020 0.72 0.66 - 1.25 mg/dL Final    Creatinine clearance (ml/min), if applicable    Serum creatinine: 0.74 mg/dL 09/15/24 1418  Estimated creatinine clearance: 102.7 mL/min     ED Symptoms: Presented to the ED with 2 days of lower abdominal pressure and diarrhea (10 episodes per day). Patient was treated for C-diff about 10 days ago.     Current Symptoms: Unable to assess.     RN Recommendations/Instructions per [place] ED lab result protocol:   [facility] ED lab result protocol utilized: C-diff    Unable to reach patient/caregiver.     Left voicemail message requesting a call back to [phone] between 9 a.m. and 5:30 p.m. for patient's ED/UC lab results.    Letter pended to be sent via USPS mail.     ALEX LARSEN RN
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
## 2024-09-20 ENCOUNTER — TELEPHONE (OUTPATIENT)
Dept: SURGERY | Facility: CLINIC | Age: 69
End: 2024-09-20
Payer: COMMERCIAL

## 2024-09-20 NOTE — TELEPHONE ENCOUNTER
"[facility] Call Center    Phone Message    May a detailed message be left on voicemail: yes     Reason for Call: Other: Chacha is calling in asking for a call back from the Pt's care team. She states that the Pt is still having issues following his recent ED visit and would like to discuss this with Dr. Martino's team as he is still having stomach issues but cannot take the pain medications he was prescribed as they made him feel \"woozy.\" Please call back as soon as possible to discuss.     Action Taken: Message routed to:  Other: [facility]    Travel Screening: Not Applicable     Date of Service:                                                                      "

## 2024-09-20 NOTE — TELEPHONE ENCOUNTER
Per Dr Martino;    Will be managed surgical (upcoming planned fulguration of angiodysplasia of the rectum. )  by Dr Martino .    Patient should be managed medically by PCP office for ongoing C-Diff symptoms (Dr Rutledge has been treating)  and pt was prescribed Cipro.  His medical diagnosis that could also be playing a role in not feeling well and for possible alternative Pain medication as he was given Oxycodone (listed Percocet on allergies).  Lab abnormalities in ER labs-  pt drinking Gatorade and trying to keep up with eating and drinking although stomach issues may need additional nutrition options for other health diagnosis.    Wife states understanding and will reach out to PCP office for post ER appt.    Renae THOMAS   [facility]

## 2024-09-23 ENCOUNTER — VIRTUAL VISIT (OUTPATIENT)
Dept: FAMILY MEDICINE | Facility: CLINIC | Age: 69
End: 2024-09-23
Payer: COMMERCIAL

## 2024-09-23 DIAGNOSIS — A04.72 C. DIFFICILE COLITIS: Primary | ICD-10-CM

## 2024-09-23 DIAGNOSIS — K61.2 ANORECTAL ABSCESS: ICD-10-CM

## 2024-09-23 DIAGNOSIS — K92.1 BLOOD IN STOOL: ICD-10-CM

## 2024-09-23 PROCEDURE — 99442 PR PHYSICIAN TELEPHONE EVALUATION 11-20 MIN: CPT | Mod: 93 | Performed by: FAMILY MEDICINE

## 2024-09-23 NOTE — PROGRESS NOTES
Ghulam is a 68 year old who is being evaluated via a billable telephone visit.    How would you like to obtain your AVS? MyChart  If the video visit is dropped, the invitation should be resent by: Text to cell phone: [phone]  Will anyone else be joining your video visit? No  Originating Location (pt. Location): Home    Distant Location (provider location):  On-site    Assessment & Plan     C. difficile colitis  Anorectal abscess  Blood in stool   -- Currently on ciprofloxacin and metronidazole for his anorectal abscess and C. difficile infection.  He is only having 2-3 bowel movements per day.  Abdominal pain is improving.  He will plan to complete the 10-day course of his antibiotics with last dose on 9/25.  He will follow-up with myself in person a couple days after this to ensure that he continues to improve.  Reasons to present to the ED discussed.  All questions answered.    The risks, benefits and treatment options of prescribed medications or other treatments have been discussed with the patient. The patient verbalized their understanding and should call or follow up if no improvement or if they develop further problems.      MED REC REQUIRED  Post Medication Reconciliation Status: discharge medications reconciled and changed, per note/orders      Subjective   Ghulam is a 68 year old, presenting for the following health issues:  ER F/U        9/23/2024     1:49 PM   Additional Questions   Roomed by Ladan Alvarez CMA   Accompanied by Felecia - wife     HPI     ED/UC Followup:    Facility:  [facility]   Date of visit: 9/15/2024  Reason for visit: Abdominal Pain, Anorectal abscess  Current Status: he is feeling a little better, he is still having a little upset stomach and states he is dizzy from the medication he was given in the ER. Unsure of which one is making him dizzy.  He is no longer taking oxycodone, he is using tylenol.       Prescribed ciprofloxacin and metronidazole on 9/15  for a 10 day course.     Continues to have issues with diarrhea, having 2-3 episodes per day. Improved from prior.     Occasionally will have some blood in the stool. Has met with general surgery.   Pain has improved since being seen in the ER.             Objective           Vitals:  No vitals were obtained today due to virtual visit.    Physical Exam   General: Alert and no distress //Respiratory: No audible wheeze, cough, or shortness of breath // Psychiatric:  Appropriate affect, tone, and pace of words            Phone call duration: 15 minutes  Signed Electronically by: Jamie Rutledge DO

## 2024-09-27 ENCOUNTER — OFFICE VISIT (OUTPATIENT)
Dept: FAMILY MEDICINE | Facility: CLINIC | Age: 69
End: 2024-09-27
Payer: COMMERCIAL

## 2024-09-27 VITALS
RESPIRATION RATE: 12 BRPM | TEMPERATURE: 97.3 F | HEIGHT: 74 IN | WEIGHT: 167.8 LBS | BODY MASS INDEX: 21.53 KG/M2 | OXYGEN SATURATION: 100 % | HEART RATE: 67 BPM | SYSTOLIC BLOOD PRESSURE: 130 MMHG | DIASTOLIC BLOOD PRESSURE: 78 MMHG

## 2024-09-27 DIAGNOSIS — K92.1 BLOOD IN STOOL: ICD-10-CM

## 2024-09-27 DIAGNOSIS — K61.2 ANORECTAL ABSCESS: ICD-10-CM

## 2024-09-27 DIAGNOSIS — R19.5 LOOSE STOOLS: ICD-10-CM

## 2024-09-27 DIAGNOSIS — R10.84 ABDOMINAL PAIN, GENERALIZED: ICD-10-CM

## 2024-09-27 DIAGNOSIS — A49.8 RECURRENT CLOSTRIDIOIDES DIFFICILE INFECTION: Primary | ICD-10-CM

## 2024-09-27 PROCEDURE — 99213 OFFICE O/P EST LOW 20 MIN: CPT | Performed by: FAMILY MEDICINE

## 2024-09-27 ASSESSMENT — PAIN SCALES - GENERAL: PAINLEVEL: NO PAIN (0)

## 2024-09-27 NOTE — PROGRESS NOTES
Assessment & Plan     Recurrent Clostridioides difficile infection  Abdominal pain, generalized  Blood in stool  Anorectal abscess  -- scheduled for flex sig on 10/16 for further evaluation of the blood in the stool and anorectal abscess.   -- continues to have intermittent lower abdominal discomfort after eating.   -- having 3 bowel movements per day. Formed stool today.   -- 2 recent infections of C. Diff.  -- multiple prior abdominal surgeries.   -- will refer to GI for his recurrent c. Diff infections, abdominal discomfort.   -- have patient start on citrucel powder daily for fiber supplementation.   - Adult GI  Referral - Consult Only    The risks, benefits and treatment options of prescribed medications or other treatments have been discussed with the patient. The patient verbalized their understanding and should call or follow up if no improvement or if they develop further problems.      MED REC REQUIRED  Post Medication Reconciliation Status: discharge medications reconciled and changed, per note/orders      Lamar Parmar is a 69 year old, presenting for the following health issues:  ER F/U (9/15  c diff and anorectal abscess/)        9/27/2024     2:13 PM   Additional Questions   Roomed by Olivier DRAKE CMA   Accompanied by self     HPI       ED/UC Followup:    Facility:  [facility]   Date of visit: 9/15/24   Reason for visit: Anorectal abscess   Current Status: gotten better, but still having low abdomen pain , comes and goes , gets worse after eating , cramping pain . He also has  Diarrhea  ongoing - loose stools .       CT abdomen pelvis with contrast on 9/15/2024  IMPRESSION:   1.  Inflammatory wall thickening involving the sigmoid colon and rectum again noted consistent with distal colitis and proctitis. There is adjacent edema. A tiny potential locule of fluid between the anorectal junction and the prostate is suggested and   this could be a small abscess versus reactive  "fluid. It is only 1.5 cm.  2.  Mild wall thickening of the urinary bladder again noted. Correlate clinically with any evidence of cystitis.  3.  No other acute abnormality.      Reports had a formed stool today.   Still having blood in the stool at times.   Having 3 small bowel movements per day.   Completed course of ciprofloxacin and metronidazole.   Scheduled for flex sig on 10/16  Occasionally will get some lower abdominal pain with eating. No upper GI pain after eating.   No fevers.   No urinary issues.   Multiple prior GI surgeries in the past.       Review of Systems  Constitutional, HEENT, cardiovascular, pulmonary, gi and gu systems are negative, except as otherwise noted.      Objective    /78 (BP Location: Right arm, Patient Position: Sitting, Cuff Size: Adult Regular)   Pulse 67   Temp 97.3  F (36.3  C) (Tympanic)   Resp 12   Ht 1.88 m (6' 2\")   Wt 76.1 kg (167 lb 12.8 oz)   SpO2 100%   BMI 21.54 kg/m    Body mass index is 21.54 kg/m .  Physical Exam   General: alert, cooperative, no acute distress   CV: RRR, no murmur  Resp: non-labored breathing, clear to auscultation, no wheezing or rales   Abdomen: non-tender. No rebound or guarding.   Extremities: No peripheral edema, calves non-tender.       Signed Electronically by: Jamie Rutledge DO    "

## 2024-09-27 NOTE — PATIENT INSTRUCTIONS
Follow up with Dr. Martino for the colonoscopy.     Follow up with Gastroenterology for the recurrent C. Diff.       Citrucel powder: 2 g (1 heaping tablespoon) in 8 oz (240 mL) of cold water; increase as needed by 1 heaping tablespoon up to 3 times/day    Start on citrucel for fiber supplementation.

## 2024-10-13 ENCOUNTER — APPOINTMENT (OUTPATIENT)
Dept: CT IMAGING | Facility: CLINIC | Age: 69
End: 2024-10-13
Attending: EMERGENCY MEDICINE
Payer: COMMERCIAL

## 2024-10-13 ENCOUNTER — HOSPITAL ENCOUNTER (EMERGENCY)
Facility: CLINIC | Age: 69
Discharge: HOME | End: 2024-10-13
Attending: EMERGENCY MEDICINE | Admitting: EMERGENCY MEDICINE
Payer: COMMERCIAL

## 2024-10-13 VITALS
TEMPERATURE: 97.5 F | RESPIRATION RATE: 18 BRPM | SYSTOLIC BLOOD PRESSURE: 142 MMHG | OXYGEN SATURATION: 98 % | BODY MASS INDEX: 22 KG/M2 | HEIGHT: 73 IN | WEIGHT: 166 LBS | HEART RATE: 61 BPM | DIASTOLIC BLOOD PRESSURE: 98 MMHG

## 2024-10-13 DIAGNOSIS — Z86.19 H/O CLOSTRIDIUM DIFFICILE INFECTION: ICD-10-CM

## 2024-10-13 DIAGNOSIS — R10.84 GENERALIZED ABDOMINAL PAIN: ICD-10-CM

## 2024-10-13 DIAGNOSIS — R19.7 DIARRHEA, UNSPECIFIED TYPE: ICD-10-CM

## 2024-10-13 LAB
ADV 40+41 DNA STL QL NAA+NON-PROBE: NEGATIVE
ALBUMIN SERPL BCG-MCNC: 3.7 G/DL (ref 3.5–5.2)
ALBUMIN UR-MCNC: NEGATIVE MG/DL
ALP SERPL-CCNC: 65 U/L (ref 40–150)
ALT SERPL W P-5'-P-CCNC: 9 U/L (ref 0–70)
ANION GAP SERPL CALCULATED.3IONS-SCNC: 11 MMOL/L (ref 7–15)
APPEARANCE UR: CLEAR
AST SERPL W P-5'-P-CCNC: 14 U/L (ref 0–45)
ASTRO TYP 1-8 RNA STL QL NAA+NON-PROBE: NEGATIVE
BASOPHILS # BLD AUTO: 0 10E3/UL (ref 0–0.2)
BASOPHILS NFR BLD AUTO: 1 %
BILIRUB SERPL-MCNC: 0.4 MG/DL
BILIRUB UR QL STRIP: NEGATIVE
BUN SERPL-MCNC: 8.8 MG/DL (ref 8–23)
C CAYETANENSIS DNA STL QL NAA+NON-PROBE: NEGATIVE
C DIFF TOX B STL QL: NEGATIVE
CALCIUM SERPL-MCNC: 8.7 MG/DL (ref 8.8–10.4)
CAMPYLOBACTER DNA SPEC NAA+PROBE: NEGATIVE
CHLORIDE SERPL-SCNC: 105 MMOL/L (ref 98–107)
COLOR UR AUTO: YELLOW
CREAT SERPL-MCNC: 0.78 MG/DL (ref 0.67–1.17)
CRYPTOSP DNA STL QL NAA+NON-PROBE: NEGATIVE
E COLI O157 DNA STL QL NAA+NON-PROBE: ABNORMAL
E HISTOLYT DNA STL QL NAA+NON-PROBE: NEGATIVE
EAEC ASTA GENE ISLT QL NAA+PROBE: NEGATIVE
EC STX1+STX2 GENES STL QL NAA+NON-PROBE: NEGATIVE
EGFRCR SERPLBLD CKD-EPI 2021: >90 ML/MIN/1.73M2
EOSINOPHIL # BLD AUTO: 0.1 10E3/UL (ref 0–0.7)
EOSINOPHIL NFR BLD AUTO: 2 %
EPEC EAE GENE STL QL NAA+NON-PROBE: NEGATIVE
ERYTHROCYTE [DISTWIDTH] IN BLOOD BY AUTOMATED COUNT: 13.3 % (ref 10–15)
ETEC LTA+ST1A+ST1B TOX ST NAA+NON-PROBE: NEGATIVE
G LAMBLIA DNA STL QL NAA+NON-PROBE: NEGATIVE
GLUCOSE SERPL-MCNC: 95 MG/DL (ref 70–99)
GLUCOSE UR STRIP-MCNC: NEGATIVE MG/DL
HCO3 SERPL-SCNC: 24 MMOL/L (ref 22–29)
HCT VFR BLD AUTO: 38.5 % (ref 40–53)
HGB BLD-MCNC: 13.7 G/DL (ref 13.3–17.7)
HGB UR QL STRIP: NEGATIVE
IMM GRANULOCYTES # BLD: 0 10E3/UL
IMM GRANULOCYTES NFR BLD: 0 %
KETONES UR STRIP-MCNC: NEGATIVE MG/DL
LEUKOCYTE ESTERASE UR QL STRIP: NEGATIVE
LIPASE SERPL-CCNC: 26 U/L (ref 13–60)
LYMPHOCYTES # BLD AUTO: 1.4 10E3/UL (ref 0.8–5.3)
LYMPHOCYTES NFR BLD AUTO: 33 %
MCH RBC QN AUTO: 32.7 PG (ref 26.5–33)
MCHC RBC AUTO-ENTMCNC: 35.6 G/DL (ref 31.5–36.5)
MCV RBC AUTO: 92 FL (ref 78–100)
MONOCYTES # BLD AUTO: 0.4 10E3/UL (ref 0–1.3)
MONOCYTES NFR BLD AUTO: 9 %
MUCOUS THREADS #/AREA URNS LPF: PRESENT /LPF
NEUTROPHILS # BLD AUTO: 2.4 10E3/UL (ref 1.6–8.3)
NEUTROPHILS NFR BLD AUTO: 56 %
NITRATE UR QL: NEGATIVE
NOROVIRUS GI+II RNA STL QL NAA+NON-PROBE: POSITIVE
NRBC # BLD AUTO: 0 10E3/UL
NRBC BLD AUTO-RTO: 0 /100
P SHIGELLOIDES DNA STL QL NAA+NON-PROBE: NEGATIVE
PH UR STRIP: 5 [PH] (ref 5–7)
PLATELET # BLD AUTO: 152 10E3/UL (ref 150–450)
POTASSIUM SERPL-SCNC: 3.5 MMOL/L (ref 3.4–5.3)
PROT SERPL-MCNC: 6 G/DL (ref 6.4–8.3)
RBC # BLD AUTO: 4.19 10E6/UL (ref 4.4–5.9)
RBC URINE: 1 /HPF
RVA RNA STL QL NAA+NON-PROBE: NEGATIVE
SALMONELLA SP RPOD STL QL NAA+PROBE: NEGATIVE
SAPO I+II+IV+V RNA STL QL NAA+NON-PROBE: NEGATIVE
SHIGELLA SP+EIEC IPAH ST NAA+NON-PROBE: NEGATIVE
SODIUM SERPL-SCNC: 140 MMOL/L (ref 135–145)
SP GR UR STRIP: 1.01 (ref 1–1.03)
UROBILINOGEN UR STRIP-MCNC: NORMAL MG/DL
V CHOLERAE DNA SPEC QL NAA+PROBE: NEGATIVE
VIBRIO DNA SPEC NAA+PROBE: NEGATIVE
WBC # BLD AUTO: 4.3 10E3/UL (ref 4–11)
WBC URINE: <1 /HPF
Y ENTEROCOL DNA STL QL NAA+PROBE: NEGATIVE

## 2024-10-13 PROCEDURE — 96376 TX/PRO/DX INJ SAME DRUG ADON: CPT | Performed by: EMERGENCY MEDICINE

## 2024-10-13 PROCEDURE — 87493 C DIFF AMPLIFIED PROBE: CPT | Mod: XU | Performed by: EMERGENCY MEDICINE

## 2024-10-13 PROCEDURE — 250N000011 HC RX IP 250 OP 636: Performed by: EMERGENCY MEDICINE

## 2024-10-13 PROCEDURE — 74177 CT ABD & PELVIS W/CONTRAST: CPT

## 2024-10-13 PROCEDURE — 82310 ASSAY OF CALCIUM: CPT | Performed by: EMERGENCY MEDICINE

## 2024-10-13 PROCEDURE — 81001 URINALYSIS AUTO W/SCOPE: CPT | Performed by: EMERGENCY MEDICINE

## 2024-10-13 PROCEDURE — 99284 EMERGENCY DEPT VISIT MOD MDM: CPT | Performed by: EMERGENCY MEDICINE

## 2024-10-13 PROCEDURE — 85004 AUTOMATED DIFF WBC COUNT: CPT | Performed by: EMERGENCY MEDICINE

## 2024-10-13 PROCEDURE — 83690 ASSAY OF LIPASE: CPT | Performed by: EMERGENCY MEDICINE

## 2024-10-13 PROCEDURE — 87507 IADNA-DNA/RNA PROBE TQ 12-25: CPT | Performed by: EMERGENCY MEDICINE

## 2024-10-13 PROCEDURE — 96374 THER/PROPH/DIAG INJ IV PUSH: CPT | Performed by: EMERGENCY MEDICINE

## 2024-10-13 PROCEDURE — 99285 EMERGENCY DEPT VISIT HI MDM: CPT | Mod: 25 | Performed by: EMERGENCY MEDICINE

## 2024-10-13 PROCEDURE — 81003 URINALYSIS AUTO W/O SCOPE: CPT | Performed by: EMERGENCY MEDICINE

## 2024-10-13 PROCEDURE — 36415 COLL VENOUS BLD VENIPUNCTURE: CPT | Performed by: EMERGENCY MEDICINE

## 2024-10-13 RX ORDER — HYDROMORPHONE HYDROCHLORIDE 1 MG/ML
0.3 INJECTION, SOLUTION INTRAMUSCULAR; INTRAVENOUS; SUBCUTANEOUS ONCE
Status: COMPLETED | OUTPATIENT
Start: 2024-10-13 | End: 2024-10-13

## 2024-10-13 RX ORDER — LOPERAMIDE HYDROCHLORIDE 2 MG/1
2 TABLET ORAL 4 TIMES DAILY PRN
Qty: 30 TABLET | Refills: 0 | Status: SHIPPED | OUTPATIENT
Start: 2024-10-13

## 2024-10-13 RX ORDER — HYDROMORPHONE HYDROCHLORIDE 1 MG/ML
0.5 INJECTION, SOLUTION INTRAMUSCULAR; INTRAVENOUS; SUBCUTANEOUS ONCE
Status: COMPLETED | OUTPATIENT
Start: 2024-10-13 | End: 2024-10-13

## 2024-10-13 RX ORDER — IOPAMIDOL 755 MG/ML
81 INJECTION, SOLUTION INTRAVASCULAR ONCE
Status: COMPLETED | OUTPATIENT
Start: 2024-10-13 | End: 2024-10-13

## 2024-10-13 RX ADMIN — IOPAMIDOL 81 ML: 755 INJECTION, SOLUTION INTRAVENOUS at 16:29

## 2024-10-13 RX ADMIN — HYDROMORPHONE HYDROCHLORIDE 0.3 MG: 1 INJECTION, SOLUTION INTRAMUSCULAR; INTRAVENOUS; SUBCUTANEOUS at 14:30

## 2024-10-13 RX ADMIN — HYDROMORPHONE HYDROCHLORIDE 0.5 MG: 1 INJECTION, SOLUTION INTRAMUSCULAR; INTRAVENOUS; SUBCUTANEOUS at 19:46

## 2024-10-13 ASSESSMENT — ACTIVITIES OF DAILY LIVING (ADL)
ADLS_ACUITY_SCORE: 37

## 2024-10-13 NOTE — ED PROVIDER NOTES
History     Chief Complaint   Patient presents with    Abdominal Pain      2 days, same pain as when he had cdiff     HPI  Ghulam Rizzo is a 69 year old male past medical history significant for depression prostate cancer compression fractures of back hypertension bowel syndrome and multiple episodes of C. difficile who presents emergency department with worsening abdominal pain and loose stools.  Patient states symptoms been going worsening for the past 3 to 4 days.  Pain is similar to when he has had.  Feels bloated and when he has bathroom is small amounts of loose watery stools.    Allergies:  Allergies   Allergen Reactions    Levaquin [Levofloxacin] Nausea and Vomiting    Oxycodone Dizziness    Percocet [Oxycodone-Acetaminophen] Visual Disturbance     hallucinations       Problem List:    Patient Active Problem List    Diagnosis Date Noted    Recurrent major depressive disorder, in remission (H) 12/22/2023     Priority: Medium    Vertigo 07/05/2023     Priority: Medium    Adenocarcinoma of prostate (H) 05/16/2023     Priority: Medium    Compression fracture of L4 vertebra (H) 05/09/2023     Priority: Medium    Compression fracture of L3 lumbar vertebra, with routine healing, subsequent encounter 05/09/2023     Priority: Medium    Benign essential hypertension 03/01/2023     Priority: Medium    Saccular aneurysm 02/07/2023     Priority: Medium    Cecal volvulus (H) 07/13/2022     Priority: Medium    Elevated prostate specific antigen (PSA) 02/09/2022     Priority: Medium    Other irritable bowel syndrome 11/07/2019     Priority: Medium    Screening for hypertension 11/07/2019     Priority: Medium    Proctitis 01/26/2017     Priority: Medium     Colonoscopy in 2013.       Cirrhosis of liver without ascites (H) 09/23/2015     Priority: Medium     Thought due to hepatitis C.  Follows with [facility].  Completed Harvoni 9/2015.      Abdominal pain, generalized 08/20/2015     Priority: Medium    Hyperlipidemia LDL goal  <130 07/16/2012     Priority: Medium    Tobacco abuse 07/16/2012     Priority: Medium        Past Medical History:    Past Medical History:   Diagnosis Date    Anisocoria     Asthma     Carpal tunnel syndrome     Head injury, unspecified     Hepatitis C     Obesity     Other convulsions     Other mononeuritis of upper limb     Unspecified essential hypertension        Past Surgical History:    Past Surgical History:   Procedure Laterality Date    COLECTOMY WITHOUT COLOSTOMY Right 7/13/2022    Procedure: Laparotomy, right colectomy, lysis of adhesions,;  Surgeon: Tommy Martino DO;  Location: [facility]    COLONOSCOPY      COLONOSCOPY N/A 7/18/2023    Procedure: Colonoscopy with polypectomy;  Surgeon: Octavio Aguirre MD;  Location: [facility]    COLONOSCOPY N/A 6/10/2024    Procedure: Colonoscopy;  Surgeon: Tommy Martino DO;  Location: [facility]    ESOPHAGOSCOPY, GASTROSCOPY, DUODENOSCOPY (EGD), COMBINED N/A 6/10/2024    Procedure: Esophagoscopy, gastroscopy, duodenoscopy (EGD), combined;  Surgeon: Tommy Martino DO;  Location: [facility]    GI SURGERY      HERNIA REPAIR      HERNIORRHAPHY INCISIONAL (LOCATION) N/A 7/13/2022    Procedure: primary incisional hernia repair;  Surgeon: Tommy Martino DO;  Location: [facility] OR    INSERT SEED MARKER / MATRIX N/A 8/30/2023    Procedure: Transrectal ultrasound guided placement of fiducials and SpaceOar;  Surgeon: Chase Blount MD;  Location:  OR    SURGICAL HISTORY OF -   4/6/87    esophagogastroduodenoscopy    SURGICAL HISTORY OF -   4/27/87    exploratory laparotomy and anterior gastropexy over a gastrostomy tube.    SURGICAL HISTORY OF -   11/13/90    esophagogastroduodenoscopy    SURGICAL HISTORY OF -   1991    L diaphragm eventration repair, fixation of stomach and colon to abd wall    SURGICAL HISTORY OF -       hiatal hernia repair    THORACIC SURGERY         Family History:    Family History   Problem Relation Age of Onset     "Cerebrovascular Disease Mother     Heart Disease Brother     Heart Disease Brother        Social History:  Marital Status:   [2]  Social History     Tobacco Use    Smoking status: Former     Current packs/day: 0.50     Average packs/day: 0.5 packs/day for 50.0 years (25.0 ttl pk-yrs)     Types: Cigarettes    Smokeless tobacco: Never   Vaping Use    Vaping status: Never Used   Substance Use Topics    Alcohol use: Yes     Comment: 1-2 beers daily    Drug use: No        Medications:    acetaminophen (TYLENOL) 500 MG tablet  amLODIPine (NORVASC) 5 MG tablet  calcitonin, salmon, (MIACALCIN) 200 UNIT/ACT nasal spray  Calcium Carbonate (CALCIUM-CARB 600 PO)  cyanocobalamin (VITAMIN B-12) 1000 MCG tablet  glycerin (LAXATIVE) 1.2 g suppository  hydrocortisone 2.5 % cream  hydrocortisone-pramoxine (PROCTOFOAM-HC) 1-1 % rectal foam  linaclotide (LINZESS) 145 MCG capsule  methylcellulose (CITRUCEL) powder  oxyCODONE (ROXICODONE) 5 MG tablet  polyethylene glycol (MIRALAX) 17 GM/Dose powder  senna-docusate (NEGRITO-COLACE) 8.6-50 MG per tablet  sertraline (ZOLOFT) 50 MG tablet  tamsulosin (FLOMAX) 0.4 MG capsule  vitamin D3 (CHOLECALCIFEROL) 50 mcg (2000 units) tablet          Review of Systems  Per HPI  Physical Exam   BP: (!) 148/94  Pulse: 67  Temp: 97.5  F (36.4  C)  Resp: 18  Height: 185.4 cm (6' 1\")  Weight: 75.3 kg (166 lb)  SpO2: 100 %      Physical Exam  Vitals and nursing note reviewed.   Constitutional:       General: He is not in acute distress.     Appearance: He is well-developed. He is not ill-appearing, toxic-appearing or diaphoretic.   HENT:      Head: Normocephalic and atraumatic.      Mouth/Throat:      Mouth: Mucous membranes are moist.      Pharynx: Oropharynx is clear.   Eyes:      Conjunctiva/sclera: Conjunctivae normal.   Cardiovascular:      Rate and Rhythm: Normal rate and regular rhythm.      Pulses: Normal pulses.      Heart sounds: Normal heart sounds. No murmur heard.  Pulmonary:      Effort: " Pulmonary effort is normal.      Breath sounds: Normal breath sounds. No stridor. No wheezing or rhonchi.   Abdominal:      Comments: Soft distended generalized tenderness to palpation in the abdomen with no guarding or rebound present no flank pain present no masses or hernia present.  Bowel sounds are slightly hyperactive   Musculoskeletal:         General: No swelling or tenderness. Normal range of motion.      Cervical back: Normal range of motion and neck supple.      Right lower leg: No edema.      Left lower leg: No edema.   Skin:     General: Skin is warm and dry.      Capillary Refill: Capillary refill takes less than 2 seconds.      Findings: No rash.   Neurological:      General: No focal deficit present.      Mental Status: He is alert and oriented to person, place, and time.      Cranial Nerves: No cranial nerve deficit.      Gait: Gait normal.      Deep Tendon Reflexes: Reflexes normal.   Psychiatric:         Mood and Affect: Mood normal.         ED Course     ED Course as of 10/14/24 1958   Sun Oct 13, 2024   1801 CT Abdomen Pelvis w Contrast  IMPRESSION:   1.  The distal colon and rectum are underdistended with persistent mild mucosal enhancement, overall slightly improved.  2.  Scattered air-fluid levels throughout the nondilated gastrointestinal tract no evidence of obstruction.  3.  Perivesicular and perirectal edema could be secondary to radiation change. Underlying cystitis would be difficult to exclude.     2108 Discussed the findings with the patient.  He is frustrated that we do not have an answer for his symptoms but understands that he needs a follow-up as scheduled with his surgeon as scheduled for flex sig.  He also understands that if his C. difficile test is positive, he will need to seek treatment and will not be able to do his flex sig.  He is frustrated about the longevity of his diarrhea and his symptoms that go with it.  Plan to discharge him with a prescription for loperamide to  try this for his discomfort.  Patient understands to follow-up with his primary care provider tomorrow and his surgeon this week to discuss whether or not flak symptoms good idea for him.     Procedures              Critical Care time:  none              No results found for this or any previous visit (from the past 24 hour(s)).    Medications - No data to display    Assessments & Plan (with Medical Decision Making) records reviewed including past medical history medications and allergies.  ED visit from 9/15/2024 was reviewed.  Visit on 9/4/2024 was reviewed.  Patient has finished antibiotics and has not been on them for about 3 weeks.  Labs were obtained.  Stool samples were ordered.  Patient's white count was 4.3 hemoglobin 13.7 platelet count 152.  Comprehensive metabolic panel without significant abnormality.  Lipase was 26.  CT scan of the abdomen pelvis was ordered due to the patient's abdominal pain.  There is a wait for CT scans and at this time I am going to sign the patient out to Dr. Flower for further evaluation and care.  I expect patient will be discharged to home and await for culture results to treat.       I have reviewed the nursing notes.    I have reviewed the findings, diagnosis, plan and need for follow up with the patient.           New Prescriptions    No medications on file       Final diagnoses:   Generalized abdominal pain   H/O Clostridium difficile infection   Diarrhea, unspecified type       10/13/2024   [facility] EMERGENCY DEPT       Luiz Syed MD  10/14/24 2002

## 2024-10-13 NOTE — ED TRIAGE NOTES
Pt reports generalized abdominal pain that started 2 days ago. Pt reports he knows what it is, he probably has Cdiff again. Pt said when he can go he has runny stool, same as the last two times.      Triage Assessment (Adult)       Row Name 10/13/24 6969          Triage Assessment    Airway WDL WDL        Cardiac WDL    Cardiac WDL WDL        Cognitive/Neuro/Behavioral WDL    Cognitive/Neuro/Behavioral WDL WDL

## 2024-10-13 NOTE — ED NOTES
Pt up to BR and stool samples collected and sent.  Stool was liquid.  Pt crampy now after BM and walking. Rates pain as 6-7/10.

## 2024-10-14 ENCOUNTER — TELEPHONE (OUTPATIENT)
Dept: NURSING | Facility: CLINIC | Age: 69
End: 2024-10-14
Payer: COMMERCIAL

## 2024-10-14 ENCOUNTER — TELEPHONE (OUTPATIENT)
Dept: SURGERY | Facility: CLINIC | Age: 69
End: 2024-10-14
Payer: COMMERCIAL

## 2024-10-14 NOTE — TELEPHONE ENCOUNTER
Tested thru ER visit yesterday with AVS:  Follow-Ups    Follow up with Jamie Rutledge DO (Family Medicine); This week for recheck.  Follow up with [facility] Emergency Dept (EMERGENCY MEDICINE); If symptoms worsen  _______________________    Does not appear pt has received notification of positive Norovirus GI/Gll    With Caveat: the Norovirus positive target may be a false positive result; please correlate with clinical findings. The virus may persist for many weeks to months.     C-diff is now negative.    Has Flex SIg scheduled 16th.    Please advise.  Continue as scheduled? Any treat recommendations? Rescheduled until Virus is cleared or no longer symptomatic?      Renae THOMAS   [facility] RN

## 2024-10-14 NOTE — ED PROVIDER NOTES
"     Emergency Department Patient Sign-out       Brief HPI:  This is a 69 year old male signed out to me by Dr. Syed .  See initial ED Provider note for details of the presentation.       Exam:   Patient Vitals for the past 24 hrs:   BP Temp Temp src Pulse Resp SpO2 Height Weight   10/13/24 1939 130/82 -- -- 54 -- 99 % -- --   10/13/24 1900 (!) 147/86 -- -- 54 -- 99 % -- --   10/13/24 1830 139/80 -- -- 57 -- 99 % -- --   10/13/24 1726 (!) 143/86 -- -- 55 -- 98 % -- --   10/13/24 1304 (!) 148/94 97.5  F (36.4  C) Tympanic 67 18 100 % 1.854 m (6' 1\") 75.3 kg (166 lb)           ED RESULTS:   Results for orders placed or performed during the hospital encounter of 10/13/24 (from the past 24 hour(s))   CBC with platelets differential     Status: Abnormal    Collection Time: 10/13/24  2:32 PM    Narrative    The following orders were created for panel order CBC with platelets differential.  Procedure                               Abnormality         Status                     ---------                               -----------         ------                     CBC with platelets and d...[[id number]]  Abnormal            Final result                 Please view results for these tests on the individual orders.   Comprehensive metabolic panel     Status: Abnormal    Collection Time: 10/13/24  2:32 PM   Result Value Ref Range    Sodium 140 135 - 145 mmol/L    Potassium 3.5 3.4 - 5.3 mmol/L    Carbon Dioxide (CO2) 24 22 - 29 mmol/L    Anion Gap 11 7 - 15 mmol/L    Urea Nitrogen 8.8 8.0 - 23.0 mg/dL    Creatinine 0.78 0.67 - 1.17 mg/dL    GFR Estimate >90 >60 mL/min/1.73m2    Calcium 8.7 (L) 8.8 - 10.4 mg/dL    Chloride 105 98 - 107 mmol/L    Glucose 95 70 - 99 mg/dL    Alkaline Phosphatase 65 40 - 150 U/L    AST 14 0 - 45 U/L    ALT 9 0 - 70 U/L    Protein Total 6.0 (L) 6.4 - 8.3 g/dL    Albumin 3.7 3.5 - 5.2 g/dL    Bilirubin Total 0.4 <=1.2 mg/dL   Lipase     Status: Normal    Collection Time: 10/13/24  2:32 PM   Result " Value Ref Range    Lipase 26 13 - 60 U/L   CBC with platelets and differential     Status: Abnormal    Collection Time: 10/13/24  2:32 PM   Result Value Ref Range    WBC Count 4.3 4.0 - 11.0 10e3/uL    RBC Count 4.19 (L) 4.40 - 5.90 10e6/uL    Hemoglobin 13.7 13.3 - 17.7 g/dL    Hematocrit 38.5 (L) 40.0 - 53.0 %    MCV 92 78 - 100 fL    MCH 32.7 26.5 - 33.0 pg    MCHC 35.6 31.5 - 36.5 g/dL    RDW 13.3 10.0 - 15.0 %    Platelet Count 152 150 - 450 10e3/uL    % Neutrophils 56 %    % Lymphocytes 33 %    % Monocytes 9 %    % Eosinophils 2 %    % Basophils 1 %    % Immature Granulocytes 0 %    NRBCs per 100 WBC 0 <1 /100    Absolute Neutrophils 2.4 1.6 - 8.3 10e3/uL    Absolute Lymphocytes 1.4 0.8 - 5.3 10e3/uL    Absolute Monocytes 0.4 0.0 - 1.3 10e3/uL    Absolute Eosinophils 0.1 0.0 - 0.7 10e3/uL    Absolute Basophils 0.0 0.0 - 0.2 10e3/uL    Absolute Immature Granulocytes 0.0 <=0.4 10e3/uL    Absolute NRBCs 0.0 10e3/uL   CT Abdomen Pelvis w Contrast     Status: None    Collection Time: 10/13/24  4:38 PM    Narrative    EXAM: CT ABDOMEN PELVIS W CONTRAST  LOCATION: [facility]  DATE: 10/13/2024    INDICATION: Generalized abdominal pain. History of C. difficile colitis  COMPARISON: CT 9/15/2024  TECHNIQUE: CT scan of the abdomen and pelvis was performed following injection of IV contrast. Multiplanar reformats were obtained. Dose reduction techniques were used.  CONTRAST: 81 mL Isovue 370    FINDINGS:   LOWER CHEST: Chronic elevation left hemidiaphragm unchanged.    HEPATOBILIARY: Calcified hepatic granulomata and stable simple hepatic cysts do not require imaging follow-up. Normal gallbladder and biliary system.    PANCREAS: Normal.    SPLEEN: Calcified splenic granulomata. No splenomegaly.    ADRENAL GLANDS: Normal.    KIDNEYS/BLADDER: Simple bilateral renal cortical cysts do not require imaging follow-up. No urinary tract calculus nor hydronephrosis. Perivesicular edema/inflammation  unchanged.    BOWEL: The distal descending colon and rectosigmoid region underdistended with some persistent mild mucosal hyperenhancement overall slightly less pronounced than the previous study. Scattered air-fluid levels throughout nondilated stomach, small bowel   and proximal colon. Perirectal edema unchanged.    LYMPH NODES: No lymphadenopathy.    VASCULATURE: No aortoiliac aneurysm.    PELVIC ORGANS: Fiducial markers are present within the prostate.    MUSCULOSKELETAL: Generalized demineralization. Stable L3 compression deformity.      Impression    IMPRESSION:   1.  The distal colon and rectum are underdistended with persistent mild mucosal enhancement, overall slightly improved.  2.  Scattered air-fluid levels throughout the nondilated gastrointestinal tract no evidence of obstruction.  3.  Perivesicular and perirectal edema could be secondary to radiation change. Underlying cystitis would be difficult to exclude.   UA with Microscopic reflex to Culture     Status: Abnormal    Collection Time: 10/13/24  7:14 PM    Specimen: Urine, Clean Catch   Result Value Ref Range    Color Urine Yellow Colorless, Straw, Light Yellow, Yellow    Appearance Urine Clear Clear    Glucose Urine Negative Negative mg/dL    Bilirubin Urine Negative Negative    Ketones Urine Negative Negative mg/dL    Specific Gravity Urine 1.010 1.003 - 1.035    Blood Urine Negative Negative    pH Urine 5.0 5.0 - 7.0    Protein Albumin Urine Negative Negative mg/dL    Urobilinogen Urine Normal Normal, 2.0 mg/dL    Nitrite Urine Negative Negative    Leukocyte Esterase Urine Negative Negative    Mucus Urine Present (A) None Seen /LPF    RBC Urine 1 <=2 /HPF    WBC Urine <1 <=5 /HPF    Narrative    Urine Culture not indicated       ED MEDICATIONS:   Medications   HYDROmorphone (PF) (DILAUDID) injection 0.3 mg (0.3 mg Intravenous $Given 10/13/24 1430)   iopamidol (ISOVUE-370) solution 81 mL (81 mLs Intravenous $Given 10/13/24 1625)   sodium chloride  (PF) 0.9% PF flush 60 mL (100 mLs Intracatheter $Given 10/13/24 1630)   HYDROmorphone (PF) (DILAUDID) injection 0.5 mg (0.5 mg Intravenous $Given 10/13/24 1946)         Impression:    ICD-10-CM    1. Generalized abdominal pain  R10.84       2. H/O Clostridium difficile infection  Z86.19           Plan:    CT improving, Urine reassuring, C diff pending.     Start loperamide, follow-up with his primary care provider, follow-up with his surgeon as scheduled this week for his flex sig.      DO Darren Vizcaino Rachael M, DO  10/13/24 0411

## 2024-10-14 NOTE — LETTER
October 14, 2024        Ghulam Rizzo  [address] [place] [id number]          Dear Ghulam Rizzo:    You were seen in the [facility] Emergency Department at [facility] on 10/13/2024.  We are unable to reach you by phone, so we are sending you this letter.     It is important that you call [facility] Emergency Department lab result nurse at [phone], as we have information to relay to you AND/OR we MAY have to make some changes in your treatment.    Best time to call back is between 9AM and 5:30PM, 7 days a week.      Sincerely,     [facility] Emergency Department Lab Result RN  [phone]

## 2024-10-14 NOTE — DISCHARGE INSTRUCTIONS
You were seen in the emergency department today for abdominal pain.  As we discussed your C. difficile test is pending.  Follow-up on this in your chart.  You can likely have your flex sig as scheduled on Wednesday however pending your symptoms.  You need to call your surgeon and discuss your symptoms with them and have them clear you for this procedure.  You can take the loperamide as needed for your symptoms.  You do not need to take this medication however it is something that you might find relieve her symptoms.  If your C. difficile test is positive you will get a phone call about this.  Otherwise work to stay hydrated.    Return to the emergency department with worsening or changing symptoms that you find concerning.

## 2024-10-14 NOTE — TELEPHONE ENCOUNTER
[facility]    Reason for call: Lab Result Notification     Lab Result (including Rx patient on, if applicable).  If culture, copy of lab report at bottom.  Lab Result: Stool panel    Component      Latest Ref Rng 10/13/2024  6:30 PM   Norovirus Gl/Gll      Negative  Positive !       Legend:  ! Abnormal    No ED antibiotic prescribed    Creatinine Level (mg/dl)   Creatinine   Date Value Ref Range Status   10/13/2024 0.78 0.67 - 1.17 mg/dL Final   06/28/2020 0.72 0.66 - 1.25 mg/dL Final    Creatinine clearance (ml/min), if applicable    Serum creatinine: 0.78 mg/dL 10/13/24 1432  Estimated creatinine clearance: 93.9 mL/min     Patient presented to [place] ED on 10/13/2024 with worsening abdominal pain and loose stools    RN Recommendations/Instructions per [place] ED lab result protocol:   [facility] ED lab result protocol utilized: Enteric Bacteria and Viruses Protocol - Norovirus    Unable to assess current symptoms    Unable to reach patient/caregiver.     Left voicemail message requesting a call back to [phone] between 9 a.m. and 5:30 p.m. for patient's ED/UC lab results.      Letter pended to be sent via [employer] mail.  - removed    ADDENDUM: Patient's wife returned call. Relayed results below per protocol.     Symptoms: Patient is still having frequent stools. No fever or vomiting. Able to stay hydrated.    Advised patient call PCP and schedule a follow-up visit. Reviewed return precautions. Wife is agreeable with plan and verbalized understanding.      Lillie Diana RN  
Second attempt to reach patient. Message left and letter pended.    Lillie Diana RN  10/14/24 4:45 PM  [facility]  Emergency Department Lab Results RN  
[employer]

## 2024-10-15 ENCOUNTER — PATIENT OUTREACH (OUTPATIENT)
Dept: CARE COORDINATION | Facility: CLINIC | Age: 69
End: 2024-10-15
Payer: COMMERCIAL

## 2024-10-15 ENCOUNTER — TELEPHONE (OUTPATIENT)
Dept: SURGERY | Facility: CLINIC | Age: 69
End: 2024-10-15
Payer: COMMERCIAL

## 2024-10-15 NOTE — TELEPHONE ENCOUNTER
[facility] Call Center    Phone Message    May a detailed message be left on voicemail: yes     Reason for Call: Symptoms or Concerns     If patient has red-flag symptoms, warm transfer to triage line    Current symptom or concern: Patient Diagnosed with NoroVirus    Symptoms have been present for:  3 day(s)    Has patient previously been seen for this? No    By: Dr. Martino    Date: 10-13-24    Are there any new or worsening symptoms? No    Action Taken: Other: [place] Gen. Surg.    Travel Screening: Not Applicable     Date of Service:

## 2024-10-15 NOTE — PROGRESS NOTES
[facility] Care Resource Center Contact  [facility]/Voicemail     Clinical Data: Care Coordination ED-sourced Outreach-     Outreach attempted x 2.  Left message on patient's voicemail, providing [facility]'s 24/7 scheduling and nurse triage phone number [phone] ([phone]) for questions/concerns and/or to schedule an appt with an [facility] provider.      [facility] unable to send Care Coordination introduction letter as patient does not have an active MyChart account. Clinic Care Coordination services remain available via referral if needed.    Plan: [facility] will do no further outreaches at this time.       ABDULLAHI Medina  [facility] Resource [place], [facility]    *Connected Care Resource Team does NOT follow patient ongoing. Referrals are identified based on internal discharge reports and the outreach is to ensure patient has an understanding of their discharge instructions.

## 2024-10-15 NOTE — TELEPHONE ENCOUNTER
"Per Dr Martino;  \"We should reschedule.  He was negative in the past.  In particular if he's having discomfort and or diarrhea\"    Renae THOMAS   Specialty Clinic RN    "

## 2024-10-15 NOTE — PROGRESS NOTES
CTU Attending Progress Daily Note     15 Oct 2024 10:16    Procedure:       CABG                                            POD#         4          Patient seen as post-op critical care follow-up    HPI:  82 years old male patient with pmh of HLD, DM, HTN brought to the ed for fall. Hx taken from the patient and is aox3 at time of the interview.  Patient states that he was going down to his basement, and when he got there the next thing he remembers is lying himself on the floor on his back. He does not know wether he lost consciousness or not not but thinks that he lost conciousnes as he does not know what actually happened and how he found himself on the floor. On further inquiry, patient states that he was trying to get up from the floor then he felt that his left shoulder is weak, painful and stiff. on trying to get up from the ground, he state that he hit his head against the ground .  He was finally able to gain his strength back and get up, he went up to his room and slept. His sons called his PCP who advised him to go to the ER. Further review of the systems is negative for any headache, light headiness dizziness palpitations, chest pain, nausea, vomit, diarrhea, dysuria , frequency, urgency, extremity weakness and other significant complaints.        (05 Oct 2024 02:37)    See preop testing chart H&P    Interval event for past 24 hr:  ALEX GROVER  82y had Afib    Current Complains:  ALEX GROVER has no new complaints    REVIEW OF SYSTEMS:  CONSTITUTIONAL:  [-] weakness, [-] fevers, [-] chills  EYES/ENT: [-] visual changes, [-] vertigo, [-] throat pain   NECK: [-] pain, [-] stiffness  RESPIRATORY: [-] cough, [-] wheezing, [-] hemoptysis, [-] shortness of breath  CARDIOVASCULAR: [-] chest pain, [-] palpitations, [-] orthopnea  GASTROINTESTINAL:    [-]abdominal pain, [-] nausea, [-] vomiting, [-] hematemesis, [-] diarrhea, [-] constipation, [-] melena, [-] hematochezia.  GENITOURINARY: [-] dysuria, [-] frequency, [-] hematuria  NEUROLOGICAL: [-] numbness, [-] weakness  SKIN: [-] itching, [-] burning, [-] rashes, [-] lesions   All other review of systems is negative unless indicated above.    [  ] Unable to assess ROS because :    OBJECTIVE:  ICU Vital Signs Last 24 Hrs  T(C): 36 (15 Oct 2024 08:00), Max: 38.1 (14 Oct 2024 13:00)  T(F): 96.8 (15 Oct 2024 08:00), Max: 100.6 (14 Oct 2024 13:00)  HR: 66 (15 Oct 2024 08:00) (52 - 110)  BP: 97/54 (15 Oct 2024 08:00) (97/54 - 131/61)  BP(mean): 72 (15 Oct 2024 08:00) (72 - 91)  ABP: --  ABP(mean): --  RR: 27 (15 Oct 2024 08:00) (11 - 30)  SpO2: 95% (15 Oct 2024 08:00) (94% - 100%)    O2 Parameters below as of 15 Oct 2024 08:00  Patient On (Oxygen Delivery Method): nasal cannula  O2 Flow (L/min): 4          I&O's Summary    14 Oct 2024 07:01  -  15 Oct 2024 07:00  --------------------------------------------------------  IN: 1113.3 mL / OUT: 1025 mL / NET: 88.3 mL    15 Oct 2024 07:01  -  15 Oct 2024 10:16  --------------------------------------------------------  IN: 200 mL / OUT: 100 mL / NET: 100 mL      PHYSICAL EXAM:  General: WN/WD NAD    HEENT:     [+] NCAT      Neck:         [+] FROM      Chest:         [-] Sternal click   [-] Sternal drainage      Lungs:          [+] CTA  BL    Cardiac:       [+] S1 [+] S2    [+] RRR   [-] Irregular       Abdomen:    [+] BS    [+] Soft    [+] Non-tender     [-] Distended      Extremities:    [+] Pulses     Neuro:        [+] Awake   [+]  Alert       Skin:          [+] Normal incisions         CAPILLARY BLOOD GLUCOSE      POCT Blood Glucose.: 109 mg/dL (15 Oct 2024 06:42)    CAPILLARY BLOOD GLUCOSE      POCT Blood Glucose.: 109 mg/dL (15 Oct 2024 06:42)  POCT Blood Glucose.: 176 mg/dL (14 Oct 2024 21:15)  POCT Blood Glucose.: 202 mg/dL (14 Oct 2024 17:00)  POCT Blood Glucose.: 215 mg/dL (14 Oct 2024 11:30)      HOSPITAL MEDICATIONS:  MEDICATIONS  (STANDING):  acetaminophen   IVPB .. 1000 milliGRAM(s) IV Intermittent once  acetaminophen   IVPB .. 1000 milliGRAM(s) IV Intermittent once  allopurinol 100 milliGRAM(s) Oral daily  aMIOdarone    Tablet 200 milliGRAM(s) Oral daily  aMIOdarone Infusion 0.5 mG/Min (16.7 mL/Hr) IV Continuous <Continuous>  aspirin enteric coated 325 milliGRAM(s) Oral daily  bisacodyl 5 milliGRAM(s) Oral every 12 hours  chlorhexidine 2% Cloths 1 Application(s) Topical daily  colchicine 0.6 milliGRAM(s) Oral two times a day  dextrose 5%. 1000 milliLiter(s) (100 mL/Hr) IV Continuous <Continuous>  dextrose 5%. 1000 milliLiter(s) (50 mL/Hr) IV Continuous <Continuous>  dextrose 50% Injectable 50 milliLiter(s) IV Push every 15 minutes  ferrous    sulfate 325 milliGRAM(s) Oral daily  folic acid 1 milliGRAM(s) Oral daily  glucagon  Injectable 1 milliGRAM(s) IntraMuscular once  heparin   Injectable 5000 Unit(s) SubCutaneous every 12 hours  insulin glargine Injectable (LANTUS) 25 Unit(s) SubCutaneous every morning  insulin lispro (ADMELOG) corrective regimen sliding scale   SubCutaneous three times a day before meals  insulin lispro (ADMELOG) corrective regimen sliding scale   SubCutaneous at bedtime  insulin lispro Injectable (ADMELOG) 7 Unit(s) SubCutaneous three times a day before meals  insulin regular Infusion 1 Unit(s)/Hr (1 mL/Hr) IV Continuous <Continuous>  ipratropium    for Nebulization 500 MICROGram(s) Nebulizer every 6 hours  metoprolol tartrate 25 milliGRAM(s) Oral two times a day  ondansetron Injectable 4 milliGRAM(s) IV Push once  pantoprazole    Tablet 40 milliGRAM(s) Oral before breakfast  polyethylene glycol 3350 17 Gram(s) Oral daily  potassium chloride    Tablet ER 40 milliEquivalent(s) Oral daily  senna 2 Tablet(s) Oral at bedtime  sodium chloride 0.9%. 1000 milliLiter(s) (20 mL/Hr) IV Continuous <Continuous>  tamsulosin 0.4 milliGRAM(s) Oral at bedtime    MEDICATIONS  (PRN):  dextrose Oral Gel 15 Gram(s) Oral once PRN Blood Glucose LESS THAN 70 milliGRAM(s)/deciliter  melatonin 5 milliGRAM(s) Oral at bedtime PRN Insomnia  oxyCODONE    IR 10 milliGRAM(s) Oral every 4 hours PRN Severe Pain (7 - 10)  oxyCODONE    IR 5 milliGRAM(s) Oral every 4 hours PRN Moderate Pain (4 - 6)      LABS:                          8.5    15.56 )-----------( 252      ( 15 Oct 2024 02:00 )             26.2     10-15    137  |  103  |  46[H]  ----------------------------<  113[H]  4.0   |  25  |  1.7[H]    Ca    8.4      15 Oct 2024 02:00  Mg     2.4     10-15    TPro  4.8[L]  /  Alb  2.8[L]  /  TBili  0.5  /  DBili  x   /  AST  62[H]  /  ALT  52[H]  /  AlkPhos  91  10-15      Urinalysis Basic - ( 15 Oct 2024 02:00 )    Color: x / Appearance: x / SG: x / pH: x  Gluc: 113 mg/dL / Ketone: x  / Bili: x / Urobili: x   Blood: x / Protein: x / Nitrite: x   Leuk Esterase: x / RBC: x / WBC x   Sq Epi: x / Non Sq Epi: x / Bacteria: x          RADIOLOGY:    < from: Xray Chest 1 View- PORTABLE-Routine (Xray Chest 1 View- PORTABLE-Routine in AM.) (10.15.24 @ 05:06) >  IMPRESSION:    1. Unchanged patchy bilateral opacities/pleural effusions.    < end of copied text >    Assessment:  CAD SP CABG  Acute blood loss anemia  Afib    PAST MEDICAL & SURGICAL HISTORY:  DM (diabetes mellitus)      HTN (hypertension)      HLD (hyperlipidemia)      H/O knee surgery          PLAN:  Neuro: Pain control  Pulm: Encourage coughing, deep breathing and use of incentive spirometry. Wean off supplemental oxygen as able. Daily CXR.   Cardio: Monitor telemetry/alarms. Amiodarone 200 PO Q 24h  GI: Tolerating diet. Continue stool softeners. Continue GI prophylaxis  Renal: monitor urine output, supplement electrolytes as needed  Vasc: Heparin SC/SCDs for DVT prophylaxis  Heme: Monitor H/H.   ID: Trend WBCs  Endocrine: Monitor finger stick blood sugar and control hyperglycemia with insulin  Physical Therapy: OOB/ambulate        Discussed with Cardiothoracic Team at AM rounds.     The following medication was given:     MEDICATION:  Gentamicin  ROUTE: IM  SITE: Deltoid - Right  DOSE: 80 mg  LOT #: [id number]  : [employer]  EXPIRATION DATE: 06/24  NDC#: 5239961   Was there drug waste? No  Multi-dose vial: Kaylen Hassan RN  May 8, 2023

## 2024-10-17 ENCOUNTER — TELEPHONE (OUTPATIENT)
Dept: FAMILY MEDICINE | Facility: CLINIC | Age: 69
End: 2024-10-17
Payer: COMMERCIAL

## 2024-10-17 NOTE — TELEPHONE ENCOUNTER
Patient's spouse calling wondering what precautions are needed for Norovirus. Educated on prevention methods.    Patient has been experiencing mild stomach pains, able to work outside. Advised to seek care at Urgent Care or the Emergency Room if pain increases and unable to eat or drink. Patient and spouse voiced understanding.    Gato MCNEAL RN  [facility]

## 2024-12-02 ENCOUNTER — LAB (OUTPATIENT)
Dept: LAB | Facility: CLINIC | Age: 69
End: 2024-12-02
Payer: COMMERCIAL

## 2024-12-02 DIAGNOSIS — C61 PROSTATE CANCER (H): ICD-10-CM

## 2024-12-02 LAB — PSA SERPL DL<=0.01 NG/ML-MCNC: 0.13 NG/ML (ref 0–6.5)

## 2024-12-02 PROCEDURE — 36415 COLL VENOUS BLD VENIPUNCTURE: CPT

## 2024-12-02 PROCEDURE — 84153 ASSAY OF PSA TOTAL: CPT

## 2024-12-03 ENCOUNTER — OFFICE VISIT (OUTPATIENT)
Dept: RADIATION THERAPY | Facility: OUTPATIENT CENTER | Age: 69
End: 2024-12-03
Payer: COMMERCIAL

## 2024-12-03 DIAGNOSIS — C61 PROSTATE CANCER (H): Primary | ICD-10-CM

## 2024-12-03 NOTE — LETTER
12/3/2024      Ghulam Rizzo  [address] [place] [id number]      Dear Colleague,    Thank you for referring your patient, Ghulam Rizzo, to the [facility] RADIATION THERAPY [facility]. Please see a copy of my visit note below.       Department of Radiation Oncology  Radiation Therapy Center  [facility] [place] [address]  [phone]       Radiation Oncology Follow-up Visit  12/3/24      Ghulam Rizzo  MRN: [id number]   : 1955     Radiation Oncologist: Armani Rodgers MD  Provider: ODALYS Segura  V 24 with me    DIAGNOSIS: Prostate adenocarcinoma  INTENT OF RADIOTHERAPY: Adjuvant  PATHOLOGY:  unfavorable intermediate, [place] 4 + 3 = 7                                 STAGE:[id number]   CONCURRENT SYSTEMIC THERAPY:  No        ONCOLOGIC HISTORY:      23  PSA  =10.84      3/9/2023, prostatic MR:  Prostate measures 4.3 x 3.4 x 4.9 cm.  37 g.  Suspicious lesion noted in the peripheral zone.  Lesion in the right base and mid gland peripherally and at the transition zone at the 9-12 o'clock position relative to the urethra.  Possible involvement of anterior fibromuscular stroma noted.  22 mm in greatest dimension.  Capsular abutment greater than 15 mm with smooth contour, moderate suspicion of minimal extraprostatic extension.  No suspicious adenopathy.  PI-RADS 5     3/16/2023, CT CAP: Normal lymph nodes.  Mild prostatic gland enlargement noted.     2023, lumbar XR: Acute superior endplate compression fracture at L3 with 20% height loss and no retropulsion.  Questionable superior endplate compression fracture at L4 with less than 10% height loss.     2023: Patient undergoes prostatic biopsy with Dr. Blount. Digital rectal exam was performed revealing a normal feeling prostate.     2023, surgical pathology:  Prostate, Target 1: Right Mid TZ 11:00: Prostatic adenocarcinoma. [place] 4+3 = 7.  Grade group 3.  1/2 cores  Prostate, [place], Right: High-grade prostatic  intraepithelial neoplasia  Prostate, Right Transitional Zone: Prostatic adenocarcinoma.  [place] 4+3 = 7.  Grade group 3.  1/1 cores.     5/18/2023, follow-up with Dr. Blount: Dr. Blount discussed the diagnosis of prostate cancer with the patient.  Patient diagnosed with unfavorable intermediate risk prostate cancer given the patient's PSA between 10-20 ng/mL, normal or minimally abnormal exam and [place] 4+3 = 7.  Plan for the patient to have a bone scan to complete evaluation for staging.  Dr. Blount discussed treatment options including observation, definitive therapy including surgery and/or radiotherapy.  Dr. Blount believes that this patient is not an ideal observation candidate given his disease characteristics.  Because the patient has a history of several abdominal surgeries, surgery would be more difficult and would carry a higher likelihood of needing to perform the surgery open.  Space OAR was discussed.  Referral to radiation oncology placed.     6/8/2023, CT head no contrast: No acute intracranial abnormality     6/13/2023, bone scan: No osseous metastases     6/15/23 seen in clinic for consult. Dr Rodgers recommended radiotherapy to the prostate consisting of 70 Gy in 28 fractions in addition to 6 months of ADT.      7/21/23 received Lupron 45 mg.      SITE OF TREATMENT: Pelvis/Prostate     DATES  OF TREATMENT: 9/21/23 to 10/30/23     TOTAL DOSE OF TREATMENT: 7, 000 cGy     DOSE PER FRACTION OF TREATMENT: 250 cGy x 28 fractions    INTERVAL SINCE COMPLETION OF RADIATION THERAPY:   1 year 1 mo    SUBJECTIVE:   Ghulam returns for follow up. He is on flomax one per day Flow is good.  In may he noted blood in stool, noted blood in stool and in toilet bowel, bright red. Had a screening colonoscopy 7/2023 which was normal. He was scheduled for colonoscopy in July 2024 ordered by me but then developed a tooth abscess and was on abx which caused c diff. So no colonoscopy. He is doing better now. Wt is going  back up. He is dealing with vertigo and room spinning and headaches for several months. Saw Neuro and had MRI of brain which was normal and is seeing ENT this month.   Denies blood in urine. No other complaints.     PHYSICAL EXAM:  There were no vitals taken for this visit.  Gen: Alert, in NAD  Eyes: PERRL, EOMI, sclera anicteric  HENT     Head: NC/AT     Ears: No external auricular lesions     Nose/sinus: No rhinorrhea or epistaxis     Oral Cavity/Oropharynx: MMM  Neck: Supple, full ROM, no LAD  Pulm: No wheezing, stridor or respiratory distress  CV: Well-perfused, no cyanosis, no pedal edema  Abdominal: Soft, nontender, nondistended, no hepatomegaly  Back: No step-offs or pain to palpation along the thoracolumbar spine, no CVA tenderness    Musculoskeletal: Normal bulk and tone   Skin: Normal color and turgor  Neurologic: A/Ox3, CN II-XII intact  Psychiatric: Appropriate mood and affect    LABS AND IMAGING:  PSA   12/2/24  0.13  6/6/24  0.03  12/4/23 0.05  10/30/23  RT  7/21/23  Lupron  2/8/23  10.84    IMPRESSION:   Mr. Rizzo is a 68 year old male with a prostate adenocarcinoma unfavorable intermediate, [place] 4 + 3 = 7    [id number], he underwent ST ADT and RT. PSA is low has BRBPR. Will reschedule for colonoscopy. We discussed the possibility of radiation proctitis. If so will need referral to GI.   PSA rise is likely a result of testosterone recovery after Lupron.     PLAN:   RTO in 6 months with PSA.   See me after    ODALYS Segura  Department of Radiation Oncology  [facility]          Again, thank you for allowing me to participate in the care of your patient.        Sincerely,        Yulissa Longoria PA-C

## 2024-12-03 NOTE — PROGRESS NOTES
Department of Radiation Oncology  Radiation Therapy Center  [facility]  [place] [address]  [phone]       Radiation Oncology Follow-up Visit  12/3/24      Ghulam Rizzo  MRN: [id number]   : 1955     Radiation Oncologist: Armani Rodgers MD  Provider: ODALYS Segura  LCV 24 with me    DIAGNOSIS: Prostate adenocarcinoma  INTENT OF RADIOTHERAPY: Adjuvant  PATHOLOGY:  unfavorable intermediate, [place] 4 + 3 = 7                                 STAGE:[id number]   CONCURRENT SYSTEMIC THERAPY:  No        ONCOLOGIC HISTORY:      23  PSA  =10.84      3/9/2023, prostatic MR:  Prostate measures 4.3 x 3.4 x 4.9 cm.  37 g.  Suspicious lesion noted in the peripheral zone.  Lesion in the right base and mid gland peripherally and at the transition zone at the 9-12 o'clock position relative to the urethra.  Possible involvement of anterior fibromuscular stroma noted.  22 mm in greatest dimension.  Capsular abutment greater than 15 mm with smooth contour, moderate suspicion of minimal extraprostatic extension.  No suspicious adenopathy.  PI-RADS 5     3/16/2023, CT CAP: Normal lymph nodes.  Mild prostatic gland enlargement noted.     2023, lumbar XR: Acute superior endplate compression fracture at L3 with 20% height loss and no retropulsion.  Questionable superior endplate compression fracture at L4 with less than 10% height loss.     2023: Patient undergoes prostatic biopsy with Dr. Blount. Digital rectal exam was performed revealing a normal feeling prostate.     2023, surgical pathology:  Prostate, Target 1: Right Mid TZ 11:00: Prostatic adenocarcinoma. [place] 4+3 = 7.  Grade group 3.  1/2 cores  Prostate, [place], Right: High-grade prostatic intraepithelial neoplasia  Prostate, Right Transitional Zone: Prostatic adenocarcinoma.  [place] 4+3 = 7.  Grade group 3.  1/1 cores.     2023, follow-up with Dr. Blount: Dr. Blount discussed the diagnosis of prostate cancer  with the patient.  Patient diagnosed with unfavorable intermediate risk prostate cancer given the patient's PSA between 10-20 ng/mL, normal or minimally abnormal exam and [place] 4+3 = 7.  Plan for the patient to have a bone scan to complete evaluation for staging.  Dr. Blount discussed treatment options including observation, definitive therapy including surgery and/or radiotherapy.  Dr. Blount believes that this patient is not an ideal observation candidate given his disease characteristics.  Because the patient has a history of several abdominal surgeries, surgery would be more difficult and would carry a higher likelihood of needing to perform the surgery open.  Space OAR was discussed.  Referral to radiation oncology placed.     6/8/2023, CT head no contrast: No acute intracranial abnormality     6/13/2023, bone scan: No osseous metastases     6/15/23 seen in clinic for consult. Dr Rodgers recommended radiotherapy to the prostate consisting of 70 Gy in 28 fractions in addition to 6 months of ADT.      7/21/23 received Lupron 45 mg.      SITE OF TREATMENT: Pelvis/Prostate     DATES  OF TREATMENT: 9/21/23 to 10/30/23     TOTAL DOSE OF TREATMENT: 7, 000 cGy     DOSE PER FRACTION OF TREATMENT: 250 cGy x 28 fractions    INTERVAL SINCE COMPLETION OF RADIATION THERAPY:   1 year 1 mo    SUBJECTIVE:   Ghulam returns for follow up. He is on flomax one per day Flow is good.  In may he noted blood in stool, noted blood in stool and in toilet bowel, bright red. Had a screening colonoscopy 7/2023 which was normal. He was scheduled for colonoscopy in July 2024 ordered by me but then developed a tooth abscess and was on abx which caused c diff. So no colonoscopy. He is doing better now. Wt is going back up. He is dealing with vertigo and room spinning and headaches for several months. Saw Neuro and had MRI of brain which was normal and is seeing ENT this month.   Denies blood in urine. No other complaints.     PHYSICAL  EXAM:  There were no vitals taken for this visit.  Gen: Alert, in NAD  Eyes: PERRL, EOMI, sclera anicteric  HENT     Head: NC/AT     Ears: No external auricular lesions     Nose/sinus: No rhinorrhea or epistaxis     Oral Cavity/Oropharynx: MMM  Neck: Supple, full ROM, no LAD  Pulm: No wheezing, stridor or respiratory distress  CV: Well-perfused, no cyanosis, no pedal edema  Abdominal: Soft, nontender, nondistended, no hepatomegaly  Back: No step-offs or pain to palpation along the thoracolumbar spine, no CVA tenderness    Musculoskeletal: Normal bulk and tone   Skin: Normal color and turgor  Neurologic: A/Ox3, CN II-XII intact  Psychiatric: Appropriate mood and affect    LABS AND IMAGING:  PSA   12/2/24  0.13  6/6/24  0.03  12/4/23 0.05  10/30/23  RT  7/21/23  Lupron  2/8/23  10.84    IMPRESSION:   Mr. Rizzo is a 68 year old male with a prostate adenocarcinoma unfavorable intermediate, [place] 4 + 3 = 7    [id number], he underwent ST ADT and RT. PSA is low has BRBPR. Will reschedule for colonoscopy. We discussed the possibility of radiation proctitis. If so will need referral to GI.   PSA rise is likely a result of testosterone recovery after Lupron.     PLAN:   RTO in 6 months with PSA.   See me after    ODALYS Segura  Department of Radiation Oncology  [facility]

## 2024-12-22 DIAGNOSIS — F33.40 RECURRENT MAJOR DEPRESSIVE DISORDER, IN REMISSION (H): ICD-10-CM

## 2025-01-10 ENCOUNTER — HOSPITAL ENCOUNTER (OUTPATIENT)
Facility: CLINIC | Age: 70
Discharge: HOME | End: 2025-01-10
Attending: SURGERY | Admitting: SURGERY
Payer: COMMERCIAL

## 2025-01-10 VITALS
HEART RATE: 56 BPM | RESPIRATION RATE: 16 BRPM | SYSTOLIC BLOOD PRESSURE: 146 MMHG | TEMPERATURE: 97.9 F | OXYGEN SATURATION: 98 % | DIASTOLIC BLOOD PRESSURE: 92 MMHG

## 2025-01-10 LAB — COLONOSCOPY: NORMAL

## 2025-01-10 PROCEDURE — 370N000017 HC ANESTHESIA TECHNICAL FEE, PER MIN: Performed by: SURGERY

## 2025-01-10 PROCEDURE — 45382 COLONOSCOPY W/CONTROL BLEED: CPT | Performed by: SURGERY

## 2025-01-10 PROCEDURE — 258N000003 HC RX IP 258 OP 636: Performed by: NURSE ANESTHETIST, CERTIFIED REGISTERED

## 2025-01-10 PROCEDURE — 250N000009 HC RX 250: Performed by: SURGERY

## 2025-01-10 RX ORDER — NALOXONE HYDROCHLORIDE 0.4 MG/ML
0.4 INJECTION, SOLUTION INTRAMUSCULAR; INTRAVENOUS; SUBCUTANEOUS
Status: CANCELLED | OUTPATIENT
Start: 2025-01-10

## 2025-01-10 RX ORDER — NALOXONE HYDROCHLORIDE 0.4 MG/ML
0.2 INJECTION, SOLUTION INTRAMUSCULAR; INTRAVENOUS; SUBCUTANEOUS
Status: CANCELLED | OUTPATIENT
Start: 2025-01-10

## 2025-01-10 RX ORDER — ONDANSETRON 2 MG/ML
4 INJECTION INTRAMUSCULAR; INTRAVENOUS
Status: DISCONTINUED | OUTPATIENT
Start: 2025-01-10 | End: 2025-01-10 | Stop reason: HOSPADM

## 2025-01-10 RX ORDER — FLUMAZENIL 0.1 MG/ML
0.2 INJECTION, SOLUTION INTRAVENOUS
Status: CANCELLED | OUTPATIENT
Start: 2025-01-10 | End: 2025-01-11

## 2025-01-10 RX ORDER — LIDOCAINE 40 MG/G
CREAM TOPICAL
Status: DISCONTINUED | OUTPATIENT
Start: 2025-01-10 | End: 2025-01-10 | Stop reason: HOSPADM

## 2025-01-10 RX ORDER — SODIUM CHLORIDE, SODIUM LACTATE, POTASSIUM CHLORIDE, CALCIUM CHLORIDE 600; 310; 30; 20 MG/100ML; MG/100ML; MG/100ML; MG/100ML
INJECTION, SOLUTION INTRAVENOUS CONTINUOUS
Status: DISCONTINUED | OUTPATIENT
Start: 2025-01-10 | End: 2025-01-10 | Stop reason: HOSPADM

## 2025-01-10 RX ADMIN — LIDOCAINE HYDROCHLORIDE 0.2 ML: 10 INJECTION, SOLUTION EPIDURAL; INFILTRATION; INTRACAUDAL; PERINEURAL at 12:02

## 2025-01-10 RX ADMIN — SODIUM CHLORIDE, POTASSIUM CHLORIDE, SODIUM LACTATE AND CALCIUM CHLORIDE: 600; 310; 30; 20 INJECTION, SOLUTION INTRAVENOUS at 12:03

## 2025-01-10 ASSESSMENT — ACTIVITIES OF DAILY LIVING (ADL)
ADLS_ACUITY_SCORE: 50

## 2025-01-10 NOTE — H&P
[facility]    Pre-Endoscopy History and Physical     Ghulam Rizzo MRN# [id number]   YOB: 1955 Age: 69 year old     Date of Procedure: 1/10/2025  Primary care provider: Jamie Rutledge  Type of Endoscopy: Flexible sigmoidoscopy with treatment of radiation proctitis  Reason for Procedure: Radiation proctitis  Type of Anesthesia Anticipated: Conscious Sedation    HPI:    Ghulam is a 69 year old male who will be undergoing the above procedure.      Ongoing BRBPR.  Improved from prior but persistent.  Some looser stools.  No other changes.    A history and physical has been performed. The patient's medications and allergies have been reviewed. The risks and benefits of the procedure and the sedation options and risks were discussed with the patient.  All questions were answered and informed consent was obtained.      He denies a personal or family history of anesthesia complications or bleeding disorders.     Patient Active Problem List   Diagnosis    Hyperlipidemia LDL goal <130    Tobacco abuse    Abdominal pain, generalized    Cirrhosis of liver without ascites (H)    Proctitis    Other irritable bowel syndrome    Screening for hypertension    Elevated prostate specific antigen (PSA)    Cecal volvulus (H)    Saccular aneurysm    Benign essential hypertension    Compression fracture of L4 vertebra (H)    Compression fracture of L3 lumbar vertebra, with routine healing, subsequent encounter    Adenocarcinoma of prostate (H)    Vertigo    Recurrent major depressive disorder, in remission        Past Medical History:   Diagnosis Date    Anisocoria     Asthma     Carpal tunnel syndrome     2006    Head injury, unspecified     closed head injury 4 yrs ago    Hepatitis C     Cured by Harvoni in 2015. diagnosed in 2012 was positive for Hepatitis C.     Obesity     2006, resolved    Other convulsions     seizure disorder due to head injury 4 yrs ago.    Other mononeuritis of upper limb     L  phrenic nerve paralysis    Unspecified essential hypertension         Past Surgical History:   Procedure Laterality Date    COLECTOMY WITHOUT COLOSTOMY Right 7/13/2022    Procedure: Laparotomy, right colectomy, lysis of adhesions,;  Surgeon: Tommy Martino DO;  Location: [facility]    COLONOSCOPY      COLONOSCOPY N/A 7/18/2023    Procedure: Colonoscopy with polypectomy;  Surgeon: Octavio Aguirre MD;  Location: [facility]    COLONOSCOPY N/A 6/10/2024    Procedure: Colonoscopy;  Surgeon: Tommy Martino DO;  Location: [facility]    ESOPHAGOSCOPY, GASTROSCOPY, DUODENOSCOPY (EGD), COMBINED N/A 6/10/2024    Procedure: Esophagoscopy, gastroscopy, duodenoscopy (EGD), combined;  Surgeon: Tommy Martino DO;  Location: [facility]    GI SURGERY      HERNIA REPAIR      HERNIORRHAPHY INCISIONAL (LOCATION) N/A 7/13/2022    Procedure: primary incisional hernia repair;  Surgeon: Tommy Martino DO;  Location: [facility]    INSERT SEED MARKER / MATRIX N/A 8/30/2023    Procedure: Transrectal ultrasound guided placement of fiducials and SpaceOar;  Surgeon: Chase Blount MD;  Location:  OR    SURGICAL HISTORY OF -   4/6/87    esophagogastroduodenoscopy    SURGICAL HISTORY OF -   4/27/87    exploratory laparotomy and anterior gastropexy over a gastrostomy tube.    SURGICAL HISTORY OF -   11/13/90    esophagogastroduodenoscopy    SURGICAL HISTORY OF -   1991    L diaphragm eventration repair, fixation of stomach and colon to abd wall    SURGICAL HISTORY OF -       hiatal hernia repair    THORACIC SURGERY         Social History     Tobacco Use    Smoking status: Former     Current packs/day: 0.50     Average packs/day: 0.5 packs/day for 50.0 years (25.0 ttl pk-yrs)     Types: Cigarettes    Smokeless tobacco: Never   Substance Use Topics    Alcohol use: Yes     Comment: 1-2 beers daily       Family History   Problem Relation Age of Onset    Cerebrovascular Disease Mother     Heart Disease Brother      Heart Disease Brother        Prior to Admission medications    Medication Sig Start Date End Date Taking? Authorizing Provider   acetaminophen (TYLENOL) 500 MG tablet Take 1,000 mg by mouth every 6 hours as needed for mild pain   Yes Reported, Patient   amLODIPine (NORVASC) 5 MG tablet Take 1 tablet (5 mg) by mouth daily 12/22/23  Yes Jamie Rutledge,    Calcium Carbonate (CALCIUM-CARB 600 PO) Take 2 tablets by mouth daily   Yes Reported, Patient   cyanocobalamin (VITAMIN B-12) 1000 MCG tablet Take 1 tablet (1,000 mcg) by mouth daily 9/13/23  Yes Chip Pinon MD   hydrocortisone 2.5 % cream Apply topically 2 times daily Apply with rectal applicator twice daily 6/11/24  Yes Tommy Martino,    polyethylene glycol (MIRALAX) 17 GM/Dose powder Take 17 g (1 capful.) by mouth daily 3/21/23  Yes Jamie Rutledge,    senna-docusate (NEGRITO-COLACE) 8.6-50 MG per tablet Take 1-2 tablets by mouth 2 times daily as needed for constipation. 1/9/13  Yes Luiz Eckert MD   sertraline (ZOLOFT) 50 MG tablet Take 1 tablet by mouth daily 12/23/24  Yes Jamie Rutledge DO   tamsulosin (FLOMAX) 0.4 MG capsule Take 1 capsule (0.4 mg) by mouth every evening 6/7/24  Yes Yulissa Longoria PA-C   vitamin D3 (CHOLECALCIFEROL) 50 mcg (2000 units) tablet Take 1 tablet (50 mcg) by mouth daily 4/17/23  Yes Jamie Rutledge DO   calcitonin, salmon, (MIACALCIN) 200 UNIT/ACT nasal spray Spray 1 spray into one nostril alternating nostrils daily for 30 days Alternate nostril each day.  Patient not taking: Reported on 9/4/2024 4/17/23 5/17/23  Jamie Rutledge DO   glycerin (LAXATIVE) 1.2 g suppository Place 1 suppository rectally daily as needed (constipation.) 3/21/23   Jamie Rutledeg,    hydrocortisone-pramoxine (PROCTOFOAM-HC) 1-1 % rectal foam Place 1 Applicatorful rectally 3 times daily  Patient not taking: Reported on 9/4/2024 6/10/24   Tommy Martino,    linaclotide (LINZESS) 145 MCG capsule Take 1 capsule  "(145 mcg) by mouth every morning (before breakfast)  Patient not taking: Reported on 9/4/2024 10/10/23   Jamie Rutledge DO   loperamide (IMODIUM A-D) 2 MG tablet Take 1 tablet (2 mg) by mouth 4 times daily as needed for diarrhea. 10/13/24   Patience Banks DO   methylcellulose (CITRUCEL) powder Citrucel powder: 2 g (1 heaping tablespoon) in 8 oz (240 mL) of cold water; increase as needed by 1 heaping tablespoon up to 3 times/day 9/27/24   Jamie Rutledge DO       Allergies   Allergen Reactions    Levaquin [Levofloxacin] Nausea and Vomiting    Oxycodone Dizziness    Percocet [Oxycodone-Acetaminophen] Visual Disturbance     hallucinations        REVIEW OF SYSTEMS:   5 point ROS negative except as noted above in HPI, including Gen., Resp., CV, GI &  system review.    PHYSICAL EXAM:   BP (!) 145/98   Pulse 74   Temp 98  F (36.7  C) (Oral)   Resp 16   SpO2 100%  Estimated body mass index is 21.9 kg/m  as calculated from the following:    Height as of 10/13/24: 1.854 m (6' 1\").    Weight as of 10/13/24: 75.3 kg (166 lb).   Constitutional: Awake, alert, no acute distress.  Eyes: No scleral icterus.  Conjunctiva are without injection.  ENMT: Mucous membranes moist, dentition and gums are intact.   Neck: Soft, supple, trachea midline.    Endocrine: n/a   Lymphatic: There is no cervical, submandibularadenopathy.  Respiratory: normal effortgs   Cardiovascular: S1, S2  Abdomen: Non-distended, non-tender,  No masses,  Musculoskeletal: No spinal or CVA tenderness. Full range of motion in the upper and lower extremities.    Skin: No skin rashes or lesions to inspection.  No petechia.    Neurologic: alerted and oriented 3x  Psychiatric: The patient's affect is not blunted and mood is appropriate.  DIAGNOSTICS:    Not indicated    IMPRESSION   ASA Class 4 - Severe systemic disease, but not incapacitating    PLAN:   Plan for Flexible Sigmoidoscopy with possible biopsy, possible polypectomy, possible colonscopy. We " discussed the risks, benefits and alternatives and the patient wished to proceed.  Patient is cleared for the above procedure.    The above has been forwarded to the consulting provider.    Tommy Martino DO  [facility]

## 2025-02-20 ENCOUNTER — HOSPITAL ENCOUNTER (EMERGENCY)
Facility: CLINIC | Age: 70
Discharge: HOME | End: 2025-02-20
Attending: EMERGENCY MEDICINE | Admitting: EMERGENCY MEDICINE
Payer: COMMERCIAL

## 2025-02-20 ENCOUNTER — APPOINTMENT (OUTPATIENT)
Dept: CT IMAGING | Facility: CLINIC | Age: 70
End: 2025-02-20
Attending: EMERGENCY MEDICINE
Payer: COMMERCIAL

## 2025-02-20 VITALS
TEMPERATURE: 98.7 F | OXYGEN SATURATION: 100 % | DIASTOLIC BLOOD PRESSURE: 101 MMHG | SYSTOLIC BLOOD PRESSURE: 166 MMHG | RESPIRATION RATE: 16 BRPM | WEIGHT: 174 LBS | HEART RATE: 67 BPM | BODY MASS INDEX: 22.96 KG/M2

## 2025-02-20 DIAGNOSIS — K52.9 PROCTOCOLITIS: ICD-10-CM

## 2025-02-20 LAB
ALBUMIN SERPL BCG-MCNC: 4.1 G/DL (ref 3.5–5.2)
ALP SERPL-CCNC: 96 U/L (ref 40–150)
ALT SERPL W P-5'-P-CCNC: 12 U/L (ref 0–70)
ANION GAP SERPL CALCULATED.3IONS-SCNC: 11 MMOL/L (ref 7–15)
AST SERPL W P-5'-P-CCNC: 15 U/L (ref 0–45)
BASOPHILS # BLD AUTO: 0.1 10E3/UL (ref 0–0.2)
BASOPHILS NFR BLD AUTO: 1 %
BILIRUB SERPL-MCNC: 0.7 MG/DL
BUN SERPL-MCNC: 10.3 MG/DL (ref 8–23)
CALCIUM SERPL-MCNC: 9.2 MG/DL (ref 8.8–10.4)
CHLORIDE SERPL-SCNC: 99 MMOL/L (ref 98–107)
CREAT SERPL-MCNC: 0.74 MG/DL (ref 0.67–1.17)
EGFRCR SERPLBLD CKD-EPI 2021: >90 ML/MIN/1.73M2
EOSINOPHIL # BLD AUTO: 0.1 10E3/UL (ref 0–0.7)
EOSINOPHIL NFR BLD AUTO: 1 %
ERYTHROCYTE [DISTWIDTH] IN BLOOD BY AUTOMATED COUNT: 12.4 % (ref 10–15)
GLUCOSE SERPL-MCNC: 98 MG/DL (ref 70–99)
HCO3 SERPL-SCNC: 23 MMOL/L (ref 22–29)
HCT VFR BLD AUTO: 42.7 % (ref 40–53)
HGB BLD-MCNC: 15.4 G/DL (ref 13.3–17.7)
HOLD SPECIMEN: NORMAL
IMM GRANULOCYTES # BLD: 0 10E3/UL
IMM GRANULOCYTES NFR BLD: 0 %
LIPASE SERPL-CCNC: 29 U/L (ref 13–60)
LYMPHOCYTES # BLD AUTO: 1.4 10E3/UL (ref 0.8–5.3)
LYMPHOCYTES NFR BLD AUTO: 22 %
MCH RBC QN AUTO: 33.3 PG (ref 26.5–33)
MCHC RBC AUTO-ENTMCNC: 36.1 G/DL (ref 31.5–36.5)
MCV RBC AUTO: 92 FL (ref 78–100)
MONOCYTES # BLD AUTO: 0.6 10E3/UL (ref 0–1.3)
MONOCYTES NFR BLD AUTO: 9 %
NEUTROPHILS # BLD AUTO: 4.4 10E3/UL (ref 1.6–8.3)
NEUTROPHILS NFR BLD AUTO: 67 %
NRBC # BLD AUTO: 0 10E3/UL
NRBC BLD AUTO-RTO: 0 /100
PLATELET # BLD AUTO: 180 10E3/UL (ref 150–450)
POTASSIUM SERPL-SCNC: 4.5 MMOL/L (ref 3.4–5.3)
PROT SERPL-MCNC: 6.6 G/DL (ref 6.4–8.3)
RBC # BLD AUTO: 4.63 10E6/UL (ref 4.4–5.9)
SODIUM SERPL-SCNC: 133 MMOL/L (ref 135–145)
WBC # BLD AUTO: 6.6 10E3/UL (ref 4–11)

## 2025-02-20 PROCEDURE — 250N000011 HC RX IP 250 OP 636: Performed by: EMERGENCY MEDICINE

## 2025-02-20 PROCEDURE — 96360 HYDRATION IV INFUSION INIT: CPT | Mod: 59 | Performed by: EMERGENCY MEDICINE

## 2025-02-20 PROCEDURE — 84155 ASSAY OF PROTEIN SERUM: CPT | Performed by: EMERGENCY MEDICINE

## 2025-02-20 PROCEDURE — 250N000009 HC RX 250: Performed by: EMERGENCY MEDICINE

## 2025-02-20 PROCEDURE — 36415 COLL VENOUS BLD VENIPUNCTURE: CPT | Performed by: EMERGENCY MEDICINE

## 2025-02-20 PROCEDURE — 258N000003 HC RX IP 258 OP 636: Performed by: EMERGENCY MEDICINE

## 2025-02-20 PROCEDURE — 99284 EMERGENCY DEPT VISIT MOD MDM: CPT | Performed by: EMERGENCY MEDICINE

## 2025-02-20 PROCEDURE — 85004 AUTOMATED DIFF WBC COUNT: CPT | Performed by: EMERGENCY MEDICINE

## 2025-02-20 PROCEDURE — 74177 CT ABD & PELVIS W/CONTRAST: CPT

## 2025-02-20 PROCEDURE — 83690 ASSAY OF LIPASE: CPT | Performed by: EMERGENCY MEDICINE

## 2025-02-20 PROCEDURE — 99285 EMERGENCY DEPT VISIT HI MDM: CPT | Mod: 25 | Performed by: EMERGENCY MEDICINE

## 2025-02-20 PROCEDURE — 250N000013 HC RX MED GY IP 250 OP 250 PS 637: Performed by: EMERGENCY MEDICINE

## 2025-02-20 PROCEDURE — 85014 HEMATOCRIT: CPT | Performed by: EMERGENCY MEDICINE

## 2025-02-20 PROCEDURE — 96361 HYDRATE IV INFUSION ADD-ON: CPT | Performed by: EMERGENCY MEDICINE

## 2025-02-20 RX ORDER — ACETAMINOPHEN 500 MG
1000 TABLET ORAL ONCE
Status: COMPLETED | OUTPATIENT
Start: 2025-02-20 | End: 2025-02-20

## 2025-02-20 RX ORDER — HYDROCODONE BITARTRATE AND ACETAMINOPHEN 5; 325 MG/1; MG/1
1 TABLET ORAL EVERY 6 HOURS PRN
Qty: 10 TABLET | Refills: 0 | Status: SHIPPED | OUTPATIENT
Start: 2025-02-20

## 2025-02-20 RX ORDER — IOPAMIDOL 755 MG/ML
84 INJECTION, SOLUTION INTRAVASCULAR ONCE
Status: COMPLETED | OUTPATIENT
Start: 2025-02-20 | End: 2025-02-20

## 2025-02-20 RX ADMIN — SODIUM CHLORIDE 61 ML: 9 INJECTION, SOLUTION INTRAVENOUS at 13:26

## 2025-02-20 RX ADMIN — ACETAMINOPHEN 1000 MG: 500 TABLET, FILM COATED ORAL at 12:34

## 2025-02-20 RX ADMIN — SODIUM CHLORIDE, SODIUM LACTATE, POTASSIUM CHLORIDE, AND CALCIUM CHLORIDE 1000 ML: .6; .31; .03; .02 INJECTION, SOLUTION INTRAVENOUS at 12:05

## 2025-02-20 RX ADMIN — IOPAMIDOL 84 ML: 755 INJECTION, SOLUTION INTRAVENOUS at 13:25

## 2025-02-20 ASSESSMENT — COLUMBIA-SUICIDE SEVERITY RATING SCALE - C-SSRS
6. HAVE YOU EVER DONE ANYTHING, STARTED TO DO ANYTHING, OR PREPARED TO DO ANYTHING TO END YOUR LIFE?: NO
2. HAVE YOU ACTUALLY HAD ANY THOUGHTS OF KILLING YOURSELF IN THE PAST MONTH?: NO
1. IN THE PAST MONTH, HAVE YOU WISHED YOU WERE DEAD OR WISHED YOU COULD GO TO SLEEP AND NOT WAKE UP?: NO

## 2025-02-20 ASSESSMENT — ACTIVITIES OF DAILY LIVING (ADL)
ADLS_ACUITY_SCORE: 50

## 2025-02-20 NOTE — ED PROVIDER NOTES
History     Chief Complaint   Patient presents with    Abdominal Pain     HPI  Ghulam Rizzo is a 69 year old male who presents for abdominal pain.  The patient says that he has been feeling worse over the past 2 or 3 days, feels like he cannot have a bowel movement.  He has had some nausea but no vomiting.  No fevers or chills.  No dysuria or hematuria.  He developed a headache last night, got worse over several hours.  Headache is posterior.  He has not taking thing for this.  He reports he has been eating and drinking okay.    Allergies:  Allergies   Allergen Reactions    Levaquin [Levofloxacin] Nausea and Vomiting    Oxycodone Dizziness    Percocet [Oxycodone-Acetaminophen] Visual Disturbance     hallucinations       Problem List:    Patient Active Problem List    Diagnosis Date Noted    Recurrent major depressive disorder, in remission 12/22/2023     Priority: Medium    Vertigo 07/05/2023     Priority: Medium    Adenocarcinoma of prostate (H) 05/16/2023     Priority: Medium    Compression fracture of L4 vertebra (H) 05/09/2023     Priority: Medium    Compression fracture of L3 lumbar vertebra, with routine healing, subsequent encounter 05/09/2023     Priority: Medium    Benign essential hypertension 03/01/2023     Priority: Medium    Saccular aneurysm 02/07/2023     Priority: Medium    Cecal volvulus (H) 07/13/2022     Priority: Medium    Elevated prostate specific antigen (PSA) 02/09/2022     Priority: Medium    Other irritable bowel syndrome 11/07/2019     Priority: Medium    Screening for hypertension 11/07/2019     Priority: Medium    Proctitis 01/26/2017     Priority: Medium     Colonoscopy in 2013.       Cirrhosis of liver without ascites (H) 09/23/2015     Priority: Medium     Thought due to hepatitis C.  Follows with [facility].  Completed [facility]oni 9/2015.      Abdominal pain, generalized 08/20/2015     Priority: Medium    Hyperlipidemia LDL goal <130 07/16/2012     Priority: Medium    Tobacco abuse  07/16/2012     Priority: Medium        Past Medical History:    Past Medical History:   Diagnosis Date    Anisocoria     Asthma     Carpal tunnel syndrome     Head injury, unspecified     Hepatitis C     Obesity     Other convulsions     Other mononeuritis of upper limb     Unspecified essential hypertension        Past Surgical History:    Past Surgical History:   Procedure Laterality Date    COLECTOMY WITHOUT COLOSTOMY Right 7/13/2022    Procedure: Laparotomy, right colectomy, lysis of adhesions,;  Surgeon: Tommy Martino DO;  Location: [facility]    COLONOSCOPY      COLONOSCOPY N/A 7/18/2023    Procedure: Colonoscopy with polypectomy;  Surgeon: Octavio Aguirre MD;  Location: [facility]    COLONOSCOPY N/A 6/10/2024    Procedure: Colonoscopy;  Surgeon: Tommy Martino DO;  Location: [facility]    ESOPHAGOSCOPY, GASTROSCOPY, DUODENOSCOPY (EGD), COMBINED N/A 6/10/2024    Procedure: Esophagoscopy, gastroscopy, duodenoscopy (EGD), combined;  Surgeon: Tommy Martino DO;  Location: [facility]    GI SURGERY      HERNIA REPAIR      HERNIORRHAPHY INCISIONAL (LOCATION) N/A 7/13/2022    Procedure: primary incisional hernia repair;  Surgeon: Tommy Martino DO;  Location: [facility]    INSERT SEED MARKER / MATRIX N/A 8/30/2023    Procedure: Transrectal ultrasound guided placement of fiducials and SpaceOar;  Surgeon: Chase lBount MD;  Location:  OR    SURGICAL HISTORY OF -   4/6/87    esophagogastroduodenoscopy    SURGICAL HISTORY OF -   4/27/87    exploratory laparotomy and anterior gastropexy over a gastrostomy tube.    SURGICAL HISTORY OF -   11/13/90    esophagogastroduodenoscopy    SURGICAL HISTORY OF -   1991    L diaphragm eventration repair, fixation of stomach and colon to abd wall    SURGICAL HISTORY OF -       hiatal hernia repair    THORACIC SURGERY         Family History:    Family History   Problem Relation Age of Onset    Cerebrovascular Disease Mother     Heart Disease  Brother     Heart Disease Brother        Social History:  Marital Status:   [2]  Social History     Tobacco Use    Smoking status: Former     Current packs/day: 0.50     Average packs/day: 0.5 packs/day for 50.0 years (25.0 ttl pk-yrs)     Types: Cigarettes    Smokeless tobacco: Never   Vaping Use    Vaping status: Never Used   Substance Use Topics    Alcohol use: Yes     Comment: 1-2 beers daily    Drug use: No        Medications:    HYDROcodone-acetaminophen (NORCO) 5-325 MG tablet  acetaminophen (TYLENOL) 500 MG tablet  amLODIPine (NORVASC) 5 MG tablet  calcitonin, salmon, (MIACALCIN) 200 UNIT/ACT nasal spray  Calcium Carbonate (CALCIUM-CARB 600 PO)  cyanocobalamin (VITAMIN B-12) 1000 MCG tablet  glycerin (LAXATIVE) 1.2 g suppository  hydrocortisone 2.5 % cream  hydrocortisone-pramoxine (PROCTOFOAM-HC) 1-1 % rectal foam  linaclotide (LINZESS) 145 MCG capsule  loperamide (IMODIUM A-D) 2 MG tablet  methylcellulose (CITRUCEL) powder  polyethylene glycol (MIRALAX) 17 GM/Dose powder  senna-docusate (NEGRITO-COLACE) 8.6-50 MG per tablet  sertraline (ZOLOFT) 50 MG tablet  tamsulosin (FLOMAX) 0.4 MG capsule  vitamin D3 (CHOLECALCIFEROL) 50 mcg (2000 units) tablet          Review of Systems    Physical Exam   BP: (!) 166/101  Pulse: 67  Temp: 98.7  F (37.1  C)  Resp: 16  Weight: 78.9 kg (174 lb)  SpO2: 100 %      Physical Exam  Constitutional:       General: He is in acute distress.      Appearance: He is well-developed.   HENT:      Head: Normocephalic and atraumatic.   Cardiovascular:      Rate and Rhythm: Normal rate.   Pulmonary:      Effort: No respiratory distress.      Breath sounds: No stridor.   Abdominal:      Tenderness: There is generalized abdominal tenderness.      Hernia: No hernia is present.   Genitourinary:     Penis: Normal.       Testes: Normal.   Skin:     General: Skin is warm and dry.   Neurological:      Mental Status: He is alert.         ED Course        Procedures              Critical Care  time:  none              Results for orders placed or performed during the hospital encounter of 02/20/25 (from the past 24 hours)   CBC with Platelets & Differential    Narrative    The following orders were created for panel order CBC with Platelets & Differential.  Procedure                               Abnormality         Status                     ---------                               -----------         ------                     CBC with platelets and d...[[id number]]  Abnormal            Final result                 Please view results for these tests on the individual orders.   Comprehensive metabolic panel   Result Value Ref Range    Sodium 133 (L) 135 - 145 mmol/L    Potassium 4.5 3.4 - 5.3 mmol/L    Carbon Dioxide (CO2) 23 22 - 29 mmol/L    Anion Gap 11 7 - 15 mmol/L    Urea Nitrogen 10.3 8.0 - 23.0 mg/dL    Creatinine 0.74 0.67 - 1.17 mg/dL    GFR Estimate >90 >60 mL/min/1.73m2    Calcium 9.2 8.8 - 10.4 mg/dL    Chloride 99 98 - 107 mmol/L    Glucose 98 70 - 99 mg/dL    Alkaline Phosphatase 96 40 - 150 U/L    AST 15 0 - 45 U/L    ALT 12 0 - 70 U/L    Protein Total 6.6 6.4 - 8.3 g/dL    Albumin 4.1 3.5 - 5.2 g/dL    Bilirubin Total 0.7 <=1.2 mg/dL   Lipase   Result Value Ref Range    Lipase 29 13 - 60 U/L   [place] Draw    Narrative    The following orders were created for panel order [place] Draw.  Procedure                               Abnormality         Status                     ---------                               -----------         ------                     Extra Blue Top Tube[[id number]]                              Final result                 Please view results for these tests on the individual orders.   CBC with platelets and differential   Result Value Ref Range    WBC Count 6.6 4.0 - 11.0 10e3/uL    RBC Count 4.63 4.40 - 5.90 10e6/uL    Hemoglobin 15.4 13.3 - 17.7 g/dL    Hematocrit 42.7 40.0 - 53.0 %    MCV 92 78 - 100 fL    MCH 33.3 (H) 26.5 - 33.0 pg    MCHC 36.1 31.5 - 36.5 g/dL     RDW 12.4 10.0 - 15.0 %    Platelet Count 180 150 - 450 10e3/uL    % Neutrophils 67 %    % Lymphocytes 22 %    % Monocytes 9 %    % Eosinophils 1 %    % Basophils 1 %    % Immature Granulocytes 0 %    NRBCs per 100 WBC 0 <1 /100    Absolute Neutrophils 4.4 1.6 - 8.3 10e3/uL    Absolute Lymphocytes 1.4 0.8 - 5.3 10e3/uL    Absolute Monocytes 0.6 0.0 - 1.3 10e3/uL    Absolute Eosinophils 0.1 0.0 - 0.7 10e3/uL    Absolute Basophils 0.1 0.0 - 0.2 10e3/uL    Absolute Immature Granulocytes 0.0 <=0.4 10e3/uL    Absolute NRBCs 0.0 10e3/uL   Extra Blue Top Tube   Result Value Ref Range    Hold Specimen JIC    CT Abdomen Pelvis w Contrast    Narrative    EXAM: CT ABDOMEN PELVIS W CONTRAST  LOCATION: [facility]  DATE: 2/20/2025    INDICATION: Diffuse abdominal pain, nausea.  COMPARISON: 10/13/2024  TECHNIQUE: CT scan of the abdomen and pelvis was performed following injection of IV contrast. Multiplanar reformats were obtained. Dose reduction techniques were used.  CONTRAST: 84 mL isovue 370    FINDINGS:   LOWER CHEST: No pneumonic consolidation or pleural effusion. Moderate elevation of the left hemidiaphragm is unchanged with mild compressive atelectasis in the left lung base, stable.    HEPATOBILIARY: Scattered tiny cysts again seen in the liver measuring up to 12 mm in the left hepatic lobe. Multiple calcified granulomas also redemonstrated. Mild surface nodularity of the inferior right hepatic lobe is stable, nonspecific. Liver is   otherwise normal in size without suspicious lesions. Gallbladder is normal.    PANCREAS: Normal.    SPLEEN: Normal.    ADRENAL GLANDS: Normal.    KIDNEYS/BLADDER: No urinary stones or hydroureteronephrosis. Right renal cysts and additional too small to characterize cystic lesions in both kidneys that are visually benign are stable without specific follow-up recommendations. Mild diffuse wall   thickening of the urinary bladder with perivesicular fat stranding  redemonstrated, unchanged from prior.    BOWEL: Mild mucosal enhancement of the rectosigmoid colon with fluid in the upstream colon. An ileocolonic anastomosis again seen in the right abdomen. No bowel obstruction. Perirectal fat stranding seen on prior study is significantly improved. No free   fluid or free air.    LYMPH NODES: Normal.    VASCULATURE: Moderate aortoiliac atherosclerotic calcifications. No abdominal aortic aneurysm.    PELVIC ORGANS: Prostate gland is upper limits of normal in size with radiation seeds in place, unchanged.    MUSCULOSKELETAL: Stable moderate L3 compression deformity. No suspicious osseous lesions or acute fractures.        Impression    IMPRESSION:     1.  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    2.  Mild mucosal enhancement of the rectosigmoid colon with fluid in the upstream colon. Perirectal fat stranding seen on prior study is nearly resolved. While these findings may represent improved postradiation rectosigmoid colitis, infectious etiology   is not fully excluded.       Medications   lactated ringers BOLUS 1,000 mL (0 mLs Intravenous Stopped 2/20/25 1411)   acetaminophen (TYLENOL) tablet 1,000 mg (1,000 mg Oral $Given 2/20/25 1234)   iopamidol (ISOVUE-370) solution 84 mL (84 mLs Intravenous $Given 2/20/25 1325)   sodium chloride 0.9 % bag 500 mL for CT scan flush use (61 mLs Intravenous $Given 2/20/25 1326)       Assessments & Plan (with Medical Decision Making)   6-year-old male with multiple prior abdominal surgeries presents with abdominal pain.  High risk presentation given his prior abdominal surgeries.  He is given acetaminophen for the pain.  CT of the abdomen pelvis obtained, images interpreted independently as well as radiology reviewed, he does have some mild inflammation around his distal colon and I believe this likely represents  the cause of his symptoms.  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  Hospitalization is considered however at this time he is well-appearing and nontoxic and appears safe to be treated at home.  We discussed eating a low fiber diet and drinking plenty of fluids.  He is told to return if worse, otherwise follow-up in clinic.  He is given a short course of hydrocodone just acetaminophen Stephon with his symptoms.  The patient is in agreement with this plan.    I have reviewed the nursing notes.    I have reviewed the findings, diagnosis, plan and need for follow up with the patient.           Medical Decision Making  The patient's presentation was of high complexity (a chronic illness severe exacerbation, progression, or side effect of treatment).    The patient's evaluation involved:  ordering and/or review of 3+ test(s) in this encounter (see separate area of note for details)  independent interpretation of testing performed by another health professional (see separate area of note for details)    The patient's management necessitated high risk (a decision regarding hospitalization).        New Prescriptions    HYDROCODONE-ACETAMINOPHEN (NORCO) 5-325 MG TABLET    Take 1 tablet by mouth every 6 hours as needed for severe pain.       Final diagnoses:   Proctocolitis       2/20/2025   [facility] EMERGENCY DEPT       Rodriguez Priest MD  02/20/25 8710

## 2025-02-20 NOTE — DISCHARGE INSTRUCTIONS
Eat a low fiber diet with foods such as applesauce, bananas, rice, toast.  Drink plenty of fluids that contain electrolytes such as Pedialyte and soup broth.  Return if you are having fevers, increasing pain, repeated vomiting, or worsening symptoms.  I believe you will likely get better over the next several days.  If not getting better over several days follow-up for a recheck in clinic.

## 2025-02-20 NOTE — ED TRIAGE NOTES
Pt in for abdominal pain. Generalized pain, not able to localize. Pain increases when he depresses stomach. Pt states he has a history of C-diff, but may also be constipated. Last productive BM 2/18. Has had multiple abdominal surgeries. Nausea without vomiting. Also reports an occipital headache, reports an aneurysm that is being monitored.     Triage Assessment (Adult)       Row Name 02/20/25 1135          Triage Assessment    Airway WDL WDL        Respiratory WDL    Respiratory WDL WDL        Skin Circulation/Temperature WDL    Skin Circulation/Temperature WDL WDL        Cardiac WDL    Cardiac WDL WDL        Peripheral/Neurovascular WDL    Peripheral Neurovascular WDL WDL        Cognitive/Neuro/Behavioral WDL    Cognitive/Neuro/Behavioral WDL WDL

## 2025-03-03 ENCOUNTER — OFFICE VISIT (OUTPATIENT)
Dept: FAMILY MEDICINE | Facility: CLINIC | Age: 70
End: 2025-03-03
Payer: COMMERCIAL

## 2025-03-03 VITALS
TEMPERATURE: 96.9 F | HEART RATE: 71 BPM | RESPIRATION RATE: 20 BRPM | BODY MASS INDEX: 23.96 KG/M2 | HEIGHT: 73 IN | OXYGEN SATURATION: 98 % | WEIGHT: 180.8 LBS | SYSTOLIC BLOOD PRESSURE: 128 MMHG | DIASTOLIC BLOOD PRESSURE: 72 MMHG

## 2025-03-03 DIAGNOSIS — R10.9 ABDOMINAL DISCOMFORT: ICD-10-CM

## 2025-03-03 DIAGNOSIS — I10 BENIGN ESSENTIAL HYPERTENSION: ICD-10-CM

## 2025-03-03 DIAGNOSIS — E78.5 HYPERLIPIDEMIA LDL GOAL <130: Primary | ICD-10-CM

## 2025-03-03 DIAGNOSIS — Z92.3 S/P RADIATION THERAPY: ICD-10-CM

## 2025-03-03 DIAGNOSIS — F33.40 RECURRENT MAJOR DEPRESSIVE DISORDER, IN REMISSION: ICD-10-CM

## 2025-03-03 DIAGNOSIS — K52.9 COLITIS: ICD-10-CM

## 2025-03-03 PROCEDURE — 3074F SYST BP LT 130 MM HG: CPT | Performed by: FAMILY MEDICINE

## 2025-03-03 PROCEDURE — 96127 BRIEF EMOTIONAL/BEHAV ASSMT: CPT | Performed by: FAMILY MEDICINE

## 2025-03-03 PROCEDURE — 99214 OFFICE O/P EST MOD 30 MIN: CPT | Performed by: FAMILY MEDICINE

## 2025-03-03 PROCEDURE — 3078F DIAST BP <80 MM HG: CPT | Performed by: FAMILY MEDICINE

## 2025-03-03 PROCEDURE — 1125F AMNT PAIN NOTED PAIN PRSNT: CPT | Performed by: FAMILY MEDICINE

## 2025-03-03 PROCEDURE — 90662 IIV NO PRSV INCREASED AG IM: CPT | Performed by: FAMILY MEDICINE

## 2025-03-03 PROCEDURE — G0008 ADMIN INFLUENZA VIRUS VAC: HCPCS | Performed by: FAMILY MEDICINE

## 2025-03-03 PROCEDURE — G2211 COMPLEX E/M VISIT ADD ON: HCPCS | Performed by: FAMILY MEDICINE

## 2025-03-03 RX ORDER — AMLODIPINE BESYLATE 5 MG/1
5 TABLET ORAL DAILY
Qty: 90 TABLET | Refills: 3 | Status: SHIPPED | OUTPATIENT
Start: 2025-03-03

## 2025-03-03 ASSESSMENT — PATIENT HEALTH QUESTIONNAIRE - PHQ9
SUM OF ALL RESPONSES TO PHQ QUESTIONS 1-9: 4
SUM OF ALL RESPONSES TO PHQ QUESTIONS 1-9: 4
10. IF YOU CHECKED OFF ANY PROBLEMS, HOW DIFFICULT HAVE THESE PROBLEMS MADE IT FOR YOU TO DO YOUR WORK, TAKE CARE OF THINGS AT HOME, OR GET ALONG WITH OTHER PEOPLE: SOMEWHAT DIFFICULT

## 2025-03-03 ASSESSMENT — PAIN SCALES - GENERAL: PAINLEVEL_OUTOF10: MODERATE PAIN (4)

## 2025-03-03 NOTE — PROGRESS NOTES
Assessment & Plan     Abdominal discomfort  S/P radiation therapy- for prostate cancer   Colitis  -- continues to have issues with abdominal discomfort and rectal issues. No fevers.  Per radiation oncology visit on 12/3/2024 if still having issues should see GI. Referral to GI placed.   - Adult GI  Referral - Consult Only; Future  - Adult GI  Referral - Consult Only; Future    Recurrent major depressive disorder, in remission  Stable. Refill provided.   - sertraline (ZOLOFT) 50 MG tablet; Take 1 tablet by mouth daily    Benign essential hypertension  Stable. Refill provided.   - amLODIPine (NORVASC) 5 MG tablet; Take 1 tablet (5 mg) by mouth daily.      MED REC REQUIRED  Post Medication Reconciliation Status: discharge medications reconciled and changed, per note/orders    The risks, benefits and treatment options of prescribed medications or other treatments have been discussed with the patient. The patient verbalized their understanding and should call or follow up if no improvement or if they develop further problems.    The longitudinal plan of care for the diagnosis(es)/condition(s) as documented were addressed during this visit. Due to the added complexity in care, I will continue to support patient in the subsequent management and with ongoing continuity of care.        Lamar Parmar is a 69 year old, presenting for the following health issues:  ER F/U ([facility] on 2/20/25 for abd pain)        3/3/2025     3:24 PM   Additional Questions   Roomed by Claudette Wakefield   Accompanied by self         3/3/2025     3:24 PM   Patient Reported Additional Medications   Patient reports taking the following new medications none     HPI      ED/UC Followup:    Facility:  [facility]  Date of visit: 2/20/2025  Reason for visit: Abd pain  Current Status: Patient still having abdominal pain intermittently. Patient states he feels better than when he was in the ER. He states he took the Vicodin and  "that seemed to help. Now he is taking tylenol and that helps a little.      Continues to have some abdominal discomfort.   No fevers  Having issues with feeling like has to have a bowel movement in the morning but then nothing will come out. Will take his laxatives and this will help improve this.   Follows with radiation oncology, last visit on 12/3/2024. At that visit it was recommended patient see GI if continues to have issues.   No rectal creams currently.     Recent ED labs and CT reviewed with patient.     CT abdomen and pelvis 2/20/2025  IMPRESSION:      1.  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     2.  Mild mucosal enhancement of the rectosigmoid colon with fluid in the upstream colon. Perirectal fat stranding seen on prior study is nearly resolved. While these findings may represent improved postradiation rectosigmoid colitis, infectious etiology   is not fully excluded.          Objective    /72 (BP Location: Right arm, Patient Position: Sitting, Cuff Size: Adult Large)   Pulse 71   Temp 96.9  F (36.1  C) (Tympanic)   Resp 20   Ht 1.859 m (6' 1.19\")   Wt 82 kg (180 lb 12.8 oz)   SpO2 98%   BMI 23.73 kg/m    Body mass index is 23.73 kg/m .  Physical Exam   General: alert, cooperative, no acute distress   CV: RRR, no murmur  Resp: non-labored breathing, clear to auscultation, no wheezing or rales   Abdomen: Soft, non-tender, no guarding. No rebound.   Extremities: No peripheral edema, calves non-tender.     Signed Electronically by: Jamie Rutledge DO    "

## 2025-03-10 ENCOUNTER — HOSPITAL ENCOUNTER (EMERGENCY)
Facility: CLINIC | Age: 70
Discharge: HOME | End: 2025-03-10
Attending: FAMILY MEDICINE | Admitting: FAMILY MEDICINE
Payer: COMMERCIAL

## 2025-03-10 ENCOUNTER — APPOINTMENT (OUTPATIENT)
Dept: CT IMAGING | Facility: CLINIC | Age: 70
End: 2025-03-10
Attending: FAMILY MEDICINE
Payer: COMMERCIAL

## 2025-03-10 VITALS
WEIGHT: 180 LBS | TEMPERATURE: 98.6 F | SYSTOLIC BLOOD PRESSURE: 159 MMHG | BODY MASS INDEX: 23.1 KG/M2 | HEIGHT: 74 IN | DIASTOLIC BLOOD PRESSURE: 93 MMHG | RESPIRATION RATE: 18 BRPM | HEART RATE: 67 BPM | OXYGEN SATURATION: 100 %

## 2025-03-10 DIAGNOSIS — N30.40 CHRONIC RADIATION CYSTITIS: ICD-10-CM

## 2025-03-10 DIAGNOSIS — K62.7 CHRONIC RADIATION PROCTITIS: ICD-10-CM

## 2025-03-10 LAB
ALBUMIN SERPL BCG-MCNC: 4.1 G/DL (ref 3.5–5.2)
ALBUMIN UR-MCNC: 10 MG/DL
ALP SERPL-CCNC: 81 U/L (ref 40–150)
ALT SERPL W P-5'-P-CCNC: 13 U/L (ref 0–70)
ANION GAP SERPL CALCULATED.3IONS-SCNC: 11 MMOL/L (ref 7–15)
APPEARANCE UR: CLEAR
AST SERPL W P-5'-P-CCNC: 15 U/L (ref 0–45)
BASOPHILS # BLD AUTO: 0 10E3/UL (ref 0–0.2)
BASOPHILS NFR BLD AUTO: 1 %
BILIRUB SERPL-MCNC: 0.9 MG/DL
BILIRUB UR QL STRIP: NEGATIVE
BUN SERPL-MCNC: 8.2 MG/DL (ref 8–23)
CALCIUM SERPL-MCNC: 9.2 MG/DL (ref 8.8–10.4)
CHLORIDE SERPL-SCNC: 101 MMOL/L (ref 98–107)
COLOR UR AUTO: YELLOW
CREAT SERPL-MCNC: 0.76 MG/DL (ref 0.67–1.17)
EGFRCR SERPLBLD CKD-EPI 2021: >90 ML/MIN/1.73M2
EOSINOPHIL # BLD AUTO: 0 10E3/UL (ref 0–0.7)
EOSINOPHIL NFR BLD AUTO: 1 %
ERYTHROCYTE [DISTWIDTH] IN BLOOD BY AUTOMATED COUNT: 13.6 % (ref 10–15)
GLUCOSE SERPL-MCNC: 106 MG/DL (ref 70–99)
GLUCOSE UR STRIP-MCNC: NEGATIVE MG/DL
HCO3 SERPL-SCNC: 23 MMOL/L (ref 22–29)
HCT VFR BLD AUTO: 43.4 % (ref 40–53)
HGB BLD-MCNC: 15.3 G/DL (ref 13.3–17.7)
HGB UR QL STRIP: NEGATIVE
IMM GRANULOCYTES # BLD: 0.1 10E3/UL
IMM GRANULOCYTES NFR BLD: 1 %
KETONES UR STRIP-MCNC: 10 MG/DL
LEUKOCYTE ESTERASE UR QL STRIP: NEGATIVE
LYMPHOCYTES # BLD AUTO: 1.1 10E3/UL (ref 0.8–5.3)
LYMPHOCYTES NFR BLD AUTO: 23 %
MCH RBC QN AUTO: 33.2 PG (ref 26.5–33)
MCHC RBC AUTO-ENTMCNC: 35.3 G/DL (ref 31.5–36.5)
MCV RBC AUTO: 94 FL (ref 78–100)
MONOCYTES # BLD AUTO: 0.5 10E3/UL (ref 0–1.3)
MONOCYTES NFR BLD AUTO: 10 %
MUCOUS THREADS #/AREA URNS LPF: PRESENT /LPF
NEUTROPHILS # BLD AUTO: 2.9 10E3/UL (ref 1.6–8.3)
NEUTROPHILS NFR BLD AUTO: 64 %
NITRATE UR QL: NEGATIVE
NRBC # BLD AUTO: 0 10E3/UL
NRBC BLD AUTO-RTO: 0 /100
PH UR STRIP: 7 [PH] (ref 5–7)
PLATELET # BLD AUTO: 163 10E3/UL (ref 150–450)
POTASSIUM SERPL-SCNC: 4.3 MMOL/L (ref 3.4–5.3)
PROT SERPL-MCNC: 6.7 G/DL (ref 6.4–8.3)
RBC # BLD AUTO: 4.61 10E6/UL (ref 4.4–5.9)
RBC URINE: 4 /HPF
SODIUM SERPL-SCNC: 135 MMOL/L (ref 135–145)
SP GR UR STRIP: 1.02 (ref 1–1.03)
UROBILINOGEN UR STRIP-MCNC: NORMAL MG/DL
WBC # BLD AUTO: 4.6 10E3/UL (ref 4–11)
WBC URINE: <1 /HPF

## 2025-03-10 PROCEDURE — 99285 EMERGENCY DEPT VISIT HI MDM: CPT | Mod: 25

## 2025-03-10 PROCEDURE — 250N000009 HC RX 250: Performed by: FAMILY MEDICINE

## 2025-03-10 PROCEDURE — 81003 URINALYSIS AUTO W/O SCOPE: CPT | Performed by: FAMILY MEDICINE

## 2025-03-10 PROCEDURE — 84155 ASSAY OF PROTEIN SERUM: CPT | Performed by: FAMILY MEDICINE

## 2025-03-10 PROCEDURE — 250N000011 HC RX IP 250 OP 636: Performed by: FAMILY MEDICINE

## 2025-03-10 PROCEDURE — 85004 AUTOMATED DIFF WBC COUNT: CPT | Performed by: FAMILY MEDICINE

## 2025-03-10 PROCEDURE — 99284 EMERGENCY DEPT VISIT MOD MDM: CPT | Performed by: FAMILY MEDICINE

## 2025-03-10 PROCEDURE — 36415 COLL VENOUS BLD VENIPUNCTURE: CPT | Performed by: FAMILY MEDICINE

## 2025-03-10 PROCEDURE — 74177 CT ABD & PELVIS W/CONTRAST: CPT

## 2025-03-10 RX ORDER — HYDROMORPHONE HYDROCHLORIDE 2 MG/1
2 TABLET ORAL EVERY 6 HOURS PRN
Qty: 30 TABLET | Refills: 0 | Status: SHIPPED | OUTPATIENT
Start: 2025-03-10

## 2025-03-10 RX ORDER — IOPAMIDOL 755 MG/ML
88 INJECTION, SOLUTION INTRAVASCULAR ONCE
Status: COMPLETED | OUTPATIENT
Start: 2025-03-10 | End: 2025-03-10

## 2025-03-10 RX ADMIN — SODIUM CHLORIDE 62 ML: 9 INJECTION, SOLUTION INTRAVENOUS at 14:47

## 2025-03-10 RX ADMIN — IOPAMIDOL 88 ML: 755 INJECTION, SOLUTION INTRAVENOUS at 14:46

## 2025-03-10 ASSESSMENT — ACTIVITIES OF DAILY LIVING (ADL)
ADLS_ACUITY_SCORE: 50

## 2025-03-10 NOTE — ED TRIAGE NOTES
Pt reports increased abdominal pain and urinary retention over the past 2 days. Pt said he will void very little then it just stops. Denies blood in urine. Pt has had a catheter for urinary retention in the past.      Triage Assessment (Adult)       Row Name 03/10/25 1208          Triage Assessment    Airway WDL WDL        Respiratory WDL    Respiratory WDL WDL        Cardiac WDL    Cardiac WDL WDL        Cognitive/Neuro/Behavioral WDL    Cognitive/Neuro/Behavioral WDL WDL

## 2025-03-10 NOTE — ED PROVIDER NOTES
History     Chief Complaint   Patient presents with    Urinary Retention     1.5 days very little urine comes out    Abdominal Pain     Getting worse over the past few days     HPI    Ghulam Rizzo is a 69 year old male who comes in with 2 weeks of pelvic pain.  He has had multiple visits for the same symptoms over the past several months.  He had history of radiation therapy for prostate cancer completed radiation therapy in October 2023.  Since then he has had episodes of pelvic pain and has undergone advanced imaging with CT scan of the abdomen pelvis from 20 2025 showing persistent bladder wall thickening and mild perivesical fat stranding similar to his previous CT favored to represent postradiation cystitis.  And mild mucosal enhancement of the rectosigmoid colon with fluid in the upstream colon and with perirectal fat stranding seen on the previous study nearly resolved.  CT scan October 13, 2024 showed perivesicular and perirectal edema that could be secondary to radiation change although underlying cystitis was not excluded.  There was persistent mild mucosal enhancement slightly improved from previous scan.  CT scan September 15, 2024 showed inflammatory wall thickening of the distal sigmoid colon and rectum consistent with distal colitis and proctitis with adjacent edema and mild wall thickening of the urinary bladder which has been chronic.  CT scan of the abdomen pelvis August 18, 2024 showed similar findings.      The patient has struggled with chronic constipation.  He had a bowel movement yesterday that he describes as runny.  He had been taking stool softeners and cathartics.  He has not had melena or hematochezia.    He reports a sense of incomplete voiding and urinary urgency without dysuria.    Allergies:  Allergies   Allergen Reactions    Levaquin [Levofloxacin] Nausea and Vomiting    Oxycodone Dizziness    Percocet [Oxycodone-Acetaminophen] Visual Disturbance     hallucinations       Problem  List:    Patient Active Problem List    Diagnosis Date Noted    S/P radiation therapy 03/03/2025     Priority: Medium    Recurrent major depressive disorder, in remission 12/22/2023     Priority: Medium    Vertigo 07/05/2023     Priority: Medium    Adenocarcinoma of prostate (H) 05/16/2023     Priority: Medium    Compression fracture of L4 vertebra (H) 05/09/2023     Priority: Medium    Compression fracture of L3 lumbar vertebra, with routine healing, subsequent encounter 05/09/2023     Priority: Medium    Benign essential hypertension 03/01/2023     Priority: Medium    Saccular aneurysm 02/07/2023     Priority: Medium    Cecal volvulus (H) 07/13/2022     Priority: Medium    Elevated prostate specific antigen (PSA) 02/09/2022     Priority: Medium    Other irritable bowel syndrome 11/07/2019     Priority: Medium    Screening for hypertension 11/07/2019     Priority: Medium    Proctitis 01/26/2017     Priority: Medium     Colonoscopy in 2013.       Cirrhosis of liver without ascites (H) 09/23/2015     Priority: Medium     Thought due to hepatitis C.  Follows with [facility].  [facility]      Abdominal pain, generalized 08/20/2015     Priority: Medium    Hyperlipidemia LDL goal <130 07/16/2012     Priority: Medium    Tobacco abuse 07/16/2012     Priority: Medium        Past Medical History:    Past Medical History:   Diagnosis Date    Anisocoria     Asthma     Carpal tunnel syndrome     Head injury, unspecified     Hepatitis C     Obesity     Other convulsions     Other mononeuritis of upper limb     Unspecified essential hypertension        Past Surgical History:    Past Surgical History:   Procedure Laterality Date    COLECTOMY WITHOUT COLOSTOMY Right 7/13/2022    Procedure: Laparotomy, right colectomy, lysis of adhesions,;  Surgeon: Tommy Martino DO;  Location: [facility] OR    COLONOSCOPY      COLONOSCOPY N/A 7/18/2023    Procedure: Colonoscopy with polypectomy;  Surgeon: Octavio Aguirre MD;   Location: [facility] GI    COLONOSCOPY N/A 6/10/2024    Procedure: Colonoscopy;  Surgeon: Tommy Martino, ;  Location: [facility]    ESOPHAGOSCOPY, GASTROSCOPY, DUODENOSCOPY (EGD), COMBINED N/A 6/10/2024    Procedure: Esophagoscopy, gastroscopy, duodenoscopy (EGD), combined;  Surgeon: Tommy Martino DO;  Location: [facility] GI    GI SURGERY      HERNIA REPAIR      HERNIORRHAPHY INCISIONAL (LOCATION) N/A 7/13/2022    Procedure: primary incisional hernia repair;  Surgeon: Tommy Martino DO;  Location: [facility] OR    INSERT SEED MARKER / MATRIX N/A 8/30/2023    Procedure: Transrectal ultrasound guided placement of fiducials and [employer];  Surgeon: Chase Blount MD;  Location:  OR    SURGICAL HISTORY OF -   4/6/87    esophagogastroduodenoscopy    SURGICAL HISTORY OF -   4/27/87    exploratory laparotomy and anterior gastropexy over a gastrostomy tube.    SURGICAL HISTORY OF -   11/13/90    esophagogastroduodenoscopy    SURGICAL HISTORY OF -   1991    L diaphragm eventration repair, fixation of stomach and colon to abd wall    SURGICAL HISTORY OF -       hiatal hernia repair    THORACIC SURGERY         Family History:    Family History   Problem Relation Age of Onset    Cerebrovascular Disease Mother     Heart Disease Brother     Heart Disease Brother        Social History:  Marital Status:   [2]  Social History     Tobacco Use    Smoking status: Former     Current packs/day: 0.50     Average packs/day: 0.5 packs/day for 50.0 years (25.0 ttl pk-yrs)     Types: Cigarettes    Smokeless tobacco: Never   Vaping Use    Vaping status: Never Used   Substance Use Topics    Alcohol use: Yes     Comment: 1-2 beers daily    Drug use: No        Medications:    COMPOUNDED NON-CONTROLLED SUBSTANCE (CMPD RX) - PHARMACY TO MIX COMPOUNDED MEDICATION  HYDROmorphone (DILAUDID) 2 MG tablet  acetaminophen (TYLENOL) 500 MG tablet  amLODIPine (NORVASC) 5 MG tablet  calcitonin, salmon, (MIACALCIN) 200 UNIT/ACT nasal  "spray  Calcium Carbonate (CALCIUM-CARB 600 PO)  cyanocobalamin (VITAMIN B-12) 1000 MCG tablet  glycerin (LAXATIVE) 1.2 g suppository  HYDROcodone-acetaminophen (NORCO) 5-325 MG tablet  hydrocortisone 2.5 % cream  hydrocortisone-pramoxine (PROCTOFOAM-HC) 1-1 % rectal foam  linaclotide (LINZESS) 145 MCG capsule  loperamide (IMODIUM A-D) 2 MG tablet  methylcellulose (CITRUCEL) powder  polyethylene glycol (MIRALAX) 17 GM/Dose powder  senna-docusate (NEGRITO-COLACE) 8.6-50 MG per tablet  sertraline (ZOLOFT) 50 MG tablet  tamsulosin (FLOMAX) 0.4 MG capsule  vitamin D3 (CHOLECALCIFEROL) 50 mcg (2000 units) tablet      Review of Systems  All other systems are reviewed and are negative    Physical Exam   BP: (!) 154/77  Pulse: 69  Temp: 98.6  F (37  C)  Resp: 18  Height: 188 cm (6' 2\")  Weight: 81.6 kg (180 lb)  SpO2: 100 %      Physical Exam    Nursing note and vitals were reviewed.  Constitutional: Awake and alert, adequately nourished and developed appearing 69-year-old in no apparent discomfort, who does not appear acutely ill, and who answers questions appropriately and cooperates with examination.  HEENT: Speech fluent.  Voice quality normal.  EOMI.   Neck: Freely mobile.  Cardiovascular: Cardiac examination reveals normal heart rate and regular rhythm without murmur.  Pulmonary/Chest: Breathing is unlabored.  Breath sounds are clear and equal bilaterally.  There no retractions, tachypnea, rales, wheezes, or rhonchi.  Abdomen: Soft, tender in the suprapubic region without peritoneal signs, no HSM or masses rebound or guarding.  Musculoskeletal: Extremities are warm and well-perfused and without edema  Neurological: Alert, oriented, thought content logical, coherent   Skin: Warm, dry, no rashes.  Psychiatric: Affect broad and appropriate.    ED Course        Procedures              Critical Care time:  none     None         Results for orders placed or performed during the hospital encounter of 03/10/25 (from the past 24 " hours)   UA with Microscopic reflex to Culture    Specimen: Urine, Midstream   Result Value Ref Range    Color Urine Yellow Colorless, Straw, Light Yellow, Yellow    Appearance Urine Clear Clear    Glucose Urine Negative Negative mg/dL    Bilirubin Urine Negative Negative    Ketones Urine 10 (A) Negative mg/dL    Specific Gravity Urine 1.019 1.003 - 1.035    Blood Urine Negative Negative    pH Urine 7.0 5.0 - 7.0    Protein Albumin Urine 10 (A) Negative mg/dL    Urobilinogen Urine Normal Normal, 2.0 mg/dL    Nitrite Urine Negative Negative    Leukocyte Esterase Urine Negative Negative    Mucus Urine Present (A) None Seen /LPF    RBC Urine 4 (H) <=2 /HPF    WBC Urine <1 <=5 /HPF    Narrative    Urine Culture not indicated   CBC with platelets differential    Narrative    The following orders were created for panel order CBC with platelets differential.  Procedure                               Abnormality         Status                     ---------                               -----------         ------                     CBC with platelets and ...[[id number]]  Abnormal            Final result                 Please view results for these tests on the individual orders.   Comprehensive metabolic panel   Result Value Ref Range    Sodium 135 135 - 145 mmol/L    Potassium 4.3 3.4 - 5.3 mmol/L    Carbon Dioxide (CO2) 23 22 - 29 mmol/L    Anion Gap 11 7 - 15 mmol/L    Urea Nitrogen 8.2 8.0 - 23.0 mg/dL    Creatinine 0.76 0.67 - 1.17 mg/dL    GFR Estimate >90 >60 mL/min/1.73m2    Calcium 9.2 8.8 - 10.4 mg/dL    Chloride 101 98 - 107 mmol/L    Glucose 106 (H) 70 - 99 mg/dL    Alkaline Phosphatase 81 40 - 150 U/L    AST 15 0 - 45 U/L    ALT 13 0 - 70 U/L    Protein Total 6.7 6.4 - 8.3 g/dL    Albumin 4.1 3.5 - 5.2 g/dL    Bilirubin Total 0.9 <=1.2 mg/dL   CBC with platelets and differential   Result Value Ref Range    WBC Count 4.6 4.0 - 11.0 10e3/uL    RBC Count 4.61 4.40 - 5.90 10e6/uL    Hemoglobin 15.3 13.3 - 17.7 g/dL     Hematocrit 43.4 40.0 - 53.0 %    MCV 94 78 - 100 fL    MCH 33.2 (H) 26.5 - 33.0 pg    MCHC 35.3 31.5 - 36.5 g/dL    RDW 13.6 10.0 - 15.0 %    Platelet Count 163 150 - 450 10e3/uL    % Neutrophils 64 %    % Lymphocytes 23 %    % Monocytes 10 %    % Eosinophils 1 %    % Basophils 1 %    % Immature Granulocytes 1 %    NRBCs per 100 WBC 0 <1 /100    Absolute Neutrophils 2.9 1.6 - 8.3 10e3/uL    Absolute Lymphocytes 1.1 0.8 - 5.3 10e3/uL    Absolute Monocytes 0.5 0.0 - 1.3 10e3/uL    Absolute Eosinophils 0.0 0.0 - 0.7 10e3/uL    Absolute Basophils 0.0 0.0 - 0.2 10e3/uL    Absolute Immature Granulocytes 0.1 <=0.4 10e3/uL    Absolute NRBCs 0.0 10e3/uL   CT Abdomen Pelvis w Contrast    Narrative    EXAM: CT ABDOMEN PELVIS W CONTRAST  LOCATION: [facility]  DATE: 3/10/2025    INDICATION: Pelvic pain, history of proctocolitis  COMPARISON: 2/20/2025  TECHNIQUE: CT scan of the abdomen and pelvis was performed following injection of IV contrast. Multiplanar reformats were obtained. Dose reduction techniques were used.  CONTRAST: 88 mL Isovue 370    FINDINGS:     LOWER CHEST: Basilar atelectasis.     HEPATOBILIARY: Subcentimeter hypoattenuating lesion/lesions are too small to characterize. No biliary dilation. Normal gallbladder.     PANCREAS: Normal.     SPLEEN: Calcified granulomas.     ADRENAL GLANDS: Normal.     KIDNEYS/BLADDER: Left kidney is absent. Benign renal cysts and/or probable cysts warrant no further follow up. No hydronephrosis. Bladder wall thickening with pericystic stranding.      BOWEL: No obstruction. Hemicolectomy. Low-attenuation wall thickening of the rectum. No pneumoperitoneum or free fluid.     LYMPH NODES: No pathologically enlarged lymph nodes.    VASCULATURE: Nonaneurysmal aorta with mild aortoiliac calcifications.     PELVIC ORGANS: No pelvic masses.     MUSCULOSKELETAL: Multilevel degenerative disc disease of the thoracolumbar spine. Fat-containing right inguinal  hernia.       Impression    IMPRESSION:  1.  Low-attenuation wall thickening of the rectum compatible with proctitis.  2.  Bladder wall thickening and pericystic stranding, which may be seen with cystitis. Correlate with urinalysis.  3.  Additional chronic and ancillary findings as described.         Medications   iopamidol (ISOVUE-370) solution 88 mL (88 mLs Intravenous $Given 3/10/25 1300)   sodium chloride 0.9 % bag 500mL for CT scan flush use (62 mLs As instructed $Given 3/10/25 7697)       Assessments & Plan (with Medical Decision Making)     69-year-old male presented with chronic pelvic pain and urinary urgency voiding small amounts.  He was not in urinary retention on bedside ultrasound evaluation and his bladder was empty after voiding.  His urine analysis does not show evidence for infection.  His CT scan again shows evidence of inflammation of the bladder rectum and distal sigmoid.  He has been present on 4 prior CT scans over the past several months.  Most likely this represents chronic radiation cystitis and proctitis.  He is due to see gastroenterology next month.  I have asked him to see his radiation oncologist to be further assessed and get additional recommendations.  In the interim have agreed to give him additional pain medication which she is requesting for the pain that is not controlled with ibuprofen and acetaminophen.  In addition I have asked him to try sucralfate enemas 20 mL of 10% sucralfate and water which I have sent to the compounding pharmacy to be administered as an enema twice a day for a month.  This certainly a low risk intervention that may help him in the interim.  I will defer to radiation oncology whether they want to administer an antispasmodic for his bladder to assist with the bladder symptoms.  He and his wife expressed understanding and their questions were answered.    I have reviewed the nursing notes.    I have reviewed the findings, diagnosis, plan and need for  follow up with the patient.         New Prescriptions    COMPOUNDED NON-CONTROLLED SUBSTANCE (CMPD RX) - PHARMACY TO MIX COMPOUNDED MEDICATION    Place 20 mLs rectally 2 times daily.    HYDROMORPHONE (DILAUDID) 2 MG TABLET    Take 1 tablet (2 mg) by mouth every 6 hours as needed for pain.       Final diagnoses:   Chronic radiation proctitis   Chronic radiation cystitis       3/10/2025   [facility] EMERGENCY DEPT       Gerardo Maciel MD  03/10/25 3917

## 2025-03-10 NOTE — ED NOTES
Pt voided 30ml of urine, post void residual was 0 in bladder.  Pt continues with 10/10 abd pain, but the feeling of having to urinate is gone.  Pt did not have BM, last one was yesterday and it was loose.    MD in room for assessment and exam.  Pt has prostate CA with radiation tx in the past.   PLAN:  Urine sent, MD order for labs, IV and CT scan.

## 2025-03-10 NOTE — ED NOTES
MD has been in to speak with pt and wife and discussed dx and plan of care.   IV pulled and pt dressed and ready to go.  Pt and wife have no further questions.

## 2025-03-10 NOTE — ED NOTES
Abd pain across the front of abd, started acouple weeks ago, it has not gotten any better, and has been seen for it recently and his PCP for this.  PCP didn't want to start antibiotics, made apt for GI, has Apt for April 7th.   Pt can't handle pain until then, PCP told them to keep apt.  Pt rates pain 10/10 abd pain.  Pt states he was on Vicodin for 5 days but was not refilled due to potential constipation.    Pt also feels he has urinary retention, he is not sure.  Pt states he feels like he can void, but only small amounts.        Bladder scan on arrival was minimal 30ml.    Pt up and ambulatory to BR to have a BM and will try and void for UA.    Abd:  flat, soft, pain at rest, +BS x 4  PLAN:  UA and await MD assessment and orders.   Pt has been taking tylenol at home without results, will wait for MD orders for narcotic.

## 2025-03-10 NOTE — ED NOTES
Pt states that he is also constipated from multiple medications. Hx of prostate cancer, completed radiation October of 2023. Feels shooting pains in abdomen and pressure on the bladder. Bladder scanned for >30, five times.

## 2025-03-10 NOTE — DISCHARGE INSTRUCTIONS
I suspect her symptoms are due to chronic radiation proctitis and cystitis.  Contact your radiation oncologist about options for management of the radiation cystitis and proctitis.  In the interim begin treatment with sucralfate enemas.  I have sent a prescription to the [facility] pharmacy.  Contact them for instructions on how to  the medication or have it sent to you.  Administer 20 mL as an enema twice daily for 4 weeks.  Follow-up as planned in your appointment for gastroenterology.  You have normal kidney function and I cannot find any reason why you are not able to take ibuprofen.  For pain you should take ibuprofen, 400 mg, 4 times per day if needed for pain.  You may add acetaminophen 1000 mg 3 times per day if needed for pain.    You may add more phone 2 mg, 1 tablets up to every 6 hours if needed for pain.  Try to use this primarily only at night to help with sleep.    Do not use alcohol, operate machinery, drive, or climb on ladders, or perform other complex motor activity or make important decisions for 8 hours after taking oxycodone. Use docusate (100mg) 2 times a day to prevent constipation while on narcotics.  Contact the [facility] pharmacy at [phone] for instructions on whether they are going to mail the medication to you or send it to one of the local [facility] pharmacies.

## 2025-03-12 ENCOUNTER — PATIENT OUTREACH (OUTPATIENT)
Dept: CARE COORDINATION | Facility: CLINIC | Age: 70
End: 2025-03-12
Payer: COMMERCIAL

## 2025-03-12 ENCOUNTER — TELEPHONE (OUTPATIENT)
Dept: FAMILY MEDICINE | Facility: CLINIC | Age: 70
End: 2025-03-12
Payer: COMMERCIAL

## 2025-03-12 NOTE — TELEPHONE ENCOUNTER
"ED/Discharge Protocol      \"I see that you were in the (ER/UC/IP) on 3/10.    How are you doing now that you are home?\" Still having some pain, controlled with Hydromorphone    Is patient experiencing symptoms that may require a hospital visit?  no    Discharge Instructions    \"Let's review your discharge instructions.  What is/are the follow-up recommendations?  Pt. Response: radiation    \"Were you instructed to make a follow-up appointment?\"  Pt. Response: No.       \"When you see the provider, I would recommend that you bring your discharge instructions with you.    Medications    \"How many new medications are you on since your hospitalization/ED visit?\"    2 or more - Epic MTM referral needed  \"How many of your current medicines changed (dose, timing, name, etc.) while you were in the hospital/ED visit?\"   2 or more - Epic MTM referral needed  \"Do you have questions about your medications?\"   No  \"Were you newly diagnosed with heart failure, COPD, diabetes or did you have a heart attack?\"   No  For patients on insulin: \"Did you start on insulin in the hospital or did you have your insulin dose changed?\"   No  Post Discharge Medication Reconciliation Status: patient was not discharged from an inpatient facility or TCU.    Was MTM referral placed (*Make sure to put transitions as reason for referral)?   No    Call Summary    \"Do you have any questions or concerns about your condition or care plan at the moment?\"    Yes has not received compounded medication from pharmacy yet, will check mail today and call back if still not received and will re-order if needed  Triage nurse advice given: call back if not received    Patient was in ER 7 times in the past year (assess appropriateness of ER visits.)        Stephanie Nuñez RN on 3/12/2025 at 11:06 AM    "

## 2025-03-12 NOTE — PROGRESS NOTES
Patient has been compliant with medication, Diaz and urine drug screen.  Ms. Cabello is well-known to me as a patient as well is in our community.  I have referred her to pain management to provide her with better quality of life and improved pain management.  She has been compliant.   [facility] Care Resource Center Contact  [facility]/Voicemail     Clinical Data: Care Coordination ED-sourced Outreach-     Outreach attempted x 2.  Left message on patient's voicemail, providing [facility]'s 24/7 scheduling and nurse triage phone number [phone] ([phone]) for questions/concerns and/or to schedule an appt with an [facility] provider.      [facility] unable to send Care Coordination introduction letter as patient does not have an active MyChart account. Clinic Care Coordination services remain available via referral if needed.    Plan: [facility] will do no further outreaches at this time.       ABDULLAHI Joyce  [facility] Care Resource [place], [facility]    *Connected Care Resource Team does NOT follow patient ongoing. Referrals are identified based on internal discharge reports and the outreach is to ensure patient has an understanding of their discharge instructions.

## 2025-03-13 ENCOUNTER — OFFICE VISIT (OUTPATIENT)
Dept: RADIATION THERAPY | Facility: OUTPATIENT CENTER | Age: 70
End: 2025-03-13
Payer: COMMERCIAL

## 2025-03-13 VITALS
WEIGHT: 179.2 LBS | OXYGEN SATURATION: 100 % | SYSTOLIC BLOOD PRESSURE: 141 MMHG | BODY MASS INDEX: 23.01 KG/M2 | RESPIRATION RATE: 14 BRPM | DIASTOLIC BLOOD PRESSURE: 79 MMHG | HEART RATE: 79 BPM

## 2025-03-13 DIAGNOSIS — C61 PROSTATE CANCER (H): Primary | ICD-10-CM

## 2025-03-13 RX ORDER — OXYBUTYNIN CHLORIDE 10 MG/1
10 TABLET, EXTENDED RELEASE ORAL DAILY
Qty: 30 TABLET | Refills: 1 | Status: SHIPPED | OUTPATIENT
Start: 2025-03-13

## 2025-03-13 NOTE — PATIENT INSTRUCTIONS
Dr. Rodgers will send a medicine to try to help with bladder symptoms.    Dr. Rodgers will reach out to Dr. Blount, Urology, we would like you to see the again to review bladder symptoms and CT scan results.  Urology, Clinic D - phone number [phone]  ( this will go to [facility] but let them know you are looking to schedule at the [facility] location)    We will see you back early April.

## 2025-03-13 NOTE — LETTER
3/13/2025      Ghulam Rizzo  [address] [place] [id number]      Dear Colleague,    Thank you for referring your patient, Ghulam Rizzo, to the [facility] RADIATION [facility]ERAPY [facility]INIC. Please see a copy of my visit note below.    Radiation Oncology Progress Note    HPI: Mr. Rizzo is a 68 year old male with biopsy-proven unfavorable intermediate  prostatic adenocarcinoma, [place] 4+3 = 7, pathologic T1cN0.  He underwent definitive radiation therapy with short-term ADT.      Radiation Treatment  9/21/23 to 10/30/23: Prostate, 7000 cGy in 28 fractions (7/21/23 received Lupron 45 mg x 1).      Patient returns for follow-up.    PSA aki was on 6/6/2024 which demonstrated a value of 0.03.  Most recent PSA on 12/2024 demonstrated slight increase to 0.13.    Posttreatment the patient has had toxicity likely from radiation including proctitis and possibly cystitis.  Prior noted bleeding per rectum has resolved with coagulation in January 2025.  Also plans to follow-up with GI team.     Has had significant urinary symptoms post completion of radiation.  Denies any hematuria.  Does seem to have pelvic pain and  significant urgency symptoms.    Plan:  PSA remains in biochemical control at this time.  He will continue follow-up in our clinic.  GI bother.  Proctitis was noted on colonoscopy and the patient has undergone coagulation with improvement.  Will also follow-up with GI team.  Patient does have a history of significant GI bother her even prior to radiation, and I wonder if there is underlying colitis picture.  Cystitis.  The patient would benefit from urology referral for further evaluation and recommendation.  May benefit from cystoscopy.  Will also trial Ditropan to help with possible bladder spasm etiology.  Return to clinic in the next few weeks to reevaluate.    40 minutes spent on the date of the encounter doing chart review, review of outside records, review of test results, interpretation of tests, patient  visit, documentation, discussion, and plan.      Armani Rodgers M.D.  Department of Radiation Oncology  [facility]               Again, thank you for allowing me to participate in the care of your patient.        Sincerely,        Armani Rodgers MD    Electronically signed

## 2025-03-13 NOTE — PROGRESS NOTES
Radiation Oncology Progress Note    HPI: Mr. Rizzo is a 68 year old male with biopsy-proven unfavorable intermediate  prostatic adenocarcinoma, [place] 4+3 = 7, pathologic T1cN0.  He underwent definitive radiation therapy with short-term ADT.      Radiation Treatment  9/21/23 to 10/30/23: Prostate, 7000 cGy in 28 fractions (7/21/23 received Lupron 45 mg x 1).      Patient returns for follow-up.    PSA aki was on 6/6/2024 which demonstrated a value of 0.03.  Most recent PSA on 12/2024 demonstrated slight increase to 0.13.    Posttreatment the patient has had toxicity likely from radiation including proctitis and possibly cystitis.  Prior noted bleeding per rectum has resolved with coagulation in January 2025.  Also plans to follow-up with GI team.     Has had significant urinary symptoms post completion of radiation.  Denies any hematuria.  Does seem to have pelvic pain and  significant urgency symptoms.    Plan:  PSA remains in biochemical control at this time.  He will continue follow-up in our clinic.  GI bother.  Proctitis was noted on colonoscopy and the patient has undergone coagulation with improvement.  Will also follow-up with GI team.  Patient does have a history of significant GI bother her even prior to radiation, and I wonder if there is underlying colitis picture.  Cystitis.  The patient would benefit from urology referral for further evaluation and recommendation.  May benefit from cystoscopy.  Will also trial Ditropan to help with possible bladder spasm etiology.  Return to clinic in the next few weeks to reevaluate.    40 minutes spent on the date of the encounter doing chart review, review of outside records, review of test results, interpretation of tests, patient visit, documentation, discussion, and plan.      Armani Rodgers M.D.  Department of Radiation Oncology  [facility]

## 2025-03-13 NOTE — NURSING NOTE
"Oncology Rooming Note    March 13, 2025 11:19 AM   Ghulam Rizzo is a 69 year old male who presents for:    Chief Complaint   Patient presents with    Radiation Therapy     Return visit with Dr. Rodgers     Initial Vitals: BP (!) 141/79 (BP Location: Left arm, Patient Position: Sitting, Cuff Size: Adult Regular)   Pulse 79   Resp 14   Wt 81.3 kg (179 lb 3.2 oz)   SpO2 100%   BMI 23.01 kg/m   Estimated body mass index is 23.01 kg/m  as calculated from the following:    Height as of 3/10/25: 1.88 m (6' 2\").    Weight as of this encounter: 81.3 kg (179 lb 3.2 oz). Body surface area is 2.06 meters squared.  Data Unavailable Comment: Data Unavailable   No LMP for male patient.  Allergies reviewed: Yes  Medications reviewed: Yes    Medications: Medication refills not needed today.  Pharmacy name entered into [facility]:    [facility] - [place], [place] - [address] [facility] - [place], [place] - [address]  [facility] [place], [place] - [address]    Frailty Screening:   Is the patient here for a new oncology consult visit in cancer care? 2. No    PHQ9:  Did this patient require a PHQ9?: No      Clinical concerns: patient added on for MD visit due to ongoing and worsening abdominal pain, bladder feeling of frequency and difficulty emptying, weak stream.  History of long term bowel issues - loose stool, has tried various medications. Will have new and upcoming consult with new GI MD on 4/7/25.  Pt has had multiple ED visits and scans - here to review symptoms and scans with Dr. Rodgers.    MD was notified.      Felicia Orourke RN             "

## 2025-03-25 ENCOUNTER — OFFICE VISIT (OUTPATIENT)
Dept: UROLOGY | Facility: CLINIC | Age: 70
End: 2025-03-25
Payer: COMMERCIAL

## 2025-03-25 VITALS
DIASTOLIC BLOOD PRESSURE: 90 MMHG | HEIGHT: 74 IN | TEMPERATURE: 98.5 F | SYSTOLIC BLOOD PRESSURE: 165 MMHG | WEIGHT: 180 LBS | BODY MASS INDEX: 23.1 KG/M2 | OXYGEN SATURATION: 100 % | HEART RATE: 78 BPM

## 2025-03-25 DIAGNOSIS — R39.15 URINARY URGENCY: Primary | ICD-10-CM

## 2025-03-25 PROCEDURE — 99213 OFFICE O/P EST LOW 20 MIN: CPT | Performed by: STUDENT IN AN ORGANIZED HEALTH CARE EDUCATION/TRAINING PROGRAM

## 2025-03-25 PROCEDURE — 3077F SYST BP >= 140 MM HG: CPT | Performed by: STUDENT IN AN ORGANIZED HEALTH CARE EDUCATION/TRAINING PROGRAM

## 2025-03-25 PROCEDURE — 3080F DIAST BP >= 90 MM HG: CPT | Performed by: STUDENT IN AN ORGANIZED HEALTH CARE EDUCATION/TRAINING PROGRAM

## 2025-03-25 RX ORDER — MIRABEGRON 25 MG/1
25 TABLET, FILM COATED, EXTENDED RELEASE ORAL DAILY
Qty: 90 TABLET | Refills: 1 | Status: CANCELLED | OUTPATIENT
Start: 2025-03-25

## 2025-03-25 NOTE — PROGRESS NOTES
"    UROLOGY FOLLOW-UP NOTE          Chief Complaint:   Today I had the pleasure of seeing . Ghulam Rizzo in follow-up for a chief complaint of LUTS.          Interval Update   Ghulam Rizzo is a very pleasant 69 year old male with a history of cirrhosis of the liver, cecal volvulus, HLD, HTN, and prostate cancer.     Brief History: . Ghulam Rizzo previously followed with urology for urinary retention. He later passed a TOV. He has a history of prostate cancer and completed prostate radiation in October 2023. He had a cystoscopy with Dr. Blount on 12/21/2023 which showed some neovascularizations, but was otherwise normal.     Today's notes: He reports bothersome urinary frequency, urgency, urinary hesitancy, and weak stream since his radiation therapy. He reports nocturia x 2. He denies dysuria and gross hematuria.          Physical Exam:   Patient is a 69 year old  male   Vitals: Blood pressure (!) 165/90, pulse 78, temperature 98.5  F (36.9  C), temperature source Tympanic, height 1.88 m (6' 2\"), weight 81.6 kg (180 lb), SpO2 100%.  General: Alert and oriented x 3, no acute distress.  Respiratory: Non-labored breathing.  Cardiac: Regular rate.    PVR: 40 mL        Labs and Pathology:    I personally reviewed all applicable laboratory data and went over findings with patient  Significant for:    CBC RESULTS:  Recent Labs   Lab Test 03/10/25  1345 02/20/25  1203 10/13/24  1432 09/15/24  1418   WBC 4.6 6.6 4.3 7.4   HGB 15.3 15.4 13.7 14.2    180 152 192        BMP RESULTS:  Recent Labs   Lab Test 03/10/25  1345 02/20/25  1203 10/13/24  1432 09/15/24  1418 07/13/22  1402 06/28/20  1016 11/25/17  1400    133* 140 137   < > 138 140   POTASSIUM 4.3 4.5 3.5 3.3*   < > 4.0 3.8   CHLORIDE 101 99 105 102   < > 107 110*   CO2 23 23 24 23   < > 27 22   ANIONGAP 11 11 11 12   < > 4 8   * 98 95 104*   < > 144* 87   BUN 8.2 10.3 8.8 13.5   < > 13 12   CR 0.76 0.74 0.78 0.74   < > 0.72 0.66 "   GFRESTIMATED >90 >90 >90 >90   < > >90 >90   GFRESTBLACK  --   --   --   --   --  >90 >90   TAVIA 9.2 9.2 8.7* 8.9   < > 8.6 8.4*    < > = values in this interval not displayed.       UA RESULTS:   Recent Labs   Lab Test 03/10/25  1317 10/13/24  1914 08/18/24  1448   SG 1.019 1.010 1.024   URINEPH 7.0 5.0 5.0   NITRITE Negative Negative Negative   RBCU 4* 1 6*   WBCU <1 <1 2       PSA RESULTS  PSA   Date Value Ref Range Status   08/03/2012 2.23 0 - 4 ug/L Final   01/15/2011 2.24 0 - 4 ug/L Final   11/03/2009 2.01 0 - 4 ug/L Final   11/30/2007 3.29 0 - 4 ug/L Final   12/02/2006 3.12 0 - 4 ug/L Final     Prostate Specific Antigen Screen   Date Value Ref Range Status   02/08/2022 11.30 (H) 0.00 - 4.00 ug/L Final     PSA Tumor Marker   Date Value Ref Range Status   12/02/2024 0.13 0.00 - 6.50 ng/mL Final   06/06/2024 0.03 0.00 - 4.50 ng/mL Final   12/04/2023 0.05 0.00 - 4.50 ng/mL Final   02/08/2023 10.84 (H) 0.00 - 4.50 ng/mL Final            Assessment/Plan   69 year old male seen in evaluation for urinary frequency, urgency, urinary hesitancy, and weak stream since his radiation therapy. He reports nocturia x 2.     He has a history of prostate cancer and completed prostate radiation in October 2023. He had a cystoscopy with Dr. Blount on 12/21/2023 which showed some neovascularizations, but was otherwise normal.    PVR today was 40 mL. He continues to take tamsulosin. He was started on oxybutynin by Dr. Rodgers less than two weeks ago and has not noticed a difference yet.     We discussed the high likelihood his symptoms are secondary to prostate radiation. He had a cystoscopy just over a year ago, so I do not think this needs to be repeated now, but we can consider it in the future.     He would like to give the oxybutynin another week or two to determine if it will be effective. If not, we could try an alternative anticholinergic or B3 agonist.     Plan:  Continue oxybutynin XR 10 mg daily for now.   Patient will  call in 1-2 weeks if symptoms do not improve.          Past Medical History:     Past Medical History:   Diagnosis Date    Anisocoria     Asthma     Carpal tunnel syndrome     2006    Head injury, unspecified     closed head injury 4 yrs ago    Hepatitis C     Cured by [facility] in 2015. diagnosed in 2012 was positive for Hepatitis C.     Obesity     2006, resolved    Other convulsions     seizure disorder due to head injury 4 yrs ago.    Other mononeuritis of upper limb     L phrenic nerve paralysis    Unspecified essential hypertension             Past Surgical History:     Past Surgical History:   Procedure Laterality Date    COLECTOMY WITHOUT COLOSTOMY Right 7/13/2022    Procedure: Laparotomy, right colectomy, lysis of adhesions,;  Surgeon: Tommy Martino DO;  Location: [facility]    COLONOSCOPY      COLONOSCOPY N/A 7/18/2023    Procedure: Colonoscopy with polypectomy;  Surgeon: Octavio Aguirre MD;  Location: [facility]    COLONOSCOPY N/A 6/10/2024    Procedure: Colonoscopy;  Surgeon: Tommy Martino DO;  Location: [facility]    ESOPHAGOSCOPY, GASTROSCOPY, DUODENOSCOPY (EGD), COMBINED N/A 6/10/2024    Procedure: Esophagoscopy, gastroscopy, duodenoscopy (EGD), combined;  Surgeon: Tommy Martino DO;  Location: [facility]    GI SURGERY      HERNIA REPAIR      HERNIORRHAPHY INCISIONAL (LOCATION) N/A 7/13/2022    Procedure: primary incisional hernia repair;  Surgeon: Tommy Martino DO;  Location: [facility]    INSERT SEED MARKER / MATRIX N/A 8/30/2023    Procedure: Transrectal ultrasound guided placement of fiducials and SpaceOar;  Surgeon: Chase Blount MD;  Location:  OR    SURGICAL HISTORY OF -   4/6/87    esophagogastroduodenoscopy    SURGICAL HISTORY OF -   4/27/87    exploratory laparotomy and anterior gastropexy over a gastrostomy tube.    SURGICAL HISTORY OF -   11/13/90    esophagogastroduodenoscopy    SURGICAL HISTORY OF -   1991    L diaphragm eventration repair,  fixation of stomach and colon to abd wall    SURGICAL HISTORY OF -       hiatal hernia repair    THORACIC SURGERY              Medications     Current Outpatient Medications   Medication Sig Dispense Refill    acetaminophen (TYLENOL) 500 MG tablet Take 1,000 mg by mouth every 6 hours as needed for mild pain      amLODIPine (NORVASC) 5 MG tablet Take 1 tablet (5 mg) by mouth daily. 90 tablet 3    Calcium Carbonate (CALCIUM-CARB 600 PO) Take 2 tablets by mouth daily      COMPOUNDED NON-CONTROLLED SUBSTANCE (CMPD RX) - PHARMACY TO MIX COMPOUNDED MEDICATION Place 20 mLs rectally 2 times daily. 1200 mL 0    cyanocobalamin (VITAMIN B-12) 1000 MCG tablet Take 1 tablet (1,000 mcg) by mouth daily 90 tablet 1    glycerin (LAXATIVE) 1.2 g suppository Place 1 suppository rectally daily as needed (constipation.) 25 suppository 1    HYDROcodone-acetaminophen (NORCO) 5-325 MG tablet Take 1 tablet by mouth every 6 hours as needed for severe pain. 10 tablet 0    HYDROmorphone (DILAUDID) 2 MG tablet Take 1 tablet (2 mg) by mouth every 6 hours as needed for pain. 30 tablet 0    oxyBUTYnin ER (DITROPAN XL) 10 MG 24 hr tablet Take 1 tablet (10 mg) by mouth daily. 30 tablet 1    polyethylene glycol (MIRALAX) 17 GM/Dose powder Take 17 g (1 capful.) by mouth daily 578 g 1    senna-docusate (NEGRITO-COLACE) 8.6-50 MG per tablet Take 1-2 tablets by mouth 2 times daily as needed for constipation. 100 tablet 5    sertraline (ZOLOFT) 50 MG tablet Take 1 tablet by mouth daily 90 tablet 3    tamsulosin (FLOMAX) 0.4 MG capsule Take 1 capsule (0.4 mg) by mouth every evening 90 capsule 3    vitamin D3 (CHOLECALCIFEROL) 50 mcg (2000 units) tablet Take 1 tablet (50 mcg) by mouth daily 90 tablet 3    calcitonin, salmon, (MIACALCIN) 200 UNIT/ACT nasal spray Spray 1 spray into one nostril alternating nostrils daily for 30 days Alternate nostril each day. 2.7 mL 0    hydrocortisone 2.5 % cream Apply topically 2 times daily Apply with rectal applicator twice  daily (Patient not taking: Reported on 3/3/2025) 30 g 0    hydrocortisone-pramoxine (PROCTOFOAM-HC) 1-1 % rectal foam Place 1 Applicatorful rectally 3 times daily (Patient not taking: Reported on 3/3/2025) 10 g 0    linaclotide (LINZESS) 145 MCG capsule Take 1 capsule (145 mcg) by mouth every morning (before breakfast) (Patient not taking: Reported on 9/4/2024) 30 capsule 3    loperamide (IMODIUM A-D) 2 MG tablet Take 1 tablet (2 mg) by mouth 4 times daily as needed for diarrhea. (Patient not taking: Reported on 3/3/2025) 30 tablet 0    methylcellulose (CITRUCEL) powder Citrucel powder: 2 g (1 heaping tablespoon) in 8 oz (240 mL) of cold water; increase as needed by 1 heaping tablespoon up to 3 times/day (Patient not taking: Reported on 3/25/2025)       No current facility-administered medications for this visit.            Family History:     Family History   Problem Relation Age of Onset    Cerebrovascular Disease Mother     Heart Disease Brother     Heart Disease Brother             Social History:     Social History     Socioeconomic History    Marital status:      Spouse name: Felecia    Number of children: 1    Years of education: Not on file    Highest education level: Not on file   Occupational History     Employer: [employer]   Tobacco Use    Smoking status: Former     Current packs/day: 0.50     Average packs/day: 0.5 packs/day for 50.0 years (25.0 ttl pk-yrs)     Types: Cigarettes    Smokeless tobacco: Never   Vaping Use    Vaping status: Never Used   Substance and Sexual Activity    Alcohol use: Yes     Comment: 1-2 beers daily    Drug use: No    Sexual activity: Yes     Partners: Female   Other Topics Concern    Parent/sibling w/ CABG, MI or angioplasty before 65F 55M? Yes     Comment: 2 brothers- heart surgery   Social History Narrative    Not on file     Social Drivers of Health     Financial Resource Strain: Low Risk  (12/22/2023)    Financial Resource Strain     Within the past 12 months,  have you or your family members you live with been unable to get utilities (heat, electricity) when it was really needed?: No   Food Insecurity: Low Risk  (12/22/2023)    Food Insecurity     Within the past 12 months, did you worry that your food would run out before you got money to buy more?: No     Within the past 12 months, did the food you bought just not last and you didn t have money to get more?: No   Transportation Needs: Low Risk  (12/22/2023)    Transportation Needs     Within the past 12 months, has lack of transportation kept you from medical appointments, getting your medicines, non-medical meetings or appointments, work, or from getting things that you need?: No   Physical Activity: Not on file   Stress: Not on file   Social Connections: Not on file   Interpersonal Safety: Low Risk  (1/10/2025)    Interpersonal Safety     Do you feel physically and emotionally safe where you currently live?: Yes     Within the past 12 months, have you been hit, slapped, kicked or otherwise physically hurt by someone?: No     Within the past 12 months, have you been humiliated or emotionally abused in other ways by your partner or ex-partner?: No   Housing Stability: Low Risk  (12/22/2023)    Housing Stability     Do you have housing? : Yes     Are you worried about losing your housing?: No            Allergies:   Levaquin [levofloxacin], Oxycodone, and Percocet [oxycodone-acetaminophen]         Review of Systems:  From intake questionnaire   Negative 14 system review except as noted on HPI, nurse's note.        Amelia nAtonio PA-C  Department of Urology

## 2025-03-25 NOTE — NURSING NOTE
"Initial BP (!) 165/90   Pulse 78   Temp 98.5  F (36.9  C) (Tympanic)   Ht 1.88 m (6' 2\")   Wt 81.6 kg (180 lb)   SpO2 100%   BMI 23.11 kg/m   Estimated body mass index is 23.11 kg/m  as calculated from the following:    Height as of this encounter: 1.88 m (6' 2\").    Weight as of this encounter: 81.6 kg (180 lb). .  Viki Granger MA    "

## 2025-03-25 NOTE — PROGRESS NOTES
Active order to obtain bladder scan? Yes   Name of ordering provider:  Amelia Antonio  Bladder scan preformed post void Yes: an hour ago.  Bladder scan reveled 40 ML  Provider notified?  Yes    Maryjane Cain LPN

## 2025-03-31 ENCOUNTER — TELEPHONE (OUTPATIENT)
Dept: UROLOGY | Facility: CLINIC | Age: 70
End: 2025-03-31
Payer: COMMERCIAL

## 2025-03-31 DIAGNOSIS — R39.15 URINARY URGENCY: Primary | ICD-10-CM

## 2025-03-31 RX ORDER — SOLIFENACIN SUCCINATE 5 MG/1
5 TABLET, FILM COATED ORAL DAILY
Qty: 30 TABLET | Refills: 3 | Status: SHIPPED | OUTPATIENT
Start: 2025-03-31

## 2025-03-31 NOTE — TELEPHONE ENCOUNTER
Pt was advised at last office visit to call if Oxybutynin is not working per LOV note. He is now calling. Please advise.  Chio GASCA RN BSN PHN  Specialty Clinics

## 2025-03-31 NOTE — TELEPHONE ENCOUNTER
Let's try solifenacin instead for at least a month. I sent this into [facility]. He can stop oxybutynin.

## 2025-03-31 NOTE — TELEPHONE ENCOUNTER
[facility] Call Center    Phone Message    May a detailed message be left on voicemail: no     Reason for Call: Other: Patient called and stated that the medication that was given to him at the last appointment is not working. Please call patient to discuss.      Action Taken: Other: [facility]    Travel Screening: Not Applicable     Date of Service:

## 2025-03-31 NOTE — TELEPHONE ENCOUNTER
Called pt and let him know a new script was sent to the pharmacy. Pt verbalized understanding.  Chio GASCA RN BSN PHN  Specialty Clinics

## 2025-04-03 ENCOUNTER — APPOINTMENT (OUTPATIENT)
Dept: CT IMAGING | Facility: CLINIC | Age: 70
End: 2025-04-03
Attending: FAMILY MEDICINE
Payer: COMMERCIAL

## 2025-04-03 ENCOUNTER — HOSPITAL ENCOUNTER (EMERGENCY)
Facility: CLINIC | Age: 70
Discharge: HOME | End: 2025-04-03
Attending: FAMILY MEDICINE
Payer: COMMERCIAL

## 2025-04-03 VITALS
HEIGHT: 74 IN | WEIGHT: 175 LBS | SYSTOLIC BLOOD PRESSURE: 138 MMHG | DIASTOLIC BLOOD PRESSURE: 88 MMHG | BODY MASS INDEX: 22.46 KG/M2 | OXYGEN SATURATION: 100 % | HEART RATE: 73 BPM | RESPIRATION RATE: 18 BRPM | TEMPERATURE: 98.4 F

## 2025-04-03 DIAGNOSIS — H81.399 PERIPHERAL VERTIGO, UNSPECIFIED LATERALITY: ICD-10-CM

## 2025-04-03 LAB
ANION GAP SERPL CALCULATED.3IONS-SCNC: 12 MMOL/L (ref 7–15)
BASOPHILS # BLD AUTO: 0.1 10E3/UL (ref 0–0.2)
BASOPHILS NFR BLD AUTO: 1 %
BUN SERPL-MCNC: 15.2 MG/DL (ref 8–23)
CALCIUM SERPL-MCNC: 9.4 MG/DL (ref 8.8–10.4)
CHLORIDE SERPL-SCNC: 105 MMOL/L (ref 98–107)
CREAT SERPL-MCNC: 0.81 MG/DL (ref 0.67–1.17)
EGFRCR SERPLBLD CKD-EPI 2021: >90 ML/MIN/1.73M2
EOSINOPHIL # BLD AUTO: 0.1 10E3/UL (ref 0–0.7)
EOSINOPHIL NFR BLD AUTO: 1 %
ERYTHROCYTE [DISTWIDTH] IN BLOOD BY AUTOMATED COUNT: 12.8 % (ref 10–15)
GLUCOSE SERPL-MCNC: 104 MG/DL (ref 70–99)
HCO3 SERPL-SCNC: 22 MMOL/L (ref 22–29)
HCT VFR BLD AUTO: 45 % (ref 40–53)
HGB BLD-MCNC: 15.8 G/DL (ref 13.3–17.7)
IMM GRANULOCYTES # BLD: 0 10E3/UL
IMM GRANULOCYTES NFR BLD: 0 %
LYMPHOCYTES # BLD AUTO: 1.7 10E3/UL (ref 0.8–5.3)
LYMPHOCYTES NFR BLD AUTO: 25 %
MCH RBC QN AUTO: 32.7 PG (ref 26.5–33)
MCHC RBC AUTO-ENTMCNC: 35.1 G/DL (ref 31.5–36.5)
MCV RBC AUTO: 93 FL (ref 78–100)
MONOCYTES # BLD AUTO: 0.5 10E3/UL (ref 0–1.3)
MONOCYTES NFR BLD AUTO: 7 %
NEUTROPHILS # BLD AUTO: 4.3 10E3/UL (ref 1.6–8.3)
NEUTROPHILS NFR BLD AUTO: 65 %
NRBC # BLD AUTO: 0 10E3/UL
NRBC BLD AUTO-RTO: 0 /100
PLATELET # BLD AUTO: 177 10E3/UL (ref 150–450)
POTASSIUM SERPL-SCNC: 4.3 MMOL/L (ref 3.4–5.3)
RBC # BLD AUTO: 4.83 10E6/UL (ref 4.4–5.9)
SODIUM SERPL-SCNC: 139 MMOL/L (ref 135–145)
WBC # BLD AUTO: 6.6 10E3/UL (ref 4–11)

## 2025-04-03 PROCEDURE — 250N000011 HC RX IP 250 OP 636: Performed by: FAMILY MEDICINE

## 2025-04-03 PROCEDURE — 80048 BASIC METABOLIC PNL TOTAL CA: CPT | Performed by: FAMILY MEDICINE

## 2025-04-03 PROCEDURE — 99285 EMERGENCY DEPT VISIT HI MDM: CPT | Mod: 25

## 2025-04-03 PROCEDURE — 36415 COLL VENOUS BLD VENIPUNCTURE: CPT | Performed by: FAMILY MEDICINE

## 2025-04-03 PROCEDURE — 96374 THER/PROPH/DIAG INJ IV PUSH: CPT | Mod: 59

## 2025-04-03 PROCEDURE — 70450 CT HEAD/BRAIN W/O DYE: CPT

## 2025-04-03 PROCEDURE — 99284 EMERGENCY DEPT VISIT MOD MDM: CPT | Performed by: FAMILY MEDICINE

## 2025-04-03 PROCEDURE — 84132 ASSAY OF SERUM POTASSIUM: CPT | Performed by: FAMILY MEDICINE

## 2025-04-03 PROCEDURE — 82310 ASSAY OF CALCIUM: CPT | Performed by: FAMILY MEDICINE

## 2025-04-03 PROCEDURE — 85004 AUTOMATED DIFF WBC COUNT: CPT | Performed by: FAMILY MEDICINE

## 2025-04-03 PROCEDURE — 82565 ASSAY OF CREATININE: CPT | Performed by: FAMILY MEDICINE

## 2025-04-03 PROCEDURE — 70496 CT ANGIOGRAPHY HEAD: CPT

## 2025-04-03 PROCEDURE — 96375 TX/PRO/DX INJ NEW DRUG ADDON: CPT

## 2025-04-03 PROCEDURE — 250N000009 HC RX 250: Performed by: FAMILY MEDICINE

## 2025-04-03 PROCEDURE — 258N000003 HC RX IP 258 OP 636: Performed by: FAMILY MEDICINE

## 2025-04-03 PROCEDURE — 96361 HYDRATE IV INFUSION ADD-ON: CPT

## 2025-04-03 RX ORDER — IOPAMIDOL 755 MG/ML
67 INJECTION, SOLUTION INTRAVASCULAR ONCE
Status: COMPLETED | OUTPATIENT
Start: 2025-04-03 | End: 2025-04-03

## 2025-04-03 RX ORDER — DIPHENHYDRAMINE HYDROCHLORIDE 50 MG/ML
12.5 INJECTION, SOLUTION INTRAMUSCULAR; INTRAVENOUS ONCE
Status: COMPLETED | OUTPATIENT
Start: 2025-04-03 | End: 2025-04-03

## 2025-04-03 RX ADMIN — SODIUM CHLORIDE 100 ML: 9 INJECTION, SOLUTION INTRAVENOUS at 20:22

## 2025-04-03 RX ADMIN — SODIUM CHLORIDE 1000 ML: 9 INJECTION, SOLUTION INTRAVENOUS at 20:08

## 2025-04-03 RX ADMIN — DIPHENHYDRAMINE HYDROCHLORIDE 12.5 MG: 50 INJECTION INTRAMUSCULAR; INTRAVENOUS at 19:57

## 2025-04-03 RX ADMIN — IOPAMIDOL 67 ML: 755 INJECTION, SOLUTION INTRAVENOUS at 20:22

## 2025-04-03 RX ADMIN — PROMETHAZINE HYDROCHLORIDE 12.5 MG: 25 INJECTION INTRAMUSCULAR; INTRAVENOUS at 20:09

## 2025-04-03 ASSESSMENT — ACTIVITIES OF DAILY LIVING (ADL)
ADLS_ACUITY_SCORE: 50

## 2025-04-03 NOTE — ED TRIAGE NOTES
Pt arrives with c/o nausea, diarrhea, and dizziness for three days. Pt reports dizziness comes on when he stands up. Denies unilateral weakness.

## 2025-04-04 NOTE — DISCHARGE INSTRUCTIONS
RETURN TO THE EMERGENCY ROOM FOR THE FOLLOWING:    Severely worsened pain, repeated vomiting and dehydration, fever greater than 101, new one-sided weakness or uncoordination, trouble with speech, facial droop, or at anytime for any concern.    FOLLOW UP:    With ENT for further discussion if this is not improving, or back to the ED at anytime for any concern.    TREATMENT RECOMMENDATIONS:    Meclizine 25 mg up to 3 times a day as needed for dizziness.  OTC medication available at home, as discussed.    NURSE ADVICE LINE:  [phone] or [phone]

## 2025-04-04 NOTE — ED PROVIDER NOTES
HPI   Patient is a 69-year-old male presenting with dizziness and nausea.  Per my chart review, the patient has a known history of prostate cancer with radiation completed in October, 2023.  He has seen urology as recently as 3/25/2025.  He has chronic lower urinary tract symptoms as well as recent bowel changes which are demanding gastroenterology follow-up.  Also listed on his chart is cirrhosis without ascites and cecal volvulus.  He presents by private car with his wife.  She is an independent historian.    The patient describes new dizziness since yesterday morning.  His dizziness specifically worsens with sitting up and standing from a supine position.  He feels off balance and a sense of movement which elicits some mild nausea.  No vomiting.  His symptoms then resolved when he lies flat.  He has a headache which is global and unusual.  He has neck pain which is also unusual.  Both of these have been present since yesterday.  No ear pain or fullness.  No ear drainage or changes in hearing.  No tinnitus.  No falls or injury.  No fever.  No cough or congestion.  He has bowel changes over the past month or so, none new.      Allergies:  Allergies   Allergen Reactions    Levaquin [Levofloxacin] Nausea and Vomiting    Oxycodone Dizziness    Percocet [Oxycodone-Acetaminophen] Visual Disturbance     hallucinations     Problem List:    Patient Active Problem List    Diagnosis Date Noted    S/P radiation therapy 03/03/2025     Priority: Medium    Recurrent major depressive disorder, in remission 12/22/2023     Priority: Medium    Vertigo 07/05/2023     Priority: Medium    Adenocarcinoma of prostate (H) 05/16/2023     Priority: Medium    Compression fracture of L4 vertebra (H) 05/09/2023     Priority: Medium    Compression fracture of L3 lumbar vertebra, with routine healing, subsequent encounter 05/09/2023     Priority: Medium    Benign essential hypertension 03/01/2023     Priority: Medium    Saccular aneurysm  02/07/2023     Priority: Medium    Cecal volvulus (H) 07/13/2022     Priority: Medium    Elevated prostate specific antigen (PSA) 02/09/2022     Priority: Medium    Other irritable bowel syndrome 11/07/2019     Priority: Medium    Screening for hypertension 11/07/2019     Priority: Medium    Proctitis 01/26/2017     Priority: Medium     Colonoscopy in 2013.       Cirrhosis of liver without ascites (H) 09/23/2015     Priority: Medium     Thought due to hepatitis C.  Follows with [facility].  Completed Harvoni 9/2015.      Abdominal pain, generalized 08/20/2015     Priority: Medium    Hyperlipidemia LDL goal <130 07/16/2012     Priority: Medium    Tobacco abuse 07/16/2012     Priority: Medium      Past Medical History:    Past Medical History:   Diagnosis Date    Anisocoria     Asthma     Carpal tunnel syndrome     Head injury, unspecified     Hepatitis C     Obesity     Other convulsions     Other mononeuritis of upper limb     Unspecified essential hypertension      Past Surgical History:    Past Surgical History:   Procedure Laterality Date    COLECTOMY WITHOUT COLOSTOMY Right 7/13/2022    Procedure: Laparotomy, right colectomy, lysis of adhesions,;  Surgeon: Tommy Martino DO;  Location: [facility]    COLONOSCOPY      COLONOSCOPY N/A 7/18/2023    Procedure: Colonoscopy with polypectomy;  Surgeon: Octavio Aguirre MD;  Location: [facility]    COLONOSCOPY N/A 6/10/2024    Procedure: Colonoscopy;  Surgeon: Tommy Martino DO;  Location: [facility]    ESOPHAGOSCOPY, GASTROSCOPY, DUODENOSCOPY (EGD), COMBINED N/A 6/10/2024    Procedure: Esophagoscopy, gastroscopy, duodenoscopy (EGD), combined;  Surgeon: Tommy Martino DO;  Location: [facility]    GI SURGERY      HERNIA REPAIR      HERNIORRHAPHY INCISIONAL (LOCATION) N/A 7/13/2022    Procedure: primary incisional hernia repair;  Surgeon: Tommy Martino DO;  Location: [facility]    INSERT SEED MARKER / MATRIX N/A 8/30/2023    Procedure: Transrectal  ultrasound guided placement of fiducials and SpaceOar;  Surgeon: Chase Blount MD;  Location: [facility]    SURGICAL HISTORY OF -   4/6/87    esophagogastroduodenoscopy    SURGICAL HISTORY OF -   4/27/87    exploratory laparotomy and anterior gastropexy over a gastrostomy tube.    SURGICAL HISTORY OF -   11/13/90    esophagogastroduodenoscopy    SURGICAL HISTORY OF -   1991    L diaphragm eventration repair, fixation of stomach and colon to abd wall    SURGICAL HISTORY OF -       hiatal hernia repair    THORACIC SURGERY       Family History:    Family History   Problem Relation Age of Onset    Cerebrovascular Disease Mother     Heart Disease Brother     Heart Disease Brother      Social History:  Marital Status:   [2]  Social History     Tobacco Use    Smoking status: Former     Current packs/day: 0.50     Average packs/day: 0.5 packs/day for 50.0 years (25.0 ttl pk-yrs)     Types: Cigarettes    Smokeless tobacco: Never   Vaping Use    Vaping status: Never Used   Substance Use Topics    Alcohol use: Yes     Comment: 1-2 beers daily    Drug use: No      Medications:    acetaminophen (TYLENOL) 500 MG tablet  amLODIPine (NORVASC) 5 MG tablet  calcitonin, salmon, (MIACALCIN) 200 UNIT/ACT nasal spray  Calcium Carbonate (CALCIUM-CARB 600 PO)  COMPOUNDED NON-CONTROLLED SUBSTANCE (CMPD RX) - PHARMACY TO MIX COMPOUNDED MEDICATION  cyanocobalamin (VITAMIN B-12) 1000 MCG tablet  glycerin (LAXATIVE) 1.2 g suppository  HYDROcodone-acetaminophen (NORCO) 5-325 MG tablet  hydrocortisone 2.5 % cream  hydrocortisone-pramoxine (PROCTOFOAM-HC) 1-1 % rectal foam  HYDROmorphone (DILAUDID) 2 MG tablet  linaclotide (LINZESS) 145 MCG capsule  loperamide (IMODIUM A-D) 2 MG tablet  methylcellulose (CITRUCEL) powder  polyethylene glycol (MIRALAX) 17 GM/Dose powder  senna-docusate (NEGRITO-COLACE) 8.6-50 MG per tablet  sertraline (ZOLOFT) 50 MG tablet  solifenacin (VESICARE) 5 MG tablet  tamsulosin (FLOMAX) 0.4 MG capsule  vitamin  "D3 (CHOLECALCIFEROL) 50 mcg (2000 units) tablet      Review of Systems   All other systems reviewed and are negative.      PE   BP: 138/88  Pulse: 73  Temp: 98.4  F (36.9  C)  Resp: 18  Height: 188 cm (6' 2\")  Weight: 79.4 kg (175 lb)  SpO2: 100 %  Physical Exam  Vitals and nursing note reviewed.   Constitutional:       General: He is not in acute distress.     Comments: Patient is frustrated.   HENT:      Head: Atraumatic.      Right Ear: Tympanic membrane and external ear normal.      Left Ear: Tympanic membrane and external ear normal.      Nose: Nose normal.      Mouth/Throat:      Mouth: Mucous membranes are moist.      Pharynx: Oropharynx is clear.   Eyes:      General: No scleral icterus.     Extraocular Movements: Extraocular movements intact.      Conjunctiva/sclera: Conjunctivae normal.      Pupils: Pupils are equal, round, and reactive to light.   Cardiovascular:      Rate and Rhythm: Normal rate.   Pulmonary:      Effort: Pulmonary effort is normal. No respiratory distress.   Musculoskeletal:         General: Normal range of motion.      Cervical back: Normal range of motion.   Skin:     General: Skin is warm and dry.   Neurological:      General: No focal deficit present.      Mental Status: He is alert and oriented to person, place, and time.      Comments: No tinnitus.  When I lay him in a Trendelenburg position and turn his head to the left into the right there is no vertigo or movement elicited.   Psychiatric:         Behavior: Behavior normal.         ED COURSE and [facility]   1907.  Patient with headache and neck pain now presenting with new vertigo since yesterday.  It is very position dependent and will extinguish with laying supine.  No vomiting.  Phenergan Benadryl and fluid bolus ordered.  CT imaging ordered.  Lab values pending.  Concern for stroke is low, likely peripheral vertigo.    2130.  CT imaging unremarkable except for the 2 mm ICA aneurysm which was known.  No acute stroke.  No cancer or " abscess.  Low concern for meningitis.  Low concern for subtle ischemic stroke given the patient's symptoms.  This is likely peripheral vertigo as it is position dependent and extinguishes completely when lying flat.  He has nausea medicine and meclizine at home.  No further prescriptions provided.  He has ENT follow-up already scheduled.  Return here for worsening as discussed.    Electronic medical chart reviewed, including medical problems, medications, medical allergies, social history.  Recent hospitalizations and surgical procedures reviewed.  Recent clinic visits and consultations reviewed.  Recent labs and test results reviewed.  Nursing notes reviewed.    The patient, their parent if applicable, and/or their medical decision maker(s) and I have reviewed all of the available historical information, applicable PMH, physical exam findings, and objective diagnostic data gathered during this ED visit.  We then discussed all work-up options and then together agreed upon the course taken during this visit.  The ultimate disposition and plan was a cooperative decision made between myself and the patient, their parent if applicable, and/or their legal decision maker(s).  The risks and benefits of all decisions made during this visit were discussed to the best of my abilities given the circumstances, and all parties are understanding of the pertinent ramifications of these decisions.      LABS  Labs Ordered and Resulted from Time of ED Arrival to Time of ED Departure   BASIC METABOLIC PANEL - Abnormal       Result Value    Sodium 139      Potassium 4.3      Chloride 105      Carbon Dioxide (CO2) 22      Anion Gap 12      Urea Nitrogen 15.2      Creatinine 0.81      GFR Estimate >90      Calcium 9.4      Glucose 104 (*)    CBC WITH PLATELETS AND DIFFERENTIAL    WBC Count 6.6      RBC Count 4.83      Hemoglobin 15.8      Hematocrit 45.0      MCV 93      MCH 32.7      MCHC 35.1      RDW 12.8      Platelet Count 177       % Neutrophils 65      % Lymphocytes 25      % Monocytes 7      % Eosinophils 1      % Basophils 1      % Immature Granulocytes 0      NRBCs per 100 WBC 0      Absolute Neutrophils 4.3      Absolute Lymphocytes 1.7      Absolute Monocytes 0.5      Absolute Eosinophils 0.1      Absolute Basophils 0.1      Absolute Immature Granulocytes 0.0      Absolute NRBCs 0.0         IMAGING  Images reviewed by me.  Radiology report also reviewed.  CTA Head Neck with Contrast   Final Result   IMPRESSION:       HEAD CTA:    1.  Probable 2 mm aneurysm of the proximal left supraclinoid ICA projected posterior medially (series 6 image 461).   2.  Intracranial circulation appears otherwise normal.      NECK CTA:   1.  No definite hemodynamically significant narrowing throughout major neck vessels.         CT Head w/o Contrast   Final Result   IMPRESSION:   1.  No acute intracranial process.          Procedures    Medications   promethazine (PHENERGAN) 12.5 mg in sodium chloride 0.9 % 55 mL intermittent infusion (12.5 mg Intravenous $Given 4/3/25 2009)   diphenhydrAMINE (BENADRYL) injection 12.5 mg (12.5 mg Intravenous $Given 4/3/25 1957)   sodium chloride 0.9% BOLUS 1,000 mL (0 mLs Intravenous Stopped 4/3/25 2130)   iopamidol (ISOVUE-370) solution 67 mL (67 mLs Intravenous $Given 4/3/25 2022)   sodium chloride 0.9 % bag for CT scan flush (100 mLs Intravenous $Given 4/3/25 2022)         IMPRESSION       ICD-10-CM    1. Peripheral vertigo, unspecified laterality  H81.399                Medication List      There are no discharge medications for this visit.                             Edwin Kohli MD  04/03/25 2131

## 2025-04-08 ENCOUNTER — PATIENT OUTREACH (OUTPATIENT)
Dept: CARE COORDINATION | Facility: CLINIC | Age: 70
End: 2025-04-08
Payer: COMMERCIAL

## 2025-04-08 NOTE — PROGRESS NOTES
Clinic Care Coordination Contact  Community Health Worker Initial Outreach    CHW Initial Information Gathering:  Referral Source: ED Follow-Up  Current living arrangement:: Not Assessed  Informal Support system:: Family, Spouse  No PCP office visit in Past Year: No  CHW Additional Questions  If ED/Hospital discharge, follow-up appointment scheduled as recommended?: Yes  Medication changes made following ED/Hospital discharge?: No  MyChart active?: Yes  Patient sent Social Drivers of Health questionnaire?: No    Patient accepts CC: No, Patient's Spouse expressed no additional suppor is needed at this time. [facility] unable to send Care Coordination introduction letter as patient does not have an active MyChart account. Clinic Care Coordination services remain available via referral if needed.    Kimberly Tolentino  Community Health Worker    Connected Care Resources   [facility]     *Connected Care Resources Team does NOT   follow patient ongoing. Referrals are identified   based on internal discharge report and the outreach is to ensure   Patient has understanding of their discharge instructions.

## 2025-04-08 NOTE — TELEPHONE ENCOUNTER
Diagnosis, Referred by & from: Radiation Proctitis   Appt date: 4/29/2025   NOTES STATUS DETAILS   OFFICE NOTE from referring provider N/A    OFFICE NOTE from other specialist Internal [facility]:  3/25/25 - URO OV with ODALYS Staton  3/3/25 - PCC OV with Dr. Rutledge  9/12/24 - SURG OV with Dr. Martino  12/21/23 - URO OV with Dr. Blount    [facility]:  3/13/25 - RAD ONC OV with Dr. Rodgers   DISCHARGE SUMMARY from [facility]:  7/13/22 - Admission with Dr. Martino   DISCHARGE REPORT from the ER Care Everywhere / [facility]:  3/10/25 - ED OV with Dr. Maciel  2/20/25 - ED OV with Dr. Priest  10/13/24 - ED OV with Dr. Banks  9/15/24 - ED OV with Dr. Gonzalez  * Additional in [facility] - :  3/29/24 - ED OV with Dr. Leblanc   OPERATIVE REPORT Internal [facility]:  8/30/23 - OP Note for Transrectal ultrasound guided placement of fiducials and SpaceOar with Dr. Blount  7/13/22 - OP Note for Laparotomy, right colectomy, lysis of adhesions, primary incisional hernia repair with Dr. Martino  11/28/09 - OP Note for RECTAL EUA, HEMORRHOIDECTOMY with Dr. Whittington   MEDICATION LIST Internal    LABS     BIOPSIES/PATHOLOGY RELATED TO DIAGNOSIS Internal [facility]:  7/18/23 - Colon Biopsy (Case: [id number])  5/8/23 - Prostate Biopsy (Case: [id number])  7/13/22 - Colon Biopsy (Case: [id number])  11/28/09 - Hemorrhoid Biopsy (Case: [id number])   DIAGNOSTIC PROCEDURES     COLONOSCOPY (most recent all time after 5 years) Internal [facility]:  1/10/25 - Colonoscopy - No Specimens  6/10/24 - Colonoscopy - No Specimens  7/18/23 - Colonoscopy   UPPER ENDOSCOPY (EGD) Internal [facility]:  6/10/24 - EGD   IMAGING (DISC & REPORT)      CT Internal [facility]:  3/10/25 - CT Abd/Pelvis  2/20/25 - CT Abd/Pelvis  10/13/24 - CT Abd/Pelvis  9/15/24 - CT Abd/Pelvis  8/18/24 - CT Abd/Pelvis  11/20/23 - CT Abd/Pelvis  6/13/23 - NM Bone Scan  3/16/23 - CT Abd/Pelvis  1/21/23 - CT Abd/Pelvis  7/13/22 - CT  Abd/Pelvis   MRI Internal [facility]:  9/7/23 - MRI Pelvis  3/9/23 - MRI Prostate   ULTRASOUND  (ENDOANAL/ENDORECTAL) Internal [facility]:  8/18/24 - US Abdomen

## 2025-04-14 ENCOUNTER — TELEPHONE (OUTPATIENT)
Dept: FAMILY MEDICINE | Facility: CLINIC | Age: 70
End: 2025-04-14
Payer: COMMERCIAL

## 2025-04-14 NOTE — TELEPHONE ENCOUNTER
Received call from Patient's wife.  States that Patient needs refill on his zoloft.  Pharmacy states that he does not have any refills left.  Relayed that Patient has new prescription for zoloft that was sent on 3/3/25, and pharmacy should have it on file.  Wife will call pharmacy to get it filled.  Javi GASCA, Clinic RN   [facility]

## 2025-04-18 ENCOUNTER — HOSPITAL ENCOUNTER (OUTPATIENT)
Facility: CLINIC | Age: 70
Setting detail: OBSERVATION
Discharge: HOME | End: 2025-04-19
Attending: EMERGENCY MEDICINE | Admitting: INTERNAL MEDICINE
Payer: COMMERCIAL

## 2025-04-18 ENCOUNTER — APPOINTMENT (OUTPATIENT)
Dept: CT IMAGING | Facility: CLINIC | Age: 70
End: 2025-04-18
Attending: EMERGENCY MEDICINE
Payer: COMMERCIAL

## 2025-04-18 DIAGNOSIS — I48.0 PAF (PAROXYSMAL ATRIAL FIBRILLATION) (H): ICD-10-CM

## 2025-04-18 DIAGNOSIS — R10.84 ABDOMINAL PAIN, GENERALIZED: ICD-10-CM

## 2025-04-18 DIAGNOSIS — I49.1 PAC (PREMATURE ATRIAL CONTRACTION): ICD-10-CM

## 2025-04-18 DIAGNOSIS — K92.1 HEMATOCHEZIA: ICD-10-CM

## 2025-04-18 DIAGNOSIS — F33.40 RECURRENT MAJOR DEPRESSIVE DISORDER, IN REMISSION: Primary | ICD-10-CM

## 2025-04-18 PROBLEM — C61 ADENOCARCINOMA OF PROSTATE (H): Status: ACTIVE | Noted: 2023-05-16

## 2025-04-18 PROBLEM — Z13.6 SCREENING FOR HYPERTENSION: Status: RESOLVED | Noted: 2019-11-07 | Resolved: 2025-04-18

## 2025-04-18 LAB
ALBUMIN SERPL BCG-MCNC: 4.4 G/DL (ref 3.5–5.2)
ALBUMIN UR-MCNC: NEGATIVE MG/DL
ALP SERPL-CCNC: 87 U/L (ref 40–150)
ALT SERPL W P-5'-P-CCNC: 14 U/L (ref 0–70)
ANION GAP SERPL CALCULATED.3IONS-SCNC: 10 MMOL/L (ref 7–15)
APPEARANCE UR: CLEAR
AST SERPL W P-5'-P-CCNC: 18 U/L (ref 0–45)
BASOPHILS # BLD AUTO: 0.1 10E3/UL (ref 0–0.2)
BASOPHILS NFR BLD AUTO: 1 %
BILIRUB SERPL-MCNC: 0.7 MG/DL
BILIRUB UR QL STRIP: NEGATIVE
BUN SERPL-MCNC: 10 MG/DL (ref 8–23)
CALCIUM SERPL-MCNC: 9.6 MG/DL (ref 8.8–10.4)
CHLORIDE SERPL-SCNC: 102 MMOL/L (ref 98–107)
COLOR UR AUTO: ABNORMAL
CREAT SERPL-MCNC: 0.79 MG/DL (ref 0.67–1.17)
D DIMER PPP FEU-MCNC: <0.27 UG/ML FEU (ref 0–0.5)
EGFRCR SERPLBLD CKD-EPI 2021: >90 ML/MIN/1.73M2
EOSINOPHIL # BLD AUTO: 0.1 10E3/UL (ref 0–0.7)
EOSINOPHIL NFR BLD AUTO: 1 %
ERYTHROCYTE [DISTWIDTH] IN BLOOD BY AUTOMATED COUNT: 13.3 % (ref 10–15)
GLUCOSE SERPL-MCNC: 105 MG/DL (ref 70–99)
GLUCOSE UR STRIP-MCNC: NEGATIVE MG/DL
HCO3 SERPL-SCNC: 23 MMOL/L (ref 22–29)
HCT VFR BLD AUTO: 44.5 % (ref 40–53)
HGB BLD-MCNC: 15.7 G/DL (ref 13.3–17.7)
HGB UR QL STRIP: NEGATIVE
HOLD SPECIMEN: NORMAL
IMM GRANULOCYTES # BLD: 0 10E3/UL
IMM GRANULOCYTES NFR BLD: 0 %
INR PPP: 1.06 (ref 0.85–1.15)
KETONES UR STRIP-MCNC: 20 MG/DL
LACTATE SERPL-SCNC: 0.8 MMOL/L (ref 0.7–2)
LEUKOCYTE ESTERASE UR QL STRIP: NEGATIVE
LYMPHOCYTES # BLD AUTO: 1.5 10E3/UL (ref 0.8–5.3)
LYMPHOCYTES NFR BLD AUTO: 28 %
MAGNESIUM SERPL-MCNC: 2 MG/DL (ref 1.7–2.3)
MCH RBC QN AUTO: 33.3 PG (ref 26.5–33)
MCHC RBC AUTO-ENTMCNC: 35.3 G/DL (ref 31.5–36.5)
MCV RBC AUTO: 94 FL (ref 78–100)
MONOCYTES # BLD AUTO: 0.5 10E3/UL (ref 0–1.3)
MONOCYTES NFR BLD AUTO: 8 %
MUCOUS THREADS #/AREA URNS LPF: PRESENT /LPF
NEUTROPHILS # BLD AUTO: 3.4 10E3/UL (ref 1.6–8.3)
NEUTROPHILS NFR BLD AUTO: 62 %
NITRATE UR QL: NEGATIVE
NRBC # BLD AUTO: 0 10E3/UL
NRBC BLD AUTO-RTO: 0 /100
PH UR STRIP: 7.5 [PH] (ref 5–7)
PLATELET # BLD AUTO: 169 10E3/UL (ref 150–450)
POTASSIUM SERPL-SCNC: 4.1 MMOL/L (ref 3.4–5.3)
PROT SERPL-MCNC: 7.3 G/DL (ref 6.4–8.3)
RBC # BLD AUTO: 4.72 10E6/UL (ref 4.4–5.9)
RBC URINE: 2 /HPF
SODIUM SERPL-SCNC: 135 MMOL/L (ref 135–145)
SP GR UR STRIP: 1.01 (ref 1–1.03)
TROPONIN T SERPL HS-MCNC: 7 NG/L
TROPONIN T SERPL HS-MCNC: 7 NG/L
TSH SERPL DL<=0.005 MIU/L-ACNC: 3.55 UIU/ML (ref 0.3–4.2)
UROBILINOGEN UR STRIP-MCNC: NORMAL MG/DL
WBC # BLD AUTO: 5.5 10E3/UL (ref 4–11)
WBC URINE: 0 /HPF

## 2025-04-18 PROCEDURE — 93005 ELECTROCARDIOGRAM TRACING: CPT

## 2025-04-18 PROCEDURE — 250N000011 HC RX IP 250 OP 636: Mod: JZ | Performed by: EMERGENCY MEDICINE

## 2025-04-18 PROCEDURE — G0378 HOSPITAL OBSERVATION PER HR: HCPCS

## 2025-04-18 PROCEDURE — 80048 BASIC METABOLIC PNL TOTAL CA: CPT | Performed by: EMERGENCY MEDICINE

## 2025-04-18 PROCEDURE — 258N000003 HC RX IP 258 OP 636

## 2025-04-18 PROCEDURE — 96374 THER/PROPH/DIAG INJ IV PUSH: CPT | Mod: 59

## 2025-04-18 PROCEDURE — 99285 EMERGENCY DEPT VISIT HI MDM: CPT | Mod: 25

## 2025-04-18 PROCEDURE — 99222 1ST HOSP IP/OBS MODERATE 55: CPT

## 2025-04-18 PROCEDURE — 83605 ASSAY OF LACTIC ACID: CPT | Performed by: EMERGENCY MEDICINE

## 2025-04-18 PROCEDURE — 36415 COLL VENOUS BLD VENIPUNCTURE: CPT | Performed by: EMERGENCY MEDICINE

## 2025-04-18 PROCEDURE — 85004 AUTOMATED DIFF WBC COUNT: CPT | Performed by: EMERGENCY MEDICINE

## 2025-04-18 PROCEDURE — 250N000013 HC RX MED GY IP 250 OP 250 PS 637

## 2025-04-18 PROCEDURE — 93010 ELECTROCARDIOGRAM REPORT: CPT | Performed by: EMERGENCY MEDICINE

## 2025-04-18 PROCEDURE — 84443 ASSAY THYROID STIM HORMONE: CPT | Performed by: EMERGENCY MEDICINE

## 2025-04-18 PROCEDURE — 85610 PROTHROMBIN TIME: CPT

## 2025-04-18 PROCEDURE — 84155 ASSAY OF PROTEIN SERUM: CPT | Performed by: EMERGENCY MEDICINE

## 2025-04-18 PROCEDURE — 84484 ASSAY OF TROPONIN QUANT: CPT | Performed by: EMERGENCY MEDICINE

## 2025-04-18 PROCEDURE — 250N000011 HC RX IP 250 OP 636: Performed by: EMERGENCY MEDICINE

## 2025-04-18 PROCEDURE — 83735 ASSAY OF MAGNESIUM: CPT | Performed by: EMERGENCY MEDICINE

## 2025-04-18 PROCEDURE — 81001 URINALYSIS AUTO W/SCOPE: CPT | Performed by: EMERGENCY MEDICINE

## 2025-04-18 PROCEDURE — 250N000009 HC RX 250: Performed by: EMERGENCY MEDICINE

## 2025-04-18 PROCEDURE — 85379 FIBRIN DEGRADATION QUANT: CPT | Performed by: EMERGENCY MEDICINE

## 2025-04-18 PROCEDURE — 74177 CT ABD & PELVIS W/CONTRAST: CPT

## 2025-04-18 PROCEDURE — 99285 EMERGENCY DEPT VISIT HI MDM: CPT | Mod: 25 | Performed by: EMERGENCY MEDICINE

## 2025-04-18 RX ORDER — AMOXICILLIN 250 MG
2 CAPSULE ORAL 2 TIMES DAILY PRN
Status: DISCONTINUED | OUTPATIENT
Start: 2025-04-18 | End: 2025-04-19 | Stop reason: HOSPADM

## 2025-04-18 RX ORDER — HYDROMORPHONE HYDROCHLORIDE 1 MG/ML
0.5 INJECTION, SOLUTION INTRAMUSCULAR; INTRAVENOUS; SUBCUTANEOUS ONCE
Status: COMPLETED | OUTPATIENT
Start: 2025-04-18 | End: 2025-04-18

## 2025-04-18 RX ORDER — HYDROXYZINE HYDROCHLORIDE 25 MG/1
25 TABLET, FILM COATED ORAL EVERY 6 HOURS PRN
Status: DISCONTINUED | OUTPATIENT
Start: 2025-04-18 | End: 2025-04-19 | Stop reason: HOSPADM

## 2025-04-18 RX ORDER — ONDANSETRON 4 MG/1
4 TABLET, ORALLY DISINTEGRATING ORAL EVERY 6 HOURS PRN
Status: DISCONTINUED | OUTPATIENT
Start: 2025-04-18 | End: 2025-04-19 | Stop reason: HOSPADM

## 2025-04-18 RX ORDER — AMLODIPINE BESYLATE 5 MG/1
5 TABLET ORAL DAILY
Status: DISCONTINUED | OUTPATIENT
Start: 2025-04-19 | End: 2025-04-19 | Stop reason: HOSPADM

## 2025-04-18 RX ORDER — IOPAMIDOL 755 MG/ML
79 INJECTION, SOLUTION INTRAVASCULAR ONCE
Status: COMPLETED | OUTPATIENT
Start: 2025-04-18 | End: 2025-04-18

## 2025-04-18 RX ORDER — NALOXONE HYDROCHLORIDE 0.4 MG/ML
0.4 INJECTION, SOLUTION INTRAMUSCULAR; INTRAVENOUS; SUBCUTANEOUS
Status: DISCONTINUED | OUTPATIENT
Start: 2025-04-18 | End: 2025-04-19 | Stop reason: HOSPADM

## 2025-04-18 RX ORDER — SODIUM CHLORIDE, SODIUM LACTATE, POTASSIUM CHLORIDE, CALCIUM CHLORIDE 600; 310; 30; 20 MG/100ML; MG/100ML; MG/100ML; MG/100ML
INJECTION, SOLUTION INTRAVENOUS CONTINUOUS
Status: DISCONTINUED | OUTPATIENT
Start: 2025-04-18 | End: 2025-04-19 | Stop reason: HOSPADM

## 2025-04-18 RX ORDER — TOLTERODINE 2 MG/1
2 CAPSULE, EXTENDED RELEASE ORAL DAILY
Status: DISCONTINUED | OUTPATIENT
Start: 2025-04-19 | End: 2025-04-19 | Stop reason: HOSPADM

## 2025-04-18 RX ORDER — HYDROMORPHONE HYDROCHLORIDE 2 MG/1
2 TABLET ORAL EVERY 6 HOURS PRN
Status: DISCONTINUED | OUTPATIENT
Start: 2025-04-18 | End: 2025-04-19 | Stop reason: HOSPADM

## 2025-04-18 RX ORDER — TAMSULOSIN HYDROCHLORIDE 0.4 MG/1
0.4 CAPSULE ORAL EVERY EVENING
Status: DISCONTINUED | OUTPATIENT
Start: 2025-04-18 | End: 2025-04-19 | Stop reason: HOSPADM

## 2025-04-18 RX ORDER — AMOXICILLIN 250 MG
1 CAPSULE ORAL 2 TIMES DAILY PRN
Status: DISCONTINUED | OUTPATIENT
Start: 2025-04-18 | End: 2025-04-19 | Stop reason: HOSPADM

## 2025-04-18 RX ORDER — PROCHLORPERAZINE MALEATE 5 MG/1
5 TABLET ORAL EVERY 6 HOURS PRN
Status: DISCONTINUED | OUTPATIENT
Start: 2025-04-18 | End: 2025-04-19 | Stop reason: HOSPADM

## 2025-04-18 RX ORDER — POLYETHYLENE GLYCOL 3350 17 G/17G
17 POWDER, FOR SOLUTION ORAL DAILY
Status: DISCONTINUED | OUTPATIENT
Start: 2025-04-19 | End: 2025-04-19 | Stop reason: HOSPADM

## 2025-04-18 RX ORDER — ACETAMINOPHEN 325 MG/1
975 TABLET ORAL 4 TIMES DAILY
Status: DISCONTINUED | OUTPATIENT
Start: 2025-04-18 | End: 2025-04-19 | Stop reason: HOSPADM

## 2025-04-18 RX ORDER — ONDANSETRON 2 MG/ML
4 INJECTION INTRAMUSCULAR; INTRAVENOUS EVERY 6 HOURS PRN
Status: DISCONTINUED | OUTPATIENT
Start: 2025-04-18 | End: 2025-04-19 | Stop reason: HOSPADM

## 2025-04-18 RX ORDER — NALOXONE HYDROCHLORIDE 0.4 MG/ML
0.2 INJECTION, SOLUTION INTRAMUSCULAR; INTRAVENOUS; SUBCUTANEOUS
Status: DISCONTINUED | OUTPATIENT
Start: 2025-04-18 | End: 2025-04-19 | Stop reason: HOSPADM

## 2025-04-18 RX ORDER — CALCIUM CARBONATE 500 MG/1
1000 TABLET, CHEWABLE ORAL 4 TIMES DAILY PRN
Status: DISCONTINUED | OUTPATIENT
Start: 2025-04-18 | End: 2025-04-19 | Stop reason: HOSPADM

## 2025-04-18 RX ADMIN — ACETAMINOPHEN 975 MG: 325 TABLET ORAL at 22:29

## 2025-04-18 RX ADMIN — SODIUM CHLORIDE, SODIUM LACTATE, POTASSIUM CHLORIDE, AND CALCIUM CHLORIDE 500 ML: .6; .31; .03; .02 INJECTION, SOLUTION INTRAVENOUS at 21:56

## 2025-04-18 RX ADMIN — HYDROMORPHONE HYDROCHLORIDE 0.5 MG: 1 INJECTION, SOLUTION INTRAMUSCULAR; INTRAVENOUS; SUBCUTANEOUS at 17:31

## 2025-04-18 RX ADMIN — SODIUM CHLORIDE 60 ML: 9 INJECTION, SOLUTION INTRAVENOUS at 15:56

## 2025-04-18 RX ADMIN — IOPAMIDOL 79 ML: 755 INJECTION, SOLUTION INTRAVENOUS at 15:56

## 2025-04-18 RX ADMIN — TAMSULOSIN HYDROCHLORIDE 0.4 MG: 0.4 CAPSULE ORAL at 22:29

## 2025-04-18 ASSESSMENT — ACTIVITIES OF DAILY LIVING (ADL)
CHANGE_IN_FUNCTIONAL_STATUS_SINCE_ONSET_OF_CURRENT_ILLNESS/INJURY: NO
ADLS_ACUITY_SCORE: 50
WALKING_OR_CLIMBING_STAIRS_DIFFICULTY: NO
DOING_ERRANDS_INDEPENDENTLY_DIFFICULTY: NO
CONCENTRATING,_REMEMBERING_OR_MAKING_DECISIONS_DIFFICULTY: NO
FALL_HISTORY_WITHIN_LAST_SIX_MONTHS: NO
ADLS_ACUITY_SCORE: 50
DIFFICULTY_COMMUNICATING: NO
ADLS_ACUITY_SCORE: 50
ADLS_ACUITY_SCORE: 50
HEARING_DIFFICULTY_OR_DEAF: NO
WEAR_GLASSES_OR_BLIND: YES
ADLS_ACUITY_SCORE: 50
TOILETING_ISSUES: NO
DRESSING/BATHING_DIFFICULTY: NO
VISION_MANAGEMENT: GLASSES
DIFFICULTY_EATING/SWALLOWING: NO
ADLS_ACUITY_SCORE: 50

## 2025-04-18 NOTE — ED PROVIDER NOTES
[facility]  Emergency Department Visit Note    PATIENT:  Ghulam Rizzo     69 year old     male      [id number]    Chief complaint:  Chief Complaint   Patient presents with    Chest Pain    Abdominal Pain        History of present illness:  Patient is a 69 year old male with a medical history significant for chronic radiation proctitis and cystitis, chronic constipation, medical history significant for cirrhosis, irritable bowel syndrome, cecal volvulus presenting for evaluation of fluttering in his chest.  Patient reports over the past about 12 hours he has been experiencing intermittent fluttering in his chest.  He describes it feels kind of like a abnormal heartbeat.  He notes that it comes and goes randomly.  Nothing seems to make it better or worse.  He denies any chest pain but does have persistent abdominal pain.  He has had abdominal pain chronically but notes that it is worse at this time.  It is at the point now where he is no longer able to sleep at night.  He has had multiple interventions and evaluations for this abdominal pain.    Previous provider Dr. Omar Brennan's note patient has had pelvic pain since he finished prostate cancer radiation in October 2023.  CT of the abdomen from showed persistent bladder wall thickening and mild perivesicular fat stranding similar to previous CTs.  He had a CT scan in October 13, 2024 that showed perivesicular and perirectal edema.  Mild mucosal enhancement on previous scans.  It does appear that he has had multiple different CT scans that all show persistent perirectal and perivesicular swelling and irritation.  He did see urology on 3 - 25 - 25 where they increased his oxybutynin and discussed the possibility of chronic radiation-induced prostatitis.  No melena or hematochezia.  However new as of yesterday patient has been experiencing blood in his stool.  He had a very significant voluminous watery stool that had some blood in it yesterday.   This is new.  He notes that the chest palpitations seems to be new since his pain has been getting worse progressively.    No recent fevers or chills.  Eating and drinking fine.  Taking his medications as normal.    Review of Systems:  As in HPI above    BP (!) 158/76   Pulse 61   Temp 98.4  F (36.9  C) (Oral)   Resp 16   Wt 73.5 kg (162 lb)   SpO2 100%   BMI 20.80 kg/m      Physical Exam  Constitutional: laying in hospital bed, alert, oriented, in no apparent distress, conversant, and answering questions appropriately  HEENT: normocephalic, atraumatic, pupils 3mm, equal, round, and reactive to light, sclerae anicteric, extraocular motions intact, moist mucous membranes, and poor dentition  Neck: able to fully range, no midline tenderness, and no stridor  Cardiovascular: Normal rate, intermittently irregular rhythm, intermittently regular rhythm, peripheral pulses are strong and equal in wrists and ankles  Pulmonary: breathing comfortably on room air and lungs clear to auscultation bilaterally  Abdominal: Tenderness over the mid abdomen, otherwise abdomen is soft and nontender throughout, no rebound or guarding with palpation of the quadrants, there is rebound with tenderness over the midline where his pain is  Genitourinary: deferred  Extremities/MSK: no peripheral edema, no cyanosis , and no calf tenderness to palpation  Skin: warm, dry and non-diaphoretic  Neurologic: moves all four extremities spontaneously and GCS 15  Psychiatric: calm, appropriate      MDM:  Patient is a 69 year old male with above history presenting for evaluation of worsening abdominal pain, blood in his stool yesterday and intermittent fluttering in his chest.    Vitals reassuring, afebrile, normal blood pressure, heart rate is in the 60s and is intermittently regular and intermittently irregular. Exam reveals tenderness in the mid abdomen otherwise reassuring exam.  Patient has no pain with a deep breath but feels like he is unable  to get a deep breath and specifically on the left side of his chest..    At this point I am most concerned about new onset atrial fibrillation.  This could be related to patient's abdominal pathology.  Considering this plan to obtain CT scan of patient's abdomen, EKG, troponin and telemetry.  Also plan for magnesium and TSH as these can be possible culprits in the onset of atrial fibrillation.  His symptoms started within the last 24 hours so I did consider cardioversion however he is going in and out of atrial fibrillation so I suspect if I did convert him with electricity he would continue to have paroxysmal irregular heartbeats.    ECG:  - Ventricular rate 63 bpm, irregular  - VT, QRS, QT intervals variable, on the more normal-appearing beats he does appear to have a normal VT, normal QRS, normal QT segment length  - Axis normal  - no ST segment or T wave changes concerning for acute ischemia  - Comparison to prior ECGs: new irregular rhythm, seems to be variable atrial beats, atrial fibrillation  - My independent interpretation: new atrial fibrillation    No rate control indicated.     Remainder of ED course below.    ED COURSE:  ED Course as of 04/18/25 2217 Fri Apr 18, 2025   1655 CT Chest/Abdomen/Pelvis w Contrast  IMPRESSION:  1.  Either persistent or recurrent proctitis, could be related to radiation therapy given brachytherapy seeds in the prostate.  2.  No evidence of mechanical bowel obstruction or perforation.  3.  No acute abnormality in the chest.     1707 TSH: 3.55   1707 Magnesium: 2.0   1707 Troponin T, High Sensitivity: 7   2215 I called GI.  And discussed my concern for patient's recurrent proctitis with hematochezia and the possibility for worsening signs and symptoms with anticoagulation.  I then spoke with Grabiel who is planning on doing a colonoscopy and establishing care with this patient for recurrent proctitis and cystitis.  They recommend anticoagulation if needed for atrial fibrillation  but otherwise they recommend more prompt flexible sigmoidoscopy and colonoscopy.  Patient has no interventions indicated emergently so is okay to be admitted here.   2216 Labs are reassuring, pain is well-controlled at this time.  I also did discuss the possibility of mesenteric ischemia with a GI doctor.  She said that this would be a very unlikely presentation for this patient.  It is more likely that it is proctitis contributing to her symptoms.  She states that if it was mesenteric ischemia would likely send something different on CT scan and he did also have ischemia in a different distribution.   2217 Patient is comfortable with this plan.  Bed ordered.       Encounter Diagnoses:  Final diagnoses:   PAF (paroxysmal atrial fibrillation) (H)   Hematochezia       Final disposition: [facility] observation    Patience Banks DO  EM Physician  [facility]       Patience Banks DO  04/18/25 2216

## 2025-04-18 NOTE — ED TRIAGE NOTES
"Presents with right sided chest discomfort and feelings of \"fluttering\" pt states it has been intermittent since 0130.   Also has ongoing abdominal pain that is getting worse.   Has an appt with [facility] on 4/29     Triage Assessment (Adult)       Row Name 04/18/25 1454          Triage Assessment    Airway WDL WDL        Respiratory WDL    Respiratory WDL WDL        Skin Circulation/Temperature WDL    Skin Circulation/Temperature WDL WDL        Cardiac WDL    Cardiac WDL X;all  right sided chest \"fluttering\"        Peripheral/Neurovascular WDL    Peripheral Neurovascular WDL WDL        Cognitive/Neuro/Behavioral WDL    Cognitive/Neuro/Behavioral WDL WDL                     "

## 2025-04-19 ENCOUNTER — ORDERS ONLY (AUTO-RELEASED) (OUTPATIENT)
Dept: MEDSURG UNIT | Facility: CLINIC | Age: 70
End: 2025-04-19
Payer: COMMERCIAL

## 2025-04-19 VITALS
RESPIRATION RATE: 18 BRPM | TEMPERATURE: 97.3 F | HEIGHT: 74 IN | WEIGHT: 168.21 LBS | BODY MASS INDEX: 21.59 KG/M2 | OXYGEN SATURATION: 98 % | SYSTOLIC BLOOD PRESSURE: 146 MMHG | HEART RATE: 60 BPM | DIASTOLIC BLOOD PRESSURE: 80 MMHG

## 2025-04-19 DIAGNOSIS — I49.1 PAC (PREMATURE ATRIAL CONTRACTION): ICD-10-CM

## 2025-04-19 LAB
ADV 40+41 DNA STL QL NAA+NON-PROBE: NEGATIVE
ANION GAP SERPL CALCULATED.3IONS-SCNC: 7 MMOL/L (ref 7–15)
ASTRO TYP 1-8 RNA STL QL NAA+NON-PROBE: NEGATIVE
BUN SERPL-MCNC: 10.7 MG/DL (ref 8–23)
C CAYETANENSIS DNA STL QL NAA+NON-PROBE: NEGATIVE
C DIFF TOX B STL QL: NEGATIVE
CALCIUM SERPL-MCNC: 8.9 MG/DL (ref 8.8–10.4)
CAMPYLOBACTER DNA SPEC NAA+PROBE: NEGATIVE
CHLORIDE SERPL-SCNC: 105 MMOL/L (ref 98–107)
CREAT SERPL-MCNC: 0.76 MG/DL (ref 0.67–1.17)
CRYPTOSP DNA STL QL NAA+NON-PROBE: NEGATIVE
E COLI O157 DNA STL QL NAA+NON-PROBE: NORMAL
E HISTOLYT DNA STL QL NAA+NON-PROBE: NEGATIVE
EAEC ASTA GENE ISLT QL NAA+PROBE: NEGATIVE
EC STX1+STX2 GENES STL QL NAA+NON-PROBE: NEGATIVE
EGFRCR SERPLBLD CKD-EPI 2021: >90 ML/MIN/1.73M2
EPEC EAE GENE STL QL NAA+NON-PROBE: NEGATIVE
ETEC LTA+ST1A+ST1B TOX ST NAA+NON-PROBE: NEGATIVE
G LAMBLIA DNA STL QL NAA+NON-PROBE: NEGATIVE
GLUCOSE SERPL-MCNC: 97 MG/DL (ref 70–99)
HCO3 SERPL-SCNC: 27 MMOL/L (ref 22–29)
HOLD SPECIMEN: NORMAL
MAGNESIUM SERPL-MCNC: 1.9 MG/DL (ref 1.7–2.3)
NOROVIRUS GI+II RNA STL QL NAA+NON-PROBE: NEGATIVE
P SHIGELLOIDES DNA STL QL NAA+NON-PROBE: NEGATIVE
POTASSIUM SERPL-SCNC: 4.1 MMOL/L (ref 3.4–5.3)
RVA RNA STL QL NAA+NON-PROBE: NEGATIVE
SALMONELLA SP RPOD STL QL NAA+PROBE: NEGATIVE
SAPO I+II+IV+V RNA STL QL NAA+NON-PROBE: NEGATIVE
SHIGELLA SP+EIEC IPAH ST NAA+NON-PROBE: NEGATIVE
SODIUM SERPL-SCNC: 139 MMOL/L (ref 135–145)
V CHOLERAE DNA SPEC QL NAA+PROBE: NEGATIVE
VIBRIO DNA SPEC NAA+PROBE: NEGATIVE
Y ENTEROCOL DNA STL QL NAA+PROBE: NEGATIVE

## 2025-04-19 PROCEDURE — 36415 COLL VENOUS BLD VENIPUNCTURE: CPT

## 2025-04-19 PROCEDURE — G0378 HOSPITAL OBSERVATION PER HR: HCPCS

## 2025-04-19 PROCEDURE — 250N000013 HC RX MED GY IP 250 OP 250 PS 637

## 2025-04-19 PROCEDURE — 250N000013 HC RX MED GY IP 250 OP 250 PS 637: Performed by: INTERNAL MEDICINE

## 2025-04-19 PROCEDURE — 87493 C DIFF AMPLIFIED PROBE: CPT

## 2025-04-19 PROCEDURE — 82947 ASSAY GLUCOSE BLOOD QUANT: CPT

## 2025-04-19 PROCEDURE — 80048 BASIC METABOLIC PNL TOTAL CA: CPT

## 2025-04-19 PROCEDURE — 87507 IADNA-DNA/RNA PROBE TQ 12-25: CPT

## 2025-04-19 PROCEDURE — 83735 ASSAY OF MAGNESIUM: CPT

## 2025-04-19 PROCEDURE — 99239 HOSP IP/OBS DSCHRG MGMT >30: CPT | Performed by: STUDENT IN AN ORGANIZED HEALTH CARE EDUCATION/TRAINING PROGRAM

## 2025-04-19 RX ORDER — BUPROPION HYDROCHLORIDE 150 MG/1
150 TABLET ORAL EVERY MORNING
Qty: 30 TABLET | Refills: 0 | Status: SHIPPED | OUTPATIENT
Start: 2025-04-19

## 2025-04-19 RX ORDER — HYDROMORPHONE HYDROCHLORIDE 2 MG/1
2 TABLET ORAL EVERY 6 HOURS PRN
Qty: 10 TABLET | Refills: 0 | Status: SHIPPED | OUTPATIENT
Start: 2025-04-19 | End: 2025-04-22

## 2025-04-19 RX ADMIN — ACETAMINOPHEN 975 MG: 325 TABLET ORAL at 11:56

## 2025-04-19 RX ADMIN — POLYETHYLENE GLYCOL 3350 17 G: 17 POWDER, FOR SOLUTION ORAL at 08:15

## 2025-04-19 RX ADMIN — TOLTERODINE TARTRATE 2 MG: 2 CAPSULE, EXTENDED RELEASE ORAL at 08:15

## 2025-04-19 RX ADMIN — ACETAMINOPHEN 975 MG: 325 TABLET ORAL at 06:35

## 2025-04-19 RX ADMIN — SERTRALINE HYDROCHLORIDE 50 MG: 50 TABLET ORAL at 08:15

## 2025-04-19 RX ADMIN — AMLODIPINE BESYLATE 5 MG: 5 TABLET ORAL at 08:15

## 2025-04-19 ASSESSMENT — ACTIVITIES OF DAILY LIVING (ADL)
ADLS_ACUITY_SCORE: 29
ADLS_ACUITY_SCORE: 27
ADLS_ACUITY_SCORE: 31
ADLS_ACUITY_SCORE: 29
ADLS_ACUITY_SCORE: 31
ADLS_ACUITY_SCORE: 29
ADLS_ACUITY_SCORE: 27
ADLS_ACUITY_SCORE: 29
ADLS_ACUITY_SCORE: 29
ADLS_ACUITY_SCORE: 27
ADLS_ACUITY_SCORE: 29
ADLS_ACUITY_SCORE: 31
ADLS_ACUITY_SCORE: 31

## 2025-04-19 NOTE — PLAN OF CARE
Problem: Adult Inpatient Plan of Care  Goal: Plan of Care Review  Description: The Plan of Care Review/Shift note should be completed every shift.  The Outcome Evaluation is a brief statement about your assessment that the patient is improving, declining, or no change.  This information will be displayed automatically on your shiftnote.  Flowsheets (Taken 4/19/2025 0674)  Outcome Evaluation: Patient alert and oriented, able to make needs known.  Continues to have abdominal discomfort but is unable to have a BM since coming to the hospital.  Will monitor inorder to collect a stool sample.  IV fluids infusing per order.  Will continue to monitor patient throughout remainder of shift and plan of care.  Plan of Care Reviewed With: patient  Overall Patient Progress: no change  Goal: Absence of Hospital-Acquired Illness or Injury  Intervention: Identify and Manage Fall Risk  Recent Flowsheet Documentation  Taken 4/18/2025 2337 by Tiny Gifford RN  Safety Promotion/Fall Prevention:   clutter free environment maintained   lighting adjusted   mobility aid in reach   nonskid shoes/slippers when out of bed   room door open   safety round/check completed  Intervention: Prevent Infection  Recent Flowsheet Documentation  Taken 4/18/2025 2337 by Tiny Gifford, RN  Infection Prevention:   hand hygiene promoted   personal protective equipment utilized   rest/sleep promoted  Goal: Readiness for Transition of Care  Intervention: Mutually Develop Transition Plan  Recent Flowsheet Documentation  Taken 4/18/2025 2100 by Tiny Gifford RN  Equipment Currently Used at Home: none     Problem: Delirium  Goal: Improved Behavioral Control  Intervention: Minimize Safety Risk  Recent Flowsheet Documentation  Taken 4/18/2025 2337 by Tiny Gifford RN  Enhanced Safety Measures: pain management     Problem: Skin Injury Risk Increased  Goal: Skin Health and Integrity  Intervention: Plan: Nurse Driven Intervention: Friction and  Shear  Recent Flowsheet Documentation  Taken 4/19/2025 0300 by Tiny Gifford, RN  Friction/Shear Interventions: HOB 30 degrees or less  Taken 4/18/2025 2337 by Tiny Gifford, RN  Friction/Shear Interventions: HOB 30 degrees or less   Goal Outcome Evaluation:      Plan of Care Reviewed With: patient    Overall Patient Progress: no changeOverall Patient Progress: no change    Outcome Evaluation: Patient alert and oriented, able to make needs known.  Continues to have abdominal discomfort but is unable to have a BM since coming to the hospital.  Will monitor inorder to collect a stool sample.  IV fluids infusing per order.  Will continue to monitor patient throughout remainder of shift and plan of care.

## 2025-04-19 NOTE — H&P
[facility]    History and Physical - Hospitalist Service       Date of Admission:  4/18/2025    Assessment & Plan    Ghulam Rizzo is a 69 year old male with past medical hx of prostate cancer, proctitis, HTN, HLD, tobacco use admitted on 4/18/2025. He presented for ~12 hours of palpitations.     Frequent PACs    Presented for palpitations that started 4/18 am. Thought to be afib in the ER, however appears normal sinus with frequent PACs on review of tele.     While in the room patient reported palpitations, and on monitor he was having bigeminal PACs. Palpitations resolved after it returned to sinus with intermittent PACs.    Has not been sleeping well recently due to pain from proctitis as below. Also mildly dehydrated due to diarrhea as below. Both of which are possibly contributing to more frequent PACs.    - LR 100ml/hr overnight  - Started Tylenol 975mg QID and Atarax PRN due to pain from proctitis preventing sleep, likely contributing to more frequent PACs  - Tele    Hematochezia, acute  Hx of C. Diff x2 in 2024  Developed hematochezia 4/17 with high volume watery stools. Having ~3 stools per day. States it feels like his prior episodes of C. Diff in 2024.  - C. Diff, enteric panel pending    Radiation proctitis, acute on chronic  Chronic from prior radiation, but getting worse over the last month and impairing sleep. CT with persistent or recurrent proctitis. Colonoscopy 1/10/25 showed few angiodysplastic lesions treated with heater probe. Is already set up to see [facility] 4/29. Case discussed with [facility], who stated if he needs anticoagulation for afib he'll need to be observed closely for worsening bleeding.   - Scheduled tylenol and added [place] Atarax as above  - Continued PTA PRN Dilaudid 2mg PO  - Continue with scheduled [facility] appointment 4/29    Adenocarcinoma of prostate s/p radiation  Completed radiation 10/2023, then had issues with proctitis as above.  - Continued PTA Solifenacin  5mg, Flomax 0.4mg    Benign essential hypertension  - Continued PTA Amlodipine 5mg    Recurrent major depressive disorder, in remission  - Continued PTA Sertraline 50mg    Tobacco abuse in remission  Quit 2 years ago       Observation Goals: -diagnostic tests and consults completed and resulted, -vital signs normal or at patient baseline, -safe disposition plan has been identified, Nurse to notify provider when observation goals have been met and patient is ready for discharge.  Diet: Combination Diet Low Saturated Fat Na <2400mg Diet  DVT Prophylaxis: Ambulate every shift  Rivera Catheter: Not present  Lines: None     Cardiac Monitoring: ACTIVE order. Indication: Chest pain/ ACS rule out (24 hours)  Code Status: Full Code    Clinically Significant Risk Factors Present on Admission                   # Hypertension: Noted on problem list                      Disposition Plan     Medically Ready for Discharge: Anticipated Tomorrow         The patient's care was discussed with the Attending Physician, Dr. Brandt, Patient, and Patient's Family.    Stevie Zabala PA-C  Hospitalist Service  [facility]  Securely message with [employer] (more info)  Text page via [facility] Paging/Directory     ______________________________________________________________________    Chief Complaint   Palpitations    History is obtained from the patient and his wife    History of Present Illness   Ghulam Rizzo is a 69 year old male who presented for palpitations.    Patient presented for fluttering in his chest for the last 12 hours prior to ER arrival.  He states this comes and goes randomly.  He does not have chest pain, shortness of breath, associated dizziness or lightheadedness, orthopnea.    He has a history of prostate cancer that was treated with radiation in 2023 and he subsequently developed proctitis.  He had a colonoscopy in January that showed angiodysplastic lesions treated with a heater probe.  After this  he had episodes of hematochezia.  This is since resolved but he started having large watery volume diarrhea with hematochezia yesterday.  He has lower abdominal pain associated with this that is not changed from his chronic proctitis pain.  He is establishing with [facility] on 4/29.    He has a history of C. difficile twice in 2024 and he states that this watery diarrhea and hematochezia is similar.  He denies poor p.o. intake, medication changes, fevers, chills.  The lower abdominal pain has been getting worse over the last month and has caused him to lose a lot of sleep.  He has not taken increased caffeine from the lack of sleep.  He quit smoking 2 years ago.  He drinks 1-2 beers per day.      Past Medical History    Past Medical History:   Diagnosis Date    Anisocoria     Asthma     Carpal tunnel syndrome     2006    Head injury, unspecified     closed head injury 4 yrs ago    Hepatitis C     Cured by [facility] in 2015. diagnosed in 2012 was positive for Hepatitis C.     Obesity     2006, resolved    Other convulsions     seizure disorder due to head injury 4 yrs ago.    Other mononeuritis of upper limb     L phrenic nerve paralysis    Unspecified essential hypertension        Past Surgical History   Past Surgical History:   Procedure Laterality Date    COLECTOMY WITHOUT COLOSTOMY Right 7/13/2022    Procedure: Laparotomy, right colectomy, lysis of adhesions,;  Surgeon: Tommy Martino DO;  Location: [facility]    COLONOSCOPY      COLONOSCOPY N/A 7/18/2023    Procedure: Colonoscopy with polypectomy;  Surgeon: Octavio Aguirre MD;  Location: [facility]    COLONOSCOPY N/A 6/10/2024    Procedure: Colonoscopy;  Surgeon: Tommy Martino DO;  Location: [facility]    ESOPHAGOSCOPY, GASTROSCOPY, DUODENOSCOPY (EGD), COMBINED N/A 6/10/2024    Procedure: Esophagoscopy, gastroscopy, duodenoscopy (EGD), combined;  Surgeon: Tommy Martino DO;  Location: [facility]    GI SURGERY      HERNIA REPAIR      HERNIORRHAPHY  INCISIONAL (LOCATION) N/A 7/13/2022    Procedure: primary incisional hernia repair;  Surgeon: Tommy Martino DO;  Location: [facility] OR    INSERT SEED MARKER / MATRIX N/A 8/30/2023    Procedure: Transrectal ultrasound guided placement of fiducials and SpaceOar;  Surgeon: Chase Blount MD;  Location: [facility]    SURGICAL HISTORY OF -   4/6/87    esophagogastroduodenoscopy    SURGICAL HISTORY OF -   4/27/87    exploratory laparotomy and anterior gastropexy over a gastrostomy tube.    SURGICAL HISTORY OF -   11/13/90    esophagogastroduodenoscopy    SURGICAL HISTORY OF -   1991    L diaphragm eventration repair, fixation of stomach and colon to abd wall    SURGICAL HISTORY OF -       hiatal hernia repair    THORACIC SURGERY         Prior to Admission Medications   Prior to Admission Medications   Prescriptions Last Dose Informant Patient Reported? Taking?   Calcium Carbonate (CALCIUM-CARB 600 PO)   Yes No   Sig: Take 2 tablets by mouth daily   HYDROcodone-acetaminophen (NORCO) 5-325 MG tablet   No No   Sig: Take 1 tablet by mouth every 6 hours as needed for severe pain.   HYDROmorphone (DILAUDID) 2 MG tablet   No No   Sig: Take 1 tablet (2 mg) by mouth every 6 hours as needed for pain.   acetaminophen (TYLENOL) 500 MG tablet  Spouse/Significant Other Yes No   Sig: Take 1,000 mg by mouth every 6 hours as needed for mild pain   amLODIPine (NORVASC) 5 MG tablet   No No   Sig: Take 1 tablet (5 mg) by mouth daily.   calcitonin, salmon, (MIACALCIN) 200 UNIT/ACT nasal spray   No No   Sig: Spray 1 spray into one nostril alternating nostrils daily for 30 days Alternate nostril each day.   cyanocobalamin (VITAMIN B-12) 1000 MCG tablet   No No   Sig: Take 1 tablet (1,000 mcg) by mouth daily   glycerin (LAXATIVE) 1.2 g suppository   No No   Sig: Place 1 suppository rectally daily as needed (constipation.)   hydrocortisone 2.5 % cream   No No   Sig: Apply topically 2 times daily Apply with rectal applicator twice  daily   Patient not taking: Reported on 3/3/2025   hydrocortisone-pramoxine (PROCTOFOAM-HC) 1-1 % rectal foam   No No   Sig: Place 1 Applicatorful rectally 3 times daily   Patient not taking: Reported on 3/3/2025   linaclotide (LINZESS) 145 MCG capsule   No No   Sig: Take 1 capsule (145 mcg) by mouth every morning (before breakfast)   Patient not taking: Reported on 9/4/2024   loperamide (IMODIUM A-D) 2 MG tablet   No No   Sig: Take 1 tablet (2 mg) by mouth 4 times daily as needed for diarrhea.   Patient not taking: Reported on 3/3/2025   methylcellulose (CITRUCEL) powder   No No   Sig: Citrucel powder: 2 g (1 heaping tablespoon) in 8 oz (240 mL) of cold water; increase as needed by 1 heaping tablespoon up to 3 times/day   Patient not taking: Reported on 3/25/2025   polyethylene glycol (MIRALAX) 17 GM/Dose powder   No No   Sig: Take 17 g (1 capful.) by mouth daily   senna-docusate (NEGRITO-COLACE) 8.6-50 MG per tablet  Spouse/Significant Other No No   Sig: Take 1-2 tablets by mouth 2 times daily as needed for constipation.   sertraline (ZOLOFT) 50 MG tablet   No No   Sig: Take 1 tablet by mouth daily   solifenacin (VESICARE) 5 MG tablet   No No   Sig: Take 1 tablet (5 mg) by mouth daily.   tamsulosin (FLOMAX) 0.4 MG capsule   No No   Sig: Take 1 capsule (0.4 mg) by mouth every evening   vitamin D3 (CHOLECALCIFEROL) 50 mcg (2000 units) tablet   No No   Sig: Take 1 tablet (50 mcg) by mouth daily      Facility-Administered Medications: None      2023 UPDATE: these sections are not required for  billing, but can be added when medically relevant     Physical Exam   Vital Signs: Temp: 97.9  F (36.6  C) Temp src: Oral BP: (!) 145/84 Pulse: 57   Resp: 16 SpO2: 98 % O2 Device: None (Room air)    Weight: 168 lbs 3.38 oz    Constitutional: Alert, oriented, cooperative, in no acute distress, appears nontoxic.  HENT: Oropharynx is clear and moist. Sunken eyes. Poor dentition. No evidence of cranial trauma. Normocephalic. Eyes  anicteric.   Cardiovascular: Regular rate and rhythm, normal S1 and S2, and no murmur noted. Good peripheral pulses in wrists bilaterally. PACs do perfuse on pulses No lower extremity edema.  Respiratory: Clear to auscultation bilaterally with no adventitious breath sounds.   GI: Soft, Suprapubic tenderness, normal bowel sounds, no hepatosplenomegaly, no masses appreciated.  Musculoskeletal: Normal muscle bulk and tone. FROM in all extremities   Skin: Warm and dry, no rashes on exposed skin.   Psych: Normal affect and speech.  Neurologic: Cranial nerves 2-12 are grossly intact.       Medical Decision Making       70 MINUTES SPENT BY ME on the date of service doing chart review, history, exam, documentation & further activities per the note.      Data     I have personally reviewed the following data over the past 24 hrs:    5.5  \   15.7   / 169     135 102 10.0 /  105 (H)   4.1 23 0.79 \     ALT: 14 AST: 18 AP: 87 TBILI: 0.7   ALB: 4.4 TOT PROTEIN: 7.3 LIPASE: N/A     Trop: 7 BNP: N/A     TSH: 3.55 T4: N/A A1C: N/A     Procal: N/A CRP: N/A Lactic Acid: 0.8       INR:  1.06 PTT:  N/A   D-dimer:  <0.27 Fibrinogen:  N/A       Imaging results reviewed over the past 24 hrs:   Recent Results (from the past 24 hours)   CT Chest/Abdomen/Pelvis w Contrast    Narrative    EXAM: CT CHEST/ABDOMEN/PELVIS W CONTRAST  LOCATION: [facility]  DATE: 4/18/2025    INDICATION: abdominal pain, chronic and new chest pain, hx of left diaphragm issues, mesh in left side of abdomen, hx of prostate cancer  COMPARISON: CT abdomen/pelvis 3/10/2025  TECHNIQUE: CT scan of the chest, abdomen, and pelvis was performed following injection of IV contrast. Multiplanar reformats were obtained. Dose reduction techniques were used.   CONTRAST: 79mL Isovue 370    FINDINGS:   LUNGS AND PLEURA: Persistent elevation the left hemidiaphragm with some mild compressive atelectasis in the left lower lobe. Minimal biapical scarring  without tom airspace consolidation, pleural effusion or pneumothorax. Multiple calcified granulomas   in the right lower lobe.    MEDIASTINUM/AXILLAE: No suspicious lymphadenopathy. Calcified mediastinal and right hilar lymph nodes, compatible with prior granulomatous disease. No pericardial effusion. Subcentimeter thyroid nodules, no specific follow-up recommended given size.    CORONARY ARTERY CALCIFICATION: None.    HEPATOBILIARY: Simple-appearing hepatic cysts, no specific follow-up recommended. Calcified granulomas are also unchanged. No evidence of cholecystitis or biliary obstruction.    PANCREAS: No ductal dilation or surrounding inflammation.    SPLEEN: Calcified granulomas, unchanged from prior exam.    ADRENAL GLANDS: Normal.    KIDNEYS/BLADDER: Right renal cysts, no specific follow-up recommended. No hydronephrosis. Urinary bladder is unremarkable.    BOWEL: Persistent inflammatory changes in the rectum with some subtle surrounding fat stranding. No evidence of mechanical bowel obstruction, perforation or drainable fluid collection. Likely prior right hemicolectomy.    LYMPH NODES: No suspicious lymphadenopathy. No abdominopelvic free fluid.    VASCULATURE: Mild calcified and noncalcified atherosclerosis.    PELVIC ORGANS: Brachytherapy seeds in the prostate.    MUSCULOSKELETAL: Diffuse osteopenia. No acute bony abnormality. Stable L3 compression deformity.      Impression    IMPRESSION:  1.  Either persistent or recurrent proctitis, could be related to radiation therapy given brachytherapy seeds in the prostate.  2.  No evidence of mechanical bowel obstruction or perforation.  3.  No acute abnormality in the chest.

## 2025-04-19 NOTE — DISCHARGE SUMMARY
[facility]  Hospitalist Discharge Summary      Date of Admission:  4/18/2025  Date of Discharge:  4/19/2025  Discharging Provider: Vince Felder DO  Discharge Service: Hospitalist Service    Discharge Diagnoses   Frequent PACs  Hematochezia, acute  Hx of C. Diff x2 in 2024   Radiation proctitis, acute on chronic   Adenocarcinoma of prostate s/p radiation   Benign essential hypertension   Recurrent major depressive disorder, in remission   Tobacco abuse in remission     Clinically Significant Risk Factors          Follow-ups Needed After Discharge   Follow-up Appointments       Hospital Follow-up with Existing Primary Care Provider (PCP)          Schedule Primary Care visit within: 7 Days             Unresulted Labs Ordered in the Past 30 Days of this Admission       Date and Time Order Name Status Description    4/18/2025  8:03 PM Enteric Bacteria and Virus Panel PCR In process     4/18/2025  8:03 PM C. difficile Toxin B PCR with reflex to C. difficile EIA In process         These results will be followed up by PCP.     Discharge Disposition   Discharged to home  Condition at discharge: Stable    Hospital Course    Ghulam Rizzo is a 69 year old male with past medical hx of prostate cancer, proctitis, HTN, HLD, tobacco use admitted on 4/18/2025. He presented for ~12 hours of palpitations.     Frequent PACs    Presented for palpitations that started 4/18 am. Thought to be afib in the ER, however appears normal sinus with frequent PACs on review of tele.     While in the room patient reported palpitations, and on monitor he was having bigeminal PACs. Palpitations resolved after it returned to sinus with intermittent PACs.    Has not been sleeping well recently due to pain from proctitis as below. Also mildly dehydrated due to diarrhea as below. Both of which are possibly contributing to more frequent PACs. Overnight telemetry reviewed without any evidence of atrial fibrillation and had mild  PAC burden.   - Zio patch ordered for additional outpatient monitoring, may require cardiology referral if PAC burden is  - Patient borderline sinus bradycardia this admission so would be hesitant to prescribe beta blockers or calcium channel blockers that could exacerbate bradycardia, but will defer to percentage found of zio patch on whether cardiology referral is appropriate    Recurrent major depressive disorder, not in remission   Long discussion with patient about symptoms and chronic medical conditions. Appears he also has ongoing, moderate to severe depression that is exacerbating his symptoms. He describes being overwhelmed, fatigued, and unmotivated to do anything recently. Previously well-controlled on sertraline 75 mg but was reduced down to 50 mg in 2023. Likely needs additional management for depression in outpatient setting. Discussed going up on Sertraline dose or adding Wellbutrin for stimulating effects given chronic fatigue and lack of motivation.   - Added Wellbutrin 150 mg XR daily    - Continued PTA Sertraline 50mg  - Follow up with PCP in 1-2 weeks for follow up and additional management     Hematochezia, acute  Hx of C. Diff x2 in 2024  Developed hematochezia 4/17 with high volume watery stools. Having ~3 stools per day. States it feels like his prior episodes of C. Diff in 2024. Very low suspicion of infectious diarrhea based on symptom history.   - C. Diff and enteric panel pending  - Continue with [facility] follow up   - Continue daily bowel regimen     Radiation proctitis, acute on chronic  Chronic from prior radiation, but getting worse over the last month and impairing sleep. CT with persistent or recurrent proctitis. Colonoscopy 1/10/25 showed few angiodysplastic lesions treated with heater probe. Is already set up to see [facility] 4/29. Case discussed with [facility], who stated if he needs anticoagulation for afib he'll need to be observed closely for worsening bleeding.   - Scheduled tylenol and  added PRN Atarax as above  - Continued PTA PRN Dilaudid 2mg PO  - Continue with scheduled [facility] appointment 4/29    Adenocarcinoma of prostate s/p radiation  Completed radiation 10/2023, then had issues with proctitis as above.  - Continued PTA Solifenacin 5mg, Flomax 0.4mg    Benign essential hypertension  - Continued PTA Amlodipine 5mg    Tobacco abuse in remission  Quit 2 years ago    Consultations This Hospital Stay   None    Code Status   Full Code    Time Spent on this Encounter   I, Vince Felder DO, personally saw the patient today and spent greater than 30 minutes discharging this patient.       Vince Felder DO  [facility] SURGICAL  [address] [address]  Phone: [phone]  Fax: [phone]  ______________________________________________________________________    Physical Exam   Vital Signs: Temp: 97.3  F (36.3  C) Temp src: Oral BP: (!) 146/80 Pulse: 60   Resp: 18 SpO2: 98 % O2 Device: None (Room air)    Weight: 168 lbs 3.38 oz    Constitutional: awake, alert, cooperative, no apparent distress, and appears stated age  Respiratory: No increased work of breathing, good air exchange, clear to auscultation bilaterally, no crackles or wheezing  Cardiovascular: Normal apical impulse, regular rate and rhythm, normal S1 and S2, no S3 or S4, and no murmur noted  GI: No scars, normal bowel sounds, soft, non-distended, non-tender, no masses palpated, no hepatosplenomegally  Musculoskeletal: There is no redness, warmth, or swelling of the joints.  Full range of motion noted.  Motor strength is 5 out of 5 all extremities bilaterally.  Tone is normal.       Primary Care Physician   Jamie Rutledge    Discharge Orders      Reason for your hospital stay    Heart palpitations, dizziness     Activity    Your activity upon discharge: activity as tolerated     Diet    Follow this diet upon discharge:   Combination Diet Low Saturated Fat Na <2400mg Diet     Hospital Follow-up with  Existing Primary Care Provider (PCP)          JOYCE PATCH MAIL OUT       Significant Results and Procedures   Most Recent 3 CBC's:  Recent Labs   Lab Test 04/18/25  1501 04/03/25  1919 03/10/25  1345   WBC 5.5 6.6 4.6   HGB 15.7 15.8 15.3   MCV 94 93 94    177 163     Most Recent 3 BMP's:  Recent Labs   Lab Test 04/19/25  0538 04/18/25  1501 04/03/25  1919    135 139   POTASSIUM 4.1 4.1 4.3   CHLORIDE 105 102 105   CO2 27 23 22   BUN 10.7 10.0 15.2   CR 0.76 0.79 0.81   ANIONGAP 7 10 12   TAVIA 8.9 9.6 9.4   GLC 97 105* 104*   ,   Results for orders placed or performed during the hospital encounter of 04/18/25   CT Chest/Abdomen/Pelvis w Contrast    Narrative    EXAM: CT CHEST/ABDOMEN/PELVIS W CONTRAST  LOCATION: [facility]  DATE: 4/18/2025    INDICATION: abdominal pain, chronic and new chest pain, hx of left diaphragm issues, mesh in left side of abdomen, hx of prostate cancer  COMPARISON: CT abdomen/pelvis 3/10/2025  TECHNIQUE: CT scan of the chest, abdomen, and pelvis was performed following injection of IV contrast. Multiplanar reformats were obtained. Dose reduction techniques were used.   CONTRAST: 79mL Isovue 370    FINDINGS:   LUNGS AND PLEURA: Persistent elevation the left hemidiaphragm with some mild compressive atelectasis in the left lower lobe. Minimal biapical scarring without tom airspace consolidation, pleural effusion or pneumothorax. Multiple calcified granulomas   in the right lower lobe.    MEDIASTINUM/AXILLAE: No suspicious lymphadenopathy. Calcified mediastinal and right hilar lymph nodes, compatible with prior granulomatous disease. No pericardial effusion. Subcentimeter thyroid nodules, no specific follow-up recommended given size.    CORONARY ARTERY CALCIFICATION: None.    HEPATOBILIARY: Simple-appearing hepatic cysts, no specific follow-up recommended. Calcified granulomas are also unchanged. No evidence of cholecystitis or biliary  obstruction.    PANCREAS: No ductal dilation or surrounding inflammation.    SPLEEN: Calcified granulomas, unchanged from prior exam.    ADRENAL GLANDS: Normal.    KIDNEYS/BLADDER: Right renal cysts, no specific follow-up recommended. No hydronephrosis. Urinary bladder is unremarkable.    BOWEL: Persistent inflammatory changes in the rectum with some subtle surrounding fat stranding. No evidence of mechanical bowel obstruction, perforation or drainable fluid collection. Likely prior right hemicolectomy.    LYMPH NODES: No suspicious lymphadenopathy. No abdominopelvic free fluid.    VASCULATURE: Mild calcified and noncalcified atherosclerosis.    PELVIC ORGANS: Brachytherapy seeds in the prostate.    MUSCULOSKELETAL: Diffuse osteopenia. No acute bony abnormality. Stable L3 compression deformity.      Impression    IMPRESSION:  1.  Either persistent or recurrent proctitis, could be related to radiation therapy given brachytherapy seeds in the prostate.  2.  No evidence of mechanical bowel obstruction or perforation.  3.  No acute abnormality in the chest.       Discharge Medications   Current Discharge Medication List        START taking these medications    Details   buPROPion (WELLBUTRIN XL) 150 MG 24 hr tablet Take 1 tablet (150 mg) by mouth every morning.  Qty: 30 tablet, Refills: 0    Associated Diagnoses: Recurrent major depressive disorder, in remission           CONTINUE these medications which have NOT CHANGED    Details   acetaminophen (TYLENOL) 500 MG tablet Take 1,000 mg by mouth every 6 hours as needed for mild pain      amLODIPine (NORVASC) 5 MG tablet Take 1 tablet (5 mg) by mouth daily.  Qty: 90 tablet, Refills: 3    Associated Diagnoses: Benign essential hypertension      Calcium Carbonate (CALCIUM-CARB 600 PO) Take 2 tablets by mouth daily      cyanocobalamin (VITAMIN B-12) 1000 MCG tablet Take 1 tablet (1,000 mcg) by mouth daily  Qty: 90 tablet, Refills: 1    Associated Diagnoses: Neuropathy       HYDROcodone-acetaminophen (NORCO) 5-325 MG tablet Take 1 tablet by mouth every 6 hours as needed for severe pain.  Qty: 10 tablet, Refills: 0      HYDROmorphone (DILAUDID) 2 MG tablet Take 1 tablet (2 mg) by mouth every 6 hours as needed for pain.  Qty: 30 tablet, Refills: 0      polyethylene glycol (MIRALAX) 17 GM/Dose powder Take 17 g (1 capful.) by mouth daily  Qty: 578 g, Refills: 1    Associated Diagnoses: Constipation, unspecified constipation type      senna-docusate (NEGRITO-COLACE) 8.6-50 MG per tablet Take 1-2 tablets by mouth 2 times daily as needed for constipation.  Qty: 100 tablet, Refills: 5    Associated Diagnoses: Chronic constipation      sertraline (ZOLOFT) 50 MG tablet Take 1 tablet by mouth daily  Qty: 90 tablet, Refills: 3    Associated Diagnoses: Recurrent major depressive disorder, in remission      solifenacin (VESICARE) 5 MG tablet Take 1 tablet (5 mg) by mouth daily.  Qty: 30 tablet, Refills: 3    Associated Diagnoses: Urinary urgency      tamsulosin (FLOMAX) 0.4 MG capsule Take 1 capsule (0.4 mg) by mouth every evening  Qty: 90 capsule, Refills: 3    Associated Diagnoses: Prostate cancer (H)      vitamin D3 (CHOLECALCIFEROL) 50 mcg (2000 units) tablet Take 1 tablet (50 mcg) by mouth daily  Qty: 90 tablet, Refills: 3    Associated Diagnoses: Closed compression fracture of L3 lumbar vertebra, with routine healing, subsequent encounter; Closed compression fracture of L4 lumbar vertebra, with routine healing, subsequent encounter      calcitonin, salmon, (MIACALCIN) 200 UNIT/ACT nasal spray Spray 1 spray into one nostril alternating nostrils daily for 30 days Alternate nostril each day.  Qty: 2.7 mL, Refills: 0    Associated Diagnoses: Closed compression fracture of L3 lumbar vertebra, with routine healing, subsequent encounter; Closed compression fracture of L4 lumbar vertebra, with routine healing, subsequent encounter      glycerin (LAXATIVE) 1.2 g suppository Place 1 suppository rectally  daily as needed (constipation.)  Qty: 25 suppository, Refills: 1    Associated Diagnoses: Constipation, unspecified constipation type           STOP taking these medications       linaclotide (LINZESS) 145 MCG capsule Comments:   Reason for Stopping:         loperamide (IMODIUM A-D) 2 MG tablet Comments:   Reason for Stopping:         methylcellulose (CITRUCEL) powder Comments:   Reason for Stopping:             Allergies   Allergies   Allergen Reactions    Levaquin [Levofloxacin] Nausea and Vomiting    Oxycodone Dizziness    Percocet [Oxycodone-Acetaminophen] Visual Disturbance     hallucinations

## 2025-04-19 NOTE — ED NOTES
[facility]   Admission Handoff    The patient is Ghulam Rizzo, 69 year old who arrived in the ED by CAR from home with a complaint of Chest Pain and Abdominal Pain  . The patient's current symptoms are new and during this time the symptoms have decreased. In the ED, patient was diagnosed with   Final diagnoses:   PAF (paroxysmal atrial fibrillation) (H)   Hematochezia         Needed?: No    Allergies:    Allergies   Allergen Reactions    Levaquin [Levofloxacin] Nausea and Vomiting    Oxycodone Dizziness    Percocet [Oxycodone-Acetaminophen] Visual Disturbance     hallucinations       Past Medical Hx:   Past Medical History:   Diagnosis Date    Anisocoria     Asthma     Carpal tunnel syndrome     2006    Head injury, unspecified     closed head injury 4 yrs ago    Hepatitis C     Cured by [facility] in 2015. diagnosed in 2012 was positive for Hepatitis C.     Obesity     2006, resolved    Other convulsions     seizure disorder due to head injury 4 yrs ago.    Other mononeuritis of upper limb     L phrenic nerve paralysis    Unspecified essential hypertension        Initial vitals were: BP: (!) 176/91  Pulse: 63  Temp: 98.4  F (36.9  C)  Resp: 18  Weight: 73.5 kg (162 lb)  SpO2: 100 %   Recent vital Signs: BP (!) 158/76   Pulse 64   Temp 98.4  F (36.9  C) (Oral)   Resp 20   Wt 73.5 kg (162 lb)   SpO2 100%   BMI 20.80 kg/m      Elimination Status: Continent: Yes     Activity Level: Independent    Fall Status: Reason for falls risk:  Mobility  nonskid shoes/slippers when out of bed and patient and family education    Baseline Mental status: WDL  Current Mental Status changes: at basesline    Infection present or suspected this encounter: no  Sepsis suspected: No    Isolation type: n/a    Bariatric equipment needed?: No    In the ED these meds were given:   Medications   iopamidol (ISOVUE-370) solution 79 mL (79 mLs Intravenous $Given 4/18/25 1694)   sodium chloride 0.9 % bag  for CT scan flush (60 mLs As instructed $Given 4/18/25 4101)   HYDROmorphone (PF) (DILAUDID) injection 0.5 mg (0.5 mg Intravenous $Given 4/18/25 9355)       Drips running?  No    Home pump  No    Current LDAs: Peripheral IV: Site right AC; Gauge 18  none     Results:   Labs/Imaging  Ordered and Resulted from Time of ED Arrival Up to the Time of Departure from the ED  Results for orders placed or performed during the hospital encounter of 04/18/25 (from the past 24 hours)   [place] Draw    Narrative    The following orders were created for panel order [place] Draw.  Procedure                               Abnormality         Status                     ---------                               -----------         ------                     Extra Blue Top Tube[[id number]]                             Final result               Extra Green Top (Lithiu...[[id number]]                      Final result               Extra Purple Top Tube[[id number]]                           Final result                 Please view results for these tests on the individual orders.   Extra Blue Top Tube   Result Value Ref Range    Hold Specimen JIC    Extra Green Top (Lithium Heparin) Tube   Result Value Ref Range    Hold Specimen JIC    Extra Purple Top Tube   Result Value Ref Range    Hold Specimen JIC    Comprehensive metabolic panel   Result Value Ref Range    Sodium 135 135 - 145 mmol/L    Potassium 4.1 3.4 - 5.3 mmol/L    Carbon Dioxide (CO2) 23 22 - 29 mmol/L    Anion Gap 10 7 - 15 mmol/L    Urea Nitrogen 10.0 8.0 - 23.0 mg/dL    Creatinine 0.79 0.67 - 1.17 mg/dL    GFR Estimate >90 >60 mL/min/1.73m2    Calcium 9.6 8.8 - 10.4 mg/dL    Chloride 102 98 - 107 mmol/L    Glucose 105 (H) 70 - 99 mg/dL    Alkaline Phosphatase 87 40 - 150 U/L    AST 18 0 - 45 U/L    ALT 14 0 - 70 U/L    Protein Total 7.3 6.4 - 8.3 g/dL    Albumin 4.4 3.5 - 5.2 g/dL    Bilirubin Total 0.7 <=1.2 mg/dL   CBC with platelets differential    Narrative    The  following orders were created for panel order CBC with platelets differential.  Procedure                               Abnormality         Status                     ---------                               -----------         ------                     CBC with platelets and ...[[id number]]  Abnormal            Final result                 Please view results for these tests on the individual orders.   Troponin T, High Sensitivity   Result Value Ref Range    Troponin T, High Sensitivity 7 <=22 ng/L   TSH with free T4 reflex   Result Value Ref Range    TSH 3.55 0.30 - 4.20 uIU/mL   Magnesium   Result Value Ref Range    Magnesium 2.0 1.7 - 2.3 mg/dL   CBC with platelets and differential   Result Value Ref Range    WBC Count 5.5 4.0 - 11.0 10e3/uL    RBC Count 4.72 4.40 - 5.90 10e6/uL    Hemoglobin 15.7 13.3 - 17.7 g/dL    Hematocrit 44.5 40.0 - 53.0 %    MCV 94 78 - 100 fL    MCH 33.3 (H) 26.5 - 33.0 pg    MCHC 35.3 31.5 - 36.5 g/dL    RDW 13.3 10.0 - 15.0 %    Platelet Count 169 150 - 450 10e3/uL    % Neutrophils 62 %    % Lymphocytes 28 %    % Monocytes 8 %    % Eosinophils 1 %    % Basophils 1 %    % Immature Granulocytes 0 %    NRBCs per 100 WBC 0 <1 /100    Absolute Neutrophils 3.4 1.6 - 8.3 10e3/uL    Absolute Lymphocytes 1.5 0.8 - 5.3 10e3/uL    Absolute Monocytes 0.5 0.0 - 1.3 10e3/uL    Absolute Eosinophils 0.1 0.0 - 0.7 10e3/uL    Absolute Basophils 0.1 0.0 - 0.2 10e3/uL    Absolute Immature Granulocytes 0.0 <=0.4 10e3/uL    Absolute NRBCs 0.0 10e3/uL   D dimer quantitative   Result Value Ref Range    D-Dimer Quantitative <0.27 0.00 - 0.50 ug/mL FEU    Narrative    This D-dimer assay is intended for use in conjunction with a clinical pretest probability assessment model to exclude pulmonary embolism (PE) and deep venous thrombosis (DVT) in outpatients suspected of PE or DVT. The cut-off value is 0.50 ug/mL FEU.    For patients 50 years of age or older, the application of age-adjusted cut-off values for  D-Dimer may increase the specificity without significant effect on sensitivity. The literature suggested calculation age adjusted cut-off in ug/L = age in years x 10 ug/L. The results in this laboratory are reported as ug/mL rather than ug/L. The calculation for age adjusted cut off in ug/mL= age in years x 0.01 ug/mL. For example, the cut off for a 76 year old male is 76 x 0.01 ug/mL = 0.76 ug/mL (760 ug/L).    M Mazin et al. Age adjusted D-dimer cut-off levels to rule out pulmonary embolism: The ADJUST-PE Study. ALCIDES 2014;311:1952-1653.; HJ Hubert et al. Diagnostic accuracy of conventional or age adjusted D-dimer cutoff values in older patients with suspected venous thromboembolism. Systemic review and meta-analysis. BMJ 2013:346:f2492.   INR   Result Value Ref Range    INR 1.06 0.85 - 1.15   EKG 12 lead   Result Value Ref Range    Systolic Blood Pressure  mmHg    Diastolic Blood Pressure  mmHg    Ventricular Rate 63 BPM    Atrial Rate 63 BPM    CT Interval 158 ms    QRS Duration 82 ms     ms    QTc 415 ms    P Axis 59 degrees    R AXIS 69 degrees    T Axis 49 degrees    Interpretation ECG       Sinus rhythm with Premature supraventricular complexes  Otherwise normal ECG  No previous ECGs available     CT Chest/Abdomen/Pelvis w Contrast    Narrative    EXAM: CT CHEST/ABDOMEN/PELVIS W CONTRAST  LOCATION: [facility]  DATE: 4/18/2025    INDICATION: abdominal pain, chronic and new chest pain, hx of left diaphragm issues, mesh in left side of abdomen, hx of prostate cancer  COMPARISON: CT abdomen/pelvis 3/10/2025  TECHNIQUE: CT scan of the chest, abdomen, and pelvis was performed following injection of IV contrast. Multiplanar reformats were obtained. Dose reduction techniques were used.   CONTRAST: 79mL Isovue 370    FINDINGS:   LUNGS AND PLEURA: Persistent elevation the left hemidiaphragm with some mild compressive atelectasis in the left lower lobe. Minimal biapical scarring  without tom airspace consolidation, pleural effusion or pneumothorax. Multiple calcified granulomas   in the right lower lobe.    MEDIASTINUM/AXILLAE: No suspicious lymphadenopathy. Calcified mediastinal and right hilar lymph nodes, compatible with prior granulomatous disease. No pericardial effusion. Subcentimeter thyroid nodules, no specific follow-up recommended given size.    CORONARY ARTERY CALCIFICATION: None.    HEPATOBILIARY: Simple-appearing hepatic cysts, no specific follow-up recommended. Calcified granulomas are also unchanged. No evidence of cholecystitis or biliary obstruction.    PANCREAS: No ductal dilation or surrounding inflammation.    SPLEEN: Calcified granulomas, unchanged from prior exam.    ADRENAL GLANDS: Normal.    KIDNEYS/BLADDER: Right renal cysts, no specific follow-up recommended. No hydronephrosis. Urinary bladder is unremarkable.    BOWEL: Persistent inflammatory changes in the rectum with some subtle surrounding fat stranding. No evidence of mechanical bowel obstruction, perforation or drainable fluid collection. Likely prior right hemicolectomy.    LYMPH NODES: No suspicious lymphadenopathy. No abdominopelvic free fluid.    VASCULATURE: Mild calcified and noncalcified atherosclerosis.    PELVIC ORGANS: Brachytherapy seeds in the prostate.    MUSCULOSKELETAL: Diffuse osteopenia. No acute bony abnormality. Stable L3 compression deformity.      Impression    IMPRESSION:  1.  Either persistent or recurrent proctitis, could be related to radiation therapy given brachytherapy seeds in the prostate.  2.  No evidence of mechanical bowel obstruction or perforation.  3.  No acute abnormality in the chest.   UA with Microscopic reflex to Culture    Specimen: Urine, Midstream   Result Value Ref Range    Color Urine Light Yellow Colorless, Straw, Light Yellow, Yellow    Appearance Urine Clear Clear    Glucose Urine Negative Negative mg/dL    Bilirubin Urine Negative Negative    Ketones Urine 20  (A) Negative mg/dL    Specific Gravity Urine 1.015 1.003 - 1.035    Blood Urine Negative Negative    pH Urine 7.5 (H) 5.0 - 7.0    Protein Albumin Urine Negative Negative mg/dL    Urobilinogen Urine Normal Normal mg/dL    Nitrite Urine Negative Negative    Leukocyte Esterase Urine Negative Negative    Mucus Urine Present (A) None Seen /LPF    RBC Urine 2 <=2 /HPF    WBC Urine 0 <=5 /HPF    Narrative    Urine Culture not indicated   Troponin T, High Sensitivity   Result Value Ref Range    Troponin T, High Sensitivity 7 <=22 ng/L   Lactic Acid Whole Blood with 1X Repeat in 2 HR when >2   Result Value Ref Range    Lactic Acid, Initial 0.8 0.7 - 2.0 mmol/L       For the majority of the shift this patient's behavior was Green     Cardiac Rhythm: Atrial rhythm  Pt needs tele? Yes  Skin/wound Issues: None    Code Status: Full Code    Pain control: good    Nausea control: good    Abnormal labs/tests/findings requiring intervention: n/a watching for bleeding    Patient tested for COVID 19 prior to admission: NO     OBS brochure/video discussed/provided to patient/family: Yes     Family present during ED course? Yes     Family Comments/Social Situation comments: n/a    Tasks needing completion: None    Tiny Keenan, RN

## 2025-04-19 NOTE — PROGRESS NOTES
"[facility] [place]MISSION NOTE    Patient admitted to room 2211 at approximately 2056 via wheel chair from emergency room. Patient was accompanied by transport tech.     Verbal SBAR report received from [facility] prior to patient arrival.     Patient ambulated to bed independently. Patient alert and oriented X 4. Pain is controlled with current analgesics.  Medication(s) being used: narcotic analgesics including hydromorphone (Dilaudid).  . Admission vital signs: Blood pressure (!) 145/84, pulse 57, temperature 97.9  F (36.6  C), temperature source Oral, resp. rate 16, height 1.88 m (6' 2\"), weight 76.3 kg (168 lb 3.4 oz), SpO2 98%. Patient was oriented to plan of care, call light, bed controls, tv, telephone, bathroom, and visiting hours.     Risk Assessment    The following safety risks were identified during admission: none. Yellow risk band applied: YES.     Skin Initial Assessment    This writer admitted this patient and completed a full skin assessment and Link score in the Adult PCS flowsheet.   Photo documentation of skin problem and/or wound competed via [employer] application (located under Media):  No    Appropriate interventions initiated as needed.     Secondary skin check declined.         Education    Patient has a Cornish to Observation order: Yes  Observation education completed and documented: Yes      Tiny Gifford RN      "
[facility] DISCHARGE NOTE    Patient discharged to home at 1:33 PM via wheel chair. Accompanied by spouse and staff. Discharge instructions reviewed with patient and spouse, opportunity offered to ask questions. Prescriptions sent to patients preferred pharmacy. All belongings sent with patient.    Sandra Archuleta RN  
English

## 2025-04-20 ENCOUNTER — HOSPITAL ENCOUNTER (EMERGENCY)
Facility: CLINIC | Age: 70
Discharge: HOME | End: 2025-04-20
Attending: EMERGENCY MEDICINE | Admitting: EMERGENCY MEDICINE
Payer: COMMERCIAL

## 2025-04-20 ENCOUNTER — ANESTHESIA EVENT (OUTPATIENT)
Dept: EMERGENCY MEDICINE | Facility: CLINIC | Age: 70
End: 2025-04-20
Payer: COMMERCIAL

## 2025-04-20 ENCOUNTER — ANESTHESIA (OUTPATIENT)
Dept: EMERGENCY MEDICINE | Facility: CLINIC | Age: 70
End: 2025-04-20
Payer: COMMERCIAL

## 2025-04-20 ENCOUNTER — APPOINTMENT (OUTPATIENT)
Dept: MRI IMAGING | Facility: CLINIC | Age: 70
End: 2025-04-20
Attending: EMERGENCY MEDICINE
Payer: COMMERCIAL

## 2025-04-20 ENCOUNTER — APPOINTMENT (OUTPATIENT)
Dept: CT IMAGING | Facility: CLINIC | Age: 70
End: 2025-04-20
Attending: EMERGENCY MEDICINE
Payer: COMMERCIAL

## 2025-04-20 VITALS
HEART RATE: 67 BPM | RESPIRATION RATE: 22 BRPM | DIASTOLIC BLOOD PRESSURE: 85 MMHG | SYSTOLIC BLOOD PRESSURE: 150 MMHG | OXYGEN SATURATION: 99 % | WEIGHT: 162 LBS | TEMPERATURE: 98.6 F | BODY MASS INDEX: 20.8 KG/M2

## 2025-04-20 DIAGNOSIS — R51.9 INTRACTABLE HEADACHE, UNSPECIFIED CHRONICITY PATTERN, UNSPECIFIED HEADACHE TYPE: ICD-10-CM

## 2025-04-20 DIAGNOSIS — R00.2 PALPITATIONS: ICD-10-CM

## 2025-04-20 LAB
ANION GAP SERPL CALCULATED.3IONS-SCNC: 12 MMOL/L (ref 7–15)
APPEARANCE CSF: CLEAR
APTT PPP: 29 SECONDS (ref 22–38)
BASOPHILS # BLD AUTO: 0 10E3/UL (ref 0–0.2)
BASOPHILS NFR BLD AUTO: 1 %
BUN SERPL-MCNC: 9.4 MG/DL (ref 8–23)
C GATTII+NEOFOR DNA CSF QL NAA+NON-PROBE: NEGATIVE
CALCIUM SERPL-MCNC: 9.2 MG/DL (ref 8.8–10.4)
CHLORIDE SERPL-SCNC: 102 MMOL/L (ref 98–107)
CMV DNA CSF QL NAA+NON-PROBE: NEGATIVE
COLOR CSF: COLORLESS
CREAT SERPL-MCNC: 0.7 MG/DL (ref 0.67–1.17)
E COLI K1 AG CSF QL: NEGATIVE
EGFRCR SERPLBLD CKD-EPI 2021: >90 ML/MIN/1.73M2
EOSINOPHIL # BLD AUTO: 0.1 10E3/UL (ref 0–0.7)
EOSINOPHIL NFR BLD AUTO: 1 %
ERYTHROCYTE [DISTWIDTH] IN BLOOD BY AUTOMATED COUNT: 12.9 % (ref 10–15)
EV RNA SPEC QL NAA+PROBE: NEGATIVE
GLUCOSE BLDC GLUCOMTR-MCNC: 114 MG/DL (ref 70–99)
GLUCOSE CSF-MCNC: 56 MG/DL (ref 40–70)
GLUCOSE SERPL-MCNC: 107 MG/DL (ref 70–99)
GP B STREP DNA CSF QL NAA+NON-PROBE: NEGATIVE
HAEM INFLU DNA CSF QL NAA+NON-PROBE: NEGATIVE
HCO3 SERPL-SCNC: 22 MMOL/L (ref 22–29)
HCT VFR BLD AUTO: 42.5 % (ref 40–53)
HGB BLD-MCNC: 15.3 G/DL (ref 13.3–17.7)
HHV6 DNA CSF QL NAA+NON-PROBE: NEGATIVE
HSV1 DNA CSF QL NAA+NON-PROBE: NEGATIVE
HSV2 DNA CSF QL NAA+NON-PROBE: NEGATIVE
IMM GRANULOCYTES # BLD: 0 10E3/UL
IMM GRANULOCYTES NFR BLD: 0 %
INR PPP: 1.03 (ref 0.85–1.15)
L MONOCYTOG DNA CSF QL NAA+NON-PROBE: NEGATIVE
LYMPHOCYTES # BLD AUTO: 1.6 10E3/UL (ref 0.8–5.3)
LYMPHOCYTES NFR BLD AUTO: 32 %
MCH RBC QN AUTO: 33.3 PG (ref 26.5–33)
MCHC RBC AUTO-ENTMCNC: 36 G/DL (ref 31.5–36.5)
MCV RBC AUTO: 92 FL (ref 78–100)
MONOCYTES # BLD AUTO: 0.4 10E3/UL (ref 0–1.3)
MONOCYTES NFR BLD AUTO: 7 %
N MEN DNA CSF QL NAA+NON-PROBE: NEGATIVE
NEUTROPHILS # BLD AUTO: 3 10E3/UL (ref 1.6–8.3)
NEUTROPHILS NFR BLD AUTO: 59 %
NRBC # BLD AUTO: 0 10E3/UL
NRBC BLD AUTO-RTO: 0 /100
PARECHOVIRUS A RNA CSF QL NAA+NON-PROBE: NEGATIVE
PLATELET # BLD AUTO: 157 10E3/UL (ref 150–450)
POTASSIUM SERPL-SCNC: 4.1 MMOL/L (ref 3.4–5.3)
PROT CSF-MCNC: 46 MG/DL (ref 15–45)
PSA SERPL DL<=0.01 NG/ML-MCNC: 0.05 NG/ML (ref 0–6.5)
RBC # BLD AUTO: 4.6 10E6/UL (ref 4.4–5.9)
RBC # CSF MANUAL: 0 /UL (ref 0–2)
S PNEUM DNA CSF QL NAA+NON-PROBE: NEGATIVE
SODIUM SERPL-SCNC: 136 MMOL/L (ref 135–145)
TROPONIN T SERPL HS-MCNC: 7 NG/L
TUBE # CSF: 4
VZV DNA CSF QL NAA+NON-PROBE: NEGATIVE
WBC # BLD AUTO: 5.1 10E3/UL (ref 4–11)
WBC # CSF MANUAL: 0 /UL (ref 0–5)

## 2025-04-20 PROCEDURE — 84484 ASSAY OF TROPONIN QUANT: CPT | Performed by: EMERGENCY MEDICINE

## 2025-04-20 PROCEDURE — 84157 ASSAY OF PROTEIN OTHER: CPT | Performed by: EMERGENCY MEDICINE

## 2025-04-20 PROCEDURE — 0042T CT HEAD PERFUSION W CONTRAST: CPT

## 2025-04-20 PROCEDURE — 85730 THROMBOPLASTIN TIME PARTIAL: CPT | Performed by: EMERGENCY MEDICINE

## 2025-04-20 PROCEDURE — 96361 HYDRATE IV INFUSION ADD-ON: CPT | Performed by: EMERGENCY MEDICINE

## 2025-04-20 PROCEDURE — 86617 LYME DISEASE ANTIBODY: CPT | Performed by: EMERGENCY MEDICINE

## 2025-04-20 PROCEDURE — 87070 CULTURE OTHR SPECIMN AEROBIC: CPT | Performed by: EMERGENCY MEDICINE

## 2025-04-20 PROCEDURE — 62270 DX LMBR SPI PNXR: CPT | Performed by: EMERGENCY MEDICINE

## 2025-04-20 PROCEDURE — 250N000009 HC RX 250: Performed by: EMERGENCY MEDICINE

## 2025-04-20 PROCEDURE — 82962 GLUCOSE BLOOD TEST: CPT

## 2025-04-20 PROCEDURE — 93005 ELECTROCARDIOGRAM TRACING: CPT | Performed by: EMERGENCY MEDICINE

## 2025-04-20 PROCEDURE — 89050 BODY FLUID CELL COUNT: CPT | Performed by: EMERGENCY MEDICINE

## 2025-04-20 PROCEDURE — 80048 BASIC METABOLIC PNL TOTAL CA: CPT | Performed by: EMERGENCY MEDICINE

## 2025-04-20 PROCEDURE — 82945 GLUCOSE OTHER FLUID: CPT | Performed by: EMERGENCY MEDICINE

## 2025-04-20 PROCEDURE — 87483 CNS DNA AMP PROBE TYPE 12-25: CPT | Performed by: EMERGENCY MEDICINE

## 2025-04-20 PROCEDURE — 99207 PR NO CHARGE LOS: CPT

## 2025-04-20 PROCEDURE — 70496 CT ANGIOGRAPHY HEAD: CPT

## 2025-04-20 PROCEDURE — 70553 MRI BRAIN STEM W/O & W/DYE: CPT

## 2025-04-20 PROCEDURE — 370N000003 HC ANESTHESIA WARD SERVICE: Performed by: NURSE ANESTHETIST, CERTIFIED REGISTERED

## 2025-04-20 PROCEDURE — 99285 EMERGENCY DEPT VISIT HI MDM: CPT | Performed by: EMERGENCY MEDICINE

## 2025-04-20 PROCEDURE — 250N000013 HC RX MED GY IP 250 OP 250 PS 637: Performed by: STUDENT IN AN ORGANIZED HEALTH CARE EDUCATION/TRAINING PROGRAM

## 2025-04-20 PROCEDURE — 85004 AUTOMATED DIFF WBC COUNT: CPT | Performed by: EMERGENCY MEDICINE

## 2025-04-20 PROCEDURE — 85610 PROTHROMBIN TIME: CPT | Performed by: EMERGENCY MEDICINE

## 2025-04-20 PROCEDURE — A9585 GADOBUTROL INJECTION: HCPCS | Performed by: EMERGENCY MEDICINE

## 2025-04-20 PROCEDURE — 87015 SPECIMEN INFECT AGNT CONCNTJ: CPT | Performed by: EMERGENCY MEDICINE

## 2025-04-20 PROCEDURE — 255N000002 HC RX 255 OP 636: Performed by: EMERGENCY MEDICINE

## 2025-04-20 PROCEDURE — 96375 TX/PRO/DX INJ NEW DRUG ADDON: CPT | Performed by: EMERGENCY MEDICINE

## 2025-04-20 PROCEDURE — 70450 CT HEAD/BRAIN W/O DYE: CPT

## 2025-04-20 PROCEDURE — 93010 ELECTROCARDIOGRAM REPORT: CPT | Performed by: EMERGENCY MEDICINE

## 2025-04-20 PROCEDURE — 96374 THER/PROPH/DIAG INJ IV PUSH: CPT | Mod: 59 | Performed by: EMERGENCY MEDICINE

## 2025-04-20 PROCEDURE — 99291 CRITICAL CARE FIRST HOUR: CPT | Mod: 25 | Performed by: EMERGENCY MEDICINE

## 2025-04-20 PROCEDURE — 250N000011 HC RX IP 250 OP 636: Performed by: EMERGENCY MEDICINE

## 2025-04-20 PROCEDURE — 258N000003 HC RX IP 258 OP 636: Performed by: EMERGENCY MEDICINE

## 2025-04-20 PROCEDURE — 250N000013 HC RX MED GY IP 250 OP 250 PS 637: Performed by: EMERGENCY MEDICINE

## 2025-04-20 PROCEDURE — 36415 COLL VENOUS BLD VENIPUNCTURE: CPT | Performed by: EMERGENCY MEDICINE

## 2025-04-20 PROCEDURE — 84153 ASSAY OF PSA TOTAL: CPT | Performed by: EMERGENCY MEDICINE

## 2025-04-20 PROCEDURE — 250N000011 HC RX IP 250 OP 636: Mod: JZ | Performed by: EMERGENCY MEDICINE

## 2025-04-20 RX ORDER — IOPAMIDOL 755 MG/ML
167 INJECTION, SOLUTION INTRAVASCULAR ONCE
Status: COMPLETED | OUTPATIENT
Start: 2025-04-20 | End: 2025-04-20

## 2025-04-20 RX ORDER — QUETIAPINE FUMARATE 25 MG/1
25 TABLET, FILM COATED ORAL ONCE
Status: COMPLETED | OUTPATIENT
Start: 2025-04-20 | End: 2025-04-20

## 2025-04-20 RX ORDER — GADOBUTROL 604.72 MG/ML
6 INJECTION INTRAVENOUS ONCE
Status: COMPLETED | OUTPATIENT
Start: 2025-04-20 | End: 2025-04-20

## 2025-04-20 RX ORDER — DROPERIDOL 2.5 MG/ML
0.62 INJECTION, SOLUTION INTRAMUSCULAR; INTRAVENOUS ONCE
Status: COMPLETED | OUTPATIENT
Start: 2025-04-20 | End: 2025-04-20

## 2025-04-20 RX ORDER — HYDROMORPHONE HYDROCHLORIDE 1 MG/ML
0.5 INJECTION, SOLUTION INTRAMUSCULAR; INTRAVENOUS; SUBCUTANEOUS ONCE
Status: COMPLETED | OUTPATIENT
Start: 2025-04-20 | End: 2025-04-20

## 2025-04-20 RX ORDER — ACETAMINOPHEN 500 MG
1000 TABLET ORAL ONCE
Status: COMPLETED | OUTPATIENT
Start: 2025-04-20 | End: 2025-04-20

## 2025-04-20 RX ORDER — TETRACAINE HYDROCHLORIDE 5 MG/ML
1-2 SOLUTION OPHTHALMIC ONCE
Status: DISCONTINUED | OUTPATIENT
Start: 2025-04-20 | End: 2025-04-20 | Stop reason: HOSPADM

## 2025-04-20 RX ORDER — QUETIAPINE FUMARATE 25 MG/1
25 TABLET, FILM COATED ORAL 2 TIMES DAILY
Status: DISCONTINUED | OUTPATIENT
Start: 2025-04-20 | End: 2025-04-20

## 2025-04-20 RX ADMIN — SODIUM CHLORIDE 200 ML: 9 INJECTION, SOLUTION INTRAVENOUS at 14:34

## 2025-04-20 RX ADMIN — SODIUM CHLORIDE 1000 ML: 0.9 INJECTION, SOLUTION INTRAVENOUS at 16:28

## 2025-04-20 RX ADMIN — DROPERIDOL 0.62 MG: 2.5 INJECTION, SOLUTION INTRAMUSCULAR; INTRAVENOUS at 16:26

## 2025-04-20 RX ADMIN — QUETIAPINE FUMARATE 25 MG: 25 TABLET ORAL at 20:27

## 2025-04-20 RX ADMIN — HYDROMORPHONE HYDROCHLORIDE 0.5 MG: 1 INJECTION, SOLUTION INTRAMUSCULAR; INTRAVENOUS; SUBCUTANEOUS at 18:04

## 2025-04-20 RX ADMIN — GADOBUTROL 6 ML: 604.72 INJECTION INTRAVENOUS at 17:04

## 2025-04-20 RX ADMIN — ACETAMINOPHEN 1000 MG: 500 TABLET, FILM COATED ORAL at 16:22

## 2025-04-20 RX ADMIN — IOPAMIDOL 167 ML: 755 INJECTION, SOLUTION INTRAVENOUS at 14:34

## 2025-04-20 ASSESSMENT — ACTIVITIES OF DAILY LIVING (ADL)
ADLS_ACUITY_SCORE: 51

## 2025-04-20 ASSESSMENT — ENCOUNTER SYMPTOMS: DYSRHYTHMIAS: 1

## 2025-04-20 ASSESSMENT — LIFESTYLE VARIABLES: TOBACCO_USE: 1

## 2025-04-20 NOTE — ED PROVIDER NOTES
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 difficile, frequent PACs, recent admission for palpitations discharged yesterday with etiology identified as PACs, known saccular aneurysm identified on past brain imaging presenting for evaluation of 10 out of 10 headache that started suddenly at 2 AM.  Has had it for about 12 hours.  Also was experiencing dizziness and difficulty with his vision specifically out of his left eye.  He notes that there was a bunch of lines in the back of his eye and black spots. This is still present.  Even has difficulty with a couple of steps. He does endorse a chronic headache and chronic dizziness but this is worse than normal.  During his recent hospitalization he was admitted with concern for atrial fibrillation however he was found to have frequent PACs.  Was not discharged on anticoagulation especially considering his history of hematochezia in the setting of radiation proctitis also that he was not in atrial fibrillation. Still struggling with constant and persistent fluttering in his chest that is persistently bothersome. The fluttering, abdominal pain, headaches, dizziness and malaise are all making life very difficult for him at the time,    He has been feeling very bad over the past couple of months, specifically with abdominal pain, difficulty sleeping and increased fatigue.     Review of Systems:  As in HPI above    BP (!) 168/100   Pulse 73   Temp 98.6  F (37  C) (Oral)   Resp 18   Wt 73.5 kg (162 lb)   SpO2 100%   BMI 20.80 kg/m       Physical Exam  Constitutional: laying in hospital bed, alert, oriented, in no apparent distress, conversant, and answering questions appropriately  HEENT: normocephalic, atraumatic, pupils 3mm, equal, round, and reactive to light, sclerae anicteric, extraocular motions intact, and moist mucous membranes visual acuity intact to near and far vision, lines and dots with left monocular vision, no temporal artery tenderness with palpation bilaterally, TM clear with no blood or signs of infection bilaterally  Neck: able to fully range, no midline tenderness, and no stridor  Cardiovascular: regular rate and rhythm  Pulmonary: breathing comfortably on room air and lungs clear to auscultation bilaterally  Abdominal: Soft, nondistended, tenderness with palpation  Genitourinary: deferred  Extremities/MSK: no peripheral edema, no cyanosis , and no calf tenderness to palpation  Skin: warm, dry, non-diaphoretic, no rashes or lesions, and no mottling  Neurologic: moves all four extremities spontaneously, GCS 15, CNII-XII intact, 5/5  strength bilaterally, 5/5 strength in dorsiflexion and plantarflexion bilaterally, sensation intact to light touch in bilateral upper and lower extremities, no dysmetria on finger-nose-finger, no pronator drift, no involuntary movements, no rigidity or spasticity, and requires assistance to ambulate  Psychiatric: calm, appropriate   IOP left eye 22 right eye  30     MDM:  Patient is a 69 year old male with above history presenting for evaluation of sudden onset severe headache dizziness and left vision changes.    Vitals reassuring and within normal limits.  Blood pressure is 168/100. Exam is notable for 4 out of 5 strength in the left upper extremity, visual changes in the left eye but vision still intact, severe headache, baseline tremor.    No respiratory distress.  Patient is resting in bed.  Feels uncomfortable and is very sick and tired of coming to the hospital.    ECG:  - Ventricular  rate 74 bpm, regular  - IA, QRS, QT intervals normal  - Axis normal  - no ST segment or T wave changes concerning for acute ischemia  - Comparison to prior ECGs: no changes  - My independent interpretation: overall reassuring in this context    Considering patients perceived vision issues and subjective weakness in left arm, plan for stroke work up, considering time since symptom onset lower concern for ischemic stroke and not in intervention window so no code stroke called.     Also concerned for subarachnoid hemorrhage, migraine, electrolyte abnormality, medication side effects, glaucoma.   Also considering but much less likely GCA, TIA, TMJ.     Remainder of ED course below.    ED COURSE:  ED Course as of 04/21/25 2139   Sun Apr 20, 2025   1800 MR Brain w/o & w Contrast  IMPRESSION:  1.  No acute/subacute infarct or other acute intracranial abnormality.  2.  Few small enhancing calvarial lesions are nonspecific but could represent osseous metastases given patient's history of prostate cancer.  3.  Mild cerebral volume loss and presumed changes of chronic microvascular disease.     1826 I spoke to stroke neuro about patient's MRI.  No concern for acute stroke at this time. Some concern for possible new mets on skull, unclear what exactly these lesions are. Spoke to oncology and they state very unlikely that these are mets but they would be happy to see Ghulam outpatient and discus further cancer screening.    2017 LP was done to rule out subarachnoid hemorrhage considering patient's symptoms. No xanthochromia. Ultimately, unsure what caused patients headache. He feels back to baseline. Also possible this was episodic.     His health is clearly changing right now although I did not identify and acute life threatening illness at this time. Was just discharged yesterday and wants to go home. Will follow up as scheduled. I don't see a specific reason why this isn't safe but clearly outlined the importance of him  returning with changing or worsening symptoms. He and his wife agree.    Mon Apr 21, 2025 2138 I reviewed the workup with the patient and answered their questions. Follow up instructions and return precautions discussed with patient and listed in discharge paperwork. Patient discharged in stable condition.      2138 Protein total CSF(!): 46.0   2138 Culture: No growth, less than 1 day   2138 WBC: 5.1   2138 Hemoglobin: 15.3   2138 Troponin T, High Sensitivity: 7   2138 PSA Tumor Marker: 0.05   2138 Basic metabolic panel(!)  All values reassuring and within normal limits        Encounter Diagnoses:  Final diagnoses:   Intractable headache, unspecified chronicity pattern, unspecified headache type   Palpitations       Final disposition: discharge    Patience Banks DO  EM Physician  [facility]       Patience Banks DO  04/21/25 2139

## 2025-04-20 NOTE — ANESTHESIA PREPROCEDURE EVALUATION
Anesthesia Pre-Procedure Evaluation    Patient: Ghulam Rizzo   MRN: [id number] : 1955        Procedure : * No procedures listed *          Past Medical History:   Diagnosis Date    Anisocoria     Asthma     Carpal tunnel syndrome         Head injury, unspecified     closed head injury 4 yrs ago    Hepatitis C     Cured by [facility] in . diagnosed in  was positive for Hepatitis C.     Obesity     2006, resolved    Other convulsions     seizure disorder due to head injury 4 yrs ago.    Other mononeuritis of upper limb     L phrenic nerve paralysis    Unspecified essential hypertension       Past Surgical History:   Procedure Laterality Date    COLECTOMY WITHOUT COLOSTOMY Right 2022    Procedure: Laparotomy, right colectomy, lysis of adhesions,;  Surgeon: Tommy Martino DO;  Location: [facility] OR    COLONOSCOPY      COLONOSCOPY N/A 2023    Procedure: Colonoscopy with polypectomy;  Surgeon: Octavio Aguirre MD;  Location: [facility] GI    COLONOSCOPY N/A 6/10/2024    Procedure: Colonoscopy;  Surgeon: Tommy Martino DO;  Location: [facility] GI    ESOPHAGOSCOPY, GASTROSCOPY, DUODENOSCOPY (EGD), COMBINED N/A 6/10/2024    Procedure: Esophagoscopy, gastroscopy, duodenoscopy (EGD), combined;  Surgeon: Tommy Martino DO;  Location: [facility] GI    GI SURGERY      HERNIA REPAIR      HERNIORRHAPHY INCISIONAL (LOCATION) N/A 2022    Procedure: primary incisional hernia repair;  Surgeon: Tommy Martino DO;  Location: [facility] OR    INSERT SEED MARKER / MATRIX N/A 2023    Procedure: Transrectal ultrasound guided placement of fiducials and [employer];  Surgeon: Chase Blount MD;  Location:  OR    SURGICAL HISTORY OF -   87    esophagogastroduodenoscopy    SURGICAL HISTORY OF -   87    exploratory laparotomy and anterior gastropexy over a gastrostomy tube.    SURGICAL HISTORY OF -   90    esophagogastroduodenoscopy    SURGICAL HISTORY OF -       L  diaphragm eventration repair, fixation of stomach and colon to abd wall    SURGICAL HISTORY OF -       hiatal hernia repair    THORACIC SURGERY        Allergies   Allergen Reactions    Levaquin [Levofloxacin] Nausea and Vomiting    Oxycodone Dizziness    Percocet [Oxycodone-Acetaminophen] Visual Disturbance     hallucinations      Social History     Tobacco Use    Smoking status: Former     Current packs/day: 0.50     Average packs/day: 0.5 packs/day for 50.0 years (25.0 ttl pk-yrs)     Types: Cigarettes    Smokeless tobacco: Never   Substance Use Topics    Alcohol use: Yes     Comment: 1-2 beers daily      Wt Readings from Last 1 Encounters:   04/20/25 73.5 kg (162 lb)        Anesthesia Evaluation   Pt has had prior anesthetic. Type: General and MAC.        ROS/MED HX  ENT/Pulmonary:     (+)                tobacco use, Past use,    Intermittent, asthma                  Neurologic:       Cardiovascular:     (+) Dyslipidemia hypertension- -   -  - -                        dysrhythmias (PAC (premature atrial contraction)), Other,             METS/Exercise Tolerance:     Hematologic:       Musculoskeletal: Comment: Compression fracture of L3 lumbar vertebra, with routine healing, subsequent encounter  Compression fracture of L4 vertebra          GI/Hepatic:     (+)       Inflammatory bowel disease,      liver disease (Cirrhosis of liver without ascites),       Renal/Genitourinary:       Endo:       Psychiatric/Substance Use:     (+) psychiatric history depression       Infectious Disease:       Malignancy:   (+) Malignancy, History of Prostate.    Other:               OUTSIDE LABS:  CBC:   Lab Results   Component Value Date    WBC 5.1 04/20/2025    WBC 5.5 04/18/2025    HGB 15.3 04/20/2025    HGB 15.7 04/18/2025    HCT 42.5 04/20/2025    HCT 44.5 04/18/2025     04/20/2025     04/18/2025     BMP:   Lab Results   Component Value Date     04/20/2025     04/19/2025    POTASSIUM 4.1 04/20/2025     POTASSIUM 4.1 04/19/2025    CHLORIDE 102 04/20/2025    CHLORIDE 105 04/19/2025    CO2 22 04/20/2025    CO2 27 04/19/2025    BUN 9.4 04/20/2025    BUN 10.7 04/19/2025    CR 0.70 04/20/2025    CR 0.76 04/19/2025     (H) 04/20/2025     (H) 04/20/2025     COAGS:   Lab Results   Component Value Date    PTT 29 04/20/2025    INR 1.03 04/20/2025     POC:   Lab Results   Component Value Date     (H) 04/03/2006     HEPATIC:   Lab Results   Component Value Date    ALBUMIN 4.4 04/18/2025    PROTTOTAL 7.3 04/18/2025    ALT 14 04/18/2025    AST 18 04/18/2025    ALKPHOS 87 04/18/2025    BILITOTAL 0.7 04/18/2025     OTHER:   Lab Results   Component Value Date    LACT 0.8 04/18/2025    A1C 5.0 06/23/2023    TAVIA 9.2 04/20/2025    PHOS 3.9 07/14/2022    MAG 1.9 04/19/2025    LIPASE 29 02/20/2025    TSH 3.55 04/18/2025    T4 1.27 06/08/2023    CRP <2.9 11/25/2017       Anesthesia Plan       - Procedure: Procedure only, no anesthetic delivered                    Consents    Anesthesia Plan(s) and associated risks, benefits, and realistic alternatives discussed. Questions answered and patient/representative(s) expressed understanding.     - Discussed: Risks, Benefits and Alternatives for the PROCEDURE were discussed     - Discussed with:  Patient            Postoperative Care            Comments:               ANGELA Garcia CRNA    Clinically Significant Risk Factors Present on Admission                   # Hypertension: Noted on problem list

## 2025-04-20 NOTE — CONSULTS
"  [facility]    Stroke Telephone Note    I was called by Patience Banks DO on 04/20/25 regarding patient Ghulam Rizzo. The patient is a 69 year old male presenting with severe sudden onset headache, dizziness and decreased left eye vision. Symptoms began at 2 AM. Per ED provider, patient describes it as seeing lines and blurriness of left eye. On initial ED provider exam notable for decreased strength of left hand. NIH=3. Not on any PTA anticoagulation    Vitals  BP: (!) 162/92   Pulse: 64   Resp: 15   Temp: 98.6  F (37  C)   Weight: 73.5 kg (162 lb)    Imaging Findings  CT head: No acute intracranial process  CTA head/neck: Stable 2 mm aneurysm versus infundibulum in the left supraclinoid ICA. No vascular occlusion    Impression  Sudden onset severe headache, dizziness, and decreased left eye vision, unclear etiology. Differential includes stroke, meningitis, migraine with aura, SAH, vs other. MRI brain to further assess.    Recommendations  - brain MRI with and without contrast; if shows stroke please page stroke neurology for further recommendations   - Agree with additional workup per ED team    Case discussed with vascular neurology attending Dr. Quiles.    My recommendations are based on the information provided over the phone by Ghulam Rizzo's in-person providers. They are not intended to replace the clinical judgment of his in-person providers. I was not requested to personally see or examine the patient at this time.     Shilpa Jensen PA-C  Vascular Neurology    To page me or covering stroke neurology team member, click here: AMCOM  Choose \"On Call\" tab at top, then select \"NEUROLOGY/ALL SITES\" from middle drop-down box, press Enter, then look for \"stroke\" or \"telestroke\" for your site.   "

## 2025-04-20 NOTE — ANESTHESIA CARE TRANSFER NOTE
Patient: Ghulam Rizzo    Procedure: * No procedures listed *       Diagnosis: * No pre-op diagnosis entered *  Diagnosis Additional Information: No value filed.    Anesthesia Type:   No value filed.     Note:    Oropharynx: oropharynx clear of all foreign objects  Level of Consciousness: awake  Oxygen Supplementation: room air    Independent Airway: airway patency satisfactory and stable  Dentition: dentition unchanged  Vital Signs Stable: post-procedure vital signs reviewed and stable  Report to RN Given: handoff report given  Patient transferred to: Emergency Department  Comments: Instructed the patient to lay flat for 45 min. The patient tolerated the procedure well.   Handoff Report: Identifed the Patient, Identified the Reponsible Provider, Reviewed the pertinent medical history, Discussed the surgical course, Reviewed Intra-OP anesthesia mangement and issues during anesthesia, Set expectations for post-procedure period and Allowed opportunity for questions and acknowledgement of understanding      Vitals:  Vitals Value Taken Time   /84 04/20/25 1615   Temp     Pulse 63 04/20/25 1622   Resp 13 04/20/25 1622   SpO2 98 % 04/20/25 1622   Vitals shown include unfiled device data.    Electronically Signed By: ANGELA Garcia CRNA  April 20, 2025  4:23 PM

## 2025-04-20 NOTE — ED TRIAGE NOTES
Sx started suddenly at 2AM. Dizziness, blurred vision, 10/10 HA.   Feels off balance. Unsteady gait.   Stroke eval called     Triage Assessment (Adult)       Row Name 04/20/25 1423          Triage Assessment    Airway WDL WDL        Respiratory WDL    Respiratory WDL WDL        Skin Circulation/Temperature WDL    Skin Circulation/Temperature WDL WDL        Cardiac WDL    Cardiac WDL WDL        Peripheral/Neurovascular WDL    Peripheral Neurovascular WDL WDL        Cognitive/Neuro/Behavioral WDL    Cognitive/Neuro/Behavioral WDL X;all        Pupils (CN II)    Pupil PERRLA yes        Alpesh Coma Scale    Best Eye Response 4-->(E4) spontaneous     Best Motor Response 6-->(M6) obeys commands     Best Verbal Response 5-->(V5) oriented     Alpesh Coma Scale Score 15

## 2025-04-20 NOTE — ED NOTES
"  [facility]    Stroke Telephone Note    I was called by Patience Banks DO on 04/20/25 regarding patient Ghulam Rizzo. The patient is a 69 year old male presenting with severe sudden onset headache, dizziness and decreased left eye vision.  I was paged about MRI results.       Vitals  BP: (!) 147/77   Pulse: 65   Resp: 13   Temp: 98.6  F (37  C)   Weight: 73.5 kg (162 lb)    Imaging Findings  CT head:   1.  No acute findings.    HEAD CTA:   1.  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 No vascular occlusion.     NECK CTA:  1.  No significant stenosis in the neck.    Impression    Headache/dizziness      Recommendations  No further stroke workup warranted at this time      Recommend oncology evaluation given abnormal calvarial findings    My recommendations are based on the information provided over the phone by Ghulam Rizzo's in-person providers. They are not intended to replace the clinical judgment of his in-person providers. I was not requested to personally see or examine the patient at this time.     The Stroke Staff is Dr. Etta Lipscomb MD  Vascular Neurology Fellow    To page me or covering stroke neurology team member, click here: AMCOM  Choose \"On Call\" tab at top, then select \"NEUROLOGY/ALL SITES\" from middle drop-down box, press Enter, then look for \"stroke\" or \"telestroke\" for your site.   "

## 2025-04-20 NOTE — ANESTHESIA PROCEDURE NOTES
"Lumbar puncture Procedure Note    Pre-Procedure   Staff -        CRNA: Antonio Zarco APRN CRNA       Performed By: CRNA       Location: ED       Procedure Start/Stop Times: 4/20/2025 3:57 PM and 4/20/2025 4:09 PM       Pre-Anesthestic Checklist: patient identified, IV checked, risks and benefits discussed, informed consent, monitors and equipment checked, pre-op evaluation, at physician/surgeon's request and post-op pain management  Timeout:       Correct Patient: Yes        Correct Procedure: Yes        Correct Site: Yes        Correct Position: Yes   Procedure Documentation  Procedure: lumbar puncture         Patient Position: LLD       Skin prep: Betadine       Insertion Site: L4-5. (midline approach).       Needle Gauge: 22.        Spinal Needle Type: PencanNo introducer used       # of attempts: 1 and  # of redirects:  1    Assessment/Narrative         Paresthesias: No.       LP fluid removal amount (ml): 6.5       CSF fluid: clear.       Opening pressure was 16 cmH2O while  Lateral.      Medication(s) Administered   Medication Administration Time: 4/20/2025 3:57 PM      FOR [facility] ([facility]) ONLY:   Pain Team Contact information: please page the Pain Team Via [employer]. Search \"Pain\". During daytime hours, please page the attending first. At night please page the resident first.      " in ASU:

## 2025-04-21 ENCOUNTER — PATIENT OUTREACH (OUTPATIENT)
Dept: ONCOLOGY | Facility: CLINIC | Age: 70
End: 2025-04-21
Payer: COMMERCIAL

## 2025-04-21 NOTE — DISCHARGE INSTRUCTIONS
You were seen in the ER today with a chief complaint of a headache.  Your workup here was negative for a stroke or electrolyte abnormality or a change in your aneurysm.    Please follow-up with oncology this week in regards to the possible spots on your skull.  Please come back with any changing or worsening symptoms that you find concerning.  Please follow with your primary care doctor about the palpitations and do the heart rate monitor as you are able and follow-up with GI as possible.

## 2025-04-21 NOTE — PROGRESS NOTES
New Patient Oncology Nurse Navigator Note     Referring provider:   Referred By    Provider Department Location Phone   Patience Banks DO [facility] [phone]     Referred to (specialty): Medical Oncology    Date Referral Received:   04/21/25     Evaluation for :   maybe new mets on brain scan, please complete oncologic follow up, hx of prostate cancer    biopsy-proven unfavorable intermediate  prostatic adenocarcinoma, [place] 4+3 = 7, pathologic T1cN0.  He underwent definitive radiation therapy with short-term ADT.      Radiation Treatment  9/21/23 to 10/30/23: Prostate, 7000 cGy in 28 fractions (7/21/23 received Lupron 45 mg x 1).   PSA   Date Value Ref Range Status   08/03/2012 2.23 0 - 4 ug/L Final     Prostate Specific Antigen Screen   Date Value Ref Range Status   02/08/2022 11.30 (H) 0.00 - 4.00 ug/L Final     PSA Tumor Marker   Date Value Ref Range Status   04/20/2025 0.05 0.00 - 6.50 ng/mL Final     Patient was seen in the ED.  Neurology recommending oncology outpatient after imaging results.    EXAM: MR BRAIN W/O and W CONTRAST  LOCATION: [facility]  DATE: 4/20/2025     INDICATION: left arm weakness, vision changes in left eye, severe headache    IMPRESSION:  1.  No acute/subacute infarct or other acute intracranial abnormality.  2.  Few small enhancing calvarial lesions are nonspecific but could represent osseous metastases given patient's history of prostate cancer.  3.  Mild cerebral volume loss and presumed changes of chronic microvascular disease.       Clinical History (per Nurse review of records provided):    Patient with history of prostate cancer s/p radiation    Records Location (Care Everywhere, [place], etc.):   [facility]      Writer received referral and chart reviewed.    Referral forwarded on to new patient scheduling   with the following plan:    SCHEDULE: Dr. Rene, 5/1/25, [facility], 1-2 pm, VIDEO  Otherwise    SCHEDULE: Dr. Estrada or Dr. Rene @ [facility] or [facility], NEW, in person, first available within 2 weeks    Roseanne Castillo, RN, BSN  Oncology New Patient Nurse Navigator   [facility] [place]  [phone]

## 2025-04-22 ENCOUNTER — PATIENT OUTREACH (OUTPATIENT)
Dept: CARE COORDINATION | Facility: CLINIC | Age: 70
End: 2025-04-22
Payer: COMMERCIAL

## 2025-04-22 LAB
B BURGDOR IGG CSF QL IB: NEGATIVE
B BURGDOR IGM CSF QL IB: NEGATIVE

## 2025-04-22 NOTE — PROGRESS NOTES
[facility]    Background: Primary Care-Care Coordination initial outreach identified per system criteria and reviewed by [facility] team.    Assessment: Upon chart review, [facility] Team member will not proceed with patient outreach related to this episode of Primary Care-Care Coordination program due to reason below:    [facility] TCM Team reached out to Patient Today.    Plan: Primary Care-Care Coordination episode addressed appropriately per reason noted above.      ABDULLAHI Medina  [facility], [facility]    *[facility] Resource Team does NOT follow patient ongoing. Referrals are identified based on internal discharge reports and the outreach is to ensure patient has an understanding of their discharge instructions.

## 2025-04-22 NOTE — PROGRESS NOTES
Connected [facility] Resource Center:   [facility] Resource Center Contact  [facility]/Voicemail     Clinical Data: Post-Discharge Outreach     Outreach attempted x 2.  Left message on patient's voicemail, providing [facility]'s central phone number of [phone] ([phone]) for questions/concerns and/or to schedule an appt with an [facility] provider, if they do not have a PCP.      Plan:  [facility] will do no further outreaches at this time.       ABDULLAHI Castellano  [facility] [place], [facility]    *Connected Care Resource Team does NOT follow patient ongoing. Referrals are identified based on internal discharge reports and the outreach is to ensure patient has an understanding of their discharge instructions.

## 2025-04-23 NOTE — TELEPHONE ENCOUNTER
RECORDS STATUS - ALL OTHER DIAGNOSIS      RECORDS RECEIVED FROM: [facility]   NOTES STATUS DETAILS   OFFICE NOTE from referring provider Epic 4/20/25: Dr. Patience Banks   OFFICE NOTE from other specialist Epic 3/13/25: Dr. Armani Rodgers   DISCHARGE REPORT from the ER [facility] 4/20/25: [place] [facility]   MEDICATION LIST [facility]    LABS     ANYTHING RELATED TO DIAGNOSIS Epic Most recent 4/20/25   IMAGING (NEED IMAGES & REPORT)     CT SCANS PACS 4/20/25, 4/20/25: CT Head   4/20/25: CT Head Neck  4/18/25: CT CAP   MRI PACS 4/20/25: MR Brain

## 2025-04-24 LAB
ATRIAL RATE - MUSE: 63 BPM
ATRIAL RATE - MUSE: 74 BPM
BACTERIA CSF CULT: NORMAL
DIASTOLIC BLOOD PRESSURE - MUSE: NORMAL MMHG
DIASTOLIC BLOOD PRESSURE - MUSE: NORMAL MMHG
GRAM STAIN RESULT: NORMAL
GRAM STAIN RESULT: NORMAL
INTERPRETATION ECG - MUSE: NORMAL
INTERPRETATION ECG - MUSE: NORMAL
P AXIS - MUSE: 48 DEGREES
P AXIS - MUSE: 59 DEGREES
PR INTERVAL - MUSE: 128 MS
PR INTERVAL - MUSE: 158 MS
QRS DURATION - MUSE: 82 MS
QRS DURATION - MUSE: 86 MS
QT - MUSE: 406 MS
QT - MUSE: 418 MS
QTC - MUSE: 415 MS
QTC - MUSE: 463 MS
R AXIS - MUSE: 69 DEGREES
R AXIS - MUSE: 69 DEGREES
SYSTOLIC BLOOD PRESSURE - MUSE: NORMAL MMHG
SYSTOLIC BLOOD PRESSURE - MUSE: NORMAL MMHG
T AXIS - MUSE: 49 DEGREES
T AXIS - MUSE: 67 DEGREES
VENTRICULAR RATE- MUSE: 63 BPM
VENTRICULAR RATE- MUSE: 74 BPM

## 2025-04-29 ENCOUNTER — PRE VISIT (OUTPATIENT)
Dept: SURGERY | Facility: CLINIC | Age: 70
End: 2025-04-29

## 2025-05-01 ENCOUNTER — PRE VISIT (OUTPATIENT)
Dept: ONCOLOGY | Facility: CLINIC | Age: 70
End: 2025-05-01
Payer: COMMERCIAL

## 2025-05-05 ENCOUNTER — APPOINTMENT (OUTPATIENT)
Dept: CT IMAGING | Facility: CLINIC | Age: 70
End: 2025-05-05
Attending: EMERGENCY MEDICINE
Payer: COMMERCIAL

## 2025-05-05 ENCOUNTER — HOSPITAL ENCOUNTER (OUTPATIENT)
Facility: CLINIC | Age: 70
Setting detail: OBSERVATION
Discharge: HOME | End: 2025-05-07
Attending: EMERGENCY MEDICINE | Admitting: INTERNAL MEDICINE
Payer: COMMERCIAL

## 2025-05-05 DIAGNOSIS — F33.40 RECURRENT MAJOR DEPRESSIVE DISORDER, IN REMISSION: Primary | ICD-10-CM

## 2025-05-05 DIAGNOSIS — R39.15 URINARY URGENCY: ICD-10-CM

## 2025-05-05 DIAGNOSIS — Z92.3 S/P RADIATION THERAPY: ICD-10-CM

## 2025-05-05 DIAGNOSIS — R10.84 ABDOMINAL PAIN, GENERALIZED: ICD-10-CM

## 2025-05-05 LAB
ALBUMIN SERPL BCG-MCNC: 4.5 G/DL (ref 3.5–5.2)
ALBUMIN UR-MCNC: 20 MG/DL
ALP SERPL-CCNC: 75 U/L (ref 40–150)
ALT SERPL W P-5'-P-CCNC: 10 U/L (ref 0–70)
ANION GAP SERPL CALCULATED.3IONS-SCNC: 13 MMOL/L (ref 7–15)
APPEARANCE UR: CLEAR
AST SERPL W P-5'-P-CCNC: 11 U/L (ref 0–45)
BASOPHILS # BLD AUTO: 0.1 10E3/UL (ref 0–0.2)
BASOPHILS NFR BLD AUTO: 1 %
BILIRUB SERPL-MCNC: 0.9 MG/DL
BILIRUB UR QL STRIP: NEGATIVE
BUN SERPL-MCNC: 21.4 MG/DL (ref 8–23)
CALCIUM SERPL-MCNC: 9.9 MG/DL (ref 8.8–10.4)
CHLORIDE SERPL-SCNC: 104 MMOL/L (ref 98–107)
COLOR UR AUTO: ABNORMAL
CREAT SERPL-MCNC: 0.75 MG/DL (ref 0.67–1.17)
EGFRCR SERPLBLD CKD-EPI 2021: >90 ML/MIN/1.73M2
EOSINOPHIL # BLD AUTO: 0 10E3/UL (ref 0–0.7)
EOSINOPHIL NFR BLD AUTO: 1 %
ERYTHROCYTE [DISTWIDTH] IN BLOOD BY AUTOMATED COUNT: 12.1 % (ref 10–15)
GLUCOSE SERPL-MCNC: 102 MG/DL (ref 70–99)
GLUCOSE UR STRIP-MCNC: NEGATIVE MG/DL
HCO3 SERPL-SCNC: 23 MMOL/L (ref 22–29)
HCT VFR BLD AUTO: 42.7 % (ref 40–53)
HGB BLD-MCNC: 15.3 G/DL (ref 13.3–17.7)
HGB UR QL STRIP: NEGATIVE
HOLD SPECIMEN: NORMAL
IMM GRANULOCYTES # BLD: 0 10E3/UL
IMM GRANULOCYTES NFR BLD: 0 %
KETONES UR STRIP-MCNC: 60 MG/DL
LACTATE SERPL-SCNC: 1.4 MMOL/L (ref 0.7–2)
LEUKOCYTE ESTERASE UR QL STRIP: NEGATIVE
LIPASE SERPL-CCNC: 30 U/L (ref 13–60)
LYMPHOCYTES # BLD AUTO: 1.7 10E3/UL (ref 0.8–5.3)
LYMPHOCYTES NFR BLD AUTO: 25 %
MCH RBC QN AUTO: 32.3 PG (ref 26.5–33)
MCHC RBC AUTO-ENTMCNC: 35.8 G/DL (ref 31.5–36.5)
MCV RBC AUTO: 90 FL (ref 78–100)
MONOCYTES # BLD AUTO: 0.5 10E3/UL (ref 0–1.3)
MONOCYTES NFR BLD AUTO: 7 %
MUCOUS THREADS #/AREA URNS LPF: PRESENT /LPF
NEUTROPHILS # BLD AUTO: 4.3 10E3/UL (ref 1.6–8.3)
NEUTROPHILS NFR BLD AUTO: 66 %
NITRATE UR QL: NEGATIVE
NRBC # BLD AUTO: 0 10E3/UL
NRBC BLD AUTO-RTO: 0 /100
PH UR STRIP: 7 [PH] (ref 5–7)
PLATELET # BLD AUTO: 167 10E3/UL (ref 150–450)
POTASSIUM SERPL-SCNC: 4 MMOL/L (ref 3.4–5.3)
PROT SERPL-MCNC: 7.2 G/DL (ref 6.4–8.3)
RBC # BLD AUTO: 4.73 10E6/UL (ref 4.4–5.9)
RBC URINE: 3 /HPF
SODIUM SERPL-SCNC: 140 MMOL/L (ref 135–145)
SP GR UR STRIP: 1.01 (ref 1–1.03)
SQUAMOUS EPITHELIAL: <1 /HPF
UROBILINOGEN UR STRIP-MCNC: NORMAL MG/DL
WBC # BLD AUTO: 6.5 10E3/UL (ref 4–11)
WBC URINE: <1 /HPF

## 2025-05-05 PROCEDURE — 81001 URINALYSIS AUTO W/SCOPE: CPT | Performed by: EMERGENCY MEDICINE

## 2025-05-05 PROCEDURE — 250N000011 HC RX IP 250 OP 636: Mod: JZ | Performed by: EMERGENCY MEDICINE

## 2025-05-05 PROCEDURE — 36415 COLL VENOUS BLD VENIPUNCTURE: CPT | Performed by: EMERGENCY MEDICINE

## 2025-05-05 PROCEDURE — 74177 CT ABD & PELVIS W/CONTRAST: CPT

## 2025-05-05 PROCEDURE — 36415 COLL VENOUS BLD VENIPUNCTURE: CPT | Performed by: FAMILY MEDICINE

## 2025-05-05 PROCEDURE — 250N000011 HC RX IP 250 OP 636: Performed by: EMERGENCY MEDICINE

## 2025-05-05 PROCEDURE — 99285 EMERGENCY DEPT VISIT HI MDM: CPT | Mod: 25

## 2025-05-05 PROCEDURE — 83605 ASSAY OF LACTIC ACID: CPT | Performed by: EMERGENCY MEDICINE

## 2025-05-05 PROCEDURE — 85025 COMPLETE CBC W/AUTO DIFF WBC: CPT | Performed by: EMERGENCY MEDICINE

## 2025-05-05 PROCEDURE — 99285 EMERGENCY DEPT VISIT HI MDM: CPT | Mod: 25 | Performed by: EMERGENCY MEDICINE

## 2025-05-05 PROCEDURE — G0378 HOSPITAL OBSERVATION PER HR: HCPCS

## 2025-05-05 PROCEDURE — 96376 TX/PRO/DX INJ SAME DRUG ADON: CPT

## 2025-05-05 PROCEDURE — 82565 ASSAY OF CREATININE: CPT | Performed by: EMERGENCY MEDICINE

## 2025-05-05 PROCEDURE — 258N000003 HC RX IP 258 OP 636: Performed by: EMERGENCY MEDICINE

## 2025-05-05 PROCEDURE — 85025 COMPLETE CBC W/AUTO DIFF WBC: CPT | Performed by: FAMILY MEDICINE

## 2025-05-05 PROCEDURE — 250N000013 HC RX MED GY IP 250 OP 250 PS 637: Performed by: EMERGENCY MEDICINE

## 2025-05-05 PROCEDURE — 96361 HYDRATE IV INFUSION ADD-ON: CPT

## 2025-05-05 PROCEDURE — 120N000004 HC R&B MS OVERFLOW

## 2025-05-05 PROCEDURE — 250N000009 HC RX 250: Performed by: EMERGENCY MEDICINE

## 2025-05-05 PROCEDURE — 83690 ASSAY OF LIPASE: CPT | Performed by: EMERGENCY MEDICINE

## 2025-05-05 PROCEDURE — 96374 THER/PROPH/DIAG INJ IV PUSH: CPT

## 2025-05-05 RX ORDER — ONDANSETRON 2 MG/ML
4 INJECTION INTRAMUSCULAR; INTRAVENOUS EVERY 30 MIN PRN
Status: DISCONTINUED | OUTPATIENT
Start: 2025-05-05 | End: 2025-05-07 | Stop reason: HOSPADM

## 2025-05-05 RX ORDER — AMLODIPINE BESYLATE 5 MG/1
5 TABLET ORAL DAILY
Status: DISCONTINUED | OUTPATIENT
Start: 2025-05-05 | End: 2025-05-07 | Stop reason: HOSPADM

## 2025-05-05 RX ORDER — HYDROMORPHONE HYDROCHLORIDE 1 MG/ML
0.5 INJECTION, SOLUTION INTRAMUSCULAR; INTRAVENOUS; SUBCUTANEOUS EVERY 30 MIN PRN
Status: DISCONTINUED | OUTPATIENT
Start: 2025-05-05 | End: 2025-05-06

## 2025-05-05 RX ORDER — TAMSULOSIN HYDROCHLORIDE 0.4 MG/1
0.4 CAPSULE ORAL EVERY EVENING
Status: DISCONTINUED | OUTPATIENT
Start: 2025-05-05 | End: 2025-05-07 | Stop reason: HOSPADM

## 2025-05-05 RX ORDER — BUPROPION HYDROCHLORIDE 150 MG/1
150 TABLET ORAL DAILY
Status: DISCONTINUED | OUTPATIENT
Start: 2025-05-06 | End: 2025-05-07 | Stop reason: HOSPADM

## 2025-05-05 RX ORDER — TAMSULOSIN HYDROCHLORIDE 0.4 MG/1
0.4 CAPSULE ORAL DAILY
Status: DISCONTINUED | OUTPATIENT
Start: 2025-05-06 | End: 2025-05-05

## 2025-05-05 RX ORDER — IOPAMIDOL 755 MG/ML
79 INJECTION, SOLUTION INTRAVASCULAR ONCE
Status: COMPLETED | OUTPATIENT
Start: 2025-05-05 | End: 2025-05-05

## 2025-05-05 RX ADMIN — AMLODIPINE BESYLATE 5 MG: 5 TABLET ORAL at 23:57

## 2025-05-05 RX ADMIN — IOPAMIDOL 79 ML: 755 INJECTION, SOLUTION INTRAVENOUS at 16:09

## 2025-05-05 RX ADMIN — HYDROMORPHONE HYDROCHLORIDE 0.5 MG: 1 INJECTION, SOLUTION INTRAMUSCULAR; INTRAVENOUS; SUBCUTANEOUS at 23:57

## 2025-05-05 RX ADMIN — TAMSULOSIN HYDROCHLORIDE 0.4 MG: 0.4 CAPSULE ORAL at 23:58

## 2025-05-05 RX ADMIN — HYDROMORPHONE HYDROCHLORIDE 0.5 MG: 1 INJECTION, SOLUTION INTRAMUSCULAR; INTRAVENOUS; SUBCUTANEOUS at 16:18

## 2025-05-05 RX ADMIN — SODIUM CHLORIDE, SODIUM LACTATE, POTASSIUM CHLORIDE, AND CALCIUM CHLORIDE 1000 ML: .6; .31; .03; .02 INJECTION, SOLUTION INTRAVENOUS at 16:21

## 2025-05-05 RX ADMIN — SODIUM CHLORIDE 60 ML: 9 INJECTION, SOLUTION INTRAVENOUS at 16:09

## 2025-05-05 ASSESSMENT — ACTIVITIES OF DAILY LIVING (ADL)
ADLS_ACUITY_SCORE: 51

## 2025-05-05 ASSESSMENT — COLUMBIA-SUICIDE SEVERITY RATING SCALE - C-SSRS
3. HAVE YOU BEEN THINKING ABOUT HOW YOU MIGHT KILL YOURSELF?: NO
1. IN THE PAST MONTH, HAVE YOU WISHED YOU WERE DEAD OR WISHED YOU COULD GO TO SLEEP AND NOT WAKE UP?: NO
6. HAVE YOU EVER DONE ANYTHING, STARTED TO DO ANYTHING, OR PREPARED TO DO ANYTHING TO END YOUR LIFE?: NO
5. HAVE YOU STARTED TO WORK OUT OR WORKED OUT THE DETAILS OF HOW TO KILL YOURSELF? DO YOU INTEND TO CARRY OUT THIS PLAN?: NO
2. HAVE YOU ACTUALLY HAD ANY THOUGHTS OF KILLING YOURSELF IN THE PAST MONTH?: YES

## 2025-05-05 NOTE — ED PROVIDER NOTES
History     Chief Complaint   Patient presents with    Abdominal Pain     HPI  Ghulam Rizzo is a 69 year old male with history of adenocarcinoma of prostate, cirrhosis of liver, who presents with abdominal pain. Completed radiotherapy on 10/2023 and ADT.  Is here for worsening abdominal pain.  Says been having issues going back for several months but has just been getting worse and worse.  Says he has been unable to get out of bed for the past 2 days.  Has not eaten much of anything this past week.  Reports 15 pound weight loss.  Nausea but no vomiting.  Has difficulty emptying his bladder.  Says he is on tamsulosin.  Feels that his bladder needs to get nearly completely full to the point where is causing discomfort in then can have some release of urine.  Also has had issues with his bowels.  Patient suspects this was due to his radiation therapy for his prostate cancer. Is trying to see GI but this is several months out for appointment booking. No fevers. No blood in stool. No provocative or palliating features.  Bowel movements are not daily and sometimes can go several days.  They are soft and loose.  Does feel better after bowels move.  Does take docusate, senna, MiraLAX.    The patient's PMHx, Surgical Hx, Allergies, and Medications were all reviewed with the patient.    Allergies:  Allergies   Allergen Reactions    Levaquin [Levofloxacin] Nausea and Vomiting    Oxycodone Dizziness    Percocet [Oxycodone-Acetaminophen] Visual Disturbance and Hallucination     Does not have any problems taking Tylenol/Acetaminophen.       Problem List:    Patient Active Problem List    Diagnosis Date Noted    Hematochezia 04/18/2025     Priority: Medium    PAC (premature atrial contraction) 04/18/2025     Priority: Medium    S/P radiation therapy 03/03/2025     Priority: Medium    Recurrent major depressive disorder, in remission 12/22/2023     Priority: Medium    Vertigo 07/05/2023     Priority: Medium    Adenocarcinoma of  prostate (H) 05/16/2023     Priority: Medium    Compression fracture of L4 vertebra (H) 05/09/2023     Priority: Medium    Compression fracture of L3 lumbar vertebra, with routine healing, subsequent encounter 05/09/2023     Priority: Medium    Benign essential hypertension 03/01/2023     Priority: Medium    Saccular aneurysm 02/07/2023     Priority: Medium    Cecal volvulus (H) 07/13/2022     Priority: Medium    Elevated prostate specific antigen (PSA) 02/09/2022     Priority: Medium    Other irritable bowel syndrome 11/07/2019     Priority: Medium    Proctitis 01/26/2017     Priority: Medium     Colonoscopy in 2013.       Cirrhosis of liver without ascites (H) 09/23/2015     Priority: Medium     Thought due to hepatitis C.  Follows with [facility].  Completed Harvoni 9/2015.      Abdominal pain, generalized 08/20/2015     Priority: Medium    Hyperlipidemia LDL goal <130 07/16/2012     Priority: Medium    Tobacco abuse 07/16/2012     Priority: Medium        Past Medical History:    Past Medical History:   Diagnosis Date    Anisocoria     Asthma     Carpal tunnel syndrome     Head injury, unspecified     Hepatitis C     Obesity     Other convulsions     Other mononeuritis of upper limb     Unspecified essential hypertension        Past Surgical History:    Past Surgical History:   Procedure Laterality Date    COLECTOMY WITHOUT COLOSTOMY Right 7/13/2022    Procedure: Laparotomy, right colectomy, lysis of adhesions,;  Surgeon: Tommy Martino DO;  Location: [facility]    COLONOSCOPY      COLONOSCOPY N/A 7/18/2023    Procedure: Colonoscopy with polypectomy;  Surgeon: Octavio Aguirre MD;  Location: [facility]    COLONOSCOPY N/A 6/10/2024    Procedure: Colonoscopy;  Surgeon: Tommy Martino DO;  Location: [facility]    ESOPHAGOSCOPY, GASTROSCOPY, DUODENOSCOPY (EGD), COMBINED N/A 6/10/2024    Procedure: Esophagoscopy, gastroscopy, duodenoscopy (EGD), combined;  Surgeon: Tommy Martino DO;  Location: [facility]     GI SURGERY      HERNIA REPAIR      HERNIORRHAPHY INCISIONAL (LOCATION) N/A 7/13/2022    Procedure: primary incisional hernia repair;  Surgeon: Tommy Martino DO;  Location: [facility] OR    INSERT SEED MARKER / MATRIX N/A 8/30/2023    Procedure: Transrectal ultrasound guided placement of fiducials and SpaceOar;  Surgeon: Chase Blount MD;  Location: [facility]    SURGICAL HISTORY OF -   4/6/87    esophagogastroduodenoscopy    SURGICAL HISTORY OF -   4/27/87    exploratory laparotomy and anterior gastropexy over a gastrostomy tube.    SURGICAL HISTORY OF -   11/13/90    esophagogastroduodenoscopy    SURGICAL HISTORY OF -   1991    L diaphragm eventration repair, fixation of stomach and colon to abd wall    SURGICAL HISTORY OF -       hiatal hernia repair    THORACIC SURGERY         Family History:    Family History   Problem Relation Age of Onset    Cerebrovascular Disease Mother     Heart Disease Brother     Heart Disease Brother        Social History:  Marital Status:   [2]  Social History     Tobacco Use    Smoking status: Former     Current packs/day: 0.50     Average packs/day: 0.5 packs/day for 50.0 years (25.0 ttl pk-yrs)     Types: Cigarettes    Smokeless tobacco: Never   Vaping Use    Vaping status: Never Used   Substance Use Topics    Alcohol use: Yes     Comment: 1-2 beers daily    Drug use: No        Medications:    acetaminophen (TYLENOL) 500 MG tablet  amLODIPine (NORVASC) 5 MG tablet  buPROPion (WELLBUTRIN XL) 150 MG 24 hr tablet  Calcium Carbonate (CALCIUM-CARB 600 PO)  cyanocobalamin (VITAMIN B-12) 1000 MCG tablet  glycerin (LAXATIVE) 1.2 g suppository  HYDROcodone-acetaminophen (NORCO) 5-325 MG tablet  HYDROmorphone (DILAUDID) 2 MG tablet  polyethylene glycol (MIRALAX) 17 GM/Dose powder  senna-docusate (NEGRITO-COLACE) 8.6-50 MG per tablet  sertraline (ZOLOFT) 50 MG tablet  solifenacin (VESICARE) 5 MG tablet  tamsulosin (FLOMAX) 0.4 MG capsule  vitamin D3 (CHOLECALCIFEROL) 50 mcg  (2000 units) tablet          Review of Systems  Pertinent positives and negatives mentioned in HPI    Physical Exam   BP: 122/87  Pulse: 78  Temp: 98.3  F (36.8  C)  Resp: 16  Weight: 73.5 kg (162 lb)  SpO2: 99 %    GEN: Awake, alert, and cooperative. Cachetic   HENT: oral mucosa dry.   EYES: EOM intact. Conjunctiva clear. No discharge.   CV : Regular rate and rhythm.  PULM: Normal effort. No wheezes, rales, or rhonchi bilaterally.  ABD: scaphoid. No localizing ttp. No rebound or guarding. Will healed midline laparotomy scar.  NEURO: Normal speech. Following commands. Answering questions and interacting appropriately.   EXT: no pitting edema  INT: Warm. No diaphoresis. Normal color.        Wt Readings from Last 5 Encounters:   05/05/25 73.5 kg (162 lb)   04/20/25 73.5 kg (162 lb)   04/18/25 76.3 kg (168 lb 3.4 oz)   04/03/25 79.4 kg (175 lb)   03/25/25 81.6 kg (180 lb)       ED Course        Procedures                 Critical Care time:  none              Results for orders placed or performed during the hospital encounter of 05/05/25 (from the past 24 hours)   [facility] Draw *Canceled*    Narrative    The following orders were created for panel order [facility] Draw.  Procedure                               Abnormality         Status                     ---------                               -----------         ------                       Please view results for these tests on the individual orders.   [facility] Draw    Narrative    The following orders were created for panel order [facility] Draw.  Procedure                               Abnormality         Status                     ---------                               -----------         ------                     Extra Blue Top Tube[[id number]]                             Final result                 Please view results for these tests on the individual orders.   CBC with Platelets & Differential    Narrative    The following orders were created for panel order CBC  with Platelets & Differential.  Procedure                               Abnormality         Status                     ---------                               -----------         ------                     CBC with platelets and ...[[id number]]                      Final result                 Please view results for these tests on the individual orders.   Comprehensive metabolic panel   Result Value Ref Range    Sodium 140 135 - 145 mmol/L    Potassium 4.0 3.4 - 5.3 mmol/L    Carbon Dioxide (CO2) 23 22 - 29 mmol/L    Anion Gap 13 7 - 15 mmol/L    Urea Nitrogen 21.4 8.0 - 23.0 mg/dL    Creatinine 0.75 0.67 - 1.17 mg/dL    GFR Estimate >90 >60 mL/min/1.73m2    Calcium 9.9 8.8 - 10.4 mg/dL    Chloride 104 98 - 107 mmol/L    Glucose 102 (H) 70 - 99 mg/dL    Alkaline Phosphatase 75 40 - 150 U/L    AST 11 0 - 45 U/L    ALT 10 0 - 70 U/L    Protein Total 7.2 6.4 - 8.3 g/dL    Albumin 4.5 3.5 - 5.2 g/dL    Bilirubin Total 0.9 <=1.2 mg/dL   Lipase   Result Value Ref Range    Lipase 30 13 - 60 U/L   Extra Blue Top Tube   Result Value Ref Range    Hold Specimen JI    CBC with platelets and differential   Result Value Ref Range    WBC Count 6.5 4.0 - 11.0 10e3/uL    RBC Count 4.73 4.40 - 5.90 10e6/uL    Hemoglobin 15.3 13.3 - 17.7 g/dL    Hematocrit 42.7 40.0 - 53.0 %    MCV 90 78 - 100 fL    MCH 32.3 26.5 - 33.0 pg    MCHC 35.8 31.5 - 36.5 g/dL    RDW 12.1 10.0 - 15.0 %    Platelet Count 167 150 - 450 10e3/uL    % Neutrophils 66 %    % Lymphocytes 25 %    % Monocytes 7 %    % Eosinophils 1 %    % Basophils 1 %    % Immature Granulocytes 0 %    NRBCs per 100 WBC 0 <1 /100    Absolute Neutrophils 4.3 1.6 - 8.3 10e3/uL    Absolute Lymphocytes 1.7 0.8 - 5.3 10e3/uL    Absolute Monocytes 0.5 0.0 - 1.3 10e3/uL    Absolute Eosinophils 0.0 0.0 - 0.7 10e3/uL    Absolute Basophils 0.1 0.0 - 0.2 10e3/uL    Absolute Immature Granulocytes 0.0 <=0.4 10e3/uL    Absolute NRBCs 0.0 10e3/uL   Lactic acid whole blood with 1x repeat in 2 hr  when >2   Result Value Ref Range    Lactic Acid, Initial 1.4 0.7 - 2.0 mmol/L   CT Abdomen Pelvis w Contrast    Narrative    EXAM: CT ABDOMEN PELVIS W CONTRAST  LOCATION: [facility]  DATE: 5/5/2025    INDICATION: Progressive lower abdominal pain, early satiety, weight loss, known adenocarcinoma of prostate status post radiotherapy in 20'23. Radiation cystitis colitis.  COMPARISON: None.  TECHNIQUE: CT scan of the abdomen and pelvis was performed following injection of IV contrast. Multiplanar reformats were obtained. Dose reduction techniques were used.  CONTRAST: 79 mL Isovue 370    FINDINGS:   LOWER CHEST: Stable elevated left hemidiaphragm.    HEPATOBILIARY: No acute hepatic abnormality. Hepatic cysts appear stable. Small hypodensities are too small for characterization as well and are stable. A few calcifications noted. Gallbladder shows no calcified gallstones.    PANCREAS: Normal.    SPLEEN: Calcified granulomas.    ADRENAL GLANDS: Normal.    KIDNEYS/BLADDER: No significant mass, stones, or hydronephrosis. There are simple or benign cysts. No follow up is needed. Bladder unremarkable.    BOWEL: A few small bowel loops are mildly distended. No acute inflammatory change of the bowel identified. No convincing complete bowel obstruction identified. No free fluid or free air.    LYMPH NODES: No enlarged lymph nodes can be seen.    VASCULATURE: Mild calcifications.    PELVIC ORGANS: Prostate is 4.5 cm. Some mild edema within the fat planes of the low pelvis.    MUSCULOSKELETAL: Stable height loss at L3. Spine degenerative changes. No visible focal bone lesion can be seen.      Impression    IMPRESSION:   1.  A few small bowel loops are mildly distended and could be an ileus.  2.  No other acute abnormality identified.   UA with Microscopic reflex to Culture    Specimen: Urine, Clean Catch   Result Value Ref Range    Color Urine Light Yellow Colorless, Straw, Light Yellow, Yellow     Appearance Urine Clear Clear    Glucose Urine Negative Negative mg/dL    Bilirubin Urine Negative Negative    Ketones Urine 60 (A) Negative mg/dL    Specific Gravity Urine 1.010 1.003 - 1.035    Blood Urine Negative Negative    pH Urine 7.0 5.0 - 7.0    Protein Albumin Urine 20 (A) Negative mg/dL    Urobilinogen Urine Normal Normal mg/dL    Nitrite Urine Negative Negative    Leukocyte Esterase Urine Negative Negative    Mucus Urine Present (A) None Seen /LPF    RBC Urine 3 (H) <=2 /HPF    WBC Urine <1 <=5 /HPF    Squamous Epithelials Urine <1 <=1 /HPF    Narrative    Urine Culture not indicated       Medications   ondansetron (ZOFRAN) injection 4 mg (has no administration in time range)   HYDROmorphone (PF) (DILAUDID) injection 0.5 mg (0.5 mg Intravenous $Given 5/5/25 1618)   lactated ringers BOLUS 1,000 mL (0 mLs Intravenous Stopped 5/5/25 1847)   iopamidol (ISOVUE-370) solution 79 mL (79 mLs Intravenous $Given 5/5/25 1609)   sodium chloride 0.9 % bag for CT scan flush (60 mLs As instructed $Given 5/5/25 1609)       Assessments & Plan (with Medical Decision Making)   69 year old male with past medical history history of adenocarcinoma of the prostate treated with radiotherapy and course of androgen deprivation therapy, cirrhosis, acquired asplenia, hepatitis C which did not respond to interferon and ribavirin therapy, with progressive lower abdominal pain and discomfort associate with nausea.  Basic lab work reassuring.  CBC is normal.  Lactic acid is normal.  Lipase is normal.  CMP grossly normal, glucose 102.  Nonfocal abdominal exam.  CT abdomen pelvis with a few small bowel loops mildly distended which could represent ileus.  No acute findings to explain his symptoms.    On reevaluation patient feels somewhat better after pain medication but still not feeling well.  Is quite frustrated that no cause of his symptoms has been found.  Expresses significant frustration that has been unable to connect with the  gastroenterologist as an outpatient.  I did speak to on-call gastroenterology  reviewed images.  Questions possible narrowing at splenic flexure.  Wondered if Gastrografin study may be helpful.  Spoke with radiologist I do not think that obstruction present as it did not have that appearance but agreed that rectal administered From Patricia study may be helpful.  Discussed this option with the patient and could keep him here overnight.  He wants to go home.  Trying to see if this can be arranged as an outpatient.     Patient ultimately agreeable to staying.  No beds available [place] apartment.  Plan for retrograde Gastrografin study tomorrow.  Case discussed with cardiac and she with hospital service who accepted mention of patient.  Patient will board ED until inpatient bed becomes available.             I have reviewed the nursing notes.         New Prescriptions    No medications on file       Final diagnoses:   S/P radiation therapy   Abdominal pain, generalized     Alex Champion MD        5/5/2025   [facility] EMERGENCY DEPT    Disclaimer: This note consists of words and symbols derived from keyboarding and dictation using voice recognition software.  As a result, there may be errors that have gone undetected.  Please consider this when interpreting information found in this note.               Alex Champion MD  05/05/25 1951

## 2025-05-05 NOTE — ED TRIAGE NOTES
Pt here for ongoing abdominal pain. Unable to hardly eat/drink due to pain. Hx of prostate cancer with radiation. Finished up radiation about 7 months ago. Pt also having chronic headaches but following up with oncologist for that.      Triage Assessment (Adult)       Row Name 05/05/25 1247          Triage Assessment    Airway WDL WDL        Respiratory WDL    Respiratory WDL WDL        Skin Circulation/Temperature WDL    Skin Circulation/Temperature WDL WDL        Cardiac WDL    Cardiac WDL WDL        Peripheral/Neurovascular WDL    Peripheral Neurovascular WDL WDL        Cognitive/Neuro/Behavioral WDL    Cognitive/Neuro/Behavioral WDL WDL

## 2025-05-06 PROCEDURE — 99222 1ST HOSP IP/OBS MODERATE 55: CPT | Mod: 95 | Performed by: HOSPITALIST

## 2025-05-06 PROCEDURE — 250N000013 HC RX MED GY IP 250 OP 250 PS 637: Performed by: INTERNAL MEDICINE

## 2025-05-06 PROCEDURE — 96361 HYDRATE IV INFUSION ADD-ON: CPT

## 2025-05-06 PROCEDURE — G0378 HOSPITAL OBSERVATION PER HR: HCPCS

## 2025-05-06 PROCEDURE — 250N000013 HC RX MED GY IP 250 OP 250 PS 637: Performed by: HOSPITALIST

## 2025-05-06 PROCEDURE — 258N000003 HC RX IP 258 OP 636: Performed by: INTERNAL MEDICINE

## 2025-05-06 PROCEDURE — 120N000004 HC R&B MS OVERFLOW

## 2025-05-06 PROCEDURE — 258N000003 HC RX IP 258 OP 636: Performed by: HOSPITALIST

## 2025-05-06 PROCEDURE — 99207 PR NO BILLABLE SERVICE THIS VISIT: CPT | Performed by: INTERNAL MEDICINE

## 2025-05-06 PROCEDURE — 250N000013 HC RX MED GY IP 250 OP 250 PS 637: Performed by: EMERGENCY MEDICINE

## 2025-05-06 RX ORDER — SUCRALFATE ORAL 1 G/10ML
2 SUSPENSION ORAL ONCE
Status: COMPLETED | OUTPATIENT
Start: 2025-05-06 | End: 2025-05-06

## 2025-05-06 RX ORDER — LORAZEPAM 1 MG/1
1 TABLET ORAL EVERY 4 HOURS PRN
Status: DISCONTINUED | OUTPATIENT
Start: 2025-05-06 | End: 2025-05-07 | Stop reason: HOSPADM

## 2025-05-06 RX ORDER — ONDANSETRON 4 MG/1
4 TABLET, ORALLY DISINTEGRATING ORAL EVERY 6 HOURS PRN
Status: DISCONTINUED | OUTPATIENT
Start: 2025-05-06 | End: 2025-05-07 | Stop reason: HOSPADM

## 2025-05-06 RX ORDER — OXYBUTYNIN CHLORIDE 5 MG/1
5 TABLET ORAL 3 TIMES DAILY
Status: DISCONTINUED | OUTPATIENT
Start: 2025-05-06 | End: 2025-05-06

## 2025-05-06 RX ORDER — NALOXONE HYDROCHLORIDE 0.4 MG/ML
0.4 INJECTION, SOLUTION INTRAMUSCULAR; INTRAVENOUS; SUBCUTANEOUS
Status: DISCONTINUED | OUTPATIENT
Start: 2025-05-06 | End: 2025-05-07 | Stop reason: HOSPADM

## 2025-05-06 RX ORDER — ONDANSETRON 2 MG/ML
4 INJECTION INTRAMUSCULAR; INTRAVENOUS EVERY 6 HOURS PRN
Status: DISCONTINUED | OUTPATIENT
Start: 2025-05-06 | End: 2025-05-07 | Stop reason: HOSPADM

## 2025-05-06 RX ORDER — NALOXONE HYDROCHLORIDE 0.4 MG/ML
0.2 INJECTION, SOLUTION INTRAMUSCULAR; INTRAVENOUS; SUBCUTANEOUS
Status: DISCONTINUED | OUTPATIENT
Start: 2025-05-06 | End: 2025-05-07 | Stop reason: HOSPADM

## 2025-05-06 RX ORDER — ACETAMINOPHEN 650 MG/1
650 SUPPOSITORY RECTAL EVERY 4 HOURS PRN
Status: DISCONTINUED | OUTPATIENT
Start: 2025-05-06 | End: 2025-05-07 | Stop reason: HOSPADM

## 2025-05-06 RX ORDER — CALCIUM CARBONATE 500 MG/1
1000 TABLET, CHEWABLE ORAL 4 TIMES DAILY PRN
Status: DISCONTINUED | OUTPATIENT
Start: 2025-05-06 | End: 2025-05-07 | Stop reason: HOSPADM

## 2025-05-06 RX ORDER — ACETAMINOPHEN 325 MG/1
650 TABLET ORAL EVERY 4 HOURS PRN
Status: DISCONTINUED | OUTPATIENT
Start: 2025-05-06 | End: 2025-05-07 | Stop reason: HOSPADM

## 2025-05-06 RX ORDER — ACETAMINOPHEN 325 MG/1
650 TABLET ORAL EVERY 4 HOURS PRN
Status: DISCONTINUED | OUTPATIENT
Start: 2025-05-06 | End: 2025-05-06

## 2025-05-06 RX ORDER — HYDROMORPHONE HYDROCHLORIDE 1 MG/ML
.3-.5 INJECTION, SOLUTION INTRAMUSCULAR; INTRAVENOUS; SUBCUTANEOUS
Status: DISCONTINUED | OUTPATIENT
Start: 2025-05-06 | End: 2025-05-07 | Stop reason: HOSPADM

## 2025-05-06 RX ORDER — OXYBUTYNIN CHLORIDE 5 MG/1
5 TABLET ORAL 3 TIMES DAILY
Status: DISCONTINUED | OUTPATIENT
Start: 2025-05-06 | End: 2025-05-07

## 2025-05-06 RX ORDER — HYDROMORPHONE HYDROCHLORIDE 2 MG/1
2 TABLET ORAL
Status: DISCONTINUED | OUTPATIENT
Start: 2025-05-06 | End: 2025-05-07 | Stop reason: HOSPADM

## 2025-05-06 RX ADMIN — OXYBUTYNIN CHLORIDE 5 MG: 5 TABLET ORAL at 08:53

## 2025-05-06 RX ADMIN — AMLODIPINE BESYLATE 5 MG: 5 TABLET ORAL at 08:52

## 2025-05-06 RX ADMIN — OXYBUTYNIN CHLORIDE 5 MG: 5 TABLET ORAL at 20:29

## 2025-05-06 RX ADMIN — ACETAMINOPHEN 650 MG: 325 TABLET, FILM COATED ORAL at 14:25

## 2025-05-06 RX ADMIN — ACETAMINOPHEN 650 MG: 325 TABLET, FILM COATED ORAL at 22:46

## 2025-05-06 RX ADMIN — SODIUM CHLORIDE, SODIUM LACTATE, POTASSIUM CHLORIDE, AND CALCIUM CHLORIDE 1000 ML: .6; .31; .03; .02 INJECTION, SOLUTION INTRAVENOUS at 08:50

## 2025-05-06 RX ADMIN — BUPROPION HYDROCHLORIDE 150 MG: 150 TABLET, EXTENDED RELEASE ORAL at 08:53

## 2025-05-06 RX ADMIN — SODIUM CHLORIDE 1000 ML: 0.9 INJECTION, SOLUTION INTRAVENOUS at 06:54

## 2025-05-06 RX ADMIN — LORAZEPAM 1 MG: 1 TABLET ORAL at 22:46

## 2025-05-06 RX ADMIN — ACETAMINOPHEN 650 MG: 325 TABLET, FILM COATED ORAL at 06:55

## 2025-05-06 RX ADMIN — TAMSULOSIN HYDROCHLORIDE 0.4 MG: 0.4 CAPSULE ORAL at 18:38

## 2025-05-06 RX ADMIN — SERTRALINE HYDROCHLORIDE 50 MG: 50 TABLET ORAL at 08:53

## 2025-05-06 RX ADMIN — SUCRALFATE 2 G: 1 SUSPENSION ORAL at 08:53

## 2025-05-06 ASSESSMENT — ACTIVITIES OF DAILY LIVING (ADL)
ADLS_ACUITY_SCORE: 51
ADLS_ACUITY_SCORE: 59
ADLS_ACUITY_SCORE: 53
ADLS_ACUITY_SCORE: 51
ADLS_ACUITY_SCORE: 59
ADLS_ACUITY_SCORE: 51
ADLS_ACUITY_SCORE: 51
ADLS_ACUITY_SCORE: 59
ADLS_ACUITY_SCORE: 51
ADLS_ACUITY_SCORE: 57
ADLS_ACUITY_SCORE: 51
ADLS_ACUITY_SCORE: 61
ADLS_ACUITY_SCORE: 57

## 2025-05-06 NOTE — H&P
"[facility]    History and Physical - Hospitalist Service       Date of Admission:  5/5/2025    Assessment & Plan      Ghulam Rizzo is a 69 year old male admitted on 5/5/2025. He comes in with several weeks of abd pain.    ABDOMINAL PAIN  -Hx of intermittent diarrhea/constipation for 30 years and concerned about Bms now.  -Several months of abd discomfort and was told by oncology, and his PCP?, that likely radiation related and no easy fix.  -No distension and CT with perhaps ileus so will keep NPO and try liquids in the AM.  -Hx of radiation cystitis and feeling of trouble emptying bladder and this could be causing pain as well.  -He is on a bladder relaxant but he is not sure of the name.  -He has not seen a GI provider because \"trying to get in takes months, it will take me 9 months to see an ENT for my dizziness\"  -Encouraged to try counselling to see how he can learn to deal with some discomfort and wait for GI appt.  -Will ask day provider if they have any ideas.  -Will check PVR     Diet: Clear Liquid Diet  DVT Prophylaxis: Low Risk/Ambulatory with no VTE prophylaxis indicated and Ambulate every shift  Rivera Catheter: Not present  Lines: None     Cardiac Monitoring: None  Code Status:  Full    Clinically Significant Risk Factors Present on Admission                   # Hypertension: Noted on problem list                      Disposition Plan     Medically Ready for Discharge: Anticipated Today           Caroline Torres MD  Hospitalist Service  [facility]  Securely message with [employer] (more info)  Text page via [employer] Paging/Directory     ______________________________________________________________________    Chief Complaint   Abd pain    History is obtained from the patient    History of Present Illness   Ghulam Rizzo is a 69 year old male with history of adenocarcinoma of prostate, cirrhosis of liver, who presents with abdominal pain. Completed " radiotherapy on 10/2023 and ADT.  Is here for worsening abdominal pain.  Says been having issues going back for several months but has just been getting worse and worse.  Says he has been unable to get out of bed for the past 2 days.  Has not eaten much of anything this past week.  Reports 15 pound weight loss.  Nausea but no vomiting.  Has difficulty emptying his bladder.  Says he is on tamsulosin.  Feels that his bladder needs to get nearly completely full to the point where is causing discomfort in then can have some release of urine.  Also has had issues with his bowels.  Patient suspects this was due to his radiation therapy for his prostate cancer as he was told by his oncologist and PCP.   Is trying to see GI but this is several months out for appointment booking. No fevers. No blood in stool. No provocative or palliating features.  Bowel movements are not daily and sometimes can go several days.  They are soft and loose.  Does feel better after bowels move.  Does take docusate, senna, MiraLAX.  Has had problems with intermittent diarrhea and constipation for 30 years.  Labs normal and CT with ???mild ileus.      Past Medical History    Past Medical History:   Diagnosis Date    Anisocoria     Asthma     Carpal tunnel syndrome     2006    Head injury, unspecified     closed head injury 4 yrs ago    Hepatitis C     Cured by [facility] in 2015. diagnosed in 2012 was positive for Hepatitis C.     Obesity     2006, resolved    Other convulsions     seizure disorder due to head injury 4 yrs ago.    Other mononeuritis of upper limb     L phrenic nerve paralysis    Unspecified essential hypertension        Past Surgical History   Past Surgical History:   Procedure Laterality Date    COLECTOMY WITHOUT COLOSTOMY Right 7/13/2022    Procedure: Laparotomy, right colectomy, lysis of adhesions,;  Surgeon: Tommy Martino DO;  Location: [facility]    COLONOSCOPY      COLONOSCOPY N/A 7/18/2023    Procedure: Colonoscopy with  polypectomy;  Surgeon: Octavio Aguirre MD;  Location: [facility]    COLONOSCOPY N/A 6/10/2024    Procedure: Colonoscopy;  Surgeon: Tommy Martino DO;  Location: [facility]    ESOPHAGOSCOPY, GASTROSCOPY, DUODENOSCOPY (EGD), COMBINED N/A 6/10/2024    Procedure: Esophagoscopy, gastroscopy, duodenoscopy (EGD), combined;  Surgeon: Tommy Martino DO;  Location: [facility]    GI SURGERY      HERNIA REPAIR      HERNIORRHAPHY INCISIONAL (LOCATION) N/A 7/13/2022    Procedure: primary incisional hernia repair;  Surgeon: Tommy Martino DO;  Location: [facility] OR    INSERT SEED MARKER / MATRIX N/A 8/30/2023    Procedure: Transrectal ultrasound guided placement of fiducials and Space[employer];  Surgeon: Chase Blount MD;  Location:  OR    SURGICAL HISTORY OF -   4/6/87    esophagogastroduodenoscopy    SURGICAL HISTORY OF -   4/27/87    exploratory laparotomy and anterior gastropexy over a gastrostomy tube.    SURGICAL HISTORY OF -   11/13/90    esophagogastroduodenoscopy    SURGICAL HISTORY OF -   1991    L diaphragm eventration repair, fixation of stomach and colon to abd wall    SURGICAL HISTORY OF -       hiatal hernia repair    THORACIC SURGERY         Prior to Admission Medications   Prior to Admission Medications   Prescriptions Last Dose Informant Patient Reported? Taking?   Calcium Carbonate (CALCIUM-CARB 600 PO) Past Week Morning Self Yes Yes   Sig: Take 2 tablets by mouth daily   HYDROcodone-acetaminophen (NORCO) 5-325 MG tablet 5/4/2025 Self No Yes   Sig: Take 1 tablet by mouth every 6 hours as needed for severe pain.   HYDROmorphone (DILAUDID) 2 MG tablet 5/4/2025 Evening Self No Yes   Sig: Take 1 tablet (2 mg) by mouth every 6 hours as needed for pain.   acetaminophen (TYLENOL) 500 MG tablet More than a month Self Yes Yes   Sig: Take 1,000 mg by mouth every 6 hours as needed for mild pain   amLODIPine (NORVASC) 5 MG tablet 5/4/2025 Morning Self No Yes   Sig: Take 1 tablet (5 mg) by mouth  daily.   buPROPion (WELLBUTRIN XL) 150 MG 24 hr tablet 5/4/2025 Morning Self No Yes   Sig: Take 1 tablet (150 mg) by mouth every morning.   cyanocobalamin (VITAMIN B-12) 1000 MCG tablet 5/4/2025 Morning Self No Yes   Sig: Take 1 tablet (1,000 mcg) by mouth daily   glycerin (LAXATIVE) 1.2 g suppository 5/4/2025 Morning Self No Yes   Sig: Place 1 suppository rectally daily as needed (constipation.)   polyethylene glycol (MIRALAX) 17 GM/Dose powder Past Week Self No Yes   Sig: Take 17 g (1 capful.) by mouth daily   senna-docusate (NEGRITO-COLACE) 8.6-50 MG per tablet 5/4/2025 Self No Yes   Sig: Take 1-2 tablets by mouth 2 times daily as needed for constipation.   sertraline (ZOLOFT) 50 MG tablet 5/4/2025 Morning Self No Yes   Sig: Take 1 tablet by mouth daily   solifenacin (VESICARE) 5 MG tablet 5/4/2025 Morning Self No Yes   Sig: Take 1 tablet (5 mg) by mouth daily.   tamsulosin (FLOMAX) 0.4 MG capsule 5/3/2025 Evening Self No Yes   Sig: Take 1 capsule (0.4 mg) by mouth every evening   vitamin D3 (CHOLECALCIFEROL) 50 mcg (2000 units) tablet 5/4/2025 Morning Self No Yes   Sig: Take 1 tablet (50 mcg) by mouth daily      Facility-Administered Medications: None        Review of Systems    The 10 point Review of Systems is negative other than noted in the HPI or here.     Social History   I have reviewed this patient's social history and updated it with pertinent information if needed.  Social History     Tobacco Use    Smoking status: Former     Current packs/day: 0.50     Average packs/day: 0.5 packs/day for 50.0 years (25.0 ttl pk-yrs)     Types: Cigarettes    Smokeless tobacco: Never   Vaping Use    Vaping status: Never Used   Substance Use Topics    Alcohol use: Yes     Comment: 1-2 beers daily    Drug use: No         Family History   I have reviewed this patient's family history and updated it with pertinent information if needed.  Family History   Problem Relation Age of Onset    Cerebrovascular Disease Mother      Heart Disease Brother     Heart Disease Brother          Allergies   Allergies   Allergen Reactions    Levaquin [Levofloxacin] Nausea and Vomiting    Oxycodone Dizziness    Percocet [Oxycodone-Acetaminophen] Visual Disturbance and Hallucination     Does not have any problems taking Tylenol/Acetaminophen.        Physical Exam   Vital Signs: Temp: 98.3  F (36.8  C) Temp src: Oral BP: (!) 155/97 Pulse: 68   Resp: 16 SpO2: 99 % O2 Device: None (Room air)    Weight: 162 lbs 0 oz    General Appearance: Wdwn man in nad, A&O  Respiratory: lcta, no dyspnea  Cardiovascular: rrr  GI: no distension, well healed abd scars, no tenderness on palpation       Medical Decision Making             Data     I have personally reviewed the following data over the past 24 hrs:    6.5  \   15.3   / 167     140 104 21.4 /  102 (H)   4.0 23 0.75 \     ALT: 10 AST: 11 AP: 75 TBILI: 0.9   ALB: 4.5 TOT PROTEIN: 7.2 LIPASE: 30     Procal: N/A CRP: N/A Lactic Acid: 1.4         Imaging results reviewed over the past 24 hrs:   Recent Results (from the past 24 hours)   CT Abdomen Pelvis w Contrast    Narrative    EXAM: CT ABDOMEN PELVIS W CONTRAST  LOCATION: [facility]  DATE: 5/5/2025    INDICATION: Progressive lower abdominal pain, early satiety, weight loss, known adenocarcinoma of prostate status post radiotherapy in 20'23. Radiation cystitis colitis.  COMPARISON: None.  TECHNIQUE: CT scan of the abdomen and pelvis was performed following injection of IV contrast. Multiplanar reformats were obtained. Dose reduction techniques were used.  CONTRAST: 79 mL Isovue 370    FINDINGS:   LOWER CHEST: Stable elevated left hemidiaphragm.    HEPATOBILIARY: No acute hepatic abnormality. Hepatic cysts appear stable. Small hypodensities are too small for characterization as well and are stable. A few calcifications noted. Gallbladder shows no calcified gallstones.    PANCREAS: Normal.    SPLEEN: Calcified granulomas.    ADRENAL  GLANDS: Normal.    KIDNEYS/BLADDER: No significant mass, stones, or hydronephrosis. There are simple or benign cysts. No follow up is needed. Bladder unremarkable.    BOWEL: A few small bowel loops are mildly distended. No acute inflammatory change of the bowel identified. No convincing complete bowel obstruction identified. No free fluid or free air.    LYMPH NODES: No enlarged lymph nodes can be seen.    VASCULATURE: Mild calcifications.    PELVIC ORGANS: Prostate is 4.5 cm. Some mild edema within the fat planes of the low pelvis.    MUSCULOSKELETAL: Stable height loss at L3. Spine degenerative changes. No visible focal bone lesion can be seen.      Impression    IMPRESSION:   1.  A few small bowel loops are mildly distended and could be an ileus.  2.  No other acute abnormality identified.     As the provider for the telehealth service, I attest that I introduced myself to the patient and provided my credentials. Based on review of the patient s chart and/or a discussion with members of the patient s treatment team, I determined that telemedicine via a real-time, two-way, interactive audio and video platform is an appropriate means of providing this service. The patient and I mutually agreed that this visit is appropriate for telemedicine as well.  The RN, or tech on duty in the ER, assisted me with the patient.  [place] [facility] [place] am located [place].  The video portion of the visit was approximately 20 mins.

## 2025-05-06 NOTE — ED NOTES
Pt had a very small liquid BM after carafate enema. Pt was able to hold the enema in for one hour. Pt said he doesn't think he had much stool to begin with.

## 2025-05-06 NOTE — PROGRESS NOTES
"[facility] Medicine Progress Note  Date of Service: 05/06/2025    Assessment & Plan     Ghulam Rizzo is a 69 year old male with h/o IBS, cirrhosis without ascites due to hepatitis C, HepC in remission after treatment 2013, adenocarcinoma of the prostate s/p radiation therapy, radiation cystitis, radiation proctitis, hypertension, hyperlipidemia, tobacco use presented on 5/5/2025 due to weakness, unable to eat due to diffuse abdominal pain coming on over a year following radiation therapy to prostate.    Abdominal pain, diffuse, nonspecific  Possible radiation proctitis    C/o constant pain across the mid abdomen, ache without radiation. Feels it has been coming on since ending radiation to prostate October 2023. Gradually more bothersome. No known exacerbating factors. No change with bowel movements.  Now with decreased appetite and weight loss though has had some cycles of weight loss again over the last year or so.    Non-tender on exam, no abnormal labs. CT abdomen/pelvis without clear cause of pain.     Patient very frustrated by inability to figure out what is wrong. Also has trouble differentiating problems with bladder, bowel movements and abdominal pain. Has long h/o IBS. Told by oncology and PCP that his pain is likely due to radiation proctitis and has recently tried sucralfate enemas without improvement. He has not seen a GI provider because \"trying to get in takes months\"    Colonoscopy 1/10/2025: A few medium-sized patchy angiodysplastic lesions with typical       arborization were found in the rectum   - Observation to allow for multiple consults and facilitate getting a workable outpatient plan as patient otherwise likely to return to ED in near term if not a better plan.   - Gen surgery consult tomorrow, discussed with Dr. Martino who knows patient    - Palliative care consult for help with symptom management   - Given fluids in ED   - clear liquids and " adat   - hydromorphone prn pain   - lorazepam for anxiety (pain may be secondary to anxiety)    Radiation cystitis  BPH    On trial of Vesicare but does not feel it is working. Most likely this is not the cause of his main abdominal pain (above).    - Urology consultation tomorrow   - oxybutynin is hospital alternative for Vesicare   - tamsulosion    Cirrhosis of liver, due to Hep C  Hep C infection in remission    No ascites. Normal LFTs    Hypertension     Managed with amlodipine, continue    Depression, major    Continue bupropion and sertraline     Diet: Clear Liquid Diet  DVT Prophylaxis: Low Risk/Ambulatory with no VTE prophylaxis indicated and Ambulate every shift  Rivera Catheter: Not present  Lines: None     Cardiac Monitoring: None  Code Status:  Full  Clinically Significant Risk Factors Present on Admission                   # Hypertension: Noted on problem list                      Diet: Regular Diet Adult    DVT Prophylaxis: Low Risk/Ambulatory with no VTE prophylaxis indicated  Rivera Catheter: Not present  Lines: None     Cardiac Monitoring: None  Code Status:  Full    Discussion/Disposition: Multiple consults tomorrow.  Will try to sort out his various abdominal complaints, with the plan to get him feeling better and eating regularly.    Medically Ready for Discharge: Anticipated Tomorrow         Medical Decision Making       70 MINUTES SPENT BY ME on the date of service doing chart review, history, exam, documentation & further activities per the note.        Grzegorz Guardado MD  [facility]  Securely message with [employer] (more info)  Text page via [employer] Paging/Directory     Interval History   HPI reviewed with patient. Sucralfate enemas at home not helpful.     Physical Exam   Temp:  [98.3  F (36.8  C)-98.9  F (37.2  C)] 98.3  F (36.8  C)  Pulse:  [64-71] 71  Resp:  [16-20] 20  BP: (130-175)/(67-97) 175/93  SpO2:  [98 %-100 %] 99 %    Weights:   Vitals:     05/05/25 1245 05/06/25 1559   Weight: 73.5 kg (162 lb) 75.1 kg (165 lb 9.1 oz)    Body mass index is 21.26 kg/m .    Constitutional: alert and oriented, frustrated  CV: Regular, no edema  Respiratory: CTA bilaterally  GI: Soft, non-tender, bowel sounds normal   Skin: Warm and dry    Data   Recent Labs   Lab 05/05/25  1359   WBC 6.5   HGB 15.3   MCV 90         POTASSIUM 4.0   CHLORIDE 104   CO2 23   BUN 21.4   CR 0.75   ANIONGAP 13   TAVIA 9.9   *   ALBUMIN 4.5   PROTTOTAL 7.2   BILITOTAL 0.9   ALKPHOS 75   ALT 10   AST 11   LIPASE 30       Recent Labs   Lab 05/05/25  1359   *        Unresulted Labs Ordered in the Past 30 Days of this Admission       No orders found for last 31 day(s).             Imaging: No results found for this or any previous visit (from the past 24 hours).     I reviewed all new labs and imaging results over the last 24 hours. I personally reviewed no images or EKG's today.    Medications   Current Facility-Administered Medications   Medication Dose Route Frequency Provider Last Rate Last Admin     Current Facility-Administered Medications   Medication Dose Route Frequency Provider Last Rate Last Admin    amLODIPine (NORVASC) tablet 5 mg  5 mg Oral Daily Alex Champion MD   5 mg at 05/06/25 0852    buPROPion (WELLBUTRIN XL) 24 hr tablet 150 mg  150 mg Oral Daily Alex Champion MD   150 mg at 05/06/25 0853    oxyBUTYnin (DITROPAN) tablet 5 mg  5 mg Oral TID Caroline Torres MD   5 mg at 05/06/25 0853    sertraline (ZOLOFT) tablet 50 mg  50 mg Oral Daily Alex Champion MD   50 mg at 05/06/25 0853    tamsulosin (FLOMAX) capsule 0.4 mg  0.4 mg Oral QPM Alex Champion MD   0.4 mg at 05/05/25 9280       Grzegorz Guardado MD  [facility]

## 2025-05-06 NOTE — MEDICATION SCRIBE - ADMISSION MEDICATION HISTORY
Medication Scribe Admission Medication History    Admission medication history is complete. The information provided in this note is only as accurate as the sources available at the time of the update.    Information Source(s): Patient and CareEverywhere/SureScripts via in-person    Pertinent Information: PTA med list reviewed with patient in room and finished at desk.  He took no medicines today.    Changes made to PTA medication list:  Added: None  Deleted: None  Changed: None    Allergies reviewed with patient and updates made in EHR: yes, He does not have any problems taking Acetaminophen.    Medication History Completed By: Jessy Willett 5/5/2025 9:16 PM    PTA Med List   Medication Sig Last Dose/Taking    acetaminophen (TYLENOL) 500 MG tablet Take 1,000 mg by mouth every 6 hours as needed for mild pain More than a month    amLODIPine (NORVASC) 5 MG tablet Take 1 tablet (5 mg) by mouth daily. 5/4/2025 Morning    buPROPion (WELLBUTRIN XL) 150 MG 24 hr tablet Take 1 tablet (150 mg) by mouth every morning. 5/4/2025 Morning    Calcium Carbonate (CALCIUM-CARB 600 PO) Take 2 tablets by mouth daily Past Week Morning    cyanocobalamin (VITAMIN B-12) 1000 MCG tablet Take 1 tablet (1,000 mcg) by mouth daily 5/4/2025 Morning    glycerin (LAXATIVE) 1.2 g suppository Place 1 suppository rectally daily as needed (constipation.) 5/4/2025 Morning    HYDROcodone-acetaminophen (NORCO) 5-325 MG tablet Take 1 tablet by mouth every 6 hours as needed for severe pain. 5/4/2025    HYDROmorphone (DILAUDID) 2 MG tablet Take 1 tablet (2 mg) by mouth every 6 hours as needed for pain. 5/4/2025 Evening    polyethylene glycol (MIRALAX) 17 GM/Dose powder Take 17 g (1 capful.) by mouth daily Past Week    senna-docusate (NEGRITO-COLACE) 8.6-50 MG per tablet Take 1-2 tablets by mouth 2 times daily as needed for constipation. 5/4/2025    sertraline (ZOLOFT) 50 MG tablet Take 1 tablet by mouth daily 5/4/2025 Morning    solifenacin (VESICARE) 5 MG  tablet Take 1 tablet (5 mg) by mouth daily. 5/4/2025 Morning    tamsulosin (FLOMAX) 0.4 MG capsule Take 1 capsule (0.4 mg) by mouth every evening 5/3/2025 Evening    vitamin D3 (CHOLECALCIFEROL) 50 mcg (2000 units) tablet Take 1 tablet (50 mcg) by mouth daily 5/4/2025 Morning

## 2025-05-06 NOTE — ED NOTES
Pt decided he doesn't want narcotic pain pills, but he will take tylenol. Pt wants to wait to talk to the doctor before taking any other pills.

## 2025-05-06 NOTE — ED NOTES
Writer gave report to Flor. [facility] ICU. Pt MedSurg overflow. Report given and no further questions at this time

## 2025-05-07 VITALS
SYSTOLIC BLOOD PRESSURE: 151 MMHG | BODY MASS INDEX: 21.26 KG/M2 | DIASTOLIC BLOOD PRESSURE: 85 MMHG | OXYGEN SATURATION: 96 % | WEIGHT: 165.57 LBS | RESPIRATION RATE: 18 BRPM | TEMPERATURE: 98 F | HEART RATE: 71 BPM

## 2025-05-07 PROCEDURE — 99239 HOSP IP/OBS DSCHRG MGMT >30: CPT | Performed by: INTERNAL MEDICINE

## 2025-05-07 PROCEDURE — 99214 OFFICE O/P EST MOD 30 MIN: CPT | Performed by: SURGERY

## 2025-05-07 PROCEDURE — G0378 HOSPITAL OBSERVATION PER HR: HCPCS

## 2025-05-07 PROCEDURE — 99222 1ST HOSP IP/OBS MODERATE 55: CPT | Performed by: STUDENT IN AN ORGANIZED HEALTH CARE EDUCATION/TRAINING PROGRAM

## 2025-05-07 PROCEDURE — 250N000013 HC RX MED GY IP 250 OP 250 PS 637: Performed by: INTERNAL MEDICINE

## 2025-05-07 PROCEDURE — 250N000013 HC RX MED GY IP 250 OP 250 PS 637: Performed by: HOSPITALIST

## 2025-05-07 PROCEDURE — 99223 1ST HOSP IP/OBS HIGH 75: CPT | Performed by: NURSE PRACTITIONER

## 2025-05-07 PROCEDURE — 250N000013 HC RX MED GY IP 250 OP 250 PS 637: Performed by: EMERGENCY MEDICINE

## 2025-05-07 RX ORDER — MIRABEGRON 25 MG/1
50 TABLET, FILM COATED, EXTENDED RELEASE ORAL DAILY
Status: DISCONTINUED | OUTPATIENT
Start: 2025-05-07 | End: 2025-05-07 | Stop reason: HOSPADM

## 2025-05-07 RX ORDER — MIRABEGRON 50 MG/1
50 TABLET, FILM COATED, EXTENDED RELEASE ORAL DAILY
Qty: 30 TABLET | Refills: 0 | Status: SHIPPED | OUTPATIENT
Start: 2025-05-07 | End: 2025-05-07

## 2025-05-07 RX ORDER — MIRABEGRON 50 MG/1
50 TABLET, FILM COATED, EXTENDED RELEASE ORAL DAILY
Qty: 30 TABLET | Refills: 0 | Status: SHIPPED | OUTPATIENT
Start: 2025-05-07

## 2025-05-07 RX ORDER — HYDROMORPHONE HYDROCHLORIDE 2 MG/1
2 TABLET ORAL EVERY 6 HOURS PRN
Qty: 10 TABLET | Refills: 0 | Status: SHIPPED | OUTPATIENT
Start: 2025-05-07

## 2025-05-07 RX ORDER — AMOXICILLIN 250 MG
2 CAPSULE ORAL DAILY
Status: DISCONTINUED | OUTPATIENT
Start: 2025-05-07 | End: 2025-05-07 | Stop reason: HOSPADM

## 2025-05-07 RX ADMIN — BUPROPION HYDROCHLORIDE 150 MG: 150 TABLET, EXTENDED RELEASE ORAL at 08:12

## 2025-05-07 RX ADMIN — AMLODIPINE BESYLATE 5 MG: 5 TABLET ORAL at 08:12

## 2025-05-07 RX ADMIN — OXYBUTYNIN CHLORIDE 5 MG: 5 TABLET ORAL at 14:01

## 2025-05-07 RX ADMIN — OXYBUTYNIN CHLORIDE 5 MG: 5 TABLET ORAL at 08:12

## 2025-05-07 RX ADMIN — SENNOSIDES AND DOCUSATE SODIUM 2 TABLET: 50; 8.6 TABLET ORAL at 14:01

## 2025-05-07 RX ADMIN — SERTRALINE HYDROCHLORIDE 50 MG: 50 TABLET ORAL at 08:12

## 2025-05-07 RX ADMIN — ACETAMINOPHEN 650 MG: 325 TABLET, FILM COATED ORAL at 10:56

## 2025-05-07 ASSESSMENT — ACTIVITIES OF DAILY LIVING (ADL)
ADLS_ACUITY_SCORE: 36

## 2025-05-07 NOTE — PROGRESS NOTES
[facility] ADMISSION NOTE    Patient admitted to room 1008   at approximately 1559 via cart from emergency room. Patient was accompanied by transport tech.     Verbal SBAR report received from [facility] prior to patient arrival.     Patient ambulated to bed with one assist. Patient alert and oriented X 3. Pain is controlled with current analgesics.  Medication(s) being used: acetaminophen and narcotic analgesics including hydromorphone (Dilaudid).  . Admission vital signs: Blood pressure (!) 175/93, pulse 71, temperature 98.3  F (36.8  C), temperature source Oral, resp. rate 20, weight 75.1 kg (165 lb 9.1 oz), SpO2 99%. Patient and spouse was oriented to plan of care, call light, bed controls, tv, telephone, bathroom, and visiting hours.     Risk Assessment    The following safety risks were identified during admission: fall. Yellow risk band applied: YES.     Skin Initial Assessment    This writer admitted this patient and completed a full skin assessment and Link score in the Adult PCS flowsheet.   Photo documentation of skin problem and/or wound competed via [employer] application (located under Media):  No    Appropriate interventions initiated as needed.     Secondary skin check completed by Pt declined removing t shirt and pants from under gown..    Link Risk Assessment  Sensory Perception: 4-->no impairment  Moisture: 4-->rarely moist  Activity: 3-->walks occasionally  Mobility: 3-->slightly limited  Nutrition: 2-->probably inadequate  Friction and Shear: 2-->potential problem  Link Score: 18  Nutrition Interventions: Encourage protein intake, Maintain adequate hydration  Friction/Shear Interventions: HOB 30 degrees or less  Mattress: Standard gel/foam mattress (IsoFlex, Atmos Air, etc.)  Bed Frame: Standard width and length    Education    Patient has a [place] to Observation order: Yes  Observation education completed and documented: Yes      Flor Atkins RN

## 2025-05-07 NOTE — PROGRESS NOTES
Brief note full to follow    70 yo male     Has history of radiation therapy for prostate cancer completed in 10/2023    He reports bothersome urinary frequency, urgency, urinary hesitancy, and weak stream since his radiation therapy. He reports nocturia x 2. He denies dysuria and gross hematuria.     Low bladder scans  Being treated for overactive bladder perhaps from radiation     I think we can try switching from oxybutynin 5 mg TID to the mirabegron 50 mg daily - I put in orders, need to watch for hypertension as side effect.    Otherwise I think he would benefit from outpatient pelvic floor physical therapy to help with bladder retraining and urge supression which I will put in referral for    Anna Dai MD

## 2025-05-07 NOTE — DISCHARGE SUMMARY
"[facility]  Hospitalist Discharge Summary       Date of Admission:  5/5/2025  Date of Discharge:  May 7, 2025  Discharging Provider: Ej Miles MD      Discharge Diagnoses     Abdominal pain, diffuse, chronic  Possible radiation proctitis  Radiation cystitis  BPH  Cirrhosis of liver, due to Hep C  Hep C infection in remission  Hypertension   Depression, major    Follow-ups Needed After Discharge   Follow-up Appointments       Hospital Follow-up with Existing Primary Care Provider (PCP)          Schedule Primary Care visit within: 7 Days             Discharge Disposition   Discharged to home    Hospital Course     Ghulam Rizzo is a 69 year old male with h/o IBS, cirrhosis without ascites due to hepatitis C, HepC in remission after treatment 2013, adenocarcinoma of the prostate s/p radiation therapy, radiation cystitis, radiation proctitis, hypertension, hyperlipidemia, tobacco use presented on 5/5/2025 due to weakness, unable to eat due to diffuse abdominal pain coming on over a year following radiation therapy to prostate.    Abdominal pain, diffuse, chronic  Possible radiation proctitis    C/o constant pain across the mid abdomen, ache without radiation. Feels it has been coming on since ending radiation to prostate October 2023. Gradually more bothersome. No known exacerbating factors. No change with bowel movements.  Now with decreased appetite and weight loss though has had some cycles of weight loss again over the last year or so.    Non-tender on exam, no abnormal labs. CT abdomen/pelvis without clear cause of pain.     Patient very frustrated by inability to figure out what is wrong. Also has trouble differentiating problems with bladder, bowel movements and abdominal pain. Has long h/o IBS. Told by oncology and PCP that his pain is likely due to radiation proctitis and has recently tried sucralfate enemas without improvement. He has not seen a GI provider because \"trying " "to get in takes months\"    Colonoscopy 1/10/2025: A few medium-sized patchy angiodysplastic lesions with typical       arborization were found in the rectum   - Gen surgery consult appreciated: no indication for surgical intervention   - Referral to Gastroenterology   - Palliative care consult appreciated; referral to Pain Management and Mental Health   - hydromorphone prn pain    Radiation cystitis  BPH    On trial of Vesicare but does not feel it is working. Most likely this is not the cause of his main abdominal pain (above).    - Urology consultation appreciated   - Continue Vesicare   - tamsulosin   - Start Mirabegron per Urology   - Referral for pelvic floor training    Cirrhosis of liver, due to Hep C  Hep C infection in remission    No ascites. Normal LFTs    Hypertension     Managed with amlodipine, continue    Depression, major    Continue bupropion and sertraline     Clinically Significant Risk Factors Present on Admission                # Hypertension: Noted on problem list                    Consultations This Hospital Stay Code Status Full Code    Physical Exam   Vital Signs: Temp: 98  F (36.7  C) Temp src: Oral BP: (!) 151/85 Pulse: 71   Resp: 18 SpO2: 96 % O2 Device: None (Room air)    Weight: 165 lbs 9.05 oz    Gen: Well nourished, well developed, resting comfortably in bed, flat affect  Head: atraumatic normocephalic; left sclera injected, anicterric; oral mucosa moist, no lesions, no exudates  Neck: supple without spinal abnormality  Chest: clear to auscultation bilaterally, no wheezes, no rhonchi, no rales.  Cardiovascular: regular rate and rhythm, no gallops or rubs, no murmurs, no edema  Abdomen: bowel sounds normal, no hepatosplenomegaly, no masses, mild BUQ tender, non-distended, no guarding, no rebound tenderness  Musculoskeletal: normal muscle mass, normal muscle tone  Skin: no rashes, no chronic venous stasis    40 MINUTES SPENT BY ME on the date of service doing chart review, history, " exam, documentation & further activities per the note.     Ej Miles MD  [facility]  ______________________________________________________________________    Primary Care Physician   Jamie Rutledge    Discharge Orders      Adult Mental Health  Referral      Pain Management  Referral      Adult GI  Referral - Consult Only      Reason for your hospital stay    This is a 69 year old male admitted with abdominal pain.     Activity    Your activity upon discharge: activity as tolerated     Full Code     Diet    Follow this diet upon discharge: Orders Placed This Encounter      Regular Diet Adult       Hospital Follow-up with Existing Primary Care Provider (PCP)            Significant Results and Procedures     Discharge Medications   Current Discharge Medication List        START taking these medications    Details   mirabegron (MYRBETRIQ) 50 MG 24 hr tablet Take 1 tablet (50 mg) by mouth daily.  Qty: 30 tablet, Refills: 0    Associated Diagnoses: Abdominal pain, generalized           CONTINUE these medications which have CHANGED    Details   HYDROmorphone (DILAUDID) 2 MG tablet Take 1 tablet (2 mg) by mouth every 6 hours as needed for pain.  Qty: 10 tablet, Refills: 0    Associated Diagnoses: S/P radiation therapy; Abdominal pain, generalized           CONTINUE these medications which have NOT CHANGED    Details   acetaminophen (TYLENOL) 500 MG tablet Take 1,000 mg by mouth every 6 hours as needed for mild pain      amLODIPine (NORVASC) 5 MG tablet Take 1 tablet (5 mg) by mouth daily.  Qty: 90 tablet, Refills: 3    Associated Diagnoses: Benign essential hypertension      buPROPion (WELLBUTRIN XL) 150 MG 24 hr tablet Take 1 tablet (150 mg) by mouth every morning.  Qty: 30 tablet, Refills: 0    Associated Diagnoses: Recurrent major depressive disorder, in remission      Calcium Carbonate (CALCIUM-CARB 600 PO) Take 2 tablets by mouth daily       cyanocobalamin (VITAMIN B-12) 1000 MCG tablet Take 1 tablet (1,000 mcg) by mouth daily  Qty: 90 tablet, Refills: 1    Associated Diagnoses: Neuropathy      glycerin (LAXATIVE) 1.2 g suppository Place 1 suppository rectally daily as needed (constipation.)  Qty: 25 suppository, Refills: 1    Associated Diagnoses: Constipation, unspecified constipation type      polyethylene glycol (MIRALAX) 17 GM/Dose powder Take 17 g (1 capful.) by mouth daily  Qty: 578 g, Refills: 1    Associated Diagnoses: Constipation, unspecified constipation type      senna-docusate (NEGRITO-COLACE) 8.6-50 MG per tablet Take 1-2 tablets by mouth 2 times daily as needed for constipation.  Qty: 100 tablet, Refills: 5    Associated Diagnoses: Chronic constipation      sertraline (ZOLOFT) 50 MG tablet Take 1 tablet by mouth daily  Qty: 90 tablet, Refills: 3    Associated Diagnoses: Recurrent major depressive disorder, in remission      solifenacin (VESICARE) 5 MG tablet Take 1 tablet (5 mg) by mouth daily.  Qty: 30 tablet, Refills: 3    Associated Diagnoses: Urinary urgency      tamsulosin (FLOMAX) 0.4 MG capsule Take 1 capsule (0.4 mg) by mouth every evening  Qty: 90 capsule, Refills: 3    Associated Diagnoses: Prostate cancer (H)      vitamin D3 (CHOLECALCIFEROL) 50 mcg (2000 units) tablet Take 1 tablet (50 mcg) by mouth daily  Qty: 90 tablet, Refills: 3    Associated Diagnoses: Closed compression fracture of L3 lumbar vertebra, with routine healing, subsequent encounter; Closed compression fracture of L4 lumbar vertebra, with routine healing, subsequent encounter           STOP taking these medications       HYDROcodone-acetaminophen (NORCO) 5-325 MG tablet Comments:   Reason for Stopping:             Allergies   Allergies   Allergen Reactions    Levaquin [Levofloxacin] Nausea and Vomiting    Oxycodone Dizziness    Percocet [Oxycodone-Acetaminophen] Visual Disturbance and Hallucination     Does not have any problems taking Tylenol/Acetaminophen.

## 2025-05-07 NOTE — PROGRESS NOTES
[facility] DISCHARGE NOTE    Patient discharged to home at 4:35 PM via wheel chair. Accompanied by spouse and staff. Discharge instructions reviewed with patient, opportunity offered to ask questions. Prescriptions sent to patients preferred pharmacy. All belongings sent with patient.    Rashmi Cabrera RN

## 2025-05-07 NOTE — CONSULTS
"Urology Consult History and Physical    Name: Ghulam Rizzo    MRN: [id number]   YOB: 1955       We were asked to see Ghulam Rizzo at the request of Dr. Winters for evaluation and treatment of the following chief complaint.    CC: bladder spams    HPI:   Ghulam Rizzo is a 69 year old male with  history of cirrhosis of the liver, cecal volvulus, HLD, HTN, and prostate cancer.     Has history of radiation therapy for prostate cancer completed in 10/2023    12/2023 had cystoscopy  There were a couple of neovascularizations in the bladder, but no papillary lesions.  There were no other mucosal lesions, stones or foreign objects noted in the bladder. The remainder of the exam was normal.     3/25/2025  He reports bothersome urinary frequency, urgency, urinary hesitancy, and weak stream since his radiation therapy. He reports nocturia x 2. He denies dysuria and gross hematuria.     Plan was to continue oxybutynin 10 mg XL and if it doesn't improve do B3 Agonist    Admitted last night  \"Is here for worsening abdominal pain. Says been having issues going back for several months but has just been getting worse and worse. Says he has been unable to get out of bed for the past 2 days. Has not eaten much of anything this past week. Reports 15 pound weight loss. Nausea but no vomiting. Has difficulty emptying his bladder. Says he is on tamsulosin. Feels that his bladder needs to get nearly completely full to the point where is causing discomfort in then can have some release of urine. Also has had issues with his bowels. \"    He reports a variety of issues including abdominal spasms, and urinary hesitancy. No hematuria or dysuria. He says his urinary symptoms are not improved on medication changes in last month.        Recent Labs   Lab 05/05/25  2154   COLOR Light Yellow   APPEARANCE Clear   URINEGLC Negative   URINEBILI Negative   URINEKETONE 60*   SG 1.010   URINEPH 7.0   PROTEIN 20*   NITRITE Negative "   LEUKEST Negative   RBCU 3*   WBCU <1     Results for orders placed or performed during the hospital encounter of 03/16/23   Urine Culture    Collection Time: 03/16/23  7:42 PM    Specimen: Urine, Clean Catch   Result Value Ref Range    Culture No Growth    Results for orders placed or performed in visit on 05/31/22   Urine Culture Aerobic Bacterial - lab collect    Collection Time: 05/31/22  3:30 PM    Specimen: Urine, Midstream   Result Value Ref Range    Culture <10,000 CFU/mL Urogenital jean        Imaging Reviewed  Reviewed CT scan and based in my interpretation bladder appears normal,          Past Medical History:     Past Medical History:   Diagnosis Date    Anisocoria     Asthma     Carpal tunnel syndrome     2006    Head injury, unspecified     closed head injury 4 yrs ago    Hepatitis C     Cured by [facility] in 2015. diagnosed in 2012 was positive for Hepatitis C.     Obesity     2006, resolved    Other convulsions     seizure disorder due to head injury 4 yrs ago.    Other mononeuritis of upper limb     L phrenic nerve paralysis    Unspecified essential hypertension             Past Surgical History:     Past Surgical History:   Procedure Laterality Date    COLECTOMY WITHOUT COLOSTOMY Right 7/13/2022    Procedure: Laparotomy, right colectomy, lysis of adhesions,;  Surgeon: Tommy Martino DO;  Location: [facility]    COLONOSCOPY      COLONOSCOPY N/A 7/18/2023    Procedure: Colonoscopy with polypectomy;  Surgeon: Octavio Aguirre MD;  Location: [facility]    COLONOSCOPY N/A 6/10/2024    Procedure: Colonoscopy;  Surgeon: Tommy Martino DO;  Location: [facility]    ESOPHAGOSCOPY, GASTROSCOPY, DUODENOSCOPY (EGD), COMBINED N/A 6/10/2024    Procedure: Esophagoscopy, gastroscopy, duodenoscopy (EGD), combined;  Surgeon: Tommy Martino DO;  Location: [facility]    GI SURGERY      HERNIA REPAIR      HERNIORRHAPHY INCISIONAL (LOCATION) N/A 7/13/2022    Procedure: primary incisional hernia repair;   Surgeon: Tommy Martino DO;  Location: [facility]    INSERT SEED MARKER / MATRIX N/A 8/30/2023    Procedure: Transrectal ultrasound guided placement of fiducials and SpaceOar;  Surgeon: Chase Blount MD;  Location: [facility]    SURGICAL HISTORY OF -   4/6/87    esophagogastroduodenoscopy    SURGICAL HISTORY OF -   4/27/87    exploratory laparotomy and anterior gastropexy over a gastrostomy tube.    SURGICAL HISTORY OF -   11/13/90    esophagogastroduodenoscopy    SURGICAL HISTORY OF -   1991    L diaphragm eventration repair, fixation of stomach and colon to abd wall    SURGICAL HISTORY OF -       hiatal hernia repair    THORACIC SURGERY              Medications:     Current Facility-Administered Medications   Medication Dose Route Frequency Provider Last Rate Last Admin    acetaminophen (TYLENOL) tablet 650 mg  650 mg Oral Q4H PRN Caroline Torres MD   650 mg at 05/06/25 2246    Or    acetaminophen (TYLENOL) Suppository 650 mg  650 mg Rectal Q4H PRN Caroline Torres MD        amLODIPine (NORVASC) tablet 5 mg  5 mg Oral Daily Alex Champion MD   5 mg at 05/07/25 0812    buPROPion (WELLBUTRIN XL) 24 hr tablet 150 mg  150 mg Oral Daily Alex Champion MD   150 mg at 05/07/25 0812    calcium carbonate (TUMS) chewable tablet 1,000 mg  1,000 mg Oral 4x Daily PRN Alex Champion MD        HYDROmorphone (DILAUDID) tablet 2 mg  2 mg Oral Q3H PRN Grzegorz Guardado MD        HYDROmorphone (PF) (DILAUDID) injection 0.3-0.5 mg  0.3-0.5 mg Intravenous Q2H PRN Alex Champion MD        LORazepam (ATIVAN) tablet 1 mg  1 mg Oral Q4H PRN Grzegorz Guardado MD   1 mg at 05/06/25 2246    naloxone (NARCAN) injection 0.2 mg  0.2 mg Intravenous Q2 Min PRN Alex Champion MD        Or    naloxone (NARCAN) injection 0.4 mg  0.4 mg Intravenous Q2 Min PRN Alex Champion MD        Or    naloxone (NARCAN) injection 0.2 mg  0.2 mg Intramuscular Q2 Min PRN Alex Champion MD        Or     naloxone (NARCAN) injection 0.4 mg  0.4 mg Intramuscular Q2 Min PRN Alex Champion MD        ondansetron (ZOFRAN ODT) ODT tab 4 mg  4 mg Oral Q6H PRN Alex Champion MD        Or    ondansetron (ZOFRAN) injection 4 mg  4 mg Intravenous Q6H PRN Alex Champion MD        ondansetron (ZOFRAN) injection 4 mg  4 mg Intravenous Q30 Min PRN Alex Champion MD        oxyBUTYnin (DITROPAN) tablet 5 mg  5 mg Oral TID Grzegorz Guardado MD   5 mg at 05/07/25 0812    sertraline (ZOLOFT) tablet 50 mg  50 mg Oral Daily Alex hCampion MD   50 mg at 05/07/25 0812    tamsulosin (FLOMAX) capsule 0.4 mg  0.4 mg Oral QPM Alex Champion MD   0.4 mg at 05/06/25 1838            Review of Systems:   CONSTITUTIONAL:  denies fevers, chills          Physical Exam:   VS:  T: 97.8    HR: 63    BP: 139/86    RR: 16   GEN:  NAD.  Abdomen is soft  Bladder Is non palpable         Impression and Plan:   Ghulam Rizzo is a 69 year old male with  history of cirrhosis of the liver, cecal volvulus, HLD, HTN, and prostate cancer s/p xrt    Bladder spasms - I suspect he has overactive bladder, based on his sx. His bladder scans are low. switching to mirabegron 50 mg (need to watch for hypertension) to see if better controls spasms compared to the vesicare he's on now. Also put in referral to pelvic floor PT to help with bladder retraining        Anna Dai MD

## 2025-05-07 NOTE — PLAN OF CARE
End Of Shift Note    Situation: Admitted Obs for abdominal pain     Plan: Referrals to be done today then potentially discharge    Subjective/Objective:    Neuro: A&O x4    Cardiac: VSS. Afebrile    Resp: Lung sounds diminished bilaterally. No cough noted    GI/: Patient requested tylenol for abd pain rating 7/10. Patient also received Ativan per Dr. Guardado to see if pain correlates with anxiety. Patient slept after lorazepam.    MSK: Independent in room. Stable on his feet    Skin: No concerns    LDAs: PIV x 1      Goal Outcome Evaluation:      Plan of Care Reviewed With: patient    Overall Patient Progress: improvingOverall Patient Progress: improving

## 2025-05-07 NOTE — CONSULTS
"Palliative Care Consultation      Patient ID/Chief Complaint: Ghulam Rizzo 69 year old male being seen for palliative care consultation at the request of hospitalist secondary to assistance with symptom management.   The patient's primary care provider is:  Jamie Rutledge       Impression & Recommendations:  69 year old male with h/o IBS, cirrhosis without ascites due to hepatitis C, HepC in remission after treatment 2013, adenocarcinoma of the prostate s/p radiation therapy, radiation cystitis, radiation proctitis, hypertension, hyperlipidemia, tobacco use presented on 5/5/2025 due to weakness, unable to eat due to diffuse abdominal pain coming on over a year following radiation therapy to prostate.     Of note, he has struggled with abdominal pain for 30+ years.  He has had multiple abdominal surgeries.  He feels pain is generalized throughout the abdomen and has worsened over the last 1-1/2 years following radiation.    CT ABDOMEN PELVIS W CONTRAST on 5/5/2025 with a few small bowel loops are mildly distended and could be an ileus. No other acute abnormality identified.    MR BRAIN on 4/20/2025 with few small enhancing calvarial lesions are nonspecific but could represent osseous metastases given patient's history of prostate cancer.    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 No vascular occlusion.    Urology and general surgery are consulted to see him this admission as well.    Symptoms/recommendations/discussion:  Chronic diffuse abdominal pain for greater than 30 years with reported worsening over the last 1-1/2 years.  Pain is often upper abdominal predominantly.  Most bothersome symptom is feeling of cramping pain.  He reports taking high amounts of laxatives but still having small stool output.  He reports 15 pound weight loss over the last 2 to 3 weeks.  He says pain can feel \"like when you are a kid running and your side starts to hurt " "really badly\" but more generalized.  General Surgery to consult  Consideration for GI outpatient consultation  Comprehensive pain evaluation outpatient order placed  Outpatient mental health  referral placed for possible medication management (on low doses of Wellbutrin XL and sertraline currently) and consideration for CBT/other therapy  Previously he reports that Linzess was beneficial but stopped taking more than a year ago due to cost issues  Consideration for Kevin  Reports issues with chronic dizziness, no longer drives  Upcoming ENT evaluation in July   Scheduled for follow-up with radiation oncology and new evaluation with medical oncology in May.       Thank you for the opportunity to participate in the care of this patient and family. Our team: will continue to follow.   ANGELA Garcia CNP  Securely message with [employer] (more info)  Text page via [employer] Paging/Directory     History:  History gathered today from: patient, medical chart, medical team members, unit team members    ROS:  10 point ROS is negative unless exceptions noted below    PE: /86 (BP Location: Left arm)   Pulse 63   Temp 97.8  F (36.6  C) (Oral)   Resp 16   Wt 75.1 kg (165 lb 9.1 oz)   SpO2 98%   BMI 21.26 kg/m     Wt Readings from Last 3 Encounters:   05/06/25 75.1 kg (165 lb 9.1 oz)   04/20/25 73.5 kg (162 lb)   04/18/25 76.3 kg (168 lb 3.4 oz)     Gen alert, comfortable appearing  Head NCAT.  Poor dentition  Eyes anicteric without injection  Face symmetric, eyes conjugate  Heart regular and rhythmic  Lungs unlabored, no cough, speaking full sentences  Skin no rashes or lesions evident on face/neck  Neuro Face symmetric, eyes conjugate; speech fluent.  Neuropsych not obviously anxious or depressed        Data reviewed:  I reviewed electrolytes, BUN/creatinine, liver profile, hemoglobin and hematocrit, platelet count, and most recent imaging  Extensive outpatient provider review, urology, general surgery, PCP, " previous hospitalization notes        75 minutes spent on the date of the encounter doing chart review, history and exam, patient education & counseling, documentation and other activities as noted above.        Thank you for involving us in the patient's care.   ANGELA Garcia, [facility] Palliative Care Service

## 2025-05-07 NOTE — CONSULTS
Surgical Consultation/History and Physical  [facility] General Surgery    Ghulam is seen in consultation for Abdominal pain    Chief Complaint:  Abdominal pain    History of Present Illness: Ghulam Rizzo is a 69 year old male presents with Abdominal pain.  Patient has been having escalating abdominal pain over last several months.  No exacerbating or alleviating factors.  He finds it difficult to articulate the pain he experiences.  States it is cramping in nature.  He has struggled with bowel movements for some time and is currently on a significant bowel regimen.  He denies blood with stool.  He also admits issues with urinating with hesitancy noted.  He did state his urinary symptoms improved following enema in hospital.  He is passing gas and did have a bowel movement. He was able to eat most of his lunch this afternoon.    Patient Active Problem List   Diagnosis    Hyperlipidemia LDL goal <130    Tobacco abuse    Abdominal pain, generalized    Cirrhosis of liver without ascites (H)    Proctitis    Other irritable bowel syndrome    Elevated prostate specific antigen (PSA)    Cecal volvulus (H)    Saccular aneurysm    Benign essential hypertension    Compression fracture of L4 vertebra (H)    Compression fracture of L3 lumbar vertebra, with routine healing, subsequent encounter    Adenocarcinoma of prostate (H)    Vertigo    Recurrent major depressive disorder, in remission    S/P radiation therapy    Hematochezia    PAC (premature atrial contraction)     Past Medical History:   Diagnosis Date    Anisocoria     Asthma     Carpal tunnel syndrome     2006    Head injury, unspecified     closed head injury 4 yrs ago    Hepatitis C     Cured by [facility] in 2015. diagnosed in 2012 was positive for Hepatitis C.     Obesity     2006, resolved    Other convulsions     seizure disorder due to head injury 4 yrs ago.    Other mononeuritis of upper limb     L phrenic nerve paralysis    Unspecified essential hypertension       Past Surgical History:   Procedure Laterality Date    COLECTOMY WITHOUT COLOSTOMY Right 7/13/2022    Procedure: Laparotomy, right colectomy, lysis of adhesions,;  Surgeon: Tommy Martino DO;  Location: [facility]    COLONOSCOPY      COLONOSCOPY N/A 7/18/2023    Procedure: Colonoscopy with polypectomy;  Surgeon: Octavio Aguirre MD;  Location: [facility]    COLONOSCOPY N/A 6/10/2024    Procedure: Colonoscopy;  Surgeon: Tommy Martino DO;  Location: [facility]    ESOPHAGOSCOPY, GASTROSCOPY, DUODENOSCOPY (EGD), COMBINED N/A 6/10/2024    Procedure: Esophagoscopy, gastroscopy, duodenoscopy (EGD), combined;  Surgeon: Tommy Martino DO;  Location: [facility]    GI SURGERY      HERNIA REPAIR      HERNIORRHAPHY INCISIONAL (LOCATION) N/A 7/13/2022    Procedure: primary incisional hernia repair;  Surgeon: Tommy Martino DO;  Location: [facility]    INSERT SEED MARKER / MATRIX N/A 8/30/2023    Procedure: Transrectal ultrasound guided placement of fiducials and SpaceOar;  Surgeon: Chase Blount MD;  Location:  OR    SURGICAL HISTORY OF -   4/6/87    esophagogastroduodenoscopy    SURGICAL HISTORY OF -   4/27/87    exploratory laparotomy and anterior gastropexy over a gastrostomy tube.    SURGICAL HISTORY OF -   11/13/90    esophagogastroduodenoscopy    SURGICAL HISTORY OF -   1991    L diaphragm eventration repair, fixation of stomach and colon to abd wall    SURGICAL HISTORY OF -       hiatal hernia repair    THORACIC SURGERY       Family History   Problem Relation Age of Onset    Cerebrovascular Disease Mother     Heart Disease Brother     Heart Disease Brother      Social History     Tobacco Use    Smoking status: Former     Current packs/day: 0.50     Average packs/day: 0.5 packs/day for 50.0 years (25.0 ttl pk-yrs)     Types: Cigarettes    Smokeless tobacco: Never   Substance Use Topics    Alcohol use: Yes     Comment: 1-2 beers daily      History   Drug Use No     No current  outpatient medications on file.     Allergies   Allergen Reactions    Levaquin [Levofloxacin] Nausea and Vomiting    Oxycodone Dizziness    Percocet [Oxycodone-Acetaminophen] Visual Disturbance and Hallucination     Does not have any problems taking Tylenol/Acetaminophen.     Review of Systems:   Admits dizziness, anxiety, depression    Physical Exam:  BP (!) 151/85 (BP Location: Left arm)   Pulse 71   Temp 98  F (36.7  C) (Oral)   Resp 18   Wt 75.1 kg (165 lb 9.1 oz)   SpO2 96%   BMI 21.26 kg/m      Constitutional- No acute distress, non-toxic  Eyes: Anicteric, no injection.  PERRL  ENT:  Normocephalic, atraumatic, Nose midline, moist mucus membranes  Neck - supple, no LAD, Thyroid smooth, symmetric, Carotids without bruits  Respiratory- Clear to auscultation bilaterally, good inspiratory effort  Cardiovascular - Heart RRR, no lift's, thrills, murmurs, rubs, or gallop.  No peripheral edema.  No clubbing.  Abdomen - Soft, non-tender, +BS, no hepatosplenomegaly, no palpable masses, well healed midline laparotomy scar  Groins - 2+ pulses bilaterally and no LAD, no masses  Rectal - good tone, no masses, guaiac negative; firm prostate  Neuro - No focal neuro deficits, Alert and oriented x 3  Psych: Depressed mood and anxious affect  Musculoskeletal: Normal gait, symmetric strength.  FROM upper and lower extremities.  Skin: Warm, Dry    Lab Results   Component Value Date    WBC 6.5 05/05/2025    WBC 6.1 06/28/2020     Lab Results   Component Value Date    RBC 4.73 05/05/2025    RBC 5.28 06/28/2020     Lab Results   Component Value Date    HGB 15.3 05/05/2025    HGB 17.3 06/28/2020     Lab Results   Component Value Date    HCT 42.7 05/05/2025    HCT 50.3 06/28/2020     Lab Results   Component Value Date    MCV 90 05/05/2025    MCV 95 06/28/2020     Lab Results   Component Value Date    MCH 32.3 05/05/2025    MCH 32.8 06/28/2020     Lab Results   Component Value Date    MCHC 35.8 05/05/2025    MCHC 34.4 06/28/2020      Lab Results   Component Value Date    RDW 12.1 05/05/2025    RDW 13.2 06/28/2020     Lab Results   Component Value Date     05/05/2025     06/28/2020     Last Comprehensive Metabolic Panel:  Sodium   Date Value Ref Range Status   05/05/2025 140 135 - 145 mmol/L Final   06/28/2020 138 133 - 144 mmol/L Final     Potassium   Date Value Ref Range Status   05/05/2025 4.0 3.4 - 5.3 mmol/L Final   07/15/2022 3.9 3.4 - 5.3 mmol/L Final   06/28/2020 4.0 3.4 - 5.3 mmol/L Final     Chloride   Date Value Ref Range Status   05/05/2025 104 98 - 107 mmol/L Final   07/15/2022 108 94 - 109 mmol/L Final   06/28/2020 107 94 - 109 mmol/L Final     Carbon Dioxide   Date Value Ref Range Status   06/28/2020 27 20 - 32 mmol/L Final     Carbon Dioxide (CO2)   Date Value Ref Range Status   05/05/2025 23 22 - 29 mmol/L Final   07/15/2022 22 20 - 32 mmol/L Final     Anion Gap   Date Value Ref Range Status   05/05/2025 13 7 - 15 mmol/L Final   07/15/2022 8 3 - 14 mmol/L Final   06/28/2020 4 3 - 14 mmol/L Final     Glucose   Date Value Ref Range Status   05/05/2025 102 (H) 70 - 99 mg/dL Final   07/15/2022 85 70 - 99 mg/dL Final   06/28/2020 144 (H) 70 - 99 mg/dL Final     GLUCOSE BY METER POCT   Date Value Ref Range Status   04/20/2025 114 (H) 70 - 99 mg/dL Final     Urea Nitrogen   Date Value Ref Range Status   05/05/2025 21.4 8.0 - 23.0 mg/dL Final   07/15/2022 15 7 - 30 mg/dL Final   06/28/2020 13 7 - 30 mg/dL Final     Creatinine   Date Value Ref Range Status   05/05/2025 0.75 0.67 - 1.17 mg/dL Final   06/28/2020 0.72 0.66 - 1.25 mg/dL Final     GFR Estimate   Date Value Ref Range Status   05/05/2025 >90 >60 mL/min/1.73m2 Final     Comment:     eGFR calculated using 2021 CKD-EPI equation.   06/28/2020 >90 >60 mL/min/[1.73_m2] Final     Comment:     Non  GFR Calc  Starting 12/18/2018, serum creatinine based estimated GFR (eGFR) will be   calculated using the Chronic Kidney Disease Epidemiology Collaboration    (CKD-EPI) equation.       Calcium   Date Value Ref Range Status   05/05/2025 9.9 8.8 - 10.4 mg/dL Final   06/28/2020 8.6 8.5 - 10.1 mg/dL Final     Bilirubin Total   Date Value Ref Range Status   05/05/2025 0.9 <=1.2 mg/dL Final   06/28/2020 0.7 0.2 - 1.3 mg/dL Final     Alkaline Phosphatase   Date Value Ref Range Status   05/05/2025 75 40 - 150 U/L Final   06/28/2020 76 40 - 150 U/L Final     ALT   Date Value Ref Range Status   05/05/2025 10 0 - 70 U/L Final   06/28/2020 27 0 - 70 U/L Final     AST   Date Value Ref Range Status   05/05/2025 11 0 - 45 U/L Final   06/28/2020 14 0 - 45 U/L Final     EXAM: CT ABDOMEN PELVIS W CONTRAST  LOCATION: [facility]  DATE: 5/5/2025     INDICATION: Progressive lower abdominal pain, early satiety, weight loss, known adenocarcinoma of prostate status post radiotherapy in 20'23. Radiation cystitis colitis.  COMPARISON: None.  TECHNIQUE: CT scan of the abdomen and pelvis was performed following injection of IV contrast. Multiplanar reformats were obtained. Dose reduction techniques were used.  CONTRAST: 79 mL Isovue 370     FINDINGS:   LOWER CHEST: Stable elevated left hemidiaphragm.     HEPATOBILIARY: No acute hepatic abnormality. Hepatic cysts appear stable. Small hypodensities are too small for characterization as well and are stable. A few calcifications noted. Gallbladder shows no calcified gallstones.     PANCREAS: Normal.     SPLEEN: Calcified granulomas.     ADRENAL GLANDS: Normal.     KIDNEYS/BLADDER: No significant mass, stones, or hydronephrosis. There are simple or benign cysts. No follow up is needed. Bladder unremarkable.     BOWEL: A few small bowel loops are mildly distended. No acute inflammatory change of the bowel identified. No convincing complete bowel obstruction identified. No free fluid or free air.     LYMPH NODES: No enlarged lymph nodes can be seen.     VASCULATURE: Mild calcifications.     PELVIC ORGANS: Prostate is 4.5 cm.  "Some mild edema within the fat planes of the low pelvis.     MUSCULOSKELETAL: Stable height loss at L3. Spine degenerative changes. No visible focal bone lesion can be seen.                                                                      IMPRESSION:   1.  A few small bowel loops are mildly distended and could be an ileus.  2.  No other acute abnormality identified.    Assessment:  Abdominal pain    Plan:   Mr. Rizzo is a 70 yo man with history of radiation for prostate cancer who presents with ongoing abdominal pain.  His pain pattern, distribution, and lack of exacerbating/alleviating factors makes diagnosis difficult.  I reviewed his CT personally in addition to his lab work which is unremarkable.  I have low suspicion of obstruction or ileus based on his CT scan and bowel function while in the hospital.  He was able to tolerate lunch while in the hospital without issue.  I do not have a surgically correctable cause for his discomfort at this time.  I recommend GI assessment for his known IBS and potential SIBO work-up.  Patient has had several different GI illnesses including Norovirus and C diff during this course of pain/discomfort.     During our discussion, patient was quite tangential and perseverated on several ongoing issues including his dizziness, bladder issues, \"scalp cancer\" (reviewed his MRI which shows potential metastatic prostate cancer given sclerotic lesions.  He expressed concern of not having a plan going forward and not \"knowing my maxine.\"  Discussion with nurse, she notes wife seems overwhelmed with his ongoing symptoms and coordination with numerous specialists.  Palliative care and Urology have both weighed in as well.      Discussed case with Dr. Miles.   Discussed patient needs significant assistance in care coordination.      Tommy Martino, DO on 5/7/2025 at 2:30 PM    "

## 2025-05-08 ENCOUNTER — PATIENT OUTREACH (OUTPATIENT)
Dept: CARE COORDINATION | Facility: CLINIC | Age: 70
End: 2025-05-08
Payer: COMMERCIAL

## 2025-05-09 ENCOUNTER — HOSPITAL ENCOUNTER (INPATIENT)
Facility: HOSPITAL | Age: 70
LOS: 5 days | Discharge: HOME | End: 2025-05-15
Attending: EMERGENCY MEDICINE | Admitting: HOSPITALIST
Payer: COMMERCIAL

## 2025-05-09 ENCOUNTER — APPOINTMENT (OUTPATIENT)
Dept: CT IMAGING | Facility: HOSPITAL | Age: 70
End: 2025-05-09
Attending: EMERGENCY MEDICINE
Payer: COMMERCIAL

## 2025-05-09 ENCOUNTER — APPOINTMENT (OUTPATIENT)
Dept: CT IMAGING | Facility: HOSPITAL | Age: 70
DRG: 393 | End: 2025-05-09
Attending: EMERGENCY MEDICINE
Payer: COMMERCIAL

## 2025-05-09 DIAGNOSIS — I95.1 ORTHOSTATIC HYPOTENSION: ICD-10-CM

## 2025-05-09 DIAGNOSIS — K58.8 OTHER IRRITABLE BOWEL SYNDROME: Primary | ICD-10-CM

## 2025-05-09 DIAGNOSIS — Z71.89 OTHER SPECIFIED COUNSELING: Chronic | ICD-10-CM

## 2025-05-09 DIAGNOSIS — G89.29 CHRONIC ABDOMINAL PAIN: ICD-10-CM

## 2025-05-09 DIAGNOSIS — R10.9 CHRONIC ABDOMINAL PAIN: ICD-10-CM

## 2025-05-09 DIAGNOSIS — F41.9 ANXIETY: ICD-10-CM

## 2025-05-09 DIAGNOSIS — K59.09 CHRONIC CONSTIPATION: ICD-10-CM

## 2025-05-09 DIAGNOSIS — R42 DIZZINESS: ICD-10-CM

## 2025-05-09 DIAGNOSIS — K74.60 CIRRHOSIS OF LIVER WITHOUT ASCITES, UNSPECIFIED HEPATIC CIRRHOSIS TYPE (H): ICD-10-CM

## 2025-05-09 LAB
ALBUMIN SERPL BCG-MCNC: 3.7 G/DL (ref 3.5–5.2)
ALBUMIN UR-MCNC: NEGATIVE MG/DL
ALP SERPL-CCNC: 63 U/L (ref 40–150)
ALT SERPL W P-5'-P-CCNC: 7 U/L (ref 0–70)
ANION GAP SERPL CALCULATED.3IONS-SCNC: 16 MMOL/L (ref 7–15)
APPEARANCE UR: CLEAR
AST SERPL W P-5'-P-CCNC: 8 U/L (ref 0–45)
BASOPHILS # BLD AUTO: 0 10E3/UL (ref 0–0.2)
BASOPHILS NFR BLD AUTO: 1 %
BILIRUB DIRECT SERPL-MCNC: 0.27 MG/DL (ref 0–0.3)
BILIRUB SERPL-MCNC: 1 MG/DL
BILIRUB UR QL STRIP: NEGATIVE
BUN SERPL-MCNC: 11.2 MG/DL (ref 8–23)
CALCIUM SERPL-MCNC: 9.2 MG/DL (ref 8.8–10.4)
CHLORIDE SERPL-SCNC: 104 MMOL/L (ref 98–107)
COLOR UR AUTO: ABNORMAL
CREAT SERPL-MCNC: 0.7 MG/DL (ref 0.67–1.17)
EGFRCR SERPLBLD CKD-EPI 2021: >90 ML/MIN/1.73M2
EOSINOPHIL # BLD AUTO: 0 10E3/UL (ref 0–0.7)
EOSINOPHIL NFR BLD AUTO: 1 %
ERYTHROCYTE [DISTWIDTH] IN BLOOD BY AUTOMATED COUNT: 11.9 % (ref 10–15)
GLUCOSE SERPL-MCNC: 104 MG/DL (ref 70–99)
GLUCOSE UR STRIP-MCNC: NEGATIVE MG/DL
HCO3 SERPL-SCNC: 18 MMOL/L (ref 22–29)
HCT VFR BLD AUTO: 36.5 % (ref 40–53)
HGB BLD-MCNC: 13.6 G/DL (ref 13.3–17.7)
HGB UR QL STRIP: NEGATIVE
IMM GRANULOCYTES # BLD: 0 10E3/UL
IMM GRANULOCYTES NFR BLD: 0 %
KETONES UR STRIP-MCNC: 20 MG/DL
LACTATE SERPL-SCNC: 1.8 MMOL/L (ref 0.7–2)
LACTATE SERPL-SCNC: 2.7 MMOL/L (ref 0.7–2)
LEUKOCYTE ESTERASE UR QL STRIP: NEGATIVE
LYMPHOCYTES # BLD AUTO: 1.5 10E3/UL (ref 0.8–5.3)
LYMPHOCYTES NFR BLD AUTO: 21 %
MCH RBC QN AUTO: 32.1 PG (ref 26.5–33)
MCHC RBC AUTO-ENTMCNC: 37.3 G/DL (ref 31.5–36.5)
MCV RBC AUTO: 86 FL (ref 78–100)
MONOCYTES # BLD AUTO: 0.5 10E3/UL (ref 0–1.3)
MONOCYTES NFR BLD AUTO: 8 %
MUCOUS THREADS #/AREA URNS LPF: PRESENT /LPF
NEUTROPHILS # BLD AUTO: 4.7 10E3/UL (ref 1.6–8.3)
NEUTROPHILS NFR BLD AUTO: 69 %
NITRATE UR QL: NEGATIVE
NRBC # BLD AUTO: 0 10E3/UL
NRBC BLD AUTO-RTO: 0 /100
PH UR STRIP: 7.5 [PH] (ref 5–7)
PLATELET # BLD AUTO: 132 10E3/UL (ref 150–450)
POTASSIUM SERPL-SCNC: 3.7 MMOL/L (ref 3.4–5.3)
PROCALCITONIN SERPL IA-MCNC: 0.04 NG/ML
PROT SERPL-MCNC: 5.8 G/DL (ref 6.4–8.3)
RBC # BLD AUTO: 4.24 10E6/UL (ref 4.4–5.9)
RBC URINE: 1 /HPF
SODIUM SERPL-SCNC: 138 MMOL/L (ref 135–145)
SP GR UR STRIP: 1.01 (ref 1–1.03)
UROBILINOGEN UR STRIP-MCNC: NORMAL MG/DL
WBC # BLD AUTO: 6.8 10E3/UL (ref 4–11)
WBC URINE: 1 /HPF

## 2025-05-09 PROCEDURE — 84484 ASSAY OF TROPONIN QUANT: CPT | Performed by: EMERGENCY MEDICINE

## 2025-05-09 PROCEDURE — 74177 CT ABD & PELVIS W/CONTRAST: CPT

## 2025-05-09 PROCEDURE — 80053 COMPREHEN METABOLIC PANEL: CPT | Performed by: EMERGENCY MEDICINE

## 2025-05-09 PROCEDURE — 96374 THER/PROPH/DIAG INJ IV PUSH: CPT | Mod: 59

## 2025-05-09 PROCEDURE — 250N000011 HC RX IP 250 OP 636: Performed by: EMERGENCY MEDICINE

## 2025-05-09 PROCEDURE — 81001 URINALYSIS AUTO W/SCOPE: CPT | Performed by: EMERGENCY MEDICINE

## 2025-05-09 PROCEDURE — 84145 PROCALCITONIN (PCT): CPT | Performed by: EMERGENCY MEDICINE

## 2025-05-09 PROCEDURE — 80048 BASIC METABOLIC PNL TOTAL CA: CPT | Performed by: EMERGENCY MEDICINE

## 2025-05-09 PROCEDURE — 36415 COLL VENOUS BLD VENIPUNCTURE: CPT | Performed by: EMERGENCY MEDICINE

## 2025-05-09 PROCEDURE — 258N000003 HC RX IP 258 OP 636: Performed by: EMERGENCY MEDICINE

## 2025-05-09 PROCEDURE — 250N000011 HC RX IP 250 OP 636: Mod: JZ | Performed by: EMERGENCY MEDICINE

## 2025-05-09 PROCEDURE — 83605 ASSAY OF LACTIC ACID: CPT | Performed by: EMERGENCY MEDICINE

## 2025-05-09 PROCEDURE — 96361 HYDRATE IV INFUSION ADD-ON: CPT

## 2025-05-09 PROCEDURE — 250N000013 HC RX MED GY IP 250 OP 250 PS 637: Performed by: EMERGENCY MEDICINE

## 2025-05-09 PROCEDURE — 85025 COMPLETE CBC W/AUTO DIFF WBC: CPT | Performed by: EMERGENCY MEDICINE

## 2025-05-09 PROCEDURE — 85004 AUTOMATED DIFF WBC COUNT: CPT | Performed by: EMERGENCY MEDICINE

## 2025-05-09 PROCEDURE — 82248 BILIRUBIN DIRECT: CPT | Performed by: EMERGENCY MEDICINE

## 2025-05-09 PROCEDURE — 99285 EMERGENCY DEPT VISIT HI MDM: CPT | Mod: 25

## 2025-05-09 PROCEDURE — 71275 CT ANGIOGRAPHY CHEST: CPT

## 2025-05-09 RX ORDER — IOPAMIDOL 755 MG/ML
81 INJECTION, SOLUTION INTRAVASCULAR ONCE
Status: COMPLETED | OUTPATIENT
Start: 2025-05-09 | End: 2025-05-09

## 2025-05-09 RX ORDER — LORAZEPAM 0.5 MG/1
1 TABLET ORAL ONCE
Status: COMPLETED | OUTPATIENT
Start: 2025-05-09 | End: 2025-05-09

## 2025-05-09 RX ORDER — MORPHINE SULFATE 4 MG/ML
4 INJECTION, SOLUTION INTRAMUSCULAR; INTRAVENOUS ONCE
Refills: 0 | Status: COMPLETED | OUTPATIENT
Start: 2025-05-09 | End: 2025-05-09

## 2025-05-09 RX ADMIN — SODIUM CHLORIDE 1000 ML: 9 INJECTION, SOLUTION INTRAVENOUS at 21:43

## 2025-05-09 RX ADMIN — IOPAMIDOL 81 ML: 755 INJECTION, SOLUTION INTRAVENOUS at 23:10

## 2025-05-09 RX ADMIN — LORAZEPAM 1 MG: 0.5 TABLET ORAL at 21:43

## 2025-05-09 RX ADMIN — MORPHINE SULFATE 4 MG: 4 INJECTION, SOLUTION INTRAMUSCULAR; INTRAVENOUS at 21:43

## 2025-05-09 ASSESSMENT — ACTIVITIES OF DAILY LIVING (ADL)
ADLS_ACUITY_SCORE: 57

## 2025-05-10 PROBLEM — F41.9 ANXIETY: Status: ACTIVE | Noted: 2025-05-10

## 2025-05-10 PROBLEM — R10.9 CHRONIC ABDOMINAL PAIN: Status: ACTIVE | Noted: 2025-05-10

## 2025-05-10 PROBLEM — G89.29 CHRONIC ABDOMINAL PAIN: Status: ACTIVE | Noted: 2025-05-10

## 2025-05-10 PROBLEM — R42 DIZZINESS: Status: ACTIVE | Noted: 2025-05-10

## 2025-05-10 LAB
ALBUMIN SERPL BCG-MCNC: 3.9 G/DL (ref 3.5–5.2)
ALP SERPL-CCNC: 65 U/L (ref 40–150)
ALT SERPL W P-5'-P-CCNC: 9 U/L (ref 0–70)
ANION GAP SERPL CALCULATED.3IONS-SCNC: 6 MMOL/L (ref 7–15)
AST SERPL W P-5'-P-CCNC: 10 U/L (ref 0–45)
ATRIAL RATE - MUSE: 76 BPM
BILIRUB SERPL-MCNC: 0.8 MG/DL
BUN SERPL-MCNC: 8.8 MG/DL (ref 8–23)
CALCIUM SERPL-MCNC: 8.4 MG/DL (ref 8.8–10.4)
CHLORIDE SERPL-SCNC: 107 MMOL/L (ref 98–107)
CREAT SERPL-MCNC: 0.74 MG/DL (ref 0.67–1.17)
DIASTOLIC BLOOD PRESSURE - MUSE: NORMAL MMHG
EGFRCR SERPLBLD CKD-EPI 2021: >90 ML/MIN/1.73M2
ERYTHROCYTE [DISTWIDTH] IN BLOOD BY AUTOMATED COUNT: 12.2 % (ref 10–15)
GLUCOSE SERPL-MCNC: 92 MG/DL (ref 70–99)
HCO3 SERPL-SCNC: 26 MMOL/L (ref 22–29)
HCT VFR BLD AUTO: 35.8 % (ref 40–53)
HGB BLD-MCNC: 12.9 G/DL (ref 13.3–17.7)
INR PPP: 1.15 (ref 0.85–1.15)
INTERPRETATION ECG - MUSE: NORMAL
MCH RBC QN AUTO: 32 PG (ref 26.5–33)
MCHC RBC AUTO-ENTMCNC: 36 G/DL (ref 31.5–36.5)
MCV RBC AUTO: 89 FL (ref 78–100)
NT-PROBNP SERPL-MCNC: 330 PG/ML
P AXIS - MUSE: 60 DEGREES
PLATELET # BLD AUTO: 128 10E3/UL (ref 150–450)
POTASSIUM SERPL-SCNC: 3.7 MMOL/L (ref 3.4–5.3)
PR INTERVAL - MUSE: 168 MS
PROT SERPL-MCNC: 6 G/DL (ref 6.4–8.3)
PROTHROMBIN TIME: 15 SECONDS (ref 11.8–14.8)
QRS DURATION - MUSE: 86 MS
QT - MUSE: 442 MS
QTC - MUSE: 497 MS
R AXIS - MUSE: 77 DEGREES
RBC # BLD AUTO: 4.03 10E6/UL (ref 4.4–5.9)
SODIUM SERPL-SCNC: 139 MMOL/L (ref 135–145)
SYSTOLIC BLOOD PRESSURE - MUSE: NORMAL MMHG
T AXIS - MUSE: -65 DEGREES
TROPONIN T SERPL HS-MCNC: 16 NG/L
TROPONIN T SERPL HS-MCNC: 18 NG/L
TROPONIN T SERPL HS-MCNC: 19 NG/L
VENTRICULAR RATE- MUSE: 76 BPM
WBC # BLD AUTO: 5.8 10E3/UL (ref 4–11)

## 2025-05-10 PROCEDURE — 84484 ASSAY OF TROPONIN QUANT: CPT | Performed by: EMERGENCY MEDICINE

## 2025-05-10 PROCEDURE — 85048 AUTOMATED LEUKOCYTE COUNT: CPT | Performed by: INTERNAL MEDICINE

## 2025-05-10 PROCEDURE — 93010 ELECTROCARDIOGRAM REPORT: CPT | Mod: RTG | Performed by: STUDENT IN AN ORGANIZED HEALTH CARE EDUCATION/TRAINING PROGRAM

## 2025-05-10 PROCEDURE — 85018 HEMOGLOBIN: CPT | Performed by: INTERNAL MEDICINE

## 2025-05-10 PROCEDURE — 120N000001 HC R&B MED SURG/OB

## 2025-05-10 PROCEDURE — 99418 PROLNG IP/OBS E/M EA 15 MIN: CPT | Performed by: INTERNAL MEDICINE

## 2025-05-10 PROCEDURE — 99207 PR APP CREDIT; MD BILLING SHARED VISIT: CPT | Performed by: HOSPITALIST

## 2025-05-10 PROCEDURE — 250N000013 HC RX MED GY IP 250 OP 250 PS 637: Performed by: INTERNAL MEDICINE

## 2025-05-10 PROCEDURE — 84484 ASSAY OF TROPONIN QUANT: CPT | Performed by: INTERNAL MEDICINE

## 2025-05-10 PROCEDURE — 250N000013 HC RX MED GY IP 250 OP 250 PS 637: Performed by: HOSPITALIST

## 2025-05-10 PROCEDURE — 85610 PROTHROMBIN TIME: CPT | Performed by: INTERNAL MEDICINE

## 2025-05-10 PROCEDURE — 83880 ASSAY OF NATRIURETIC PEPTIDE: CPT | Performed by: HOSPITALIST

## 2025-05-10 PROCEDURE — 99223 1ST HOSP IP/OBS HIGH 75: CPT | Performed by: INTERNAL MEDICINE

## 2025-05-10 PROCEDURE — 80048 BASIC METABOLIC PNL TOTAL CA: CPT | Performed by: INTERNAL MEDICINE

## 2025-05-10 PROCEDURE — 36415 COLL VENOUS BLD VENIPUNCTURE: CPT | Performed by: INTERNAL MEDICINE

## 2025-05-10 PROCEDURE — 250N000013 HC RX MED GY IP 250 OP 250 PS 637: Performed by: EMERGENCY MEDICINE

## 2025-05-10 PROCEDURE — 36415 COLL VENOUS BLD VENIPUNCTURE: CPT | Performed by: EMERGENCY MEDICINE

## 2025-05-10 RX ORDER — ACETAMINOPHEN 325 MG/1
325 TABLET ORAL EVERY 6 HOURS PRN
Status: DISCONTINUED | OUTPATIENT
Start: 2025-05-10 | End: 2025-05-15 | Stop reason: HOSPADM

## 2025-05-10 RX ORDER — BUPROPION HYDROCHLORIDE 150 MG/1
150 TABLET ORAL EVERY MORNING
Status: DISCONTINUED | OUTPATIENT
Start: 2025-05-10 | End: 2025-05-15 | Stop reason: HOSPADM

## 2025-05-10 RX ORDER — TOLTERODINE 2 MG/1
2 CAPSULE, EXTENDED RELEASE ORAL DAILY
Status: DISCONTINUED | OUTPATIENT
Start: 2025-05-10 | End: 2025-05-15 | Stop reason: HOSPADM

## 2025-05-10 RX ORDER — AMOXICILLIN 250 MG
2 CAPSULE ORAL 2 TIMES DAILY PRN
Status: DISCONTINUED | OUTPATIENT
Start: 2025-05-10 | End: 2025-05-14

## 2025-05-10 RX ORDER — AMLODIPINE BESYLATE 5 MG/1
5 TABLET ORAL DAILY
Status: DISCONTINUED | OUTPATIENT
Start: 2025-05-10 | End: 2025-05-14

## 2025-05-10 RX ORDER — AMOXICILLIN 250 MG
1 CAPSULE ORAL 2 TIMES DAILY PRN
Status: DISCONTINUED | OUTPATIENT
Start: 2025-05-10 | End: 2025-05-14

## 2025-05-10 RX ORDER — HYDROMORPHONE HYDROCHLORIDE 2 MG/1
2 TABLET ORAL EVERY 6 HOURS PRN
Status: DISCONTINUED | OUTPATIENT
Start: 2025-05-10 | End: 2025-05-15 | Stop reason: HOSPADM

## 2025-05-10 RX ORDER — TAMSULOSIN HYDROCHLORIDE 0.4 MG/1
0.4 CAPSULE ORAL EVERY EVENING
Status: DISCONTINUED | OUTPATIENT
Start: 2025-05-10 | End: 2025-05-15 | Stop reason: HOSPADM

## 2025-05-10 RX ORDER — DICYCLOMINE HCL 20 MG
20 TABLET ORAL
Status: DISCONTINUED | OUTPATIENT
Start: 2025-05-10 | End: 2025-05-15 | Stop reason: HOSPADM

## 2025-05-10 RX ORDER — HYDROMORPHONE HYDROCHLORIDE 2 MG/1
2 TABLET ORAL ONCE
Refills: 0 | Status: COMPLETED | OUTPATIENT
Start: 2025-05-10 | End: 2025-05-10

## 2025-05-10 RX ORDER — ONDANSETRON 2 MG/ML
4 INJECTION INTRAMUSCULAR; INTRAVENOUS EVERY 6 HOURS PRN
Status: DISCONTINUED | OUTPATIENT
Start: 2025-05-10 | End: 2025-05-15 | Stop reason: HOSPADM

## 2025-05-10 RX ORDER — ONDANSETRON 4 MG/1
4 TABLET, ORALLY DISINTEGRATING ORAL EVERY 6 HOURS PRN
Status: DISCONTINUED | OUTPATIENT
Start: 2025-05-10 | End: 2025-05-15 | Stop reason: HOSPADM

## 2025-05-10 RX ORDER — PROCHLORPERAZINE MALEATE 5 MG/1
5 TABLET ORAL EVERY 6 HOURS PRN
Status: DISCONTINUED | OUTPATIENT
Start: 2025-05-10 | End: 2025-05-15 | Stop reason: HOSPADM

## 2025-05-10 RX ADMIN — SERTRALINE HYDROCHLORIDE 50 MG: 50 TABLET ORAL at 09:12

## 2025-05-10 RX ADMIN — BUPROPION HYDROCHLORIDE 150 MG: 150 TABLET, FILM COATED, EXTENDED RELEASE ORAL at 09:12

## 2025-05-10 RX ADMIN — SENNOSIDES AND DOCUSATE SODIUM 2 TABLET: 50; 8.6 TABLET ORAL at 21:25

## 2025-05-10 RX ADMIN — DICYCLOMINE HYDROCHLORIDE 20 MG: 20 TABLET ORAL at 16:59

## 2025-05-10 RX ADMIN — AMLODIPINE BESYLATE 5 MG: 5 TABLET ORAL at 09:12

## 2025-05-10 RX ADMIN — TOLTERODINE 2 MG: 2 CAPSULE, EXTENDED RELEASE ORAL at 09:13

## 2025-05-10 RX ADMIN — HYDROMORPHONE HYDROCHLORIDE 2 MG: 2 TABLET ORAL at 00:56

## 2025-05-10 RX ADMIN — ACETAMINOPHEN 325 MG: 325 TABLET ORAL at 12:14

## 2025-05-10 RX ADMIN — DICYCLOMINE HYDROCHLORIDE 20 MG: 20 TABLET ORAL at 21:25

## 2025-05-10 RX ADMIN — DICYCLOMINE HYDROCHLORIDE 20 MG: 20 TABLET ORAL at 13:02

## 2025-05-10 RX ADMIN — HYDROMORPHONE HYDROCHLORIDE 2 MG: 2 TABLET ORAL at 21:25

## 2025-05-10 RX ADMIN — TAMSULOSIN HYDROCHLORIDE 0.4 MG: 0.4 CAPSULE ORAL at 20:22

## 2025-05-10 RX ADMIN — ACETAMINOPHEN 325 MG: 325 TABLET ORAL at 05:51

## 2025-05-10 ASSESSMENT — ACTIVITIES OF DAILY LIVING (ADL)
ADLS_ACUITY_SCORE: 37
ADLS_ACUITY_SCORE: 36
ADLS_ACUITY_SCORE: 57
ADLS_ACUITY_SCORE: 57
ADLS_ACUITY_SCORE: 36
ADLS_ACUITY_SCORE: 57
ADLS_ACUITY_SCORE: 36
ADLS_ACUITY_SCORE: 37
ADLS_ACUITY_SCORE: 36
ADLS_ACUITY_SCORE: 36
ADLS_ACUITY_SCORE: 37
ADLS_ACUITY_SCORE: 36
ADLS_ACUITY_SCORE: 57
ADLS_ACUITY_SCORE: 36
ADLS_ACUITY_SCORE: 57
ADLS_ACUITY_SCORE: 36
DEPENDENT_IADLS:: TRANSPORTATION

## 2025-05-10 NOTE — ED PROVIDER NOTES
"EMERGENCY DEPARTMENT NOTE     Name: Ghulam Rizzo    Age/Sex: 69 year old male   MRN: [id number]   Evaluation Date & Time:  5/9/2025  8:06 PM    PCP:    Jamie Rutledge   ED Provider: Ghulam Sneed D.O.       CHIEF COMPLAINT    Shortness of Breath     HISTORY OF PRESENT ILLNESS BRIEF   Ghulam Rizzo is a 69 year old year old male with a relevant past history of h/o IBS, cirrhosis without ascites due to hepatitis C, HepC in remission after treatment 2013, adenocarcinoma of the prostate s/p radiation therapy, radiation cystitis, radiation proctitis, hypertension, hyperlipidemia, tobacco use who presents to the ED for evaluation of shortness of breath.      DIAGNOSIS & DISPOSITION/MEDICAL DECISION MAKING     1. Chronic abdominal pain    2. Dizziness    3. Anxiety        EMERGENCY DEPARTMENT COURSE   9:17 PM I met with the patient to gather history and to perform my initial exam.  We discussed treatment options and the plan for care while in the Emergency Department.  12:21 AM I spoke with the hospitalist, Dr. Ordoñez. We discussed the patient's case and they agree to admit the patient.     Triage vital signs:BP (!) 179/84   Pulse 83   Temp 98.5  F (36.9  C)   Resp 22   Ht 1.88 m (6' 2\")   Wt 74.8 kg (165 lb)   SpO2 95%   BMI 21.18 kg/m     Differential diagnosis considered included but not limited to: Cardiopulmonary process including ACS or pulmonary embolism, intra-abdominal process including bowel obstruction or perforation, bowel ischemia, urinary retention or urinary tract infection    MDM: Patient on exam and normal cardiopulmonary exam.  Abdomen with mild diffuse tenderness with predominance of tenderness in the lower abdomen without guarding or peritoneal signs.  Psychiatrically the patient was anxious  Diagnostic studies:  CT of the head: No acute process  CT abdomen and pelvis: No bowel obstruction perforation or acute process, nonspecific signs of possible enteritis  Bladder scan: Less than 100 mL " "without evidence of urinary retention  Laboratory studies were obtained and interpreted by myself:  CBC and comprehensive metabolic profile within normal limits  Initial lactic acid 2.7 normalizes to 1.8 after IV fluids  Urinalysis negative for pyuria or bacteriuria  EKG: Sinus rhythm, troponin 18  Patient received IV fluids, morphine with resolution abdominal pain.  IV lorazepam for dizziness which also resolved.  Serial abdominal exams no current tenderness.  Patient has history of chronic abdominal pain of uncertain etiology has been diagnosed in the past with IBS  Patient had recent admission with discharge 9/7/2025 at [facility] onset of possible radiation proctitis and radiation cystitis   Patient is currently pending outpatient GI evaluation.  Patient currently with the late hour does not have a ride home and is worried about recurrence of pain and would prefer admission at home for monitoring versus discharge with adjustments in outpatient medications.  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  Negative for pulmonary embolism.      2.  Left basilar atelectasis related to elevation of the left hemidiaphragm.      CT Abdomen Pelvis w Contrast   Final Result   IMPRESSION:    1.  Small bowel and colonic air-fluid levels may be seen with gastroenteritis/diarrheal illness.   2.  Significant bladder distention.   3.  Increasing prominence of " the pancreatic duct is nonspecific. Clinical correlation suggested with short-term follow-up pancreatic MRI/MRCP with and without IV contrast.        Labs Ordered and Resulted from Time of ED Arrival to Time of ED Departure   BASIC METABOLIC PANEL - Abnormal       Result Value    Sodium 138      Potassium 3.7      Chloride 104      Carbon Dioxide (CO2) 18 (*)     Anion Gap 16 (*)     Urea Nitrogen 11.2      Creatinine 0.70      GFR Estimate >90      Calcium 9.2      Glucose 104 (*)    HEPATIC FUNCTION PANEL - Abnormal    Protein Total 5.8 (*)     Albumin 3.7      Bilirubin Total 1.0      Alkaline Phosphatase 63      AST 8      ALT 7      Bilirubin Direct 0.27     LACTIC ACID WHOLE BLOOD WITH 1X REPEAT IN 2 HR WHEN >2 - Abnormal    Lactic Acid, Initial 2.7 (*)    ROUTINE UA WITH MICROSCOPIC REFLEX TO CULTURE - Abnormal    Color Urine Light Yellow      Appearance Urine Clear      Glucose Urine Negative      Bilirubin Urine Negative      Ketones Urine 20 (*)     Specific Gravity Urine 1.012      Blood Urine Negative      pH Urine 7.5 (*)     Protein Albumin Urine Negative      Urobilinogen Urine Normal      Nitrite Urine Negative      Leukocyte Esterase Urine Negative      Mucus Urine Present (*)     RBC Urine 1      WBC Urine 1     CBC WITH PLATELETS AND DIFFERENTIAL - Abnormal    WBC Count 6.8      RBC Count 4.24 (*)     Hemoglobin 13.6      Hematocrit 36.5 (*)     MCV 86      MCH 32.1      MCHC 37.3 (*)     RDW 11.9      Platelet Count 132 (*)     % Neutrophils 69      % Lymphocytes 21      % Monocytes 8      % Eosinophils 1      % Basophils 1      % Immature Granulocytes 0      NRBCs per 100 WBC 0      Absolute Neutrophils 4.7      Absolute Lymphocytes 1.5      Absolute Monocytes 0.5      Absolute Eosinophils 0.0      Absolute Basophils 0.0      Absolute Immature Granulocytes 0.0      Absolute NRBCs 0.0     TROPONIN T, HIGH SENSITIVITY - Normal    Troponin T, High Sensitivity 16     PROCALCITONIN - Normal     Procalcitonin 0.04     LACTIC ACID WHOLE BLOOD - Normal    Lactic Acid 1.8     TROPONIN T, HIGH SENSITIVITY - Normal    Troponin T, High Sensitivity 19       ED INTERVENTIONS     Medications   HYDROmorphone (DILAUDID) tablet 2 mg (2 mg Oral $Given 5/10/25 2125)   senna-docusate (SENOKOT-S/PERICOLACE) 8.6-50 MG per tablet 1 tablet ( Oral See Alternative 5/10/25 2125)     Or   senna-docusate (SENOKOT-S/PERICOLACE) 8.6-50 MG per tablet 2 tablet (2 tablets Oral $Given 5/10/25 2125)   ondansetron (ZOFRAN ODT) ODT tab 4 mg (4 mg Oral $Given 5/11/25 2104)     Or   ondansetron (ZOFRAN) injection 4 mg ( Intravenous See Alternative 5/11/25 2104)   prochlorperazine (COMPAZINE) injection 5 mg (5 mg Intravenous $Given 5/11/25 2336)     Or   prochlorperazine (COMPAZINE) tablet 5 mg ( Oral See Alternative 5/11/25 2336)   acetaminophen (TYLENOL) tablet 325 mg (325 mg Oral $Given 5/11/25 1726)   amLODIPine (NORVASC) tablet 5 mg (5 mg Oral $Given 5/11/25 1149)   buPROPion (WELLBUTRIN XL) 24 hr tablet 150 mg (150 mg Oral $Given 5/11/25 1149)   sertraline (ZOLOFT) tablet 50 mg (50 mg Oral $Given 5/11/25 1149)   tolterodine ER (DETROL LA) 24 hr capsule 2 mg (2 mg Oral $Given 5/11/25 1149)   tamsulosin (FLOMAX) capsule 0.4 mg (0.4 mg Oral $Given 5/11/25 2047)   dicyclomine (BENTYL) tablet 20 mg (20 mg Oral Not Given 5/11/25 2100)   polyethylene glycol (MIRALAX) Packet 17 g (17 g Oral Not Given 5/11/25 1150)   docusate sodium (COLACE) capsule 100 mg (100 mg Oral $Given 5/11/25 2046)   bisacodyl (DULCOLAX) suppository 10 mg (10 mg Rectal $Given 5/11/25 0648)   Enema Compound (docusate/mineral oil/NaPhos) NO MAG CIT PREMIX (226 mLs Rectal $Given 5/11/25 0828)   bisacodyl (DULCOLAX) EC tablet 5 mg (has no administration in time range)   morphine (PF) injection 4 mg (4 mg Intravenous $Given 5/9/25 2143)   LORazepam (ATIVAN) tablet 1 mg (1 mg Oral $Given 5/9/25 2143)   sodium chloride 0.9% BOLUS 1,000 mL (0 mLs Intravenous Stopped 5/9/25 1110)    sodium chloride 0.9% BOLUS 1,000 mL (0 mLs Intravenous Stopped 5/9/25 2233)   iopamidol (ISOVUE-370) solution 81 mL (81 mLs Intravenous $Given 5/9/25 2310)   HYDROmorphone (DILAUDID) tablet 2 mg (2 mg Oral $Given 5/10/25 0056)   potassium chloride gisele ER (KLOR-CON M20) CR tablet 40 mEq (40 mEq Oral $Given 5/11/25 0115)   iopamidol (ISOVUE-370) solution 90 mL (90 mLs Intravenous $Given 5/11/25 7709)     TOTAL CRITICAL CARE TIME (EXCLUDING PROCEDURES): Not applicable      DISCHARGE MEDICATIONS        Review of your medicines        UNREVIEWED medicines. Ask your doctor about these medicines        Dose / Directions   acetaminophen 500 MG tablet  Commonly known as: TYLENOL      Dose: 1,000 mg  Take 1,000 mg by mouth every 6 hours as needed for mild pain  Refills: 0     amLODIPine 5 MG tablet  Commonly known as: NORVASC  Used for: Benign essential hypertension      Dose: 5 mg  Take 1 tablet (5 mg) by mouth daily.  Quantity: 90 tablet  Refills: 3     buPROPion 150 MG 24 hr tablet  Commonly known as: WELLBUTRIN XL  Used for: Recurrent major depressive disorder, in remission      Dose: 150 mg  Take 1 tablet (150 mg) by mouth every morning.  Quantity: 30 tablet  Refills: 0     CALCIUM-CARB 600 PO      Dose: 2 tablet  Take 2 tablets by mouth daily  Refills: 0     cyanocobalamin 1000 MCG tablet  Commonly known as: VITAMIN B-12  Used for: Neuropathy      Dose: 1,000 mcg  Take 1 tablet (1,000 mcg) by mouth daily  Quantity: 90 tablet  Refills: 1     glycerin 1.2 g suppository  Commonly known as: LAXATIVE  Used for: Constipation, unspecified constipation type      Dose: 1 suppository  Place 1 suppository rectally daily as needed (constipation.)  Quantity: 25 suppository  Refills: 1     HYDROmorphone 2 MG tablet  Commonly known as: DILAUDID  Used for: S/P radiation therapy, Abdominal pain, generalized      Dose: 2 mg  Take 1 tablet (2 mg) by mouth every 6 hours as needed for pain.  Quantity: 10 tablet  Refills: 0     mirabegron  50 MG 24 hr tablet  Commonly known as: MYRBETRIQ  Used for: Abdominal pain, generalized      Dose: 50 mg  Take 1 tablet (50 mg) by mouth daily.  Quantity: 30 tablet  Refills: 0     polyethylene glycol 17 GM/Dose powder  Commonly known as: MIRALAX  Used for: Constipation, unspecified constipation type      Dose: 1 capful.  Take 17 g (1 capful.) by mouth daily  Quantity: 578 g  Refills: 1     senna-docusate 8.6-50 MG tablet  Commonly known as: Lizbeth-Colace  Used for: Chronic constipation      Dose: 1-2 tablet  Take 1-2 tablets by mouth 2 times daily as needed for constipation.  Quantity: 100 tablet  Refills: 5     sertraline 50 MG tablet  Commonly known as: ZOLOFT  Used for: Recurrent major depressive disorder, in remission      Take 1 tablet by mouth daily  Quantity: 90 tablet  Refills: 3     solifenacin 5 MG tablet  Commonly known as: VESICARE  Used for: Urinary urgency      Dose: 5 mg  Take 1 tablet (5 mg) by mouth daily.  Quantity: 30 tablet  Refills: 3     tamsulosin 0.4 MG capsule  Commonly known as: FLOMAX  Used for: Prostate cancer (H)      Dose: 0.4 mg  Take 1 capsule (0.4 mg) by mouth every evening  Quantity: 90 capsule  Refills: 3     vitamin D3 50 mcg (2000 units) tablet  Commonly known as: CHOLECALCIFEROL  Used for: Closed compression fracture of L3 lumbar vertebra, with routine healing, subsequent encounter, Closed compression fracture of L4 lumbar vertebra, with routine healing, subsequent encounter      Dose: 1 tablet  Take 1 tablet (50 mcg) by mouth daily  Quantity: 90 tablet  Refills: 3             DISPOSITION: To [facility] [place]      At the conclusion of the encounter I discussed the results of all of the tests and the disposition. The questions were answered. The patient or family acknowledged understanding and was agreeable with the care plan.    HISTORY OF PRESENT ILLNESS   Ghulam Rizzo is a 69 year old year old male with a relevant past history of h/o IBS, cirrhosis without ascites due  to hepatitis C, HepC in remission after treatment 2013, adenocarcinoma of the prostate s/p radiation therapy, radiation cystitis, radiation proctitis, hypertension, hyperlipidemia, tobacco use who presents to the ED for evaluation of shortness of breath.    Patient states he has had ongoing abdominal pain for years. Patient reports that these ongoing symptoms began after colectomy and radiation therapy years ago. Today, he reports lightheadedness and shortness of breath accompanying his abdominal pain. He notes the shortness of breath is a new symptom. Symptoms worsened around noon today. Patient says he can't walk or function. Patient also reports diarrhea and frequent urination. Patient was seen at [facility] for similar symptoms 5/5/25 (see Chart Review). Patient says he felt a little better after discharge from [facility], but symptoms have worsened today. Patient tried lying down without relief.  Patient denies chest pain, hematuria, black stools, rectal bleeding, and is not taking blood thinners. There were no other concerns/complaints at this time.     Per chart review:  5/5/25: Ghulam Rizzo is a 69 year old male with h/o IBS, cirrhosis without ascites due to hepatitis C, HepC in remission after treatment 2013, adenocarcinoma of the prostate s/p radiation therapy, radiation cystitis, radiation proctitis, hypertension, hyperlipidemia, tobacco use presented to [facility] on 5/5/2025 due to weakness, unable to eat due to diffuse abdominal pain coming on over a year following radiation therapy to prostate.     Abdominal pain, diffuse, chronic  Possible radiation proctitis    C/o constant pain across the mid abdomen, ache without radiation. Feels it has been coming on since ending radiation to prostate October 2023. Gradually more bothersome. No known exacerbating factors. No change with bowel movements.  Now with decreased appetite and weight loss though has had some cycles of weight loss again  "over the last year or so.    Non-tender on exam, no abnormal labs. CT abdomen/pelvis without clear cause of pain.     Patient very frustrated by inability to figure out what is wrong. Also has trouble differentiating problems with bladder, bowel movements and abdominal pain. Has long h/o IBS. Told by oncology and PCP that his pain is likely due to radiation proctitis and has recently tried sucralfate enemas without improvement. He has not seen a GI provider because \"trying to get in takes months\"    Colonoscopy 1/10/2025: A few medium-sized patchy angiodysplastic lesions with typical       arborization were found in the rectum   - Gen surgery consult appreciated: no indication for surgical intervention   - Referral to Gastroenterology   - Palliative care consult appreciated; referral to Pain Management and Mental Health   - hydromorphone prn pain     Radiation cystitis  BPH    On trial of Vesicare but does not feel it is working. Most likely this is not the cause of his main abdominal pain (above).    - Urology consultation appreciated   - Continue Vesicare   - tamsulosin   - Start Mirabegron per Urology   - Referral for pelvic floor training     Cirrhosis of liver, due to Hep C  Hep C infection in remission    No ascites. Normal LFTs     Hypertension     Managed with amlodipine, continue     Depression, major    Continue bupropion and sertraline      INFORMATION SOURCE AND LIMITATIONS    History/Exam limitations: N/A  Patient information was obtained from: the patient  Use of : N/A        REVIEW OF SYSTEMS:   All other systems reviewed and are negative except as noted above in HPI.    PATIENT HISTORY     Past Medical History:   Diagnosis Date    Anisocoria     Asthma     Carpal tunnel syndrome     Head injury, unspecified     Hepatitis C     Obesity     Other convulsions     Other mononeuritis of upper limb     Unspecified essential hypertension       Patient Active Problem List   Diagnosis    " Hyperlipidemia LDL goal <130    Tobacco abuse    Abdominal pain, generalized    Cirrhosis of liver without ascites (H)    Proctitis    Other irritable bowel syndrome    Elevated prostate specific antigen (PSA)    Cecal volvulus (H)    Saccular aneurysm    Benign essential hypertension    Compression fracture of L4 vertebra (H)    Compression fracture of L3 lumbar vertebra, with routine healing, subsequent encounter    Adenocarcinoma of prostate (H)    Vertigo    Recurrent major depressive disorder, in remission    S/P radiation therapy    Hematochezia    PAC (premature atrial contraction)    Dizziness    Anxiety    Chronic abdominal pain      Past Surgical History:   Procedure Laterality Date    COLECTOMY WITHOUT COLOSTOMY Right 7/13/2022    Procedure: Laparotomy, right colectomy, lysis of adhesions,;  Surgeon: Tommy Martino DO;  Location: [facility] OR    COLONOSCOPY      COLONOSCOPY N/A 7/18/2023    Procedure: Colonoscopy with polypectomy;  Surgeon: Octavio Aguirre MD;  Location: [facility] GI    COLONOSCOPY N/A 6/10/2024    Procedure: Colonoscopy;  Surgeon: Tommy Martino DO;  Location: [facility]    ESOPHAGOSCOPY, GASTROSCOPY, DUODENOSCOPY (EGD), COMBINED N/A 6/10/2024    Procedure: Esophagoscopy, gastroscopy, duodenoscopy (EGD), combined;  Surgeon: Tommy Martino DO;  Location: [facility] GI    GI SURGERY      HERNIA REPAIR      HERNIORRHAPHY INCISIONAL (LOCATION) N/A 7/13/2022    Procedure: primary incisional hernia repair;  Surgeon: Tommy Martino DO;  Location: [facility] OR    INSERT SEED MARKER / MATRIX N/A 8/30/2023    Procedure: Transrectal ultrasound guided placement of fiducials and SpaceOar;  Surgeon: Chase Blount MD;  Location:  OR    SURGICAL HISTORY OF -   4/6/87    esophagogastroduodenoscopy    SURGICAL HISTORY OF -   4/27/87    exploratory laparotomy and anterior gastropexy over a gastrostomy tube.    SURGICAL HISTORY OF - 11/13/90    esophagogastroduodenoscopy     SURGICAL HISTORY OF -   1991    L diaphragm eventration repair, fixation of stomach and colon to abd wall    SURGICAL HISTORY OF -       hiatal hernia repair    THORACIC SURGERY          Allergies   Allergen Reactions    Levaquin [Levofloxacin] Nausea and Vomiting    Oxycodone Dizziness    Percocet [Oxycodone-Acetaminophen] Visual Disturbance and Hallucination     Does not have any problems taking Tylenol/Acetaminophen.      OUTPATIENT MEDICATIONS     Current Discharge Medication List         Vitals:    05/11/25 0813 05/11/25 1616 05/11/25 2334 05/11/25 2341   BP: (!) 178/73 (!) 151/85 (!) 138/101 (!) 142/84   BP Location: Right arm Left arm Right arm Left arm   Pulse: 76 87 86 82   Resp: 18 18 14    Temp: 98.3  F (36.8  C) 97.7  F (36.5  C) 98.3  F (36.8  C)    TempSrc: Oral Oral Oral    SpO2: 99% 94% 98%    Weight:       Height:          Physical Exam   Constitutional: Oriented to person, place, and time. Appears well-developed and well-nourished.     Cardiovascular: Normal rate, regular rhythm and normal heart sounds.    Pulmonary/Chest: Normal effort  and breath sounds normal.   Abdominal: Soft. Bowel sounds are normal.   Periumbilical and lower abdominal tenderness present without guarding or peritoneal signs  Neurological: Alert and oriented to person, place, and time. Normal strength. No sensory deficit. No cranial nerve deficit.  Skin: Skin is warm and dry.   Psychiatric:Anxious mood and affect. Behavior is normal. Thought content normal.      DIAGNOSTICS    LABORATORY FINDINGS (REVIEWED AND INTERPRETED):  Labs Ordered and Resulted from Time of ED Arrival to Time of ED Departure   BASIC METABOLIC PANEL - Abnormal       Result Value    Sodium 138      Potassium 3.7      Chloride 104      Carbon Dioxide (CO2) 18 (*)     Anion Gap 16 (*)     Urea Nitrogen 11.2      Creatinine 0.70      GFR Estimate >90      Calcium 9.2      Glucose 104 (*)    HEPATIC FUNCTION PANEL - Abnormal    Protein Total 5.8 (*)      Albumin 3.7      Bilirubin Total 1.0      Alkaline Phosphatase 63      AST 8      ALT 7      Bilirubin Direct 0.27     LACTIC ACID WHOLE BLOOD WITH 1X REPEAT IN 2 HR WHEN >2 - Abnormal    Lactic Acid, Initial 2.7 (*)    ROUTINE UA WITH MICROSCOPIC REFLEX TO CULTURE - Abnormal    Color Urine Light Yellow      Appearance Urine Clear      Glucose Urine Negative      Bilirubin Urine Negative      Ketones Urine 20 (*)     Specific Gravity Urine 1.012      Blood Urine Negative      pH Urine 7.5 (*)     Protein Albumin Urine Negative      Urobilinogen Urine Normal      Nitrite Urine Negative      Leukocyte Esterase Urine Negative      Mucus Urine Present (*)     RBC Urine 1      WBC Urine 1     CBC WITH PLATELETS AND DIFFERENTIAL - Abnormal    WBC Count 6.8      RBC Count 4.24 (*)     Hemoglobin 13.6      Hematocrit 36.5 (*)     MCV 86      MCH 32.1      MCHC 37.3 (*)     RDW 11.9      Platelet Count 132 (*)     % Neutrophils 69      % Lymphocytes 21      % Monocytes 8      % Eosinophils 1      % Basophils 1      % Immature Granulocytes 0      NRBCs per 100 WBC 0      Absolute Neutrophils 4.7      Absolute Lymphocytes 1.5      Absolute Monocytes 0.5      Absolute Eosinophils 0.0      Absolute Basophils 0.0      Absolute Immature Granulocytes 0.0      Absolute NRBCs 0.0     TROPONIN T, HIGH SENSITIVITY - Normal    Troponin T, High Sensitivity 16     PROCALCITONIN - Normal    Procalcitonin 0.04     LACTIC ACID WHOLE BLOOD - Normal    Lactic Acid 1.8     TROPONIN T, HIGH SENSITIVITY - Normal    Troponin T, High Sensitivity 19           IMAGING (REVIEWED AND INTERPRETED):  CTA Abdomen Pelvis with Contrast   Final Result   IMPRESSION:   Findings suspicious for 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  Negative for pulmonary embolism.      2.  Left basilar  atelectasis related to elevation of the left hemidiaphragm.      CT Abdomen Pelvis w Contrast   Final Result   IMPRESSION:    1.  Small bowel and colonic air-fluid levels may be seen with gastroenteritis/diarrheal illness.   2.  Significant bladder distention.   3.  Increasing prominence of the pancreatic duct is nonspecific. Clinical correlation suggested with short-term follow-up pancreatic MRI/MRCP with and without IV contrast.            ECG (REVIEWED AND INTERPRETED):   ECG:   Performed at: 09-MAY-2025 20:07  HR:  85 bpm  Rhythm: Sinus   Axis: 81  QRS duration: 88 ms  QTC: 478 ms  ST changes: No ST segment elevation or depression, no T wave inversion,No Q wave  Interpretation: Sinus rhythm with PACs  Compared to most recent ECG from: April 20, 2025 no acute change    I have reviewed the patient's ECG, with comments made as listed above. Please see scanned image for full interpretation.     Billing Documentation    I obtained additional history from these independent historians: N/A    I reviewed these outside records:  5/5/2025 - ED to Hospital Admission -for evaluation of abdominal pain and urinary frequency    I noted these abnormal vital signs / labs:  /87 asymptomatic    Monitor Strip Interpretation:  Sinus rhythm  12-Lead ECG Interpretation:  Sinus rhythm  I independently reviewed the following diagnostic studies:  NA  I spoke to the following clinicians regard the patients care:  Dr. Ordoñez, hospitalist    My disposition decision is based on the following reasons:  Admit:    Compliance Documentation  MIPS Documentation Medical Decision Making  I reviewed the EMR: Inpatient Record: See Above  Care impacted by Chronic Pain  I discussed the care with another health care provider: Hospitalist  Admit.    MIPS (CTPE, Dental pain, Rivera, Sinusitis, Asthma/COPD, Head Trauma): CTA of the chest for evaluation pulmonary embolism obtained secondary to moderate suspicion at recent hospitalization    SEPSIS:  None        At the time of my evaluation, I do not feel the patient s symptoms are caused by sepsis      I, Yemi Nguyen, am serving as a scribe to document services personally performed by Dr. Sneed, based on my observations and the provider's statements to me.  I, Dr. Sneed, attest that Yemi Nguyen is acting in a scribe capacity, has observed my performance of the services and has documented them in accordance with my direction.      Ghulam Sneed D.O.  EMERGENCY MEDICINE   05/09/25  [facility] EMERGENCY DEPARTMENT  [address] [place] [id number]  [phone]  Dept: [phone]        Ghulam Sneed DO  05/12/25 0058

## 2025-05-10 NOTE — PLAN OF CARE
Goal Outcome Evaluation:      Plan of Care Reviewed With: patient          Outcome Evaluation: Anticipated discharge goal is home.

## 2025-05-10 NOTE — CONSULTS
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  Chronic abdominal pain: I suspect this is due to constipation predominant irritable bowel syndrome in the setting of pelvic floor dysfunction. He may have an underlying GI motility disorder as well.   Proctosigmoiditis from radiation does not typically cause diffuse pain, typically just bleeding (which she does not have).  Although there is some atherosclerosis on CT, mesenteric vessels do appear open.  But I do think we should rule out significant mesenteric narrowings with a planned CTA.  He is at risk for SIBO with his prior bowel resection, but this would be an outpatient evaluation.  2.  HCV cirrhosis: Current MELD is 7 indicating compensated disease.  No active issues from this.  3.  Prominent pancreatic duct: No sign of chronic pancreatitis on imaging and he has had multiple cross-sectional images in recent years.  If there is some underlying high risk pancreatic lesion causing his chronic abdominal pain, we would have seen something develop by now.  Therefore this is unlikely to be contributing to current pain issues.  4.  Lactic acidosis: Improved after IV fluid hydration     RECOMMENDATIONS   1.  CTA of the abdomen as planned to evaluate mesenteric vessel patency  2.  Outpatient MRCP to follow-up on his pancreatic duct.  3.  We will contact him to arrange follow-up in our office in [place].  4.  He has been initiated on dicyclomine as a trial and I think this is reasonable to consider before meals to see if that at least reduces some of his postprandial pain.  It can  cause constipation as a side effect so we would need to monitor closely for this.  5.  Avoid narcotics.  6.  He can continue MiraLAX twice daily with 2 senna's daily, or could consider prescribing Linzess, Trulance or Amitiza at discharge, to take instead of MiraLAX.  Unfortunately I do not know which of these 3 his insurance would cover.  It is always a guessing game and therefore this could be looked into further either through our outpatient office or through his primary care clinic.    7.  He can also consider a glycerin suppository each morning upon awakening, to promote better rectal emptying.  A step up from the strategy would be to use a fleets enema each morning.  8.  I initially wondered about a tricyclic, but I am worried about worsening his constipation so I would recommend holding off on this until we can achieve better regularity with his bowels.  9.  No further inpatient evaluation from a GI perspective at this time, assuming CT looks good.  I will sign off.  Please call with questions.       HISTORY OF PRESENT ILLNESS   Ghulam Rizzo is a pleasant 69 year old male with history of HCV cirrhosis (sustained virologic response to treatment), prostate cancer status post XRT, radiation proctitis/cystitis, cecal volvulus status post right hemicolectomy, HTN, HLD chronic abdominal pain who presented to the ER due to dyspnea and weakness.  He also reported ongoing chronic abdominal pain that has occurred ever since 10/23.  He reports having mid to generalized abdominal discomfort that can be mild at times, but typically worsens with eating.  There can be a sense of fullness as if things are not moving through very well.  He struggles to pass bowel movements, often passing just a small amount twice daily.  He tries to keep his stools loose to help.  He takes MiraLAX 17 g twice daily together with 2 senna's in the morning.  He was previously taking 4 senna's daily but did not feel the higher dose was very  effective.  He reports having testing through the pelvic floor center near [facility] years ago, and underwent therapy for what I suspect was pelvic floor dysfunction, but did not respond.  All of these abdominal issues worsened since his radiation therapy.  He denies rectal bleeding.    He was admitted from 5/5 through 5/7 at [facility] for diffuse abdominal pain.  Imaging was unremarkable.  He was seen by general surgery and referred to follow-up with gastroenterology.  Pain management and mental health referrals were also placed.    CT scan showed small bowel colonic air-fluid levels, possibly consistent with gastroenteritis, as well as bladder distention and a prominent pancreatic duct.  No active inflammatory process was seen.  Lab work shows unremarkable chemistries, normal white count.  He also had a CT scan on 5/5, with a few mildly distended small bowel loops, as well as 3/10, with mild proctitis and possible cystitis, 2/20 with bladder wall thickening and enhancement of the rectosigmoid, 10/24 with mucosal enhancement of the colon and rectum, scattered air-fluid levels, 9/24 with rectosigmoid and bladder thickening, 8/24 with rectosigmoid and bladder thickening.  He also had 3 CT abdomens in 2023.    EGD 6/24 esophageal biopsy showed possible mild reflux change.  Gastric biopsy showed minimal chronic inflammation negative for H. pylori.  Colonoscopy 1/25 showed radiation proctitis.  This was treated with hot biopsy forceps.  Proctofoam was prescribed at that time.     PAST HISTORY   Past Medical History:   Diagnosis Date    Anisocoria     Asthma     Carpal tunnel syndrome     2006    Head injury, unspecified     closed head injury 4 yrs ago    Hepatitis C     Cured by [facility] in 2015. diagnosed in 2012 was positive for Hepatitis C.     Obesity     2006, resolved    Other convulsions     seizure disorder due to head injury 4 yrs ago.    Other mononeuritis of upper limb     L phrenic nerve paralysis     Unspecified essential hypertension       Past Surgical History:   Procedure Laterality Date    COLECTOMY WITHOUT COLOSTOMY Right 7/13/2022    Procedure: Laparotomy, right colectomy, lysis of adhesions,;  Surgeon: Tommy Martino DO;  Location: [facility]    COLONOSCOPY      COLONOSCOPY N/A 7/18/2023    Procedure: Colonoscopy with polypectomy;  Surgeon: Octavio Aguirre MD;  Location: [facility]    COLONOSCOPY N/A 6/10/2024    Procedure: Colonoscopy;  Surgeon: Tommy Martino DO;  Location: [facility]    ESOPHAGOSCOPY, GASTROSCOPY, DUODENOSCOPY (EGD), COMBINED N/A 6/10/2024    Procedure: Esophagoscopy, gastroscopy, duodenoscopy (EGD), combined;  Surgeon: Tommy Martino DO;  Location: [facility]    GI SURGERY      HERNIA REPAIR      HERNIORRHAPHY INCISIONAL (LOCATION) N/A 7/13/2022    Procedure: primary incisional hernia repair;  Surgeon: Tommy Martino DO;  Location: [facility] OR    INSERT SEED MARKER / MATRIX N/A 8/30/2023    Procedure: Transrectal ultrasound guided placement of fiducials and SpaceOar;  Surgeon: Chase Blount MD;  Location:  OR    SURGICAL HISTORY OF -   4/6/87    esophagogastroduodenoscopy    SURGICAL HISTORY OF -   4/27/87    exploratory laparotomy and anterior gastropexy over a gastrostomy tube.    SURGICAL HISTORY OF -   11/13/90    esophagogastroduodenoscopy    SURGICAL HISTORY OF -   1991    L diaphragm eventration repair, fixation of stomach and colon to abd wall    SURGICAL HISTORY OF -       hiatal hernia repair    THORACIC SURGERY          Family History Social History   Family History   Problem Relation Age of Onset    Cerebrovascular Disease Mother     Heart Disease Brother     Heart Disease Brother     Social History     Socioeconomic History    Marital status:      Spouse name: Felecia    Number of children: 1   Occupational History     Employer: [employer]   Tobacco Use    Smoking status: Former     Current packs/day: 0.50     Average  packs/day: 0.5 packs/day for 50.0 years (25.0 ttl pk-yrs)     Types: Cigarettes    Smokeless tobacco: Never   Vaping Use    Vaping status: Never Used   Substance and Sexual Activity    Alcohol use: Yes     Comment: 1-2 beers daily    Drug use: No    Sexual activity: Yes     Partners: Female   Other Topics Concern    Parent/sibling w/ CABG, MI or angioplasty before 65F 55M? Yes     Comment: 2 brothers- heart surgery     Social Drivers of Health     Financial Resource Strain: Low Risk  (5/10/2025)    Financial Resource Strain     Within the past 12 months, have you or your family members you live with been unable to get utilities (heat, electricity) when it was really needed?: No   Food Insecurity: Low Risk  (5/10/2025)    Food Insecurity     Within the past 12 months, did you worry that your food would run out before you got money to buy more?: No     Within the past 12 months, did the food you bought just not last and you didn t have money to get more?: No   Transportation Needs: Low Risk  (5/10/2025)    Transportation Needs     Within the past 12 months, has lack of transportation kept you from medical appointments, getting your medicines, non-medical meetings or appointments, work, or from getting things that you need?: No   Interpersonal Safety: Low Risk  (4/18/2025)    Interpersonal Safety     Do you feel physically and emotionally safe where you currently live?: Yes     Within the past 12 months, have you been hit, slapped, kicked or otherwise physically hurt by someone?: No     Within the past 12 months, have you been humiliated or emotionally abused in other ways by your partner or ex-partner?: No   Housing Stability: Low Risk  (5/10/2025)    Housing Stability     Do you have housing? : Yes     Are you worried about losing your housing?: No         MEDICATIONS & ALLERGIES   Medications Prior to Admission   Medication Sig Dispense Refill Last Dose/Taking    acetaminophen (TYLENOL) 500 MG tablet Take 1,000 mg  "by mouth every 6 hours as needed for mild pain       amLODIPine (NORVASC) 5 MG tablet Take 1 tablet (5 mg) by mouth daily. 90 tablet 3     buPROPion (WELLBUTRIN XL) 150 MG 24 hr tablet Take 1 tablet (150 mg) by mouth every morning. 30 tablet 0     Calcium Carbonate (CALCIUM-CARB 600 PO) Take 2 tablets by mouth daily       cyanocobalamin (VITAMIN B-12) 1000 MCG tablet Take 1 tablet (1,000 mcg) by mouth daily 90 tablet 1     glycerin (LAXATIVE) 1.2 g suppository Place 1 suppository rectally daily as needed (constipation.) 25 suppository 1     HYDROmorphone (DILAUDID) 2 MG tablet Take 1 tablet (2 mg) by mouth every 6 hours as needed for pain. 10 tablet 0     mirabegron (MYRBETRIQ) 50 MG 24 hr tablet Take 1 tablet (50 mg) by mouth daily. 30 tablet 0     polyethylene glycol (MIRALAX) 17 GM/Dose powder Take 17 g (1 capful.) by mouth daily 578 g 1     senna-docusate (NEGRITO-COLACE) 8.6-50 MG per tablet Take 1-2 tablets by mouth 2 times daily as needed for constipation. 100 tablet 5     sertraline (ZOLOFT) 50 MG tablet Take 1 tablet by mouth daily 90 tablet 3     solifenacin (VESICARE) 5 MG tablet Take 1 tablet (5 mg) by mouth daily. 30 tablet 3     tamsulosin (FLOMAX) 0.4 MG capsule Take 1 capsule (0.4 mg) by mouth every evening 90 capsule 3     vitamin D3 (CHOLECALCIFEROL) 50 mcg (2000 units) tablet Take 1 tablet (50 mcg) by mouth daily 90 tablet 3         ALLERGIES   Allergies   Allergen Reactions    Levaquin [Levofloxacin] Nausea and Vomiting    Oxycodone Dizziness    Percocet [Oxycodone-Acetaminophen] Visual Disturbance and Hallucination     Does not have any problems taking Tylenol/Acetaminophen.         REVIEW OF SYSTEMS    See HPI for pertinent positives and negatives. A comprehensive review of systems was performed and was otherwise noncontributory.     OBJECTIVE   Vitals Blood pressure (!) 145/75, pulse 75, temperature 98.4  F (36.9  C), temperature source Oral, resp. rate 18, height 1.88 m (6' 2\"), weight 74.8 kg " (165 lb), SpO2 98%.           Physical  Exam  GENERAL: alert and oriented, no acute distress.    HEENT: atraumatic, anicteric, moist mucous membranes, neck soft/supple; poor dentition   PULMONARY: normal resp effort, breath sounds clear to auscultation bilaterally   CARDIOVASCULAR: normal rate and rhythm, no murmurs   ABDOMEN: soft, no tenderness, no distention, bowel sounds normal. No hepatosplenomegaly.    MUSCULOSKELETAL: joints grossly normal   NEUROLOGICAL: appropriate mental status, grossly intact   PSYCHIATRIC: normal mood, affect and insight   SKIN: warm and dry, no rashes   EXT: no edema        LABORATORY    ELECTROLYTE PANEL   Recent Labs   Lab 05/10/25  0546 05/09/25 2142 05/05/25  1359    138 140   POTASSIUM 3.7 3.7 4.0   CHLORIDE 107 104 104   CO2 26 18* 23   GLC 92 104* 102*   CR 0.74 0.70 0.75   BUN 8.8 11.2 21.4      HEMATOLOGY PANEL   Recent Labs   Lab 05/10/25  0546 05/09/25 2142 05/05/25  1359   HGB 12.9* 13.6 15.3   MCV 89 86 90   WBC 5.8 6.8 6.5   * 132* 167   INR 1.15  --   --       LIVER AND PANCREAS PANEL   Recent Labs   Lab 05/10/25  0546 05/09/25  2333 05/05/25  1359   AST 10 8 11   ALT 9 7 10   ALKPHOS 65 63 75   BILITOTAL 0.8 1.0 0.9   LIPASE  --   --  30     IMAGING STUDIES      Narrative & Impression   EXAM: CT CHEST PULMONARY EMBOLISM W CONTRAST  LOCATION: [facility]  DATE: 5/9/2025     INDICATION: dyspnea  COMPARISON: CT from 4/18/2025.  TECHNIQUE: CT chest pulmonary angiogram during arterial phase injection of IV contrast. Multiplanar reformats and MIP reconstructions were performed. Dose reduction techniques were used.   CONTRAST: ISOVUE 370 81ML     FINDINGS:  ANGIOGRAM CHEST: Pulmonary arteries are normal caliber and negative for pulmonary emboli. Thoracic aorta is negative for dissection. No CT evidence of right heart strain.     LUNGS AND PLEURA: Stable elevation of the left hemidiaphragm with associated passive left lower lobe  atelectasis. No acute airspace consolidation. No pneumothorax or pleural effusion.     MEDIASTINUM/AXILLAE: Normal heart size. No pericardial effusion.     CORONARY ARTERY CALCIFICATION: None.     UPPER ABDOMEN: Scattered hepatic and splenic granulomas.     MUSCULOSKELETAL: Osteopenia with multilevel thoracic spine degenerative disc change.                                                                      IMPRESSION:  1.  Negative for pulmonary embolism.     2.  Left basilar atelectasis related to elevation of the left hemidiaphragm.     Narrative & Impression   EXAM: CT ABDOMEN PELVIS W CONTRAST  LOCATION: [facility]  DATE: 5/9/2025     INDICATION: abdominal pain  COMPARISON: 5/5/2025  TECHNIQUE: CT scan of the abdomen and pelvis was performed following injection of IV contrast. Multiplanar reformats were obtained. Dose reduction techniques were used.  CONTRAST: ISOVUE 370 81ML     FINDINGS:   LOWER CHEST: Elevation of the left hemidiaphragm redemonstrated, incompletely assessed. No basilar infiltrates.     HEPATOBILIARY: Incidental hepatic cysts. No biliary dilatation.     PANCREAS: Mild increase in prominence of the pancreatic duct without atrophy. No peripancreatic fluid collections or inflammatory change.     SPLEEN: Limited visualization. Scattered splenic calcification.     ADRENAL GLANDS: Unremarkable     KIDNEYS/BLADDER: No hydronephrosis. Distended bladder.     BOWEL: Partial colectomy with anastomotic suture line in the right upper quadrant. Moderate colonic and small bowel air-fluid levels.     LYMPH NODES: No significant retroperitoneal adenopathy.     VASCULATURE: No abdominal aortic aneurysm. Patent portal vein.     PELVIC ORGANS: Mild prostatic hypertrophy. No free fluid.     MUSCULOSKELETAL: Small fat-containing inguinal hernias. Small fat-containing umbilical hernia. No acute bony abnormalities. Chronic L3 compression deformity.                                                                       IMPRESSION:   1.  Small bowel and colonic air-fluid levels may be seen with gastroenteritis/diarrheal illness.  2.  Significant bladder distention.  3.  Increasing prominence of the pancreatic duct is nonspecific. Clinical correlation suggested with short-term follow-up pancreatic MRI/MRCP with and without IV contrast.     I have reviewed the current diagnostic and laboratory tests.             Total time spent providing patient care: 50 minutes with at least 50% spent in reviewing documentation/test results, decision making, and coordination of care.           Lola Silva MD  Thank you for the opportunity to participate in the care of this patient.   Please feel free to call me with any questions or concerns.  Phone number [phone].

## 2025-05-10 NOTE — ED NOTES
Bed: JNED-10  Expected date: 5/9/25  Expected time:   Means of arrival:   Comments:  [facility]  69 male  Headache  SOB, abd pain, , zofran given

## 2025-05-10 NOTE — PROGRESS NOTES
Patient tranferred from ED at 0500 in a wheelchair. Walked from chair to bed independently. Admitted due to chronic abdominal pain.  GI consulted. Alert and oriented x4. Oriented to unit routine and call light use. Personal belongings include clothing and shoes.

## 2025-05-10 NOTE — ED NOTES
Pt up to ambulate per MD. Pt able to get self out of bed and walk out of room down the hallway with a steady gait. Pt able to get self back into bed. MD notified.

## 2025-05-10 NOTE — CONSULTS
Care Management Initial Consult    General Information  Assessment completed with: Ghulam Kilgore  Type of CM/SW Visit: Initial Assessment    Primary Care Provider verified and updated as needed: Yes   Readmission within the last 30 days: no previous admission in last 30 days      Reason for Consult: utilization management concerns  Advance Care Planning:            Communication Assessment  Patient's communication style: spoken language (English or Bilingual)    Hearing Difficulty or Deaf: yes   Wear Glasses or Blind: no    Cognitive  Cognitive/Neuro/Behavioral: WDL                      Living Environment:   People in home: spouse  Chacha  Current living Arrangements: house      Able to return to prior arrangements: yes     Family/Social Support:  Care provided by: self  Provides care for: no one  Marital Status:   Support system: Wife  Chacha       Description of Support System: Supportive       Current Resources:   Patient receiving home care services: No     Community Resources: None  Equipment currently used at home: none  Supplies currently used at home: None    Employment/Financial:  Employment Status: retired        Financial Concerns: none   Referral to Financial Worker: No     Does the patient's insurance plan have a 3 day qualifying hospital stay waiver?  Yes     Which insurance plan 3 day waiver is available? Alternative insurance waiver    Will the waiver be used for post-acute placement? Undetermined at this time    Lifestyle & Psychosocial Needs:  Social Drivers of Health     Food Insecurity: Low Risk  (5/10/2025)    Food Insecurity     Within the past 12 months, did you worry that your food would run out before you got money to buy more?: No     Within the past 12 months, did the food you bought just not last and you didn t have money to get more?: No   Depression: Not at risk (3/3/2025)    PHQ-2     PHQ-2 Score: 0   Housing Stability: Low Risk  (5/10/2025)    Housing Stability     Do you  have housing? : Yes     Are you worried about losing your housing?: No   Tobacco Use: Medium Risk (4/18/2025)    Patient History     Smoking Tobacco Use: Former     Smokeless Tobacco Use: Never     Passive Exposure: Not on file   Financial Resource Strain: Low Risk  (5/10/2025)    Financial Resource Strain     Within the past 12 months, have you or your family members you live with been unable to get utilities (heat, electricity) when it was really needed?: No   Alcohol Use: Not on file   Transportation Needs: Low Risk  (5/10/2025)    Transportation Needs     Within the past 12 months, has lack of transportation kept you from medical appointments, getting your medicines, non-medical meetings or appointments, work, or from getting things that you need?: No   Physical Activity: Not on file   Interpersonal Safety: Low Risk  (4/18/2025)    Interpersonal Safety     Do you feel physically and emotionally safe where you currently live?: Yes     Within the past 12 months, have you been hit, slapped, kicked or otherwise physically hurt by someone?: No     Within the past 12 months, have you been humiliated or emotionally abused in other ways by your partner or ex-partner?: No   Stress: Not on file   Social Connections: Not on file   Health Literacy: Not on file     Functional Status:  Prior to admission patient needed assistance:   Dependent ADLs:: Independent  Dependent IADLs:: Transportation     Mental Health Status:  Mental Health Status: No Current Concerns       Chemical Dependency Status:  Chemical Dependency Status: No Current Concerns           Values/Beliefs:  Spiritual, Cultural Beliefs, Voodoo Practices, Values that affect care: no          Values/Beliefs Comment: [place]    Discussed  Partnership in Safe Discharge Planning  document with patient/family: No    Additional Information:  Met with patient to review observation status billing under Medicare guidelines and provide a copy of the [facility] brochure.  Patient alert, answering questions appropriately and engaged in the conversation.  Patient is  and independent at baseline but no longer drives. He does not use any DME for mobility assist.     Next Steps: Patient is independent with activities of daily living at baseline.  No care management needs identified at this time but CM will continue to monitor progression of care, review team recommendations and provide discharge planning assist as needed.      Radha Ramirez RN

## 2025-05-10 NOTE — ED TRIAGE NOTES
Patient comes from home via  [employer]. Patient reports that today he became more short of breath and felt week almost that he was going to fall over if he didn't sit down.   No reported falls. 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 Hx of pancreatic cancer.     4 of zofran given in route for abdominal pain      Triage Assessment (Adult)       Row Name 05/09/25 2008          Triage Assessment    Airway WDL WDL        Respiratory WDL    Respiratory WDL X  subjective shortness of breath        Skin Circulation/Temperature WDL    Skin Circulation/Temperature WDL WDL        Cardiac WDL    Cardiac WDL WDL        Peripheral/Neurovascular WDL    Peripheral Neurovascular WDL X  reports a headache        Cognitive/Neuro/Behavioral WDL    Cognitive/Neuro/Behavioral WDL WDL

## 2025-05-10 NOTE — MEDICATION SCRIBE - ADMISSION MEDICATION HISTORY
Admission medication history completed at [facility]. Please see Medication Scribe Admission Medication History note from 05/05/2025.

## 2025-05-10 NOTE — PROGRESS NOTES
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 Patient admitted to [facility] from 05/05 - 05/07 for the same.  Pain is unchanged in the past year and feels that this pain started since completion of radiation therapy for prostate cancer in October 2023.  The pain is diffuse, intermittent, sharp and worsened with food and associated intermittent nausea and loose stools. He underwent EGD in 6/2024 that was notable for normal stomach, duodenum. He underwent colonoscopy in 01/2025 that was notable for a few medium-sized patchy angiodysplastic lesions with typical arborization found in the rectum. During this admission imaging with small bowel and colonic air fluid level consistent with gastroenteritis and increasing prominence of pancreatic duct. LFTs normal. No leukocytosis. GI consulted and pending recommendation. Pain improved today and awaiting CTA of abdomen and pelvis.      #History of IBS  #Chronic abdominal pain  #Presumed radiation proctitis  - complains of intermittent moderate to severe sharp generalized abdominal pain.  - LFTs normal.  WBC 6.8.  UA not concerning for infection.    - CT abdomen pelvis notable for small bowel and colonic air-fluid levels that may be consistent with gastroenteritis and significant bladder distention.  Also there was increasing prominence of the pancreatic duct which was nonspecific and follow-up MRCP was recommended.  - No diarrhea thus no enteric panel and C. difficile panel ordered  - CTA abdomen pelvis ordered to evaluate for chronic mesenteric ischemia.    - GI consulted and pending  recommendation   - Continue PTA pain regimen with p.o. Dilaudid  - Trial Dicyclomine ordered today   - Bladder scan every shift and straight cath as needed for possible urine retention  - Will need outpatient MRI/MRCP to follow-up for pancreatic duct prominence    #AGMA-resolved  #Lactic acidosis-resolved  -resolved with fluids     #Shortness of breath  -The patient states he has had shortness of breath for the last month.   - No chest pain.  No cough, sputum production.  No peripheral edema.    - WBC normal.  Procalcitonin normal.  Troponin normal.   - CTA chest with no PE or signs of pneumonia but did note left basilar atelectasis related to chronic elevation of the left diaphragm which could be causing his shortness of breath.  -BNP ordered   -Stable on RA and does not require supplemental oxygen      #Thrombocytopenia  -132---128  - Could be related to liver. Will monitor.        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  Diet: Advance Diet as Tolerated: Clear Liquid Diet    DVT Prophylaxis: Pneumatic Compression Devices  Rivera Catheter: Not present  Lines: None     Cardiac Monitoring: None  Code Status: Full Code      Clinically Significant Risk Factors Present on Admission           # Hypocalcemia: Lowest Ca = 8.4 mg/dL in last 2 days, will monitor and  replace as appropriate       # Thrombocytopenia: Lowest platelets = 128 in last 2 days, will monitor for bleeding   # Hypertension: Noted on problem list                      Social Drivers of Health    Tobacco Use: Medium Risk (4/18/2025)    Patient History     Smoking Tobacco Use: Former     Smokeless Tobacco Use: Never          Disposition Plan     Medically Ready for Discharge: Anticipated Tomorrow             Rachael Ibrahim MD  Hospitalist Service  [facility]  Securely message with [employer] (more info)  Text page via [employer] Paging/Directory   ______________________________________________________________________    Interval History   Patient seen this morning. Patient reports abdominal pain improved and now 4/10. Patient reports in relation to food intake. He has followed up with multiple providers with no resolution.     Physical Exam   Vital Signs: Temp: 98.4  F (36.9  C) Temp src: Oral BP: (!) 145/75 Pulse: 75   Resp: 18 SpO2: 98 % O2 Device: None (Room air)    Weight: 165 lbs 0 oz    Constitutional: awake, alert, cooperative, no apparent distress, and appears stated age  Eyes: pupils equal, round and reactive to light and sclera clear  ENT: atraumatic, oral pharynx with moist mucus membranes  Respiratory: No increased work of breathing, good air exchange, clear to auscultation bilaterally, no crackles or wheezing  Cardiovascular: regular rate and rhythm and normal S1 and S2  GI: soft, non-distended, and non-tender  Musculoskeletal: no lower extremity pitting edema present    Medical Decision Making       55 MINUTES SPENT BY ME on the date of service doing chart review, history, exam, documentation & further activities per the note.      Data     I have personally reviewed the following data over the past 24 hrs:    5.8  \   12.9 (L)   / 128 (L)     139 107 8.8 /  92   3.7 26 0.74 \     ALT: 9 AST: 10 AP: 65 TBILI: 0.8   ALB: 3.9 TOT PROTEIN: 6.0 (L) LIPASE: N/A     Trop: 18 BNP: N/A      Procal: 0.04 CRP: N/A Lactic Acid: 1.8       INR:  1.15 PTT:  N/A   D-dimer:  N/A Fibrinogen:  N/A       Imaging results reviewed over the past 24 hrs:   Recent Results (from the past 24 hours)   CT Abdomen Pelvis w Contrast    Narrative    EXAM: CT ABDOMEN PELVIS W CONTRAST  LOCATION: [facility]  DATE: 5/9/2025    INDICATION: abdominal pain  COMPARISON: 5/5/2025  TECHNIQUE: CT scan of the abdomen and pelvis was performed following injection of IV contrast. Multiplanar reformats were obtained. Dose reduction techniques were used.  CONTRAST: ISOVUE 370 81ML    FINDINGS:   LOWER CHEST: Elevation of the left hemidiaphragm redemonstrated, incompletely assessed. No basilar infiltrates.    HEPATOBILIARY: Incidental hepatic cysts. No biliary dilatation.    PANCREAS: Mild increase in prominence of the pancreatic duct without atrophy. No peripancreatic fluid collections or inflammatory change.    SPLEEN: Limited visualization. Scattered splenic calcification.    ADRENAL GLANDS: Unremarkable    KIDNEYS/BLADDER: No hydronephrosis. Distended bladder.    BOWEL: Partial colectomy with anastomotic suture line in the right upper quadrant. Moderate colonic and small bowel air-fluid levels.    LYMPH NODES: No significant retroperitoneal adenopathy.    VASCULATURE: No abdominal aortic aneurysm. Patent portal vein.    PELVIC ORGANS: Mild prostatic hypertrophy. No free fluid.    MUSCULOSKELETAL: Small fat-containing inguinal hernias. Small fat-containing umbilical hernia. No acute bony abnormalities. Chronic L3 compression deformity.      Impression    IMPRESSION:   1.  Small bowel and colonic air-fluid levels may be seen with gastroenteritis/diarrheal illness.  2.  Significant bladder distention.  3.  Increasing prominence of the pancreatic duct is nonspecific. Clinical correlation suggested with short-term follow-up pancreatic MRI/MRCP with and without IV contrast.   CT Chest Pulmonary Embolism w Contrast     Narrative    EXAM: CT CHEST PULMONARY EMBOLISM W CONTRAST  LOCATION: [facility]  DATE: 5/9/2025    INDICATION: dyspnea  COMPARISON: CT from 4/18/2025.  TECHNIQUE: CT chest pulmonary angiogram during arterial phase injection of IV contrast. Multiplanar reformats and MIP reconstructions were performed. Dose reduction techniques were used.   CONTRAST: ISOVUE 370 81ML    FINDINGS:  ANGIOGRAM CHEST: Pulmonary arteries are normal caliber and negative for pulmonary emboli. Thoracic aorta is negative for dissection. No CT evidence of right heart strain.    LUNGS AND PLEURA: Stable elevation of the left hemidiaphragm with associated passive left lower lobe atelectasis. No acute airspace consolidation. No pneumothorax or pleural effusion.    MEDIASTINUM/AXILLAE: Normal heart size. No pericardial effusion.    CORONARY ARTERY CALCIFICATION: None.    UPPER ABDOMEN: Scattered hepatic and splenic granulomas.    MUSCULOSKELETAL: Osteopenia with multilevel thoracic spine degenerative disc change.      Impression    IMPRESSION:  1.  Negative for pulmonary embolism.    2.  Left basilar atelectasis related to elevation of the left hemidiaphragm.

## 2025-05-10 NOTE — CARE PLAN
PRIMARY DIAGNOSIS: ABD Pain   OUTPATIENT/OBSERVATION GOALS TO BE MET BEFORE DISCHARGE:  ADLs back to baseline: Yes    Activity and level of assistance: Ambulating independently.    Pain status: Improved-controlled with oral pain medications.    Return to near baseline physical activity: Yes     Discharge Planner Nurse   Safe discharge environment identified: Yes  Barriers to discharge: Yes, ABD pain, urinary retention       Entered by: Sharri Benito RN 05/10/2025 4:05 AM     Please review provider order for any additional goals.   Nurse to notify provider when observation goals have been met and patient is ready for discharge.

## 2025-05-10 NOTE — PLAN OF CARE
PRIMARY DIAGNOSIS: ACUTE PAIN  OUTPATIENT/OBSERVATION GOALS TO BE MET BEFORE DISCHARGE:  1. Pain Status: Improved-controlled with oral pain medications.    2. Return to near baseline physical activity: Yes    3. Cleared for discharge by consultants (if involved): No    Discharge Planner Nurse   Safe discharge environment identified: Yes  Barriers to discharge: Yes, GI consult, CTA abdomen/pelvis pending.       Entered by: Yoselin Andrews RN 05/10/2025 6:23 AM     Please review provider order for any additional goals.   Nurse to notify provider when observation goals have been met and patient is ready for discharge.Goal Outcome Evaluation:      SBP in the 160s. Other Vitals stable on room air. Endorsed abdominal pain at 2/10. Pain tolerable per pt. Prn tylenol given for headache rated 4/10. Up independently in room. Keeps voiding in small amounts. Voided 100 ml. . Still wants to continue voiding on his own without straight cath.

## 2025-05-10 NOTE — PLAN OF CARE
"PRIMARY DIAGNOSIS: \"GENERIC\" NURSING  OUTPATIENT/OBSERVATION GOALS TO BE MET BEFORE DISCHARGE:  ADLs back to baseline: Yes    Activity and level of assistance: Ambulating independently.    Pain status: Improved-controlled with oral pain medications.    Return to near baseline physical activity: Yes     Discharge Planner Nurse   Safe discharge environment identified: Yes  Barriers to discharge: Yes       Entered by: Laura Hastings RN 05/10/2025 1:49 PM     Please review provider order for any additional goals.   Nurse to notify provider when observation goals have been met and patient is ready for discharge.Goal Outcome Evaluation:                            "

## 2025-05-10 NOTE — H&P
[facility]    History and Physical - Hospitalist Service       Date of Admission:  5/9/2025    Assessment & Plan      Ghulam Rizzo is a 69 year old male admitted on 5/9/2025. 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    Med rec not done at the time of admission.  Please reorder patient's PTA medications once med rec has been completed.    History of IBS  Chronic abdominal pain  Presumed radiation proctitis  The patient was just admitted to [facility] from 05/05 - 05/07 for the same.  Tonight he presents with chronic abdominal pain which he states is unchanged for the last year.  He feels that this pain started since completion of radiation therapy for prostate cancer in October 2023.  The pain is diffuse, intermittent, sharp and worsened with food.  Has intermittent nausea but no vomiting.  He has loose stools which he attributes to laxatives.  He underwent EGD in 6/2024 that was notable for normal stomach, duodenum.  Biopsies did not demonstrate H. pylori gastritis or eosinophilic esophagitis.  He underwent colonoscopy in 01/2025 that was notable for a few medium-sized patchy angiodysplastic lesions with typical arborization found in the rectum.  He was told by oncology and his PCP that his pain is likely due to radiation proctitis and he tried Carafate enemas without improvement.  For this admission he complains of intermittent moderate to severe sharp generalized abdominal pain.  On exam he has generalized tenderness but no involuntary/voluntary guarding.  LFTs normal.  WBC 6.8.  UA not concerning for infection.  CT abdomen pelvis notable for small bowel and colonic air-fluid levels that may be consistent with  gastroenteritis and significant bladder distention.  Also there was increasing prominence of the pancreatic duct which was nonspecific and follow-up MRCP was recommended.  - Enteric panel and C. difficile panel if the patient is observed to have diarrhea  - Upon review of his imaging it does not look like he has had his CTA abdomen pelvis, will order this as he could have chronic mesenteric ischemia  - GI consult, query whether this patient would benefit from hyoscyamine  - Continue PTA pain regimen with p.o. Dilaudid  - Bladder scan every shift and straight cath as needed for possible urine retention  - Will need outpatient MRI/MRCP to follow-up for pancreatic duct prominence    AGMA  Lactic acidosis  The patient presents with AG 16, bicarb 18, lactic acid 2.7 and decreased to 1.8 after IV fluids.  Likely due to poor p.o. intake in the setting of chronic liver disease.  The patient does not appear to be septic.  - Recheck BMP in the a.m.    Shortness of breath  The patient states he has had shortness of breath for the last month.  He complained of this on presentation to the ER tonight.  He denies chest pain.  No cough, sputum production.  No peripheral edema.  Lungs are clear on exam.  WBC normal.  Procalcitonin normal.  Troponin normal.  No signs of heart failure or COPD exacerbation.  CTA chest with no PE or signs of pneumonia but did note left basilar atelectasis related to chronic elevation of the left diaphragm which could be causing his shortness of breath.  - Monitor for now, could consider trial of incentive spirometry  - Routine EKG    Thrombocytopenia   on admission.  Was tempted to attribute this to his cirrhosis however this is a new finding.  Given the thrombocytopenia is mild would monitor for now. If worsening will consider workup.    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    The patient has had chronic abdominal pain since October 2023 after he completed radiation therapy for prostate cancer.  He was just admitted to [facility] from 05/05 - 05/07 for the same.  He was discharged on p.o. Dilaudid, Vesicare, mirabegron and a referral to GI.    The patient presents tonight and says his abdominal pain is unchanged.  He describes the pain as generalized, intermittent, sharp and worsened with food.  He does have the pain if he does not eat but it is not as severe as when  he does eat.  He has intermittent nausea but no vomiting.  He has loose stools which he attributes to his bowel regimen.  No fevers, chest pain, cough, sputum production, urinary complaints.  He also complains of worsening dyspnea for the last month.  The dyspnea is not worsened by laying flat.  He has no swelling in his extremities.  No hemoptysis.    ER course:  - Afebrile, HR 90s, RR 20, /80s and on RA  - K normal, creatinine normal, AG 16, bicarb 18, glucose 104  - LFTs normal  - WBC 6.8, Hgb normal,  - Troponin normal  - Lactic acid initially 2.7 and decreased to 1.8 on recheck  - Procalcitonin negative  - UA not concerning for infection  - CT abdomen pelvis with small bowel and colonic air-fluid levels concerning for gastroenteritis.  Also noted significant bladder distention and a prominent pancreatic duct  - CTA chest with no PE or  consolidation but did note left basilar atelectasis related to chronic elevation of the left diaphragm  - Given 2 L IVF, 1 mg Ativan, p.o. Dilaudid 2 mg, 4 mg IV morphine        Past Medical History    Past Medical History:   Diagnosis Date    Anisocoria     Asthma     Carpal tunnel syndrome     2006    Head injury, unspecified     closed head injury 4 yrs ago    Hepatitis C     Cured by Harvoni in 2015. diagnosed in 2012 was positive for Hepatitis C.     Obesity     2006, resolved    Other convulsions     seizure disorder due to head injury 4 yrs ago.    Other mononeuritis of upper limb     L phrenic nerve paralysis    Unspecified essential hypertension        Past Surgical History   Past Surgical History:   Procedure Laterality Date    COLECTOMY WITHOUT COLOSTOMY Right 7/13/2022    Procedure: Laparotomy, right colectomy, lysis of adhesions,;  Surgeon: Tommy Martino DO;  Location: [facility]    COLONOSCOPY      COLONOSCOPY N/A 7/18/2023    Procedure: Colonoscopy with polypectomy;  Surgeon: Octavio Aguirre MD;  Location: [facility]    COLONOSCOPY N/A 6/10/2024     Procedure: Colonoscopy;  Surgeon: Tommy Martino DO;  Location: [facility]    ESOPHAGOSCOPY, GASTROSCOPY, DUODENOSCOPY (EGD), COMBINED N/A 6/10/2024    Procedure: Esophagoscopy, gastroscopy, duodenoscopy (EGD), combined;  Surgeon: Tommy Martino DO;  Location: [facility] GI    GI SURGERY      HERNIA REPAIR      HERNIORRHAPHY INCISIONAL (LOCATION) N/A 7/13/2022    Procedure: primary incisional hernia repair;  Surgeon: Tommy Martino DO;  Location: [facility] OR    INSERT SEED MARKER / MATRIX N/A 8/30/2023    Procedure: Transrectal ultrasound guided placement of fiducials and SpaceOar;  Surgeon: Chase Blount MD;  Location:  OR    SURGICAL HISTORY OF -   4/6/87    esophagogastroduodenoscopy    SURGICAL HISTORY OF -   4/27/87    exploratory laparotomy and anterior gastropexy over a gastrostomy tube.    SURGICAL HISTORY OF -   11/13/90    esophagogastroduodenoscopy    SURGICAL HISTORY OF -   1991    L diaphragm eventration repair, fixation of stomach and colon to abd wall    SURGICAL HISTORY OF -       hiatal hernia repair    THORACIC SURGERY         Prior to Admission Medications   Prior to Admission Medications   Prescriptions Last Dose Informant Patient Reported? Taking?   Calcium Carbonate (CALCIUM-CARB 600 PO)  Self Yes No   Sig: Take 2 tablets by mouth daily   HYDROmorphone (DILAUDID) 2 MG tablet   No No   Sig: Take 1 tablet (2 mg) by mouth every 6 hours as needed for pain.   acetaminophen (TYLENOL) 500 MG tablet  Self Yes No   Sig: Take 1,000 mg by mouth every 6 hours as needed for mild pain   amLODIPine (NORVASC) 5 MG tablet  Self No No   Sig: Take 1 tablet (5 mg) by mouth daily.   buPROPion (WELLBUTRIN XL) 150 MG 24 hr tablet  Self No No   Sig: Take 1 tablet (150 mg) by mouth every morning.   cyanocobalamin (VITAMIN B-12) 1000 MCG tablet  Self No No   Sig: Take 1 tablet (1,000 mcg) by mouth daily   glycerin (LAXATIVE) 1.2 g suppository  Self No No   Sig: Place 1 suppository rectally  daily as needed (constipation.)   mirabegron (MYRBETRIQ) 50 MG 24 hr tablet   No No   Sig: Take 1 tablet (50 mg) by mouth daily.   polyethylene glycol (MIRALAX) 17 GM/Dose powder  Self No No   Sig: Take 17 g (1 capful.) by mouth daily   senna-docusate (NEGRITO-COLACE) 8.6-50 MG per tablet  Self No No   Sig: Take 1-2 tablets by mouth 2 times daily as needed for constipation.   sertraline (ZOLOFT) 50 MG tablet  Self No No   Sig: Take 1 tablet by mouth daily   solifenacin (VESICARE) 5 MG tablet  Self No No   Sig: Take 1 tablet (5 mg) by mouth daily.   tamsulosin (FLOMAX) 0.4 MG capsule  Self No No   Sig: Take 1 capsule (0.4 mg) by mouth every evening   vitamin D3 (CHOLECALCIFEROL) 50 mcg (2000 units) tablet  Self No No   Sig: Take 1 tablet (50 mcg) by mouth daily      Facility-Administered Medications: None        Review of Systems    The 10 point Review of Systems is negative other than noted in the HPI or here.     Social History   I have reviewed this patient's social history and updated it with pertinent information if needed.  Social History     Tobacco Use    Smoking status: Former     Current packs/day: 0.50     Average packs/day: 0.5 packs/day for 50.0 years (25.0 ttl pk-yrs)     Types: Cigarettes    Smokeless tobacco: Never   Vaping Use    Vaping status: Never Used   Substance Use Topics    Alcohol use: Yes     Comment: 1-2 beers daily    Drug use: No         Family History   I have reviewed this patient's family history and updated it with pertinent information if needed.  Family History   Problem Relation Age of Onset    Cerebrovascular Disease Mother     Heart Disease Brother     Heart Disease Brother          Allergies   Allergies   Allergen Reactions    Levaquin [Levofloxacin] Nausea and Vomiting    Oxycodone Dizziness    Percocet [Oxycodone-Acetaminophen] Visual Disturbance and Hallucination     Does not have any problems taking Tylenol/Acetaminophen.        Physical Exam   Vital Signs: Temp: 98.5  F (36.9   C)   BP: (!) 164/77 Pulse: 91   Resp: 28 SpO2: 100 %      Weight: 165 lbs 0 oz    GENERAL: Lying in bed, no distress, appears stated age   HEENT: NC/AT, sclera anicteric   CV: RRR, no M/R/G, CR < 2 s   PULM: CTAB, no wheezes, rales, rhonchi   GI: Abd soft, generalized abdominal tenderness, ND, no involuntary or voluntary guarding, no pain when I bumped the bed, not an acute abdomen.  He does have surgical scars about his abdomen.  MSK: WWP, no LE edema   NEURO: Awake, alert, oriented to 5/10/2025, CN II-XII grossly intact, ODONNELL, appears nonfocal  SKIN: no rash           Medical Decision Making       70 MINUTES SPENT BY ME on the date of service doing chart review, history, exam, documentation & further activities per the note.      Data     I have personally reviewed the following data over the past 24 hrs:    6.8  \   13.6   / 132 (L)     138 104 11.2 /  104 (H)   3.7 18 (L) 0.70 \     ALT: 7 AST: 8 AP: 63 TBILI: 1.0   ALB: 3.7 TOT PROTEIN: 5.8 (L) LIPASE: N/A     Trop: 16 BNP: N/A     Procal: 0.04 CRP: N/A Lactic Acid: 1.8         Imaging results reviewed over the past 24 hrs:   Recent Results (from the past 24 hours)   CT Abdomen Pelvis w Contrast    Narrative    EXAM: CT ABDOMEN PELVIS W CONTRAST  LOCATION: [facility]  DATE: 5/9/2025    INDICATION: abdominal pain  COMPARISON: 5/5/2025  TECHNIQUE: CT scan of the abdomen and pelvis was performed following injection of IV contrast. Multiplanar reformats were obtained. Dose reduction techniques were used.  CONTRAST: ISOVUE 370 81ML    FINDINGS:   LOWER CHEST: Elevation of the left hemidiaphragm redemonstrated, incompletely assessed. No basilar infiltrates.    HEPATOBILIARY: Incidental hepatic cysts. No biliary dilatation.    PANCREAS: Mild increase in prominence of the pancreatic duct without atrophy. No peripancreatic fluid collections or inflammatory change.    SPLEEN: Limited visualization. Scattered splenic calcification.    ADRENAL  GLANDS: Unremarkable    KIDNEYS/BLADDER: No hydronephrosis. Distended bladder.    BOWEL: Partial colectomy with anastomotic suture line in the right upper quadrant. Moderate colonic and small bowel air-fluid levels.    LYMPH NODES: No significant retroperitoneal adenopathy.    VASCULATURE: No abdominal aortic aneurysm. Patent portal vein.    PELVIC ORGANS: Mild prostatic hypertrophy. No free fluid.    MUSCULOSKELETAL: Small fat-containing inguinal hernias. Small fat-containing umbilical hernia. No acute bony abnormalities. Chronic L3 compression deformity.      Impression    IMPRESSION:   1.  Small bowel and colonic air-fluid levels may be seen with gastroenteritis/diarrheal illness.  2.  Significant bladder distention.  3.  Increasing prominence of the pancreatic duct is nonspecific. Clinical correlation suggested with short-term follow-up pancreatic MRI/MRCP with and without IV contrast.   CT Chest Pulmonary Embolism w Contrast    Narrative    EXAM: CT CHEST PULMONARY EMBOLISM W CONTRAST  LOCATION: [facility]  DATE: 5/9/2025    INDICATION: dyspnea  COMPARISON: CT from 4/18/2025.  TECHNIQUE: CT chest pulmonary angiogram during arterial phase injection of IV contrast. Multiplanar reformats and MIP reconstructions were performed. Dose reduction techniques were used.   CONTRAST: ISOVUE 370 81ML    FINDINGS:  ANGIOGRAM CHEST: Pulmonary arteries are normal caliber and negative for pulmonary emboli. Thoracic aorta is negative for dissection. No CT evidence of right heart strain.    LUNGS AND PLEURA: Stable elevation of the left hemidiaphragm with associated passive left lower lobe atelectasis. No acute airspace consolidation. No pneumothorax or pleural effusion.    MEDIASTINUM/AXILLAE: Normal heart size. No pericardial effusion.    CORONARY ARTERY CALCIFICATION: None.    UPPER ABDOMEN: Scattered hepatic and splenic granulomas.    MUSCULOSKELETAL: Osteopenia with multilevel thoracic spine  degenerative disc change.      Impression    IMPRESSION:  1.  Negative for pulmonary embolism.    2.  Left basilar atelectasis related to elevation of the left hemidiaphragm.

## 2025-05-10 NOTE — PROGRESS NOTES
"[facility] ED Handoff Report    ED Chief Complaint: admitted on 5/9/2025. 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 difficile panel if the patient is observed to have diarrhea  - Upon review of his imaging it does not look like he has had his CTA abdomen pelvis, will order this as he could have chronic mesenteric ischemia  - GI consult, query whether this patient would benefit from hyoscyamine  - Continue PTA pain regimen with p.o. Dilaudid  - Bladder scan every shift and straight cath as needed for possible urine retention  - Will need outpatient MRI/MRCP to follow-up for pancreatic duct prominence    PMH:    Past Medical History:   Diagnosis Date    Anisocoria     Asthma     Carpal tunnel syndrome     2006    Head injury, unspecified     closed head injury 4 yrs ago    Hepatitis C     Cured by Harvoni in 2015. diagnosed in 2012 was positive for Hepatitis C.     Obesity     2006, resolved    Other convulsions     seizure disorder due to head injury 4 yrs ago.    Other mononeuritis of upper limb     L phrenic nerve paralysis    Unspecified essential hypertension         Code Status:  Full Code     Falls Risk: N/A Band: Not applicable    Current Living Situation/Residence: lives with a significant other     Elimination Status: Continent: Yes     Activity Level: SBA    Patients Preferred Language:  English     Needed: No    Vital Signs:  BP (!) 146/82   Pulse 69   Temp 97.8  F (36.6  C) (Oral)   Resp 16   Ht 1.88 m (6' 2\")   Wt 74.8 kg (165 lb)   SpO2 99%   BMI 21.18 kg/m       Cardiac Rhythm: NSR, no tele orders    Pain Score: " 1/10    Is the Patient Confused:  No    Last Food or Drink: yesterday    Tests Performed: Done: Labs and Imaging    Treatments Provided:  Labs, Bladder scan, St Cath if needed, and pain medications.      Family Dynamics/Concerns: No    Family Updated On Visitor Policy: N/A    Plan of Care Communicated to Family: N/A    Who Was Updated about Plan of Care: No one present    Belongings Checklist Done and Signed by Patient: N/A    Medications sent with patient: None    Covid: asymptomatic , Not tested    Additional Information: Bladder scanned pt initially had 591 in bladder.  Pt able to void 125 ml then rescanned for 470.  Dr. Ordoñez notified, since pt is not having any discomfort, pt can decide if he wants to be st. Cath.  Pt declined being st cath, wants to see if he can eventually empty on his own.  Dr. Ordoñez notified and agreeable with plan.      RN: Sharri Benito RN 5/10/2025 4:40 AM

## 2025-05-11 ENCOUNTER — APPOINTMENT (OUTPATIENT)
Dept: CT IMAGING | Facility: HOSPITAL | Age: 70
DRG: 393 | End: 2025-05-11
Attending: INTERNAL MEDICINE
Payer: COMMERCIAL

## 2025-05-11 LAB
ANION GAP SERPL CALCULATED.3IONS-SCNC: 11 MMOL/L (ref 7–15)
BUN SERPL-MCNC: 8.9 MG/DL (ref 8–23)
CALCIUM SERPL-MCNC: 9 MG/DL (ref 8.8–10.4)
CHLORIDE SERPL-SCNC: 107 MMOL/L (ref 98–107)
CREAT SERPL-MCNC: 0.79 MG/DL (ref 0.67–1.17)
EGFRCR SERPLBLD CKD-EPI 2021: >90 ML/MIN/1.73M2
ERYTHROCYTE [DISTWIDTH] IN BLOOD BY AUTOMATED COUNT: 12.3 % (ref 10–15)
GLUCOSE SERPL-MCNC: 89 MG/DL (ref 70–99)
HCO3 SERPL-SCNC: 24 MMOL/L (ref 22–29)
HCT VFR BLD AUTO: 37 % (ref 40–53)
HGB BLD-MCNC: 13.1 G/DL (ref 13.3–17.7)
MCH RBC QN AUTO: 31.8 PG (ref 26.5–33)
MCHC RBC AUTO-ENTMCNC: 35.4 G/DL (ref 31.5–36.5)
MCV RBC AUTO: 90 FL (ref 78–100)
PLATELET # BLD AUTO: 137 10E3/UL (ref 150–450)
POTASSIUM SERPL-SCNC: 3.1 MMOL/L (ref 3.4–5.3)
POTASSIUM SERPL-SCNC: 3.5 MMOL/L (ref 3.4–5.3)
RBC # BLD AUTO: 4.12 10E6/UL (ref 4.4–5.9)
SODIUM SERPL-SCNC: 142 MMOL/L (ref 135–145)
WBC # BLD AUTO: 5.1 10E3/UL (ref 4–11)

## 2025-05-11 PROCEDURE — 250N000013 HC RX MED GY IP 250 OP 250 PS 637: Performed by: INTERNAL MEDICINE

## 2025-05-11 PROCEDURE — 250N000013 HC RX MED GY IP 250 OP 250 PS 637: Performed by: HOSPITALIST

## 2025-05-11 PROCEDURE — 84132 ASSAY OF SERUM POTASSIUM: CPT | Performed by: HOSPITALIST

## 2025-05-11 PROCEDURE — 80048 BASIC METABOLIC PNL TOTAL CA: CPT | Performed by: HOSPITALIST

## 2025-05-11 PROCEDURE — 36415 COLL VENOUS BLD VENIPUNCTURE: CPT | Performed by: HOSPITALIST

## 2025-05-11 PROCEDURE — 99233 SBSQ HOSP IP/OBS HIGH 50: CPT | Performed by: HOSPITALIST

## 2025-05-11 PROCEDURE — 250N000011 HC RX IP 250 OP 636: Performed by: INTERNAL MEDICINE

## 2025-05-11 PROCEDURE — 120N000001 HC R&B MED SURG/OB

## 2025-05-11 PROCEDURE — 85014 HEMATOCRIT: CPT | Performed by: HOSPITALIST

## 2025-05-11 PROCEDURE — 74174 CTA ABD&PLVS W/CONTRAST: CPT

## 2025-05-11 PROCEDURE — 85018 HEMOGLOBIN: CPT | Performed by: HOSPITALIST

## 2025-05-11 RX ORDER — DOCUSATE SODIUM 100 MG/1
100 CAPSULE, LIQUID FILLED ORAL 2 TIMES DAILY
Status: DISCONTINUED | OUTPATIENT
Start: 2025-05-11 | End: 2025-05-14

## 2025-05-11 RX ORDER — POTASSIUM CHLORIDE 1500 MG/1
40 TABLET, EXTENDED RELEASE ORAL ONCE
Status: COMPLETED | OUTPATIENT
Start: 2025-05-11 | End: 2025-05-11

## 2025-05-11 RX ORDER — IOPAMIDOL 755 MG/ML
90 INJECTION, SOLUTION INTRAVASCULAR ONCE
Status: DISCONTINUED | OUTPATIENT
Start: 2025-05-11 | End: 2025-05-11

## 2025-05-11 RX ORDER — BISACODYL 10 MG
10 SUPPOSITORY, RECTAL RECTAL DAILY PRN
Status: DISCONTINUED | OUTPATIENT
Start: 2025-05-11 | End: 2025-05-15 | Stop reason: HOSPADM

## 2025-05-11 RX ORDER — POLYETHYLENE GLYCOL 3350 17 G/17G
17 POWDER, FOR SOLUTION ORAL DAILY
Status: DISCONTINUED | OUTPATIENT
Start: 2025-05-11 | End: 2025-05-14

## 2025-05-11 RX ORDER — BISACODYL 5 MG
5 TABLET, DELAYED RELEASE (ENTERIC COATED) ORAL DAILY PRN
Status: DISCONTINUED | OUTPATIENT
Start: 2025-05-11 | End: 2025-05-15 | Stop reason: HOSPADM

## 2025-05-11 RX ORDER — IOPAMIDOL 755 MG/ML
90 INJECTION, SOLUTION INTRAVASCULAR ONCE
Status: COMPLETED | OUTPATIENT
Start: 2025-05-11 | End: 2025-05-11

## 2025-05-11 RX ADMIN — ONDANSETRON 4 MG: 4 TABLET, ORALLY DISINTEGRATING ORAL at 21:04

## 2025-05-11 RX ADMIN — ONDANSETRON 4 MG: 2 INJECTION, SOLUTION INTRAMUSCULAR; INTRAVENOUS at 04:35

## 2025-05-11 RX ADMIN — BUPROPION HYDROCHLORIDE 150 MG: 150 TABLET, FILM COATED, EXTENDED RELEASE ORAL at 11:49

## 2025-05-11 RX ADMIN — SERTRALINE HYDROCHLORIDE 50 MG: 50 TABLET ORAL at 11:49

## 2025-05-11 RX ADMIN — DICYCLOMINE HYDROCHLORIDE 20 MG: 20 TABLET ORAL at 17:26

## 2025-05-11 RX ADMIN — POTASSIUM CHLORIDE 40 MEQ: 1500 TABLET, EXTENDED RELEASE ORAL at 11:49

## 2025-05-11 RX ADMIN — Medication 226 ML: at 08:28

## 2025-05-11 RX ADMIN — TAMSULOSIN HYDROCHLORIDE 0.4 MG: 0.4 CAPSULE ORAL at 20:47

## 2025-05-11 RX ADMIN — AMLODIPINE BESYLATE 5 MG: 5 TABLET ORAL at 11:49

## 2025-05-11 RX ADMIN — TOLTERODINE 2 MG: 2 CAPSULE, EXTENDED RELEASE ORAL at 11:49

## 2025-05-11 RX ADMIN — ACETAMINOPHEN 325 MG: 325 TABLET ORAL at 09:49

## 2025-05-11 RX ADMIN — BISACODYL 10 MG: 10 SUPPOSITORY RECTAL at 06:48

## 2025-05-11 RX ADMIN — ONDANSETRON 4 MG: 2 INJECTION, SOLUTION INTRAMUSCULAR; INTRAVENOUS at 09:49

## 2025-05-11 RX ADMIN — ACETAMINOPHEN 325 MG: 325 TABLET ORAL at 17:26

## 2025-05-11 RX ADMIN — IOPAMIDOL 90 ML: 755 INJECTION, SOLUTION INTRAVENOUS at 09:34

## 2025-05-11 RX ADMIN — PROCHLORPERAZINE EDISYLATE 5 MG: 5 INJECTION INTRAMUSCULAR; INTRAVENOUS at 23:36

## 2025-05-11 RX ADMIN — DOCUSATE SODIUM 100 MG: 100 CAPSULE, LIQUID FILLED ORAL at 20:46

## 2025-05-11 ASSESSMENT — ACTIVITIES OF DAILY LIVING (ADL)
ADLS_ACUITY_SCORE: 36
ADLS_ACUITY_SCORE: 41
ADLS_ACUITY_SCORE: 41
ADLS_ACUITY_SCORE: 36
ADLS_ACUITY_SCORE: 41
ADLS_ACUITY_SCORE: 41
ADLS_ACUITY_SCORE: 36
ADLS_ACUITY_SCORE: 41
ADLS_ACUITY_SCORE: 41
ADLS_ACUITY_SCORE: 36

## 2025-05-11 NOTE — PLAN OF CARE
"PRIMARY DIAGNOSIS: \"- Abdominal pain  OUTPATIENT/OBSERVATION GOALS TO BE MET BEFORE DISCHARGE:  ADLs back to baseline: Yes    Activity and level of assistance: Ambulating independently.    Pain status: Pain free.    Return to near baseline physical activity: Yes     Discharge Planner Nurse   Safe discharge environment identified: No  Barriers to discharge: Yes       Entered by: Dilcia Joya RN 05/10/2025 7:03 PM     Please review provider order for any additional goals.   Nurse to notify provider when observation goals have been met and patient is ready for discharge.Goal Outcome Evaluation:    A/O x4, room air. VSS. Denied abdominal pain. Started on Bentyl before dinner. No Tele. Independent in room. Diet updated to Regular, eating now.  Plan CT Abdomen.  "

## 2025-05-11 NOTE — CONSULTS
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 Vascular surgery consulted as on CTA there was concern for compression of celiac artery by median arcuate ligament. Patient notess chronic abdominal pain in epigastric region that worsens w/ eating. Reports EGD/colonoscopy a few months ago, h/o GI bleeds. Some associated nausea over past few days, no bloody Bms now - is constipated.   Decided to come into hospital now w/ pain 2/2 additional dizziness with it \"feeling like he's on a boat\" when he walks. The abdominal pain is less of a concern.   Labs w/ K of 3.5, hgb 13.1, no leukocytosis wbc 5.1 - imaging as above w/ possible compression celiac artery. Overall - while median arcuate ligament syndrome can cause postprandial abdominal pain this gentleman is not the typical age group for presentation. He also has several other reasons to have abd pain - w/ IBS diagnosis. However -we will obtain duplex for further imaging.     - No role for acute vascular surgical intervention   - Will get mesenteric d uplex w/  inspiration and expiration  - Likelihood that this is MALS is this older gentleman is rare as this generally presents as a younger age however is possible  - Management for MALS first line is often celiac axis block - if no other etiology of abdominal pain could consider this - this is not a vascular surgical procedure however  - pain team may be able to help arrange this  - no role for a/c       Discussed with Dr. 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 Vascular surgery consulted as on CTA there was concern for compression of celiac artery by median arcuate ligament.     Reports a 2 year history of chornic abdominal pain, but also a 30 year history of \"belly troubles\"   Notes that he has chronic epigastric pain that is a 4/10 that worsens after eating - amount it worsens correlates to amount that he eats. Larger meals give  him more pain - up to 9/10. He states this has been going on for 2 years as well.   Over past 1 month has lost 15 pounds - does not relate this to food fear, tells me he is trying to eat.   Constipated, last BM 3 days ago - no relief w/ suppository today   Tells me he is passing gas regularly   No blood in Bms  Is nauseous  Hasn't vomited  No fever, chills      Past Medical History:  Past Medical History:   Diagnosis Date    Anisocoria     Asthma     Carpal tunnel syndrome     2006    Head injury, unspecified     closed head injury 4 yrs ago    Hepatitis C     Cured by [facility] in 2015. diagnosed in 2012 was positive for Hepatitis C.     Obesity     2006, resolved    Other convulsions     seizure disorder due to head injury 4 yrs ago.    Other mononeuritis of upper limb     L phrenic nerve paralysis    Unspecified essential hypertension        Past Surgical History  As above    Family History:  Noncontributory     Social History:  Does not smoke. Quit 2 years ago  Drinks 1 beer/day   Lives at home w/ wife.   Hasn't been able to do ADLs past 2 weeks 2/2 dizziness.     Medications:  No current outpatient medications on file.       Allergies:     Allergies   Allergen Reactions    Levaquin " "[Levofloxacin] Nausea and Vomiting    Oxycodone Dizziness    Percocet [Oxycodone-Acetaminophen] Visual Disturbance and Hallucination     Does not have any problems taking Tylenol/Acetaminophen.       Review of Symptoms:  A 10 point review of symptoms has been conducted and is negative except for that mentioned in the above HPI.    Physical Exam:    Blood pressure (!) 151/85, pulse 87, temperature 97.7  F (36.5  C), temperature source Oral, resp. rate 18, height 1.88 m (6' 2\"), weight 74.8 kg (165 lb), SpO2 94%.  Gen:    Lying in bed in NAD, A&OX3  HEENT: Normocephalic and atraumatic  CV:  RRR  Pulm:  Non-labored breathing on room air  Abd:  Soft, milldy tender to palpation in all quadrants, some voluntary guarding in epigastric region, otherwise soft, no signs of peritonitis.   Ext:  Warm and well perfused, no obvious deformities  Vascular:  Palpable dp pulses b/l.   Neuro: Sensation in tact to light touch in ble    Labs:  CBC RESULTS:   Recent Labs   Lab Test 05/11/25  0540   WBC 5.1   HGB 13.1*   *     Last Basic Metabolic Panel:  Lab Results   Component Value Date    POTASSIUM 3.5 05/11/2025    POTASSIUM 3.9 07/15/2022    POTASSIUM 4.0 06/28/2020     Lab Results   Component Value Date    CR 0.79 05/11/2025    CR 0.72 06/28/2020       Imaging:  CTA reviewed, I do see mild compression of celiac at level of median arcuate ligament. No major stenosis of vessels seen, no calcification.         "

## 2025-05-11 NOTE — PROGRESS NOTES
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 Patient admitted to [facility] from 05/05 - 05/07 for the same.  Pain is unchanged in the past year and feels that this pain started since completion of radiation therapy for prostate cancer in October 2023.  The pain is diffuse, intermittent, sharp and worsened with food and associated intermittent nausea and loose stools. He underwent EGD in 6/2024 that was notable for normal stomach, duodenum. He underwent colonoscopy in 01/2025 that was notable for a few medium-sized patchy angiodysplastic lesions with typical arborization found in the rectum. During this admission imaging with small bowel and colonic air fluid level consistent with gastroenteritis and increasing prominence of pancreatic duct. LFTs normal. No leukocytosis. GI consulted and plan for CTA of abdomen, outpatient MRCP and bowel regimen optimization. 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 Will place vascular consult.     #History of IBS  #Chronic abdominal pain  #Presumed radiation proctitis  - complains of intermittent moderate to severe sharp generalized abdominal pain.  - LFTs normal.  WBC 6.8.  UA not concerning for infection.    - CT abdomen pelvis notable for small bowel and colonic air-fluid levels that may be consistent with gastroenteritis and significant bladder distention.  Also there was increasing prominence of the pancreatic duct which was nonspecific and follow-up MRCP was recommended.  - No diarrhea thus no enteric panel and C. difficile panel ordered  -  CTA abdomen pelvis ordered to evaluate for chronic mesenteric ischemia showing possible MALS and extrinsic compression of celiac artery. Vascular surgery consulted.   - GI consulted and recommendations noted  - Continue PTA pain regimen with p.o. Dilaudid  - Trial Dicyclomine started during this admission.   - Bladder scan every shift and straight cath as needed    - Will need outpatient MRI/MRCP to follow-up for pancreatic duct prominence  -Can consider Bowel regimen changes suggested by GI: continue Miralax BID with 2 senna's daily vs Linzess/Trulance/Amitiza (based on insurance coverage) vs glycerin suppository QAM vs Fleet QAM.     #Hypokalemia-resolved  -RN potassium replacement protocol     #AGMA-resolved  #Lactic acidosis-resolved  -resolved with fluids     #Shortness of breath-resolved  #Weakness  -The patient states he has had shortness of breath for the last month.   - No chest pain.  No cough, sputum production.  No peripheral edema.    - WBC normal.  Procalcitonin normal.  Troponin normal.   - CTA chest with no PE or signs of pneumonia but did note left basilar atelectasis related to chronic elevation of the left diaphragm which could be causing his shortness of breath.  -BNP wnl   -Stable on RA and does not require supplemental oxygen    -PT/OT consult     #Thrombocytopenia  -132---128---137  - Could be related to liver. Will monitor.        #BPH  #Radiation cystitis  - Continue Vesicare, Flomax, mirabegron  - Has has referral for pelvic floor training    #Cirrhosis without ascites or varices  #History of hep C  - S/p treatment in 2013 now in remission  - LFTs normal    #HTN  - Continue PTA amlodipine    #Mood  - Continue bupropion and sertraline    #Prostate cancer s/p radiation therapy in 2023    #History of cecal volvulus in 2022 s/p right hemicolectomy          Diet: Regular Diet Adult    DVT Prophylaxis: Pneumatic Compression Devices  Rivera Catheter: Not present  Lines: None     Cardiac Monitoring:  None  Code Status: Full Code      Clinically Significant Risk Factors Present on Admission        # Hypokalemia: Lowest K = 3.1 mmol/L in last 2 days, will replace as needed    # Hypocalcemia: Lowest Ca = 8.4 mg/dL in last 2 days, will monitor and replace as appropriate       # Thrombocytopenia: Lowest platelets = 128 in last 2 days, will monitor for bleeding   # Hypertension: Noted on problem list               # Financial/Environmental Concerns: none         Social Drivers of Health    Tobacco Use: Medium Risk (4/18/2025)    Patient History     Smoking Tobacco Use: Former     Smokeless Tobacco Use: Never          Disposition Plan     Medically Ready for Discharge: Anticipated Tomorrow             Rachael Ibrahim MD  Hospitalist Service  [facility]  Securely message with [employer] (more info)  Text page via [facility] Paging/Directory   ______________________________________________________________________    Interval History   Patient seen this morning. He reports abdominal pain with eating and wants to not eat today or take only fluids. He also reports still feeling like he has stool even though he had a bowel movement after the enema this morning. Patient denies any fever, chills, chest pain, dyspnea or vomiting but reports some nausea this morning. He also reports feeling weak and unable to be steady when he gets up.     Physical Exam   Vital Signs: Temp: 98.6  F (37  C) Temp src: Oral BP: 119/63 Pulse: 80   Resp: 16 SpO2: 97 % O2 Device: None (Room air)    Weight: 165 lbs 0 oz    Constitutional: awake, alert, cooperative, no apparent distress, and appears stated age  Eyes: pupils equal, round and reactive to light and sclera clear  ENT: atraumatic, oral pharynx with moist mucus membranes  Respiratory: No increased work of breathing, good air exchange, clear to auscultation bilaterally, no crackles or wheezing  Cardiovascular: regular rate and rhythm and normal S1 and S2  GI: normal bowel  sounds, soft, non-distended, and non-tender  Musculoskeletal: left leg 4+ in strength, right leg 5+ in strength, no lower extremity pitting edema present    Medical Decision Making       52 MINUTES SPENT BY ME on the date of service doing chart review, history, exam, documentation & further activities per the note.      Data     I have personally reviewed the following data over the past 24 hrs:    5.1  \   13.1 (L)   / 137 (L)     142 107 8.9 /  89   3.5 24 0.79 \       Imaging results reviewed over the past 24 hrs:   Recent Results (from the past 24 hours)   CTA Abdomen Pelvis with Contrast    Narrative    EXAM: CTA ABDOMEN PELVIS WITH CONTRAST  LOCATION: [facility]  DATE: 5/11/2025    INDICATION: Chronic abdominal pain, worsened by food.  Eval for chronic mesenteric ischemia.  COMPARISON: 5/9/2025   TECHNIQUE: CT angiogram abdomen pelvis during arterial phase of injection of IV contrast. 2D and 3D MIP reconstructions were performed by the CT technologist. Dose reduction techniques were used.  CONTRAST: isovue 370 90mL    FINDINGS:  ANGIOGRAM ABDOMEN/PELVIS: Roughly 50% narrowing of the proximal celiac artery via extrinsic compression from the median arcuate ligament with abrupt superior angulation of the artery beyond the ligament and moderate post stenotic dilation to 9 mm in   diameter. Mild atherosclerosis with no stenoses of the superior mesenteric, single bilateral renal, or inferior mesenteric arteries.     LOWER CHEST: Markedly elevated left hemidiaphragm. Strand of atelectasis or scarring left lower lobe. Calcified right hilar lymph nodes.     HEPATOBILIARY: Calcified liver granulomas. Liver cyst.     PANCREAS: Normal.    SPLEEN: Calcified splenic granulomas.     ADRENAL GLANDS: Normal.    KIDNEYS/BLADDER: Right kidney cysts, requiring no follow-up.     BOWEL: Right hemicolectomy.     LYMPH NODES: Normal.    PELVIC ORGANS: Fiducial markers in the prostate. Pelvic phleboliths.      MUSCULOSKELETAL: Calcified injection granuloma left buttock. Osteopenia. Old L3 compression fracture. Moderate degenerative change in the spine.       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

## 2025-05-11 NOTE — PLAN OF CARE
PRIMARY DIAGNOSIS: ACUTE PAIN  OUTPATIENT/OBSERVATION GOALS TO BE MET BEFORE DISCHARGE:  1. Pain Status: reporting pain, oral PRN dilaudid given.     2. Return to near baseline physical activity: No    3. Cleared for discharge by consultants (if involved): Yes, GI with sign off if CT clear in AM.     Discharge Planner Nurse   Safe discharge environment identified: Yes  Barriers to discharge: Yes           A&Ox4, hypertensive. Reporting 8/10 abdominal bloating - PRN dilaudid given.

## 2025-05-11 NOTE — PLAN OF CARE
Dr. Koo notified @ 2115:      pt was advanced to regular diet today from clears, after dinner is reporting 8/10 pain to abdomen. Do you want to put in orders to change diet?       Findings for gastroenteritis, continue diet if tolerating.

## 2025-05-11 NOTE — PLAN OF CARE
PRIMARY DIAGNOSIS: ACUTE PAIN  OUTPATIENT/OBSERVATION GOALS TO BE MET BEFORE DISCHARGE:  1. Pain Status: Improved-controlled with oral pain medications.    2. Return to near baseline physical activity: Yes    3. Cleared for discharge by consultants (if involved): Yes    Discharge Planner Nurse   Safe discharge environment identified: Yes  Barriers to discharge: Yes - CTA 5/11 AM      Reporting improvement in pain. Conspitation present. Ambulating ind to bathroom.

## 2025-05-11 NOTE — PROVIDER NOTIFICATION
Dr. George notified @ 1422:      Pt is c/o abdominal pain, nausea, and constipation. Given PRN senna before bed last night. Pt requesting more bowel medication. Mentioned need for possible enema, any new orders?      Daily medication ordered and PRN enema.

## 2025-05-11 NOTE — PROGRESS NOTES
Care Management Follow Up    Length of Stay (days): 1    Expected Discharge Date:  05/12/2025    Anticipated Discharge Plan:   Goal is home pending team recommendations.     Transportation: Anticipate Family/friend    PT Recommendations:    OT Recommendations:        Barriers to Discharge: Pain control; unsteady with ambulation, therapy to evaluate    Prior Living Situation: house with spouse    Discussed  Partnership in Safe Discharge Planning  document with patient/family: No     Handoff Completed: No, handoff not indicated or clinically appropriate    Patient/Spokesperson Updated: No    Additional Information:  Patient is  and independent at baseline but no longer drives. He does not use any DME for mobility assist. PT and OT to evaluate and treat due to noted unsteadiness.     Next Steps: Watch for team recommendations.     Radha Ramirez RN

## 2025-05-11 NOTE — PLAN OF CARE
Problem: Delirium  Goal: Improved Sleep  5/11/2025 0330 by Yazmin Aguirre, RN  Outcome: Progressing  5/10/2025 2337 by Yazmin Aguirre, RN  Outcome: Progressing  5/10/2025 2133 by Yazmin Aguirre, RN  Outcome: Progressing       Goal Outcome Evaluation:    A&Ox4, periodically hypertensive. C/o abdominal pain and headache overnight - PRN dilaudid given x1. Reports feeling bloated and constipated, PRN senna and supp given. Passing gas. L AC SL. Regular diet. Ambulating independently in room and to bathroom. Plan for CTA today. Outpt. MRCP. GI consulted.

## 2025-05-11 NOTE — PLAN OF CARE
Problem: Adult Inpatient Plan of Care  Goal: Plan of Care Review  Description: The Plan of Care Review/Shift note should be completed every shift.  The Outcome Evaluation is a brief statement about your assessment that the patient is improving, declining, or no change.  This information will be displayed automatically on your shiftnote.  Outcome: Progressing   Goal Outcome Evaluation:       A&Ox4. Up to restroom IND. Pt in tears this AM. Reports needing to have a BM. Pt requesting enema. Enema administered with small results. Pt had another episode of small results late afternoon. Voiding adequately. CT completed. Awaiting Vascular consult. Pt willing to try soup this evening for dinner. Did not eat all day; only drinking water.   IV zofran given x1 for episodes of nausea; no emesis.

## 2025-05-12 ENCOUNTER — PATIENT OUTREACH (OUTPATIENT)
Dept: CARE COORDINATION | Facility: CLINIC | Age: 70
End: 2025-05-12
Payer: COMMERCIAL

## 2025-05-12 ENCOUNTER — APPOINTMENT (OUTPATIENT)
Dept: ULTRASOUND IMAGING | Facility: HOSPITAL | Age: 70
DRG: 393 | End: 2025-05-12
Payer: COMMERCIAL

## 2025-05-12 ENCOUNTER — DOCUMENTATION ONLY (OUTPATIENT)
Dept: GASTROENTEROLOGY | Facility: CLINIC | Age: 70
End: 2025-05-12
Payer: COMMERCIAL

## 2025-05-12 ENCOUNTER — APPOINTMENT (OUTPATIENT)
Dept: PHYSICAL THERAPY | Facility: HOSPITAL | Age: 70
DRG: 393 | End: 2025-05-12
Attending: HOSPITALIST
Payer: COMMERCIAL

## 2025-05-12 ENCOUNTER — APPOINTMENT (OUTPATIENT)
Dept: OCCUPATIONAL THERAPY | Facility: HOSPITAL | Age: 70
DRG: 393 | End: 2025-05-12
Attending: HOSPITALIST
Payer: COMMERCIAL

## 2025-05-12 ENCOUNTER — APPOINTMENT (OUTPATIENT)
Dept: CARDIOLOGY | Facility: HOSPITAL | Age: 70
DRG: 393 | End: 2025-05-12
Attending: INTERNAL MEDICINE
Payer: COMMERCIAL

## 2025-05-12 DIAGNOSIS — F41.9 ANXIETY: Primary | ICD-10-CM

## 2025-05-12 LAB
ANION GAP SERPL CALCULATED.3IONS-SCNC: 8 MMOL/L (ref 7–15)
BUN SERPL-MCNC: 14 MG/DL (ref 8–23)
CALCIUM SERPL-MCNC: 8.8 MG/DL (ref 8.8–10.4)
CHLORIDE SERPL-SCNC: 106 MMOL/L (ref 98–107)
CREAT SERPL-MCNC: 0.88 MG/DL (ref 0.67–1.17)
CRP SERPL HS-MCNC: 1 MG/L
EGFRCR SERPLBLD CKD-EPI 2021: >90 ML/MIN/1.73M2
ERYTHROCYTE [DISTWIDTH] IN BLOOD BY AUTOMATED COUNT: 12.3 % (ref 10–15)
ERYTHROCYTE [SEDIMENTATION RATE] IN BLOOD BY WESTERGREN METHOD: 9 MM/HR (ref 0–20)
GLUCOSE BLDC GLUCOMTR-MCNC: 90 MG/DL (ref 70–99)
GLUCOSE SERPL-MCNC: 90 MG/DL (ref 70–99)
HCO3 SERPL-SCNC: 26 MMOL/L (ref 22–29)
HCT VFR BLD AUTO: 34.3 % (ref 40–53)
HGB BLD-MCNC: 12.6 G/DL (ref 13.3–17.7)
LVEF ECHO: NORMAL
MCH RBC QN AUTO: 33 PG (ref 26.5–33)
MCHC RBC AUTO-ENTMCNC: 36.7 G/DL (ref 31.5–36.5)
MCV RBC AUTO: 90 FL (ref 78–100)
PLATELET # BLD AUTO: 132 10E3/UL (ref 150–450)
POTASSIUM SERPL-SCNC: 3.7 MMOL/L (ref 3.4–5.3)
RBC # BLD AUTO: 3.82 10E6/UL (ref 4.4–5.9)
SODIUM SERPL-SCNC: 140 MMOL/L (ref 135–145)
WBC # BLD AUTO: 4.3 10E3/UL (ref 4–11)

## 2025-05-12 PROCEDURE — 85014 HEMATOCRIT: CPT | Performed by: HOSPITALIST

## 2025-05-12 PROCEDURE — 36415 COLL VENOUS BLD VENIPUNCTURE: CPT | Performed by: HOSPITALIST

## 2025-05-12 PROCEDURE — 86141 C-REACTIVE PROTEIN HS: CPT

## 2025-05-12 PROCEDURE — 36415 COLL VENOUS BLD VENIPUNCTURE: CPT

## 2025-05-12 PROCEDURE — 97165 OT EVAL LOW COMPLEX 30 MIN: CPT | Mod: GO

## 2025-05-12 PROCEDURE — 97161 PT EVAL LOW COMPLEX 20 MIN: CPT | Mod: GP

## 2025-05-12 PROCEDURE — 97530 THERAPEUTIC ACTIVITIES: CPT | Mod: GP

## 2025-05-12 PROCEDURE — 120N000001 HC R&B MED SURG/OB

## 2025-05-12 PROCEDURE — 99233 SBSQ HOSP IP/OBS HIGH 50: CPT | Performed by: INTERNAL MEDICINE

## 2025-05-12 PROCEDURE — 255N000002 HC RX 255 OP 636: Performed by: INTERNAL MEDICINE

## 2025-05-12 PROCEDURE — 93306 TTE W/DOPPLER COMPLETE: CPT | Mod: 26 | Performed by: INTERNAL MEDICINE

## 2025-05-12 PROCEDURE — 250N000013 HC RX MED GY IP 250 OP 250 PS 637: Performed by: INTERNAL MEDICINE

## 2025-05-12 PROCEDURE — 250N000013 HC RX MED GY IP 250 OP 250 PS 637: Performed by: HOSPITALIST

## 2025-05-12 PROCEDURE — 85652 RBC SED RATE AUTOMATED: CPT

## 2025-05-12 PROCEDURE — 93975 VASCULAR STUDY: CPT

## 2025-05-12 PROCEDURE — 80048 BASIC METABOLIC PNL TOTAL CA: CPT | Performed by: HOSPITALIST

## 2025-05-12 PROCEDURE — 97535 SELF CARE MNGMENT TRAINING: CPT | Mod: GO

## 2025-05-12 PROCEDURE — 85041 AUTOMATED RBC COUNT: CPT | Performed by: HOSPITALIST

## 2025-05-12 PROCEDURE — 82565 ASSAY OF CREATININE: CPT | Performed by: HOSPITALIST

## 2025-05-12 RX ADMIN — SENNOSIDES AND DOCUSATE SODIUM 2 TABLET: 50; 8.6 TABLET ORAL at 08:47

## 2025-05-12 RX ADMIN — DOCUSATE SODIUM 100 MG: 100 CAPSULE, LIQUID FILLED ORAL at 08:43

## 2025-05-12 RX ADMIN — DICYCLOMINE HYDROCHLORIDE 20 MG: 20 TABLET ORAL at 11:39

## 2025-05-12 RX ADMIN — DICYCLOMINE HYDROCHLORIDE 20 MG: 20 TABLET ORAL at 21:00

## 2025-05-12 RX ADMIN — DICYCLOMINE HYDROCHLORIDE 20 MG: 20 TABLET ORAL at 17:33

## 2025-05-12 RX ADMIN — SERTRALINE HYDROCHLORIDE 50 MG: 50 TABLET ORAL at 08:43

## 2025-05-12 RX ADMIN — ACETAMINOPHEN 325 MG: 325 TABLET ORAL at 21:01

## 2025-05-12 RX ADMIN — TOLTERODINE 2 MG: 2 CAPSULE, EXTENDED RELEASE ORAL at 08:43

## 2025-05-12 RX ADMIN — PERFLUTREN 2 ML: 6.52 INJECTION, SUSPENSION INTRAVENOUS at 14:56

## 2025-05-12 RX ADMIN — AMLODIPINE BESYLATE 5 MG: 5 TABLET ORAL at 08:43

## 2025-05-12 RX ADMIN — ACETAMINOPHEN 325 MG: 325 TABLET ORAL at 06:23

## 2025-05-12 RX ADMIN — DICYCLOMINE HYDROCHLORIDE 20 MG: 20 TABLET ORAL at 06:30

## 2025-05-12 RX ADMIN — DOCUSATE SODIUM 100 MG: 100 CAPSULE, LIQUID FILLED ORAL at 21:01

## 2025-05-12 RX ADMIN — ACETAMINOPHEN 325 MG: 325 TABLET ORAL at 12:51

## 2025-05-12 RX ADMIN — BUPROPION HYDROCHLORIDE 150 MG: 150 TABLET, FILM COATED, EXTENDED RELEASE ORAL at 08:43

## 2025-05-12 RX ADMIN — TAMSULOSIN HYDROCHLORIDE 0.4 MG: 0.4 CAPSULE ORAL at 21:01

## 2025-05-12 ASSESSMENT — ACTIVITIES OF DAILY LIVING (ADL)
ADLS_ACUITY_SCORE: 36
ADLS_ACUITY_SCORE: 39
ADLS_ACUITY_SCORE: 36
ADLS_ACUITY_SCORE: 39
ADLS_ACUITY_SCORE: 36
ADLS_ACUITY_SCORE: 36
ADLS_ACUITY_SCORE: 39
ADLS_ACUITY_SCORE: 36
ADLS_ACUITY_SCORE: 36
ADLS_ACUITY_SCORE: 39
ADLS_ACUITY_SCORE: 36
ADLS_ACUITY_SCORE: 39
ADLS_ACUITY_SCORE: 36

## 2025-05-12 NOTE — PLAN OF CARE
"  Problem: Adult Inpatient Plan of Care  Goal: Plan of Care Review  Description: The Plan of Care Review/Shift note should be completed every shift.  The Outcome Evaluation is a brief statement about your assessment that the patient is improving, declining, or no change.  This information will be displayed automatically on your shiftnote.  5/11/2025 2049 by Evelia Orellana, RN  Outcome: Progressing  5/11/2025 1736 by Evelia Orellana, RN  Outcome: Progressing   Goal Outcome Evaluation:       Continues to report \"5/10\" pain. Reports it has only slightly decreased since this morning. Only using oral Tylenol for pain management. No further stool this shift. Pt was able to eat a bowl of soup, a half a glass of OJ and a package of crackers. No emesis after.                      "

## 2025-05-12 NOTE — PROGRESS NOTES
Vascular Surgery Progress Note  05/12/2025       Subjective:  - NAEON. Denies any abdominal pain at time of exam. States that he normally develops diffuse lower abdominal pain about 30 minutes after eating. Has lost significant weight and feels he is not eating as much as he normally does.     Objective:  Temp:  [97.7  F (36.5  C)-98.6  F (37  C)] 98.6  F (37  C)  Pulse:  [64-87] 64  Resp:  [14-18] 17  BP: (103-151)/() 103/58  SpO2:  [94 %-98 %] 96 %    I/O last 3 completed shifts:  In: 1190 [P.O.:1190]  Out: -       Gen: Awake, alert, NAD  Resp: NLB on RA  Abd: soft, nondistended, nontender  Ext: WWP, no edema, multiphasic PT signals bilaterally     Labs:  Recent Labs   Lab 05/12/25  0711 05/11/25  0540 05/10/25  0546   WBC 4.3 5.1 5.8   HGB 12.6* 13.1* 12.9*   * 137* 128*       Recent Labs   Lab 05/12/25  0711 05/11/25  1447 05/11/25  0540 05/10/25  0546     --  142 139   POTASSIUM 3.7 3.5 3.1* 3.7   CHLORIDE 106  --  107 107   CO2 26  --  24 26   BUN 14.0  --  8.9 8.8   CR 0.88  --  0.79 0.74   GLC 90  --  89 92   TAVIA 8.8  --  9.0 8.4*       Imaging:  CTA Abdomen Pelvis with Contrast   Final Result   IMPRESSION:   Findings suspicious for 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  Negative for pulmonary embolism.      2.  Left basilar atelectasis related to elevation of the left hemidiaphragm.      CT Abdomen Pelvis w Contrast   Final Result   IMPRESSION:    1.  Small bowel and colonic air-fluid levels may be seen with gastroenteritis/diarrheal illness.   2.  Significant bladder distention.   3.  Increasing prominence of the pancreatic duct is nonspecific. Clinical correlation suggested with short-term follow-up pancreatic MRI/MRCP with and without IV contrast.      US Abdomen Complete w Doppler Complete    (Results Pending)         Assessment/Plan:   69 year old male with hx IBS, chronic abdominal pain, and prostate cancer s/p radiation therapy who presented with dizziness and abdominal pain, incidentally found to have narrowing of the celiac artery on CTA. Mesenteric duplex today demonstrates no significant difference in velocities between inspiration and expiration. Radiographically appears to be compression of the celiac artery, but this may not be the source of his pains. He has several other potential causes.    - No plans for intervention from our team while inpatient  - ESR/CRP sent for vasculitis workup  - Could consider celiac plexus block to help r/o MALS     Seen, examined, and discussed with staff.  - - - - - - - - - - - - - - - - - -  Chadwick Dudley MD  Vascular Surgery Resident

## 2025-05-12 NOTE — PROGRESS NOTES
"Orthostatic Bps - symptomatic in standing        05/12/25 1700   Lying Orthostatic BP   Lying Orthostatic /75   Lying Orthostatic Pulse 85 bpm   Sitting Orthostatic BP   Sitting Orthostatic /85   Sitting Orthostatic Pulse 88 bpm   Standing Orthostatic BP   Standing Orthostatic BP 80/53   Standing Orthostatic Pulse 99 bpm     BP after sitting, 145/87. Pt continues to report feeling \"woozy\"    Melony Huggins, DPT 5/12/2025    "

## 2025-05-12 NOTE — PLAN OF CARE
Problem: Adult Inpatient Plan of Care  Goal: Plan of Care Review  Description: The Plan of Care Review/Shift note should be completed every shift.  The Outcome Evaluation is a brief statement about your assessment that the patient is improving, declining, or no change.  This information will be displayed automatically on your shiftnote.  Outcome: Progressing  Goal: Optimal Comfort and Wellbeing  Outcome: Progressing   Goal Outcome Evaluation:  Pt A&Ox4. Had some dizziness this AM, BP stable. Denies n/v. PRN Tylenol given for abdominal pain. Abdominal US done. Pt up independently.

## 2025-05-12 NOTE — PLAN OF CARE
Problem: Adult Inpatient Plan of Care  Goal: Optimal Comfort and Wellbeing  5/12/2025 1835 by Marina Mims RN  Outcome: Progressing  5/12/2025 1835 by Marina Mims RN  Outcome: Progressing   Goal Outcome Evaluation:    Pt is A+O x4, on RA. Mild abdominal pain, no prns given. Pt continues to have intermittent dizziness. Orthostatic done with PT. BP dropped to 80/53 with standing. Explained risk for falls, pt agreed to call before getting up- standby assist. Eating, drinking and voiding well. Pt took a shower. Able to make his need know.     Marian Mims RN.

## 2025-05-12 NOTE — PLAN OF CARE
Problem: Adult Inpatient Plan of Care  Goal: Optimal Comfort and Wellbeing  Outcome: Progressing     Problem: Delirium  Goal: Improved Sleep  Outcome: Progressing       Goal Outcome Evaluation:    A&Ox4, VSS on RA. Pain/ discomfort to abdomen, PRN tylenol given. C/o nausea, PRN compazine. L PIV SL. IND to bathroom/commode. Regular diet. . Plan for outpt. MRCP. PT/OT/Vascular consulted.

## 2025-05-12 NOTE — PROGRESS NOTES
"   05/12/25 0925   Appointment Info   Signing Clinician's Name / Credentials (OT) Shruti Jose, OTR/L   Living Environment   People in Home spouse   Current Living Arrangements house   Home Accessibility stairs to enter home;stairs within home   Number of Stairs, Main Entrance 1   Number of Stairs, Within Home, Primary ten  (split level)   Living Environment Comments Pt states his wife also has some health issues   Self-Care   Equipment Currently Used at Home none   Fall history within last six months no   Activity/Exercise/Self-Care Comment Independent with ADLs. Has not taken a shower recently d/t fear of falling   Instrumental Activities of Daily Living (IADL)   IADL Comments Independent with IADLs at baseline, has not driven lately d/t dizziness.   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 Patient admitted to [facility] from 05/05 - 05/07 for the same.  Pain is unchanged in the past year and feels that this pain started since completion of radiation therapy for prostate cancer in October 2023.  The pain is diffuse, intermittent, sharp and worsened with food and associated intermittent nausea and loose stools. He underwent EGD in 6/2024 that was notable for normal stomach, duodenum. He underwent colonoscopy in 01/2025 that was notable for a few medium-sized patchy angiodysplastic lesions with typical arborization found in the rectum. During this admission imaging with small bowel and " "colonic air fluid level consistent with gastroenteritis and increasing prominence of pancreatic duct. LFTs normal. No leukocytosis. GI consulted and plan for CTA of abdomen, outpatient MRCP and bowel regimen optimization. 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.\"   Existing Precautions/Restrictions fall   Cognitive Status Examination   Orientation Status orientation to person, place and time  (A&Ox4)   Visual Perception   Impact of Vision Impairment on Function (Vision) reports blurred vision   Pain Assessment   Patient Currently in Pain No   Range of Motion Comprehensive   General Range of Motion no range of motion deficits identified   Strength Comprehensive (MMT)   Comment, General Manual Muscle Testing (MMT) Assessment slight generalized weakness noted functionally   Coordination   Coordination Comments impaired gross motor coordination noted functionally   Bed Mobility   Bed Mobility supine-sit;sit-supine   Supine-Sit [place] (Bed Mobility) independent   Sit-Supine [place] (Bed Mobility) independent   Transfers   Transfers sit-stand transfer;toilet transfer   Sit-Stand Transfer   Sit-Stand [place] (Transfers) supervision   Toilet Transfer   Type (Toilet Transfer) sit-stand;stand-sit   [place] Level (Toilet Transfer) supervision   Assistive Device (Toilet Transfer) commode chair   Balance   Balance Comments Pt unsteady on feet ambulating with no AD, pt attributes to dizziness.   Activities of Daily Living   BADL Assessment/Intervention lower body dressing;bathing;grooming;other (see comments)  (Per clinical reasoning, anticipate pt's ability to perform home management tasks will be impacted by dizziness and impaired balance.)   Bathing Assessment/Intervention   [place] Level (Bathing) not tested   Comment, (Bathing) Per pt report, limited ability to perform bathing d/t dizziness.   Lower Body Dressing Assessment/Training   [place] Level " (Lower Body Dressing) not tested   Comment, (Lower Body Dressing) Per clinical reasoning, anticipate pt's ability to safely perform lower body dressing will be impacted by dizziness and impaired balance.   Grooming Assessment/Training   [place] Level (Grooming) supervision   Position (Grooming) sink side;unsupported standing   Clinical Impression   Criteria for Skilled Therapeutic Interventions Met (OT) Yes, treatment indicated   OT Diagnosis Impaired ability to perform ADLs, IADLs, and functional mobility.   Influenced by the following impairments dizziness   OT Problem List-Impairments impacting ADL problems related to;activity tolerance impaired;balance;coordination;mobility;strength   Assessment of Occupational Performance 1-3 Performance Deficits   Identified Performance Deficits home management, lower body dressing, bathing   Planned Therapy Interventions (OT) ADL retraining;IADL retraining;balance training;motor coordination training;strengthening;transfer training;home program guidelines;progressive activity/exercise;risk factor education   Clinical Decision Making Complexity (OT) problem focused assessment/low complexity   Risk & Benefits of therapy have been explained evaluation/treatment results reviewed;care plan/treatment goals reviewed;risks/benefits reviewed;current/potential barriers reviewed;participants voiced agreement with care plan;participants included;patient   OT Total Evaluation Time   OT Eval, Low Complexity Minutes (21888) 11   OT Goals   Therapy Frequency (OT) 5 times/week   OT Predicted Duration/Target Date for Goal Attainment 05/19/25   OT Goals Lower Body Dressing;Home Management;Lower Body Bathing   OT: Lower Body Dressing Independent   OT: Lower Body Bathing Modified independent;using adaptive equipment   OT: Home Management Modified independent;with light demand household tasks;using adaptive equipment;ambulatory level   Self-Care/Home Management   Self-Care/Home Mgmt/ADL,  Compensatory, Meal Prep Minutes (85775) 8   Symptoms Noted During/After Treatment (Meal Preparation/Planning Training) dizziness   Treatment Detail/Skilled Intervention Pt ambulated in room 10' x 2 trials with SBA-CGA, 1 minor self-corrected LOB observed. Pt educated on slow transitions to minimize dizziness, pt verbalized understanding. Pt ambulated in hallway to/from bathroom with SBA-CGA, unsteady with no LOB observed. Additional toilet transfer completed with SBA using grab bar. Provided recommendation for shower chair for bathing d/t impaired balance and fear of falling. Pt left in bed at end of session with bed alarm on and call light in reach.   OT Discharge Planning   OT Plan safety/balance with ambulatory ADLs, provide handout for obtaining shower chair   OT Discharge Recommendation (DC Rec) home with home care occupational therapy   OT Rationale for DC Rec Pt appears to be ambulating/performing ADLs close to baseline, mainly limited by dizziness. Recommend home care OT services in home setting to improve balance/strength and assist with home modifications as needed.   OT Brief overview of current status SBA-CGA functional mobility and ADLs   OT Total Distance Amb During Session (feet) 120   Total Session Time   Timed Code Treatment Minutes 8   Total Session Time (sum of timed and untimed services) 19

## 2025-05-12 NOTE — PROGRESS NOTES
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 Patient admitted to [facility] from 05/05 - 05/07 for the same.  Pain is unchanged in the past year and feels that this pain started since completion of radiation therapy for prostate cancer in October 2023.  The pain is diffuse, intermittent, sharp and worsened with food and associated intermittent nausea and loose stools. He underwent EGD in 6/2024 that was notable for normal stomach, duodenum. He underwent colonoscopy in 01/2025 that was notable for a few medium-sized patchy angiodysplastic lesions with typical arborization found in the rectum. During this admission imaging with small bowel and colonic air fluid level consistent with gastroenteritis and increasing prominence of pancreatic duct. LFTs normal. No leukocytosis. GI consulted and plan for CTA of abdomen, outpatient MRCP and bowel regimen optimization. 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 Will place vascular consult.     #History of IBS  #Chronic abdominal pain  #Presumed radiation proctitis  - complains of intermittent moderate to severe sharp generalized abdominal pain.  - LFTs normal.  WBC 6.8.  UA not concerning for infection.    - CT abdomen pelvis notable for small bowel and colonic air-fluid levels that may be consistent with gastroenteritis and significant bladder distention.  Also there was increasing prominence of the pancreatic duct which was nonspecific and follow-up MRCP was recommended.  - No diarrhea thus no enteric panel and C. difficile panel ordered  - CTA  abdomen pelvis ordered to evaluate for chronic mesenteric ischemia showing possible MALS and extrinsic compression of celiac artery. Vascular surgery consulted.    - No plans for intervention from our team while inpatient  - ESR/CRP sent for vasculitis workup (Sed Rate is 9, CRP pending)  - Could consider celiac plexus block to help r/o MALS  - ([facility] will do these)  - GI consulted and recommendations noted  - Continue PTA pain regimen with p.o. Dilaudid  - Trial Dicyclomine started during this admission.   - Bladder scan every shift and straight cath as needed    - Will need outpatient MRI/MRCP to follow-up for pancreatic duct prominence  -Can consider Bowel regimen changes suggested by GI: continue Miralax BID with 2 senna's daily vs Linzess/Trulance/Amitiza (based on insurance coverage) vs glycerin suppository QAM vs Fleet QAM.   - At this time, his abdominal pain is not his primary complaint and he does not want an injection at this time)    Dizziness  - this is his primary complaint at this time  - He has done a fairly extensive workup as an outpatient, with his next step being an ENT appointment on 5/20  - CTA was negative for obstruction  - Zio patch not yet applied, will re-order at discharge   - Will check TTE (normal heart exam)  - PT is going to see for dizziness today, and if they cannot provide any further relieve the next step would be the ENT eval as noted above  - MRI from 4/26:  1.  No acute/subacute infarct or other acute intracranial abnormality.  2.  Few small enhancing calvarial lesions are nonspecific but could represent osseous metastases given patient's history of prostate cancer.  3.  Mild cerebral volume loss and presumed changes of chronic microvascular disease.  - PSA 0.05 in 4/25 (enhancing lesions noted above)    #Hypokalemia-resolved  -RN potassium replacement protocol     #AGMA-resolved  #Lactic acidosis-resolved  -resolved with fluids     #Shortness of  breath-resolved  #Weakness  -The patient states he has had shortness of breath for the last month.   - No chest pain.  No cough, sputum production.  No peripheral edema.    - WBC normal.  Procalcitonin normal.  Troponin normal.   - CTA chest with no PE or signs of pneumonia but did note left basilar atelectasis related to chronic elevation of the left diaphragm which could be causing his shortness of breath.  -BNP wnl   -Stable on RA and does not require supplemental oxygen    -PT/OT consult     #Thrombocytopenia  -132---128---137  - Presumed due to liver disease. 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          Diet: Regular Diet Adult    DVT Prophylaxis: Pneumatic Compression Devices  Rivera Catheter: Not present  Lines: None     Cardiac Monitoring: None  Code Status: Full Code      Clinically Significant Risk Factors        # Hypokalemia: Lowest K = 3.1 mmol/L in last 2 days, will replace as needed            # Hypertension: Noted on problem list                # Financial/Environmental Concerns: none         Social Drivers of Health    Tobacco Use: Medium Risk (4/18/2025)    Patient History     Smoking Tobacco Use: Former     Smokeless Tobacco Use: Never          Disposition Plan     Medically Ready for Discharge: Anticipated Tomorrow             Brayan Vargas MD  Hospitalist Service  [facility]  Securely message with [employer] (more info)  Text page via [employer]/Directory   ______________________________________________________________________    Interval History   No new complaints  today.  He states the abd pain is managable for him.  The primary complaint is of the vertigo that he is having frequently now.      Physical Exam   Vital Signs: Temp: 98.6  F (37  C) Temp src: Oral BP: 103/58 Pulse: 64   Resp: 17 SpO2: 96 % O2 Device: None (Room air)    Weight: 165 lbs 0 oz    Constitutional: awake, alert, cooperative, no apparent distress, and appears stated age  Eyes: pupils equal, round and reactive to light and sclera clear  Respiratory: No increased work of breathing, good air exchange, clear to auscultation bilaterally, no crackles or wheezing  Cardiovascular: regular rate and rhythm and normal S1 and S2  GI: normal bowel sounds, soft, non-distended, and non-tender    Medical Decision Making       55 MINUTES SPENT BY ME on the date of service doing chart review, history, exam, documentation & further activities per the note.      Data     I have personally reviewed the following data over the past 24 hrs:    4.3  \   12.6 (L)   / 132 (L)     140 106 14.0 /  90   3.7 26 0.88 \       Imaging results reviewed over the past 24 hrs:   No results found for this or any previous visit (from the past 24 hours).

## 2025-05-12 NOTE — PROGRESS NOTES
Care Management Follow Up    Length of Stay (days): 2    Expected Discharge Date: 05/12/2025     Concerns to be Addressed:       Patient plan of care discussed at interdisciplinary rounds: Yes    Anticipated Discharge Disposition:  home no needs      Anticipated Discharge Services:  n/a  Anticipated Discharge DME:  n/a    Patient/family educated on Medicare website which has current facility and service quality ratings:  n/a  Education Provided on the Discharge Plan:  yes  Patient/Family in Agreement with the Plan:  yes    Referrals Placed by CM/SW:    Private pay costs discussed: Not applicable    Discussed  Partnership in Safe Discharge Planning  document with patient/family: No     Handoff Completed: No, handoff not indicated or clinically appropriate    Additional Information:  No needs anticipated from CM at discharge per pt; independent at baseline. Care management to follow for discharge recommendations; therapy consulted. Family to transport home.  7:55 AM    Pt declining home care.  12:26 PM      Brenna Kjellberg, BSW LSW  5/12/2025

## 2025-05-13 ENCOUNTER — APPOINTMENT (OUTPATIENT)
Dept: MRI IMAGING | Facility: HOSPITAL | Age: 70
DRG: 393 | End: 2025-05-13
Attending: PSYCHIATRY & NEUROLOGY
Payer: COMMERCIAL

## 2025-05-13 ENCOUNTER — PATIENT OUTREACH (OUTPATIENT)
Dept: CARE COORDINATION | Facility: CLINIC | Age: 70
End: 2025-05-13

## 2025-05-13 LAB
HOLD SPECIMEN: NORMAL
POTASSIUM SERPL-SCNC: 3.6 MMOL/L (ref 3.4–5.3)

## 2025-05-13 PROCEDURE — 36415 COLL VENOUS BLD VENIPUNCTURE: CPT | Performed by: INTERNAL MEDICINE

## 2025-05-13 PROCEDURE — 250N000013 HC RX MED GY IP 250 OP 250 PS 637: Performed by: HOSPITALIST

## 2025-05-13 PROCEDURE — 250N000013 HC RX MED GY IP 250 OP 250 PS 637: Performed by: INTERNAL MEDICINE

## 2025-05-13 PROCEDURE — 250N000011 HC RX IP 250 OP 636: Performed by: INTERNAL MEDICINE

## 2025-05-13 PROCEDURE — 99222 1ST HOSP IP/OBS MODERATE 55: CPT | Performed by: PSYCHIATRY & NEUROLOGY

## 2025-05-13 PROCEDURE — 84132 ASSAY OF SERUM POTASSIUM: CPT | Performed by: INTERNAL MEDICINE

## 2025-05-13 PROCEDURE — 255N000002 HC RX 255 OP 636: Performed by: PSYCHIATRY & NEUROLOGY

## 2025-05-13 PROCEDURE — A9585 GADOBUTROL INJECTION: HCPCS | Performed by: PSYCHIATRY & NEUROLOGY

## 2025-05-13 PROCEDURE — 72156 MRI NECK SPINE W/O & W/DYE: CPT

## 2025-05-13 PROCEDURE — 99232 SBSQ HOSP IP/OBS MODERATE 35: CPT | Performed by: INTERNAL MEDICINE

## 2025-05-13 PROCEDURE — 120N000001 HC R&B MED SURG/OB

## 2025-05-13 RX ORDER — MIDODRINE HYDROCHLORIDE 2.5 MG/1
2.5 TABLET ORAL 2 TIMES DAILY
Status: DISCONTINUED | OUTPATIENT
Start: 2025-05-13 | End: 2025-05-14

## 2025-05-13 RX ORDER — GADOBUTROL 604.72 MG/ML
7 INJECTION INTRAVENOUS ONCE
Status: COMPLETED | OUTPATIENT
Start: 2025-05-13 | End: 2025-05-13

## 2025-05-13 RX ADMIN — AMLODIPINE BESYLATE 5 MG: 5 TABLET ORAL at 08:15

## 2025-05-13 RX ADMIN — ONDANSETRON 4 MG: 2 INJECTION, SOLUTION INTRAMUSCULAR; INTRAVENOUS at 00:03

## 2025-05-13 RX ADMIN — DOCUSATE SODIUM 100 MG: 100 CAPSULE, LIQUID FILLED ORAL at 06:51

## 2025-05-13 RX ADMIN — ACETAMINOPHEN 325 MG: 325 TABLET ORAL at 06:16

## 2025-05-13 RX ADMIN — ONDANSETRON 4 MG: 2 INJECTION, SOLUTION INTRAMUSCULAR; INTRAVENOUS at 11:16

## 2025-05-13 RX ADMIN — POLYETHYLENE GLYCOL 3350 17 G: 17 POWDER, FOR SOLUTION ORAL at 08:18

## 2025-05-13 RX ADMIN — PROCHLORPERAZINE EDISYLATE 5 MG: 5 INJECTION INTRAMUSCULAR; INTRAVENOUS at 16:06

## 2025-05-13 RX ADMIN — SERTRALINE HYDROCHLORIDE 50 MG: 50 TABLET ORAL at 08:15

## 2025-05-13 RX ADMIN — ACETAMINOPHEN 325 MG: 325 TABLET ORAL at 13:03

## 2025-05-13 RX ADMIN — DICYCLOMINE HYDROCHLORIDE 20 MG: 20 TABLET ORAL at 11:18

## 2025-05-13 RX ADMIN — BISACODYL 5 MG: 5 TABLET, COATED ORAL at 06:51

## 2025-05-13 RX ADMIN — SENNOSIDES AND DOCUSATE SODIUM 2 TABLET: 50; 8.6 TABLET ORAL at 13:03

## 2025-05-13 RX ADMIN — DICYCLOMINE HYDROCHLORIDE 20 MG: 20 TABLET ORAL at 20:57

## 2025-05-13 RX ADMIN — DICYCLOMINE HYDROCHLORIDE 20 MG: 20 TABLET ORAL at 16:11

## 2025-05-13 RX ADMIN — BISACODYL 10 MG: 10 SUPPOSITORY RECTAL at 13:30

## 2025-05-13 RX ADMIN — GADOBUTROL 7 ML: 604.72 INJECTION INTRAVENOUS at 20:10

## 2025-05-13 RX ADMIN — ACETAMINOPHEN 325 MG: 325 TABLET ORAL at 20:56

## 2025-05-13 RX ADMIN — BUPROPION HYDROCHLORIDE 150 MG: 150 TABLET, FILM COATED, EXTENDED RELEASE ORAL at 08:15

## 2025-05-13 RX ADMIN — DICYCLOMINE HYDROCHLORIDE 20 MG: 20 TABLET ORAL at 06:16

## 2025-05-13 RX ADMIN — DOCUSATE SODIUM 100 MG: 100 CAPSULE, LIQUID FILLED ORAL at 20:55

## 2025-05-13 RX ADMIN — TOLTERODINE 2 MG: 2 CAPSULE, EXTENDED RELEASE ORAL at 08:15

## 2025-05-13 RX ADMIN — TAMSULOSIN HYDROCHLORIDE 0.4 MG: 0.4 CAPSULE ORAL at 20:56

## 2025-05-13 RX ADMIN — CARBIDOPA AND LEVODOPA 2.5 MG: 50; 200 TABLET, EXTENDED RELEASE ORAL at 13:02

## 2025-05-13 ASSESSMENT — ACTIVITIES OF DAILY LIVING (ADL)
ADLS_ACUITY_SCORE: 39
ADLS_ACUITY_SCORE: 41
ADLS_ACUITY_SCORE: 39
ADLS_ACUITY_SCORE: 41
ADLS_ACUITY_SCORE: 39
ADLS_ACUITY_SCORE: 41
ADLS_ACUITY_SCORE: 41
ADLS_ACUITY_SCORE: 39

## 2025-05-13 NOTE — PROGRESS NOTES
Physical Therapy        05/12/25 1700   Appointment Info   Signing Clinician's Name / Credentials (PT) Melony Huggins, PT, DPT   Quick Adds   Quick Adds Vestibular Eval   Living Environment   People in Home spouse   Current Living Arrangements house   Home Accessibility stairs to enter home;stairs within home   Number of Stairs, Main Entrance 1   Number of Stairs, Within Home, Primary greater than 10 stairs   Stair Railings, Within Home, Primary railings safe and in good condition   Living Environment Comments independent with mobility, ADLs, IADLs, drives   Self-Care   Equipment Currently Used at Home none   Activity/Exercise/Self-Care Comment reports frequently getting dizzy when standing up, but denies falls. Owns a cane, does not use.   General Information   Onset of Illness/Injury or Date of Surgery 05/09/25   Referring Physician Brayan Vargas MD   Patient/Family Therapy Goals Statement (PT) none stated   Pertinent History of Current Problem (include personal factors and/or comorbidities that impact the POC) 69 year old male admitted on 5/9/2025. 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   Cognition   Affect/Mental Status (Cognition) WFL   Pain Assessment   Patient Currently in Pain Yes, see Vital Sign flowsheet  (chronic abd pain)   Integumentary/Edema   Integumentary/Edema no deficits were identifed   Posture    Posture Forward head position   Range of Motion (ROM)   Range of Motion ROM is WFL   Strength (Manual Muscle Testing)   Strength (Manual Muscle Testing) strength is WFL   Bed Mobility   Comment, (Bed Mobility) independent supine to sit   Transfers   Comment, (Transfers) CGA sit to stand   Gait/Stairs (Locomotion)   Comment, (Gait/Stairs) 5'  from bed to chair, CGA, no AD   Balance   Balance Comments impaired 2/2 dizziness in standing. Normal in sitting   Oculomotor Exam   Ocular ROM Normal   Smooth Pursuit Normal   Saccades Normal   VOR Normal   VOR Cancellation Normal   Head Impulse Test Normal   Infrared Goggle Exam or Frenzel Lense Exam   Spontaneous Nystagmus Negative   Gaze Evoked Nystagmus Comments very slight end range nystagmus to right   Clinical Impression   Criteria for Skilled Therapeutic Intervention Yes, treatment indicated   PT Diagnosis (PT) difficulty walking   Influenced by the following impairments balance deficits 2/2 lightheadedness   Functional limitations due to impairments limited household mobility   Clinical Presentation (PT Evaluation Complexity) stable   Clinical Presentation Rationale presents as medically diagnosed   Clinical Decision Making (Complexity) moderate complexity   Planned Therapy Interventions (PT) balance training;bed mobility training;gait training;home exercise program;neuromuscular re-education;patient/family education;ROM (range of motion);stair training;strengthening;transfer training;progressive activity/exercise   Risk & Benefits of therapy have been explained evaluation/treatment results reviewed;current/potential barriers reviewed;participants voiced agreement with care plan;participants included;patient   PT Total Evaluation Time   PT Eval, Moderate Complexity Minutes (47095) 12   Physical Therapy Goals   PT Frequency Daily   PT Predicted Duration/Target Date for Goal Attainment 05/19/25   PT Goals Bed Mobility;Transfers;Gait;Stairs   PT: Transfers Independent;Sit to/from stand   PT: Gait Independent;Greater than 200 feet   PT: Stairs Supervision/stand-by assist;10 stairs;Rail on both sides   Interventions   Interventions Quick Adds Therapeutic Activity;Gait Training   Therapeutic Activity   Therapeutic Activities: dynamic activities to improve functional performance Minutes (13302) 10   Treatment  Detail/Skilled Intervention close supervision with transfers, cues for breathing and safety. Pt able to tolerate orthostatic BPs. Chair pushups x 5 with SBA and cues/demo   Gait Training   Gait Training Minutes (01189) 5   Symptoms Noted During/After Treatment (Gait Training) dizziness   Treatment Detail/Skilled Intervention mild path deviation, no oert LOB   Distance in Feet 80   [place] Level (Gait Training) contact guard   Physical Assistance Level (Gait Training) supervision   PT Discharge Planning   PT Plan trial cane, stairs   PT Discharge Recommendation (DC Rec) home   PT Rationale for DC Rec limited by orthostatics, otherwise independent   PT Brief overview of current status pt dizzy 2/2 orthostatic hypotension, no S/S of actual vertigo   PT Total Distance Amb During Session (feet) 85   Physical Therapy Time and Intention   Timed Code Treatment Minutes 15   Total Session Time (sum of timed and untimed services) 27       Melony Huggins, DPT 5/13/2025

## 2025-05-13 NOTE — CONSULTS
[facility]  [place]    Ghulam Rizzo MRN# [id number]   Age: 69 year old YOB: 1955               Assessment and Plan:      Dizziness, unclear etiology     We will check C-spine MRI given some suggestion of hyperreflexia on exam.    Blood pressure medications are being adjusted given an orthostatic blood pressure drop noted.    I will see him tomorrow to see how he is doing.            Chief Complaint/HPI:     This patient is a 69-year-old gentleman seen for neurologic evaluation today regarding dizziness that has been going on for several months.  He was admitted on May 9 with chronic abdominal pain and shortness of breath.  He does not describe a spinning sensation, he advocates more lightheadedness or imbalance.  Previous notes indicate that he is symptomatic while lying down, but he tells me that he is fine if lying or sitting, the dizziness comes on when he stands up.  It keeps him from doing things, though he says that he has not had any falls due to the dizziness.  It has been recommended that he use a cane or walker, but he does not do that.  He says that he still feels the dizziness.  He had an MRI of the brain last month which showed no stroke or other parenchymal lesion.  There were some enhancing calvarial lesions though these would not cause his symptoms.  He sometimes can feel like his arms or legs are asleep but this is not a constant symptom.            Past Medical History:    has a past medical history of Anisocoria, Asthma, Carpal tunnel syndrome, Head injury, unspecified, Hepatitis C, Obesity, Other convulsions, Other mononeuritis of upper limb, and Unspecified essential hypertension.          Past Surgical History:    has a past surgical history that includes surgical history of -  (4/6/87); surgical history of -  (4/27/87); surgical history of -  (11/13/90); surgical history of -  (1991); surgical history of - ; GI surgery; Thoracic surgery; hernia repair;  colonoscopy; Colectomy without colostomy (Right, 7/13/2022); Herniorrhaphy incisional (location) (N/A, 7/13/2022); Colonoscopy (N/A, 7/18/2023); Insert Seed Marker / Matrix (N/A, 8/30/2023); Colonoscopy (N/A, 6/10/2024); and Esophagoscopy, gastroscopy, duodenoscopy (EGD), combined (N/A, 6/10/2024).          Social History:     Social History     Tobacco Use    Smoking status: Former     Current packs/day: 0.50     Average packs/day: 0.5 packs/day for 50.0 years (25.0 ttl pk-yrs)     Types: Cigarettes    Smokeless tobacco: Never   Substance Use Topics    Alcohol use: Yes     Comment: 1-2 beers daily             Family History:     Family History   Problem Relation Age of Onset    Cerebrovascular Disease Mother     Heart Disease Brother     Heart Disease Brother                 Allergies:     Allergies   Allergen Reactions    Levaquin [Levofloxacin] Nausea and Vomiting    Oxycodone Dizziness    Percocet [Oxycodone-Acetaminophen] Visual Disturbance and Hallucination     Does not have any problems taking Tylenol/Acetaminophen.             Medications:     Current Facility-Administered Medications:     acetaminophen (TYLENOL) tablet 325 mg, 325 mg, Oral, Q6H PRN, Jason Ordoñez MD, 325 mg at 05/13/25 1303    [Held by provider] amLODIPine (NORVASC) tablet 5 mg, 5 mg, Oral, Daily, Rachael Ibrahim MD, 5 mg at 05/13/25 0815    bisacodyl (DULCOLAX) EC tablet 5 mg, 5 mg, Oral, Daily PRN, Rachael Ibrahim MD, 5 mg at 05/13/25 0651    bisacodyl (DULCOLAX) suppository 10 mg, 10 mg, Rectal, Daily PRN, Lana George MD, 10 mg at 05/13/25 1330    buPROPion (WELLBUTRIN XL) 24 hr tablet 150 mg, 150 mg, Oral, QAM, Rachael Ibrahim MD, 150 mg at 05/13/25 0815    dicyclomine (BENTYL) tablet 20 mg, 20 mg, Oral, 4x Daily AC & HS, Rachael Ibrahim MD, 20 mg at 05/13/25 1118    docusate sodium (COLACE) capsule 100 mg, 100 mg, Oral, BID, Lana George MD, 100 mg at 05/13/25 0651    Enema Compound (docusate/mineral oil/NaPhos) NO MAG  "CIT PREMIX, 226 mL, Rectal, Daily PRN, Lana George MD, 226 mL at 05/11/25 0828    HYDROmorphone (DILAUDID) tablet 2 mg, 2 mg, Oral, Q6H PRN, Jason Ordoñez MD, 2 mg at 05/10/25 2125    midodrine (PROAMATINE) tablet 2.5 mg, 2.5 mg, Oral, BID 09 12, Brayan Vargas MD, 2.5 mg at 05/13/25 1302    ondansetron (ZOFRAN ODT) ODT tab 4 mg, 4 mg, Oral, Q6H PRN, 4 mg at 05/11/25 2104 **OR** ondansetron (ZOFRAN) injection 4 mg, 4 mg, Intravenous, Q6H PRN, Jason Ordoñez MD, 4 mg at 05/13/25 1116    polyethylene glycol (MIRALAX) Packet 17 g, 17 g, Oral, Daily, Lana George MD, 17 g at 05/13/25 0818    prochlorperazine (COMPAZINE) injection 5 mg, 5 mg, Intravenous, Q6H PRN, 5 mg at 05/11/25 2336 **OR** prochlorperazine (COMPAZINE) tablet 5 mg, 5 mg, Oral, Q6H PRN, Jason Ordoñez MD    senna-docusate (SENOKOT-S/PERICOLACE) 8.6-50 MG per tablet 1 tablet, 1 tablet, Oral, BID PRN **OR** senna-docusate (SENOKOT-S/PERICOLACE) 8.6-50 MG per tablet 2 tablet, 2 tablet, Oral, BID PRN, Jason Ordoñez MD, 2 tablet at 05/13/25 1303    sertraline (ZOLOFT) tablet 50 mg, 50 mg, Oral, Daily, Rachael Ibrahim MD, 50 mg at 05/13/25 0815    tamsulosin (FLOMAX) capsule 0.4 mg, 0.4 mg, Oral, QPM, Rachael Ibrahim MD, 0.4 mg at 05/12/25 2101    tolterodine ER (DETROL LA) 24 hr capsule 2 mg, 2 mg, Oral, Daily, Rachael Ibrahim MD, 2 mg at 05/13/25 0815              Physical Exam:      Vitals: /72   Pulse 69   Temp 98.2  F (36.8  C) (Oral)   Resp 20   Ht 1.88 m (6' 2\")   Wt 74.8 kg (165 lb)   SpO2 100%   BMI 21.18 kg/m    BMI= Body mass index is 21.18 kg/m .     Patient is alert and in no acute distress.  He is quite thin.  Neck was supple, no carotid bruits, thyromegaly, lymphadenopathy or JVD noted.   Neurological Exam:   Mental status: Patient is alert and oriented x 3. Speech is clear and fluent    CN II: PERRLA.   CN III, IV, VI: EOMI.    CN VII: Face is symmetric with normal eye closure and smile.   CN " VII: Hearing is normal to conversation   CN IX, X: Palate elevates symmetrically. Phonation is normal.    CN XI: Head turning and shoulder shrug are intact   CN XII: Tongue is midline with normal movements and no atrophy or fasciculations.   Motor: Muscle bulk and tone are normal. No pronator drift. Strength is 5/5 bilaterally. No fasciculations noted.   Reflexes: Reflexes are brisk in the arms, brisk at the right knee but diminished or absent at the left knee   Sensory: Light touch, pinprick, and vibration sense are intact bilaterally.    Coordination: Rapid alternating movements and fine finger movements are intact. There is no dysmetria on finger-to-nose testing.    Gait: able to stand and bear weight, looks unsteady         Octavio Tabares MD       More than 60 minutes spent reviewing records and results, evaluating patient, discussing with pt and family and on documentation

## 2025-05-13 NOTE — PROGRESS NOTES
Care Management Follow Up    Length of Stay (days): 4    Expected Discharge Date: 05/13/2025     Concerns to be Addressed:  discharge planning     Patient plan of care discussed at interdisciplinary rounds: Yes    Anticipated Discharge Disposition:  home no needs per pt       Anticipated Discharge Services:  n/a  Anticipated Discharge DME:  n/a    Patient/family educated on Medicare website which has current facility and service quality ratings:  n/a  Education Provided on the Discharge Plan:  yes  Patient/Family in Agreement with the Plan:  yes    Referrals Placed by CM/SW:  yes  Private pay costs discussed: Not applicable    Discussed  Partnership in Safe Discharge Planning  document with patient/family: No     Handoff Completed: No, handoff not indicated or clinically appropriate    Additional Information:  No needs anticipated from CM at discharge per pt; independent at baseline. Declining home OT recommendations. Pt to follow up with PCP post discharge if needs arise. Family or friend to transport home.  8:42 AM    Brenna Kjellberg, BSW LSW  5/13/2025

## 2025-05-13 NOTE — PROGRESS NOTES
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 Patient admitted to [facility] from 05/05 - 05/07 for the same.  Pain is unchanged in the past year and feels that this pain started since completion of radiation therapy for prostate cancer in October 2023.  The pain is diffuse, intermittent, sharp and worsened with food and associated intermittent nausea and loose stools. He underwent EGD in 6/2024 that was notable for normal stomach, duodenum. He underwent colonoscopy in 01/2025 that was notable for a few medium-sized patchy angiodysplastic lesions with typical arborization found in the rectum. During this admission imaging with small bowel and colonic air fluid level consistent with gastroenteritis and increasing prominence of pancreatic duct. LFTs normal. No leukocytosis. GI consulted and plan for CTA of abdomen, outpatient MRCP and bowel regimen optimization. 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 Will place vascular consult.     #History of IBS  #Chronic abdominal pain  #Presumed radiation proctitis  - complains of intermittent moderate to severe sharp generalized abdominal pain.  - LFTs normal.  WBC 6.8.  UA not concerning for infection.    - CT abdomen pelvis notable for small bowel and colonic air-fluid levels that may be consistent with gastroenteritis and significant bladder distention.  Also there was increasing prominence of the pancreatic duct which was nonspecific and follow-up MRCP was recommended.  - No diarrhea thus no enteric panel and C. difficile panel ordered  - CTA  abdomen pelvis ordered to evaluate for chronic mesenteric ischemia showing possible MALS and extrinsic compression of celiac artery. Vascular surgery consulted.    - No plans for intervention from our team while inpatient  - ESR/CRP sent for vasculitis workup (Sed Rate is 9, CRP 1)  - Could consider celiac plexus block to help r/o MALS  - (d/w pain service > [facility] will do these if needed)  - GI consulted and recommendations noted  - Continue PTA pain regimen with p.o. Dilaudid  - Trial Dicyclomine started during this admission.   - Will need outpatient MRI/MRCP to follow-up for pancreatic duct prominence  -Can consider Bowel regimen changes suggested by GI: continue Miralax BID with 2 senna's daily vs Linzess/Trulance/Amitiza (based on insurance coverage) vs glycerin suppository QAM vs Fleet QAM.   - At this time, his abdominal pain is not his primary complaint and he does not want an injection at this time)    Dizziness  - this remains his primary complaint at this time  - He has done a fairly extensive workup as an outpatient, with his next step being an ENT appointment in July (soonest he can schedule)  - CTA was negative for obstruction  - Zio patch not yet applied, will re-order at discharge   - TTE 5/13: normal   - PT on 5/12 identified fairly significant orthostatic BP findings   - however he continues even to have symptoms when lying flat   - Head MRI from 4/26:  1.  No acute/subacute infarct or other acute intracranial abnormality.  2.  Few small enhancing calvarial lesions are nonspecific but could represent osseous metastases given patient's history of prostate cancer.  3.  Mild cerebral volume loss and presumed changes of chronic microvascular disease.  - PSA 0.05 in 4/25 (enhancing lesions noted above) > and he has an appointment set up on 5/20 with a specialist to eval #2 above   Plan;  Given the orthostatic findings above, I will stop his norvasc and start low dose miodrine  However  with his symptoms even when lying flat, I will ask neuro to see if they have any further thoughts to pursue    #Hypokalemia-resolved  -RN potassium replacement protocol     #AGMA-resolved  #Lactic acidosis-resolved  -resolved with fluids     #Shortness of breath-resolved  #Weakness  -The patient states he has had shortness of breath for the last month.   - No chest pain.  No cough, sputum production.  No peripheral edema.    - WBC normal.  Procalcitonin normal.  Troponin normal.   - CTA chest with no PE or signs of pneumonia but did note left basilar atelectasis related to chronic elevation of the left diaphragm which could be causing his shortness of breath.  -BNP wnl   -Stable on RA and does not require supplemental oxygen    -PT/OT consult appreciated     #Thrombocytopenia  -132---128---137  - Presumed due to liver disease. 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          Diet: Regular Diet Adult    DVT Prophylaxis: Pneumatic Compression Devices  Rivera Catheter: Not present  Lines: None     Cardiac Monitoring: None  Code Status: Full Code      Clinically Significant Risk Factors                   # Hypertension: Noted on problem list                # Financial/Environmental Concerns: none         Social Drivers of Health    Tobacco Use: Medium Risk (4/18/2025)    Patient History     Smoking Tobacco Use: Former     Smokeless Tobacco Use: Never          Disposition Plan     Medically Ready for Discharge: Anticipated Tomorrow             Brayan Vargas MD  Hospitalist Service  [facility]  [facility]  Securely message with [employer] (more info)  Text page via [employer] Paging/Directory   ______________________________________________________________________    Interval History   No new complaints today.  Continues to feel miserable, orthostatic Bps     Physical Exam   Vital Signs: Temp: 98.2  F (36.8  C) Temp src: Oral BP: 130/74 Pulse: 78   Resp: 16 SpO2: 96 % O2 Device: None (Room air)    Weight: 165 lbs 0 oz    Constitutional: awake, alert, cooperative, no apparent distress, and appears stated age  Respiratory: No increased work of breathing, good air exchange, clear to auscultation bilaterally, no crackles or wheezing  Cardiovascular: regular rate and rhythm and normal S1 and S2  GI: normal bowel sounds, soft, non-distended, and non-tender    Medical Decision Making       55 MINUTES SPENT BY ME on the date of service doing chart review, history, exam, documentation & further activities per the note.      Data     I have personally reviewed the following data over the past 24 hrs:    N/A  \   N/A   / N/A     N/A N/A N/A /  90   3.6 N/A N/A \     Procal: N/A CRP: N/A Lactic Acid: N/A         Imaging results reviewed over the past 24 hrs:   Recent Results (from the past 24 hours)   Echocardiogram Complete   Result Value    LVEF  60-65%    Narrative    [id number]  [id number]  [id number]  [id number]^RICHIE^DONALD^[place] [address]     Name: DOMINIC JAUREGUI  MRN: [id number]  : 1955  Study Date: 2025 02:34 PM  Age: 69 yrs  Gender: Male  Patient Location: [facility]  Reason For Study: Dizziness  Ordering Physician: DONALD QUIROZ  Referring Physician: DONALD QUIROZ  Performed By:      BSA: 2.0 m2  Height: 74 in  Weight: 165 lb  HR: 82  ______________________________________________________________________________  Procedure  Echocardiogram with two-dimensional, color and spectral Doppler. Definity (NDC  #[id number]) given  intravenously.  ______________________________________________________________________________  Interpretation Summary     The left ventricle is normal in size.  The visual ejection fraction is 60-65%.  No regional wall motion abnormalities noted.  Normal right ventricle size and systolic function.  IVC diameter and respiratory changes fall into an intermediate range  suggesting an RA pressure of 8 mmHg.  Sinus rhythm was noted.  Technically difficult, suboptimal study. There is no comparison study  available.  ______________________________________________________________________________  Left Ventricle  The left ventricle is normal in size. There is normal left ventricular wall  thickness. The visual ejection fraction is 60-65%. Diastolic Doppler findings  (E/E' ratio and/or other parameters) suggest left ventricular filling  pressures are indeterminate. No regional wall motion abnormalities noted.     Right Ventricle  Normal right ventricle size and systolic function.     Atria  The left atrium is mildly dilated. Right atrial size is normal.     Mitral Valve  Mitral valve leaflets appear normal. There is trace mitral regurgitation.  There is no mitral valve stenosis.     Tricuspid Valve  The tricuspid valve is not well visualized.     Aortic Valve  The aortic valve is not well visualized. No aortic regurgitation is present.  No aortic stenosis is present.     Pulmonic Valve  The pulmonic valve is not well visualized. There is no pulmonic valvular  regurgitation. There is no pulmonic valvular stenosis.     Vessels  The aorta root is normal. IVC diameter and respiratory changes fall into an  intermediate range suggesting an RA pressure of 8 mmHg.     Pericardium  There is no pericardial effusion.     Rhythm  Sinus rhythm was noted.  ______________________________________________________________________________  MMode/2D Measurements & Calculations     IVSd: 0.75 cm  LVIDd: 4.4 cm  LVIDs: 2.9 cm  LVPWd: 0.71 cm  FS:  33.7 %  LV mass(C)d: 95.7 grams  LV mass(C)dI: 47.8 grams/m2  Ao root diam: 3.1 cm  LA dimension: 3.3 cm  LA/Ao: 1.1  LVOT diam: 2.2 cm  LVOT area: 3.8 cm2  Ao root diam index Ht(cm/m): 1.6  Ao root diam index BSA (cm/m2): 1.5  EF Biplane: 74.4 %  LA Volume (BP): 55.0 ml     LA Volume Indexed (AL/bp): 28.3 ml/m2  RV Base: 3.6 cm  RWT: 0.33  TAPSE: 2.3 cm     Time Measurements  MM HR: 84.0 BPM     Doppler Measurements & Calculations  MV E max richar: 76.5 cm/sec  MV A max richar: 92.7 cm/sec  MV E/A: 0.83  MV max P.1 mmHg  MV mean P.0 mmHg  MV V2 VTI: 26.9 cm  MVA(VTI): 3.4 cm2  MV dec slope: 267.0 cm/sec2  MV dec time: 0.29 sec  Ao V2 max: 121.0 cm/sec  Ao max P.0 mmHg  Ao V2 mean: 91.0 cm/sec  Ao mean P.0 mmHg  Ao V2 VTI: 23.6 cm  VIKA(I,D): 3.8 cm2  VIKA(V,D): 3.9 cm2  LV V1 max P.2 mmHg  LV V1 max: 124.0 cm/sec  LV V1 VTI: 23.8 cm  SV(LVOT): 90.5 ml  SI(LVOT): 45.2 ml/m2  PA acc time: 0.12 sec     AV Richar Ratio (DI): 1.0  VIKA Index (cm2/m2): 1.9  E/E' av.8  Lateral E/e': 8.1  Medial E/e': 9.5  RV S Richar: 12.7 cm/sec     ______________________________________________________________________________  Report approved by: Festus Nuñez MD on 2025 03:35 PM

## 2025-05-13 NOTE — PROGRESS NOTES
Vascular Surgery Progress Note    CTA and duplex reviewed. Radiographically consistent with compressive syndrome. Patient would not like to pursue celiac plexus block, which would be our next step in diagnosis. Given his extensive abdominal surgical history, we would be unlikely to offer any surgery for suspected MALS.    Vascular Surgery will sign off at this time. No follow-up needed from our team    Chadwick Dudley MD  Vascular Surgery Resident

## 2025-05-13 NOTE — PROGRESS NOTES
"CLINICAL NUTRITION SERVICES - ASSESSMENT NOTE    RECOMMENDATIONS FOR MDs/PROVIDERS TO ORDER:  Recommend 100 mg thiamine and daily multi vit with  Minerals due to severe malnutrition    Registered Dietitian Interventions:  Ensure enlive chocolate twice daily for 10 am snack  And 8 pm snack    Future/Additional Recommendations:  Monitor po intake, weight, labs     REASON FOR ASSESSMENT  Positive admission nutrition risk screen due to decreased appetite PTA    Pt with a past medical history of IBS, chronic abdominal pain, prostate cancer s/p radiation therapy, presumed radiation proctitis, radiation cystitis, hep C cirrhosis, 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  Pain is unchanged in the past year and feels that this pain started since the completion of radiation therapy for prostate cancer in October 2023. The pain is diffuse, intermittent, sharp and worsened with food and associated intermittent nausea and loose stools.    INFORMATION OBTAINED  Assessed patient in room.    NUTRITION HISTORY  Pt does not follow any special diet at home.  He states he tries to eat small easy to tolerate meals- He states he doesn't tolerate \"heavy\" food very well ( such as pizza) and he does take a 16 oz protein drink daily but doesn't know what it's called or Calorie/protein content.  He state poor po intake for the past few months and has had weight loss    CURRENT NUTRITION ORDERS  Diet: Regular    CURRENT INTAKE/TOLERANCE  1 meal recorded on 5/12 (supper) 326 Calories and 5 g protein, Ate 1 meals (supper) on 5/11:  230 Calories and 7 g protein and ate 2 meals on 5/10 ( breakfast and supper): 591 Calories and 20 g protein.  Poor po intake    LABS  Nutrition-relevant labs: K 3.6    MEDICATIONS  Nutrition-relevant medications: wellbutrin XL, bentyl, colace, miralax, zoloft,     ANTHROPOMETRICS  Height: 188 cm (6' 2\")  Admission Weight: 74.8 kg (165 lb) " (05/09/25 2007)   Most Recent Weight: 74.8 kg (165 lb) (05/10/25 0505)  IBW: 86.36 kg (190 lb)  BMI: Body mass index is 21.18 kg/m .   Weight History:   Wt Readings from Last 10 Encounters:   05/10/25 74.8 kg (165 lb)   05/06/25 75.1 kg (165 lb 9.1 oz)   04/20/25 73.5 kg (162 lb)   04/18/25 76.3 kg (168 lb 3.4 oz)   04/03/25 79.4 kg (175 lb)   03/25/25 81.6 kg (180 lb)   03/13/25 81.3 kg (179 lb 3.2 oz)   03/10/25 81.6 kg (180 lb)   03/03/25 82 kg (180 lb 12.8 oz)   02/20/25 78.9 kg (174 lb)   Weight down 15 lb in the past 6 weeks (6.3%)    Dosing Weight: 74.8 kg, based on actual wt    ASSESSED NUTRITION NEEDS  Estimated Energy Needs: 2244+ kcals/day (30+ Ernst/Kg)  Justification: Repletion  Estimated Protein Needs: 75-90 grams protein/day (1 - 1.2 grams of pro/kg)  Justification: Repletion  Estimated Fluid Needs: 2244 mL/day (1 mL/kcal)  Justification: Maintenance    SYSTEM AND PHYSICAL FINDINGS    GI symptoms: Abdominal discomfort, last BM 5/11/2025   Skin/wounds: No open wounds noted    MALNUTRITION  % Intake: </=75% for >/= 1 month (severe)  % Weight Loss: > 5% in 1 month (severe)   Subcutaneous Fat Loss: Orbital: Mild and Triceps: Mild  Muscle Loss:  , Clavicles (pectoralis and deltoids): Moderate, Thigh (quadriceps): Moderate, and Calf (gastrocnemius): Moderate  Fluid Accumulation/Edema: None noted  Malnutrition Diagnosis: Severe malnutrition in the context of chronic illness  Malnutrition Present on Admission: Yes    NUTRITION DIAGNOSIS  Malnutrition (undernutrition) related to altered GI function/chronic illness as evidenced by 6.3% weight loss in 6 weeks, inadequate oral intake < 75%>/= 1 month and mild fat loss and moderate muscle loss    INTERVENTIONS  Recommended 6 small meals per day with supplements ( pt declines any snacks to be added here)  Medical food supplement therapy-ensure enlive (chocolate) twice daily between meals    GOALS  Tolerate po diet  Patient to consume % of nutritionally adequate  meal trays TID, or the equivalent with supplements/snacks.     MONITORING/EVALUATION  Progress toward goals will be monitored and evaluated per policy.

## 2025-05-13 NOTE — PLAN OF CARE
"Inpatient progress note  VSS, pt is on RA. C/o headache 5/10, but relieved w/ tylenol. Blurry vision, which is normal for patient, states he will have opt appt for this. +ortho statics. Pt is steady on his feet, but c/o dizziness & lightheadedness after sitting up, educated pt on importance of calling for assist, bed alarm activated.   this am. Pt c/o constipation, will give bowel meds. Possible discharge home today.       Problem: Adult Inpatient Plan of Care  Goal: Plan of Care Review  Description: The Plan of Care Review/Shift note should be completed every shift.  The Outcome Evaluation is a brief statement about your assessment that the patient is improving, declining, or no change.  This information will be displayed automatically on your shiftnote.  Outcome: Progressing  Goal: Patient-Specific Goal (Individualized)  Description: You can add care plan individualizations to a care plan. Examples of Individualization might be:  \"Parent requests to be called daily at 9am for status\", \"I have a hard time hearing out of my right ear\", or \"Do not touch me to wake me up as it startlesme\".  Outcome: Progressing  Goal: Absence of Hospital-Acquired Illness or Injury  Outcome: Progressing  Intervention: Identify and Manage Fall Risk  Recent Flowsheet Documentation  Taken 5/12/2025 2100 by Maria Goins, RN  Safety Promotion/Fall Prevention:   nonskid shoes/slippers when out of bed   safety round/check completed  Intervention: Prevent Skin Injury  Recent Flowsheet Documentation  Taken 5/12/2025 2101 by Maria Goins, RN  Body Position: position changed independently  Goal: Optimal Comfort and Wellbeing  Outcome: Progressing  Intervention: Monitor Pain and Promote Comfort  Recent Flowsheet Documentation  Taken 5/12/2025 2101 by Maria Goins, RN  Pain Management Interventions: medication (see MAR)  Goal: Readiness for Transition of Care  Outcome: Progressing     Problem: Delirium  Goal: Optimal " Coping  Outcome: Progressing  Goal: Improved Behavioral Control  Outcome: Progressing  Intervention: Minimize Safety Risk  Recent Flowsheet Documentation  Taken 5/12/2025 2100 by Maria Goins, RN  Enhanced Safety Measures: pain management  Goal: Improved Attention and Thought Clarity  Outcome: Progressing  Goal: Improved Sleep  Outcome: Progressing

## 2025-05-14 ENCOUNTER — APPOINTMENT (OUTPATIENT)
Dept: OCCUPATIONAL THERAPY | Facility: HOSPITAL | Age: 70
DRG: 393 | End: 2025-05-14
Payer: COMMERCIAL

## 2025-05-14 ENCOUNTER — APPOINTMENT (OUTPATIENT)
Dept: PHYSICAL THERAPY | Facility: HOSPITAL | Age: 70
DRG: 393 | End: 2025-05-14
Payer: COMMERCIAL

## 2025-05-14 DIAGNOSIS — M54.2 CERVICALGIA: Primary | ICD-10-CM

## 2025-05-14 LAB
CORTIS SERPL-MCNC: 14.5 UG/DL
HOLD SPECIMEN: NORMAL
POTASSIUM SERPL-SCNC: 3.8 MMOL/L (ref 3.4–5.3)

## 2025-05-14 PROCEDURE — 99222 1ST HOSP IP/OBS MODERATE 55: CPT | Performed by: INTERNAL MEDICINE

## 2025-05-14 PROCEDURE — 250N000013 HC RX MED GY IP 250 OP 250 PS 637: Performed by: INTERNAL MEDICINE

## 2025-05-14 PROCEDURE — 97535 SELF CARE MNGMENT TRAINING: CPT | Mod: GO

## 2025-05-14 PROCEDURE — 250N000011 HC RX IP 250 OP 636: Performed by: INTERNAL MEDICINE

## 2025-05-14 PROCEDURE — 99232 SBSQ HOSP IP/OBS MODERATE 35: CPT | Performed by: PSYCHIATRY & NEUROLOGY

## 2025-05-14 PROCEDURE — 250N000013 HC RX MED GY IP 250 OP 250 PS 637: Performed by: HOSPITALIST

## 2025-05-14 PROCEDURE — 82533 TOTAL CORTISOL: CPT | Performed by: INTERNAL MEDICINE

## 2025-05-14 PROCEDURE — 99418 PROLNG IP/OBS E/M EA 15 MIN: CPT | Performed by: INTERNAL MEDICINE

## 2025-05-14 PROCEDURE — 84132 ASSAY OF SERUM POTASSIUM: CPT | Performed by: INTERNAL MEDICINE

## 2025-05-14 PROCEDURE — 120N000001 HC R&B MED SURG/OB

## 2025-05-14 PROCEDURE — 99221 1ST HOSP IP/OBS SF/LOW 40: CPT | Performed by: PHYSICIAN ASSISTANT

## 2025-05-14 PROCEDURE — 99448 NTRPROF PH1/NTRNET/EHR 21-30: CPT | Performed by: STUDENT IN AN ORGANIZED HEALTH CARE EDUCATION/TRAINING PROGRAM

## 2025-05-14 PROCEDURE — 97116 GAIT TRAINING THERAPY: CPT | Mod: GP

## 2025-05-14 PROCEDURE — 99233 SBSQ HOSP IP/OBS HIGH 50: CPT | Performed by: INTERNAL MEDICINE

## 2025-05-14 PROCEDURE — 36415 COLL VENOUS BLD VENIPUNCTURE: CPT | Performed by: INTERNAL MEDICINE

## 2025-05-14 RX ORDER — ENOXAPARIN SODIUM 100 MG/ML
40 INJECTION SUBCUTANEOUS EVERY 24 HOURS
Status: DISCONTINUED | OUTPATIENT
Start: 2025-05-14 | End: 2025-05-15 | Stop reason: HOSPADM

## 2025-05-14 RX ORDER — AMOXICILLIN 250 MG
1 CAPSULE ORAL 2 TIMES DAILY
Status: SHIPPED
Start: 2025-05-14

## 2025-05-14 RX ORDER — POLYETHYLENE GLYCOL 3350 17 G/17G
17 POWDER, FOR SOLUTION ORAL 2 TIMES DAILY
Status: DISCONTINUED | OUTPATIENT
Start: 2025-05-14 | End: 2025-05-14

## 2025-05-14 RX ORDER — DICYCLOMINE HCL 20 MG
20 TABLET ORAL
Qty: 120 TABLET | Refills: 1 | Status: SHIPPED | OUTPATIENT
Start: 2025-05-14

## 2025-05-14 RX ORDER — POLYETHYLENE GLYCOL 3350 17 G/17G
17 POWDER, FOR SOLUTION ORAL 2 TIMES DAILY
Status: SHIPPED
Start: 2025-05-14

## 2025-05-14 RX ORDER — AMOXICILLIN 250 MG
1 CAPSULE ORAL 2 TIMES DAILY
Status: DISCONTINUED | OUTPATIENT
Start: 2025-05-14 | End: 2025-05-15 | Stop reason: HOSPADM

## 2025-05-14 RX ORDER — MIDODRINE HYDROCHLORIDE 5 MG/1
5 TABLET ORAL 2 TIMES DAILY
Status: DISCONTINUED | OUTPATIENT
Start: 2025-05-14 | End: 2025-05-14

## 2025-05-14 RX ORDER — AMOXICILLIN 250 MG
1 CAPSULE ORAL AT BEDTIME
Status: DISCONTINUED | OUTPATIENT
Start: 2025-05-14 | End: 2025-05-14

## 2025-05-14 RX ORDER — POLYETHYLENE GLYCOL 3350 17 G/17G
17 POWDER, FOR SOLUTION ORAL 2 TIMES DAILY
Status: DISCONTINUED | OUTPATIENT
Start: 2025-05-14 | End: 2025-05-15 | Stop reason: HOSPADM

## 2025-05-14 RX ORDER — MIDODRINE HYDROCHLORIDE 5 MG/1
5 TABLET ORAL
Qty: 90 TABLET | Refills: 1 | Status: SHIPPED | OUTPATIENT
Start: 2025-05-15 | End: 2025-05-21

## 2025-05-14 RX ORDER — MIDODRINE HYDROCHLORIDE 5 MG/1
5 TABLET ORAL
Status: DISCONTINUED | OUTPATIENT
Start: 2025-05-14 | End: 2025-05-15 | Stop reason: HOSPADM

## 2025-05-14 RX ADMIN — ACETAMINOPHEN 325 MG: 325 TABLET ORAL at 11:08

## 2025-05-14 RX ADMIN — TAMSULOSIN HYDROCHLORIDE 0.4 MG: 0.4 CAPSULE ORAL at 20:30

## 2025-05-14 RX ADMIN — ONDANSETRON 4 MG: 4 TABLET, ORALLY DISINTEGRATING ORAL at 22:03

## 2025-05-14 RX ADMIN — MIDODRINE HYDROCHLORIDE 5 MG: 5 TABLET ORAL at 09:33

## 2025-05-14 RX ADMIN — POLYETHYLENE GLYCOL 3350 17 G: 17 POWDER, FOR SOLUTION ORAL at 09:33

## 2025-05-14 RX ADMIN — SENNOSIDES AND DOCUSATE SODIUM 1 TABLET: 50; 8.6 TABLET ORAL at 20:30

## 2025-05-14 RX ADMIN — DICYCLOMINE HYDROCHLORIDE 20 MG: 20 TABLET ORAL at 08:45

## 2025-05-14 RX ADMIN — POLYETHYLENE GLYCOL 3350 17 G: 17 POWDER, FOR SOLUTION ORAL at 20:30

## 2025-05-14 RX ADMIN — MIDODRINE HYDROCHLORIDE 5 MG: 5 TABLET ORAL at 18:04

## 2025-05-14 RX ADMIN — MIDODRINE HYDROCHLORIDE 5 MG: 5 TABLET ORAL at 13:01

## 2025-05-14 RX ADMIN — BUPROPION HYDROCHLORIDE 150 MG: 150 TABLET, FILM COATED, EXTENDED RELEASE ORAL at 09:33

## 2025-05-14 RX ADMIN — DICYCLOMINE HYDROCHLORIDE 20 MG: 20 TABLET ORAL at 16:49

## 2025-05-14 RX ADMIN — DICYCLOMINE HYDROCHLORIDE 20 MG: 20 TABLET ORAL at 22:03

## 2025-05-14 RX ADMIN — ENOXAPARIN SODIUM 40 MG: 40 INJECTION SUBCUTANEOUS at 08:42

## 2025-05-14 RX ADMIN — DICYCLOMINE HYDROCHLORIDE 20 MG: 20 TABLET ORAL at 11:08

## 2025-05-14 RX ADMIN — SERTRALINE HYDROCHLORIDE 50 MG: 50 TABLET ORAL at 08:45

## 2025-05-14 RX ADMIN — TOLTERODINE 2 MG: 2 CAPSULE, EXTENDED RELEASE ORAL at 08:45

## 2025-05-14 RX ADMIN — SENNOSIDES AND DOCUSATE SODIUM 1 TABLET: 50; 8.6 TABLET ORAL at 09:33

## 2025-05-14 RX ADMIN — ACETAMINOPHEN 325 MG: 325 TABLET ORAL at 20:30

## 2025-05-14 ASSESSMENT — ACTIVITIES OF DAILY LIVING (ADL)
ADLS_ACUITY_SCORE: 41
ADLS_ACUITY_SCORE: 40
ADLS_ACUITY_SCORE: 41

## 2025-05-14 NOTE — PLAN OF CARE
A&O. VSS on RA. SBA. Dizzy. Refuses bed alarm, education provided. Bladder scan 195. Zofran and compazine tx nausea.

## 2025-05-14 NOTE — CONSULTS
[facility]    Neurosurgery Consultation     Date of Admission:  5/9/2025  Date of Consult (When I saw the patient): 05/14/25    Assessment & Plan   Ghulam Rizzo is a 69 year old male who was admitted on 5/9/2025. I was asked to see the patient for gait instability and coordination difficulty with noted cervical spinal stenosis and small enhancing calvarial lesions on cervical and brain MRI.    Active Problems:    Dizziness    Anxiety    Chronic abdominal pain  Cervical spinal stenosis   Skull mets     Plan:   Recommend consult with rad/onc and hem/onc for further evaluation of  small enhancing calvarial lesions   No current neurosurgical intervention indicated   In regards to his cervical spinal stenosis recommend outpatient follow up with Dr. Agustin for further discussion of surgical intervention   Will otherwise sign off please re-consult or call with any questions           I have discussed the following assessment and plan with Dr. Agustin who is in agreement with initial plan and will follow up with further consultation recommendations.    Vangie Joseph PA-C  [facility] Neurosurgery  O: [phone]          Code Status    Full Code    Reason for Consult   Reason for consult: I was asked by Dr. Vargas to evaluate this patient for cervical spinal stenosis.    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 He notes that over the past year he has had progressive gait instability and coordination difficulty. He notes dropping items out of his hands and  difficulty with fine motor movements. He notes that any times he goes to stand up he becomes dizzy and off balance and has to sit back down. He has intermittent posterior neck stiffness but denies any radicular upper or lower extremity pain numbness tingling or weakness. He denies changes in bowel or bladder habits. He has intermittent complaint of headaches that will occur at random and resolve on its own. He denies any accompanying nausea or emesis. He notes intermittent blurry vision that will occur when first standing and when he is dizzy but will resolve on its own or with sitting. He has history of prostate cancer with treatment in 2023 and had brain MRI April 2025 with noted small enhancing calvarial lesions possible bony mets of which has not been further evaluated.     Past Medical History   I have reviewed this patient's medical history and updated it with pertinent information if needed.   Past Medical History:   Diagnosis Date    Anisocoria     Asthma     Carpal tunnel syndrome     2006    Head injury, unspecified     closed head injury 4 yrs ago    Hepatitis C     Cured by [facility] in 2015. diagnosed in 2012 was positive for Hepatitis C.     Obesity     2006, resolved    Other convulsions     seizure disorder due to head injury 4 yrs ago.    Other mononeuritis of upper limb     L phrenic nerve paralysis    Unspecified essential hypertension        Past Surgical History   I have reviewed this patient's surgical history and updated it with pertinent information if needed.  Past Surgical History:   Procedure Laterality Date    COLECTOMY WITHOUT COLOSTOMY Right 7/13/2022    Procedure: Laparotomy, right colectomy, lysis of adhesions,;  Surgeon: Tommy Martino DO;  Location: [facility]    COLONOSCOPY      COLONOSCOPY N/A 7/18/2023    Procedure: Colonoscopy with polypectomy;  Surgeon: Octavio Aguirre MD;  Location: [facility]    COLONOSCOPY N/A 6/10/2024    Procedure: Colonoscopy;  Surgeon: Salena  Tommy Dee DO;  Location: [facility]    ESOPHAGOSCOPY, GASTROSCOPY, DUODENOSCOPY (EGD), COMBINED N/A 6/10/2024    Procedure: Esophagoscopy, gastroscopy, duodenoscopy (EGD), combined;  Surgeon: Tommy Martino DO;  Location: [facility]    GI SURGERY      HERNIA REPAIR      HERNIORRHAPHY INCISIONAL (LOCATION) N/A 7/13/2022    Procedure: primary incisional hernia repair;  Surgeon: Tommy Martino DO;  Location: [facility] OR    INSERT SEED MARKER / MATRIX N/A 8/30/2023    Procedure: Transrectal ultrasound guided placement of fiducials and SpaceOar;  Surgeon: Chase Blount MD;  Location:  OR    SURGICAL HISTORY OF -   4/6/87    esophagogastroduodenoscopy    SURGICAL HISTORY OF -   4/27/87    exploratory laparotomy and anterior gastropexy over a gastrostomy tube.    SURGICAL HISTORY OF -   11/13/90    esophagogastroduodenoscopy    SURGICAL HISTORY OF -   1991    L diaphragm eventration repair, fixation of stomach and colon to abd wall    SURGICAL HISTORY OF -       hiatal hernia repair    THORACIC SURGERY         Prior to Admission Medications   Prior to Admission Medications   Prescriptions Last Dose Informant Patient Reported? Taking?   Calcium Carbonate (CALCIUM-CARB 600 PO)  Self Yes No   Sig: Take 2 tablets by mouth daily   HYDROmorphone (DILAUDID) 2 MG tablet   No No   Sig: Take 1 tablet (2 mg) by mouth every 6 hours as needed for pain.   acetaminophen (TYLENOL) 500 MG tablet  Self Yes No   Sig: Take 1,000 mg by mouth every 6 hours as needed for mild pain   amLODIPine (NORVASC) 5 MG tablet  Self No No   Sig: Take 1 tablet (5 mg) by mouth daily.   buPROPion (WELLBUTRIN XL) 150 MG 24 hr tablet  Self No No   Sig: Take 1 tablet (150 mg) by mouth every morning.   cyanocobalamin (VITAMIN B-12) 1000 MCG tablet  Self No No   Sig: Take 1 tablet (1,000 mcg) by mouth daily   glycerin (LAXATIVE) 1.2 g suppository  Self No No   Sig: Place 1 suppository rectally daily as needed (constipation.)   mirabegron  (MYRBETRIQ) 50 MG 24 hr tablet   No No   Sig: Take 1 tablet (50 mg) by mouth daily.   polyethylene glycol (MIRALAX) 17 GM/Dose powder  Self No No   Sig: Take 17 g (1 capful.) by mouth daily   senna-docusate (NEGRITO-COLACE) 8.6-50 MG per tablet  Self No No   Sig: Take 1-2 tablets by mouth 2 times daily as needed for constipation.   sertraline (ZOLOFT) 50 MG tablet  Self No No   Sig: Take 1 tablet by mouth daily   solifenacin (VESICARE) 5 MG tablet  Self No No   Sig: Take 1 tablet (5 mg) by mouth daily.   tamsulosin (FLOMAX) 0.4 MG capsule  Self No No   Sig: Take 1 capsule (0.4 mg) by mouth every evening   vitamin D3 (CHOLECALCIFEROL) 50 mcg (2000 units) tablet  Self No No   Sig: Take 1 tablet (50 mcg) by mouth daily      Facility-Administered Medications: None     Allergies   Allergies   Allergen Reactions    Levaquin [Levofloxacin] Nausea and Vomiting    Oxycodone Dizziness    Percocet [Oxycodone-Acetaminophen] Visual Disturbance and Hallucination     Does not have any problems taking Tylenol/Acetaminophen.       Social History   I have reviewed this patient's social history and updated it with pertinent information if needed. Ghulam Rizoz  reports that he has quit smoking. His smoking use included cigarettes. He has a 25 pack-year smoking history. He has never used smokeless tobacco. He reports current alcohol use. He reports that he does not use drugs.    Family History   I have reviewed this patient's family history and updated it with pertinent information if needed.   Family History   Problem Relation Age of Onset    Cerebrovascular Disease Mother     Heart Disease Brother     Heart Disease Brother        Review of Systems   14 point review of systems negative with exception to HPI     Physical Exam   Temp: 98.2  F (36.8  C) Temp src: Oral BP: 128/71 Pulse: 70   Resp: 18 SpO2: 98 % O2 Device: None (Room air)    Vital Signs with Ranges  Temp:  [97  F (36.1  C)-98.8  F (37.1  C)] 98.2  F (36.8  C)  Pulse:  [69-80]  "70  Resp:  [16-20] 18  BP: (112-138)/(64-80) 128/71  SpO2:  [96 %-100 %] 98 %  165 lbs 0 oz     , Blood pressure 128/71, pulse 70, temperature 98.2  F (36.8  C), temperature source Oral, resp. rate 18, height 1.88 m (6' 2\"), weight 74.8 kg (165 lb), SpO2 98%.  165 lbs 0 oz  HEENT:  Normocephalic, atraumatic.  PERRLA.  EOM s intact.   Neck:  Supple, non-tender, without lymphadenopathy.  Heart:  No peripheral edema  Lungs:  No SOB  Abdomen:  Soft, non-tender, non-distended.  Normal bowel sounds.  Skin:  Warm and dry, good capillary refill.  Extremities:  Good radial and dorsalis pedis pulses bilaterally, no edema, cyanosis or clubbing.    NEUROLOGICAL EXAMINATION:   Mental status:  Alert and Oriented x 3, speech is fluent.  Cranial nerves:  II-XII intact.   Motor:  generalized atrophy of all muscle groups   Deltoids:  Right: 5/5   Left:  5/5  Biceps:  Right: 5/5   Left:  5/5  Triceps: Right: 5/5   Left:  5/5                       Wrist Extensors: Right: 5/5   Left:  5/5        Wrist Flexors:Right: 5/5   Left:  5/5             Hand : Right: 5/5   Left:  5/5         Hip Flexor: Right: 5/5   Left:  5/5                 Knee extension :Right: 5/5   Left:  5/5               Knee flexion: Right: 5/5   Left:  5/5              Plantar flexion:Right: 5/5   Left:  5/5        dorsiflexion:Right: 5/5   Left:  5/5                        EHL:Right: 5/5   Left:  5/5                     Sensation:  intact to LT Throughout   Reflexes:  Negative Babinski.  POSITIVE Clonus 1-2 beats bilaterally.    Coordination:  Smooth finger to nose testing.   Negative pronator drift.   Gait:  not tested    Cervical examination reveals good range of motion.  No tenderness to palpation of the cervical spine or paraspinous muscles bilaterally.     Lumbar examination reveals no tenderness of the spine or paraspinous muscles.  Hip height is symmetrical. Straight leg raise is negative bilaterally.     Images reviewed with noted C3-4 spinal stenosis and " small calvarial lesions   IMPRESSION:  1.  No acute/subacute infarct or other acute intracranial abnormality.  2.  Few small enhancing calvarial lesions are nonspecific but could represent osseous metastases given patient's history of prostate cancer.  3.  Mild cerebral volume loss and presumed changes of chronic microvascular disease.    FINDINGS:   Vertebral body height is normal. Mild grade 1 retrolisthesis of C3 and C2. Normal marrow signal. No abnormal cord signal. No extraspinal abnormality. There is a retrocerebellar cyst.     Craniovertebral junction and C1-C2: Normal.     C2-C3: Slight degenerative retrolisthesis of C2. Normal disc height. Interbody spurring. No central canal or neural foraminal stenosis..      C3-C4: Grade 1 retrolisthesis of C3. Mild loss of disc height. There is a mild disc osteophyte complex and uncinate spurring. Moderate to severe central canal stenosis. Normal facets. Mild foraminal narrowing.      C4-C5: Slight loss of disc height. Mild disc protrusion. Normal facets. No central canal or neural foraminal stenosis.      C5-C6: Normal disc height. Slight annular bulge. Mild facet arthropathy. No central canal or neural foraminal stenosis.      C6-C7: Normal disc height. No herniation. Normal facets. No central canal or neural foraminal stenosis.      C7-T1: Normal disc height. No herniation. Normal facets. No spinal canal or neural foraminal stenosis.                                                                      IMPRESSION:  1.  At C3-4 there is moderate to severe central canal stenosis secondary to a mild disc osteophyte complex and uncinate spurring.  2.  Normal signal in the cervical cord.    Data     CBC RESULTS:   Recent Labs   Lab Test 05/12/25  0711   WBC 4.3   RBC 3.82*   HGB 12.6*   HCT 34.3*   MCV 90   MCH 33.0   MCHC 36.7*   RDW 12.3   *     Basic Metabolic Panel:  Lab Results   Component Value Date     05/12/2025     06/28/2020      Lab Results    Component Value Date    POTASSIUM 3.8 05/14/2025    POTASSIUM 3.9 07/15/2022    POTASSIUM 4.0 06/28/2020     Lab Results   Component Value Date    CHLORIDE 106 05/12/2025    CHLORIDE 108 07/15/2022    CHLORIDE 107 06/28/2020     Lab Results   Component Value Date    TAVIA 8.8 05/12/2025    TAVIA 8.6 06/28/2020     Lab Results   Component Value Date    CO2 26 05/12/2025    CO2 22 07/15/2022    CO2 27 06/28/2020     Lab Results   Component Value Date    BUN 14.0 05/12/2025    BUN 15 07/15/2022    BUN 13 06/28/2020     Lab Results   Component Value Date    CR 0.88 05/12/2025    CR 0.72 06/28/2020     Lab Results   Component Value Date    GLC 90 05/12/2025    GLC 90 05/12/2025    GLC 85 07/15/2022     06/28/2020     INR:  Lab Results   Component Value Date    INR 1.15 05/10/2025    INR 1.03 04/20/2025    INR 1.06 04/18/2025    INR 1.09 01/16/2023    INR 1.04 11/25/2017    INR 1.1 03/30/2015

## 2025-05-14 NOTE — PLAN OF CARE
"  Problem: Adult Inpatient Plan of Care  Goal: Plan of Care Review  Description: The Plan of Care Review/Shift note should be completed every shift.  The Outcome Evaluation is a brief statement about your assessment that the patient is improving, declining, or no change.  This information will be displayed automatically on your shiftnote.  Outcome: Progressing  Flowsheets (Taken 5/14/2025 0318)  Plan of Care Reviewed With: patient  Overall Patient Progress: improving  Goal: Patient-Specific Goal (Individualized)  Description: You can add care plan individualizations to a care plan. Examples of Individualization might be:  \"Parent requests to be called daily at 9am for status\", \"I have a hard time hearing out of my right ear\", or \"Do not touch me to wake me up as it startlesme\".  Outcome: Progressing  Goal: Optimal Comfort and Wellbeing  Outcome: Progressing     Problem: Adult Inpatient Plan of Care  Goal: Absence of Hospital-Acquired Illness or Injury  Intervention: Identify and Manage Fall Risk  Recent Flowsheet Documentation  Taken 5/14/2025 0314 by Jelly Arguelles RN  Safety Promotion/Fall Prevention: nonskid shoes/slippers when out of bed  Intervention: Prevent Skin Injury  Recent Flowsheet Documentation  Taken 5/14/2025 0314 by Jelly Arguelles RN  Body Position: position changed independently  Intervention: Prevent Infection  Recent Flowsheet Documentation  Taken 5/14/2025 0315 by Jelly Arguelles RN  Infection Prevention: cohorting utilized   Goal Outcome Evaluation:      Plan of Care Reviewed With: patient    Overall Patient Progress: improvingOverall Patient Progress: improving               "

## 2025-05-14 NOTE — PLAN OF CARE
A&O. Ortho +. SBA walker, refuses bed alarm, compression stockings on, midodrine. Bladderscan 182. Chronic abd pain, tylenol x1, bowel meds. Hem/onc

## 2025-05-14 NOTE — PROGRESS NOTES
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 Patient admitted to [facility] from 05/05 - 05/07 for the same.  Pain is unchanged in the past year and feels that this pain started since completion of radiation therapy for prostate cancer in October 2023.  The pain is diffuse, intermittent, sharp and worsened with food and associated intermittent nausea and loose stools. He underwent EGD in 6/2024 that was notable for normal stomach, duodenum. He underwent colonoscopy in 01/2025 that was notable for a few medium-sized patchy angiodysplastic lesions with typical arborization found in the rectum.     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  WBC 6.8.  UA not concerning for infection.    - CT abdomen pelvis notable for small bowel and colonic air-fluid levels that may be consistent with gastroenteritis and significant bladder distention.  Also there was increasing prominence of the pancreatic duct which was nonspecific and follow-up MRCP was recommended.  - No diarrhea thus no enteric panel and C. difficile panel ordered  - CTA abdomen pelvis ordered to evaluate for chronic mesenteric ischemia showing possible MALS and extrinsic compression of celiac artery. Vascular surgery consulted.    - No plans for intervention from our team while inpatient  - ESR/CRP sent for vasculitis workup (Sed Rate is 9, CRP 1)  - Could  consider celiac plexus block to help r/o MALS  - (d/w pain service > [facility] will do these if needed), and patient declines at this time)  - per Vascular Surgery note 5/13: vascular surgery unlikely to offer surgery given prior surgical abdomen   - GI consulted and recommendations noted  - Continue PTA pain regimen with p.o. Dilaudid  - Trial Dicyclomine started during this admission.   - Will need outpatient MRI/MRCP to follow-up for pancreatic duct prominence  -Can consider Bowel regimen changes suggested by GI: continue Miralax BID with 2 senna's daily vs Linzess/Trulance/Amitiza (based on insurance coverage) vs glycerin suppository QAM vs Fleet QAM.   - At this time, his abdominal pain is not his primary complaint and he does not want an injection at this time)  - For his ongoing constipation, will increase Miralax to bid, as well as Senna-S bid  - No plans to pursue further inpatient evaluation of his abd pain at this time > needs outpt imaging as noted above at discharge    Dizziness / Gait Instability - Likely multifactorial due to orthostatic hypotension and cervical cord compression, with possible undiagnosed inner ear problem   - this remains his primary complaint   - He has done a fairly extensive workup as an outpatient, with his next step being an ENT appointment in July (soonest he could schedule)  - CTA was negative for obstruction 4/20, 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  No acute/subacute infarct or other acute intracranial abnormality.  2.  Few small enhancing calvarial lesions are  nonspecific but could represent osseous metastases given patient's history of prostate cancer.  3.  Mild cerebral volume loss and presumed changes of chronic microvascular disease.  - PSA 0.05 in 4/25 (enhancing lesions noted above) > and he has an appointment set up on 5/20 with a specialist to eval #2 above   - Neuro consulted on 5/13, and cervical MRI noted which showed moderate to severe stenosis, that per neuro appears to be compressing cord slightly   Plan;  Recheck orthostatics today, and increase midodrine further if needed   Ask NSG to see today  Will ask PT to see again today, he would like a rolling walker  Will also check AM cortisol, given orthostasis     Hypokalemia-resolved  -RN potassium replacement protocol     AGMA-resolved  Lactic acidosis-resolved  -resolved with fluids     #Shortness of breath-resolved  #Weakness  -The patient states he has had shortness of breath for the last month.   - No chest pain.  No cough, sputum production.  No peripheral edema.    - WBC normal.  Procalcitonin normal.  Troponin normal.   - CTA chest with no PE or signs of pneumonia but did note left basilar atelectasis related to chronic elevation of the left diaphragm which could be causing his shortness of breath.  -BNP wnl   -Stable on RA and does not require supplemental oxygen    -PT/OT consult appreciated     #Thrombocytopenia  -132---128---137  - Presumed due to liver disease. 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          Diet: Regular Diet Adult  Snacks/Supplements  Adult: Ensure Enlive; Between Meals    DVT Prophylaxis: Pneumatic Compression Devices  Rivera Catheter: Not present  Lines: None     Cardiac Monitoring: None  Code Status: Full Code      Clinically Significant Risk Factors                   # Hypertension: Noted on problem list             # Severe Malnutrition: based on nutrition assessment and treatment provided per dietitian's recommendations., PRESENT ON ADMISSION   # Financial/Environmental Concerns: none         Social Drivers of Health    Tobacco Use: Medium Risk (4/18/2025)    Patient History     Smoking Tobacco Use: Former     Smokeless Tobacco Use: Never          Disposition Plan     Medically Ready for Discharge: Anticipated Tomorrow             Brayan Vargas MD  Hospitalist Service  [facility]  Securely message with [employer] (more info)  Text page via [employer] Paging/Directory   ______________________________________________________________________    Interval History   No new complaints today.  He is receptive to potential cervical surgery if needed.  He would like a little further assistance with a bowel regimen due to his constipation.     Physical Exam   Vital Signs: Temp: 98.3  F (36.8  C) Temp src: Oral BP: 112/70 Pulse: 73   Resp: 16 SpO2: 97 % O2 Device: None (Room air)    Weight: 165 lbs 0 oz    Constitutional: awake, alert, cooperative, no apparent distress, and appears stated age  Respiratory: No increased work of breathing, good air exchange, clear to auscultation bilaterally, no crackles or wheezing  Cardiovascular: regular rate and rhythm and normal S1 and S2  GI: normal bowel sounds, soft, non-distended, and non-tender    Medical Decision Making       50 MINUTES SPENT BY ME on the date of service doing chart review, history, exam, documentation & further activities per the note.      Data     I have personally reviewed the following data over the past 24 hrs:    N/A  \   N/A   / N/A     N/A N/A N/A /  N/A   3.8 N/A N/A \        Imaging results reviewed over the past 24 hrs:   Recent Results (from the past 24 hours)   MR Cervical Spine w/o & w Contrast    Narrative    EXAM: MR CERVICAL SPINE W/O and W CONTRAST  LOCATION: [facility]  DATE: 5/13/2025    INDICATION: unsteady, hyperreflexic, eval for cervical lesion or stenosis  COMPARISON: None.  CONTRAST: 7ml Gadavist  TECHNIQUE: MRI Cervical Spine without and with IV contrast.    FINDINGS:   Vertebral body height is normal. Mild grade 1 retrolisthesis of C3 and C2. Normal marrow signal. No abnormal cord signal. No extraspinal abnormality. There is a retrocerebellar cyst.    Craniovertebral junction and C1-C2: Normal.    C2-C3: Slight degenerative retrolisthesis of C2. Normal disc height. Interbody spurring. No central canal or neural foraminal stenosis..     C3-C4: Grade 1 retrolisthesis of C3. Mild loss of disc height. There is a mild disc osteophyte complex and uncinate spurring. Moderate to severe central canal stenosis. Normal facets. Mild foraminal narrowing.     C4-C5: Slight loss of disc height. Mild disc protrusion. Normal facets. No central canal or neural foraminal stenosis.     C5-C6: Normal disc height. Slight annular bulge. Mild facet arthropathy. No central canal or neural foraminal stenosis.     C6-C7: Normal disc height. No herniation. Normal facets. No central canal or neural foraminal stenosis.     C7-T1: Normal disc height. No herniation. Normal facets. No spinal canal or neural foraminal stenosis.      Impression    IMPRESSION:  1.  At C3-4 there is moderate to severe central canal stenosis secondary to a mild disc osteophyte complex and uncinate spurring.  2.  Normal signal in the cervical cord.

## 2025-05-14 NOTE — PROGRESS NOTES
[facility] [place]    Long Discussion with step dtr at bedside this afternoon - 40 extra minutes in time today.    We discussed his current eval, in particular the recurrent abd pain, findings, and surgical recommendations.  Patient again affirmed that he can tolerate his current symptoms.  We discussed low volume eating, high nutritional caloric drinks etc.    Orthostatic Hypotension: we discussed stopping the norvasc, starting midorine, compression support stockings etc.  He would like to try the support stockings as well.  He will go home with a rolling walker.  I will also increase the midodrine up to 5 tid.  He reports he does feel better on this current regimen.    Finally, we also discussed the findings on his scalp.  Both oncology, nsg, and rad onc have seem him today.  Their plan was reviewed.    He can discharge tomorrow morning, not feeling quite comfortable yet this evening.

## 2025-05-14 NOTE — PROGRESS NOTES
[facility]  [place]    Ghulam Rizzo MRN# [id number]   Age: 69 year old YOB: 1955               Assessment and Plan:      Dizziness, unclear etiology     C-spine MRI shows stenosis with some cord impingement at C3-4, no clearly abnormal cord signal.   If symptoms do not improve with addressing orthostatic issues, then cervical myelopathy may be the cause.     Without spinning type dizziness, I doubt ENT is going to have a clear dx or treatment for him.     Will ask neurosurgery to see with question of whether to treat the cervical stenosis surgically.  Pt is not averse to surgery.                 Chief Complaint/HPI:     5/14/25:  no change in symptoms overnight.  C-spine MRI done    5/13/25:  This patient is a 69-year-old gentleman seen for neurologic evaluation today regarding dizziness that has been going on for several months.  He was admitted on May 9 with chronic abdominal pain and shortness of breath.  He does not describe a spinning sensation, he advocates more lightheadedness or imbalance.  Previous notes indicate that he is symptomatic while lying down, but he tells me that he is fine if lying or sitting, the dizziness comes on when he stands up.  It keeps him from doing things, though he says that he has not had any falls due to the dizziness.  It has been recommended that he use a cane or walker, but he does not do that.  He says that he still feels the dizziness.  He had an MRI of the brain last month which showed no stroke or other parenchymal lesion.  There were some enhancing calvarial lesions though these would not cause his symptoms.  He sometimes can feel like his arms or legs are asleep but this is not a constant symptom.            Past Medical History:    has a past medical history of Anisocoria, Asthma, Carpal tunnel syndrome, Head injury, unspecified, Hepatitis C, Obesity, Other convulsions, Other mononeuritis of upper limb, and Unspecified essential  hypertension.            Medications:     Current Facility-Administered Medications:     acetaminophen (TYLENOL) tablet 325 mg, 325 mg, Oral, Q6H PRN, Jason Ordoñez MD, 325 mg at 05/13/25 2056    bisacodyl (DULCOLAX) EC tablet 5 mg, 5 mg, Oral, Daily PRN, Rachael Ibrahim MD, 5 mg at 05/13/25 0651    bisacodyl (DULCOLAX) suppository 10 mg, 10 mg, Rectal, Daily PRN, Lana George MD, 10 mg at 05/13/25 1330    buPROPion (WELLBUTRIN XL) 24 hr tablet 150 mg, 150 mg, Oral, QAM, Rachael Ibrahim MD, 150 mg at 05/13/25 0815    dicyclomine (BENTYL) tablet 20 mg, 20 mg, Oral, 4x Daily AC & HS, Rachael Ibrahim MD, 20 mg at 05/13/25 2057    Enema Compound (docusate/mineral oil/NaPhos) NO MAG CIT PREMIX, 226 mL, Rectal, Daily PRN, Lana George MD, 226 mL at 05/11/25 0828    enoxaparin ANTICOAGULANT (LOVENOX) injection 40 mg, 40 mg, Subcutaneous, Q24H, Brayan Vargas MD    HYDROmorphone (DILAUDID) tablet 2 mg, 2 mg, Oral, Q6H PRN, Jason Ordoñez MD, 2 mg at 05/10/25 2125    midodrine (PROAMATINE) tablet 2.5 mg, 2.5 mg, Oral, BID 09 12, Brayan Vargas MD, 2.5 mg at 05/13/25 1302    ondansetron (ZOFRAN ODT) ODT tab 4 mg, 4 mg, Oral, Q6H PRN, 4 mg at 05/11/25 2104 **OR** ondansetron (ZOFRAN) injection 4 mg, 4 mg, Intravenous, Q6H PRN, Jason Ordoñez MD, 4 mg at 05/13/25 1116    polyethylene glycol (MIRALAX) Packet 17 g, 17 g, Oral, BID, Brayan Vargas MD    prochlorperazine (COMPAZINE) injection 5 mg, 5 mg, Intravenous, Q6H PRN, 5 mg at 05/13/25 1606 **OR** prochlorperazine (COMPAZINE) tablet 5 mg, 5 mg, Oral, Q6H PRN, Jason Ordoñez MD    senna-docusate (SENOKOT-S/PERICOLACE) 8.6-50 MG per tablet 1 tablet, 1 tablet, Oral, BID **OR** [DISCONTINUED] senna-docusate (SENOKOT-S/PERICOLACE) 8.6-50 MG per tablet 2 tablet, 2 tablet, Oral, BID PRN, Jason Ordoñez MD, 2 tablet at 05/13/25 1303    sertraline (ZOLOFT) tablet 50 mg, 50 mg, Oral, Daily, Rachael Ibrahim MD, 50 mg at 05/13/25 0815    tamsulosin  "(FLOMAX) capsule 0.4 mg, 0.4 mg, Oral, QPM, Rachael Ibrahim MD, 0.4 mg at 05/13/25 2056    tolterodine ER (DETROL LA) 24 hr capsule 2 mg, 2 mg, Oral, Daily, Rachael Ibrahim MD, 2 mg at 05/13/25 0815              Physical Exam:      Vitals: /70 (BP Location: Left arm)   Pulse 73   Temp 98.3  F (36.8  C) (Oral)   Resp 16   Ht 1.88 m (6' 2\")   Wt 74.8 kg (165 lb)   SpO2 97%   BMI 21.18 kg/m    BMI= Body mass index is 21.18 kg/m .     Patient is alert and in no acute distress.  He is quite thin.  Neck was supple, no carotid bruits, thyromegaly, lymphadenopathy or JVD noted.   Neurological Exam:   Mental status: Patient is alert and oriented x 3. Speech is clear and fluent    CN II: PERRLA.   CN III, IV, VI: EOMI.    CN VII: Face is symmetric with normal eye closure and smile.   CN VII: Hearing is normal to conversation   CN IX, X: Palate elevates symmetrically. Phonation is normal.    CN XI: Head turning and shoulder shrug are intact   CN XII: Tongue is midline with normal movements and no atrophy or fasciculations.   Motor: Muscle bulk and tone are normal. No pronator drift. Strength is 5/5 bilaterally. No fasciculations noted.   Reflexes: Reflexes are brisk in the arms, brisk at the right knee but diminished or absent at the left knee   Sensory: Light touch, pinprick, and vibration sense are intact bilaterally.    Coordination: Rapid alternating movements and fine finger movements are intact. There is no dysmetria on finger-to-nose testing.    Gait: able to stand and bear weight, looks unsteady         Octavio Tabares MD       More than 35 minutes spent reviewing records and results, evaluating patient, discussing with pt and hospitalist and on documentation    "

## 2025-05-14 NOTE — PLAN OF CARE
"  Problem: Adult Inpatient Plan of Care  Goal: Plan of Care Review  Description: The Plan of Care Review/Shift note should be completed every shift.  The Outcome Evaluation is a brief statement about your assessment that the patient is improving, declining, or no change.  This information will be displayed automatically on your shiftnote.  Outcome: Progressing  Goal: Patient-Specific Goal (Individualized)  Description: You can add care plan individualizations to a care plan. Examples of Individualization might be:  \"Parent requests to be called daily at 9am for status\", \"I have a hard time hearing out of my right ear\", or \"Do not touch me to wake me up as it startlesme\".  Outcome: Progressing  Goal: Absence of Hospital-Acquired Illness or Injury  Outcome: Progressing  Intervention: Identify and Manage Fall Risk  Recent Flowsheet Documentation  Taken 5/13/2025 2200 by Felecia Kang RN  Safety Promotion/Fall Prevention: nonskid shoes/slippers when out of bed  Intervention: Prevent Skin Injury  Recent Flowsheet Documentation  Taken 5/13/2025 2200 by Felecia Kang RN  Body Position: position changed independently  Goal: Optimal Comfort and Wellbeing  Outcome: Progressing  Intervention: Monitor Pain and Promote Comfort  Recent Flowsheet Documentation  Taken 5/13/2025 2056 by Felecia Kang RN  Pain Management Interventions: medication (see MAR)  Goal: Readiness for Transition of Care  Outcome: Progressing     Problem: Delirium  Goal: Optimal Coping  Outcome: Progressing  Goal: Improved Behavioral Control  Outcome: Progressing  Intervention: Minimize Safety Risk  Recent Flowsheet Documentation  Taken 5/13/2025 2200 by Felecia Kang RN  Enhanced Safety Measures: pain management  Goal: Improved Attention and Thought Clarity  Outcome: Progressing  Intervention: Maximize Cognitive Function  Recent Flowsheet Documentation  Taken 5/13/2025 2200 by Felecia Kang RN  Sensory Stimulation Regulation: quiet environment " promoted  Reorientation Measures: clock in view  Goal: Improved Sleep  Outcome: Progressing   Goal Outcome Evaluation:       Patient alert and orient. Had some abd pain-medicated with Tylenol. Refuses bed alarm. Asked that he call us when he needs to get up.

## 2025-05-14 NOTE — PLAN OF CARE
Goal Outcome Evaluation:      Plan of Care Reviewed With: patient    Overall Patient Progress: improvingOverall Patient Progress: improving      Pt A&Ox4. Denies pain or dizziness. On room air. VSS. Uses urinal at bedside. On K protocol, AM recheck. Regular diet.

## 2025-05-14 NOTE — CONSULTS
[facility] Radiation Oncology Inpatient Consult Note    Patient: Ghulam Rizzo  MRN: [id number]  Date of Service: 5/14/2025      Reason for Visit    I was consulted by .No ref. provider found regarding a few small marrow replacing, enhancing lesions of the calvarium, nonspecific but could represent osseous metastases given patient's history of prostate cancer seen on MRI 4/20/25.      Assessment/Plan    Active Problems:    Dizziness    Anxiety    Chronic abdominal pain     Unfavorable intermediate  prostatic adenocarcinoma, Zion 4+3 = 7, Stage T1cN0, pretreatment PSA 10.8.  He underwent definitive radiation therapy (7000 cGy in 28 fractions) with short-term ADT (Lupron x1 7/21/2023):    Posttreatment PSA remains low (0.05 on 4/20/2025).    Ongoing dizziness/difficulties with balance.    Few small nonspecific enhancing lesions of the calvarium unlikely to be contributory to any symptoms he is currently experiencing.  Could repeat PSA to determine if rising- last month remained low.  Without focal symptoms and given current size and uncertainly if metastases would not recommend any radiation therapy intervention other than radiation oncology follow-up as outpatient as previously scheduled.  No inpatient radiation indicated.     ECOG Performance Status: (2) Ambulatory and capable of self care, unable to carry out work activity, up and about > 50% or waking hours    Staging History     Cancer Staging   Adenocarcinoma of prostate (H)  Staging form: Prostate, AJCC 8th Edition  - Clinical stage from 7/20/2023: Stage IIC (cT1c, cN0, cM0, PSA: 10.8, Grade Group: 3) - Signed by Sepideh Falcon MD on 5/14/2025      History  Unfavorable intermediate  prostatic adenocarcinoma, Zion 4+3 = 7, pathologic T1cN0, pretmt PSA: 10.84.  He underwent definitive radiation therapy with short-term ADT:    7/21/23:  Lupron 45 mg x 1  9/21/23-10/30/23: Prostate 7000 cGy in 28 fractions (MARVIN Rodgers).     PSA  6/6/2024:    0.03   12/2/2024: 0.13  4/20/2025: 0.05    4/20/2025 CT head: No acute findings or bone abnormality  4/20/2025 MRI brain:  Few small marrow replacing, enhancing lesions of the calvarium (series 112 image 13, image 21, image 26) which appear to correspond to sclerotic foci on the prior head CT-- nonspecific but could represent osseous metastases given patient's history of prostate cancer.     5/5/2025 - 5/7/2025 admitted with weakness and diffuse abdominal pain.    5/9/2025 presented to the ED with shortness of breath and dizziness.  He was admitted with ongoing dizziness, instability and intermittent  headaches that resolve without intervention.    Neuro consulted on 5/13, and cervical MRI noted which showed moderate to severe stenosis, that per neuro appears to be compressing cord slightly     Few small nonspecific enhancing lesions of the calvarium unlikely to be contributory to any symptoms he is currently experiencing.  Could repeat PSA to determine if rising.  Without PSA rise and without symptoms would not recommend any intervention other than radiation oncology follow-up as outpatient as previously scheduled--per chart he has an outpatient heme onc referral 5/20/25.      Past History    Past Medical History:   Diagnosis Date    Anisocoria     Asthma     Carpal tunnel syndrome     2006    Head injury, unspecified     closed head injury 4 yrs ago    Hepatitis C     Cured by [facility] in 2015. diagnosed in 2012 was positive for Hepatitis C.     Obesity     2006, resolved    Other convulsions     seizure disorder due to head injury 4 yrs ago.    Other mononeuritis of upper limb     L phrenic nerve paralysis    Unspecified essential hypertension       Family History   Problem Relation Age of Onset    Cerebrovascular Disease Mother     Heart Disease Brother     Heart Disease Brother      Past Surgical History:   Procedure Laterality Date    COLECTOMY WITHOUT COLOSTOMY Right 7/13/2022    Procedure: Laparotomy, right  colectomy, lysis of adhesions,;  Surgeon: Tommy Martino DO;  Location: [facility]    COLONOSCOPY      COLONOSCOPY N/A 7/18/2023    Procedure: Colonoscopy with polypectomy;  Surgeon: Octavio Aguirre MD;  Location: [facility]    COLONOSCOPY N/A 6/10/2024    Procedure: Colonoscopy;  Surgeon: Tommy Martino DO;  Location: [facility]    ESOPHAGOSCOPY, GASTROSCOPY, DUODENOSCOPY (EGD), COMBINED N/A 6/10/2024    Procedure: Esophagoscopy, gastroscopy, duodenoscopy (EGD), combined;  Surgeon: Tommy Martino DO;  Location: [facility]    GI SURGERY      HERNIA REPAIR      HERNIORRHAPHY INCISIONAL (LOCATION) N/A 7/13/2022    Procedure: primary incisional hernia repair;  Surgeon: Tommy Martino DO;  Location: [facility] OR    INSERT SEED MARKER / MATRIX N/A 8/30/2023    Procedure: Transrectal ultrasound guided placement of fiducials and SpaceOar;  Surgeon: Chase Blount MD;  Location:  OR    SURGICAL HISTORY OF -   4/6/87    esophagogastroduodenoscopy    SURGICAL HISTORY OF -   4/27/87    exploratory laparotomy and anterior gastropexy over a gastrostomy tube.    SURGICAL HISTORY OF -   11/13/90    esophagogastroduodenoscopy    SURGICAL HISTORY OF -   1991    L diaphragm eventration repair, fixation of stomach and colon to abd wall    SURGICAL HISTORY OF -       hiatal hernia repair    THORACIC SURGERY        Social History     Socioeconomic History    Marital status:      Spouse name: Felecia    Number of children: 1    Years of education: Not on file    Highest education level: Not on file   Occupational History     Employer: [employer]   Tobacco Use    Smoking status: Former     Current packs/day: 0.50     Average packs/day: 0.5 packs/day for 50.0 years (25.0 ttl pk-yrs)     Types: Cigarettes    Smokeless tobacco: Never   Vaping Use    Vaping status: Never Used   Substance and Sexual Activity    Alcohol use: Yes     Comment: 1-2 beers daily    Drug use: No    Sexual activity: Yes      Partners: Female   Other Topics Concern    Parent/sibling w/ CABG, MI or angioplasty before 65F 55M? Yes     Comment: 2 brothers- heart surgery   Social History Narrative    Not on file     Social Drivers of Health     Financial Resource Strain: Low Risk  (5/10/2025)    Financial Resource Strain     Within the past 12 months, have you or your family members you live with been unable to get utilities (heat, electricity) when it was really needed?: No   Food Insecurity: Low Risk  (5/10/2025)    Food Insecurity     Within the past 12 months, did you worry that your food would run out before you got money to buy more?: No     Within the past 12 months, did the food you bought just not last and you didn t have money to get more?: No   Transportation Needs: Low Risk  (5/10/2025)    Transportation Needs     Within the past 12 months, has lack of transportation kept you from medical appointments, getting your medicines, non-medical meetings or appointments, work, or from getting things that you need?: No   Physical Activity: Not on file   Stress: Not on file   Social Connections: Not on file   Interpersonal Safety: Low Risk  (5/12/2025)    Interpersonal Safety     Do you feel physically and emotionally safe where you currently live?: Yes     Within the past 12 months, have you been hit, slapped, kicked or otherwise physically hurt by someone?: No     Within the past 12 months, have you been humiliated or emotionally abused in other ways by your partner or ex-partner?: No   Housing Stability: Low Risk  (5/10/2025)    Housing Stability     Do you have housing? : Yes     Are you worried about losing your housing?: No       Allergies    Allergies   Allergen Reactions    Levaquin [Levofloxacin] Nausea and Vomiting    Oxycodone Dizziness    Percocet [Oxycodone-Acetaminophen] Visual Disturbance and Hallucination     Does not have any problems taking Tylenol/Acetaminophen.        Physical Exam    No exam performed today,  e-consult.     Lab Results     Results for orders placed or performed during the hospital encounter of 05/09/25   Comprehensive metabolic panel    Collection Time: 05/10/25  5:46 AM   Result Value Ref Range    Sodium 139 135 - 145 mmol/L    Potassium 3.7 3.4 - 5.3 mmol/L    Carbon Dioxide (CO2) 26 22 - 29 mmol/L    Anion Gap 6 (L) 7 - 15 mmol/L    Urea Nitrogen 8.8 8.0 - 23.0 mg/dL    Creatinine 0.74 0.67 - 1.17 mg/dL    GFR Estimate >90 >60 mL/min/1.73m2    Calcium 8.4 (L) 8.8 - 10.4 mg/dL    Chloride 107 98 - 107 mmol/L    Glucose 92 70 - 99 mg/dL    Alkaline Phosphatase 65 40 - 150 U/L    AST 10 0 - 45 U/L    ALT 9 0 - 70 U/L    Protein Total 6.0 (L) 6.4 - 8.3 g/dL    Albumin 3.9 3.5 - 5.2 g/dL    Bilirubin Total 0.8 <=1.2 mg/dL     No results found for this or any previous visit.     Imaging Results    MR Cervical Spine w/o & w Contrast  Result Date: 5/14/2025  EXAM: MR CERVICAL SPINE W/O and W CONTRAST LOCATION: [facility] DATE: 5/13/2025 INDICATION: unsteady, hyperreflexic, eval for cervical lesion or stenosis COMPARISON: None. CONTRAST: 7ml Gadavist TECHNIQUE: MRI Cervical Spine without and with IV contrast. FINDINGS: Vertebral body height is normal. Mild grade 1 retrolisthesis of C3 and C2. Normal marrow signal. No abnormal cord signal. No extraspinal abnormality. There is a retrocerebellar cyst. Craniovertebral junction and C1-C2: Normal. C2-C3: Slight degenerative retrolisthesis of C2. Normal disc height. Interbody spurring. No central canal or neural foraminal stenosis.. C3-C4: Grade 1 retrolisthesis of C3. Mild loss of disc height. There is a mild disc osteophyte complex and uncinate spurring. Moderate to severe central canal stenosis. Normal facets. Mild foraminal narrowing. C4-C5: Slight loss of disc height. Mild disc protrusion. Normal facets. No central canal or neural foraminal stenosis. C5-C6: Normal disc height. Slight annular bulge. Mild facet arthropathy. No central  canal or neural foraminal stenosis. C6-C7: Normal disc height. No herniation. Normal facets. No central canal or neural foraminal stenosis. C7-T1: Normal disc height. No herniation. Normal facets. No spinal canal or neural foraminal stenosis.     IMPRESSION: 1.  At C3-4 there is moderate to severe central canal stenosis secondary to a mild disc osteophyte complex and uncinate spurring. 2.  Normal signal in the cervical cord.     Echocardiogram Complete  Result Date: 2025  [id number]^RICHIE^DONALD^[place] [address]  Name: DOMINIC JAUREGUI MRN: [id number] : 1955 Study Date: 2025 02:34 PM Age: 69 yrs Gender: Male Patient Location: [facility] Reason For Study: Dizziness Ordering Physician: DONALD QUIROZ Referring Physician: DONALD QUIROZ Performed By:   BSA: 2.0 m2 Height: 74 in Weight: 165 lb HR: 82 ______________________________________________________________________________ Procedure Echocardiogram with two-dimensional, color and spectral Doppler. Definity (NDC #13041-597) given intravenously. ______________________________________________________________________________ Interpretation Summary  The left ventricle is normal in size. The visual ejection fraction is 60-65%. No regional wall motion abnormalities noted. Normal right ventricle size and systolic function. IVC diameter and respiratory changes fall into an intermediate range suggesting an RA pressure of 8 mmHg. Sinus rhythm was noted. Technically difficult, suboptimal study. There is no comparison study available. ______________________________________________________________________________ Left Ventricle The left ventricle is normal in size. There is normal left ventricular wall thickness. The visual ejection fraction is 60-65%. Diastolic Doppler findings (E/E' ratio and/or other parameters) suggest left ventricular filling pressures are indeterminate. No regional wall motion  abnormalities noted.  Right Ventricle Normal right ventricle size and systolic function.  Atria The left atrium is mildly dilated. Right atrial size is normal.  Mitral Valve Mitral valve leaflets appear normal. There is trace mitral regurgitation. There is no mitral valve stenosis.  Tricuspid Valve The tricuspid valve is not well visualized.  Aortic Valve The aortic valve is not well visualized. No aortic regurgitation is present. No aortic stenosis is present.  Pulmonic Valve The pulmonic valve is not well visualized. There is no pulmonic valvular regurgitation. There is no pulmonic valvular stenosis.  Vessels The aorta root is normal. IVC diameter and respiratory changes fall into an intermediate range suggesting an RA pressure of 8 mmHg.  Pericardium There is no pericardial effusion.  Rhythm Sinus rhythm was noted. ______________________________________________________________________________ MMode/2D Measurements & Calculations  IVSd: 0.75 cm LVIDd: 4.4 cm LVIDs: 2.9 cm LVPWd: 0.71 cm FS: 33.7 % LV mass(C)d: 95.7 grams LV mass(C)dI: 47.8 grams/m2 Ao root diam: 3.1 cm LA dimension: 3.3 cm LA/Ao: 1.1 LVOT diam: 2.2 cm LVOT area: 3.8 cm2 Ao root diam index Ht(cm/m): 1.6 Ao root diam index BSA (cm/m2): 1.5 EF Biplane: 74.4 % LA Volume (BP): 55.0 ml  LA Volume Indexed (AL/bp): 28.3 ml/m2 RV Base: 3.6 cm RWT: 0.33 TAPSE: 2.3 cm  Time Measurements MM HR: 84.0 BPM  Doppler Measurements & Calculations MV E max richar: 76.5 cm/sec MV A max richar: 92.7 cm/sec MV E/A: 0.83 MV max P.1 mmHg MV mean P.0 mmHg MV V2 VTI: 26.9 cm MVA(VTI): 3.4 cm2 MV dec slope: 267.0 cm/sec2 MV dec time: 0.29 sec Ao V2 max: 121.0 cm/sec Ao max P.0 mmHg Ao V2 mean: 91.0 cm/sec Ao mean P.0 mmHg Ao V2 VTI: 23.6 cm VIKA(I,D): 3.8 cm2 VIKA(V,D): 3.9 cm2 LV V1 max P.2 mmHg LV V1 max: 124.0 cm/sec LV V1 VTI: 23.8 cm SV(LVOT): 90.5 ml SI(LVOT): 45.2 ml/m2 PA acc time: 0.12 sec  AV Richar Ratio (DI): 1.0 VIKA Index (cm2/m2): 1.9 E/E' av.8  Lateral E/e': 8.1 Medial E/e': 9.5 RV S Richar: 12.7 cm/sec  ______________________________________________________________________________ Report approved by: Festus Nuñez MD on 05/12/2025 03:35 PM       US Mesenteric Arteries Complete  Result Date: 5/12/2025  EXAM: US MESENTERIC ARTERIES COMPLETE LOCATION: [facility] DATE: 5/12/2025 INDICATION: Please eval mesenteric arteries with inspiration and expiration COMPARISON: 5/11/2005. TECHNIQUE: Duplex Ultrasound of the mesenteric vessels. Colorflow and spectral Doppler with waveform analysis. FINDINGS: The aorta, proximal celiac and superior mesenteric arteries are patent.  There is no significant plaque formation. There is no evidence for an abdominal aortic aneurysum. The peak systolic velocities are as follows: Aorta: 90 cm/s. Celiac Origin: 300 cm/s. (Normal 100-150 cm/s.) With inspiration, the peak systolic velocity is 498 cm/s. With expiration, the velocity is 503 cm/s. SMA Origin: 220cm/s. (Normal  cm/s.) JENNA Origin: 268 cm/s. (Normal  cm/s.) A significant stenosis (>70%) is considered a PSV of >200 cm/s in the celiac, a PSV of > 275 cm/s in the SMA or an EDV of >55 cm/s in the SMA.     IMPRESSION: The proximal celiac, superior mesenteric and inferior mesenteric arteries are patent. The patient noted oral intake approximately 2 hours prior to this exam. While the peak systolic velocities are diffusely elevated in the mesenteric arteries, as noted above, this is therefore unlikely to relate to stenosis. Review of prior CTA demonstrates wide patency of the origin of the mesenteric arteries. There is no significant change in the peak systolic velocity of the proximal celiac artery on full inspiration versus full expiration to suggest a hemodynamically significant median arcuate ligament compression.    CTA Abdomen Pelvis with Contrast  Result Date: 5/11/2025  EXAM: CTA ABDOMEN PELVIS WITH CONTRAST LOCATION: [facility] DATE: 5/11/2025 INDICATION: Chronic abdominal pain, worsened by food.  Eval for chronic mesenteric ischemia. COMPARISON: 5/9/2025 TECHNIQUE: CT angiogram abdomen pelvis during arterial phase of injection of IV contrast. 2D and 3D MIP reconstructions were performed by the CT technologist. Dose reduction techniques were used. CONTRAST: isovue 370 90mL FINDINGS: ANGIOGRAM ABDOMEN/PELVIS: Roughly 50% narrowing of the proximal celiac artery via extrinsic compression from the median arcuate ligament with abrupt superior angulation of the artery beyond the ligament and moderate post stenotic dilation to 9 mm in diameter. Mild atherosclerosis with no stenoses of the superior mesenteric, single bilateral renal, or inferior mesenteric arteries. LOWER CHEST: Markedly elevated left hemidiaphragm. Strand of atelectasis or scarring left lower lobe. Calcified right hilar lymph nodes. HEPATOBILIARY: Calcified liver granulomas. Liver cyst. PANCREAS: Normal. SPLEEN: Calcified splenic granulomas. ADRENAL GLANDS: Normal. KIDNEYS/BLADDER: Right kidney cysts, requiring no follow-up. BOWEL: Right hemicolectomy. LYMPH NODES: Normal. PELVIC ORGANS: Fiducial markers in the prostate. Pelvic phleboliths. MUSCULOSKELETAL: Calcified injection granuloma left buttock. Osteopenia. Old L3 compression fracture. Moderate degenerative change in the spine.     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 Multiplanar reformats were obtained. Dose reduction techniques were used. CONTRAST: ISOVUE 370 81ML FINDINGS: LOWER CHEST:  Elevation of the left hemidiaphragm redemonstrated, incompletely assessed. No basilar infiltrates. HEPATOBILIARY: Incidental hepatic cysts. No biliary dilatation. PANCREAS: Mild increase in prominence of the pancreatic duct without atrophy. No peripancreatic fluid collections or inflammatory change. SPLEEN: Limited visualization. Scattered splenic calcification. ADRENAL GLANDS: Unremarkable KIDNEYS/BLADDER: No hydronephrosis. Distended bladder. BOWEL: Partial colectomy with anastomotic suture line in the right upper quadrant. Moderate colonic and small bowel air-fluid levels. LYMPH NODES: No significant retroperitoneal adenopathy. VASCULATURE: No abdominal aortic aneurysm. Patent portal vein. PELVIC ORGANS: Mild prostatic hypertrophy. No free fluid. MUSCULOSKELETAL: Small fat-containing inguinal hernias. Small fat-containing umbilical hernia. No acute bony abnormalities. Chronic L3 compression deformity.     IMPRESSION: 1.  Small bowel and colonic air-fluid levels may be seen with gastroenteritis/diarrheal illness. 2.  Significant bladder distention. 3.  Increasing prominence of the pancreatic duct is nonspecific. Clinical correlation suggested with short-term follow-up pancreatic MRI/MRCP with and without IV contrast.    CT Chest Pulmonary Embolism w Contrast  Result Date: 5/9/2025  EXAM: CT CHEST PULMONARY EMBOLISM W CONTRAST LOCATION: [facility] DATE: 5/9/2025 INDICATION: dyspnea COMPARISON: CT from 4/18/2025. TECHNIQUE: CT chest pulmonary angiogram during arterial phase injection of IV contrast. Multiplanar reformats and MIP reconstructions were performed. Dose reduction techniques were used. CONTRAST: ISOVUE 370 81ML FINDINGS: ANGIOGRAM CHEST: Pulmonary arteries are normal caliber and negative for pulmonary emboli. Thoracic aorta is negative for dissection. No CT evidence of right heart strain. LUNGS AND PLEURA: Stable elevation of the left hemidiaphragm with associated passive left  lower lobe atelectasis. No acute airspace consolidation. No pneumothorax or pleural effusion. MEDIASTINUM/AXILLAE: Normal heart size. No pericardial effusion. CORONARY ARTERY CALCIFICATION: None. UPPER ABDOMEN: Scattered hepatic and splenic granulomas. MUSCULOSKELETAL: Osteopenia with multilevel thoracic spine degenerative disc change.     IMPRESSION: 1.  Negative for pulmonary embolism. 2.  Left basilar atelectasis related to elevation of the left hemidiaphragm.    CT Abdomen Pelvis w Contrast  Result Date: 5/5/2025  EXAM: CT ABDOMEN PELVIS W CONTRAST LOCATION: [facility] DATE: 5/5/2025 INDICATION: Progressive lower abdominal pain, early satiety, weight loss, known adenocarcinoma of prostate status post radiotherapy in 20'23. Radiation cystitis colitis. COMPARISON: None. TECHNIQUE: CT scan of the abdomen and pelvis was performed following injection of IV contrast. Multiplanar reformats were obtained. Dose reduction techniques were used. CONTRAST: 79 mL Isovue 370 FINDINGS: LOWER CHEST: Stable elevated left hemidiaphragm. HEPATOBILIARY: No acute hepatic abnormality. Hepatic cysts appear stable. Small hypodensities are too small for characterization as well and are stable. A few calcifications noted. Gallbladder shows no calcified gallstones. PANCREAS: Normal. SPLEEN: Calcified granulomas. ADRENAL GLANDS: Normal. KIDNEYS/BLADDER: No significant mass, stones, or hydronephrosis. There are simple or benign cysts. No follow up is needed. Bladder unremarkable. BOWEL: A few small bowel loops are mildly distended. No acute inflammatory change of the bowel identified. No convincing complete bowel obstruction identified. No free fluid or free air. LYMPH NODES: No enlarged lymph nodes can be seen. VASCULATURE: Mild calcifications. PELVIC ORGANS: Prostate is 4.5 cm. Some mild edema within the fat planes of the low pelvis. MUSCULOSKELETAL: Stable height loss at L3. Spine degenerative changes. No  visible focal bone lesion can be seen.     IMPRESSION: 1.  A few small bowel loops are mildly distended and could be an ileus. 2.  No other acute abnormality identified.    MR Brain w/o & w Contrast  Result Date: 4/20/2025  EXAM: MR BRAIN W/O and W CONTRAST LOCATION: [facility] DATE: 4/20/2025 INDICATION: left arm weakness, vision changes in left eye, severe headache COMPARISON: Head CT 04/20/2025, 06/08/2023. CONTRAST: gadavist 6 mL TECHNIQUE: Routine multiplanar multisequence head MRI without and with intravenous contrast. FINDINGS: INTRACRANIAL CONTENTS: Unchanged CSF intensity retrocerebellar arachnoid cyst. No acute or subacute infarct. No mass, acute hemorrhage, or extra-axial fluid collections. Scattered nonspecific T2/FLAIR hyperintensities within the cerebral white matter most consistent with mild chronic microvascular ischemic change. Mild generalized cerebral atrophy. No hydrocephalus. Normal position of the cerebellar tonsils. No pathologic contrast enhancement. SELLA: No abnormality accounting for technique. OSSEOUS STRUCTURES/SOFT TISSUES: There are a few small marrow replacing, enhancing lesions of the calvarium (series 112 image 13, image 21, image 26) which appear to correspond to sclerotic foci on the prior head CT. The major intracranial vascular flow voids are maintained. ORBITS: No abnormality accounting for technique. SINUSES/MASTOIDS: Mild mucosal thickening scattered about the paranasal sinuses. No middle ear or mastoid effusion.     IMPRESSION: 1.  No acute/subacute infarct or other acute intracranial abnormality. 2.  Few small enhancing calvarial lesions are nonspecific but could represent osseous metastases given patient's history of prostate cancer. 3.  Mild cerebral volume loss and presumed changes of chronic microvascular disease.     CT Head Perfusion w Contrast - For Tier 2 Stroke  Result Date: 4/20/2025  EXAM: CT HEAD PERFUSION W CONTRAST LOCATION:   [facility] DATE: 4/20/2025 INDICATION: Code Stroke to evaluate for potential thrombolysis and thrombectomy. Evaluate mismatch between penumbra and core infarct. READ IMMEDIATELY. COMPARISON: CT brain from the same day.. TECHNIQUE: CT cerebral perfusion was performed utilizing a second contrast bolus. Perfusion data were post processed with generation of standard perfusion maps and estimation of ischemic/infarcted volumes utilizing standard threshold values. Dose reduction techniques were used. All stenosis measurements made according to NASCET criteria unless otherwise specified. CONTRAST: 100m: [id number] FINDINGS: CT PERFUSION: PERFUSION MAPS: Symmetrical cerebral perfusion. No focal deficits in cerebral blood flow or volume to suggest ischemia/oligemia.     IMPRESSION: CT PERFUSION: 1.  Normal cerebral perfusion.    CTA Head Neck with Contrast  Result Date: 4/20/2025  EXAM: CTA HEAD NECK W CONTRAST LOCATION: [facility] DATE: 4/20/2025 INDICATION: Code Stroke, evaluate for LVO. PLEASE READ IMMEDIATELY. COMPARISON: None. CONTRAST: 67m: [id number] TECHNIQUE: Head and neck CT angiogram with IV contrast. Axial helical CT images of the head and neck vessels obtained during the arterial phase of intravenous contrast administration. Axial 2D reconstructed images and multiplanar 3D MIP reconstructed images of the head and neck vessels were performed by the technologist. Dose reduction techniques were used. All stenosis measurements made according to NASCET criteria unless otherwise specified. FINDINGS: HEAD CTA: ANTERIOR CIRCULATION: No significant stenosis. A previously noted region of possible aneurysmal dilatation of the supraclinoid ICA on the left is similar in appearance and could reflect a small aneurysm or infundibulum measuring 2 mm. Standard [place] anatomy. POSTERIOR CIRCULATION: No stenosis/occlusion, aneurysm, or high flow vascular malformation.  Balanced vertebral arteries supply a normal basilar artery. Fetal type origins of the bilateral PCAs. DURAL VENOUS SINUSES: Expected enhancement of the major dural venous sinuses. NECK CTA: RIGHT CAROTID: No measurable stenosis or dissection. LEFT CAROTID: No measurable stenosis or dissection. VERTEBRAL ARTERIES: No focal stenosis or dissection. Balanced vertebral arteries. AORTIC ARCH: Classic aortic arch anatomy with no significant stenosis at the origin of the great vessels. NONVASCULAR STRUCTURES: Unremarkable.     IMPRESSION: HEAD CTA: 1.  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 No vascular occlusion. NECK CTA: 1.  No significant stenosis in the neck.    CT Head w/o Contrast  Result Date: 4/20/2025  EXAM: CT HEAD W/O CONTRAST LOCATION: [facility] DATE: 4/20/2025 INDICATION: Code Stroke, rule out hemorrhage and evaluate for potential thrombolysis thrombectomy. PLEASE READ IMMEDIATELY. COMPARISON: None. TECHNIQUE: Routine CT Head without IV contrast. Multiplanar reformats. Dose reduction techniques were used. FINDINGS: INTRACRANIAL CONTENTS: No intracranial hemorrhage, extraaxial collection, or mass effect.  No CT evidence of acute infarct. Mild presumed chronic small vessel ischemic changes. Mild generalized volume loss. No hydrocephalus. Posterior fossa arachnoid cyst. VISUALIZED ORBITS/SINUSES/MASTOIDS: No intraorbital abnormality. No paranasal sinus mucosal disease. No middle ear or mastoid effusion. BONES/SOFT TISSUES: No acute abnormality.     IMPRESSION: 1.  No acute findings.    CT Chest/Abdomen/Pelvis w Contrast  Result Date: 4/18/2025  EXAM: CT CHEST/ABDOMEN/PELVIS W CONTRAST LOCATION: [facility] DATE: 4/18/2025 INDICATION: abdominal pain, chronic and new chest pain, hx of left diaphragm issues, mesh in left side of abdomen, hx of prostate cancer COMPARISON: CT abdomen/pelvis 3/10/2025 TECHNIQUE: CT scan of the chest,  abdomen, and pelvis was performed following injection of IV contrast. Multiplanar reformats were obtained. Dose reduction techniques were used. CONTRAST: 79mL Isovue 370 FINDINGS: LUNGS AND PLEURA: Persistent elevation the left hemidiaphragm with some mild compressive atelectasis in the left lower lobe. Minimal biapical scarring without tom airspace consolidation, pleural effusion or pneumothorax. Multiple calcified granulomas in the right lower lobe. MEDIASTINUM/AXILLAE: No suspicious lymphadenopathy. Calcified mediastinal and right hilar lymph nodes, compatible with prior granulomatous disease. No pericardial effusion. Subcentimeter thyroid nodules, no specific follow-up recommended given size. CORONARY ARTERY CALCIFICATION: None. HEPATOBILIARY: Simple-appearing hepatic cysts, no specific follow-up recommended. Calcified granulomas are also unchanged. No evidence of cholecystitis or biliary obstruction. PANCREAS: No ductal dilation or surrounding inflammation. SPLEEN: Calcified granulomas, unchanged from prior exam. ADRENAL GLANDS: Normal. KIDNEYS/BLADDER: Right renal cysts, no specific follow-up recommended. No hydronephrosis. Urinary bladder is unremarkable. BOWEL: Persistent inflammatory changes in the rectum with some subtle surrounding fat stranding. No evidence of mechanical bowel obstruction, perforation or drainable fluid collection. Likely prior right hemicolectomy. LYMPH NODES: No suspicious lymphadenopathy. No abdominopelvic free fluid. VASCULATURE: Mild calcified and noncalcified atherosclerosis. PELVIC ORGANS: Brachytherapy seeds in the prostate. MUSCULOSKELETAL: Diffuse osteopenia. No acute bony abnormality. Stable L3 compression deformity.     IMPRESSION: 1.  Either persistent or recurrent proctitis, could be related to radiation therapy given brachytherapy seeds in the prostate. 2.  No evidence of mechanical bowel obstruction or perforation. 3.  No acute abnormality in the  chest.      Pathology    No results found for this or any previous visit (from the past 8760 hours).   Case: [id number]                                   Authorizing Provider:  Chase Blount       Collected:           05/08/2023 11:12 AM                                  MD Feliberto                                                                     Ordering Location:     [facility]  Received:            05/09/2023 08:36 AM                                  [facility]                                                           Pathologist:           Danielle Saravia MD                                                           Specimens:   A) - Prostate, Target 1: Right Mid TZ 11:00                                                          B) - Prostate, Base, Left                                                                            C) - Prostate, Mid, Left                                                                            D) - Prostate, [place], Left                                                                            E) - Prostate, Base, Right                                                                          F) - Prostate, Mid, Right                                                                            G) - Prostate, [place], Right                                                                          H) - Prostate, Left transitional zone                                                                I) - Prostate, Right Transitional Zone                                                    Final Diagnosis   A.  Prostate, target 1, right mid TZ 11:00, biopsy:  - Prostatic adenocarcinoma  - [place] score 7 (4+3)  - Grade group 3  - Extent: Involves 1 of 2 cores (6 mm, 60%)    B.  Prostate, left base, biopsy:  - Benign prostate tissue    C.  Prostate, left mid, biopsy:  - Benign prostate tissue    D.  Prostate, left apex, biopsy:  - Benign prostate tissue    E.   Prostate, right base, biopsy:  - Benign prostate tissue    F.  Prostate, right mid, biopsy:  - Benign prostate tissue    G.  Prostate, right apex, biopsy:  - High-grade prostatic intraepithelial neoplasia (HGPIN)    H.  Prostate, left transition zone, biopsy:  - Benign prostate tissue    I.  Prostate, right transition zone, biopsy:  - Prostatic adenocarcinoma  - [place] score 7 (4+3)  - Grade group 3  - Extent: Involves 1 of 1 core (3 mm, 25%)     Total time of this visit, including time spent face-to-face with patient and or via video/audio, and also in preparing for today's visit for MDM and documentation. Medical decision-making included consideration and possible diagnoses, management options, complex record review, review of diagnostic tests and information, consideration and discussion of significant complications based on comorbidities, discussion with providers involved in the care of the patient.  Personally reviewed all recent images with very small enhancing lesion of skull.      30 Minutes spent.     This note has been dictated using voice recognition software and as a result may contain minor grammatical errors and unintended word substitutions.    Signed by: Sepideh Falcon MD

## 2025-05-15 ENCOUNTER — PATIENT OUTREACH (OUTPATIENT)
Dept: CARE COORDINATION | Facility: CLINIC | Age: 70
End: 2025-05-15
Payer: COMMERCIAL

## 2025-05-15 ENCOUNTER — APPOINTMENT (OUTPATIENT)
Dept: PHYSICAL THERAPY | Facility: HOSPITAL | Age: 70
DRG: 393 | End: 2025-05-15
Payer: COMMERCIAL

## 2025-05-15 VITALS
SYSTOLIC BLOOD PRESSURE: 152 MMHG | WEIGHT: 165 LBS | OXYGEN SATURATION: 96 % | HEIGHT: 74 IN | HEART RATE: 77 BPM | DIASTOLIC BLOOD PRESSURE: 83 MMHG | BODY MASS INDEX: 21.17 KG/M2 | TEMPERATURE: 98.4 F | RESPIRATION RATE: 16 BRPM

## 2025-05-15 LAB
HOLD SPECIMEN: NORMAL
POTASSIUM SERPL-SCNC: 4 MMOL/L (ref 3.4–5.3)

## 2025-05-15 PROCEDURE — 250N000013 HC RX MED GY IP 250 OP 250 PS 637: Performed by: INTERNAL MEDICINE

## 2025-05-15 PROCEDURE — 250N000013 HC RX MED GY IP 250 OP 250 PS 637: Performed by: HOSPITALIST

## 2025-05-15 PROCEDURE — 36415 COLL VENOUS BLD VENIPUNCTURE: CPT | Performed by: INTERNAL MEDICINE

## 2025-05-15 PROCEDURE — 250N000011 HC RX IP 250 OP 636: Performed by: INTERNAL MEDICINE

## 2025-05-15 PROCEDURE — 84132 ASSAY OF SERUM POTASSIUM: CPT | Performed by: INTERNAL MEDICINE

## 2025-05-15 PROCEDURE — 97116 GAIT TRAINING THERAPY: CPT | Mod: GP

## 2025-05-15 PROCEDURE — 99239 HOSP IP/OBS DSCHRG MGMT >30: CPT | Performed by: INTERNAL MEDICINE

## 2025-05-15 PROCEDURE — 97110 THERAPEUTIC EXERCISES: CPT | Mod: GP

## 2025-05-15 RX ORDER — SODIUM PHOSPHATE,MONO-DIBASIC 19G-7G/118
1 ENEMA (ML) RECTAL ONCE
Status: COMPLETED | OUTPATIENT
Start: 2025-05-15 | End: 2025-05-15

## 2025-05-15 RX ADMIN — BISACODYL 10 MG: 10 SUPPOSITORY RECTAL at 09:24

## 2025-05-15 RX ADMIN — SODIUM PHOSPHATE, DIBASIC AND SODIUM PHOSPHATE, MONOBASIC 1 ENEMA: 7; 19 ENEMA RECTAL at 15:41

## 2025-05-15 RX ADMIN — MIDODRINE HYDROCHLORIDE 5 MG: 5 TABLET ORAL at 09:23

## 2025-05-15 RX ADMIN — MIDODRINE HYDROCHLORIDE 5 MG: 5 TABLET ORAL at 11:56

## 2025-05-15 RX ADMIN — DICYCLOMINE HYDROCHLORIDE 20 MG: 20 TABLET ORAL at 09:24

## 2025-05-15 RX ADMIN — ACETAMINOPHEN 325 MG: 325 TABLET ORAL at 09:24

## 2025-05-15 RX ADMIN — Medication 226 ML: at 13:43

## 2025-05-15 RX ADMIN — DICYCLOMINE HYDROCHLORIDE 20 MG: 20 TABLET ORAL at 11:56

## 2025-05-15 RX ADMIN — ENOXAPARIN SODIUM 40 MG: 40 INJECTION SUBCUTANEOUS at 09:23

## 2025-05-15 RX ADMIN — POLYETHYLENE GLYCOL 3350 17 G: 17 POWDER, FOR SOLUTION ORAL at 09:23

## 2025-05-15 RX ADMIN — SENNOSIDES AND DOCUSATE SODIUM 1 TABLET: 50; 8.6 TABLET ORAL at 09:24

## 2025-05-15 RX ADMIN — SERTRALINE HYDROCHLORIDE 50 MG: 50 TABLET ORAL at 09:24

## 2025-05-15 RX ADMIN — BUPROPION HYDROCHLORIDE 150 MG: 150 TABLET, FILM COATED, EXTENDED RELEASE ORAL at 09:23

## 2025-05-15 RX ADMIN — DICYCLOMINE HYDROCHLORIDE 20 MG: 20 TABLET ORAL at 16:21

## 2025-05-15 RX ADMIN — TOLTERODINE 2 MG: 2 CAPSULE, EXTENDED RELEASE ORAL at 09:24

## 2025-05-15 ASSESSMENT — ACTIVITIES OF DAILY LIVING (ADL)
ADLS_ACUITY_SCORE: 41
ADLS_ACUITY_SCORE: 40
ADLS_ACUITY_SCORE: 41
ADLS_ACUITY_SCORE: 41
ADLS_ACUITY_SCORE: 40
ADLS_ACUITY_SCORE: 41
ADLS_ACUITY_SCORE: 40
ADLS_ACUITY_SCORE: 41
ADLS_ACUITY_SCORE: 40
ADLS_ACUITY_SCORE: 40
ADLS_ACUITY_SCORE: 41
ADLS_ACUITY_SCORE: 40

## 2025-05-15 NOTE — CARE PLAN
PRIMARY Concern: abdominal pain, SOB  SAFETY RISK Concerns (fall risk, behaviors, etc.): fall risk      Isolation/Type: none  Tests/Procedures for NEXT shift: none  Consults? (Pending/following, signed-off?) Hem/Onc, neurology, neurosurgery  Where is patient from? (Home, TCU, etc.): home  Other Important info for NEXT shift: Wife and daughter are caregivers, chronic constipation  Anticipated DC date & active delays: Today  _____________________________________________________________________________  SUMMARY NOTE:   Orientation/Cognitive: A&Ox4  Observation Goals (Met/ Not Met): inpatient status  Mobility Level/Assist Equipment: SBA  Antibiotics & Plan (IV/po, length of tx left): none  Pain Management: tylenol  Complete Pain Reassessment: Y/N Yes Due next: q 4 hr  Tele/VS/O2: no tele, VSS, RA  ABNL Lab/BG: none  Diet: regular  Bowel/Bladder: up to bathroom hallway  Skin Concerns: none  Drains/Devices: PIVSL

## 2025-05-15 NOTE — PLAN OF CARE
Goal Outcome Evaluation:      Plan of Care Reviewed With: patient    Overall Patient Progress: improvingOverall Patient Progress: improving      Pt A&Ox4. Denies pain. On room air. VSS. SBA with walker, refuses bed alarm. Bladder scan 225 ml. K protocol, AM recheck. Regular diet.

## 2025-05-15 NOTE — PLAN OF CARE
Goal Outcome Evaluation:      Plan of Care Reviewed With: patient, spouse    Overall Patient Progress: improvingOverall Patient Progress: improving       Pt is A&Ox4, ambulating in hallway with walker. Denies N/V/D, SOB, reports fatigue. Suppository given, enema given, pt declined assistance on the commode. Family updated.

## 2025-05-15 NOTE — PROGRESS NOTES
"Care Management Follow Up    Length of Stay (days): 5    Expected Discharge Date: 05/15/2025    Anticipated Discharge Plan:   Home     Transportation: Confirmed Family    PT Recommendations: home, Per plan established by the PT  OT Recommendations:  home with assist     Barriers to Discharge: Clearance for discharge    Prior Living Situation: house with spouse    Discussed  Partnership in Safe Discharge Planning  document with patient/family: No     Handoff Completed: No, handoff not indicated or clinically appropriate    Patient/Spokesperson Updated: Yes. Who? Ghulam    Additional Information:  Patient is  and independent at baseline but no longer drives. He does not use any DME for mobility assist.  MD is asking for home PT and skilled nursing. Writer met with patient at the bedside to discuss his preferences. Per patient, he does not want home care. He states, \"That's what my daughter does so she can help me. I won't need home care.\"    Next Steps:  No care management needs identified at this time but CM will continue to monitor progression of care, review team recommendations and provide discharge planning assist as needed.      Radha Ramirez RN    "

## 2025-05-15 NOTE — PLAN OF CARE
Goal Outcome Evaluation:    Pt did have mild complaints of abdominal pain/constipation. PRN Tylenol given. Did experience nausea this evening. PRN Zofran given. Ortho Bps completed. Pt did state that he is dizzy when OOB. K protocol. Recheck tomorrow. GI/Vascular/Heme/Onc following. Pt is alert and orientated. Up independently with transfers and ambulation using a walker. Pt refuses to be helped to the bathroom and refuses the bed alarm. Plan is for pt to discharge home tomorrow. Will continue to monitor.

## 2025-05-15 NOTE — PROGRESS NOTES
Physical Therapy Discharge Summary    Reason for therapy discharge:    Discharged to home.    Progress towards therapy goal(s). See goals on Care Plan in [facility] electronic health record for goal details.  Goals met    Therapy recommendation(s):    No further therapy is recommended. FWW issued for home.

## 2025-05-15 NOTE — PLAN OF CARE
"  Problem: Adult Inpatient Plan of Care  Goal: Patient-Specific Goal (Individualized)  Description: You can add care plan individualizations to a care plan. Examples of Individualization might be:  \"Parent requests to be called daily at 9am for status\", \"I have a hard time hearing out of my right ear\", or \"Do not touch me to wake me up as it startlesme\".  Outcome: Progressing     Problem: Adult Inpatient Plan of Care  Goal: Plan of Care Review  Description: The Plan of Care Review/Shift note should be completed every shift.  The Outcome Evaluation is a brief statement about your assessment that the patient is improving, declining, or no change.  This information will be displayed automatically on your shiftnote.  Flowsheets (Taken 5/15/2025 0236)  Plan of Care Reviewed With: patient  Overall Patient Progress: improving     Problem: Adult Inpatient Plan of Care  Goal: Absence of Hospital-Acquired Illness or Injury  Intervention: Prevent Skin Injury  Recent Flowsheet Documentation  Taken 5/15/2025 0236 by Jelly Arguelles RN  Body Position: position changed independently  Taken 5/15/2025 0235 by Jelly Arguelles RN  Body Position: position changed independently     Problem: Adult Inpatient Plan of Care  Goal: Absence of Hospital-Acquired Illness or Injury  Intervention: Prevent Infection  Recent Flowsheet Documentation  Taken 5/15/2025 0236 by Jelly Arguelles RN  Infection Prevention: single patient room provided   Goal Outcome Evaluation:      Plan of Care Reviewed With: patient    Overall Patient Progress: improvingOverall Patient Progress: improving               "

## 2025-05-15 NOTE — PLAN OF CARE
Patient discharged to home at 4:54 PM  via Private Car.  Accompanied by daughter and staff.  Discharge instructions were reviewed with patient and daughter, opportunity offered to ask questions.    Prescriptions e-prescribed to pharmacy.  Access discontinued: Yes  Care plan and education discontinued: Yes  Home meds retrieved from pharmacy: N/A  Belongings were sent home with patient/family:  Clothing, glasses, walker from PT given to daughter to take home.

## 2025-05-15 NOTE — DISCHARGE SUMMARY
"[facility]  Hospitalist Discharge Summary      Date of Admission:  5/9/2025  Date of Discharge:  5/15/2025  Discharging Provider: Genaro Lubin MD  Discharge Service: Hospitalist Service    Discharge Diagnoses   ***    Clinically Significant Risk Factors     # Severe Malnutrition: based on nutrition assessment and treatment provided per dietitian's recommendations.      Follow-ups Needed After Discharge   Follow-up Appointments       Hospital Follow-up with Existing Primary Care Provider (PCP)          Schedule Primary Care visit within: 7 Days   Recommended labs and Imaging (to be ordered by Primary Care Provider): Please check orthostatic blood pressures and adjust midodrine as needed.       Follow Up      You will be contacted by [facility] for required follow up imaging.            {Additional follow-up instructions/to-do's for PCP    :***}    Unresulted Labs Ordered in the Past 30 Days of this Admission       No orders found from 4/9/2025 to 5/10/2025.        These results will be followed up by ***    Discharge Disposition   {Discharge to:6182617::\"Discharged to home\"}  Condition at discharge: {CONDITION:877870::\"Stable\"}    Hospital Course   {OPTIONAL -- use to select A&P section   :530124}    Consultations This Hospital Stay   GASTROENTEROLOGY IP CONSULT  CARE MANAGEMENT / SOCIAL WORK IP CONSULT  PHYSICAL THERAPY ADULT IP CONSULT  OCCUPATIONAL THERAPY ADULT IP CONSULT  VASCULAR SURGERY IP CONSULT  PHYSICAL THERAPY ADULT IP CONSULT  NEUROLOGY IP CONSULT  NEUROSURGERY IP CONSULT  NEUROSURGERY IP CONSULT  RADIATION ONCOLOGY IP CONSULT  HEMATOLOGY & ONCOLOGY IP CONSULT    Code Status   Full Code    Time Spent on this Encounter   {How much time did you spend on discharge?:18234133}       Genaro Lubin MD  [facility] EXTENDED RECOVERY AND SHORT STAY  [address] [place] [address]  Phone: [phone]  Fax: " [phone]  ______________________________________________________________________    Physical Exam   Vital Signs: Temp: 98.4  F (36.9  C) Temp src: Oral BP: (!) 152/83 (RN notified) Pulse: 77   Resp: 16 SpO2: 96 % O2 Device: None (Room air)    Weight: 165 lbs 0 oz  {Recommend personal SmartPhrase or Notewriter for exam (OPTIONAL)   :731399}       Primary Care Physician   Jamie Rutledge    Discharge Orders      Primary Care - Care Coordination Referral      Home Care Referral      Reason for your hospital stay    Dizziness and Abdominal Pain     Activity    Your activity upon discharge: activity as tolerated, use the rolling walker at all times when up.  Please be careful with position changes, do them slowly to avoid light-headed symptoms.      Please wear the support stockings as able.     Follow Up    You will be contacted by [facility] for required follow up imaging.     Diet    Follow this diet upon discharge: Current Diet:Orders Placed This Encounter      Snacks/Supplements Adult: Ensure Enlive; Between Meals      Regular Diet Adult.  Small meals are encouraged.     Hospital Follow-up with Existing Primary Care Provider (PCP)            Significant Results and Procedures   {Data for Discharge Summary:448042}    Discharge Medications   Current Discharge Medication List        START taking these medications    Details   dicyclomine (BENTYL) 20 MG tablet Take 1 tablet (20 mg) by mouth 4 times daily (before meals and nightly).  Qty: 120 tablet, Refills: 1    Associated Diagnoses: Other irritable bowel syndrome      midodrine (PROAMATINE) 5 MG tablet Take 1 tablet (5 mg) by mouth 3 times daily (with meals).  Qty: 90 tablet, Refills: 1    Associated Diagnoses: Orthostatic hypotension           CONTINUE these medications which have CHANGED    Details   polyethylene glycol (MIRALAX) 17 GM/Dose powder Take 17 g by mouth 2 times daily.    Associated Diagnoses: Other irritable bowel syndrome      senna-docusate  (NEGRITO-COLACE) 8.6-50 MG tablet Take 1 tablet by mouth 2 times daily.    Associated Diagnoses: Chronic constipation           CONTINUE these medications which have NOT CHANGED    Details   acetaminophen (TYLENOL) 500 MG tablet Take 1,000 mg by mouth every 6 hours as needed for mild pain      buPROPion (WELLBUTRIN XL) 150 MG 24 hr tablet Take 1 tablet (150 mg) by mouth every morning.  Qty: 30 tablet, Refills: 0    Associated Diagnoses: Recurrent major depressive disorder, in remission      Calcium Carbonate (CALCIUM-CARB 600 PO) Take 2 tablets by mouth daily      cyanocobalamin (VITAMIN B-12) 1000 MCG tablet Take 1 tablet (1,000 mcg) by mouth daily  Qty: 90 tablet, Refills: 1    Associated Diagnoses: Neuropathy      glycerin (LAXATIVE) 1.2 g suppository Place 1 suppository rectally daily as needed (constipation.)  Qty: 25 suppository, Refills: 1    Associated Diagnoses: Constipation, unspecified constipation type      mirabegron (MYRBETRIQ) 50 MG 24 hr tablet Take 1 tablet (50 mg) by mouth daily.  Qty: 30 tablet, Refills: 0    Associated Diagnoses: Abdominal pain, generalized      sertraline (ZOLOFT) 50 MG tablet Take 1 tablet by mouth daily  Qty: 90 tablet, Refills: 3    Associated Diagnoses: Recurrent major depressive disorder, in remission      solifenacin (VESICARE) 5 MG tablet Take 1 tablet (5 mg) by mouth daily.  Qty: 30 tablet, Refills: 3    Associated Diagnoses: Urinary urgency      tamsulosin (FLOMAX) 0.4 MG capsule Take 1 capsule (0.4 mg) by mouth every evening  Qty: 90 capsule, Refills: 3    Associated Diagnoses: Prostate cancer (H)      vitamin D3 (CHOLECALCIFEROL) 50 mcg (2000 units) tablet Take 1 tablet (50 mcg) by mouth daily  Qty: 90 tablet, Refills: 3    Associated Diagnoses: Closed compression fracture of L3 lumbar vertebra, with routine healing, subsequent encounter; Closed compression fracture of L4 lumbar vertebra, with routine healing, subsequent encounter           STOP taking these medications        amLODIPine (NORVASC) 5 MG tablet Comments:   Reason for Stopping:         HYDROmorphone (DILAUDID) 2 MG tablet Comments:   Reason for Stopping:             Allergies   Allergies   Allergen Reactions    Levaquin [Levofloxacin] Nausea and Vomiting    Oxycodone Dizziness    Percocet [Oxycodone-Acetaminophen] Visual Disturbance and Hallucination     Does not have any problems taking Tylenol/Acetaminophen.      Ensure Enlive; Between Meals      Regular Diet Adult.  Small meals are encouraged.     Hospital Follow-up with Existing Primary Care Provider (PCP)            Significant Results and Procedures   Results for orders placed or performed during the hospital encounter of 05/09/25   CT Abdomen Pelvis w Contrast    Narrative    EXAM: CT ABDOMEN PELVIS W CONTRAST  LOCATION: [facility]  DATE: 5/9/2025    INDICATION: abdominal pain  COMPARISON: 5/5/2025  TECHNIQUE: CT scan of the abdomen and pelvis was performed following injection of IV contrast. Multiplanar reformats were obtained. Dose reduction techniques were used.  CONTRAST: ISOVUE 370 81ML    FINDINGS:   LOWER CHEST: Elevation of the left hemidiaphragm redemonstrated, incompletely assessed. No basilar infiltrates.    HEPATOBILIARY: Incidental hepatic cysts. No biliary dilatation.    PANCREAS: Mild increase in prominence of the pancreatic duct without atrophy. No peripancreatic fluid collections or inflammatory change.    SPLEEN: Limited visualization. Scattered splenic calcification.    ADRENAL GLANDS: Unremarkable    KIDNEYS/BLADDER: No hydronephrosis. Distended bladder.    BOWEL: Partial colectomy with anastomotic suture line in the right upper quadrant. Moderate colonic and small bowel air-fluid levels.    LYMPH NODES: No significant retroperitoneal adenopathy.    VASCULATURE: No abdominal aortic aneurysm. Patent portal vein.    PELVIC ORGANS: Mild prostatic hypertrophy. No free fluid.    MUSCULOSKELETAL: Small fat-containing inguinal hernias. Small fat-containing umbilical hernia. No acute bony abnormalities. Chronic L3 compression deformity.      Impression    IMPRESSION:   1.  Small bowel and colonic air-fluid levels may be seen with gastroenteritis/diarrheal illness.  2.  Significant bladder distention.  3.  Increasing prominence of the pancreatic duct is nonspecific. Clinical correlation suggested with short-term follow-up pancreatic  MRI/MRCP with and without IV contrast.   CT Chest Pulmonary Embolism w Contrast    Narrative    EXAM: CT CHEST PULMONARY EMBOLISM W CONTRAST  LOCATION: [facility]  DATE: 5/9/2025    INDICATION: dyspnea  COMPARISON: CT from 4/18/2025.  TECHNIQUE: CT chest pulmonary angiogram during arterial phase injection of IV contrast. Multiplanar reformats and MIP reconstructions were performed. Dose reduction techniques were used.   CONTRAST: ISOVUE 370 81ML    FINDINGS:  ANGIOGRAM CHEST: Pulmonary arteries are normal caliber and negative for pulmonary emboli. Thoracic aorta is negative for dissection. No CT evidence of right heart strain.    LUNGS AND PLEURA: Stable elevation of the left hemidiaphragm with associated passive left lower lobe atelectasis. No acute airspace consolidation. No pneumothorax or pleural effusion.    MEDIASTINUM/AXILLAE: Normal heart size. No pericardial effusion.    CORONARY ARTERY CALCIFICATION: None.    UPPER ABDOMEN: Scattered hepatic and splenic granulomas.    MUSCULOSKELETAL: Osteopenia with multilevel thoracic spine degenerative disc change.      Impression    IMPRESSION:  1.  Negative for pulmonary embolism.    2.  Left basilar atelectasis related to elevation of the left hemidiaphragm.   CTA Abdomen Pelvis with Contrast    Narrative    EXAM: CTA ABDOMEN PELVIS WITH CONTRAST  LOCATION: [facility]  DATE: 5/11/2025    INDICATION: Chronic abdominal pain, worsened by food.  Eval for chronic mesenteric ischemia.  COMPARISON: 5/9/2025   TECHNIQUE: CT angiogram abdomen pelvis during arterial phase of injection of IV contrast. 2D and 3D MIP reconstructions were performed by the CT technologist. Dose reduction techniques were used.  CONTRAST: isovue 370 90mL    FINDINGS:  ANGIOGRAM ABDOMEN/PELVIS: Roughly 50% narrowing of the proximal celiac artery via extrinsic compression from the median arcuate ligament with abrupt superior angulation of the artery  beyond the ligament and moderate post stenotic dilation to 9 mm in   diameter. Mild atherosclerosis with no stenoses of the superior mesenteric, single bilateral renal, or inferior mesenteric arteries.     LOWER CHEST: Markedly elevated left hemidiaphragm. Strand of atelectasis or scarring left lower lobe. Calcified right hilar lymph nodes.     HEPATOBILIARY: Calcified liver granulomas. Liver cyst.     PANCREAS: Normal.    SPLEEN: Calcified splenic granulomas.     ADRENAL GLANDS: Normal.    KIDNEYS/BLADDER: Right kidney cysts, requiring no follow-up.     BOWEL: Right hemicolectomy.     LYMPH NODES: Normal.    PELVIC ORGANS: Fiducial markers in the prostate. Pelvic phleboliths.     MUSCULOSKELETAL: Calcified injection granuloma left buttock. Osteopenia. Old L3 compression fracture. Moderate degenerative change in the spine.       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  TECHNIQUE: Duplex Ultrasound of the mesenteric vessels. Colorflow and spectral Doppler with waveform analysis.    FINDINGS:    The aorta, proximal celiac and superior mesenteric arteries are patent.  There is no significant plaque formation. There is no evidence for an abdominal aortic aneurysum. The peak systolic velocities are as follows:  Aorta: 90 cm/s.  Celiac Origin: 300 cm/s. (Normal 100-150 cm/s.) With inspiration, the peak systolic velocity is 498 cm/s. With expiration, the velocity is 503 cm/s.  SMA Origin: 220cm/s. (Normal  cm/s.)   JENNA Origin: 268 cm/s. (Normal  cm/s.)    A significant stenosis (>70%) is considered a PSV of >200 cm/s in the  celiac, a PSV of > 275 cm/s in the SMA or an EDV of >55 cm/s in the SMA.      Impression    IMPRESSION:  The proximal celiac, superior mesenteric and inferior mesenteric arteries are patent. The patient noted oral intake approximately 2 hours prior to this exam. While the peak systolic velocities are diffusely elevated in the mesenteric arteries, as noted   above, this is therefore unlikely to relate to stenosis. Review of prior CTA demonstrates wide patency of the origin of the mesenteric arteries.    There is no significant change in the peak systolic velocity of the proximal celiac artery on full inspiration versus full expiration to suggest a hemodynamically significant median arcuate ligament compression.   MR Cervical Spine w/o & w Contrast    Narrative    EXAM: MR CERVICAL SPINE W/O and W CONTRAST  LOCATION: [facility]  DATE: 5/13/2025    INDICATION: unsteady, hyperreflexic, eval for cervical lesion or stenosis  COMPARISON: None.  CONTRAST: 7ml Gadavist  TECHNIQUE: MRI Cervical Spine without and with IV contrast.    FINDINGS:   Vertebral body height is normal. Mild grade 1 retrolisthesis of C3 and C2. Normal marrow signal. No abnormal cord signal. No extraspinal abnormality. There is a retrocerebellar cyst.    Craniovertebral junction and C1-C2: Normal.    C2-C3: Slight degenerative retrolisthesis of C2. Normal disc height. Interbody spurring. No central canal or neural foraminal stenosis..     C3-C4: Grade 1 retrolisthesis of C3. Mild loss of disc height. There is a mild disc osteophyte complex and uncinate spurring. Moderate to severe central canal stenosis. Normal facets. Mild foraminal narrowing.     C4-C5: Slight loss of disc height. Mild disc protrusion. Normal facets. No central canal or neural foraminal stenosis.     C5-C6: Normal disc height. Slight annular bulge. Mild facet arthropathy. No central canal or neural foraminal stenosis.     C6-C7: Normal disc height. No  herniation. Normal facets. No central canal or neural foraminal stenosis.     C7-T1: Normal disc height. No herniation. Normal facets. No spinal canal or neural foraminal stenosis.      Impression    IMPRESSION:  1.  At C3-4 there is moderate to severe central canal stenosis secondary to a mild disc osteophyte complex and uncinate spurring.  2.  Normal signal in the cervical cord.     Echocardiogram Complete     Value    LVEF  60-65%    [facility]    [id number]  [id number]  [id number]  [id number]^RICHIE^DONALD^[place] [address]     Name: DOMINIC JAUREGUI  MRN: [id number]  : 1955  Study Date: 2025 02:34 PM  Age: 69 yrs  Gender: Male  Patient Location: [facility]  Reason For Study: Dizziness  Ordering Physician: DONALD QUIROZ  Referring Physician: DONALD QUIROZ  Performed By:      BSA: 2.0 m2  Height: 74 in  Weight: 165 lb  HR: 82  ______________________________________________________________________________  Procedure  Echocardiogram with two-dimensional, color and spectral Doppler. Definity (NDC  #49098-034) given intravenously.  ______________________________________________________________________________  Interpretation Summary     The left ventricle is normal in size.  The visual ejection fraction is 60-65%.  No regional wall motion abnormalities noted.  Normal right ventricle size and systolic function.  IVC diameter and respiratory changes fall into an intermediate range  suggesting an RA pressure of 8 mmHg.  Sinus rhythm was noted.  Technically difficult, suboptimal study. There is no comparison study  available.  ______________________________________________________________________________  Left Ventricle  The left ventricle is normal in size. There is normal left ventricular wall  thickness. The visual ejection fraction is 60-65%. Diastolic Doppler findings  (E/E' ratio and/or other parameters) suggest left ventricular filling  pressures are indeterminate. No  regional wall motion abnormalities noted.     Right Ventricle  Normal right ventricle size and systolic function.     Atria  The left atrium is mildly dilated. Right atrial size is normal.     Mitral Valve  Mitral valve leaflets appear normal. There is trace mitral regurgitation.  There is no mitral valve stenosis.     Tricuspid Valve  The tricuspid valve is not well visualized.     Aortic Valve  The aortic valve is not well visualized. No aortic regurgitation is present.  No aortic stenosis is present.     Pulmonic Valve  The pulmonic valve is not well visualized. There is no pulmonic valvular  regurgitation. There is no pulmonic valvular stenosis.     Vessels  The aorta root is normal. IVC diameter and respiratory changes fall into an  intermediate range suggesting an RA pressure of 8 mmHg.     Pericardium  There is no pericardial effusion.     Rhythm  Sinus rhythm was noted.  ______________________________________________________________________________  MMode/2D Measurements & Calculations     IVSd: 0.75 cm  LVIDd: 4.4 cm  LVIDs: 2.9 cm  LVPWd: 0.71 cm  FS: 33.7 %  LV mass(C)d: 95.7 grams  LV mass(C)dI: 47.8 grams/m2  Ao root diam: 3.1 cm  LA dimension: 3.3 cm  LA/Ao: 1.1  LVOT diam: 2.2 cm  LVOT area: 3.8 cm2  Ao root diam index Ht(cm/m): 1.6  Ao root diam index BSA (cm/m2): 1.5  EF Biplane: 74.4 %  LA Volume (BP): 55.0 ml     LA Volume Indexed (AL/bp): 28.3 ml/m2  RV Base: 3.6 cm  RWT: 0.33  TAPSE: 2.3 cm     Time Measurements  MM HR: 84.0 BPM     Doppler Measurements & Calculations  MV E max jose luis: 76.5 cm/sec  MV A max jose luis: 92.7 cm/sec  MV E/A: 0.83  MV max P.1 mmHg  MV mean P.0 mmHg  MV V2 VTI: 26.9 cm  MVA(VTI): 3.4 cm2  MV dec slope: 267.0 cm/sec2  MV dec time: 0.29 sec  Ao V2 max: 121.0 cm/sec  Ao max P.0 mmHg  Ao V2 mean: 91.0 cm/sec  Ao mean P.0 mmHg  Ao V2 VTI: 23.6 cm  VIKA(I,D): 3.8 cm2  VIKA(V,D): 3.9 cm2  LV V1 max P.2 mmHg  LV V1 max: 124.0 cm/sec  LV V1 VTI: 23.8 cm  SV(LVOT):  90.5 ml  SI(LVOT): 45.2 ml/m2  PA acc time: 0.12 sec     AV Richar Ratio (DI): 1.0  VIKA Index (cm2/m2): 1.9  E/E' av.8  Lateral E/e': 8.1  Medial E/e': 9.5  RV S Richar: 12.7 cm/sec     ______________________________________________________________________________  Report approved by: Festus Nuñez MD on 2025 03:35 PM             Discharge Medications   Current Discharge Medication List        START taking these medications    Details   dicyclomine (BENTYL) 20 MG tablet Take 1 tablet (20 mg) by mouth 4 times daily (before meals and nightly).  Qty: 120 tablet, Refills: 1    Associated Diagnoses: Other irritable bowel syndrome      midodrine (PROAMATINE) 5 MG tablet Take 1 tablet (5 mg) by mouth 3 times daily (with meals).  Qty: 90 tablet, Refills: 1    Associated Diagnoses: Orthostatic hypotension           CONTINUE these medications which have CHANGED    Details   polyethylene glycol (MIRALAX) 17 GM/Dose powder Take 17 g by mouth 2 times daily.    Associated Diagnoses: Other irritable bowel syndrome      senna-docusate (NEGRITO-COLACE) 8.6-50 MG tablet Take 1 tablet by mouth 2 times daily.    Associated Diagnoses: Chronic constipation           CONTINUE these medications which have NOT CHANGED    Details   acetaminophen (TYLENOL) 500 MG tablet Take 1,000 mg by mouth every 6 hours as needed for mild pain      buPROPion (WELLBUTRIN XL) 150 MG 24 hr tablet Take 1 tablet (150 mg) by mouth every morning.  Qty: 30 tablet, Refills: 0    Associated Diagnoses: Recurrent major depressive disorder, in remission      Calcium Carbonate (CALCIUM-CARB 600 PO) Take 2 tablets by mouth daily      cyanocobalamin (VITAMIN B-12) 1000 MCG tablet Take 1 tablet (1,000 mcg) by mouth daily  Qty: 90 tablet, Refills: 1    Associated Diagnoses: Neuropathy      glycerin (LAXATIVE) 1.2 g suppository Place 1 suppository rectally daily as needed (constipation.)  Qty: 25 suppository, Refills: 1    Associated Diagnoses: Constipation, unspecified  constipation type      mirabegron (MYRBETRIQ) 50 MG 24 hr tablet Take 1 tablet (50 mg) by mouth daily.  Qty: 30 tablet, Refills: 0    Associated Diagnoses: Abdominal pain, generalized      sertraline (ZOLOFT) 50 MG tablet Take 1 tablet by mouth daily  Qty: 90 tablet, Refills: 3    Associated Diagnoses: Recurrent major depressive disorder, in remission      solifenacin (VESICARE) 5 MG tablet Take 1 tablet (5 mg) by mouth daily.  Qty: 30 tablet, Refills: 3    Associated Diagnoses: Urinary urgency      tamsulosin (FLOMAX) 0.4 MG capsule Take 1 capsule (0.4 mg) by mouth every evening  Qty: 90 capsule, Refills: 3    Associated Diagnoses: Prostate cancer (H)      vitamin D3 (CHOLECALCIFEROL) 50 mcg (2000 units) tablet Take 1 tablet (50 mcg) by mouth daily  Qty: 90 tablet, Refills: 3    Associated Diagnoses: Closed compression fracture of L3 lumbar vertebra, with routine healing, subsequent encounter; Closed compression fracture of L4 lumbar vertebra, with routine healing, subsequent encounter           STOP taking these medications       amLODIPine (NORVASC) 5 MG tablet Comments:   Reason for Stopping:         HYDROmorphone (DILAUDID) 2 MG tablet Comments:   Reason for Stopping:             Allergies   Allergies   Allergen Reactions    Levaquin [Levofloxacin] Nausea and Vomiting    Oxycodone Dizziness    Percocet [Oxycodone-Acetaminophen] Visual Disturbance and Hallucination     Does not have any problems taking Tylenol/Acetaminophen.

## 2025-05-16 ENCOUNTER — PATIENT OUTREACH (OUTPATIENT)
Dept: CARE COORDINATION | Facility: CLINIC | Age: 70
End: 2025-05-16
Payer: COMMERCIAL

## 2025-05-16 PROBLEM — K58.8 OTHER IRRITABLE BOWEL SYNDROME: Status: ACTIVE | Noted: 2019-11-07

## 2025-05-16 ASSESSMENT — ACTIVITIES OF DAILY LIVING (ADL): DEPENDENT_IADLS:: TRANSPORTATION

## 2025-05-16 NOTE — PROGRESS NOTES
Clinic Care Coordination Contact    5/9/2025 - 5/15/2025 (6 days)  [facility]    History of IBS  Chronic abdominal pain  Presumed radiation proctitis    UTC/Voicemail    Clinical Data: Care Coordinator Outreach    Outreach Documentation Number of Outreach Attempt   5/16/2025   9:35 AM 1       Left message on patient's voicemail with call back information and requested return call.      Plan: . Care Coordinator will try to reach patient again in 1-2 business days.    [facility]   Donna Parker RN, Care Coordinator   [facility]'s   E-mail [email or web address]   [phone]

## 2025-05-16 NOTE — PROGRESS NOTES
11:14 am Incoming call from the patient  Left a message to call him back     Clinic Care Coordination Contact  [facility]/Voicemail    Clinical Data: Care Coordinator Outreach    Outreach Documentation Number of Outreach Attempt   5/16/2025   9:35 AM 1   5/16/2025   1:33 PM 2       Left message on patient's voicemail with call back information and requested return call.      Plan: . Care Coordinator will do no further outreaches at this time.    [facility]   Donna Parker RN, Care Coordinator   [facility]'s   E-mail [email or web address]   [phone]

## 2025-05-16 NOTE — PLAN OF CARE
Occupational Therapy Discharge Summary    Reason for therapy discharge:    Discharged to home.    Progress towards therapy goal(s). See goals on Care Plan in [facility] electronic health record for goal details.  Goals partially met.  Barriers to achieving goals:   discharge from facility.    Therapy recommendation(s):    No further therapy is recommended.

## 2025-05-21 ENCOUNTER — TELEPHONE (OUTPATIENT)
Dept: VASCULAR SURGERY | Facility: CLINIC | Age: 70
End: 2025-05-21

## 2025-05-21 ENCOUNTER — OFFICE VISIT (OUTPATIENT)
Dept: FAMILY MEDICINE | Facility: CLINIC | Age: 70
End: 2025-05-21
Payer: COMMERCIAL

## 2025-05-21 VITALS
RESPIRATION RATE: 18 BRPM | BODY MASS INDEX: 20.15 KG/M2 | HEART RATE: 79 BPM | OXYGEN SATURATION: 100 % | DIASTOLIC BLOOD PRESSURE: 88 MMHG | HEIGHT: 74 IN | TEMPERATURE: 97.1 F | WEIGHT: 157 LBS | SYSTOLIC BLOOD PRESSURE: 138 MMHG

## 2025-05-21 DIAGNOSIS — M48.00 CENTRAL STENOSIS OF SPINAL CANAL: ICD-10-CM

## 2025-05-21 DIAGNOSIS — R11.0 NAUSEA: ICD-10-CM

## 2025-05-21 DIAGNOSIS — K86.9 PANCREATIC LESION: ICD-10-CM

## 2025-05-21 DIAGNOSIS — E78.5 HYPERLIPIDEMIA LDL GOAL <130: ICD-10-CM

## 2025-05-21 DIAGNOSIS — K58.9 IRRITABLE BOWEL SYNDROME, UNSPECIFIED TYPE: ICD-10-CM

## 2025-05-21 DIAGNOSIS — I77.4 MEDIAN ARCUATE LIGAMENT SYNDROME: ICD-10-CM

## 2025-05-21 DIAGNOSIS — I95.1 ORTHOSTATIC HYPOTENSION: ICD-10-CM

## 2025-05-21 DIAGNOSIS — Z92.3 S/P RADIATION THERAPY: ICD-10-CM

## 2025-05-21 DIAGNOSIS — R42 DIZZINESS: ICD-10-CM

## 2025-05-21 DIAGNOSIS — R10.84 ABDOMINAL PAIN, GENERALIZED: Primary | ICD-10-CM

## 2025-05-21 PROCEDURE — 99495 TRANSJ CARE MGMT MOD F2F 14D: CPT | Performed by: FAMILY MEDICINE

## 2025-05-21 PROCEDURE — 1111F DSCHRG MED/CURRENT MED MERGE: CPT | Performed by: FAMILY MEDICINE

## 2025-05-21 PROCEDURE — 1126F AMNT PAIN NOTED NONE PRSNT: CPT | Performed by: FAMILY MEDICINE

## 2025-05-21 PROCEDURE — 3075F SYST BP GE 130 - 139MM HG: CPT | Performed by: FAMILY MEDICINE

## 2025-05-21 PROCEDURE — 3079F DIAST BP 80-89 MM HG: CPT | Performed by: FAMILY MEDICINE

## 2025-05-21 RX ORDER — HYDROMORPHONE HYDROCHLORIDE 2 MG/1
2 TABLET ORAL DAILY PRN
Qty: 15 TABLET | Refills: 0 | Status: SHIPPED | OUTPATIENT
Start: 2025-05-21

## 2025-05-21 RX ORDER — MIDODRINE HYDROCHLORIDE 5 MG/1
5 TABLET ORAL 2 TIMES DAILY WITH MEALS
Qty: 120 TABLET | Refills: 1 | Status: SHIPPED | OUTPATIENT
Start: 2025-05-21

## 2025-05-21 RX ORDER — ONDANSETRON 4 MG/1
4 TABLET, FILM COATED ORAL EVERY 8 HOURS PRN
Qty: 60 TABLET | Refills: 1 | Status: SHIPPED | OUTPATIENT
Start: 2025-05-21

## 2025-05-21 ASSESSMENT — PAIN SCALES - GENERAL: PAINLEVEL_OUTOF10: NO PAIN (0)

## 2025-05-21 NOTE — TELEPHONE ENCOUNTER
Caller: Ghulam    Provider: none, referral in place    Detailed reason for call: Patient calling to schedule from vascular surgery referral that was placed today. I explained to him the process of review and that someone from scheduling will call once we know who/what to get him scheduled for.  He is hoping to be able to go to the [place] location if possible. Please review and advise. Looks like this referral was routed to the [facility] for now.     Best phone number to contact: [phone]    Best time to contact: any    Ok to leave a detailed message: unknow    Ok to speak to authorized person if needed: unknown      (Noted to patient if reason is related to wound or incision, to please send a photo via email or [employer].)

## 2025-05-21 NOTE — PROGRESS NOTES
Assessment & Plan     Abdominal pain, generalized  S/P radiation therapy  Irritable bowel syndrome, unspecified type  Median arcuate ligament syndrome  Chronic pain at rest. Worsens after eating. Met with vascular surgery while inpatient. Did not recommend surgery due to prior abdominal surgeries. Consider a celiac plexus block but patient deferred while in the hospital and wanted to get a second opinion from vascular surgery regarding his current condition.   -- using dilaudid sparingly for severe pain. Prescription provided today.   - HYDROmorphone (DILAUDID) 2 MG tablet  Dispense: 15 tablet; Refill: 0  - Vascular Surgery Referral    Nausea  - ondansetron ZOFRAN) 4 MG tablet  Dispense: 60 tablet; Refill: 1    Dizziness  Central canal stenosis  -- has follow up with ENT and also with Neurosurgery in the future.   -- stopped flomax 3 days ago to see if this was a culprit for his symptoms. Has not noticed much change.     Orthostatic hypotension  Currently on midodrine 5 mg TID. BP elevated at home. Plan to decrease down to 5 mg BID.   - midodrine (PROAMATINE) 5 MG tablet  Dispense: 120 tablet; Refill: 1    Pancreatic lesion  Needs follow up outpatient on this. MRI/MRCP order placed.   - MR Abdomen MRCP w/o & w Contrast      Follow up with myself in 1 month. Sooner if needed.     The risks, benefits and treatment options of prescribed medications or other treatments have been discussed with the patient. The patient verbalized their understanding and should call or follow up if no improvement or if they develop further problems.    The longitudinal plan of care for the diagnosis(es)/condition(s) as documented were addressed during this visit. Due to the added complexity in care, I will continue to support patient in the subsequent management and with ongoing continuity of care.        MED REC REQUIRED  Post Medication Reconciliation Status: discharge medications reconciled and changed, per  note/orders      Lamar Parmar is a 69 year old, presenting for the following health issues:  Hospital F/U        5/21/2025     2:09 PM   Additional Questions   Roomed by rmb   Accompanied by self         5/21/2025     2:09 PM   Patient Reported Additional Medications   Patient reports taking the following new medications none     HPI        Hospital Follow-up Visit:    Hospital/Nursing Home/IP Rehab Facility: [facility]  Most Recent Admission Date: 5/9/2025   Most Recent Admission Diagnosis: Dizziness - R42  Anxiety - F41.9  Chronic abdominal pain - R10.9, G89.29     Most Recent Discharge Date: 5/15/2025   Most Recent Discharge Diagnosis: Chronic abdominal pain - R10.9, G89.29  Dizziness - R42  Anxiety - F41.9  Other specified counseling - Z71.89  Chronic constipation - K59.09  Other irritable bowel syndrome - K58.8  Orthostatic hypotension - I95.1  Cirrhosis of liver without ascites, unspecified hepatic cirrhosis type (H) - K74.60   Was the patient in the ICU or did the patient experience delirium during hospitalization?  Yes         5/21/2025     2:17 PM   Mini-Cog Total Score   Mini Cog Total Score 0     Do you have any other stressors you would like to discuss with your provider? Health Concerns    Problems taking medications regularly:  None  Medication changes since discharge: None  Problems adhering to non-medication therapy:  None    Summary of hospitalization:  [facility] discharge summary reviewed  Diagnostic Tests/Treatments reviewed.  Follow up needed: MRI/MRCP  Other Healthcare Providers Involved in Patient s Care:         neurosurgery, vascular surgery  Update since discharge: stable.         Plan of care communicated with patient           Abdominal pain   IBS, radiation proctitis, newly diagnosed compressive celiac vessel syndrome.   Vascular surgery saw in the hospital  Not a candidate for surgery. ( Due to multiple prior abdominal procedures) Defers  "celiac plexus block while in the hospital.   Wishes to further discuss this with a vascular surgeon his diagnosis and next steps.   Continues to get severe pain after eating.   Has been losing weight due to not eating.   Using dilaudid at night time which helps with pain.   Chronically has pain at all times.    Orthostatic hypotension  Stopped norvasc in the hospital.   Started on midodrine 5 mg TID.   Checking BP at home. Having some readings in the normal range, others slightly elevated.       Pancreatic duct prominence.   Needs outpatient MRI/MRCP and ordered today.       Central canal stenosis  Dizziness  Referred to neurosurgery for this. Schedule for neurosurgery on 6/11/2025  Stopped his flomax 3 days ago to see if this would be helpful for the dizziness.         Objective    /88   Pulse 79   Temp 97.1  F (36.2  C) (Tympanic)   Resp 18   Ht 1.88 m (6' 2\")   Wt 71.2 kg (157 lb)   SpO2 100%   BMI 20.16 kg/m    Body mass index is 20.16 kg/m .  Physical Exam   General: alert, cooperative, no acute distress   CV: RRR  Resp: non-labored breathing, clear to auscultation, no wheezing or rales   Abdomen: Soft, non-tender, no guarding. No rebound.   Extremities: No peripheral edema, calves non-tender.       Signed Electronically by: Jamie Rutledge DO    "

## 2025-05-21 NOTE — PATIENT INSTRUCTIONS
Utilize dilaudid as needed for severe pain     Utilize zofran ( ondansetron) as needed for nausea.     Decrease midodrine from 5 mg three times per day to 5 mg twice daily.     Follow up with vascular surgery.     Follow up with neurosurgery.     Proceed with MRI/MRCP for further evaluation of the pancreatic lesion.

## 2025-05-22 NOTE — TELEPHONE ENCOUNTER
TCB.     Dr. Diamond messaged back regarding referral and said he saw the patient. Need to know if he would like to see him again since he is in [place] but technically would not be a second opinion. Otherwise can see Dr. Bill at the  who sees for this.

## 2025-05-22 NOTE — TELEPHONE ENCOUNTER
Currently in Work Q and pt seen by Dr. Dudley on 5/13/25 inpt. Message sent to nursing supervisor.

## 2025-05-25 ENCOUNTER — HEALTH MAINTENANCE LETTER (OUTPATIENT)
Age: 70
End: 2025-05-25

## 2025-05-27 ENCOUNTER — DOCUMENTATION ONLY (OUTPATIENT)
Dept: GASTROENTEROLOGY | Facility: CLINIC | Age: 70
End: 2025-05-27
Payer: COMMERCIAL

## 2025-06-04 ENCOUNTER — LAB (OUTPATIENT)
Dept: LAB | Facility: CLINIC | Age: 70
End: 2025-06-04
Payer: COMMERCIAL

## 2025-06-04 DIAGNOSIS — C61 PROSTATE CANCER (H): ICD-10-CM

## 2025-06-04 LAB — PSA SERPL DL<=0.01 NG/ML-MCNC: 0.05 NG/ML (ref 0–6.5)

## 2025-06-04 PROCEDURE — 84153 ASSAY OF PSA TOTAL: CPT

## 2025-06-04 PROCEDURE — 36415 COLL VENOUS BLD VENIPUNCTURE: CPT

## 2025-06-09 DIAGNOSIS — R10.84 ABDOMINAL PAIN, GENERALIZED: ICD-10-CM

## 2025-06-09 RX ORDER — HYDROMORPHONE HYDROCHLORIDE 2 MG/1
2 TABLET ORAL DAILY PRN
Qty: 15 TABLET | Refills: 0 | Status: SHIPPED | OUTPATIENT
Start: 2025-06-09

## 2025-06-09 NOTE — TELEPHONE ENCOUNTER
Medication Question or Refill    Contacts       Contact Date/Time Type Contact Phone/Fax    06/09/2025 01:40 PM CDT Phone (Incoming) Chacha Rizzo (Emergency Contact) [phone] (M)          What medication are you calling about (include dose and sig)?: Dilaudid - Spouse calling for refill - No need to call patient back, unless there are questions or problems.      Preferred Pharmacy:   [facility] [id number] [place] [address]  [id number] [place] [address]  Phone: [phone] Fax: [phone]    Controlled Substance Agreement on file:   CSA -- Patient Level:    CSA: None found at the patient level.       Who prescribed the medication?: [place]    Do you need a refill? Yes    When did you use the medication last? ?    Patient offered an appointment? No    Do you have any questions or concerns?  No    Could we send this information to you in [facility] or would you prefer to receive a phone call?:   Patient would prefer a phone call   Okay to leave a detailed message?: No at Cell number on file:    Telephone Information:   Mobile [phone]

## 2025-06-11 ENCOUNTER — OFFICE VISIT (OUTPATIENT)
Dept: NEUROSURGERY | Facility: CLINIC | Age: 70
End: 2025-06-11
Payer: COMMERCIAL

## 2025-06-11 ENCOUNTER — HOSPITAL ENCOUNTER (OUTPATIENT)
Dept: RADIOLOGY | Facility: HOSPITAL | Age: 70
Discharge: HOME | End: 2025-06-11
Attending: STUDENT IN AN ORGANIZED HEALTH CARE EDUCATION/TRAINING PROGRAM
Payer: COMMERCIAL

## 2025-06-11 VITALS
OXYGEN SATURATION: 100 % | SYSTOLIC BLOOD PRESSURE: 174 MMHG | HEIGHT: 74 IN | BODY MASS INDEX: 20.53 KG/M2 | WEIGHT: 160 LBS | HEART RATE: 67 BPM | DIASTOLIC BLOOD PRESSURE: 80 MMHG

## 2025-06-11 DIAGNOSIS — M54.2 CERVICALGIA: ICD-10-CM

## 2025-06-11 DIAGNOSIS — R26.9 GAIT ABNORMALITY: Primary | ICD-10-CM

## 2025-06-11 DIAGNOSIS — M89.9 SKULL LESION: ICD-10-CM

## 2025-06-11 PROCEDURE — 72050 X-RAY EXAM NECK SPINE 4/5VWS: CPT

## 2025-06-11 ASSESSMENT — PAIN SCALES - GENERAL: PAINLEVEL_OUTOF10: MODERATE PAIN (5)

## 2025-06-11 NOTE — NURSING NOTE
"Ghulam Rizzo is a 69 year old male who presents for:  Chief Complaint   Patient presents with    [facility] F/U     Hospital follow up for cervical spinal stenosis. Patient reports pain in neck about a 5, with headaches almost every day which are resolved with medication. No numbness/tingling.        Initial Vitals:  BP (!) 174/80   Pulse 67   Ht 6' 2\" (1.88 m)   Wt 160 lb (72.6 kg)   SpO2 100%   BMI 20.54 kg/m   Estimated body mass index is 20.54 kg/m  as calculated from the following:    Height as of this encounter: 6' 2\" (1.88 m).    Weight as of this encounter: 160 lb (72.6 kg). Body surface area is 1.95 meters squared. BP completed using cuff size: regular  Moderate Pain (5)    Patient BP is elevated today and is already following up with primary after the appointment.      Lamonte Fernández    "

## 2025-06-11 NOTE — LETTER
6/11/2025      Ghulam Rizzo  [address] [place] [id number]      Dear Colleague,    Thank you for referring your patient, Ghulam Rizzo, to the [facility]. Please see a copy of my visit note below.    Dear Dr. Rutledge,     Thank you for the opportunity to participate in the care of of Mr. Ghulam Rizzo.    As you know, he is a 69-year-old male who was recently hospitalized for abdominal pain.  As a result, the patient has had significant weight loss.  The patient started at 180 pounds but now is down to approximately 160 pounds due to his poor appetite.  He does attest to significant bilateral lower extremity muscle atrophy as well.     During the hospitalization, the patient also reported unsteadiness with gait.  This prompted both brain and MR imaging.  He is here to discuss the results of these images.    Today, he reports he also has neurological symptoms of dizziness, unorganized thoughts, having blurry vision, urinary accidents. These symptoms have been ongoing for months.     He has previously seen Dr. Pinon for evaluation and was initiated on B12 supplementation which he continues to take. He has an upcoming ENT evaluation for his dizziness/vertigo.     Of note, he also has undergone a low volume lumbar puncture. He stated that his gait did not improve after this.     On examination, he is awake and alert.  He has evidence of unsteady gait, but ambulates unassisted.  He has no assistive devices today.  The patient cannot perform Romberg testing as he cannot steadily stand with his 2 feet together.  The patient has no obvious focal strength deficits with manual power testing in the upper or lower extremities.  He does have findings of hyperreflexia in the upper extremities relative to the lower extremities which are hyporeflexic.  He has no focal asymmetry in his face or dysarthria.    His imaging was reviewed.  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  He has some focal calvarial lesions as well that are contrast-enhancing.  His CTA head and neck were reviewed.  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    His MRI cervical spine demonstrates canal stenosis at C3-4 secondary to both disc herniation and posterior ligamentum flavum hypertrophy.    EMG from Dr. Pinon 9/13/2023:   \"This is an abnormal nerve conduction and EMG study.  The study is suggestive of a sensorimotor polyneuropathy in both legs.  Further clinical correlation is needed.\"    In assessment, Ghulam Rizzo is a 69-year-old male with gait unsteadiness with an unclear etiology at this time.  My differential is extensive including nutritional deficiencies, polyneuropathy, normal pressure hydrocephalus, or cervical myelopathy.     For his gait issues and dizziness, I recommend that he represent to Dr. Pinon for reassessment and to see whether he agrees with localization of his symptoms to the cervical spine.     I also encouraged him to see his ear nose and throat provider for evaluation of his dizziness.    Finally, I would plan on repeating a MRI of the brain with and without contrast in approximately 6 months to assess for his skull lesions. We will also need a scan of his aneurysm at some point in the future as well, which will be determined in the future.     Juan Pablo \"Conor\" MD Nikole    of Neurosurgery, [facility]              Again, thank you for allowing me to participate in the care of your patient.        Sincerely,        Juan Pablo Agustin MD    Electronically signed"

## 2025-06-11 NOTE — PROGRESS NOTES
Dear Dr. Rutledge,     Thank you for the opportunity to participate in the care of of Mr. Ghulam Rizzo.    As you know, he is a 69-year-old male who was recently hospitalized for abdominal pain.  As a result, the patient has had significant weight loss.  The patient started at 180 pounds but now is down to approximately 160 pounds due to his poor appetite.  He does attest to significant bilateral lower extremity muscle atrophy as well.     During the hospitalization, the patient also reported unsteadiness with gait.  This prompted both brain and MR imaging.  He is here to discuss the results of these images.    Today, he reports he also has neurological symptoms of dizziness, unorganized thoughts, having blurry vision, urinary accidents. These symptoms have been ongoing for months.     He has previously seen Dr. Pinon for evaluation and was initiated on B12 supplementation which he continues to take. He has an upcoming ENT evaluation for his dizziness/vertigo.     Of note, he also has undergone a low volume lumbar puncture. He stated that his gait did not improve after this.     On examination, he is awake and alert.  He has evidence of unsteady gait, but ambulates unassisted.  He has no assistive devices today.  The patient cannot perform Romberg testing as he cannot steadily stand with his 2 feet together.  The patient has no obvious focal strength deficits with manual power testing in the upper or lower extremities.  He does have findings of hyperreflexia in the upper extremities relative to the lower extremities which are hyporeflexic.  He has no focal asymmetry in his face or dysarthria.    His imaging was reviewed.  His brain MRI is notable for a posterior fossa cyst with no associated mass effect on the cerebellum.  He has some focal calvarial lesions as well that are contrast-enhancing.  His CTA head and neck were reviewed.  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    His MRI cervical spine  "demonstrates canal stenosis at C3-4 secondary to both disc herniation and posterior ligamentum flavum hypertrophy.    EMG from Dr. Pinon 9/13/2023:   \"This is an abnormal nerve conduction and EMG study.  The study is suggestive of a sensorimotor polyneuropathy in both legs.  Further clinical correlation is needed.\"    In assessment, Ghulam Rizzo is a 69-year-old male with gait unsteadiness with an unclear etiology at this time.  My differential is extensive including nutritional deficiencies, polyneuropathy, normal pressure hydrocephalus, or cervical myelopathy.     For his gait issues and dizziness, I recommend that he represent to Dr. Pinon for reassessment and to see whether he agrees with localization of his symptoms to the cervical spine.     I also encouraged him to see his ear nose and throat provider for evaluation of his dizziness.    Finally, I would plan on repeating a MRI of the brain with and without contrast in approximately 6 months to assess for his skull lesions. We will also need a scan of his aneurysm at some point in the future as well, which will be determined in the future.     Juan Pablo \"Conor\" MD Nikole    of Neurosurgery, [facility]            "

## 2025-06-12 ENCOUNTER — PATIENT OUTREACH (OUTPATIENT)
Dept: CARE COORDINATION | Facility: CLINIC | Age: 70
End: 2025-06-12
Payer: COMMERCIAL

## 2025-06-16 ENCOUNTER — PATIENT OUTREACH (OUTPATIENT)
Dept: CARE COORDINATION | Facility: CLINIC | Age: 70
End: 2025-06-16
Payer: COMMERCIAL

## 2025-06-21 DIAGNOSIS — C61 PROSTATE CANCER (H): ICD-10-CM

## 2025-06-30 DIAGNOSIS — R39.15 URINARY URGENCY: ICD-10-CM

## 2025-06-30 RX ORDER — SOLIFENACIN SUCCINATE 5 MG/1
5 TABLET, FILM COATED ORAL DAILY
Qty: 30 TABLET | Refills: 3 | Status: SHIPPED | OUTPATIENT
Start: 2025-06-30

## 2025-06-30 NOTE — TELEPHONE ENCOUNTER
Requested Prescriptions   Pending Prescriptions Disp Refills    solifenacin (VESICARE) 5 MG tablet 30 tablet 3     Sig: Take 1 tablet (5 mg) by mouth daily.       There is no refill protocol information for this order          Last office visit: 3/25/2025 ; last virtual visit: Visit date not found with prescribing provider:  Amelia Antonio     Future Office Visit:          Jennifer Wu   [facility] [place]   [facility]  [phone]

## 2025-07-01 ENCOUNTER — TELEPHONE (OUTPATIENT)
Dept: VASCULAR SURGERY | Facility: CLINIC | Age: 70
End: 2025-07-01

## 2025-07-01 NOTE — TELEPHONE ENCOUNTER
Spoke to patient wife. Patient was cutting the grass. Please call back tomorrow. [phone]  _______________________________________________________  Received: Today  Vangie Prieto RN  [facility]; Sary Perez RN  Needs 3 month follow up with Dr. Diamond with ultrasound prior. Will have celiac plexus block with [place]

## 2025-07-14 ENCOUNTER — OFFICE VISIT (OUTPATIENT)
Dept: AUDIOLOGY | Facility: CLINIC | Age: 70
End: 2025-07-14
Payer: COMMERCIAL

## 2025-07-14 ENCOUNTER — OFFICE VISIT (OUTPATIENT)
Dept: OTOLARYNGOLOGY | Facility: CLINIC | Age: 70
End: 2025-07-14
Payer: COMMERCIAL

## 2025-07-14 DIAGNOSIS — R42 DIZZINESS: ICD-10-CM

## 2025-07-14 DIAGNOSIS — Z57.0 OCCUPATIONAL EXPOSURE TO NOISE: ICD-10-CM

## 2025-07-14 DIAGNOSIS — H90.3 SENSORINEURAL HEARING LOSS (SNHL) OF BOTH EARS: ICD-10-CM

## 2025-07-14 DIAGNOSIS — R42 MIGRAINOUS DIZZINESS: Primary | ICD-10-CM

## 2025-07-14 DIAGNOSIS — H90.3 BILATERAL SENSORINEURAL HEARING LOSS: Primary | ICD-10-CM

## 2025-07-14 PROCEDURE — 92557 COMPREHENSIVE HEARING TEST: CPT | Performed by: AUDIOLOGIST

## 2025-07-14 PROCEDURE — 92550 TYMPANOMETRY & REFLEX THRESH: CPT | Mod: 52 | Performed by: AUDIOLOGIST

## 2025-07-14 PROCEDURE — 99203 OFFICE O/P NEW LOW 30 MIN: CPT | Performed by: OTOLARYNGOLOGY

## 2025-07-14 RX ORDER — MAGNESIUM OXIDE 400 MG/1
400 TABLET ORAL DAILY
Qty: 90 TABLET | Refills: 3 | Status: SHIPPED | OUTPATIENT
Start: 2025-07-14 | End: 2026-07-09

## 2025-07-14 SDOH — HEALTH STABILITY - PHYSICAL HEALTH: OCCUPATIONAL EXPOSURE TO NOISE: Z57.0

## 2025-07-14 NOTE — PROGRESS NOTES
AUDIOLOGY REPORT     SUMMARY: Audiology visit completed. See audiogram for results.       RECOMMENDATIONS: Follow-up with ENT.    Lindsay Plummer, [facility]  [place] Licensed Audiologist #[id number]

## 2025-07-14 NOTE — PATIENT INSTRUCTIONS
"Migraine Diet       Food may play a significant role in the frequency of migraine. Although some migraine patients find that eating certain foods will provoke symptoms every single time, the effect of diet may be less obvious.    In general, the more \"trigger\" foods you consume, the more symptoms you may have. By avoiding these possible triggers, you can minimize your symptoms.     Eating regularly timed meals, avoiding hunger, avoiding dehydration, and avoiding skipping meals      Try following this list as strictly as possible for at least two months.     You may gradually add back your favorite foods one at a time, keeping track of your headaches as you do so.     Category  Foods to Avoid, Reduce, or Limit  Foods that are OK    Caffeine  No more than 2 servings / day. Do not vary the amount or timing from day to day. Coffee, tea, ayla, Mountain Dew, Sunkist, certain medications (Anacin, Excedrin)  Decaffeinated coffee, herbal or green tea, caffeine-free sodas, fruit juice (see below)    Snacks / Desserts  Chocolate, nuts (peanuts, especially), peanut butter, seeds  Fruits listed below, sherbet, ice cream, cakes, pudding, Jello, sugar, jam, jelly, honey, hard candy, cookies made w/o chocolate or nuts    Alcohol  Avoid all, especially: ales, Burgundy, chianti, malted beers, red wine, vinod, vermouth. Note: some medications contain alcohol (Nyquil)  Non-alcoholic beverages    Dairy  Aged cheeses: Brie, blue, boursault, brick, Camembert, cheddar, Emmenlalaer, gouda, mozzarella, Parmesan, Provolone, Andrew, Roquefort, stilton, Swiss, etc.   Buttermilk, chocolate milk, sour cream   Eggs and yogurt should be limited to 2-3 times per week  Other cheeses: American, cottage, cream cheese, farmer, ricotta, Velveeta.   Milk,   Egg substitute    Cereals & Grains  Fresh breads and yeast products, fresh bagels, fresh doughnuts, yeast extracts, romero's yeast, sourdough   (*freezing bread may inactivate yeast)  Commercial " "breads (white, wheat, rye, multi-grain, Italian), English Muffins, crackers, rye, toast, bagels, potatoes, rice, spaghetti, noodles, hot or dried cereals, oatmeal    Meats  Aged, canned, cured, or processed meats (bologna, pepperoni, salami, other pre-packaged deli meats), pickled meats or fish, salted or dried meats or poultry, hot dogs, sausages, jerky  Fresh / unprocessed meats, poultry, fish, lamb, pork, veal, lamb, tuna    MSG (monosodium glutamate)  Avoid glutamate in all its multiple forms: MSG, \"natural flavoring,\" \"flavor enhancer,\" etc.   Soy sauce, foods containing \"hydrolyzed protein products\" or \"autolyzed yeast\", canned soups, bouillon cubes, Accent, meat tenderizers, seasoned salts. Pickled, preserved or marinated foods  Salt and other spices, butter, margarine, cooking oil, white vinegar, salad dressing (small amounts)    Sweeteners  Aspartame (Equal, Nutrasweet) (somewhat controversial)  Sucrose (sugar), high fructose corn syrup, sucralose (Splenda), saccharin (Sweet 'n Low)    Vegetables  Pole or broad beans, lima beans, Italian beans, lentils, snow peas, unruly beans, Navy beans, cardoza beans, pea pods, sauerkraut, garbanzo beans, onions, olives, pickles  Asparagus, beets, broccoli, carrots, corn, lettuce, pumpkins, spinach, squash, string beans, tomatoes- all those not listed    Fruit  Avocados, figs, papaya, passion fruit, raisins, red plums. Limit bananas and citrus fruit & juice (orange, lemon, lime, grapefruit, tangerines) to   cup per day  Apples, berries, peaches, pears, prunes, fruit cocktail    Mixed Dishes  Frozen meals/Microwave ready      "

## 2025-07-14 NOTE — LETTER
7/14/2025      Ghulam Rizzo  [address] [place] [id number]      Dear Colleague,    Thank you for referring your patient, Ghulam Rizzo, to the [facility]. Please see a copy of my visit note below.    CHIEF COMPLAINT: Patient presents with:  Dizziness: Comes and goes for the past few years, has gradually gotten worse.          HISTORY OF PRESENT ILLNESS    Ghulam was seen at the behest of Aamir Ayala DO for hearing concern.     AUDIOLOGY     HISTORY: New to Dr. Gonzalez for history of chronic dizziness. Has seen Neurosurgery, who rec ENT and follow up with Neurology. Prior audio 04/02/2018 showed mild sloping to severe SNHL, bilat. Hearing aids recommended at that time - has not pursued due to dealing with other health issues. [facility]. Pt reports dizziness for past few years that started with occasional episodes of spinning dizziness up to 30 minutes. Over time, has become more frequent with both general imbalance and episodes of spinning vertigo lasting minutes. 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 Family hx in father (also noise exposure). Denies otalgia, otorrhea, aural fullness, tinnitus, and prior ear sugery. RESULTS: Otoscopy: Non-occluding cerumen, bilat. Tymps: Slightly reduced compliance, bilat. Reflexes: Present ipsi left, absent ipsi right (DNT contra due to equipment). Audio: Slight sloping to mod-severe SNHL, bilat. Decline in thresholds at 0700-0489 Hz only bilat from 04/02/2018, otherwise stable. REC: To ENT as planned. Recheck per ENT. [facility].       REVIEW OF SYSTEMS    Review of Systems as per HPI and PMHx, otherwise 10 system review system are negative.       ALLERGIES    Levaquin [levofloxacin], Oxycodone, and Percocet [oxycodone-acetaminophen]    CURRENT MEDICATIONS      Current Outpatient Medications:      acetaminophen (TYLENOL) 500 MG tablet, Take 1,000 mg  by mouth every 6 hours as needed for mild pain, Disp: , Rfl:      buPROPion (WELLBUTRIN XL) 150 MG 24 hr tablet, Take 1 tablet (150 mg) by mouth every morning., Disp: 30 tablet, Rfl: 0     Calcium Carbonate (CALCIUM-CARB 600 PO), Take 2 tablets by mouth daily, Disp: , Rfl:      cyanocobalamin (VITAMIN B-12) 1000 MCG tablet, Take 1 tablet (1,000 mcg) by mouth daily, Disp: 90 tablet, Rfl: 1     dicyclomine (BENTYL) 20 MG tablet, Take 1 tablet (20 mg) by mouth 4 times daily (before meals and nightly)., Disp: 120 tablet, Rfl: 1     glycerin (LAXATIVE) 1.2 g suppository, Place 1 suppository rectally daily as needed (constipation.), Disp: 25 suppository, Rfl: 1     HYDROmorphone (DILAUDID) 2 MG tablet, Take 1 tablet (2 mg) by mouth daily as needed for pain or moderate to severe pain., Disp: 15 tablet, Rfl: 0     magnesium oxide (MAG-OX) 400 MG tablet, Take 1 tablet (400 mg) by mouth daily., Disp: 90 tablet, Rfl: 3     midodrine (PROAMATINE) 5 MG tablet, Take 1 tablet (5 mg) by mouth 2 times daily (with meals)., Disp: 120 tablet, Rfl: 1     mirabegron (MYRBETRIQ) 50 MG 24 hr tablet, Take 1 tablet (50 mg) by mouth daily., Disp: 30 tablet, Rfl: 0     ondansetron (ZOFRAN) 4 MG tablet, Take 1 tablet (4 mg) by mouth every 8 hours as needed for nausea., Disp: 60 tablet, Rfl: 1     polyethylene glycol (MIRALAX) 17 GM/Dose powder, Take 17 g by mouth 2 times daily., Disp: , Rfl:      senna-docusate (NEGRITO-COLACE) 8.6-50 MG tablet, Take 1 tablet by mouth 2 times daily., Disp: , Rfl:      sertraline (ZOLOFT) 50 MG tablet, Take 1 tablet by mouth daily, Disp: 90 tablet, Rfl: 3     solifenacin (VESICARE) 5 MG tablet, Take 1 tablet (5 mg) by mouth daily., Disp: 30 tablet, Rfl: 3     tamsulosin (FLOMAX) 0.4 MG capsule, Take 1 capsule (0.4 mg) by mouth every evening, Disp: 90 capsule, Rfl: 3     vitamin D3 (CHOLECALCIFEROL) 50 mcg (2000 units) tablet, Take 1 tablet (50 mcg) by mouth daily, Disp: 90 tablet, Rfl: 3     PAST MEDICAL  HISTORY    PAST MEDICAL HISTORY:   Past Medical History:   Diagnosis Date     Anisocoria      Asthma      Carpal tunnel syndrome     2006     Head injury, unspecified     closed head injury 4 yrs ago     Hepatitis C     Cured by [facility] in 2015. diagnosed in 2012 was positive for Hepatitis C.      Obesity     2006, resolved     Other convulsions     seizure disorder due to head injury 4 yrs ago.     Other mononeuritis of upper limb     L phrenic nerve paralysis     Unspecified essential hypertension        PAST SURGICAL HISTORY    PAST SURGICAL HISTORY:   Past Surgical History:   Procedure Laterality Date     COLECTOMY WITHOUT COLOSTOMY Right 07/13/2022    Procedure: Laparotomy, right colectomy, lysis of adhesions,;  Surgeon: Tommy Martino DO;  Location: [facility]     COLONOSCOPY       COLONOSCOPY N/A 07/18/2023    Procedure: Colonoscopy with polypectomy;  Surgeon: Octavio Aguirre MD;  Location: [facility]     COLONOSCOPY N/A 06/10/2024    Procedure: Colonoscopy;  Surgeon: Tommy Martino DO;  Location: [facility]     ESOPHAGOSCOPY, GASTROSCOPY, DUODENOSCOPY (EGD), COMBINED N/A 06/10/2024    Procedure: Esophagoscopy, gastroscopy, duodenoscopy (EGD), combined;  Surgeon: Tommy Martino DO;  Location: [facility]     GI SURGERY       HERNIA REPAIR       HERNIORRHAPHY INCISIONAL (LOCATION) N/A 07/13/2022    Procedure: primary incisional hernia repair;  Surgeon: Tommy Martino DO;  Location: [facility]     INSERT SEED MARKER / MATRIX N/A 08/30/2023    Procedure: Transrectal ultrasound guided placement of fiducials and SpaceOar;  Surgeon: Chase Blount MD;  Location:  OR     SURGICAL HISTORY OF -   04/06/1987    esophagogastroduodenoscopy     SURGICAL HISTORY OF -   04/27/1987    exploratory laparotomy and anterior gastropexy over a gastrostomy tube.     SURGICAL HISTORY OF -   11/13/1990    esophagogastroduodenoscopy     SURGICAL HISTORY OF -   01/01/1991    L diaphragm eventration  repair, fixation of stomach and colon to abd wall     SURGICAL HISTORY OF -       hiatal hernia repair     THORACIC SURGERY         FAMILY  HISTORY    FAMILY HISTORY:   Family History   Problem Relation Age of Onset     Cerebrovascular Disease Mother      Heart Disease Brother      Heart Disease Brother        SOCIAL HISTORY    SOCIAL HISTORY:   Social History     Tobacco Use     Smoking status: Former     Current packs/day: 0.50     Average packs/day: 0.5 packs/day for 50.0 years (25.0 ttl pk-yrs)     Types: Cigarettes     Smokeless tobacco: Never   Substance Use Topics     Alcohol use: Yes     Comment: 1-2 beers daily        PHYSICAL EXAM    HEAD: Normal appearance and symmetry:  No cutaneous lesions.      NECK:  supple     EARS:    Right:   TM intact   LEFT:   TM intact    EYES:  EOMI    CN VII/XII:  intact     NOSE:     Dorsum:   straight  Septum:  midline  Mucosa:  moist        ORAL CAVITY/OROPHARYNX:     Lips:  Normal.  Tongue: normal, midline  Mucosa:   no lesions     NECK:  Trachea:  midline.              Thyroid:  normal              Adenopathy:  none        NEURO:   Alert and Oriented     GAIT AND STATION:  normal     RESPIRATORY:   Symmetry and Respiratory effort     PSYCH:  Normal mood and affect     SKIN:   warm and dry         IMPRESSION:    Encounter Diagnoses   Name Primary?     Migrainous dizziness Yes     Sensorineural hearing loss (SNHL) of both ears      Occupational exposure to noise           RECOMMENDATIONS:      Discussed vestibular testing to rule out vestibular pathology.  I do not suspect an inner ear cause of his dizziness.      Orders Placed This Encounter   Medications     magnesium oxide (MAG-OX) 400 MG tablet     Sig: Take 1 tablet (400 mg) by mouth daily.     Dispense:  90 tablet     Refill:  3      Recommend migraine diet  Defer vestibular testing at this time.  Follow up with neurology.    Again, thank you for allowing me to participate in the care of your patient.         Sincerely,        Yuri Gonzalez MD    Electronically signed

## 2025-07-14 NOTE — PROGRESS NOTES
CHIEF COMPLAINT: Patient presents with:  Dizziness: Comes and goes for the past few years, has gradually gotten worse.          HISTORY OF PRESENT ILLNESS    Ghulam was seen at the behest of Aamir Ayala DO for hearing concern.     AUDIOLOGY     HISTORY: New to Dr. Gonzalez for history of chronic dizziness. Has seen Neurosurgery, who rec ENT and follow up with Neurology. Prior audio 04/02/2018 showed mild sloping to severe SNHL, bilat. Hearing aids recommended at that time - has not pursued due to dealing with other health issues. [facility]. Pt reports dizziness for past few years that started with occasional episodes of spinning dizziness up to 30 minutes. Over time, has become more frequent with both general imbalance and episodes of spinning vertigo lasting minutes. 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 Family hx in father (also noise exposure). Denies otalgia, otorrhea, aural fullness, tinnitus, and prior ear sugery. RESULTS: Otoscopy: Non-occluding cerumen, bilat. Tymps: Slightly reduced compliance, bilat. Reflexes: Present ipsi left, absent ipsi right (DNT contra due to equipment). Audio: Slight sloping to mod-severe SNHL, bilat. Decline in thresholds at 2674-0802 Hz only bilat from 04/02/2018, otherwise stable. REC: To ENT as planned. Recheck per ENT. [facility] when ready.       REVIEW OF SYSTEMS    Review of Systems as per HPI and PMHx, otherwise 10 system review system are negative.       ALLERGIES    Levaquin [levofloxacin], Oxycodone, and Percocet [oxycodone-acetaminophen]    CURRENT MEDICATIONS      Current Outpatient Medications:     acetaminophen (TYLENOL) 500 MG tablet, Take 1,000 mg by mouth every 6 hours as needed for mild pain, Disp: , Rfl:     buPROPion (WELLBUTRIN XL) 150 MG 24 hr tablet, Take 1 tablet (150 mg) by mouth every morning., Disp: 30 tablet, Rfl: 0    Calcium Carbonate (CALCIUM-CARB 600 PO), Take 2  tablets by mouth daily, Disp: , Rfl:     cyanocobalamin (VITAMIN B-12) 1000 MCG tablet, Take 1 tablet (1,000 mcg) by mouth daily, Disp: 90 tablet, Rfl: 1    dicyclomine (BENTYL) 20 MG tablet, Take 1 tablet (20 mg) by mouth 4 times daily (before meals and nightly)., Disp: 120 tablet, Rfl: 1    glycerin (LAXATIVE) 1.2 g suppository, Place 1 suppository rectally daily as needed (constipation.), Disp: 25 suppository, Rfl: 1    HYDROmorphone (DILAUDID) 2 MG tablet, Take 1 tablet (2 mg) by mouth daily as needed for pain or moderate to severe pain., Disp: 15 tablet, Rfl: 0    magnesium oxide (MAG-OX) 400 MG tablet, Take 1 tablet (400 mg) by mouth daily., Disp: 90 tablet, Rfl: 3    midodrine (PROAMATINE) 5 MG tablet, Take 1 tablet (5 mg) by mouth 2 times daily (with meals)., Disp: 120 tablet, Rfl: 1    mirabegron (MYRBETRIQ) 50 MG 24 hr tablet, Take 1 tablet (50 mg) by mouth daily., Disp: 30 tablet, Rfl: 0    ondansetron (ZOFRAN) 4 MG tablet, Take 1 tablet (4 mg) by mouth every 8 hours as needed for nausea., Disp: 60 tablet, Rfl: 1    polyethylene glycol (MIRALAX) 17 GM/Dose powder, Take 17 g by mouth 2 times daily., Disp: , Rfl:     senna-docusate (NEGRITO-COLACE) 8.6-50 MG tablet, Take 1 tablet by mouth 2 times daily., Disp: , Rfl:     sertraline (ZOLOFT) 50 MG tablet, Take 1 tablet by mouth daily, Disp: 90 tablet, Rfl: 3    solifenacin (VESICARE) 5 MG tablet, Take 1 tablet (5 mg) by mouth daily., Disp: 30 tablet, Rfl: 3    tamsulosin (FLOMAX) 0.4 MG capsule, Take 1 capsule (0.4 mg) by mouth every evening, Disp: 90 capsule, Rfl: 3    vitamin D3 (CHOLECALCIFEROL) 50 mcg (2000 units) tablet, Take 1 tablet (50 mcg) by mouth daily, Disp: 90 tablet, Rfl: 3     PAST MEDICAL HISTORY    PAST MEDICAL HISTORY:   Past Medical History:   Diagnosis Date    Anisocoria     Asthma     Carpal tunnel syndrome     2006    Head injury, unspecified     closed head injury 4 yrs ago    Hepatitis C     Cured by [facility] in 2015. diagnosed in 2012  was positive for Hepatitis C.     Obesity     2006, resolved    Other convulsions     seizure disorder due to head injury 4 yrs ago.    Other mononeuritis of upper limb     L phrenic nerve paralysis    Unspecified essential hypertension        PAST SURGICAL HISTORY    PAST SURGICAL HISTORY:   Past Surgical History:   Procedure Laterality Date    COLECTOMY WITHOUT COLOSTOMY Right 07/13/2022    Procedure: Laparotomy, right colectomy, lysis of adhesions,;  Surgeon: Tommy Martino DO;  Location: [facility]    COLONOSCOPY      COLONOSCOPY N/A 07/18/2023    Procedure: Colonoscopy with polypectomy;  Surgeon: Octavio Aguirre MD;  Location: [facility]    COLONOSCOPY N/A 06/10/2024    Procedure: Colonoscopy;  Surgeon: Tommy Martino DO;  Location: [facility]    ESOPHAGOSCOPY, GASTROSCOPY, DUODENOSCOPY (EGD), COMBINED N/A 06/10/2024    Procedure: Esophagoscopy, gastroscopy, duodenoscopy (EGD), combined;  Surgeon: Tommy Martino DO;  Location: [facility]    GI SURGERY      HERNIA REPAIR      HERNIORRHAPHY INCISIONAL (LOCATION) N/A 07/13/2022    Procedure: primary incisional hernia repair;  Surgeon: Tommy Martino DO;  Location: [facility]    INSERT SEED MARKER / MATRIX N/A 08/30/2023    Procedure: Transrectal ultrasound guided placement of fiducials and SpaceOar;  Surgeon: Chase Blount MD;  Location:  OR    SURGICAL HISTORY OF -   04/06/1987    esophagogastroduodenoscopy    SURGICAL HISTORY OF -   04/27/1987    exploratory laparotomy and anterior gastropexy over a gastrostomy tube.    SURGICAL HISTORY OF -   11/13/1990    esophagogastroduodenoscopy    SURGICAL HISTORY OF -   01/01/1991    L diaphragm eventration repair, fixation of stomach and colon to abd wall    SURGICAL HISTORY OF -       hiatal hernia repair    THORACIC SURGERY         FAMILY  HISTORY    FAMILY HISTORY:   Family History   Problem Relation Age of Onset    Cerebrovascular Disease Mother     Heart Disease Brother      Heart Disease Brother        SOCIAL HISTORY    SOCIAL HISTORY:   Social History     Tobacco Use    Smoking status: Former     Current packs/day: 0.50     Average packs/day: 0.5 packs/day for 50.0 years (25.0 ttl pk-yrs)     Types: Cigarettes    Smokeless tobacco: Never   Substance Use Topics    Alcohol use: Yes     Comment: 1-2 beers daily        PHYSICAL EXAM    HEAD: Normal appearance and symmetry:  No cutaneous lesions.      NECK:  supple     EARS:    Right:   TM intact   LEFT:   TM intact    EYES:  EOMI    CN VII/XII:  intact     NOSE:     Dorsum:   straight  Septum:  midline  Mucosa:  moist        ORAL CAVITY/OROPHARYNX:     Lips:  Normal.  Tongue: normal, midline  Mucosa:   no lesions     NECK:  Trachea:  midline.              Thyroid:  normal              Adenopathy:  none        NEURO:   Alert and Oriented     GAIT AND STATION:  normal     RESPIRATORY:   Symmetry and Respiratory effort     PSYCH:  Normal mood and affect     SKIN:   warm and dry         IMPRESSION:    Encounter Diagnoses   Name Primary?    Migrainous dizziness Yes    Sensorineural hearing loss (SNHL) of both ears     Occupational exposure to noise           RECOMMENDATIONS:      Discussed vestibular testing to rule out vestibular pathology.  I do not suspect an inner ear cause of his dizziness.      Orders Placed This Encounter   Medications    magnesium oxide (MAG-OX) 400 MG tablet     Sig: Take 1 tablet (400 mg) by mouth daily.     Dispense:  90 tablet     Refill:  3      Recommend migraine diet  Defer vestibular testing at this time.  Follow up with neurology.

## 2025-07-15 RX ORDER — TAMSULOSIN HYDROCHLORIDE 0.4 MG/1
0.4 CAPSULE ORAL EVERY EVENING
Qty: 90 CAPSULE | Refills: 3 | Status: SHIPPED | OUTPATIENT
Start: 2025-07-15

## 2025-07-28 ENCOUNTER — MYC MEDICAL ADVICE (OUTPATIENT)
Dept: INTERVENTIONAL RADIOLOGY/VASCULAR | Facility: CLINIC | Age: 70
End: 2025-07-28
Payer: COMMERCIAL

## 2025-07-28 DIAGNOSIS — R10.84 ABDOMINAL PAIN, GENERALIZED: ICD-10-CM

## 2025-07-28 DIAGNOSIS — I77.4 MEDIAN ARCUATE LIGAMENT SYNDROME: ICD-10-CM

## 2025-07-30 RX ORDER — HYDROMORPHONE HYDROCHLORIDE 2 MG/1
2 TABLET ORAL DAILY PRN
Qty: 15 TABLET | Refills: 0 | Status: SHIPPED | OUTPATIENT
Start: 2025-07-30

## 2025-07-30 NOTE — TELEPHONE ENCOUNTER
Refill provided. Please schedule patient for medicare annual wellness and visit for future refills.

## 2025-07-30 NOTE — TELEPHONE ENCOUNTER
Pt called back.  He says he will call back to schedule appt.    Tonya Parson on 7/30/2025 at 4:19 PM

## 2025-08-07 ENCOUNTER — HOSPITAL ENCOUNTER (OUTPATIENT)
Dept: CT IMAGING | Facility: HOSPITAL | Age: 70
Discharge: HOME | End: 2025-08-07
Attending: SURGERY
Payer: COMMERCIAL

## 2025-08-07 VITALS
DIASTOLIC BLOOD PRESSURE: 99 MMHG | TEMPERATURE: 98.5 F | HEART RATE: 60 BPM | SYSTOLIC BLOOD PRESSURE: 170 MMHG | OXYGEN SATURATION: 98 % | RESPIRATION RATE: 18 BRPM

## 2025-08-07 DIAGNOSIS — R10.9 CHRONIC ABDOMINAL PAIN: Primary | ICD-10-CM

## 2025-08-07 DIAGNOSIS — G89.29 CHRONIC ABDOMINAL PAIN: Primary | ICD-10-CM

## 2025-08-07 DIAGNOSIS — K55.1 MESENTERIC ARTERY STENOSIS: ICD-10-CM

## 2025-08-07 LAB
ANION GAP SERPL CALCULATED.3IONS-SCNC: 11 MMOL/L (ref 7–15)
APTT PPP: 30 SECONDS (ref 22–38)
BUN SERPL-MCNC: 16.6 MG/DL (ref 8–23)
CALCIUM SERPL-MCNC: 9.3 MG/DL (ref 8.8–10.4)
CHLORIDE SERPL-SCNC: 102 MMOL/L (ref 98–107)
CREAT SERPL-MCNC: 0.74 MG/DL (ref 0.67–1.17)
EGFRCR SERPLBLD CKD-EPI 2021: >90 ML/MIN/1.73M2
ERYTHROCYTE [DISTWIDTH] IN BLOOD BY AUTOMATED COUNT: 14.7 % (ref 10–15)
GLUCOSE SERPL-MCNC: 114 MG/DL (ref 70–99)
HCO3 SERPL-SCNC: 24 MMOL/L (ref 22–29)
HCT VFR BLD AUTO: 39 % (ref 40–53)
HGB BLD-MCNC: 13 G/DL (ref 13.3–17.7)
INR PPP: 1.05 (ref 0.85–1.15)
MCH RBC QN AUTO: 33.6 PG (ref 26.5–33)
MCHC RBC AUTO-ENTMCNC: 33.3 G/DL (ref 31.5–36.5)
MCV RBC AUTO: 101 FL (ref 78–100)
PLATELET # BLD AUTO: 168 10E3/UL (ref 150–450)
POTASSIUM SERPL-SCNC: 4.4 MMOL/L (ref 3.4–5.3)
PROTHROMBIN TIME: 13.9 SECONDS (ref 11.8–14.8)
RBC # BLD AUTO: 3.87 10E6/UL (ref 4.4–5.9)
SODIUM SERPL-SCNC: 137 MMOL/L (ref 135–145)
WBC # BLD AUTO: 4.7 10E3/UL (ref 4–11)

## 2025-08-07 PROCEDURE — 36415 COLL VENOUS BLD VENIPUNCTURE: CPT | Performed by: RADIOLOGY

## 2025-08-07 PROCEDURE — 80048 BASIC METABOLIC PNL TOTAL CA: CPT | Performed by: RADIOLOGY

## 2025-08-07 PROCEDURE — 250N000011 HC RX IP 250 OP 636: Performed by: RADIOLOGY

## 2025-08-07 PROCEDURE — 250N000011 HC RX IP 250 OP 636: Performed by: NURSE PRACTITIONER

## 2025-08-07 PROCEDURE — 99152 MOD SED SAME PHYS/QHP 5/>YRS: CPT

## 2025-08-07 PROCEDURE — 85610 PROTHROMBIN TIME: CPT | Performed by: NURSE PRACTITIONER

## 2025-08-07 PROCEDURE — 85014 HEMATOCRIT: CPT | Performed by: NURSE PRACTITIONER

## 2025-08-07 PROCEDURE — 85730 THROMBOPLASTIN TIME PARTIAL: CPT | Performed by: NURSE PRACTITIONER

## 2025-08-07 RX ORDER — NICOTINE POLACRILEX 4 MG
15-30 LOZENGE BUCCAL
Status: ACTIVE | OUTPATIENT
Start: 2025-08-07

## 2025-08-07 RX ORDER — NALOXONE HYDROCHLORIDE 0.4 MG/ML
0.4 INJECTION, SOLUTION INTRAMUSCULAR; INTRAVENOUS; SUBCUTANEOUS
Status: ACTIVE | OUTPATIENT
Start: 2025-08-07

## 2025-08-07 RX ORDER — DEXTROSE MONOHYDRATE 25 G/50ML
25-50 INJECTION, SOLUTION INTRAVENOUS
Status: ACTIVE | OUTPATIENT
Start: 2025-08-07

## 2025-08-07 RX ORDER — FENTANYL CITRATE 50 UG/ML
25-50 INJECTION, SOLUTION INTRAMUSCULAR; INTRAVENOUS EVERY 5 MIN PRN
Status: ACTIVE | OUTPATIENT
Start: 2025-08-07

## 2025-08-07 RX ORDER — ONDANSETRON 2 MG/ML
4 INJECTION INTRAMUSCULAR; INTRAVENOUS
Status: ACTIVE | OUTPATIENT
Start: 2025-08-07

## 2025-08-07 RX ORDER — IOPAMIDOL 755 MG/ML
1 INJECTION, SOLUTION INTRAVASCULAR ONCE
Status: COMPLETED | OUTPATIENT
Start: 2025-08-07 | End: 2025-08-07

## 2025-08-07 RX ORDER — ALCOHOL 1 ML/ML
60 INJECTION, SOLUTION PERCUTANEOUS ONCE
Status: DISPENSED | OUTPATIENT
Start: 2025-08-07

## 2025-08-07 RX ORDER — BUPIVACAINE HYDROCHLORIDE 5 MG/ML
20 INJECTION, SOLUTION EPIDURAL; INTRACAUDAL; PERINEURAL ONCE
Status: COMPLETED | OUTPATIENT
Start: 2025-08-07 | End: 2025-08-07

## 2025-08-07 RX ORDER — LIDOCAINE 40 MG/G
CREAM TOPICAL
Status: ACTIVE | OUTPATIENT
Start: 2025-08-07

## 2025-08-07 RX ORDER — NALOXONE HYDROCHLORIDE 0.4 MG/ML
0.2 INJECTION, SOLUTION INTRAMUSCULAR; INTRAVENOUS; SUBCUTANEOUS
Status: ACTIVE | OUTPATIENT
Start: 2025-08-07

## 2025-08-07 RX ORDER — FLUMAZENIL 0.1 MG/ML
0.2 INJECTION, SOLUTION INTRAVENOUS
Status: ACTIVE | OUTPATIENT
Start: 2025-08-07

## 2025-08-07 RX ADMIN — FENTANYL CITRATE 25 MCG: 50 INJECTION, SOLUTION INTRAMUSCULAR; INTRAVENOUS at 14:24

## 2025-08-07 RX ADMIN — FENTANYL CITRATE 25 MCG: 50 INJECTION, SOLUTION INTRAMUSCULAR; INTRAVENOUS at 14:20

## 2025-08-07 RX ADMIN — IOPAMIDOL 1 ML: 755 INJECTION, SOLUTION INTRAVENOUS at 14:57

## 2025-08-07 RX ADMIN — FENTANYL CITRATE 25 MCG: 50 INJECTION, SOLUTION INTRAMUSCULAR; INTRAVENOUS at 14:38

## 2025-08-07 RX ADMIN — BUPIVACAINE HYDROCHLORIDE 100 MG: 5 INJECTION, SOLUTION EPIDURAL; INTRACAUDAL; PERINEURAL at 14:29

## 2025-08-07 RX ADMIN — FENTANYL CITRATE 25 MCG: 50 INJECTION, SOLUTION INTRAMUSCULAR; INTRAVENOUS at 14:34

## 2025-08-07 RX ADMIN — FENTANYL CITRATE 50 MCG: 50 INJECTION, SOLUTION INTRAMUSCULAR; INTRAVENOUS at 14:10

## 2025-08-07 RX ADMIN — MIDAZOLAM HYDROCHLORIDE 0.5 MG: 1 INJECTION, SOLUTION INTRAMUSCULAR; INTRAVENOUS at 14:16

## 2025-08-07 RX ADMIN — MIDAZOLAM HYDROCHLORIDE 0.5 MG: 1 INJECTION, SOLUTION INTRAMUSCULAR; INTRAVENOUS at 14:27

## 2025-08-07 RX ADMIN — MIDAZOLAM HYDROCHLORIDE 1 MG: 1 INJECTION, SOLUTION INTRAMUSCULAR; INTRAVENOUS at 14:06

## 2025-08-25 ENCOUNTER — HOSPITAL ENCOUNTER (INPATIENT)
Facility: CLINIC | Age: 70
End: 2025-08-25
Attending: FAMILY MEDICINE | Admitting: STUDENT IN AN ORGANIZED HEALTH CARE EDUCATION/TRAINING PROGRAM
Payer: COMMERCIAL

## 2025-08-25 ENCOUNTER — APPOINTMENT (OUTPATIENT)
Dept: CT IMAGING | Facility: CLINIC | Age: 70
End: 2025-08-25
Attending: FAMILY MEDICINE
Payer: COMMERCIAL

## 2025-08-25 PROBLEM — K52.9 COLITIS: Status: ACTIVE | Noted: 2025-08-25

## 2025-08-25 PROCEDURE — 74177 CT ABD & PELVIS W/CONTRAST: CPT

## 2025-08-25 ASSESSMENT — ACTIVITIES OF DAILY LIVING (ADL)
ADLS_ACUITY_SCORE: 58
ADLS_ACUITY_SCORE: 38
ADLS_ACUITY_SCORE: 58

## 2025-08-26 PROBLEM — R10.9 INTRACTABLE ABDOMINAL PAIN: Status: ACTIVE | Noted: 2025-08-26

## 2025-08-26 ASSESSMENT — ACTIVITIES OF DAILY LIVING (ADL)
ADLS_ACUITY_SCORE: 38

## 2025-08-27 ASSESSMENT — ACTIVITIES OF DAILY LIVING (ADL)
ADLS_ACUITY_SCORE: 38

## 2025-08-28 ASSESSMENT — ACTIVITIES OF DAILY LIVING (ADL)
ADLS_ACUITY_SCORE: 38

## 2025-08-29 ASSESSMENT — ACTIVITIES OF DAILY LIVING (ADL)
ADLS_ACUITY_SCORE: 38

## 2025-09-02 ENCOUNTER — PATIENT OUTREACH (OUTPATIENT)
Dept: FAMILY MEDICINE | Facility: CLINIC | Age: 70
End: 2025-09-02
Payer: COMMERCIAL

## 2025-09-03 ENCOUNTER — DOCUMENTATION ONLY (OUTPATIENT)
Dept: GASTROENTEROLOGY | Facility: CLINIC | Age: 70
End: 2025-09-03

## 2025-09-03 ENCOUNTER — OFFICE VISIT (OUTPATIENT)
Dept: FAMILY MEDICINE | Facility: CLINIC | Age: 70
End: 2025-09-03
Payer: COMMERCIAL

## 2025-09-03 VITALS
OXYGEN SATURATION: 100 % | TEMPERATURE: 97.4 F | DIASTOLIC BLOOD PRESSURE: 90 MMHG | SYSTOLIC BLOOD PRESSURE: 130 MMHG | HEART RATE: 64 BPM | WEIGHT: 156 LBS | HEIGHT: 73 IN | BODY MASS INDEX: 20.67 KG/M2 | RESPIRATION RATE: 20 BRPM

## 2025-09-03 DIAGNOSIS — R10.9 ABDOMINAL WALL PAIN: ICD-10-CM

## 2025-09-03 DIAGNOSIS — R10.84 ABDOMINAL PAIN, GENERALIZED: ICD-10-CM

## 2025-09-03 DIAGNOSIS — R10.9 CHRONIC ABDOMINAL PAIN: ICD-10-CM

## 2025-09-03 DIAGNOSIS — K52.9 COLITIS: ICD-10-CM

## 2025-09-03 DIAGNOSIS — I77.4 MEDIAN ARCUATE LIGAMENT SYNDROME: Primary | ICD-10-CM

## 2025-09-03 DIAGNOSIS — G89.29 CHRONIC ABDOMINAL PAIN: ICD-10-CM

## 2025-09-03 RX ORDER — HYDROMORPHONE HYDROCHLORIDE 4 MG/1
2-4 TABLET ORAL EVERY 8 HOURS PRN
Qty: 20 TABLET | Refills: 0 | Status: SHIPPED | OUTPATIENT
Start: 2025-09-03

## 2025-09-03 RX ORDER — GABAPENTIN 300 MG/1
300 CAPSULE ORAL 3 TIMES DAILY
Qty: 90 CAPSULE | Refills: 3 | Status: SHIPPED | OUTPATIENT
Start: 2025-09-03

## 2025-09-03 ASSESSMENT — PAIN SCALES - GENERAL: PAINLEVEL_OUTOF10: MODERATE PAIN (4)

## (undated) DEVICE — PREP POVIDONE-IODINE 10% SOLUTION 4OZ BOTTLE MDS093944

## (undated) DEVICE — GLOVE PROTEXIS W/NEU-THERA 7.5  2D73TE75

## (undated) DEVICE — DRAPE UNDER BUTTOCK 8483

## (undated) DEVICE — PACK LAPAROTOMY CUSTOM LAKES

## (undated) DEVICE — GLOVE PROTEXIS BLUE W/NEU-THERA 8.0  2D73EB80

## (undated) DEVICE — Device

## (undated) DEVICE — GOWN XLG DISP 9545

## (undated) DEVICE — GOWN IMPERVIOUS SPECIALTY XLG/XLONG 32474

## (undated) DEVICE — SYSTEM HYDROGEL SPACEOAR INJEC 10ML SV-2101

## (undated) DEVICE — DRAPE IOBAN INCISE 13X13" 6640EZ

## (undated) DEVICE — DECANTER BAG 2002S

## (undated) DEVICE — LINEN TOWEL PACK X5 5464

## (undated) DEVICE — ENDO SNARE EXACTO COLD 9MM LOOP 2.4MMX230CM 00711115

## (undated) DEVICE — ADH SKIN CLOSURE PREMIERPRO EXOFIN 1.0ML 3470

## (undated) DEVICE — GLOVE BIOGEL PI MICRO SZ 7.5 48575

## (undated) DEVICE — PACK SET-UP STD 9102

## (undated) DEVICE — BLANKET BAIR HUGGER UPPER BODY 42268

## (undated) DEVICE — CATH TRAY FOLEY SURESTEP 16FR W/URINE MTR STATLK LF A303416A

## (undated) DEVICE — LINEN GOWN X4 5410

## (undated) DEVICE — SUCTION MANIFOLD NEPTUNE 2 SYS 1 PORT 702-025-000

## (undated) DEVICE — JELLY LUBRICATING SURGILUBE 2OZ TUBE 0281-0205-02

## (undated) DEVICE — TRAY PREP DRY SKIN SCRUB 067

## (undated) DEVICE — LINEN LEG DRAPE 5457

## (undated) DEVICE — PREP CHLORAPREP 26ML TINTED ORANGE  260815

## (undated) DEVICE — SU VICRYL 3-0 SH 27" UND J416H

## (undated) DEVICE — PAD CHUX UNDERPAD 30X36" P3036C

## (undated) DEVICE — SOL NACL 0.9% IRRIG 1000ML BOTTLE 07138-09

## (undated) DEVICE — PEN MARKING SKIN W/LABELS 31145918

## (undated) DEVICE — DRSG GAUZE 4X8" NON21842

## (undated) DEVICE — SU SILK 3-0 SH CR 8X30" C017D

## (undated) DEVICE — BLADE CLIPPER 4406

## (undated) DEVICE — SU VICRYL 2-0 REEL 54" UND J286G

## (undated) DEVICE — ESU ENDO FORCEP BX HOT

## (undated) DEVICE — STPL LINEAR CUT 75MM TLC75

## (undated) DEVICE — DRSG TELFA 3X8" 1238

## (undated) DEVICE — ENDO FORCEP ENDOJAW BIOPSY 2.8MMX230CM FB-220U

## (undated) DEVICE — STPL SKIN 35W ROTATING HEAD PRW35

## (undated) DEVICE — LINEN DRAPE 54X72" 5467

## (undated) DEVICE — STOCKING SLEEVE COMPRESSION CALF MED

## (undated) DEVICE — SOL WATER IRRIG 1000ML BOTTLE 07139-09

## (undated) DEVICE — SPONGE LAP 18X18" 1515

## (undated) DEVICE — DRSG KERLIX FLUFFS X5

## (undated) DEVICE — STPL LINEAR CUT 60X3.5MM TX60B

## (undated) DEVICE — SUCTION TIP YANKAUER STR K87

## (undated) DEVICE — DRAPE SHEET REV FOLD 3/4 9349

## (undated) DEVICE — BLADE KNIFE SURG 15 371115

## (undated) DEVICE — DRSG TEGADERM 4X4 3/4" 1626W

## (undated) DEVICE — COVER EQUIP BAG W/BAND 36X28" LF 01-3628

## (undated) DEVICE — PREP POVIDONE-IODINE 7.5% SCRUB 4OZ BOTTLE MDS093945

## (undated) DEVICE — TUBING SUCTION 12"X1/4" N612

## (undated) DEVICE — BLADE KNIFE SURG 10 371110

## (undated) DEVICE — BLADE CLIPPER SGL USE 9680

## (undated) DEVICE — SUCTION TIP POOLE K770

## (undated) DEVICE — SU MONOCRYL 4-0 PS-2 18" UND Y496G

## (undated) DEVICE — COVER ULTRASOUND PROBE 1X9" LF 25040

## (undated) DEVICE — STPL RELOAD LINEAR CUT 75MM TCR75

## (undated) DEVICE — SOL NACL 0.9% INJ 250ML BAG 2B1322Q

## (undated) DEVICE — OINTMENT ANTIBIOTIC BACITRACIN ZINC .9 G 1171

## (undated) DEVICE — GEL ULTRASOUND AQUASONIC 100 .25 L 01-08

## (undated) DEVICE — DRSG ABDOMINAL 07 1/2X8" 7197D

## (undated) DEVICE — ESU LIGASURE IMPACT OPEN SEALER/DVDR CVD LG JAW 36MM LF4318

## (undated) DEVICE — SYR 10ML LL W/O NDL 302995

## (undated) DEVICE — STRAP KNEE/BODY 31143004

## (undated) RX ORDER — KETOROLAC TROMETHAMINE 30 MG/ML
INJECTION, SOLUTION INTRAMUSCULAR; INTRAVENOUS
Status: DISPENSED
Start: 2022-07-13

## (undated) RX ORDER — LIDOCAINE HYDROCHLORIDE 10 MG/ML
INJECTION, SOLUTION EPIDURAL; INFILTRATION; INTRACAUDAL; PERINEURAL
Status: DISPENSED
Start: 2023-05-08

## (undated) RX ORDER — FENTANYL CITRATE-0.9 % NACL/PF 10 MCG/ML
PLASTIC BAG, INJECTION (ML) INTRAVENOUS
Status: DISPENSED
Start: 2022-07-13

## (undated) RX ORDER — FENTANYL CITRATE 50 UG/ML
INJECTION, SOLUTION INTRAMUSCULAR; INTRAVENOUS
Status: DISPENSED
Start: 2022-07-13

## (undated) RX ORDER — LIDOCAINE HYDROCHLORIDE 10 MG/ML
INJECTION, SOLUTION EPIDURAL; INFILTRATION; INTRACAUDAL; PERINEURAL
Status: DISPENSED
Start: 2022-07-13

## (undated) RX ORDER — LIDOCAINE HYDROCHLORIDE 10 MG/ML
INJECTION, SOLUTION EPIDURAL; INFILTRATION; INTRACAUDAL; PERINEURAL
Status: DISPENSED
Start: 2025-01-10

## (undated) RX ORDER — BUPIVACAINE HYDROCHLORIDE 5 MG/ML
INJECTION, SOLUTION EPIDURAL; INTRACAUDAL; PERINEURAL
Status: DISPENSED
Start: 2025-08-07

## (undated) RX ORDER — BUPIVACAINE HYDROCHLORIDE 2.5 MG/ML
INJECTION, SOLUTION EPIDURAL; INFILTRATION; INTRACAUDAL
Status: DISPENSED
Start: 2022-07-13

## (undated) RX ORDER — PROPOFOL 10 MG/ML
INJECTION, EMULSION INTRAVENOUS
Status: DISPENSED
Start: 2022-07-13

## (undated) RX ORDER — DEXAMETHASONE SODIUM PHOSPHATE 4 MG/ML
INJECTION, SOLUTION INTRA-ARTICULAR; INTRALESIONAL; INTRAMUSCULAR; INTRAVENOUS; SOFT TISSUE
Status: DISPENSED
Start: 2022-07-13

## (undated) RX ORDER — LIDOCAINE HYDROCHLORIDE 10 MG/ML
INJECTION, SOLUTION INFILTRATION; PERINEURAL
Status: DISPENSED
Start: 2025-08-07

## (undated) RX ORDER — PROPOFOL 10 MG/ML
INJECTION, EMULSION INTRAVENOUS
Status: DISPENSED
Start: 2023-08-30

## (undated) RX ORDER — FENTANYL CITRATE 50 UG/ML
INJECTION, SOLUTION INTRAMUSCULAR; INTRAVENOUS
Status: DISPENSED
Start: 2023-08-30

## (undated) RX ORDER — MAGNESIUM SULFATE HEPTAHYDRATE 40 MG/ML
INJECTION, SOLUTION INTRAVENOUS
Status: DISPENSED
Start: 2022-07-13

## (undated) RX ORDER — LIDOCAINE HYDROCHLORIDE 10 MG/ML
INJECTION, SOLUTION EPIDURAL; INFILTRATION; INTRACAUDAL; PERINEURAL
Status: DISPENSED
Start: 2024-08-14

## (undated) RX ORDER — ONDANSETRON 2 MG/ML
INJECTION INTRAMUSCULAR; INTRAVENOUS
Status: DISPENSED
Start: 2023-08-30

## (undated) RX ORDER — GLYCOPYRROLATE 0.2 MG/ML
INJECTION, SOLUTION INTRAMUSCULAR; INTRAVENOUS
Status: DISPENSED
Start: 2022-07-13

## (undated) RX ORDER — MEPERIDINE HYDROCHLORIDE 25 MG/ML
INJECTION INTRAMUSCULAR; INTRAVENOUS; SUBCUTANEOUS
Status: DISPENSED
Start: 2022-07-13

## (undated) RX ORDER — FENTANYL CITRATE 50 UG/ML
INJECTION, SOLUTION INTRAMUSCULAR; INTRAVENOUS
Status: DISPENSED
Start: 2025-08-07

## (undated) RX ORDER — EPHEDRINE SULFATE 50 MG/ML
INJECTION, SOLUTION INTRAMUSCULAR; INTRAVENOUS; SUBCUTANEOUS
Status: DISPENSED
Start: 2022-07-13

## (undated) RX ORDER — ONDANSETRON 2 MG/ML
INJECTION INTRAMUSCULAR; INTRAVENOUS
Status: DISPENSED
Start: 2022-07-13

## (undated) RX ORDER — LIDOCAINE HYDROCHLORIDE 10 MG/ML
INJECTION, SOLUTION EPIDURAL; INFILTRATION; INTRACAUDAL; PERINEURAL
Status: DISPENSED
Start: 2024-06-10

## (undated) RX ORDER — GENTAMICIN 40 MG/ML
INJECTION, SOLUTION INTRAMUSCULAR; INTRAVENOUS
Status: DISPENSED
Start: 2023-05-08